# Patient Record
Sex: FEMALE | Race: WHITE | NOT HISPANIC OR LATINO | Employment: OTHER | URBAN - METROPOLITAN AREA
[De-identification: names, ages, dates, MRNs, and addresses within clinical notes are randomized per-mention and may not be internally consistent; named-entity substitution may affect disease eponyms.]

---

## 2017-01-09 ENCOUNTER — HOSPITAL ENCOUNTER (OUTPATIENT)
Dept: LAB | Facility: HOSPITAL | Age: 67
Setting detail: SPECIMEN
Discharge: HOME | End: 2017-01-09
Attending: NURSE PRACTITIONER | Admitting: NURSE PRACTITIONER

## 2017-01-09 LAB
ALBUMIN SERPL-MCNC: 3.7 G/DL (ref 3.5–4.8)
ALBUMIN SERPL-MCNC: 3.7 G/DL (ref 3.5–4.8)
ALBUMIN/GLOB SERPL: 1 {RATIO} (ref 1–1.7)
ALBUMIN/GLOB SERPL: 1.1 {RATIO} (ref 1–1.7)
ALP SERPL-CCNC: 37 IU/L (ref 32–91)
ALP SERPL-CCNC: 39 IU/L (ref 32–91)
ALT SERPL-CCNC: 18 IU/L (ref 14–54)
ALT SERPL-CCNC: 18 IU/L (ref 14–54)
ANION GAP SERPL CALC-SCNC: 11 MMOL/L (ref 10–20)
ANION GAP SERPL CALC-SCNC: 12 MMOL/L (ref 10–20)
AST SERPL-CCNC: 21 IU/L (ref 15–41)
AST SERPL-CCNC: 21 IU/L (ref 15–41)
BILIRUB SERPL-MCNC: 0.4 MG/DL (ref 0.3–1.2)
BILIRUB SERPL-MCNC: 0.4 MG/DL (ref 0.3–1.2)
BILIRUB UR QL STRIP: NEGATIVE MG/DL
BUN SERPL-MCNC: 10 MG/DL (ref 8–20)
BUN SERPL-MCNC: 9 MG/DL (ref 8–20)
BUN/CREAT SERPL: 11.1 (ref 5.4–26.2)
BUN/CREAT SERPL: 9 (ref 5.4–26.2)
CALCIUM SERPL-MCNC: 9.3 MG/DL (ref 8.9–10.3)
CALCIUM SERPL-MCNC: 9.3 MG/DL (ref 8.9–10.3)
CASTS URNS QL MICRO: ABNORMAL /[LPF]
CHLORIDE SERPL-SCNC: 99 MMOL/L (ref 101–111)
CHLORIDE SERPL-SCNC: 99 MMOL/L (ref 101–111)
CHOLEST SERPL-MCNC: 160 MG/DL
CHOLEST/HDLC SERPL: 3.4 {RATIO}
COLOR UR: YELLOW
CONV BACTERIA IN URINE MICRO: NEGATIVE
CONV CLARITY OF URINE: ABNORMAL
CONV CO2: 30 MMOL/L (ref 22–32)
CONV CO2: 30 MMOL/L (ref 22–32)
CONV HYALINE CASTS IN URINE MICRO: ABNORMAL /[LPF] (ref 0–5)
CONV LDL CHOLESTEROL DIRECT: 90 MG/DL (ref 0–100)
CONV MICROALBUM.,U,RANDOM: 6 MG/L
CONV PROTEIN IN URINE BY AUTOMATED TEST STRIP: NEGATIVE MG/DL
CONV SMALL ROUND CELLS: ABNORMAL /[HPF]
CONV TOTAL PROTEIN: 7 G/DL (ref 6.1–7.9)
CONV TOTAL PROTEIN: 7.4 G/DL (ref 6.1–7.9)
CONV UROBILINOGEN IN URINE BY AUTOMATED TEST STRIP: 0.2 MG/DL
CREAT 24H UR-MCNC: 73.1 MG/DL
CREAT UR-MCNC: 0.9 MG/DL (ref 0.4–1)
CREAT UR-MCNC: 1 MG/DL (ref 0.4–1)
CRP SERPL-MCNC: 0.2 MG/DL (ref 0–0.7)
DIFFERENTIAL METHOD BLD: (no result)
EOSINOPHIL # BLD AUTO: 0.2 10*3/UL (ref 0–0.3)
EOSINOPHIL # BLD AUTO: 4 % (ref 0–3)
ERYTHROCYTE [DISTWIDTH] IN BLOOD BY AUTOMATED COUNT: 15.8 % (ref 11.5–14.5)
ERYTHROCYTE [SEDIMENTATION RATE] IN BLOOD BY WESTERGREN METHOD: 20 MM/HR (ref 0–30)
GLOBULIN UR ELPH-MCNC: 3.3 G/DL (ref 2.5–3.8)
GLOBULIN UR ELPH-MCNC: 3.7 G/DL (ref 2.5–3.8)
GLUCOSE SERPL-MCNC: 170 MG/DL (ref 65–99)
GLUCOSE SERPL-MCNC: 173 MG/DL (ref 65–99)
GLUCOSE UR QL: NEGATIVE MG/DL
HCT VFR BLD AUTO: 36.7 % (ref 35–49)
HDLC SERPL-MCNC: 46 MG/DL
HGB BLD-MCNC: 12.2 G/DL (ref 12–15)
HGB UR QL STRIP: NEGATIVE
KETONES UR QL STRIP: NEGATIVE MG/DL
LDLC/HDLC SERPL: 1.9 {RATIO}
LEUKOCYTE ESTERASE UR QL STRIP: ABNORMAL
LIPID INTERPRETATION: ABNORMAL
LYMPHOCYTES # BLD AUTO: 2.1 10*3/UL (ref 0.8–4.8)
LYMPHOCYTES NFR BLD AUTO: 58 % (ref 18–42)
MCH RBC QN AUTO: 27.6 PG (ref 26–32)
MCHC RBC AUTO-ENTMCNC: 33.2 G/DL (ref 32–36)
MCV RBC AUTO: 83.1 FL (ref 80–94)
MICROALBUMIN/CREAT UR: 8.2 UG/MG
MONOCYTES # BLD AUTO: 0.5 10*3/UL (ref 0.1–1.3)
MONOCYTES NFR BLD AUTO: 13 % (ref 2–11)
NEUTROPHILS # BLD AUTO: 1 10*3/UL (ref 2.3–8.6)
NEUTROPHILS NFR BLD AUTO: 25 % (ref 50–75)
NITRITE UR QL STRIP: NEGATIVE
PATHOLOGIST REVIEW: (no result)
PATHOLOGIST REVIEW: (no result)
PH UR STRIP.AUTO: 7.5 [PH] (ref 4.5–8)
PLATELET # BLD AUTO: 284 10*3/UL (ref 150–450)
PMV BLD AUTO: 8 FL (ref 7.4–10.4)
POTASSIUM SERPL-SCNC: 4 MMOL/L (ref 3.6–5.1)
POTASSIUM SERPL-SCNC: 4 MMOL/L (ref 3.6–5.1)
RBC # BLD AUTO: 4.42 10*6/UL (ref 4–5.4)
RBC #/AREA URNS HPF: 1 /[HPF] (ref 0–3)
SODIUM SERPL-SCNC: 136 MMOL/L (ref 136–144)
SODIUM SERPL-SCNC: 137 MMOL/L (ref 136–144)
SP GR UR: 1.01 (ref 1–1.03)
SPERM URNS QL MICRO: ABNORMAL /[HPF]
SQUAMOUS SPT QL MICRO: 10 /[HPF] (ref 0–5)
TRIGL SERPL-MCNC: 165 MG/DL
UNIDENT CRYS URNS QL MICRO: ABNORMAL /[HPF]
VLDLC SERPL CALC-MCNC: 24.1 MG/DL
WBC # BLD AUTO: 3.8 10*3/UL (ref 4.5–11.5)
WBC #/AREA URNS HPF: 6 /[HPF] (ref 0–5)
YEAST SPEC QL WET PREP: ABNORMAL /[HPF]

## 2017-01-31 ENCOUNTER — HOSPITAL ENCOUNTER (OUTPATIENT)
Dept: LAB | Facility: HOSPITAL | Age: 67
Discharge: HOME | End: 2017-01-31
Attending: INTERNAL MEDICINE | Admitting: INTERNAL MEDICINE

## 2017-01-31 LAB
BASOPHILS # BLD AUTO: 0 10*3/UL (ref 0–0.2)
BASOPHILS NFR BLD AUTO: 1 % (ref 0–2)
DIFFERENTIAL METHOD BLD: (no result)
EOSINOPHIL # BLD AUTO: 0.1 10*3/UL (ref 0–0.3)
EOSINOPHIL # BLD AUTO: 4 % (ref 0–3)
ERYTHROCYTE [DISTWIDTH] IN BLOOD BY AUTOMATED COUNT: 15.7 % (ref 11.5–14.5)
HCT VFR BLD AUTO: 35.5 % (ref 35–49)
HGB BLD-MCNC: 11.8 G/DL (ref 12–15)
LYMPHOCYTES # BLD AUTO: 1.8 10*3/UL (ref 0.8–4.8)
LYMPHOCYTES NFR BLD AUTO: 45 % (ref 18–42)
MCH RBC QN AUTO: 27.8 PG (ref 26–32)
MCHC RBC AUTO-ENTMCNC: 33.2 G/DL (ref 32–36)
MCV RBC AUTO: 83.9 FL (ref 80–94)
MONOCYTES # BLD AUTO: 0.4 10*3/UL (ref 0.1–1.3)
MONOCYTES NFR BLD AUTO: 10 % (ref 2–11)
NEUTROPHILS # BLD AUTO: 1.6 10*3/UL (ref 2.3–8.6)
NEUTROPHILS NFR BLD AUTO: 40 % (ref 50–75)
NRBC BLD AUTO-RTO: 0 /100{WBCS}
NRBC/RBC NFR BLD MANUAL: 0 10*3/UL
PLATELET # BLD AUTO: 291 10*3/UL (ref 150–450)
PMV BLD AUTO: 8.6 FL (ref 7.4–10.4)
RBC # BLD AUTO: 4.23 10*6/UL (ref 4–5.4)
WBC # BLD AUTO: 3.9 10*3/UL (ref 4.5–11.5)

## 2017-02-08 ENCOUNTER — HOSPITAL ENCOUNTER (OUTPATIENT)
Dept: FAMILY MEDICINE CLINIC | Facility: CLINIC | Age: 67
Setting detail: SPECIMEN
Discharge: HOME | End: 2017-02-08
Attending: FAMILY MEDICINE | Admitting: FAMILY MEDICINE

## 2017-02-08 LAB
BILIRUB UR QL STRIP: NEGATIVE MG/DL
CASTS URNS QL MICRO: ABNORMAL /[LPF]
COLOR UR: YELLOW
CONV BACTERIA IN URINE MICRO: NEGATIVE
CONV CLARITY OF URINE: CLEAR
CONV HYALINE CASTS IN URINE MICRO: 0 /[LPF] (ref 0–5)
CONV PROTEIN IN URINE BY AUTOMATED TEST STRIP: NEGATIVE MG/DL
CONV SMALL ROUND CELLS: ABNORMAL /[HPF]
CONV UROBILINOGEN IN URINE BY AUTOMATED TEST STRIP: 0.2 MG/DL
CULTURE INDICATED?: ABNORMAL
GLUCOSE UR QL: 500 MG/DL
HGB UR QL STRIP: ABNORMAL
KETONES UR QL STRIP: NEGATIVE MG/DL
LEUKOCYTE ESTERASE UR QL STRIP: NEGATIVE
NITRITE UR QL STRIP: NEGATIVE
PH UR STRIP.AUTO: 7.5 [PH] (ref 4.5–8)
RBC #/AREA URNS HPF: 3 /[HPF] (ref 0–3)
SP GR UR: 1.01 (ref 1–1.03)
SPERM URNS QL MICRO: ABNORMAL /[HPF]
SQUAMOUS SPT QL MICRO: 1 /[HPF] (ref 0–5)
UNIDENT CRYS URNS QL MICRO: ABNORMAL /[HPF]
WBC #/AREA URNS HPF: 5 /[HPF] (ref 0–5)
YEAST SPEC QL WET PREP: ABNORMAL /[HPF]

## 2017-02-21 ENCOUNTER — HOSPITAL ENCOUNTER (OUTPATIENT)
Dept: FAMILY MEDICINE CLINIC | Facility: CLINIC | Age: 67
Setting detail: SPECIMEN
Discharge: HOME | End: 2017-02-21
Attending: FAMILY MEDICINE | Admitting: FAMILY MEDICINE

## 2017-02-21 LAB
BILIRUB UR QL STRIP: NEGATIVE MG/DL
CASTS URNS QL MICRO: ABNORMAL /[LPF]
COLOR UR: YELLOW
CONV BACTERIA IN URINE MICRO: NEGATIVE
CONV CLARITY OF URINE: CLEAR
CONV HYALINE CASTS IN URINE MICRO: 2 /[LPF] (ref 0–5)
CONV PROTEIN IN URINE BY AUTOMATED TEST STRIP: NEGATIVE MG/DL
CONV SMALL ROUND CELLS: ABNORMAL /[HPF]
CONV UROBILINOGEN IN URINE BY AUTOMATED TEST STRIP: 0.2 MG/DL
CULTURE INDICATED?: ABNORMAL
GLUCOSE UR QL: 500 MG/DL
HGB UR QL STRIP: NEGATIVE
KETONES UR QL STRIP: NEGATIVE MG/DL
LEUKOCYTE ESTERASE UR QL STRIP: NEGATIVE
NITRITE UR QL STRIP: NEGATIVE
PH UR STRIP.AUTO: 7 [PH] (ref 4.5–8)
RBC #/AREA URNS HPF: 3 /[HPF] (ref 0–3)
SP GR UR: 1.01 (ref 1–1.03)
SPERM URNS QL MICRO: ABNORMAL /[HPF]
SQUAMOUS SPT QL MICRO: 2 /[HPF] (ref 0–5)
UNIDENT CRYS URNS QL MICRO: ABNORMAL /[HPF]
WBC #/AREA URNS HPF: 1 /[HPF] (ref 0–5)
YEAST SPEC QL WET PREP: ABNORMAL /[HPF]

## 2017-03-02 ENCOUNTER — HOSPITAL ENCOUNTER (OUTPATIENT)
Dept: ONCOLOGY | Facility: CLINIC | Age: 67
Discharge: HOME | End: 2017-03-02
Attending: INTERNAL MEDICINE | Admitting: INTERNAL MEDICINE

## 2017-03-02 LAB
FERRITIN SERPL-MCNC: 11 NG/ML (ref 11–307)
FOLATE SERPL-MCNC: >24.8 NG/ML (ref 5.9–24.8)
IRON SATN MFR SERPL: 12 % (ref 15–50)
IRON SERPL-MCNC: 70 UG/DL (ref 28–170)
LDH SERPL-CCNC: 148 IU/L (ref 98–192)
MAGNESIUM UR-MCNC: 0.93 % (ref 0.5–1.5)
RETICS/RBC NFR MANUAL: 0.05 10*6/UL
TIBC SERPL-MCNC: 572 UG/DL (ref 228–428)

## 2017-03-03 LAB
ANA SER QL IA: NORMAL
HAPTOGLOB SERPL-MCNC: 85 MG/DL (ref 36–195)

## 2017-03-06 LAB
ALBUMIN SERPL-MCNC: 4.2 G/DL (ref 3.5–4.8)
ALPHA1 GLOB FLD ELPH-MCNC: 0.1 GM/DL (ref 0.1–0.4)
ALPHA2 GLOB SERPL ELPH-MCNC: 0.6 GM/DL (ref 0.5–1)
B-GLOBULIN SERPL ELPH-MCNC: 1.1 GM/DL (ref 0.7–1.4)
CONV TOTAL PROTEIN: 7.1 G/DL (ref 6.1–7.9)
GAMMA GLOB SERPL ELPH-MCNC: 1.2 GM/DL (ref 0.6–1.6)
INSULIN SERPL-ACNC: NORMAL U[IU]/ML

## 2017-03-27 ENCOUNTER — HOSPITAL ENCOUNTER (OUTPATIENT)
Dept: ONCOLOGY | Facility: CLINIC | Age: 67
Discharge: HOME | End: 2017-03-27
Attending: INTERNAL MEDICINE | Admitting: INTERNAL MEDICINE

## 2017-03-28 ENCOUNTER — HOSPITAL ENCOUNTER (OUTPATIENT)
Dept: ONCOLOGY | Facility: CLINIC | Age: 67
Discharge: HOME | End: 2017-03-28
Attending: INTERNAL MEDICINE | Admitting: INTERNAL MEDICINE

## 2017-03-29 ENCOUNTER — HOSPITAL ENCOUNTER (OUTPATIENT)
Dept: ONCOLOGY | Facility: CLINIC | Age: 67
Discharge: HOME | End: 2017-03-29
Attending: INTERNAL MEDICINE | Admitting: INTERNAL MEDICINE

## 2017-04-26 ENCOUNTER — OFFICE (AMBULATORY)
Dept: URBAN - METROPOLITAN AREA CLINIC 64 | Facility: CLINIC | Age: 67
End: 2017-04-26

## 2017-04-26 VITALS
DIASTOLIC BLOOD PRESSURE: 77 MMHG | SYSTOLIC BLOOD PRESSURE: 151 MMHG | WEIGHT: 178 LBS | HEIGHT: 64 IN | HEART RATE: 62 BPM

## 2017-04-26 DIAGNOSIS — D50.9 IRON DEFICIENCY ANEMIA, UNSPECIFIED: ICD-10-CM

## 2017-04-26 DIAGNOSIS — R10.13 EPIGASTRIC PAIN: ICD-10-CM

## 2017-04-26 DIAGNOSIS — K21.9 GASTRO-ESOPHAGEAL REFLUX DISEASE WITHOUT ESOPHAGITIS: ICD-10-CM

## 2017-04-26 DIAGNOSIS — K59.00 CONSTIPATION, UNSPECIFIED: ICD-10-CM

## 2017-04-26 PROCEDURE — 99204 OFFICE O/P NEW MOD 45 MIN: CPT | Performed by: INTERNAL MEDICINE

## 2017-04-26 RX ORDER — ESOMEPRAZOLE MAGNESIUM 40 MG/1
CAPSULE, DELAYED RELEASE ORAL
Qty: 30 | Refills: 11 | Status: COMPLETED
End: 2017-04-26

## 2017-04-26 RX ORDER — PANTOPRAZOLE SODIUM 40 MG/1
40 TABLET, DELAYED RELEASE ORAL
Qty: 30 | Refills: 11 | Status: COMPLETED
Start: 2017-04-26 | End: 2017-11-15

## 2017-04-28 ENCOUNTER — HOSPITAL ENCOUNTER (OUTPATIENT)
Dept: ONCOLOGY | Facility: CLINIC | Age: 67
Discharge: HOME | End: 2017-04-28
Attending: INTERNAL MEDICINE | Admitting: INTERNAL MEDICINE

## 2017-05-01 ENCOUNTER — HOSPITAL ENCOUNTER (OUTPATIENT)
Dept: LAB | Facility: HOSPITAL | Age: 67
Setting detail: SPECIMEN
Discharge: HOME | End: 2017-05-01
Attending: INTERNAL MEDICINE | Admitting: INTERNAL MEDICINE

## 2017-05-01 LAB
ALBUMIN SERPL-MCNC: 3.8 G/DL (ref 3.5–4.8)
ALBUMIN/GLOB SERPL: 1.1 {RATIO} (ref 1–1.7)
ALP SERPL-CCNC: 38 IU/L (ref 32–91)
ALT SERPL-CCNC: 14 IU/L (ref 14–54)
ANION GAP SERPL CALC-SCNC: 14.2 MMOL/L (ref 10–20)
AST SERPL-CCNC: 16 IU/L (ref 15–41)
BILIRUB SERPL-MCNC: 0.3 MG/DL (ref 0.3–1.2)
BUN SERPL-MCNC: 11 MG/DL (ref 8–20)
BUN/CREAT SERPL: 11 (ref 5.4–26.2)
CALCIUM SERPL-MCNC: 9.5 MG/DL (ref 8.9–10.3)
CHLORIDE SERPL-SCNC: 104 MMOL/L (ref 101–111)
CHOLEST SERPL-MCNC: 151 MG/DL
CHOLEST/HDLC SERPL: 3 {RATIO}
CONV CO2: 27 MMOL/L (ref 22–32)
CONV LDL CHOLESTEROL DIRECT: 79 MG/DL (ref 0–100)
CONV TOTAL PROTEIN: 7.3 G/DL (ref 6.1–7.9)
CREAT UR-MCNC: 1 MG/DL (ref 0.4–1)
GLOBULIN UR ELPH-MCNC: 3.5 G/DL (ref 2.5–3.8)
GLUCOSE SERPL-MCNC: 146 MG/DL (ref 65–99)
HDLC SERPL-MCNC: 50 MG/DL
LDLC/HDLC SERPL: 1.6 {RATIO}
LIPID INTERPRETATION: ABNORMAL
POTASSIUM SERPL-SCNC: 4.2 MMOL/L (ref 3.6–5.1)
SODIUM SERPL-SCNC: 141 MMOL/L (ref 136–144)
TRIGL SERPL-MCNC: 137 MG/DL
VLDLC SERPL CALC-MCNC: 22 MG/DL

## 2017-06-28 ENCOUNTER — HOSPITAL ENCOUNTER (OUTPATIENT)
Dept: ONCOLOGY | Facility: CLINIC | Age: 67
Discharge: HOME | End: 2017-06-28
Attending: INTERNAL MEDICINE | Admitting: INTERNAL MEDICINE

## 2017-07-11 ENCOUNTER — ON CAMPUS - OUTPATIENT (AMBULATORY)
Dept: URBAN - METROPOLITAN AREA HOSPITAL 77 | Facility: HOSPITAL | Age: 67
End: 2017-07-11

## 2017-07-11 DIAGNOSIS — Z12.11 ENCOUNTER FOR SCREENING FOR MALIGNANT NEOPLASM OF COLON: ICD-10-CM

## 2017-07-11 DIAGNOSIS — K62.1 RECTAL POLYP: ICD-10-CM

## 2017-07-11 DIAGNOSIS — D50.9 IRON DEFICIENCY ANEMIA, UNSPECIFIED: ICD-10-CM

## 2017-07-11 DIAGNOSIS — D12.3 BENIGN NEOPLASM OF TRANSVERSE COLON: ICD-10-CM

## 2017-07-11 DIAGNOSIS — K57.30 DIVERTICULOSIS OF LARGE INTESTINE WITHOUT PERFORATION OR ABS: ICD-10-CM

## 2017-07-11 PROCEDURE — 43239 EGD BIOPSY SINGLE/MULTIPLE: CPT | Performed by: INTERNAL MEDICINE

## 2017-07-11 PROCEDURE — 45385 COLONOSCOPY W/LESION REMOVAL: CPT | Mod: 33 | Performed by: INTERNAL MEDICINE

## 2017-07-26 ENCOUNTER — HOSPITAL ENCOUNTER (OUTPATIENT)
Dept: ONCOLOGY | Facility: CLINIC | Age: 67
Discharge: HOME | End: 2017-07-26
Attending: INTERNAL MEDICINE | Admitting: INTERNAL MEDICINE

## 2017-07-27 ENCOUNTER — HOSPITAL ENCOUNTER (OUTPATIENT)
Dept: LAB | Facility: HOSPITAL | Age: 67
Setting detail: SPECIMEN
Discharge: HOME | End: 2017-07-27
Attending: NURSE PRACTITIONER | Admitting: NURSE PRACTITIONER

## 2017-07-27 LAB
ALBUMIN SERPL-MCNC: 3.8 G/DL (ref 3.5–4.8)
ALBUMIN/GLOB SERPL: 1.2 {RATIO} (ref 1–1.7)
ALP SERPL-CCNC: 37 IU/L (ref 32–91)
ALT SERPL-CCNC: 18 IU/L (ref 14–54)
ANION GAP SERPL CALC-SCNC: 13.3 MMOL/L (ref 10–20)
AST SERPL-CCNC: 18 IU/L (ref 15–41)
BILIRUB SERPL-MCNC: 0.5 MG/DL (ref 0.3–1.2)
BUN SERPL-MCNC: 15 MG/DL (ref 8–20)
BUN/CREAT SERPL: 15 (ref 5.4–26.2)
CALCIUM SERPL-MCNC: 9.4 MG/DL (ref 8.9–10.3)
CHLORIDE SERPL-SCNC: 98 MMOL/L (ref 101–111)
CHOLEST SERPL-MCNC: 160 MG/DL
CHOLEST/HDLC SERPL: 4 {RATIO}
CONV CO2: 28 MMOL/L (ref 22–32)
CONV LDL CHOLESTEROL DIRECT: 95 MG/DL (ref 0–100)
CONV MICROALBUM.,U,RANDOM: <2 MG/L
CONV TOTAL PROTEIN: 7 G/DL (ref 6.1–7.9)
CREAT 24H UR-MCNC: 47.3 MG/DL
CREAT UR-MCNC: 1 MG/DL (ref 0.4–1)
GLOBULIN UR ELPH-MCNC: 3.2 G/DL (ref 2.5–3.8)
GLUCOSE SERPL-MCNC: 123 MG/DL (ref 65–99)
HDLC SERPL-MCNC: 40 MG/DL
LDLC/HDLC SERPL: 2.4 {RATIO}
LIPID INTERPRETATION: ABNORMAL
MICROALBUMIN/CREAT UR: NORMAL UG/MG
POTASSIUM SERPL-SCNC: 4.3 MMOL/L (ref 3.6–5.1)
SODIUM SERPL-SCNC: 135 MMOL/L (ref 136–144)
TRIGL SERPL-MCNC: 176 MG/DL
VLDLC SERPL CALC-MCNC: 25 MG/DL

## 2017-08-23 ENCOUNTER — OFFICE (AMBULATORY)
Dept: URBAN - METROPOLITAN AREA CLINIC 64 | Facility: CLINIC | Age: 67
End: 2017-08-23

## 2017-08-23 ENCOUNTER — HOSPITAL ENCOUNTER (OUTPATIENT)
Dept: ONCOLOGY | Facility: HOSPITAL | Age: 67
Discharge: HOME | End: 2017-08-23
Attending: INTERNAL MEDICINE | Admitting: INTERNAL MEDICINE

## 2017-08-23 VITALS
HEIGHT: 64 IN | DIASTOLIC BLOOD PRESSURE: 79 MMHG | HEART RATE: 64 BPM | WEIGHT: 177 LBS | SYSTOLIC BLOOD PRESSURE: 143 MMHG

## 2017-08-23 DIAGNOSIS — K21.9 GASTRO-ESOPHAGEAL REFLUX DISEASE WITHOUT ESOPHAGITIS: ICD-10-CM

## 2017-08-23 DIAGNOSIS — D50.9 IRON DEFICIENCY ANEMIA, UNSPECIFIED: ICD-10-CM

## 2017-08-23 PROCEDURE — 99213 OFFICE O/P EST LOW 20 MIN: CPT | Performed by: INTERNAL MEDICINE

## 2017-08-23 RX ORDER — LANSOPRAZOLE 30 MG/1
CAPSULE, DELAYED RELEASE PELLETS ORAL
Qty: 90 | Refills: 1 | Status: ACTIVE

## 2017-09-27 ENCOUNTER — CLINICAL SUPPORT (OUTPATIENT)
Dept: ONCOLOGY | Facility: HOSPITAL | Age: 67
End: 2017-09-27

## 2017-09-27 ENCOUNTER — HOSPITAL ENCOUNTER (OUTPATIENT)
Dept: ONCOLOGY | Facility: CLINIC | Age: 67
Setting detail: INFUSION SERIES
Discharge: HOME | End: 2017-09-27
Attending: INTERNAL MEDICINE | Admitting: INTERNAL MEDICINE

## 2017-09-27 ENCOUNTER — HOSPITAL ENCOUNTER (OUTPATIENT)
Dept: ONCOLOGY | Facility: HOSPITAL | Age: 67
Discharge: HOME | End: 2017-09-27
Attending: INTERNAL MEDICINE | Admitting: INTERNAL MEDICINE

## 2017-09-27 NOTE — PROGRESS NOTES
PATIENTS ONCOLOGY RECORD LOCATED IN [facility]      Subjective     Name:  NANCI FISH     Date:  2017  Address:  [address] [place] [address] [place] [facility]  Home: [phone]  :  1950 AGE:  67 y.o.        RECORDS OBTAINED:  Patients Oncology Record is located in [facility]

## 2017-10-25 ENCOUNTER — HOSPITAL ENCOUNTER (OUTPATIENT)
Dept: ONCOLOGY | Facility: HOSPITAL | Age: 67
Discharge: HOME | End: 2017-10-25
Attending: INTERNAL MEDICINE | Admitting: INTERNAL MEDICINE

## 2017-10-25 ENCOUNTER — HOSPITAL ENCOUNTER (OUTPATIENT)
Dept: LAB | Facility: HOSPITAL | Age: 67
Setting detail: SPECIMEN
Discharge: HOME | End: 2017-10-25
Attending: INTERNAL MEDICINE | Admitting: INTERNAL MEDICINE

## 2017-10-25 LAB
ALBUMIN SERPL-MCNC: 3.9 G/DL (ref 3.5–4.8)
ALBUMIN SERPL-MCNC: 4 G/DL (ref 3.5–4.8)
ALBUMIN/GLOB SERPL: 1.1 {RATIO} (ref 1–1.7)
ALBUMIN/GLOB SERPL: 1.2 {RATIO} (ref 1–1.7)
ALP SERPL-CCNC: 38 IU/L (ref 32–91)
ALP SERPL-CCNC: 40 IU/L (ref 32–91)
ALT SERPL-CCNC: 17 IU/L (ref 14–54)
ALT SERPL-CCNC: 17 IU/L (ref 14–54)
ANION GAP SERPL CALC-SCNC: 11.2 MMOL/L (ref 10–20)
ANION GAP SERPL CALC-SCNC: 12.2 MMOL/L (ref 10–20)
AST SERPL-CCNC: 20 IU/L (ref 15–41)
AST SERPL-CCNC: 23 IU/L (ref 15–41)
BASOPHILS # BLD AUTO: 0 10*3/UL (ref 0–0.2)
BASOPHILS NFR BLD AUTO: 1 % (ref 0–2)
BILIRUB SERPL-MCNC: 0.3 MG/DL (ref 0.3–1.2)
BILIRUB SERPL-MCNC: 0.4 MG/DL (ref 0.3–1.2)
BILIRUB UR QL STRIP: NEGATIVE MG/DL
BUN SERPL-MCNC: 12 MG/DL (ref 8–20)
BUN SERPL-MCNC: 12 MG/DL (ref 8–20)
BUN/CREAT SERPL: 13.3 (ref 5.4–26.2)
BUN/CREAT SERPL: 13.3 (ref 5.4–26.2)
CALCIUM SERPL-MCNC: 9.7 MG/DL (ref 8.9–10.3)
CALCIUM SERPL-MCNC: 9.8 MG/DL (ref 8.9–10.3)
CASTS URNS QL MICRO: ABNORMAL /[LPF]
CHLORIDE SERPL-SCNC: 97 MMOL/L (ref 101–111)
CHLORIDE SERPL-SCNC: 98 MMOL/L (ref 101–111)
CHOLEST SERPL-MCNC: 149 MG/DL
CHOLEST/HDLC SERPL: 3 {RATIO}
COLOR UR: YELLOW
CONV BACTERIA IN URINE MICRO: NEGATIVE
CONV CLARITY OF URINE: CLEAR
CONV CO2: 29 MMOL/L (ref 22–32)
CONV CO2: 29 MMOL/L (ref 22–32)
CONV HYALINE CASTS IN URINE MICRO: 1 /[LPF] (ref 0–5)
CONV LDL CHOLESTEROL DIRECT: 86 MG/DL (ref 0–100)
CONV MICROALBUM.,U,RANDOM: 2 MG/L
CONV PROTEIN IN URINE BY AUTOMATED TEST STRIP: NEGATIVE MG/DL
CONV SMALL ROUND CELLS: ABNORMAL /[HPF]
CONV TOTAL PROTEIN: 7.1 G/DL (ref 6.1–7.9)
CONV TOTAL PROTEIN: 7.5 G/DL (ref 6.1–7.9)
CONV UROBILINOGEN IN URINE BY AUTOMATED TEST STRIP: 0.2 MG/DL
CREAT 24H UR-MCNC: 41.4 MG/DL
CREAT UR-MCNC: 0.9 MG/DL (ref 0.4–1)
CREAT UR-MCNC: 0.9 MG/DL (ref 0.4–1)
CRP SERPL-MCNC: 0.38 MG/DL (ref 0–0.7)
CULTURE INDICATED?: ABNORMAL
DIFFERENTIAL METHOD BLD: (no result)
EOSINOPHIL # BLD AUTO: 0.2 10*3/UL (ref 0–0.3)
EOSINOPHIL # BLD AUTO: 4 % (ref 0–3)
ERYTHROCYTE [DISTWIDTH] IN BLOOD BY AUTOMATED COUNT: 14 % (ref 11.5–14.5)
ERYTHROCYTE [SEDIMENTATION RATE] IN BLOOD BY WESTERGREN METHOD: 24 MM/HR (ref 0–30)
GLOBULIN UR ELPH-MCNC: 3.2 G/DL (ref 2.5–3.8)
GLOBULIN UR ELPH-MCNC: 3.5 G/DL (ref 2.5–3.8)
GLUCOSE SERPL-MCNC: 113 MG/DL (ref 65–99)
GLUCOSE SERPL-MCNC: 113 MG/DL (ref 65–99)
GLUCOSE UR QL: NEGATIVE MG/DL
HCT VFR BLD AUTO: 40.1 % (ref 35–49)
HDLC SERPL-MCNC: 49 MG/DL
HGB BLD-MCNC: 13.3 G/DL (ref 12–15)
HGB UR QL STRIP: NEGATIVE
KETONES UR QL STRIP: NEGATIVE MG/DL
LDLC/HDLC SERPL: 1.8 {RATIO}
LEUKOCYTE ESTERASE UR QL STRIP: ABNORMAL
LIPID INTERPRETATION: NORMAL
LYMPHOCYTES # BLD AUTO: 2.2 10*3/UL (ref 0.8–4.8)
LYMPHOCYTES NFR BLD AUTO: 48 % (ref 18–42)
MCH RBC QN AUTO: 29.9 PG (ref 26–32)
MCHC RBC AUTO-ENTMCNC: 33.3 G/DL (ref 32–36)
MCV RBC AUTO: 89.6 FL (ref 80–94)
MICROALBUMIN/CREAT UR: 4.8 UG/MG
MONOCYTES # BLD AUTO: 0.4 10*3/UL (ref 0.1–1.3)
MONOCYTES NFR BLD AUTO: 8 % (ref 2–11)
NEUTROPHILS # BLD AUTO: 1.8 10*3/UL (ref 2.3–8.6)
NEUTROPHILS NFR BLD AUTO: 39 % (ref 50–75)
NITRITE UR QL STRIP: NEGATIVE
NRBC BLD AUTO-RTO: 0 /100{WBCS}
NRBC/RBC NFR BLD MANUAL: 0 10*3/UL
PH UR STRIP.AUTO: 7 [PH] (ref 4.5–8)
PLATELET # BLD AUTO: 321 10*3/UL (ref 150–450)
PMV BLD AUTO: 7.8 FL (ref 7.4–10.4)
POTASSIUM SERPL-SCNC: 4.2 MMOL/L (ref 3.6–5.1)
POTASSIUM SERPL-SCNC: 4.2 MMOL/L (ref 3.6–5.1)
RBC # BLD AUTO: 4.47 10*6/UL (ref 4–5.4)
RBC #/AREA URNS HPF: 1 /[HPF] (ref 0–3)
SODIUM SERPL-SCNC: 134 MMOL/L (ref 136–144)
SODIUM SERPL-SCNC: 134 MMOL/L (ref 136–144)
SP GR UR: 1.01 (ref 1–1.03)
SPERM URNS QL MICRO: ABNORMAL /[HPF]
SQUAMOUS SPT QL MICRO: 2 /[HPF] (ref 0–5)
TRIGL SERPL-MCNC: 134 MG/DL
UNIDENT CRYS URNS QL MICRO: ABNORMAL /[HPF]
VLDLC SERPL CALC-MCNC: 13.7 MG/DL
WBC # BLD AUTO: 4.6 10*3/UL (ref 4.5–11.5)
WBC #/AREA URNS HPF: 3 /[HPF] (ref 0–5)
YEAST SPEC QL WET PREP: ABNORMAL /[HPF]

## 2017-11-15 ENCOUNTER — OFFICE (AMBULATORY)
Dept: URBAN - METROPOLITAN AREA CLINIC 64 | Facility: CLINIC | Age: 67
End: 2017-11-15

## 2017-11-15 VITALS
HEART RATE: 62 BPM | HEIGHT: 64 IN | SYSTOLIC BLOOD PRESSURE: 127 MMHG | WEIGHT: 180 LBS | DIASTOLIC BLOOD PRESSURE: 69 MMHG

## 2017-11-15 DIAGNOSIS — K21.9 GASTRO-ESOPHAGEAL REFLUX DISEASE WITHOUT ESOPHAGITIS: ICD-10-CM

## 2017-11-15 DIAGNOSIS — K59.00 CONSTIPATION, UNSPECIFIED: ICD-10-CM

## 2017-11-15 DIAGNOSIS — R10.13 EPIGASTRIC PAIN: ICD-10-CM

## 2017-11-15 LAB
AMYLASE, SERUM: 36 U/L (ref 31–124)
LIPASE, SERUM: 32 U/L (ref 14–72)

## 2017-11-15 PROCEDURE — 99214 OFFICE O/P EST MOD 30 MIN: CPT | Performed by: INTERNAL MEDICINE

## 2017-11-22 ENCOUNTER — HOSPITAL ENCOUNTER (OUTPATIENT)
Dept: RHEUMATOLOGY | Facility: CLINIC | Age: 67
Discharge: HOME | End: 2017-11-22
Attending: INTERNAL MEDICINE | Admitting: INTERNAL MEDICINE

## 2018-01-10 ENCOUNTER — OFFICE (AMBULATORY)
Dept: URBAN - METROPOLITAN AREA CLINIC 64 | Facility: CLINIC | Age: 68
End: 2018-01-10

## 2018-01-10 VITALS
SYSTOLIC BLOOD PRESSURE: 128 MMHG | HEIGHT: 64 IN | WEIGHT: 177 LBS | DIASTOLIC BLOOD PRESSURE: 72 MMHG | HEART RATE: 61 BPM

## 2018-01-10 DIAGNOSIS — K21.9 GASTRO-ESOPHAGEAL REFLUX DISEASE WITHOUT ESOPHAGITIS: ICD-10-CM

## 2018-01-10 DIAGNOSIS — R10.13 EPIGASTRIC PAIN: ICD-10-CM

## 2018-01-10 PROCEDURE — 99213 OFFICE O/P EST LOW 20 MIN: CPT | Performed by: INTERNAL MEDICINE

## 2018-02-01 ENCOUNTER — HOSPITAL ENCOUNTER (OUTPATIENT)
Dept: LAB | Facility: HOSPITAL | Age: 68
Setting detail: SPECIMEN
Discharge: HOME | End: 2018-02-01
Attending: NURSE PRACTITIONER | Admitting: NURSE PRACTITIONER

## 2018-02-01 LAB
ALBUMIN SERPL-MCNC: 3.9 G/DL (ref 3.5–4.8)
ALBUMIN/GLOB SERPL: 1.1 {RATIO} (ref 1–1.7)
ALP SERPL-CCNC: 38 IU/L (ref 32–91)
ALT SERPL-CCNC: 16 IU/L (ref 14–54)
ANION GAP SERPL CALC-SCNC: 10.3 MMOL/L (ref 10–20)
AST SERPL-CCNC: 20 IU/L (ref 15–41)
BILIRUB SERPL-MCNC: 0.3 MG/DL (ref 0.3–1.2)
BUN SERPL-MCNC: 11 MG/DL (ref 8–20)
BUN/CREAT SERPL: 15.7 (ref 5.4–26.2)
CALCIUM SERPL-MCNC: 9.6 MG/DL (ref 8.9–10.3)
CHLORIDE SERPL-SCNC: 100 MMOL/L (ref 101–111)
CHOLEST SERPL-MCNC: 163 MG/DL
CHOLEST/HDLC SERPL: 3.8 {RATIO}
CONV CO2: 30 MMOL/L (ref 22–32)
CONV LDL CHOLESTEROL DIRECT: 97 MG/DL (ref 0–100)
CONV MICROALBUM.,U,RANDOM: 7 MG/L
CONV TOTAL PROTEIN: 7.4 G/DL (ref 6.1–7.9)
CREAT 24H UR-MCNC: 90.1 MG/DL
CREAT UR-MCNC: 0.7 MG/DL (ref 0.4–1)
GLOBULIN UR ELPH-MCNC: 3.5 G/DL (ref 2.5–3.8)
GLUCOSE SERPL-MCNC: 169 MG/DL (ref 65–99)
HDLC SERPL-MCNC: 43 MG/DL
LDLC/HDLC SERPL: 2.3 {RATIO}
LIPID INTERPRETATION: ABNORMAL
MICROALBUMIN/CREAT UR: 7.8 UG/MG
POTASSIUM SERPL-SCNC: 4.3 MMOL/L (ref 3.6–5.1)
SODIUM SERPL-SCNC: 136 MMOL/L (ref 136–144)
TRIGL SERPL-MCNC: 180 MG/DL
VLDLC SERPL CALC-MCNC: 23.4 MG/DL

## 2018-02-22 ENCOUNTER — HOSPITAL ENCOUNTER (OUTPATIENT)
Dept: LAB | Facility: HOSPITAL | Age: 68
Discharge: HOME | End: 2018-02-22
Attending: INTERNAL MEDICINE | Admitting: INTERNAL MEDICINE

## 2018-02-22 LAB
ALBUMIN SERPL-MCNC: 3.4 G/DL (ref 3.5–4.8)
ALBUMIN/GLOB SERPL: 1.1 {RATIO} (ref 1–1.7)
ALP SERPL-CCNC: 36 IU/L (ref 32–91)
ALT SERPL-CCNC: ABNORMAL IU/L (ref 14–54)
ANION GAP SERPL CALC-SCNC: 10 MMOL/L (ref 10–20)
AST SERPL-CCNC: ABNORMAL IU/L (ref 15–41)
BASOPHILS # BLD AUTO: 0 10*3/UL (ref 0–0.2)
BASOPHILS NFR BLD AUTO: 0 % (ref 0–2)
BILIRUB SERPL-MCNC: ABNORMAL MG/DL (ref 0.3–1.2)
BILIRUB UR QL STRIP: NEGATIVE MG/DL
BUN SERPL-MCNC: 12 MG/DL (ref 8–20)
BUN/CREAT SERPL: 13.3 (ref 5.4–26.2)
CALCIUM SERPL-MCNC: 9 MG/DL (ref 8.9–10.3)
CASTS URNS QL MICRO: ABNORMAL /[LPF]
CHLORIDE SERPL-SCNC: 99 MMOL/L (ref 101–111)
COLOR UR: YELLOW
CONV BACTERIA IN URINE MICRO: NEGATIVE
CONV CLARITY OF URINE: ABNORMAL
CONV CO2: 29 MMOL/L (ref 22–32)
CONV HYALINE CASTS IN URINE MICRO: ABNORMAL /[LPF] (ref 0–5)
CONV PROTEIN IN URINE BY AUTOMATED TEST STRIP: NEGATIVE MG/DL
CONV SMALL ROUND CELLS: ABNORMAL /[HPF]
CONV TOTAL PROTEIN: 6.5 G/DL (ref 6.1–7.9)
CONV UROBILINOGEN IN URINE BY AUTOMATED TEST STRIP: 0.2 MG/DL
CREAT UR-MCNC: 0.9 MG/DL (ref 0.4–1)
CRP SERPL-MCNC: 0.53 MG/DL (ref 0–0.7)
CULTURE INDICATED?: ABNORMAL
DIFFERENTIAL METHOD BLD: (no result)
EOSINOPHIL # BLD AUTO: 0.2 10*3/UL (ref 0–0.3)
EOSINOPHIL # BLD AUTO: 4 % (ref 0–3)
ERYTHROCYTE [DISTWIDTH] IN BLOOD BY AUTOMATED COUNT: 13.6 % (ref 11.5–14.5)
ERYTHROCYTE [SEDIMENTATION RATE] IN BLOOD BY WESTERGREN METHOD: 17 MM/HR (ref 0–30)
GLOBULIN UR ELPH-MCNC: 3.1 G/DL (ref 2.5–3.8)
GLUCOSE SERPL-MCNC: 316 MG/DL (ref 65–99)
GLUCOSE UR QL: >1000 MG/DL
HCT VFR BLD AUTO: 39.8 % (ref 35–49)
HGB BLD-MCNC: 13.4 G/DL (ref 12–15)
HGB UR QL STRIP: NEGATIVE
KETONES UR QL STRIP: NEGATIVE MG/DL
LEUKOCYTE ESTERASE UR QL STRIP: ABNORMAL
LYMPHOCYTES # BLD AUTO: 1.7 10*3/UL (ref 0.8–4.8)
LYMPHOCYTES NFR BLD AUTO: 36 % (ref 18–42)
MCH RBC QN AUTO: 29.6 PG (ref 26–32)
MCHC RBC AUTO-ENTMCNC: 33.6 G/DL (ref 32–36)
MCV RBC AUTO: 87.9 FL (ref 80–94)
MONOCYTES # BLD AUTO: 0.3 10*3/UL (ref 0.1–1.3)
MONOCYTES NFR BLD AUTO: 7 % (ref 2–11)
NEUTROPHILS # BLD AUTO: 2.5 10*3/UL (ref 2.3–8.6)
NEUTROPHILS NFR BLD AUTO: 53 % (ref 50–75)
NITRITE UR QL STRIP: NEGATIVE
NRBC BLD AUTO-RTO: 0 /100{WBCS}
NRBC/RBC NFR BLD MANUAL: 0 10*3/UL
PH UR STRIP.AUTO: 6.5 [PH] (ref 4.5–8)
PLATELET # BLD AUTO: 276 10*3/UL (ref 150–450)
PMV BLD AUTO: 8 FL (ref 7.4–10.4)
POTASSIUM SERPL-SCNC: ABNORMAL MMOL/L (ref 3.6–5.1)
RBC # BLD AUTO: 4.52 10*6/UL (ref 4–5.4)
RBC #/AREA URNS HPF: 1 /[HPF] (ref 0–3)
SODIUM SERPL-SCNC: 134 MMOL/L (ref 136–144)
SP GR UR: 1.02 (ref 1–1.03)
SPERM URNS QL MICRO: ABNORMAL /[HPF]
SQUAMOUS SPT QL MICRO: 4 /[HPF] (ref 0–5)
UNIDENT CRYS URNS QL MICRO: ABNORMAL /[HPF]
WBC # BLD AUTO: 4.7 10*3/UL (ref 4.5–11.5)
WBC #/AREA URNS HPF: 5 /[HPF] (ref 0–5)
YEAST SPEC QL WET PREP: ABNORMAL /[HPF]

## 2018-02-22 PROCEDURE — 86481 TB AG RESPONSE T-CELL SUSP: CPT

## 2018-02-23 ENCOUNTER — LAB REQUISITION (OUTPATIENT)
Dept: LAB | Facility: HOSPITAL | Age: 68
End: 2018-02-23

## 2018-02-23 DIAGNOSIS — Z00.00 ROUTINE GENERAL MEDICAL EXAMINATION AT A HEALTH CARE FACILITY: ICD-10-CM

## 2018-02-25 LAB
TSPOT INTERPRETATION: NEGATIVE
TSPOT NIL CONTROL: 0
TSPOT PANEL A: 0
TSPOT PANEL B: 0
TSPOT POS CONTROL: 138

## 2018-02-26 LAB — TSPOT INTERPRETATION: NORMAL

## 2018-04-18 ENCOUNTER — HOSPITAL ENCOUNTER (OUTPATIENT)
Dept: LAB | Facility: HOSPITAL | Age: 68
Discharge: HOME | End: 2018-04-18
Attending: INTERNAL MEDICINE | Admitting: INTERNAL MEDICINE

## 2018-04-18 LAB
ANION GAP SERPL CALC-SCNC: 11.1 MMOL/L (ref 10–20)
BUN SERPL-MCNC: 12 MG/DL (ref 8–20)
BUN/CREAT SERPL: 12 (ref 5.4–26.2)
CALCIUM SERPL-MCNC: 9.8 MG/DL (ref 8.9–10.3)
CHLORIDE SERPL-SCNC: 101 MMOL/L (ref 101–111)
CONV CO2: 29 MMOL/L (ref 22–32)
CREAT UR-MCNC: 1 MG/DL (ref 0.4–1)
GLUCOSE SERPL-MCNC: 139 MG/DL (ref 65–99)
POTASSIUM SERPL-SCNC: 4.1 MMOL/L (ref 3.6–5.1)
SODIUM SERPL-SCNC: 137 MMOL/L (ref 136–144)

## 2018-05-03 ENCOUNTER — HOSPITAL ENCOUNTER (OUTPATIENT)
Dept: ORTHOPEDIC SURGERY | Facility: CLINIC | Age: 68
Discharge: HOME | End: 2018-05-03
Attending: PODIATRIST | Admitting: PODIATRIST

## 2018-05-03 ENCOUNTER — HOSPITAL ENCOUNTER (OUTPATIENT)
Dept: LAB | Facility: HOSPITAL | Age: 68
Setting detail: SPECIMEN
Discharge: HOME | End: 2018-05-03
Attending: NURSE PRACTITIONER | Admitting: NURSE PRACTITIONER

## 2018-05-03 LAB
ALBUMIN SERPL-MCNC: 3.9 G/DL (ref 3.5–4.8)
ALBUMIN/GLOB SERPL: 1.1 {RATIO} (ref 1–1.7)
ALP SERPL-CCNC: 42 IU/L (ref 32–91)
ALT SERPL-CCNC: 19 IU/L (ref 14–54)
ANION GAP SERPL CALC-SCNC: 11 MMOL/L (ref 10–20)
AST SERPL-CCNC: 20 IU/L (ref 15–41)
BILIRUB SERPL-MCNC: 0.3 MG/DL (ref 0.3–1.2)
BUN SERPL-MCNC: 11 MG/DL (ref 8–20)
BUN/CREAT SERPL: 11 (ref 5.4–26.2)
CALCIUM SERPL-MCNC: 9.9 MG/DL (ref 8.9–10.3)
CHLORIDE SERPL-SCNC: 103 MMOL/L (ref 101–111)
CHOLEST SERPL-MCNC: 154 MG/DL
CHOLEST/HDLC SERPL: 3.7 {RATIO}
CONV CO2: 30 MMOL/L (ref 22–32)
CONV LDL CHOLESTEROL DIRECT: 89 MG/DL (ref 0–100)
CONV TOTAL PROTEIN: 7.5 G/DL (ref 6.1–7.9)
CREAT UR-MCNC: 1 MG/DL (ref 0.4–1)
GLOBULIN UR ELPH-MCNC: 3.6 G/DL (ref 2.5–3.8)
GLUCOSE SERPL-MCNC: 101 MG/DL (ref 65–99)
HDLC SERPL-MCNC: 42 MG/DL
LDLC/HDLC SERPL: 2.1 {RATIO}
LIPID INTERPRETATION: ABNORMAL
POTASSIUM SERPL-SCNC: 4 MMOL/L (ref 3.6–5.1)
SODIUM SERPL-SCNC: 140 MMOL/L (ref 136–144)
TRIGL SERPL-MCNC: 167 MG/DL
VLDLC SERPL CALC-MCNC: 23.4 MG/DL

## 2018-07-03 ENCOUNTER — HOSPITAL ENCOUNTER (OUTPATIENT)
Dept: LAB | Facility: HOSPITAL | Age: 68
Discharge: HOME | End: 2018-07-03
Attending: INTERNAL MEDICINE | Admitting: INTERNAL MEDICINE

## 2018-07-03 LAB
ALBUMIN SERPL-MCNC: 3.9 G/DL (ref 3.5–4.8)
ALBUMIN/GLOB SERPL: 1.1 {RATIO} (ref 1–1.7)
ALP SERPL-CCNC: 40 IU/L (ref 32–91)
ALT SERPL-CCNC: 16 IU/L (ref 14–54)
ANION GAP SERPL CALC-SCNC: 11 MMOL/L (ref 10–20)
AST SERPL-CCNC: 18 IU/L (ref 15–41)
BASOPHILS # BLD AUTO: 0 10*3/UL (ref 0–0.2)
BASOPHILS NFR BLD AUTO: 1 % (ref 0–2)
BILIRUB SERPL-MCNC: 0.6 MG/DL (ref 0.3–1.2)
BILIRUB UR QL STRIP: NEGATIVE MG/DL
BUN SERPL-MCNC: 14 MG/DL (ref 8–20)
BUN/CREAT SERPL: 15.6 (ref 5.4–26.2)
CALCIUM SERPL-MCNC: 9.5 MG/DL (ref 8.9–10.3)
CASTS URNS QL MICRO: NORMAL /[LPF]
CHLORIDE SERPL-SCNC: 101 MMOL/L (ref 101–111)
COLOR UR: YELLOW
CONV BACTERIA IN URINE MICRO: NEGATIVE
CONV CLARITY OF URINE: CLEAR
CONV CO2: 28 MMOL/L (ref 22–32)
CONV HYALINE CASTS IN URINE MICRO: 3 /[LPF] (ref 0–5)
CONV PROTEIN IN URINE BY AUTOMATED TEST STRIP: NEGATIVE MG/DL
CONV SMALL ROUND CELLS: NORMAL /[HPF]
CONV TOTAL PROTEIN: 7.5 G/DL (ref 6.1–7.9)
CONV UROBILINOGEN IN URINE BY AUTOMATED TEST STRIP: 1 MG/DL
CREAT UR-MCNC: 0.9 MG/DL (ref 0.4–1)
CRP SERPL-MCNC: 0.18 MG/DL (ref 0–0.7)
CULTURE INDICATED?: NORMAL
DIFFERENTIAL METHOD BLD: (no result)
EOSINOPHIL # BLD AUTO: 0.2 10*3/UL (ref 0–0.3)
EOSINOPHIL # BLD AUTO: 4 % (ref 0–3)
ERYTHROCYTE [DISTWIDTH] IN BLOOD BY AUTOMATED COUNT: 12.5 % (ref 11.5–14.5)
ERYTHROCYTE [SEDIMENTATION RATE] IN BLOOD BY WESTERGREN METHOD: 15 MM/HR (ref 0–30)
GLOBULIN UR ELPH-MCNC: 3.6 G/DL (ref 2.5–3.8)
GLUCOSE SERPL-MCNC: 172 MG/DL (ref 65–99)
GLUCOSE UR QL: NEGATIVE MG/DL
HCT VFR BLD AUTO: 41.1 % (ref 35–49)
HGB BLD-MCNC: 13.7 G/DL (ref 12–15)
HGB UR QL STRIP: NEGATIVE
KETONES UR QL STRIP: NEGATIVE MG/DL
LEUKOCYTE ESTERASE UR QL STRIP: NEGATIVE
LYMPHOCYTES # BLD AUTO: 2.1 10*3/UL (ref 0.8–4.8)
LYMPHOCYTES NFR BLD AUTO: 42 % (ref 18–42)
MCH RBC QN AUTO: 29.1 PG (ref 26–32)
MCHC RBC AUTO-ENTMCNC: 33.2 G/DL (ref 32–36)
MCV RBC AUTO: 87.5 FL (ref 80–94)
MONOCYTES # BLD AUTO: 0.4 10*3/UL (ref 0.1–1.3)
MONOCYTES NFR BLD AUTO: 9 % (ref 2–11)
NEUTROPHILS # BLD AUTO: 2.2 10*3/UL (ref 2.3–8.6)
NEUTROPHILS NFR BLD AUTO: 44 % (ref 50–75)
NITRITE UR QL STRIP: NEGATIVE
NRBC BLD AUTO-RTO: 0 /100{WBCS}
NRBC/RBC NFR BLD MANUAL: 0 10*3/UL
PH UR STRIP.AUTO: 7.5 [PH] (ref 4.5–8)
PLATELET # BLD AUTO: 309 10*3/UL (ref 150–450)
PMV BLD AUTO: 7.8 FL (ref 7.4–10.4)
POTASSIUM SERPL-SCNC: 4 MMOL/L (ref 3.6–5.1)
RBC # BLD AUTO: 4.69 10*6/UL (ref 4–5.4)
RBC #/AREA URNS HPF: 1 /[HPF] (ref 0–3)
SODIUM SERPL-SCNC: 136 MMOL/L (ref 136–144)
SP GR UR: 1.02 (ref 1–1.03)
SPERM URNS QL MICRO: NORMAL /[HPF]
SQUAMOUS SPT QL MICRO: 1 /[HPF] (ref 0–5)
UNIDENT CRYS URNS QL MICRO: NORMAL /[HPF]
WBC # BLD AUTO: 4.9 10*3/UL (ref 4.5–11.5)
WBC #/AREA URNS HPF: 1 /[HPF] (ref 0–5)
YEAST SPEC QL WET PREP: NORMAL /[HPF]

## 2018-07-16 ENCOUNTER — HOSPITAL ENCOUNTER (OUTPATIENT)
Dept: LAB | Facility: HOSPITAL | Age: 68
Discharge: HOME | End: 2018-07-16
Attending: NURSE PRACTITIONER | Admitting: NURSE PRACTITIONER

## 2018-07-16 LAB — HAV IGM SERPL QL IA: NONREACTIVE

## 2018-10-02 ENCOUNTER — HOSPITAL ENCOUNTER (OUTPATIENT)
Dept: FAMILY MEDICINE CLINIC | Facility: CLINIC | Age: 68
Discharge: HOME | End: 2018-10-02
Attending: PHYSICIAN ASSISTANT | Admitting: PHYSICIAN ASSISTANT

## 2018-10-31 ENCOUNTER — HOSPITAL ENCOUNTER (OUTPATIENT)
Dept: LAB | Facility: HOSPITAL | Age: 68
Setting detail: SPECIMEN
Discharge: HOME | End: 2018-10-31
Attending: NURSE PRACTITIONER | Admitting: NURSE PRACTITIONER

## 2018-10-31 LAB
ALBUMIN SERPL-MCNC: 3.6 G/DL (ref 3.5–4.8)
ALBUMIN/GLOB SERPL: 1 {RATIO} (ref 1–1.7)
ALP SERPL-CCNC: 43 IU/L (ref 32–91)
ALT SERPL-CCNC: 18 IU/L (ref 14–54)
ANION GAP SERPL CALC-SCNC: 12 MMOL/L (ref 10–20)
AST SERPL-CCNC: 20 IU/L (ref 15–41)
BASOPHILS # BLD AUTO: 0.1 10*3/UL (ref 0–0.2)
BASOPHILS NFR BLD AUTO: 1 % (ref 0–2)
BILIRUB SERPL-MCNC: 0.5 MG/DL (ref 0.3–1.2)
BUN SERPL-MCNC: 10 MG/DL (ref 8–20)
BUN/CREAT SERPL: 11.1 (ref 5.4–26.2)
CALCIUM SERPL-MCNC: 9.5 MG/DL (ref 8.9–10.3)
CHLORIDE SERPL-SCNC: 103 MMOL/L (ref 101–111)
CHOLEST SERPL-MCNC: 163 MG/DL
CHOLEST/HDLC SERPL: 3.9 {RATIO}
CONV CO2: 29 MMOL/L (ref 22–32)
CONV LDL CHOLESTEROL DIRECT: 105 MG/DL (ref 0–100)
CONV TOTAL PROTEIN: 7.1 G/DL (ref 6.1–7.9)
CREAT UR-MCNC: 0.9 MG/DL (ref 0.4–1)
CRP SERPL-MCNC: 0.34 MG/DL (ref 0–0.7)
DIFFERENTIAL METHOD BLD: (no result)
EOSINOPHIL # BLD AUTO: 0.3 10*3/UL (ref 0–0.3)
EOSINOPHIL # BLD AUTO: 5 % (ref 0–3)
ERYTHROCYTE [DISTWIDTH] IN BLOOD BY AUTOMATED COUNT: 12.9 % (ref 11.5–14.5)
ERYTHROCYTE [SEDIMENTATION RATE] IN BLOOD BY WESTERGREN METHOD: 13 MM/HR (ref 0–30)
GLOBULIN UR ELPH-MCNC: 3.5 G/DL (ref 2.5–3.8)
GLUCOSE SERPL-MCNC: 136 MG/DL (ref 65–99)
HCT VFR BLD AUTO: 38.7 % (ref 35–49)
HDLC SERPL-MCNC: 41 MG/DL
HGB BLD-MCNC: 13.1 G/DL (ref 12–15)
LDLC/HDLC SERPL: 2.5 {RATIO}
LIPID INTERPRETATION: ABNORMAL
LYMPHOCYTES # BLD AUTO: 2.9 10*3/UL (ref 0.8–4.8)
LYMPHOCYTES NFR BLD AUTO: 51 % (ref 18–42)
MCH RBC QN AUTO: 29.5 PG (ref 26–32)
MCHC RBC AUTO-ENTMCNC: 33.8 G/DL (ref 32–36)
MCV RBC AUTO: 87.2 FL (ref 80–94)
MONOCYTES # BLD AUTO: 0.4 10*3/UL (ref 0.1–1.3)
MONOCYTES NFR BLD AUTO: 7 % (ref 2–11)
NEUTROPHILS # BLD AUTO: 2.1 10*3/UL (ref 2.3–8.6)
NEUTROPHILS NFR BLD AUTO: 36 % (ref 50–75)
NRBC BLD AUTO-RTO: 0 /100{WBCS}
NRBC/RBC NFR BLD MANUAL: 0 10*3/UL
PLATELET # BLD AUTO: 313 10*3/UL (ref 150–450)
PMV BLD AUTO: 8.2 FL (ref 7.4–10.4)
POTASSIUM SERPL-SCNC: 4 MMOL/L (ref 3.6–5.1)
RBC # BLD AUTO: 4.44 10*6/UL (ref 4–5.4)
SODIUM SERPL-SCNC: 140 MMOL/L (ref 136–144)
TRIGL SERPL-MCNC: 186 MG/DL
VLDLC SERPL CALC-MCNC: 16.8 MG/DL
WBC # BLD AUTO: 5.7 10*3/UL (ref 4.5–11.5)

## 2018-11-12 ENCOUNTER — HOSPITAL ENCOUNTER (OUTPATIENT)
Dept: LAB | Facility: HOSPITAL | Age: 68
Discharge: HOME | End: 2018-11-12
Attending: INTERNAL MEDICINE | Admitting: INTERNAL MEDICINE

## 2018-11-12 LAB
ALBUMIN SERPL-MCNC: 3.8 G/DL (ref 3.5–4.8)
ALP SERPL-CCNC: 45 IU/L (ref 32–91)
ALT SERPL-CCNC: 18 IU/L (ref 14–54)
ANION GAP SERPL CALC-SCNC: 12.8 MMOL/L (ref 10–20)
AST SERPL-CCNC: 24 IU/L (ref 15–41)
BILIRUB DIRECT SERPL-MCNC: 0.1 MG/DL (ref 0.1–0.5)
BILIRUB SERPL-MCNC: 0.4 MG/DL (ref 0.3–1.2)
BUN SERPL-MCNC: 13 MG/DL (ref 8–20)
BUN/CREAT SERPL: 13 (ref 5.4–26.2)
CALCIUM SERPL-MCNC: 9.2 MG/DL (ref 8.9–10.3)
CHLORIDE SERPL-SCNC: 99 MMOL/L (ref 101–111)
CHOLEST SERPL-MCNC: 164 MG/DL
CHOLEST/HDLC SERPL: 3.8 {RATIO}
CK SERPL-CCNC: 49 IU/L (ref 38–234)
CONV CO2: 28 MMOL/L (ref 22–32)
CONV LDL CHOLESTEROL DIRECT: 97 MG/DL (ref 0–100)
CONV TOTAL PROTEIN: 6.9 G/DL (ref 6.1–7.9)
CREAT UR-MCNC: 1 MG/DL (ref 0.4–1)
GLUCOSE SERPL-MCNC: 172 MG/DL (ref 65–99)
HDLC SERPL-MCNC: 44 MG/DL
LDLC/HDLC SERPL: 2.2 {RATIO}
LIPID INTERPRETATION: ABNORMAL
POTASSIUM SERPL-SCNC: 3.8 MMOL/L (ref 3.6–5.1)
SODIUM SERPL-SCNC: 136 MMOL/L (ref 136–144)
TRIGL SERPL-MCNC: 200 MG/DL
VLDLC SERPL CALC-MCNC: 23.4 MG/DL

## 2019-01-29 ENCOUNTER — HOSPITAL ENCOUNTER (OUTPATIENT)
Dept: LAB | Facility: HOSPITAL | Age: 69
Setting detail: SPECIMEN
Discharge: HOME | End: 2019-01-29
Attending: NURSE PRACTITIONER | Admitting: NURSE PRACTITIONER

## 2019-01-29 LAB
ALBUMIN SERPL-MCNC: 3.6 G/DL (ref 3.5–4.8)
ALBUMIN/GLOB SERPL: 1.1 {RATIO} (ref 1–1.7)
ALP SERPL-CCNC: 41 IU/L (ref 32–91)
ALT SERPL-CCNC: 17 IU/L (ref 14–54)
ANION GAP SERPL CALC-SCNC: 12.8 MMOL/L (ref 10–20)
AST SERPL-CCNC: 19 IU/L (ref 15–41)
BILIRUB SERPL-MCNC: 0.5 MG/DL (ref 0.3–1.2)
BUN SERPL-MCNC: 12 MG/DL (ref 8–20)
BUN/CREAT SERPL: 13.3 (ref 5.4–26.2)
CALCIUM SERPL-MCNC: 9.7 MG/DL (ref 8.9–10.3)
CHLORIDE SERPL-SCNC: 98 MMOL/L (ref 101–111)
CHOLEST SERPL-MCNC: 153 MG/DL
CHOLEST/HDLC SERPL: 3.7 {RATIO}
CONV CO2: 29 MMOL/L (ref 22–32)
CONV LDL CHOLESTEROL DIRECT: 91 MG/DL (ref 0–100)
CONV TOTAL PROTEIN: 6.9 G/DL (ref 6.1–7.9)
CREAT UR-MCNC: 0.9 MG/DL (ref 0.4–1)
GLOBULIN UR ELPH-MCNC: 3.3 G/DL (ref 2.5–3.8)
GLUCOSE SERPL-MCNC: 129 MG/DL (ref 65–99)
HDLC SERPL-MCNC: 41 MG/DL
LDLC/HDLC SERPL: 2.2 {RATIO}
LIPID INTERPRETATION: ABNORMAL
POTASSIUM SERPL-SCNC: 3.8 MMOL/L (ref 3.6–5.1)
SODIUM SERPL-SCNC: 136 MMOL/L (ref 136–144)
TRIGL SERPL-MCNC: 170 MG/DL
VLDLC SERPL CALC-MCNC: 21.6 MG/DL

## 2019-03-04 ENCOUNTER — HOSPITAL ENCOUNTER (OUTPATIENT)
Dept: LAB | Facility: HOSPITAL | Age: 69
Discharge: HOME | End: 2019-03-04
Attending: NURSE PRACTITIONER | Admitting: NURSE PRACTITIONER

## 2019-03-04 PROCEDURE — 86481 TB AG RESPONSE T-CELL SUSP: CPT

## 2019-03-05 ENCOUNTER — LAB REQUISITION (OUTPATIENT)
Dept: LAB | Facility: HOSPITAL | Age: 69
End: 2019-03-05

## 2019-03-05 DIAGNOSIS — Z00.00 ROUTINE GENERAL MEDICAL EXAMINATION AT A HEALTH CARE FACILITY: ICD-10-CM

## 2019-03-06 LAB
TSPOT INTERPRETATION: NEGATIVE
TSPOT INTERPRETATION: NORMAL
TSPOT NIL CONTROL INTERPRETATION: NORMAL
TSPOT PANEL A: 0
TSPOT PANEL B: 0
TSPOT POS CONTROL INTERPRETATION: NORMAL

## 2019-04-08 ENCOUNTER — HOSPITAL ENCOUNTER (OUTPATIENT)
Dept: FAMILY MEDICINE CLINIC | Facility: CLINIC | Age: 69
Setting detail: SPECIMEN
Discharge: HOME | End: 2019-04-08
Attending: FAMILY MEDICINE | Admitting: FAMILY MEDICINE

## 2019-04-08 LAB
AMIKACIN SUSC ISLT: ABNORMAL
AMPICILLIN SUSC ISLT: ABNORMAL
AZTREONAM SUSC ISLT: ABNORMAL
BACTERIA ISLT: ABNORMAL
BACTERIA SPEC AEROBE CULT: ABNORMAL
CEFAZOLIN SUSC ISLT: ABNORMAL
CEFEPIME SUSC ISLT: ABNORMAL
CEFTAZIDIME SUSC ISLT: ABNORMAL
CEFTRIAXONE SUSC ISLT: ABNORMAL
CIPROFLOXACIN SUSC ISLT: ABNORMAL
COLONY COUNT: ABNORMAL
ERTAPENEM SUSC ISLT: ABNORMAL
LEVOFLOXACIN SUSC ISLT: ABNORMAL
Lab: ABNORMAL
MEROPENEM SUSC ISLT: ABNORMAL
MICRO REPORT STATUS: ABNORMAL
NITROFURANTOIN SUSC ISLT: ABNORMAL
PIP+TAZO SUSC ISLT: ABNORMAL
SPECIMEN SOURCE: ABNORMAL
SUSC METH SPEC: ABNORMAL
TETRACYCLINE SUSC ISLT: ABNORMAL
TOBRAMYCIN SUSC ISLT: ABNORMAL
TRIMETHOPRIM/SULFA: ABNORMAL

## 2019-07-15 ENCOUNTER — LAB (OUTPATIENT)
Dept: LAB | Facility: HOSPITAL | Age: 69
End: 2019-07-15

## 2019-07-15 ENCOUNTER — TRANSCRIBE ORDERS (OUTPATIENT)
Dept: ADMINISTRATIVE | Facility: HOSPITAL | Age: 69
End: 2019-07-15

## 2019-07-15 DIAGNOSIS — Z79.899 LONG TERM USE OF DRUG: Primary | ICD-10-CM

## 2019-07-15 DIAGNOSIS — Z79.899 LONG TERM USE OF DRUG: ICD-10-CM

## 2019-07-15 LAB
ALBUMIN SERPL-MCNC: 3.7 G/DL (ref 3.5–4.8)
ALBUMIN/GLOB SERPL: 1.1 G/DL (ref 1–1.7)
ALP SERPL-CCNC: 39 U/L (ref 32–91)
ALT SERPL W P-5'-P-CCNC: 23 U/L (ref 14–54)
ANION GAP SERPL CALCULATED.3IONS-SCNC: 12 MMOL/L (ref 5–15)
AST SERPL-CCNC: 23 U/L (ref 15–41)
BASOPHILS # BLD AUTO: 0 10*3/MM3 (ref 0–0.2)
BASOPHILS NFR BLD AUTO: 0.8 % (ref 0–1.5)
BILIRUB SERPL-MCNC: 0.4 MG/DL (ref 0.3–1.2)
BUN BLD-MCNC: 12 MG/DL (ref 8–20)
BUN/CREAT SERPL: 13.3 (ref 5.4–26.2)
CALCIUM SPEC-SCNC: 9.5 MG/DL (ref 8.9–10.3)
CHLORIDE SERPL-SCNC: 103 MMOL/L (ref 101–111)
CO2 SERPL-SCNC: 27 MMOL/L (ref 22–32)
CREAT BLD-MCNC: 0.9 MG/DL (ref 0.4–1)
CRP SERPL-MCNC: 0.19 MG/DL (ref 0–0.7)
DEPRECATED RDW RBC AUTO: 40.7 FL (ref 37–54)
EOSINOPHIL # BLD AUTO: 0.2 10*3/MM3 (ref 0–0.4)
EOSINOPHIL NFR BLD AUTO: 5 % (ref 0.3–6.2)
ERYTHROCYTE [DISTWIDTH] IN BLOOD BY AUTOMATED COUNT: 13.2 % (ref 12.3–15.4)
ERYTHROCYTE [SEDIMENTATION RATE] IN BLOOD: 22 MM/HR (ref 0–30)
GFR SERPL CREATININE-BSD FRML MDRD: 62 ML/MIN/1.73
GLOBULIN UR ELPH-MCNC: 3.5 GM/DL (ref 2.5–3.8)
GLUCOSE BLD-MCNC: 189 MG/DL (ref 65–99)
HCT VFR BLD AUTO: 40.6 % (ref 34–46.6)
HGB BLD-MCNC: 13.5 G/DL (ref 12–15.9)
LYMPHOCYTES # BLD AUTO: 2.3 10*3/MM3 (ref 0.7–3.1)
LYMPHOCYTES NFR BLD AUTO: 45.9 % (ref 19.6–45.3)
MCH RBC QN AUTO: 29.3 PG (ref 26.6–33)
MCHC RBC AUTO-ENTMCNC: 33.3 G/DL (ref 31.5–35.7)
MCV RBC AUTO: 87.9 FL (ref 79–97)
MONOCYTES # BLD AUTO: 0.3 10*3/MM3 (ref 0.1–0.9)
MONOCYTES NFR BLD AUTO: 6.7 % (ref 5–12)
NEUTROPHILS # BLD AUTO: 2.1 10*3/MM3 (ref 1.7–7)
NEUTROPHILS NFR BLD AUTO: 41.6 % (ref 42.7–76)
NRBC BLD AUTO-RTO: 0.1 /100 WBC (ref 0–0.2)
PLATELET # BLD AUTO: 285 10*3/MM3 (ref 140–450)
PMV BLD AUTO: 8.2 FL (ref 6–12)
POTASSIUM BLD-SCNC: 4 MMOL/L (ref 3.6–5.1)
PROT SERPL-MCNC: 7.2 G/DL (ref 6.1–7.9)
RBC # BLD AUTO: 4.62 10*6/MM3 (ref 3.77–5.28)
SODIUM BLD-SCNC: 138 MMOL/L (ref 136–144)
WBC NRBC COR # BLD: 5 10*3/MM3 (ref 3.4–10.8)

## 2019-07-15 PROCEDURE — 85025 COMPLETE CBC W/AUTO DIFF WBC: CPT

## 2019-07-15 PROCEDURE — 36415 COLL VENOUS BLD VENIPUNCTURE: CPT

## 2019-07-15 PROCEDURE — 86140 C-REACTIVE PROTEIN: CPT

## 2019-07-15 PROCEDURE — 85652 RBC SED RATE AUTOMATED: CPT

## 2019-07-15 PROCEDURE — 80053 COMPREHEN METABOLIC PANEL: CPT

## 2019-07-31 PROBLEM — I10 HYPERTENSION: Status: ACTIVE | Noted: 2019-07-31

## 2019-07-31 PROBLEM — Z12.31 VISIT FOR SCREENING MAMMOGRAM: Status: ACTIVE | Noted: 2018-03-20

## 2019-07-31 PROBLEM — Z23 ENCOUNTER FOR IMMUNIZATION: Status: ACTIVE | Noted: 2017-10-19

## 2019-07-31 PROBLEM — R30.0 DIFFICULT OR PAINFUL URINATION: Status: ACTIVE | Noted: 2019-04-08

## 2019-07-31 PROBLEM — E11.42 DIABETIC PERIPHERAL NEUROPATHY (HCC): Status: ACTIVE | Noted: 2019-05-08

## 2019-07-31 PROBLEM — H91.93 DECREASED HEARING, BILATERAL: Status: ACTIVE | Noted: 2017-08-24

## 2019-07-31 PROBLEM — E55.9 VITAMIN D DEFICIENCY: Status: ACTIVE | Noted: 2018-02-27

## 2019-07-31 PROBLEM — M77.40 METATARSALGIA: Status: ACTIVE | Noted: 2018-05-03

## 2019-07-31 PROBLEM — M19.91 PRIMARY LOCALIZED OSTEOARTHRITIS: Status: ACTIVE | Noted: 2018-02-27

## 2019-07-31 PROBLEM — Z82.3 FAMILY HISTORY OF CEREBROVASCULAR ACCIDENT (CVA): Status: ACTIVE | Noted: 2019-07-31

## 2019-07-31 PROBLEM — Z00.00 ENCOUNTER FOR GENERAL ADULT MEDICAL EXAMINATION WITHOUT ABNORMAL FINDINGS: Status: ACTIVE | Noted: 2017-09-19

## 2019-07-31 PROBLEM — D72.819 LEUKOPENIA: Status: ACTIVE | Noted: 2017-02-21

## 2019-07-31 PROBLEM — R82.90 ABNORMAL URINALYSIS: Status: ACTIVE | Noted: 2019-04-08

## 2019-07-31 PROBLEM — M54.6 THORACIC BACK PAIN: Status: ACTIVE | Noted: 2018-10-02

## 2019-07-31 PROBLEM — R89.9 ABNORMAL LABORATORY TEST: Status: ACTIVE | Noted: 2018-07-09

## 2019-07-31 PROBLEM — M54.50 LOW BACK PAIN: Status: ACTIVE | Noted: 2018-10-02

## 2019-07-31 PROBLEM — M79.673 PAIN OF FOOT: Status: ACTIVE | Noted: 2017-08-09

## 2019-07-31 PROBLEM — M19.079 OSTEOARTHRITIS OF FOOT: Status: ACTIVE | Noted: 2017-08-09

## 2019-07-31 PROBLEM — M05.9 SEROPOSITIVE RHEUMATOID ARTHRITIS (HCC): Status: ACTIVE | Noted: 2017-04-11

## 2019-07-31 PROBLEM — R00.2 PALPITATIONS: Status: ACTIVE | Noted: 2019-01-02

## 2019-07-31 PROBLEM — R31.29 MICROSCOPIC HEMATURIA: Status: ACTIVE | Noted: 2017-02-08

## 2019-07-31 PROBLEM — J18.9 ATYPICAL PNEUMONIA: Status: ACTIVE | Noted: 2019-03-09

## 2019-07-31 PROBLEM — K21.9 GASTROESOPHAGEAL REFLUX DISEASE: Status: ACTIVE | Noted: 2019-07-31

## 2019-07-31 PROBLEM — E11.9 TYPE 2 DIABETES MELLITUS: Status: ACTIVE | Noted: 2019-07-31

## 2019-07-31 PROBLEM — E78.5 HYPERLIPIDEMIA: Status: ACTIVE | Noted: 2019-07-31

## 2019-07-31 RX ORDER — LANCETS
EACH MISCELLANEOUS
COMMUNITY
Start: 2016-06-15 | End: 2020-05-13

## 2019-07-31 RX ORDER — SYRINGE-NEEDLE,INSULIN,0.5 ML 27GX1/2"
SYRINGE, EMPTY DISPOSABLE MISCELLANEOUS
COMMUNITY
Start: 2019-01-31 | End: 2020-05-13

## 2019-08-06 ENCOUNTER — OFFICE VISIT (OUTPATIENT)
Dept: ENDOCRINOLOGY | Facility: CLINIC | Age: 69
End: 2019-08-06

## 2019-08-06 VITALS
HEIGHT: 63 IN | HEART RATE: 55 BPM | DIASTOLIC BLOOD PRESSURE: 70 MMHG | OXYGEN SATURATION: 96 % | WEIGHT: 170 LBS | SYSTOLIC BLOOD PRESSURE: 120 MMHG | BODY MASS INDEX: 30.12 KG/M2

## 2019-08-06 DIAGNOSIS — Z79.4 TYPE 2 DIABETES MELLITUS WITH HYPERGLYCEMIA, WITH LONG-TERM CURRENT USE OF INSULIN (HCC): Primary | ICD-10-CM

## 2019-08-06 DIAGNOSIS — E11.65 TYPE 2 DIABETES MELLITUS WITH HYPERGLYCEMIA, WITH LONG-TERM CURRENT USE OF INSULIN (HCC): Primary | ICD-10-CM

## 2019-08-06 DIAGNOSIS — E11.42 DIABETIC PERIPHERAL NEUROPATHY (HCC): ICD-10-CM

## 2019-08-06 DIAGNOSIS — I10 ESSENTIAL HYPERTENSION: ICD-10-CM

## 2019-08-06 DIAGNOSIS — E78.5 HYPERLIPIDEMIA, UNSPECIFIED HYPERLIPIDEMIA TYPE: ICD-10-CM

## 2019-08-06 PROCEDURE — 99214 OFFICE O/P EST MOD 30 MIN: CPT | Performed by: INTERNAL MEDICINE

## 2019-08-06 NOTE — PROGRESS NOTES
Endocrine Progress Note Outpatient     Patient Care Team:  Emily Burdick DO as PCP - General    Chief Complaint: Follow-up diabetes     HPI: XT 9-year-old female with history of type 2 diabetes, hypertension, hyperlipidemia is here for follow-up.  For type 2 diabetes she is currently on Janumet /1000, 1 tablet daily and Novolin 70/30 insulin, 54 units before breakfast and 18 units before supper.  She now tells me that she is taking her insulin before she eats and she is trying to follow the diet.  Hypertension: Well-controlled  Hyperlipidemia: Currently on lovastatin and Crestor panel is doing well.    Past Medical History:   Diagnosis Date   • Depression     Abstracted from [facility]   • Dexamethasone adverse reaction 2014    Abstracted from [facility] Osteopenia   • Diabetes mellitus, type 2 (CMS/HCC) 1988    [facility]   • GERD (gastroesophageal reflux disease)     Abstracted from [facility]   • H/O mammogram     Abstracted from [facility]   • Hyperlipidemia     Abstracted from [facility]   • Hypertension     Abstracted from [facility]   • Osteopenia     Abstracted from [facility]   • RA (rheumatoid arthritis) (CMS/HCC)     [facility]   • Rheumatoid nodule (CMS/HCC) 06/2011    Abstracted from [facility] rt. elbow, left elbow, and epidermal inclusion cyst post. neck (June 2011)--Dr. Whitmore   • Rheumatoid nodule (CMS/HCC)     [facility] 2 and rt. & lt. 3rd fingers (Oct.2012)   • Uterine prolapse     Abstracted from [facility]       Social History     Socioeconomic History   • Marital status:      Spouse name: Not on file   • Number of children: Not on file   • Years of education: Not on file   • Highest education level: Not on file   Tobacco Use   • Smoking status: Never Smoker   • Smokeless tobacco: Never Used   Substance and Sexual Activity   • Alcohol use: No     Frequency: Never   • Drug use: Defer   • Sexual activity: Defer        Family History   Problem Relation Age of Onset   • Heart disease Mother    • Diabetes Mother    • Hypertension Mother    • Rheum arthritis Mother    • Coronary artery disease Mother    • Alcohol abuse Mother    • Heart disease Father    • Mental illness Father    • Hypertension Father    • Alcohol abuse Father    • Diabetes Sister    • Stroke Sister    • Cancer Sister    • Osteoarthritis Sister    • Rheum arthritis Sister    • Diabetes Maternal Grandfather        No Known Allergies    ROS:   Constitutional:  Admit fatigue, tiredness.    Eyes:  Denies change in visual acuity   HENT:  Denies nasal congestion or sore throat   Respiratory: denies cough, shortness of breath.   Cardiovascular:  denies chest pain, edema   GI:  Denies abdominal pain, nausea, vomiting.   Musculoskeletal:  Denies back pain or joint pain   Integument:  Denies dry skin and rash   Neurologic:  Denies headache, focal weakness or sensory changes   Endocrine:  Denies polyuria or polydipsia   Psychiatric:  Denies depression or anxiety      Vitals:    08/06/19 1030   BP: 120/70   Pulse: 55   SpO2: 96%       Physical Exam:  GEN: NAD, conversant  EYES: EOMI, PERRL, no conjunctival erythema  NECK: no thyromegaly, full ROM   CV: RRR, no murmurs/rubs/gallops, no peripheral edema  LUNG: CTAB, no wheezes/rales/ronchi  SKIN: no rashes, no acanthosis  MSK: no deformities, full ROM of all extremities  NEURO: no tremors, DTR normal  PSYCH: AOX3, appropriate mood, affect normal      Results Review:     I reviewed the patient's new clinical results.       Lab Results   Component Value Date    GLUCOSE 189 (H) 07/15/2019    BUN 12 07/15/2019    CREATININE 0.90 07/15/2019    EGFRIFNONA 62 07/15/2019    EGFRIFAFRI 59 (L) 08/13/2017    BCR 13.3 07/15/2019    K 4.0 07/15/2019    CO2 27.0 07/15/2019    CALCIUM 9.5 07/15/2019    ALBUMIN 3.70 07/15/2019    LABIL2 1.1 01/29/2019    AST 23 07/15/2019    ALT 23 07/15/2019    CHOL 153 01/29/2019    TRIG 170 (H)  "01/29/2019    LDL 91 01/29/2019    HDL 41 01/29/2019     Lab Results   Component Value Date    TSH 1.61 05/12/2017     Labs from July 30, 2019 showed an A1c of 7.3%, total cholesterol 141, LDL 66, triglycerides 152, BUN 13, creatinine 1.0, sodium 140, potassium 4.1, chloride 99, CO2 30, glucose 119.    Medication Review: Reviewed.       Current Outpatient Medications:   •  ACCU-CHEK SOFTCLIX LANCETS lancets, ACCU-CHEK SOFTCLIX LANCETS, Disp: , Rfl:   •  acetaminophen (TYLENOL) 500 MG tablet, Daily., Disp: , Rfl:   •  Adalimumab (HUMIRA PEN) 40 MG/0.8ML Pen-injector Kit, HUMIRA PEN 40 MG/0.8ML PNKT, Disp: , Rfl:   •  albuterol sulfate HFA (VENTOLIN HFA) 108 (90 Base) MCG/ACT inhaler, VENTOLIN  (90 Base) MCG/ACT AERS, Disp: , Rfl:   •  amLODIPine (NORVASC) 10 MG tablet, Take 10 mg by mouth Daily., Disp: , Rfl:   •  aspirin 81 MG EC tablet, Every 12 (Twelve) Hours., Disp: , Rfl:   •  atenolol (TENORMIN) 25 MG tablet, Take 25 mg by mouth Daily., Disp: , Rfl:   •  CALCIUM-VITAMIN D PO, Take  by mouth., Disp: , Rfl:   •  DULoxetine (CYMBALTA) 60 MG capsule, Take 60 mg by mouth Daily., Disp: , Rfl:   •  fenofibrate 160 MG tablet, Daily., Disp: , Rfl:   •  Ferrous Sulfate (IRON) 325 (65 Fe) MG tablet, IRON 325 (65 Fe) MG TABS, Disp: , Rfl:   •  fluticasone (FLONASE ALLERGY RELIEF) 50 MCG/ACT nasal spray, FLONASE ALLERGY RELIEF 50 MCG/ACT SUSP, Disp: , Rfl:   •  glucose blood (ACCU-CHEK SIMONE PLUS) test strip, ACCU-CHEK SIMONE PLUS STRP, Disp: , Rfl:   •  ibuprofen (ADVIL) 200 MG tablet, Every 12 (Twelve) Hours., Disp: , Rfl:   •  insulin NPH-insulin regular (HUMULIN 70/30) (70-30) 100 UNIT/ML injection, HUMULIN 70/30 (70-30) 100 UNIT/ML SUSP, Disp: , Rfl:   •  Insulin Syringe-Needle U-100 (BD INSULIN SYRINGE U/F) 31G X 5/16\" 1 ML misc, BD INSULIN SYRINGE U/F 31G X 5/16\" 1 ML, Disp: , Rfl:   •  L-Lysine 500 MG tablet tablet, L-LYSINE 500 MG TABS, Disp: , Rfl:   •  lansoprazole (PREVACID) 30 MG capsule, LANSOPRAZOLE 30 " "MG CPDR, Disp: , Rfl:   •  lovastatin (MEVACOR) 40 MG tablet, Daily., Disp: , Rfl:   •  SITagliptin-metFORMIN HCl ER (JANUMET XR) 100-1000 MG tablet, Daily., Disp: , Rfl:   •  spironolactone-hydrochlorothiazide (ALDACTAZIDE) 25-25 MG tablet, SPIRONOLACTONE-HCTZ 25-25 MG TABS, Disp: , Rfl:   •  traZODone (DESYREL) 50 MG tablet, TRAZODONE HCL 50 MG TABS, Disp: , Rfl:   •  Cholecalciferol (VITAMIN D) 2000 units capsule, VITAMIN D 2000 UNIT CAPS, Disp: , Rfl:   •  Cyanocobalamin 1000 MCG/ML kit, Inject as directed, Disp: , Rfl:       Assessment/Plan   Diabetes mellitus type 2: Excellent control, continue current medication  Hypertension: Well-controlled, continue current medication  Hyperlipidemia: Well-controlled, continue current medication  Diabetic neuropathy: Stable.    Follow-up in 6 months with labs.            Dano Rangel MD FACE.  06/15/19  4:34 PM      EMR Dragon / transcription disclaimer:     \"Dictated utilizing Dragon dictation\".                 "

## 2019-08-13 ENCOUNTER — OFFICE VISIT (OUTPATIENT)
Dept: FAMILY MEDICINE CLINIC | Facility: CLINIC | Age: 69
End: 2019-08-13

## 2019-08-13 VITALS
BODY MASS INDEX: 30.12 KG/M2 | DIASTOLIC BLOOD PRESSURE: 75 MMHG | SYSTOLIC BLOOD PRESSURE: 148 MMHG | TEMPERATURE: 97.9 F | WEIGHT: 170 LBS | OXYGEN SATURATION: 95 % | HEIGHT: 63 IN | RESPIRATION RATE: 14 BRPM | HEART RATE: 55 BPM

## 2019-08-13 DIAGNOSIS — E61.1 IRON DEFICIENCY: ICD-10-CM

## 2019-08-13 DIAGNOSIS — E53.8 B12 DEFICIENCY: ICD-10-CM

## 2019-08-13 DIAGNOSIS — R07.81 RIB PAIN ON RIGHT SIDE: Primary | ICD-10-CM

## 2019-08-13 LAB
BASOPHILS # BLD AUTO: 0.1 10*3/MM3 (ref 0–0.2)
BASOPHILS NFR BLD AUTO: 1.1 % (ref 0–1.5)
DEPRECATED RDW RBC AUTO: 42 FL (ref 37–54)
EOSINOPHIL # BLD AUTO: 0.3 10*3/MM3 (ref 0–0.4)
EOSINOPHIL NFR BLD AUTO: 4.4 % (ref 0.3–6.2)
ERYTHROCYTE [DISTWIDTH] IN BLOOD BY AUTOMATED COUNT: 13.5 % (ref 12.3–15.4)
FERRITIN SERPL-MCNC: 61 NG/ML (ref 11–307)
HCT VFR BLD AUTO: 40.5 % (ref 34–46.6)
HGB BLD-MCNC: 13.7 G/DL (ref 12–15.9)
IRON 24H UR-MRATE: 84 MCG/DL (ref 28–170)
IRON SATN MFR SERPL: 18 % (ref 15–50)
LYMPHOCYTES # BLD AUTO: 2.7 10*3/MM3 (ref 0.7–3.1)
LYMPHOCYTES NFR BLD AUTO: 45 % (ref 19.6–45.3)
MCH RBC QN AUTO: 29.5 PG (ref 26.6–33)
MCHC RBC AUTO-ENTMCNC: 33.7 G/DL (ref 31.5–35.7)
MCV RBC AUTO: 87.6 FL (ref 79–97)
MONOCYTES # BLD AUTO: 0.5 10*3/MM3 (ref 0.1–0.9)
MONOCYTES NFR BLD AUTO: 8.5 % (ref 5–12)
NEUTROPHILS # BLD AUTO: 2.5 10*3/MM3 (ref 1.7–7)
NEUTROPHILS NFR BLD AUTO: 41 % (ref 42.7–76)
NRBC BLD AUTO-RTO: 0.1 /100 WBC (ref 0–0.2)
PLATELET # BLD AUTO: 305 10*3/MM3 (ref 140–450)
PMV BLD AUTO: 8.8 FL (ref 6–12)
RBC # BLD AUTO: 4.63 10*6/MM3 (ref 3.77–5.28)
TIBC SERPL-MCNC: 476 MCG/DL (ref 228–428)
TRANSFERRIN SERPL-MCNC: 340 MG/DL (ref 190–380)
VIT B12 BLD-MCNC: 1391 PG/ML (ref 180–914)
WBC NRBC COR # BLD: 6.1 10*3/MM3 (ref 3.4–10.8)

## 2019-08-13 PROCEDURE — 99213 OFFICE O/P EST LOW 20 MIN: CPT | Performed by: FAMILY MEDICINE

## 2019-08-13 PROCEDURE — 82728 ASSAY OF FERRITIN: CPT | Performed by: FAMILY MEDICINE

## 2019-08-13 PROCEDURE — 82607 VITAMIN B-12: CPT | Performed by: FAMILY MEDICINE

## 2019-08-13 PROCEDURE — 36415 COLL VENOUS BLD VENIPUNCTURE: CPT | Performed by: FAMILY MEDICINE

## 2019-08-13 PROCEDURE — 84466 ASSAY OF TRANSFERRIN: CPT | Performed by: FAMILY MEDICINE

## 2019-08-13 PROCEDURE — 85025 COMPLETE CBC W/AUTO DIFF WBC: CPT | Performed by: FAMILY MEDICINE

## 2019-08-13 PROCEDURE — 83540 ASSAY OF IRON: CPT | Performed by: FAMILY MEDICINE

## 2019-08-13 RX ORDER — LANOLIN ALCOHOL/MO/W.PET/CERES
1000 CREAM (GRAM) TOPICAL DAILY
COMMUNITY

## 2019-08-13 RX ORDER — LISINOPRIL AND HYDROCHLOROTHIAZIDE 25; 20 MG/1; MG/1
1 TABLET ORAL DAILY
COMMUNITY
End: 2019-11-14 | Stop reason: SDUPTHER

## 2019-08-13 NOTE — PROGRESS NOTES
"Subjective   Jenna Rosa is a 69 y.o. female.     Chief Complaint   Patient presents with   • Chest Pain     pain in her ribs. Patient saw  at the [facility] recently.          Current Outpatient Medications:   •  ACCU-CHEK SOFTCLIX LANCETS lancets, ACCU-CHEK SOFTCLIX LANCETS, Disp: , Rfl:   •  acetaminophen (TYLENOL) 500 MG tablet, Daily., Disp: , Rfl:   •  Adalimumab (HUMIRA PEN) 40 MG/0.8ML Pen-injector Kit, HUMIRA PEN 40 MG/0.8ML PNKT, Disp: , Rfl:   •  albuterol sulfate HFA (VENTOLIN HFA) 108 (90 Base) MCG/ACT inhaler, VENTOLIN  (90 Base) MCG/ACT AERS, Disp: , Rfl:   •  amLODIPine (NORVASC) 10 MG tablet, Take 10 mg by mouth Daily., Disp: , Rfl:   •  aspirin 81 MG EC tablet, Every 12 (Twelve) Hours., Disp: , Rfl:   •  atenolol (TENORMIN) 25 MG tablet, Take 25 mg by mouth Daily., Disp: , Rfl:   •  CALCIUM-VITAMIN D PO, Take  by mouth., Disp: , Rfl:   •  Cholecalciferol (VITAMIN D) 2000 units capsule, VITAMIN D 2000 UNIT CAPS, Disp: , Rfl:   •  DULoxetine (CYMBALTA) 60 MG capsule, Take 60 mg by mouth Daily., Disp: , Rfl:   •  fenofibrate 160 MG tablet, Daily., Disp: , Rfl:   •  Ferrous Sulfate (IRON) 325 (65 Fe) MG tablet, IRON 325 (65 Fe) MG TABS, Disp: , Rfl:   •  fluticasone (FLONASE ALLERGY RELIEF) 50 MCG/ACT nasal spray, FLONASE ALLERGY RELIEF 50 MCG/ACT SUSP, Disp: , Rfl:   •  glucose blood (ACCU-CHEK SIMONE PLUS) test strip, ACCU-CHEK SIMONE PLUS STRP, Disp: , Rfl:   •  ibuprofen (ADVIL) 200 MG tablet, Every 12 (Twelve) Hours., Disp: , Rfl:   •  insulin NPH-insulin regular (HUMULIN 70/30) (70-30) 100 UNIT/ML injection, HUMULIN 70/30 (70-30) 100 UNIT/ML SUSP, Disp: , Rfl:   •  Insulin Syringe-Needle U-100 (BD INSULIN SYRINGE U/F) 31G X 5/16\" 1 ML misc, BD INSULIN SYRINGE U/F 31G X 5/16\" 1 ML, Disp: , Rfl:   •  L-Lysine 500 MG tablet tablet, L-LYSINE 500 MG TABS, Disp: , Rfl:   •  lansoprazole (PREVACID) 30 MG capsule, LANSOPRAZOLE 30 MG CPDR, Disp: , Rfl:   •  lisinopril-hydrochlorothiazide " (PRINZIDE,ZESTORETIC) 20-25 MG per tablet, Take 1 tablet by mouth Daily., Disp: , Rfl:   •  lovastatin (MEVACOR) 40 MG tablet, Daily., Disp: , Rfl:   •  SITagliptin-metFORMIN HCl ER (JANUMET XR) 100-1000 MG tablet, Daily., Disp: , Rfl:   •  traZODone (DESYREL) 50 MG tablet, TRAZODONE HCL 50 MG TABS, Disp: , Rfl:   •  vitamin B-12 (CYANOCOBALAMIN) 1000 MCG tablet, Take 1,000 mcg by mouth Daily., Disp: , Rfl:   •  Cyanocobalamin 1000 MCG/ML kit, Inject as directed, Disp: , Rfl:     Past Medical History:   Diagnosis Date   • Breast mass, right    • Depression     [facility]   • Dexamethasone adverse reaction 2014    Abstracted from [facility] Osteopenia   • Diabetes mellitus, type 2 (CMS/HCC) 1988    Abstracted from [facility]   • Diabetic peripheral neuropathy (CMS/HCC)    • GERD (gastroesophageal reflux disease)     Abstracted from [facility]   • H/O mammogram     Abstracted from [facility]   • Hyperlipidemia     Abstracted from [facility]   • Hypertension     Abstracted from [facility]   • Insomnia    • Low HDL (under 40)    • Lower back pain    • Metatarsalgia    • Never smoked cigarettes    • Obesity    • Osteoarthritis    • Osteopenia     Abstracted from [facility]   • Palpitations    • Polypharmacy    • Pulmonary nodule    • RA (rheumatoid arthritis) (CMS/HCC)     Abstracted from [facility]   • Rheumatoid arthritis (CMS/HCC)    • Rheumatoid nodule (CMS/HCC) 06/2011    [facility] rt. elbow, left elbow, and epidermal inclusion cyst post. neck (June 2011)--Dr. Whitmore   • Rheumatoid nodule (CMS/HCC)     Abstracted from [facility] rt. thumb x 2 and rt. & lt. 3rd fingers (Oct.2012)   • Uterine prolapse     Abstracted from [facility]   • Uterine prolapse    • Vitamin D deficiency        Past Surgical History:   Procedure Laterality Date   • BILATERAL SALPINGO OOPHORECTOMY      Abstracted from [facility] endometriosis   • CHOLECYSTECTOMY      Abstracted from [facility]   • COLONOSCOPY   2017    Abstracted from [facility] polyps x 3= 2017, rech 2020 [facility]   • CYST REMOVAL      Abstracted from [facility] Seb cyst on neck   • OTHER SURGICAL HISTORY      Abstracted from [facility] Uterine Prolapse repair   • TOTAL ABDOMINAL HYSTERECTOMY      Abstracted from [facility] BSO---endometriosis   • TOTAL ABDOMINAL HYSTERECTOMY WITH SALPINGO OOPHORECTOMY         Family History   Problem Relation Age of Onset   • Heart disease Mother    • Diabetes Mother    • Hypertension Mother    • Rheum arthritis Mother    • Coronary artery disease Mother    • Alcohol abuse Mother    • Heart disease Father    • Mental illness Father    • Hypertension Father    • Alcohol abuse Father    • Diabetes Sister    • Stroke Sister    • Cancer Sister    • Osteoarthritis Sister    • Rheum arthritis Sister    • Diabetes Maternal Grandfather        Social History     Socioeconomic History   • Marital status:      Spouse name: Not on file   • Number of children: Not on file   • Years of education: Not on file   • Highest education level: Not on file   Tobacco Use   • Smoking status: Never Smoker   • Smokeless tobacco: Never Used   Substance and Sexual Activity   • Alcohol use: No     Frequency: Never   • Drug use: Defer   • Sexual activity: Defer       68y/o C female here for f/u from  for Rt side pain    Pt leaned over the car console to open the door and slipped and fell onto the console hitting her Rt lower ribs on her side; Pt tried ice/ heat but not better after 1 week so went to  for eval; Rib XRays neg and told to use heat and f/u w/ PCP         The following portions of the patient's history were reviewed and updated as appropriate: allergies, current medications, past family history, past medical history, past social history, past surgical history and problem list.    Review of Systems   Musculoskeletal: Positive for arthralgias.   Skin: Negative for rash and bruise.       Vitals:    08/13/19 1149   BP: 148/75   Pulse:  55   Resp: 14   Temp: 97.9 °F (36.6 °C)   SpO2: 95%       Objective   Physical Exam   Constitutional: She is oriented to person, place, and time. She appears well-developed and well-nourished.   HENT:   Head: Normocephalic and atraumatic.   Right Ear: External ear normal. cerumen impaction (mild cerumin) is present.  Left Ear: Tympanic membrane, external ear and ear canal normal.   Cardiovascular: Normal rate, regular rhythm and normal heart sounds.   Pulmonary/Chest: Effort normal and breath sounds normal. No respiratory distress. She exhibits tenderness (Rt side lower rib cage).   Neurological: She is alert and oriented to person, place, and time. No cranial nerve deficit.   Psychiatric: She has a normal mood and affect. Her behavior is normal. Judgment and thought content normal.   Nursing note and vitals reviewed.        Assessment/Plan   Jenna was seen today for chest pain.    Diagnoses and all orders for this visit:    Rib pain on right side    Iron deficiency  -     Ferritin  -     Iron Profile  -     CBC & Differential  -     CBC Auto Differential    B12 deficiency  -     Vitamin B12

## 2019-09-05 ENCOUNTER — HOSPITAL ENCOUNTER (EMERGENCY)
Facility: HOSPITAL | Age: 69
Discharge: HOME | End: 2019-09-06
Attending: EMERGENCY MEDICINE | Admitting: EMERGENCY MEDICINE

## 2019-09-05 ENCOUNTER — APPOINTMENT (OUTPATIENT)
Dept: GENERAL RADIOLOGY | Facility: HOSPITAL | Age: 69
End: 2019-09-05

## 2019-09-05 DIAGNOSIS — N30.00 ACUTE CYSTITIS WITHOUT HEMATURIA: Primary | ICD-10-CM

## 2019-09-05 DIAGNOSIS — R00.2 PALPITATIONS: ICD-10-CM

## 2019-09-05 LAB
BASOPHILS # BLD AUTO: 0.1 10*3/MM3 (ref 0–0.2)
BASOPHILS NFR BLD AUTO: 0.7 % (ref 0–1.5)
DEPRECATED RDW RBC AUTO: 40.3 FL (ref 37–54)
EOSINOPHIL # BLD AUTO: 0.1 10*3/MM3 (ref 0–0.4)
EOSINOPHIL NFR BLD AUTO: 1.2 % (ref 0.3–6.2)
ERYTHROCYTE [DISTWIDTH] IN BLOOD BY AUTOMATED COUNT: 13.3 % (ref 12.3–15.4)
HCT VFR BLD AUTO: 38.2 % (ref 34–46.6)
HGB BLD-MCNC: 12.9 G/DL (ref 12–15.9)
LYMPHOCYTES # BLD AUTO: 2.1 10*3/MM3 (ref 0.7–3.1)
LYMPHOCYTES NFR BLD AUTO: 23.4 % (ref 19.6–45.3)
MCH RBC QN AUTO: 29 PG (ref 26.6–33)
MCHC RBC AUTO-ENTMCNC: 33.8 G/DL (ref 31.5–35.7)
MCV RBC AUTO: 85.8 FL (ref 79–97)
MONOCYTES # BLD AUTO: 0.9 10*3/MM3 (ref 0.1–0.9)
MONOCYTES NFR BLD AUTO: 9.8 % (ref 5–12)
NEUTROPHILS # BLD AUTO: 5.7 10*3/MM3 (ref 1.7–7)
NEUTROPHILS NFR BLD AUTO: 64.9 % (ref 42.7–76)
NRBC BLD AUTO-RTO: 0 /100 WBC (ref 0–0.2)
PLATELET # BLD AUTO: 290 10*3/MM3 (ref 140–450)
PMV BLD AUTO: 7.9 FL (ref 6–12)
RBC # BLD AUTO: 4.45 10*6/MM3 (ref 3.77–5.28)
WBC NRBC COR # BLD: 8.8 10*3/MM3 (ref 3.4–10.8)

## 2019-09-05 PROCEDURE — 83735 ASSAY OF MAGNESIUM: CPT | Performed by: EMERGENCY MEDICINE

## 2019-09-05 PROCEDURE — 93005 ELECTROCARDIOGRAM TRACING: CPT | Performed by: EMERGENCY MEDICINE

## 2019-09-05 PROCEDURE — 81001 URINALYSIS AUTO W/SCOPE: CPT | Performed by: EMERGENCY MEDICINE

## 2019-09-05 PROCEDURE — 84443 ASSAY THYROID STIM HORMONE: CPT | Performed by: EMERGENCY MEDICINE

## 2019-09-05 PROCEDURE — 80053 COMPREHEN METABOLIC PANEL: CPT | Performed by: EMERGENCY MEDICINE

## 2019-09-05 PROCEDURE — 93005 ELECTROCARDIOGRAM TRACING: CPT

## 2019-09-05 PROCEDURE — 85025 COMPLETE CBC W/AUTO DIFF WBC: CPT | Performed by: EMERGENCY MEDICINE

## 2019-09-05 PROCEDURE — 87077 CULTURE AEROBIC IDENTIFY: CPT | Performed by: EMERGENCY MEDICINE

## 2019-09-05 PROCEDURE — 99284 EMERGENCY DEPT VISIT MOD MDM: CPT

## 2019-09-05 PROCEDURE — 87186 SC STD MICRODIL/AGAR DIL: CPT | Performed by: EMERGENCY MEDICINE

## 2019-09-05 PROCEDURE — 71045 X-RAY EXAM CHEST 1 VIEW: CPT

## 2019-09-05 PROCEDURE — 87086 URINE CULTURE/COLONY COUNT: CPT | Performed by: EMERGENCY MEDICINE

## 2019-09-05 PROCEDURE — 84484 ASSAY OF TROPONIN QUANT: CPT | Performed by: EMERGENCY MEDICINE

## 2019-09-06 VITALS
SYSTOLIC BLOOD PRESSURE: 137 MMHG | HEIGHT: 63 IN | OXYGEN SATURATION: 99 % | TEMPERATURE: 98.8 F | HEART RATE: 63 BPM | RESPIRATION RATE: 18 BRPM | BODY MASS INDEX: 30.08 KG/M2 | WEIGHT: 169.75 LBS | DIASTOLIC BLOOD PRESSURE: 53 MMHG

## 2019-09-06 LAB
ALBUMIN SERPL-MCNC: 3.8 G/DL (ref 3.5–4.8)
ALBUMIN/GLOB SERPL: 1 G/DL (ref 1–1.7)
ALP SERPL-CCNC: 41 U/L (ref 32–91)
ALT SERPL W P-5'-P-CCNC: 15 U/L (ref 14–54)
ANION GAP SERPL CALCULATED.3IONS-SCNC: 17.5 MMOL/L (ref 5–15)
AST SERPL-CCNC: 18 U/L (ref 15–41)
BACTERIA UR QL AUTO: ABNORMAL /HPF
BILIRUB SERPL-MCNC: 0.5 MG/DL (ref 0.3–1.2)
BILIRUB UR QL STRIP: NEGATIVE
BUN BLD-MCNC: 13 MG/DL (ref 8–20)
BUN/CREAT SERPL: 13 (ref 5.4–26.2)
CALCIUM SPEC-SCNC: 9.1 MG/DL (ref 8.9–10.3)
CHLORIDE SERPL-SCNC: 97 MMOL/L (ref 101–111)
CLARITY UR: ABNORMAL
CO2 SERPL-SCNC: 23 MMOL/L (ref 22–32)
COLOR UR: ABNORMAL
CREAT BLD-MCNC: 1 MG/DL (ref 0.4–1)
GFR SERPL CREATININE-BSD FRML MDRD: 55 ML/MIN/1.73
GLOBULIN UR ELPH-MCNC: 3.7 GM/DL (ref 2.5–3.8)
GLUCOSE BLD-MCNC: 156 MG/DL (ref 65–99)
GLUCOSE BLDC GLUCOMTR-MCNC: 149 MG/DL (ref 70–105)
GLUCOSE UR STRIP-MCNC: NEGATIVE MG/DL
HGB UR QL STRIP.AUTO: ABNORMAL
HYALINE CASTS UR QL AUTO: ABNORMAL /LPF
KETONES UR QL STRIP: NEGATIVE
LEUKOCYTE ESTERASE UR QL STRIP.AUTO: ABNORMAL
MAGNESIUM SERPL-MCNC: 1.9 MG/DL (ref 1.8–2.5)
NITRITE UR QL STRIP: POSITIVE
PH UR STRIP.AUTO: 6 [PH] (ref 5–8)
POTASSIUM BLD-SCNC: 3.5 MMOL/L (ref 3.6–5.1)
PROT SERPL-MCNC: 7.5 G/DL (ref 6.1–7.9)
PROT UR QL STRIP: NEGATIVE
RBC # UR: ABNORMAL /HPF
REF LAB TEST METHOD: ABNORMAL
SODIUM BLD-SCNC: 134 MMOL/L (ref 136–144)
SP GR UR STRIP: 1.01 (ref 1–1.03)
SQUAMOUS #/AREA URNS HPF: ABNORMAL /HPF
TROPONIN I SERPL-MCNC: <0.03 NG/ML (ref 0–0.03)
TSH SERPL DL<=0.05 MIU/L-ACNC: 1.4 UIU/ML (ref 0.34–5.6)
UROBILINOGEN UR QL STRIP: ABNORMAL
WBC UR QL AUTO: ABNORMAL /HPF

## 2019-09-06 PROCEDURE — 96361 HYDRATE IV INFUSION ADD-ON: CPT

## 2019-09-06 PROCEDURE — 96374 THER/PROPH/DIAG INJ IV PUSH: CPT

## 2019-09-06 PROCEDURE — 25010000002 CEFTRIAXONE PER 250 MG: Performed by: EMERGENCY MEDICINE

## 2019-09-06 PROCEDURE — 82962 GLUCOSE BLOOD TEST: CPT

## 2019-09-06 RX ORDER — POTASSIUM CHLORIDE 20 MEQ/1
40 TABLET, EXTENDED RELEASE ORAL
Status: DISCONTINUED | OUTPATIENT
Start: 2019-09-06 | End: 2019-09-06 | Stop reason: HOSPADM

## 2019-09-06 RX ORDER — CEFDINIR 300 MG/1
300 CAPSULE ORAL 2 TIMES DAILY
Qty: 14 CAPSULE | Refills: 0 | Status: SHIPPED | OUTPATIENT
Start: 2019-09-06 | End: 2019-09-25

## 2019-09-06 RX ORDER — POTASSIUM CHLORIDE 20 MEQ/1
TABLET, EXTENDED RELEASE ORAL
Status: COMPLETED
Start: 2019-09-06 | End: 2019-09-06

## 2019-09-06 RX ADMIN — POTASSIUM CHLORIDE 40 MEQ: 1500 TABLET, EXTENDED RELEASE ORAL at 01:25

## 2019-09-06 RX ADMIN — POTASSIUM CHLORIDE 40 MEQ: 20 TABLET, EXTENDED RELEASE ORAL at 01:25

## 2019-09-06 RX ADMIN — CEFTRIAXONE SODIUM 1 G: 10 INJECTION, POWDER, FOR SOLUTION INTRAVENOUS at 01:14

## 2019-09-06 RX ADMIN — SODIUM CHLORIDE 1000 ML: 900 INJECTION, SOLUTION INTRAVENOUS at 01:25

## 2019-09-06 NOTE — ED PROVIDER NOTES
Subjective   Pleasant 69-year-old female who developed fast irregular palpitations at 9 PM at rest.  Patient states it waxed and waned for approximately 2 hours with associated chills, leg aches.  Patient was mildly lightheaded but did not feel she would pass out.  No chest pain or shortness of air.  Patient has had similar symptoms in the past.  Patient has no known cardiac history however.  Patient has had negative stress tests and cardiac catheterization in the past.  She denies any recent illness or changes to her medicines.  Patient does complain of dysuria and has been taking Azo today.  Palpitations were moderate without any clear aggravating leaving factors.            Review of Systems   Constitutional: Positive for chills.   Respiratory: Positive for cough.    Cardiovascular: Positive for palpitations.   Genitourinary: Positive for dysuria.   Musculoskeletal: Positive for myalgias.   All other systems reviewed and are negative.      Past Medical History:   Diagnosis Date   • Breast mass, right    • Depression     Abstracted from [facility]   • Dexamethasone adverse reaction 2014    Abstracted from [facility] Osteopenia   • Diabetes mellitus, type 2 (CMS/Formerly [facility]) 1988    Abstracted from [facility]   • Diabetic peripheral neuropathy (CMS/Formerly [facility])    • GERD (gastroesophageal reflux disease)     Abstracted from [facility]   • H/O mammogram     Abstracted from [facility]   • Hyperlipidemia     Abstracted from [facility]   • Hypertension     Abstracted from [facility]   • Insomnia    • Low HDL (under 40)    • Lower back pain    • Metatarsalgia    • Never smoked cigarettes    • Obesity    • Osteoarthritis    • Osteopenia     Abstracted from [facility]   • Palpitations    • Polypharmacy    • Pulmonary nodule    • RA (rheumatoid arthritis) (CMS/Formerly [facility])     Abstracted from [facility]   • Rheumatoid arthritis (CMS/Formerly [facility])    • Rheumatoid nodule (CMS/Formerly [facility]) 06/2011    Abstracted from [facility] rt. elbow, left elbow, and epidermal  inclusion cyst post. neck (June 2011)--Dr. Whitmore   • Rheumatoid nodule (CMS/HCC)     Abstracted from [facility] rt. thumb x 2 and rt. & lt. 3rd fingers (Oct.2012)   • Uterine prolapse     Abstracted from [facility]   • Uterine prolapse    • Vitamin D deficiency        No Known Allergies    Past Surgical History:   Procedure Laterality Date   • BILATERAL SALPINGO OOPHORECTOMY      Abstracted from [facility] endometriosis   • CHOLECYSTECTOMY      Abstracted from [facility]   • COLONOSCOPY  2017    Abstracted from [facility] polyps x 3= 2017, rech 2020 [facility]   • CYST REMOVAL      Abstracted from [facility] Seb cyst on neck   • OTHER SURGICAL HISTORY      Abstracted from [facility] Uterine Prolapse repair   • TOTAL ABDOMINAL HYSTERECTOMY      Abstracted from [facility] BSO---endometriosis   • TOTAL ABDOMINAL HYSTERECTOMY WITH SALPINGO OOPHORECTOMY         Family History   Problem Relation Age of Onset   • Heart disease Mother    • Diabetes Mother    • Hypertension Mother    • Rheum arthritis Mother    • Coronary artery disease Mother    • Alcohol abuse Mother    • Heart disease Father    • Mental illness Father    • Hypertension Father    • Alcohol abuse Father    • Diabetes Sister    • Stroke Sister    • Cancer Sister    • Osteoarthritis Sister    • Rheum arthritis Sister    • Diabetes Maternal Grandfather        Social History     Socioeconomic History   • Marital status:      Spouse name: Not on file   • Number of children: Not on file   • Years of education: Not on file   • Highest education level: Not on file   Tobacco Use   • Smoking status: Never Smoker   • Smokeless tobacco: Never Used   Substance and Sexual Activity   • Alcohol use: No     Frequency: Never   • Drug use: Defer   • Sexual activity: Defer           Objective   Physical Exam   Constitutional: She is oriented to person, place, and time. She appears well-developed and well-nourished.   HENT:   Head: Normocephalic and atraumatic.    Mouth/Throat: Oropharynx is clear and moist.   Eyes: Conjunctivae and EOM are normal. Pupils are equal, round, and reactive to light.   Neck: Normal range of motion. Neck supple.   Cardiovascular: Normal rate, regular rhythm and normal heart sounds.   Pulmonary/Chest: Effort normal and breath sounds normal.   Abdominal: Soft. Bowel sounds are normal. She exhibits no distension. There is no tenderness.   Musculoskeletal: Normal range of motion. She exhibits no edema or deformity.   Neurological: She is alert and oriented to person, place, and time. No cranial nerve deficit.   Motor and sensation intact   Skin: Skin is warm and dry. Capillary refill takes less than 2 seconds.   Psychiatric: She has a normal mood and affect.       Procedures           ED Course  ED Course as of Sep 06 0217   Thu Sep 05, 2019   2316 Normal sinus rhythm, rate 69, QTc 435, no acute ST change  [JR]      ED Course User Index  [JR] Candido Jalloh MD                  MDM  Number of Diagnoses or Management Options  Acute cystitis without hematuria:   Palpitations:   Diagnosis management comments: Results for orders placed or performed during the hospital encounter of 09/05/19  -Comprehensive Metabolic Panel       Result                      Value             Ref Range           Glucose                     156 (H)           65 - 99 mg/dL       BUN                         13                8 - 20 mg/dL        Creatinine                  1.00              0.40 - 1.00 *       Sodium                      134 (L)           136 - 144 mm*       Potassium                   3.5 (L)           3.6 - 5.1 mm*       Chloride                    97 (L)            101 - 111 mm*       CO2                         23.0              22.0 - 32.0 *       Calcium                     9.1               8.9 - 10.3 m*       Total Protein               7.5               6.1 - 7.9 g/*       Albumin                     3.80              3.50 - 4.80 *       ALT (SGPT)                   15                14 - 54 U/L         AST (SGOT)                  18                15 - 41 U/L         Alkaline Phosphatase        41                32 - 91 U/L         Total Bilirubin             0.5               0.3 - 1.2 mg*       eGFR Non  Amer       55 (L)            >60 mL/min/1*       Globulin                    3.7               2.5 - 3.8 gm*       A/G Ratio                   1.0               1.0 - 1.7 g/*       BUN/Creatinine Ratio        13.0              5.4 - 26.2          Anion Gap                   17.5 (H)          5.0 - 15.0 m*  -Urinalysis With Culture If Indicated - Urine, Clean Catch       Result                      Value             Ref Range           Color, UA                   Orange (A)        Yellow, Straw       Appearance, UA              Cloudy (A)        Clear               pH, UA                      6.0               5.0 - 8.0           Specific [place], UA        1.011             1.005 - 1.030       Glucose, UA                 Negative          Negative            Ketones, UA                 Negative          Negative            Bilirubin, UA               Negative          Negative            Blood, UA                   Trace (A)         Negative            Protein, UA                 Negative          Negative            Leuk Esterase, UA                             Negative        Moderate (2+) (A)       Nitrite, UA                 Positive (A)      Negative            Urobilinogen, UA            1.0 E.U./dL       0.2 - 1.0 E.*  -Troponin       Result                      Value             Ref Range           Troponin I                  <0.030            0.000 - 0.03*  -Magnesium       Result                      Value             Ref Range           Magnesium                   1.9               1.8 - 2.5 mg*  -TSH       Result                      Value             Ref Range           TSH                         1.400             0.340 - 5.60*  -CBC Auto  Differential       Result                      Value             Ref Range           WBC                         8.80              3.40 - 10.80*       RBC                         4.45              3.77 - 5.28 *       Hemoglobin                  12.9              12.0 - 15.9 *       Hematocrit                  38.2              34.0 - 46.6 %       MCV                         85.8              79.0 - 97.0 *       MCH                         29.0              26.6 - 33.0 *       MCHC                        33.8              31.5 - 35.7 *       RDW                         13.3              12.3 - 15.4 %       RDW-SD                      40.3              37.0 - 54.0 *       MPV                         7.9               6.0 - 12.0 fL       Platelets                   290               140 - 450 10*       Neutrophil %                64.9              42.7 - 76.0 %       Lymphocyte %                23.4              19.6 - 45.3 %       Monocyte %                  9.8               5.0 - 12.0 %        Eosinophil %                1.2               0.3 - 6.2 %         Basophil %                  0.7               0.0 - 1.5 %         Neutrophils, Absolute       5.70              1.70 - 7.00 *       Lymphocytes, Absolute       2.10              0.70 - 3.10 *       Monocytes, Absolute         0.90              0.10 - 0.90 *       Eosinophils, Absolute       0.10              0.00 - 0.40 *       Basophils, Absolute         0.10              0.00 - 0.20 *       nRBC                        0.0               0.0 - 0.2 /1*  -Urinalysis, Microscopic Only - Urine, Clean Catch       Result                      Value             Ref Range           RBC, UA                     3-5 (A)           None Seen /H*       WBC, UA                                       None Seen /H*   Too Numerous to Count (A)       Bacteria, UA                2+ (A)            None Seen /H*       Squamous Epithelial Ce*     None Seen         None Seen, 0*        Hyaline Casts, UA           None Seen         None Seen /L*       Methodology                                                   Automated Microscopy  -POC Glucose Once       Result                      Value             Ref Range           Glucose                     149 (H)           70 - 105 mg/*    Well, feels better, no cp/soa, occ palp felt in ED, NSR when symptomatic.       Amount and/or Complexity of Data Reviewed  Clinical lab tests: reviewed  Tests in the radiology section of CPT®: reviewed  Independent visualization of images, tracings, or specimens: yes        Final diagnoses:   Acute cystitis without hematuria   Palpitations              Candido Jalloh MD  09/06/19 0212

## 2019-09-06 NOTE — ED NOTES
"Pt reports to having bounding in her neck and she decided to check her Blood pressure.  The machine said she \"had a irregular heart rate.\" She reports to having chills, dry cough from Lisinopril. She believes she has a UTI due to bladder spasms, pain with urination, and achiness.       Lisa Garcia, RN  09/05/19 [id number]    "

## 2019-09-08 LAB — BACTERIA SPEC AEROBE CULT: ABNORMAL

## 2019-09-08 NOTE — PHARMACY RECOMMENDATION
69 yoF with acute cystitis. Final Ucx: E.coli (Susceptible to Rocephin). Patient was given Rx for cefdinir on ED discharge. No further interventions required.    Urine Culture - Urine, Urine, Clean Catch   Order: [id number] - Reflex for Order [id number]   Status:  Final result   Visible to patient:  No (Not Released) Next appt:  12/02/2019 at 01:40 PM in Rheumatology (Rose Gibbs MD)   Specimen Information: Urine, Clean Catch        Urine Culture >100,000 CFU/mL Escherichia coli Abnormal           Resulting Agency: [facility]   Susceptibility      Escherichia coli     ISIDRO     Ampicillin >=32 ug/ml Resistant     Ampicillin + Sulbactam >=32 ug/ml Resistant     Cefazolin <=4 ug/ml Susceptible     Cefepime <=1 ug/ml Susceptible     Ceftazidime <=1 ug/ml Susceptible     Ceftriaxone <=1 ug/ml Susceptible     Gentamicin >=16 ug/ml Resistant     Levofloxacin 1 ug/ml Susceptible     Nitrofurantoin <=16 ug/ml Susceptible     Piperacillin + Tazobactam <=4 ug/ml Susceptible     Tetracycline >=16 ug/ml Resistant     Trimethoprim + Sulfamethoxazole >=320 ug/ml Resistant                Chantal Carter, PharmD, BCPS.

## 2019-09-09 DIAGNOSIS — N30.00 ACUTE CYSTITIS WITHOUT HEMATURIA: Primary | ICD-10-CM

## 2019-09-16 RX ORDER — ATENOLOL 25 MG/1
TABLET ORAL
Qty: 90 TABLET | Refills: 0 | Status: SHIPPED | OUTPATIENT
Start: 2019-09-16 | End: 2019-12-23

## 2019-09-16 RX ORDER — INSULIN NPH HUM/REG INSULIN HM 70-30/ML
VIAL (ML) SUBCUTANEOUS
Qty: 30 ML | Refills: 2 | Status: SHIPPED | OUTPATIENT
Start: 2019-09-16 | End: 2020-02-12

## 2019-09-16 RX ORDER — AMLODIPINE BESYLATE 10 MG/1
TABLET ORAL
Qty: 90 TABLET | Refills: 0 | Status: SHIPPED | OUTPATIENT
Start: 2019-09-16 | End: 2019-12-23

## 2019-09-19 ENCOUNTER — CLINICAL SUPPORT (OUTPATIENT)
Dept: FAMILY MEDICINE CLINIC | Facility: CLINIC | Age: 69
End: 2019-09-19

## 2019-09-19 DIAGNOSIS — N30.00 ACUTE CYSTITIS WITHOUT HEMATURIA: Primary | ICD-10-CM

## 2019-09-19 LAB
BACTERIA UR QL AUTO: ABNORMAL /HPF
BILIRUB UR QL STRIP: NEGATIVE
CLARITY UR: ABNORMAL
COLOR UR: YELLOW
GLUCOSE UR STRIP-MCNC: ABNORMAL MG/DL
HGB UR QL STRIP.AUTO: ABNORMAL
HYALINE CASTS UR QL AUTO: ABNORMAL /LPF
KETONES UR QL STRIP: NEGATIVE
LEUKOCYTE ESTERASE UR QL STRIP.AUTO: ABNORMAL
NITRITE UR QL STRIP: NEGATIVE
PH UR STRIP.AUTO: 6.5 [PH] (ref 5–8)
PROT UR QL STRIP: NEGATIVE
RBC # UR: ABNORMAL /HPF
REF LAB TEST METHOD: ABNORMAL
SP GR UR STRIP: 1.02 (ref 1–1.03)
SQUAMOUS #/AREA URNS HPF: ABNORMAL /HPF
UROBILINOGEN UR QL STRIP: ABNORMAL
WBC UR QL AUTO: ABNORMAL /HPF

## 2019-09-19 PROCEDURE — 87077 CULTURE AEROBIC IDENTIFY: CPT | Performed by: FAMILY MEDICINE

## 2019-09-19 PROCEDURE — 81001 URINALYSIS AUTO W/SCOPE: CPT | Performed by: FAMILY MEDICINE

## 2019-09-19 PROCEDURE — 87086 URINE CULTURE/COLONY COUNT: CPT | Performed by: FAMILY MEDICINE

## 2019-09-19 PROCEDURE — 87186 SC STD MICRODIL/AGAR DIL: CPT | Performed by: FAMILY MEDICINE

## 2019-09-20 RX ORDER — TRAZODONE HYDROCHLORIDE 50 MG/1
TABLET ORAL
Qty: 90 TABLET | Refills: 0 | Status: SHIPPED | OUTPATIENT
Start: 2019-09-20 | End: 2020-01-24

## 2019-09-20 RX ORDER — SITAGLIPTIN AND METFORMIN HYDROCHLORIDE 1000; 100 MG/1; MG/1
TABLET, FILM COATED, EXTENDED RELEASE ORAL
Qty: 90 TABLET | Refills: 0 | Status: SHIPPED | OUTPATIENT
Start: 2019-09-20 | End: 2020-01-24

## 2019-09-21 LAB — BACTERIA SPEC AEROBE CULT: ABNORMAL

## 2019-09-25 RX ORDER — LEVOFLOXACIN 500 MG/1
500 TABLET, FILM COATED ORAL DAILY
Qty: 5 TABLET | Refills: 0 | Status: SHIPPED | OUTPATIENT
Start: 2019-09-25 | End: 2020-02-04

## 2019-10-23 ENCOUNTER — TELEPHONE (OUTPATIENT)
Dept: RHEUMATOLOGY | Facility: CLINIC | Age: 69
End: 2019-10-23

## 2019-10-23 NOTE — TELEPHONE ENCOUNTER
Humira (CF) - [place] Approved.  Coverage dates: 7/23/19 - 10/21/20.  Patient was last seen by DM in March but has a follow up with Dr. EASTON on 12/2/19.

## 2019-11-04 RX ORDER — LANSOPRAZOLE 30 MG/1
CAPSULE, DELAYED RELEASE ORAL
Qty: 90 CAPSULE | Refills: 0 | Status: SHIPPED | OUTPATIENT
Start: 2019-11-04 | End: 2020-01-24

## 2019-11-14 RX ORDER — LISINOPRIL AND HYDROCHLOROTHIAZIDE 25; 20 MG/1; MG/1
1 TABLET ORAL DAILY
Qty: 90 TABLET | Refills: 2 | Status: SHIPPED | OUTPATIENT
Start: 2019-11-14 | End: 2020-07-31

## 2019-12-02 ENCOUNTER — APPOINTMENT (OUTPATIENT)
Dept: LAB | Facility: HOSPITAL | Age: 69
End: 2019-12-02

## 2019-12-02 ENCOUNTER — OFFICE VISIT (OUTPATIENT)
Dept: RHEUMATOLOGY | Facility: CLINIC | Age: 69
End: 2019-12-02

## 2019-12-02 VITALS
HEIGHT: 63 IN | HEART RATE: 137 BPM | SYSTOLIC BLOOD PRESSURE: 148 MMHG | BODY MASS INDEX: 29.77 KG/M2 | DIASTOLIC BLOOD PRESSURE: 78 MMHG | WEIGHT: 168 LBS

## 2019-12-02 DIAGNOSIS — M19.012 OSTEOARTHRITIS OF LEFT SHOULDER, UNSPECIFIED OSTEOARTHRITIS TYPE: ICD-10-CM

## 2019-12-02 DIAGNOSIS — M85.80 OSTEOPENIA, UNSPECIFIED LOCATION: ICD-10-CM

## 2019-12-02 DIAGNOSIS — M05.9 SEROPOSITIVE RHEUMATOID ARTHRITIS (HCC): Primary | ICD-10-CM

## 2019-12-02 DIAGNOSIS — Z79.899 LONG-TERM USE OF HIGH-RISK MEDICATION: ICD-10-CM

## 2019-12-02 LAB
ALBUMIN SERPL-MCNC: 4.3 G/DL (ref 3.5–5.2)
ALBUMIN/GLOB SERPL: 1.2 G/DL
ALP SERPL-CCNC: 44 U/L (ref 39–117)
ALT SERPL W P-5'-P-CCNC: 11 U/L (ref 1–33)
ANION GAP SERPL CALCULATED.3IONS-SCNC: 11.5 MMOL/L (ref 5–15)
ANISOCYTOSIS BLD QL: ABNORMAL
AST SERPL-CCNC: 13 U/L (ref 1–32)
BILIRUB SERPL-MCNC: <0.2 MG/DL (ref 0.2–1.2)
BUN BLD-MCNC: 15 MG/DL (ref 8–23)
BUN/CREAT SERPL: 16.5 (ref 7–25)
CALCIUM SPEC-SCNC: 9.7 MG/DL (ref 8.6–10.5)
CHLORIDE SERPL-SCNC: 98 MMOL/L (ref 98–107)
CO2 SERPL-SCNC: 28.5 MMOL/L (ref 22–29)
CREAT BLD-MCNC: 0.91 MG/DL (ref 0.57–1)
CRP SERPL-MCNC: 0.19 MG/DL (ref 0–0.5)
DEPRECATED RDW RBC AUTO: 41.2 FL (ref 37–54)
EOSINOPHIL # BLD MANUAL: 0.28 10*3/MM3 (ref 0–0.4)
EOSINOPHIL NFR BLD MANUAL: 4.1 % (ref 0.3–6.2)
ERYTHROCYTE [DISTWIDTH] IN BLOOD BY AUTOMATED COUNT: 12.8 % (ref 12.3–15.4)
ERYTHROCYTE [SEDIMENTATION RATE] IN BLOOD: 17 MM/HR (ref 0–30)
GFR SERPL CREATININE-BSD FRML MDRD: 61 ML/MIN/1.73
GLOBULIN UR ELPH-MCNC: 3.5 GM/DL
GLUCOSE BLD-MCNC: 166 MG/DL (ref 65–99)
HCT VFR BLD AUTO: 38.5 % (ref 34–46.6)
HGB BLD-MCNC: 12.3 G/DL (ref 12–15.9)
LYMPHOCYTES # BLD MANUAL: 3.22 10*3/MM3 (ref 0.7–3.1)
LYMPHOCYTES NFR BLD MANUAL: 46.4 % (ref 19.6–45.3)
LYMPHOCYTES NFR BLD MANUAL: 5.2 % (ref 5–12)
MCH RBC QN AUTO: 28.1 PG (ref 26.6–33)
MCHC RBC AUTO-ENTMCNC: 31.9 G/DL (ref 31.5–35.7)
MCV RBC AUTO: 88.1 FL (ref 79–97)
MICROCYTES BLD QL: ABNORMAL
MONOCYTES # BLD AUTO: 0.36 10*3/MM3 (ref 0.1–0.9)
NEUTROPHILS # BLD AUTO: 3.07 10*3/MM3 (ref 1.7–7)
NEUTROPHILS NFR BLD MANUAL: 44.3 % (ref 42.7–76)
PLAT MORPH BLD: NORMAL
PLATELET # BLD AUTO: 332 10*3/MM3 (ref 140–450)
PMV BLD AUTO: 10.3 FL (ref 6–12)
POTASSIUM BLD-SCNC: 4.3 MMOL/L (ref 3.5–5.2)
PROT SERPL-MCNC: 7.8 G/DL (ref 6–8.5)
RBC # BLD AUTO: 4.37 10*6/MM3 (ref 3.77–5.28)
SODIUM BLD-SCNC: 138 MMOL/L (ref 136–145)
WBC MORPH BLD: NORMAL
WBC NRBC COR # BLD: 6.94 10*3/MM3 (ref 3.4–10.8)

## 2019-12-02 PROCEDURE — 86140 C-REACTIVE PROTEIN: CPT | Performed by: INTERNAL MEDICINE

## 2019-12-02 PROCEDURE — 86481 TB AG RESPONSE T-CELL SUSP: CPT | Performed by: INTERNAL MEDICINE

## 2019-12-02 PROCEDURE — 80053 COMPREHEN METABOLIC PANEL: CPT | Performed by: INTERNAL MEDICINE

## 2019-12-02 PROCEDURE — 85007 BL SMEAR W/DIFF WBC COUNT: CPT | Performed by: INTERNAL MEDICINE

## 2019-12-02 PROCEDURE — 99214 OFFICE O/P EST MOD 30 MIN: CPT | Performed by: INTERNAL MEDICINE

## 2019-12-02 PROCEDURE — 85652 RBC SED RATE AUTOMATED: CPT | Performed by: INTERNAL MEDICINE

## 2019-12-02 PROCEDURE — 36415 COLL VENOUS BLD VENIPUNCTURE: CPT | Performed by: INTERNAL MEDICINE

## 2019-12-02 PROCEDURE — 85027 COMPLETE CBC AUTOMATED: CPT | Performed by: INTERNAL MEDICINE

## 2019-12-02 NOTE — PROGRESS NOTES
HPI:  The patient is a 69-year-old female who suffers from seropositive rheumatoid arthritis she comes today in follow-up.  Her last appointment was 3/4/2019.  Her treatment consists of Humira 40 mg every other week.  She uses the medication regularly, denies side effects overall she claims to be doing well, today she reports being achy, her pain at times is rated 6 out of 10, she has about 10 minutes of morning stiffness her left shoulder has been achy as well as her lower back.  She feels depressed but not suicidal because of her 's diagnosis of cancer, he is going to start chemotherapy soon all the appointments having overwhelming.    She denies fever or chills, no changes in her weight, she has dry eyes and dry mouth, denies nausea vomiting, no diarrhea, no chest pain shortness of breath.  All other systems reviewed and they were negative.    No recent laboratories were reviewed today.    Rheumatologic history:  1. Seropositive rheumatoid arthritis diagnosed by Dr. Burrows from [facility] in 1998  Failure to multiple medications, patient does not recall the names  Most recent treatment  at the moment of transitioning care due, MTX 7.5 mg and Humira.     MTX discontinued because of leukopenia  Humira to this date     HEPATITIS PANEL (7/18) NEGATIVE  TSPOT (2/18) NEGATIVE     2.osteopenia; DEXA 7/2016 Femur T-score -1.5 & spine T-score -0.9               Past Medical History:   Diagnosis Date   • Breast mass, right    • Depression     Abstracted from [facility]   • Dexamethasone adverse reaction 2014    Abstracted from [facility] Osteopenia   • Diabetes mellitus, type 2 (CMS/HCC) 1988    Abstracted from [facility]   • Diabetic peripheral neuropathy (CMS/formerly [facility])    • GERD (gastroesophageal reflux disease)     Abstracted from [facility]   • H/O mammogram     Abstracted from [facility]   • Hyperlipidemia     Abstracted from [facility]   • Hypertension     Abstracted from [facility]   • Insomnia    •  Low HDL (under 40)    • Lower back pain    • Metatarsalgia    • Never smoked cigarettes    • Obesity    • Osteoarthritis    • Osteopenia     Abstracted from [facility]   • Palpitations    • Polypharmacy    • Pulmonary nodule    • RA (rheumatoid arthritis) (CMS/HCC)     Abstracted from [facility]   • Rheumatoid arthritis (CMS/HCC)    • Rheumatoid nodule (CMS/HCC) 06/2011    Abstracted [facility] rt. elbow, left elbow, and epidermal inclusion cyst post. neck (June 2011)--Dr. Whitmore   • Rheumatoid nodule (CMS/HCC)     Abstracted from [facility] rt. thumb x 2 and rt. & lt. 3rd fingers (Oct.2012)   • Uterine prolapse     Abstracted from [facility]   • Uterine prolapse    • Vitamin D deficiency        Current Outpatient Medications   Medication Sig Dispense Refill   • ACCU-CHEK SOFTCLIX LANCETS lancets ACCU-CHEK SOFTCLIX LANCETS     • acetaminophen (TYLENOL) 500 MG tablet Daily.     • Adalimumab (HUMIRA PEN) 40 MG/0.8ML Pen-injector Kit HUMIRA PEN 40 MG/0.8ML PNKT     • albuterol sulfate HFA (VENTOLIN HFA) 108 (90 Base) MCG/ACT inhaler VENTOLIN  (90 Base) MCG/ACT AERS     • amLODIPine (NORVASC) 10 MG tablet TAKE 1 TABLET BY MOUTH DAILY 90 tablet 0   • aspirin 81 MG EC tablet Every 12 (Twelve) Hours.     • atenolol (TENORMIN) 25 MG tablet TAKE 1 TABLET BY MOUTH DAILY 90 tablet 0   • CALCIUM-VITAMIN D PO Take  by mouth.     • Cholecalciferol (VITAMIN D) 2000 units capsule VITAMIN D 2000 UNIT CAPS     • Cyanocobalamin 1000 MCG/ML kit Inject as directed     • DULoxetine (CYMBALTA) 60 MG capsule Take 60 mg by mouth Daily.     • fenofibrate 160 MG tablet Daily.     • Ferrous Sulfate (IRON) 325 (65 Fe) MG tablet IRON 325 (65 Fe) MG TABS     • fluticasone (FLONASE ALLERGY RELIEF) 50 MCG/ACT nasal spray FLONASE ALLERGY RELIEF 50 MCG/ACT SUSP     • glucose blood (ACCU-CHEK SIMONE PLUS) test strip ACCU-CHEK SIMONE PLUS STRP     • HUMULIN 70/30 (70-30) 100 UNIT/ML injection INJECT 52 UNITS BEFORE BREAKFAST AND 22 UNITS  "BEFORE SUPPER DX E11.65 30 mL 2   • ibuprofen (ADVIL) 200 MG tablet Every 12 (Twelve) Hours.     • Insulin Syringe-Needle U-100 (BD INSULIN SYRINGE U/F) 31G X 5/16\" 1 ML misc BD INSULIN SYRINGE U/F 31G X 5/16\" 1 ML     • JANUMET -1000 MG tablet TAKE 1 TABLET BY MOUTH DAILY 90 tablet 0   • L-Lysine 500 MG tablet tablet L-LYSINE 500 MG TABS     • lansoprazole (PREVACID) 30 MG capsule TAKE 1 CAPSULE BY MOUTH DAILY 90 capsule 0   • levoFLOXacin (LEVAQUIN) 500 MG tablet Take 1 tablet by mouth Daily. 5 tablet 0   • lisinopril-hydrochlorothiazide (PRINZIDE,ZESTORETIC) 20-25 MG per tablet TAKE 1 TABLET BY MOUTH DAILY 90 tablet 2   • lovastatin (MEVACOR) 40 MG tablet Daily.     • traZODone (DESYREL) 50 MG tablet TAKE 1 TABLET BY MOUTH DAILY AT BEDTIME 90 tablet 0   • vitamin B-12 (CYANOCOBALAMIN) 1000 MCG tablet Take 1,000 mcg by mouth Daily.       No current facility-administered medications for this visit.        Physical exam:    Vitals:    12/02/19 1415   BP: 148/78   Pulse: (!) 137     GENERAL: Well-developed, well-nourished in no acute distress. Alert and oriented x3.  HEENT: Normocephalic, atraumatic. Pupils are equal, round, and reactive to light. Extraocular muscles are intact. Mucous membranes are pink and moist. Nostrils are clear.   NECK: Supple without lymphadenopathy.  LUNGS: Clear to auscultation bilaterally.  HEART: Regular rate and rhythm without murmur, rub or gallop.  CHEST: Respirations easy and unlabored.  EXTREMITIES: No cyanosis, edema or clubbing.  SKIN: Warm, dry and intact.  0.7 cm mass in the fourth right DIP and smaller one in the fourth right PIP joint.  MSK: Left shoulder painful arc at 95 degrees.  Tenderness with internal rotation.  Orlin test negative. Otherwise no joint tenderness, no signs of synovitis.    Assessment:  1.  Seropositive rheumatoid arthritis.  Managing well with current therapy.  Physical exam shows signs of osteoarthritis in the left shoulder.  No signs of active " inflammatory arthropathy.  Continue with Humira as is.  Labs to be done today.    2.  High-risk long-term medications.  The patient is on medication for management of inflammatory arthritis.  I am monitoring for side effects.    Colonoscopy- 6/2017 ; Pap Smear  2 years ago   ; Mammogram- 2019  Vaccinations: Flu 2017  ; Pneumovax- 10/2017   ; Zoster-No  DEXA Scan:  7/19/ 2016, 2/19 osteopenia        Ca+ / Vit D: Yes, both  Last Imaging Studies: CXR: 11/16/2016 Hands: 11/22/17 Feet: 11/22/17  Hepatitis panel, HIV, QTB/PPD:  2/22/18      Orders Placed This Encounter   Procedures   • US Soft Tissue     Cyst in the  4th right DIP and PIP joint.     Standing Status:   Future     Standing Expiration Date:   12/2/2020     Scheduling Instructions:      MSK Ultrasound of the hands and wrists     Order Specific Question:   Reason for Exam:     Answer:   Chronic polyarthralgias   • Comprehensive Metabolic Panel   • C-reactive Protein   • Sedimentation Rate   • Comprehensive Metabolic Panel     Standing Status:   Future     Standing Expiration Date:   12/2/2020   • C-reactive Protein     Standing Status:   Future     Standing Expiration Date:   12/2/2020   • Sedimentation Rate     Standing Status:   Future     Standing Expiration Date:   12/2/2020   • TSPOT   • CBC With Manual Differential   • CBC With Manual Differential     Standing Status:   Future       #3 osteopenia.  Continue to take calcium and vitamin D.  Labs to be done today.    4.  Left shoulder arthropathy/tendinopathy.  We discussed regarding corticosteroid injection.  Patient agreeable to proceed.    5.  Diabetes.  She is to have a close monitoring of her blood sugar after her corticosteroid injection.    6.Cysts in the 4th digit. New.  MSK ordered.    Plan:  Continue with Humira 40 mg every other week  Corticosteroid injection of the left shoulder, appointment to be scheduled  Monitor blood glucose  Take calcium and vitamin D  Update vaccinations  US of the  hands  RTC in 3 months or sooner if needed.

## 2019-12-04 LAB
TSPOT INTERPRETATION: NEGATIVE
TSPOT NIL CONTROL INTERPRETATION: NORMAL
TSPOT PANEL A: 0
TSPOT PANEL B: 0
TSPOT POS CONTROL INTERPRETATION: NORMAL

## 2019-12-05 ENCOUNTER — TELEPHONE (OUTPATIENT)
Dept: RHEUMATOLOGY | Facility: CLINIC | Age: 69
End: 2019-12-05

## 2019-12-23 RX ORDER — ATENOLOL 25 MG/1
TABLET ORAL
Qty: 90 TABLET | Refills: 0 | Status: SHIPPED | OUTPATIENT
Start: 2019-12-23 | End: 2020-03-27

## 2019-12-23 RX ORDER — AMLODIPINE BESYLATE 10 MG/1
TABLET ORAL
Qty: 90 TABLET | Refills: 0 | Status: SHIPPED | OUTPATIENT
Start: 2019-12-23 | End: 2020-03-27

## 2019-12-27 ENCOUNTER — TELEPHONE (OUTPATIENT)
Dept: ENDOCRINOLOGY | Facility: CLINIC | Age: 69
End: 2019-12-27

## 2019-12-27 NOTE — TELEPHONE ENCOUNTER
Pt wnted to schedule lab appt. Last office note states that pt should fu in  6 months with labs but there are no orders in chart. Pt scheduled for lab 1/28. Please advise what labs to order?

## 2019-12-30 ENCOUNTER — CLINICAL SUPPORT (OUTPATIENT)
Dept: FAMILY MEDICINE CLINIC | Facility: CLINIC | Age: 69
End: 2019-12-30

## 2019-12-30 DIAGNOSIS — E11.69 TYPE 2 DIABETES MELLITUS WITH OTHER SPECIFIED COMPLICATION, UNSPECIFIED WHETHER LONG TERM INSULIN USE (HCC): Primary | ICD-10-CM

## 2019-12-30 DIAGNOSIS — Z23 NEED FOR INFLUENZA VACCINATION: ICD-10-CM

## 2019-12-30 DIAGNOSIS — D72.819 LEUKOPENIA, UNSPECIFIED TYPE: ICD-10-CM

## 2019-12-30 DIAGNOSIS — E55.9 VITAMIN D DEFICIENCY: ICD-10-CM

## 2019-12-30 PROCEDURE — G0008 ADMIN INFLUENZA VIRUS VAC: HCPCS | Performed by: FAMILY MEDICINE

## 2019-12-30 PROCEDURE — 90653 IIV ADJUVANT VACCINE IM: CPT | Performed by: FAMILY MEDICINE

## 2020-01-03 DIAGNOSIS — Z79.899 LONG-TERM USE OF HIGH-RISK MEDICATION: ICD-10-CM

## 2020-01-03 DIAGNOSIS — M05.9 SEROPOSITIVE RHEUMATOID ARTHRITIS (HCC): Primary | ICD-10-CM

## 2020-01-03 RX ORDER — ADALIMUMAB 40MG/0.4ML
40 KIT SUBCUTANEOUS
Qty: 6 EACH | Refills: 0 | Status: SHIPPED | OUTPATIENT
Start: 2020-01-03 | End: 2020-04-30 | Stop reason: SDUPTHER

## 2020-01-20 NOTE — TELEPHONE ENCOUNTER
Last visit:  8/13/19  Next visit:none   Last labs: 12/2/19    Rx requested:lansoprazole,Janumet,Trazodone  Pharmacy:[facility] in [place]

## 2020-01-24 RX ORDER — SITAGLIPTIN AND METFORMIN HYDROCHLORIDE 1000; 100 MG/1; MG/1
TABLET, FILM COATED, EXTENDED RELEASE ORAL
Qty: 90 TABLET | Refills: 0 | Status: SHIPPED | OUTPATIENT
Start: 2020-01-24 | End: 2020-04-21

## 2020-01-24 RX ORDER — TRAZODONE HYDROCHLORIDE 50 MG/1
TABLET ORAL
Qty: 90 TABLET | Refills: 0 | Status: SHIPPED | OUTPATIENT
Start: 2020-01-24 | End: 2020-05-18

## 2020-01-24 RX ORDER — LANSOPRAZOLE 30 MG/1
CAPSULE, DELAYED RELEASE ORAL
Qty: 90 CAPSULE | Refills: 0 | Status: SHIPPED | OUTPATIENT
Start: 2020-01-24 | End: 2020-04-21

## 2020-01-28 ENCOUNTER — LAB (OUTPATIENT)
Dept: LAB | Facility: HOSPITAL | Age: 70
End: 2020-01-28

## 2020-01-28 DIAGNOSIS — E11.69 TYPE 2 DIABETES MELLITUS WITH OTHER SPECIFIED COMPLICATION, UNSPECIFIED WHETHER LONG TERM INSULIN USE (HCC): ICD-10-CM

## 2020-01-28 DIAGNOSIS — E55.9 VITAMIN D DEFICIENCY: ICD-10-CM

## 2020-01-28 DIAGNOSIS — D72.819 LEUKOPENIA, UNSPECIFIED TYPE: ICD-10-CM

## 2020-01-28 LAB
ALBUMIN SERPL-MCNC: 4.2 G/DL (ref 3.5–5.2)
ALBUMIN/GLOB SERPL: 1.2 G/DL
ALP SERPL-CCNC: 39 U/L (ref 39–117)
ALT SERPL W P-5'-P-CCNC: 14 U/L (ref 1–33)
ANION GAP SERPL CALCULATED.3IONS-SCNC: 12.8 MMOL/L (ref 5–15)
AST SERPL-CCNC: 15 U/L (ref 1–32)
BILIRUB SERPL-MCNC: 0.2 MG/DL (ref 0.2–1.2)
BUN BLD-MCNC: 12 MG/DL (ref 8–23)
BUN/CREAT SERPL: 13.2 (ref 7–25)
CALCIUM SPEC-SCNC: 10.1 MG/DL (ref 8.6–10.5)
CHLORIDE SERPL-SCNC: 101 MMOL/L (ref 98–107)
CHOLEST SERPL-MCNC: 166 MG/DL (ref 0–200)
CO2 SERPL-SCNC: 29.2 MMOL/L (ref 22–29)
CREAT BLD-MCNC: 0.91 MG/DL (ref 0.57–1)
GFR SERPL CREATININE-BSD FRML MDRD: 61 ML/MIN/1.73
GLOBULIN UR ELPH-MCNC: 3.4 GM/DL
GLUCOSE BLD-MCNC: 94 MG/DL (ref 65–99)
HBA1C MFR BLD: 7.2 % (ref 3.5–5.6)
HDLC SERPL-MCNC: 47 MG/DL (ref 40–60)
LDLC SERPL CALC-MCNC: 86 MG/DL (ref 0–100)
LDLC/HDLC SERPL: 1.83 {RATIO}
POTASSIUM BLD-SCNC: 4 MMOL/L (ref 3.5–5.2)
PROT SERPL-MCNC: 7.6 G/DL (ref 6–8.5)
SODIUM BLD-SCNC: 143 MMOL/L (ref 136–145)
TRIGL SERPL-MCNC: 165 MG/DL (ref 0–150)
VLDLC SERPL-MCNC: 33 MG/DL (ref 5–40)

## 2020-01-28 PROCEDURE — 83036 HEMOGLOBIN GLYCOSYLATED A1C: CPT

## 2020-01-28 PROCEDURE — 80061 LIPID PANEL: CPT

## 2020-01-28 PROCEDURE — 80053 COMPREHEN METABOLIC PANEL: CPT

## 2020-01-28 PROCEDURE — 36415 COLL VENOUS BLD VENIPUNCTURE: CPT

## 2020-01-31 NOTE — TELEPHONE ENCOUNTER
Pt came in for labs to be drawn, but only orders in system are [facility]. (pt also had some [facility] orders drawn yesterday)  Please clarify if you want additional lab orders.

## 2020-01-31 NOTE — TELEPHONE ENCOUNTER
These were both ordered by other tavo Rangel/Haim...LM asking if pt had done yet. If she wants done here, you will have to reorder them under you.

## 2020-02-03 RX ORDER — AMOXICILLIN AND CLAVULANATE POTASSIUM 875; 125 MG/1; MG/1
TABLET, FILM COATED ORAL
COMMUNITY
Start: 2020-01-13 | End: 2020-02-04

## 2020-02-04 ENCOUNTER — OFFICE VISIT (OUTPATIENT)
Dept: ENDOCRINOLOGY | Facility: CLINIC | Age: 70
End: 2020-02-04

## 2020-02-04 VITALS
SYSTOLIC BLOOD PRESSURE: 135 MMHG | OXYGEN SATURATION: 98 % | BODY MASS INDEX: 29.77 KG/M2 | HEIGHT: 63 IN | WEIGHT: 168 LBS | DIASTOLIC BLOOD PRESSURE: 75 MMHG | HEART RATE: 65 BPM

## 2020-02-04 DIAGNOSIS — I10 ESSENTIAL HYPERTENSION: ICD-10-CM

## 2020-02-04 DIAGNOSIS — E78.2 MIXED HYPERLIPIDEMIA: ICD-10-CM

## 2020-02-04 DIAGNOSIS — Z79.4 TYPE 2 DIABETES MELLITUS WITH HYPERGLYCEMIA, WITH LONG-TERM CURRENT USE OF INSULIN (HCC): Primary | ICD-10-CM

## 2020-02-04 DIAGNOSIS — E11.65 TYPE 2 DIABETES MELLITUS WITH HYPERGLYCEMIA, WITH LONG-TERM CURRENT USE OF INSULIN (HCC): Primary | ICD-10-CM

## 2020-02-04 DIAGNOSIS — E11.42 DIABETIC PERIPHERAL NEUROPATHY (HCC): ICD-10-CM

## 2020-02-04 PROCEDURE — 99214 OFFICE O/P EST MOD 30 MIN: CPT | Performed by: INTERNAL MEDICINE

## 2020-02-04 NOTE — PROGRESS NOTES
Endocrine Progress Note Outpatient     Patient Care Team:  Emily Burdick DO as PCP - General    Chief Complaint: Follow-up diabetes     HPI: 69-year-old female with history of type 2 diabetes, hypertension, hyperlipidemia is here for follow-up.  For type 2 diabetes she is currently on Janumet /1000, 1 tablet daily and Novolin 70/30 insulin, 50 units before breakfast and 20 units before supper.  She now tells me that she is taking her insulin before she eats and she is trying to follow the diet.  Hypertension: Well-controlled  Hyperlipidemia: Currently on lovastatin and Crestor panel is doing well.    Past Medical History:   Diagnosis Date   • Breast mass, right    • Depression     Abstracted from [facility]   • Dexamethasone adverse reaction 2014    [facility] Osteopenia   • Diabetes mellitus, type 2 (CMS/HCC) 1988    Abstracted from [facility]   • Diabetic peripheral neuropathy (CMS/HCC)    • GERD (gastroesophageal reflux disease)     Abstracted from [facility]   • H/O mammogram     Abstracted from [facility]   • Hyperlipidemia     Abstracted from [facility]   • Hypertension     Abstracted from [facility]   • Insomnia    • Low HDL (under 40)    • Lower back pain    • Metatarsalgia    • Never smoked cigarettes    • Obesity    • Osteoarthritis    • Osteopenia     Abstracted from [facility]   • Palpitations    • Polypharmacy    • Pulmonary nodule    • RA (rheumatoid arthritis) (CMS/HCC)     Abstracted from [facility]   • Rheumatoid arthritis (CMS/HCC)    • Rheumatoid nodule (CMS/HCC) 06/2011    [facility] rt. elbow, left elbow, and epidermal inclusion cyst post. neck (June 2011)--Dr. Whitmore   • Rheumatoid nodule (CMS/HCC)     [facility] rt. thumb x 2 and rt. & lt. 3rd fingers (Oct.2012)   • Uterine prolapse     [facility]   • Uterine prolapse    • Vitamin D deficiency        Social History     Socioeconomic History   • Marital status:       Spouse name: Carson Rosa   • Number of children: 3   • Years of education: Not on file   • Highest education level: Not on file   Tobacco Use   • Smoking status: Never Smoker   • Smokeless tobacco: Never Used   Substance and Sexual Activity   • Alcohol use: No     Frequency: Never   • Drug use: Defer   • Sexual activity: Defer       Family History   Problem Relation Age of Onset   • Heart disease Mother    • Diabetes Mother    • Hypertension Mother    • Rheum arthritis Mother    • Coronary artery disease Mother    • Alcohol abuse Mother    • Heart disease Father    • Mental illness Father    • Hypertension Father    • Alcohol abuse Father    • Diabetes Sister    • Stroke Sister    • Cancer Sister    • Osteoarthritis Sister    • Rheum arthritis Sister    • Diabetes Maternal Grandfather        No Known Allergies    ROS:   Constitutional:  Admit fatigue, tiredness.    Eyes:  Denies change in visual acuity   HENT:  Denies nasal congestion or sore throat   Respiratory: denies cough, shortness of breath.   Cardiovascular:  denies chest pain, edema   GI:  Denies abdominal pain, nausea, vomiting.   Musculoskeletal:  Denies back pain or joint pain   Integument:  Denies dry skin and rash   Neurologic:  Denies headache, focal weakness or sensory changes   Endocrine:  Denies polyuria or polydipsia   Psychiatric:  Denies depression or anxiety      Vitals:    02/04/20 1000   BP: 135/75   Pulse: 65   SpO2: 98%       Physical Exam:  GEN: NAD, conversant  EYES: EOMI, PERRL, no conjunctival erythema  NECK: no thyromegaly, full ROM   CV: RRR, no murmurs/rubs/gallops, no peripheral edema  LUNG: CTAB, no wheezes/rales/ronchi  SKIN: no rashes, no acanthosis  MSK: no deformities, full ROM of all extremities  NEURO: no tremors, DTR normal  PSYCH: AOX3, appropriate mood, affect normal      Results Review:     I reviewed the patient's new clinical results.       Lab Results   Component Value Date    GLUCOSE 94 01/28/2020    BUN 12  01/28/2020    CREATININE 0.91 01/28/2020    EGFRIFNONA 61 01/28/2020    EGFRIFAFRI 59 (L) 08/13/2017    BCR 13.2 01/28/2020    K 4.0 01/28/2020    CO2 29.2 (H) 01/28/2020    CALCIUM 10.1 01/28/2020    ALBUMIN 4.20 01/28/2020    LABIL2 1.1 01/29/2019    AST 15 01/28/2020    ALT 14 01/28/2020    CHOL 166 01/28/2020    TRIG 165 (H) 01/28/2020    LDL 86 01/28/2020    HDL 47 01/28/2020     Lab Results   Component Value Date    TSH 1.400 09/05/2019   Labs from January 2020 showed an A1c of 7.2, LDL 86, triglycerides 165, BUN 12, creatinine 0.9.    Labs from July 30, 2019 showed an A1c of 7.3%, total cholesterol 141, LDL 66, triglycerides 152, BUN 13, creatinine 1.0, sodium 140, potassium 4.1, chloride 99, CO2 30, glucose 119.    Medication Review: Reviewed.       Current Outpatient Medications:   •  ACCU-CHEK SOFTCLIX LANCETS lancets, ACCU-CHEK SOFTCLIX LANCETS, Disp: , Rfl:   •  acetaminophen (TYLENOL) 500 MG tablet, Daily., Disp: , Rfl:   •  albuterol sulfate HFA (VENTOLIN HFA) 108 (90 Base) MCG/ACT inhaler, VENTOLIN  (90 Base) MCG/ACT AERS, Disp: , Rfl:   •  amLODIPine (NORVASC) 10 MG tablet, TAKE 1 TABLET BY MOUTH DAILY, Disp: 90 tablet, Rfl: 0  •  aspirin 81 MG EC tablet, Every 12 (Twelve) Hours., Disp: , Rfl:   •  atenolol (TENORMIN) 25 MG tablet, TAKE 1 TABLET BY MOUTH DAILY, Disp: 90 tablet, Rfl: 0  •  CALCIUM-VITAMIN D PO, Take  by mouth., Disp: , Rfl:   •  Cholecalciferol (VITAMIN D) 2000 units capsule, VITAMIN D 2000 UNIT CAPS, Disp: , Rfl:   •  DULoxetine (CYMBALTA) 60 MG capsule, Take 60 mg by mouth Daily., Disp: , Rfl:   •  fenofibrate 160 MG tablet, Daily., Disp: , Rfl:   •  Ferrous Sulfate (IRON) 325 (65 Fe) MG tablet, IRON 325 (65 Fe) MG TABS, Disp: , Rfl:   •  fluticasone (FLONASE ALLERGY RELIEF) 50 MCG/ACT nasal spray, FLONASE ALLERGY RELIEF 50 MCG/ACT SUSP, Disp: , Rfl:   •  glucose blood (ACCU-CHEK SIMONE PLUS) test strip, ACCU-CHEK SIMONE PLUS STRP, Disp: , Rfl:   •  HUMIRA PEN 40 MG/0.4ML  "Pen-injector Kit, Inject 40 mg under the skin into the appropriate area as directed Every 14 (Fourteen) Days., Disp: 6 each, Rfl: 0  •  HUMULIN 70/30 (70-30) 100 UNIT/ML injection, INJECT 52 UNITS BEFORE BREAKFAST AND 22 UNITS BEFORE SUPPER DX E11.65, Disp: 30 mL, Rfl: 2  •  ibuprofen (ADVIL) 200 MG tablet, Every 12 (Twelve) Hours., Disp: , Rfl:   •  Insulin Syringe-Needle U-100 (BD INSULIN SYRINGE U/F) 31G X 5/16\" 1 ML misc, BD INSULIN SYRINGE U/F 31G X 5/16\" 1 ML, Disp: , Rfl:   •  JANUMET -1000 MG tablet, TAKE 1 TABLET BY MOUTH DAILY, Disp: 90 tablet, Rfl: 0  •  L-Lysine 500 MG tablet tablet, L-LYSINE 500 MG TABS, Disp: , Rfl:   •  lansoprazole (PREVACID) 30 MG capsule, TAKE 1 CAPSULE BY MOUTH DAILY, Disp: 90 capsule, Rfl: 0  •  lisinopril-hydrochlorothiazide (PRINZIDE,ZESTORETIC) 20-25 MG per tablet, TAKE 1 TABLET BY MOUTH DAILY, Disp: 90 tablet, Rfl: 2  •  lovastatin (MEVACOR) 40 MG tablet, Daily., Disp: , Rfl:   •  traZODone (DESYREL) 50 MG tablet, TAKE 1 TABLET BY MOUTH DAILY AT BEDTIME, Disp: 90 tablet, Rfl: 0  •  vitamin B-12 (CYANOCOBALAMIN) 1000 MCG tablet, Take 1,000 mcg by mouth Daily., Disp: , Rfl:       Assessment/Plan   1. Diabetes mellitus type 2: Excellent control, continue current medication  2. Hypertension: Well-controlled, continue current medication  3. Hyperlipidemia: Well-controlled, continue current medication  4. Diabetic neuropathy: Stable.    Follow-up in 6 months with labs.            Dano Rangel MD FACE.  06/15/19  4:34 PM      EMR Dragon / transcription disclaimer:     \"Dictated utilizing Dragon dictation\".                 "

## 2020-02-04 NOTE — PATIENT INSTRUCTIONS
Continue current medication  Always keep glucose source with you in case of low blood sugar  Please get regular eye exam and flu vaccine  Follow-up in 6 months with labs.  Always bring blood sugar records for review on the visits.

## 2020-02-12 RX ORDER — INSULIN NPH HUM/REG INSULIN HM 70-30/ML
VIAL (ML) SUBCUTANEOUS
Qty: 30 ML | Refills: 2 | Status: SHIPPED | OUTPATIENT
Start: 2020-02-12 | End: 2020-05-13

## 2020-03-06 ENCOUNTER — OFFICE VISIT (OUTPATIENT)
Dept: FAMILY MEDICINE CLINIC | Facility: CLINIC | Age: 70
End: 2020-03-06

## 2020-03-06 VITALS
SYSTOLIC BLOOD PRESSURE: 135 MMHG | HEART RATE: 70 BPM | HEIGHT: 63 IN | BODY MASS INDEX: 29.95 KG/M2 | RESPIRATION RATE: 16 BRPM | TEMPERATURE: 98.2 F | WEIGHT: 169 LBS | OXYGEN SATURATION: 97 % | DIASTOLIC BLOOD PRESSURE: 75 MMHG

## 2020-03-06 DIAGNOSIS — I10 ESSENTIAL HYPERTENSION: ICD-10-CM

## 2020-03-06 DIAGNOSIS — J02.9 ACUTE PHARYNGITIS, UNSPECIFIED ETIOLOGY: Primary | ICD-10-CM

## 2020-03-06 DIAGNOSIS — F32.9 REACTIVE DEPRESSION: ICD-10-CM

## 2020-03-06 PROCEDURE — 99213 OFFICE O/P EST LOW 20 MIN: CPT | Performed by: FAMILY MEDICINE

## 2020-03-06 RX ORDER — GABAPENTIN 300 MG/1
300 CAPSULE ORAL NIGHTLY
Qty: 30 CAPSULE | Refills: 2 | Status: SHIPPED | OUTPATIENT
Start: 2020-03-06 | End: 2020-05-11 | Stop reason: SDUPTHER

## 2020-03-06 RX ORDER — ESCITALOPRAM OXALATE 10 MG/1
10 TABLET ORAL NIGHTLY
Qty: 30 TABLET | Refills: 1 | Status: SHIPPED | OUTPATIENT
Start: 2020-03-06 | End: 2020-05-20 | Stop reason: SDUPTHER

## 2020-03-06 RX ORDER — AMOXICILLIN 875 MG/1
875 TABLET, COATED ORAL 2 TIMES DAILY
Qty: 20 TABLET | Refills: 0 | Status: SHIPPED | OUTPATIENT
Start: 2020-03-06 | End: 2020-05-13

## 2020-03-06 NOTE — PROGRESS NOTES
"Subjective   Jenna Rosa is a 70 y.o. female.     Chief Complaint   Patient presents with   • Sore Throat     sore throat x3days    • Anxiety     anxiety/stress         Current Outpatient Medications:   •  ACCU-CHEK SOFTCLIX LANCETS lancets, ACCU-CHEK SOFTCLIX LANCETS, Disp: , Rfl:   •  acetaminophen (TYLENOL) 500 MG tablet, Daily., Disp: , Rfl:   •  albuterol sulfate HFA (VENTOLIN HFA) 108 (90 Base) MCG/ACT inhaler, VENTOLIN  (90 Base) MCG/ACT AERS, Disp: , Rfl:   •  amLODIPine (NORVASC) 10 MG tablet, TAKE 1 TABLET BY MOUTH DAILY, Disp: 90 tablet, Rfl: 0  •  aspirin 81 MG EC tablet, Every 12 (Twelve) Hours., Disp: , Rfl:   •  atenolol (TENORMIN) 25 MG tablet, TAKE 1 TABLET BY MOUTH DAILY, Disp: 90 tablet, Rfl: 0  •  CALCIUM-VITAMIN D PO, Take  by mouth., Disp: , Rfl:   •  Cholecalciferol (VITAMIN D) 2000 units capsule, VITAMIN D 2000 UNIT CAPS, Disp: , Rfl:   •  fenofibrate 160 MG tablet, Daily., Disp: , Rfl:   •  Ferrous Sulfate (IRON) 325 (65 Fe) MG tablet, IRON 325 (65 Fe) MG TABS, Disp: , Rfl:   •  fluticasone (FLONASE ALLERGY RELIEF) 50 MCG/ACT nasal spray, FLONASE ALLERGY RELIEF 50 MCG/ACT SUSP, Disp: , Rfl:   •  glucose blood (ACCU-CHEK SIMONE PLUS) test strip, Use to check BS 2 times daily DX E11.65, Disp: 200 each, Rfl: 3  •  HUMIRA PEN 40 MG/0.4ML Pen-injector Kit, Inject 40 mg under the skin into the appropriate area as directed Every 14 (Fourteen) Days., Disp: 6 each, Rfl: 0  •  HUMULIN 70/30 (70-30) 100 UNIT/ML injection, INJECT 52 UNITS BEFORE BREAKFAST AND 22 UNITS BEFORE SUPPER DX E11.65, Disp: 30 mL, Rfl: 2  •  ibuprofen (ADVIL) 200 MG tablet, Every 12 (Twelve) Hours., Disp: , Rfl:   •  Insulin Syringe-Needle U-100 (BD INSULIN SYRINGE U/F) 31G X 5/16\" 1 ML misc, BD INSULIN SYRINGE U/F 31G X 5/16\" 1 ML, Disp: , Rfl:   •  JANUMET -1000 MG tablet, TAKE 1 TABLET BY MOUTH DAILY, Disp: 90 tablet, Rfl: 0  •  L-Lysine 500 MG tablet tablet, L-LYSINE 500 MG TABS, Disp: , Rfl:   •  " lansoprazole (PREVACID) 30 MG capsule, TAKE 1 CAPSULE BY MOUTH DAILY, Disp: 90 capsule, Rfl: 0  •  lisinopril-hydrochlorothiazide (PRINZIDE,ZESTORETIC) 20-25 MG per tablet, TAKE 1 TABLET BY MOUTH DAILY, Disp: 90 tablet, Rfl: 2  •  lovastatin (MEVACOR) 40 MG tablet, Daily., Disp: , Rfl:   •  traZODone (DESYREL) 50 MG tablet, TAKE 1 TABLET BY MOUTH DAILY AT BEDTIME, Disp: 90 tablet, Rfl: 0  •  vitamin B-12 (CYANOCOBALAMIN) 1000 MCG tablet, Take 1,000 mcg by mouth Daily., Disp: , Rfl:   •  amoxicillin (AMOXIL) 875 MG tablet, Take 1 tablet by mouth 2 (Two) Times a Day., Disp: 20 tablet, Rfl: 0  •  escitalopram (LEXAPRO) 10 MG tablet, Take 1 tablet by mouth Every Night., Disp: 30 tablet, Rfl: 1  •  gabapentin (NEURONTIN) 300 MG capsule, Take 1 capsule by mouth Every Night., Disp: 30 capsule, Rfl: 2    Past Medical History:   Diagnosis Date   • Breast mass, right    • Depression     [facility]   • Dexamethasone adverse reaction 2014    [facility] from [facility] Osteopenia   • Diabetes mellitus, type 2 (CMS/HCC) 1988    Abstracted from [facility]   • Diabetic peripheral neuropathy (CMS/HCC)    • GERD (gastroesophageal reflux disease)     [facility]   • H/O mammogram     [facility]   • Hyperlipidemia     Abstracted from [facility]   • Hypertension     Abstract[facility]   • Insomnia    • Low HDL (under 40)    • Lower back pain    • Metatarsalgia    • Never smoked cigarettes    • Obesity    • Osteoarthritis    • Osteopenia     [facility]   • Palpitations    • Polypharmacy    • Pulmonary nodule    • RA (rheumatoid arthritis) (CMS/HCC)     [facility]   • Rheumatoid arthritis (CMS/HCC)    • Rheumatoid nodule (CMS/HCC) 06/2011    [facility] elbow, left elbow, and epidermal inclusion cyst post. neck (June 2011)--Dr. Whitmore   • Rheumatoid nodule (CMS/HCC)     [facility] rt. thumb x 2 and rt. & lt. 3rd  fingers (Oct.2012)   • Uterine prolapse     [facility]   • Uterine prolapse    • Vitamin D deficiency        Past Surgical History:   Procedure Laterality Date   • BILATERAL SALPINGO OOPHORECTOMY      Abstracted from [facility] endometriosis   • CHOLECYSTECTOMY      Abstracted from [facility]   • COLONOSCOPY  2017    Abstracted from [facility] polyps x 3= 2017, rech 2020 [facility]   • CYST REMOVAL      Abstracted from [facility] Seb cyst on neck   • OTHER SURGICAL HISTORY      Abstracted from [facility] Uterine Prolapse repair   • TOTAL ABDOMINAL HYSTERECTOMY      Abstracted from [facility] BSO---endometriosis   • TOTAL ABDOMINAL HYSTERECTOMY WITH SALPINGO OOPHORECTOMY         Family History   Problem Relation Age of Onset   • Heart disease Mother    • Diabetes Mother    • Hypertension Mother    • Rheum arthritis Mother    • Coronary artery disease Mother    • Alcohol abuse Mother    • Heart disease Father    • Mental illness Father    • Hypertension Father    • Alcohol abuse Father    • Diabetes Sister    • Stroke Sister    • Cancer Sister    • Osteoarthritis Sister    • Rheum arthritis Sister    • Diabetes Maternal Grandfather        Social History     Socioeconomic History   • Marital status:      Spouse name: Carson Rosa   • Number of children: 3   • Years of education: Not on file   • Highest education level: Not on file   Tobacco Use   • Smoking status: Never Smoker   • Smokeless tobacco: Never Used   Substance and Sexual Activity   • Alcohol use: No     Frequency: Never   • Drug use: Defer   • Sexual activity: Defer       71y/o C female here w/ 3days of s. Throat and f/u on Anx    Pt states her 's cancer is spreading and she is trying to take care of him but it's exhausting and she is stressed and tired; she does have family nearby but hasn't asked them for much help; he is more confused lately and still undergoing radiation    Pt did get her flu shot but has had some exposure to  strep and now has a sore throat       The following portions of the patient's history were reviewed and updated as appropriate: allergies, current medications, past family history, past medical history, past social history, past surgical history and problem list.    Review of Systems   Constitutional: Negative for chills, fatigue and fever.   HENT: Positive for sore throat. Negative for congestion, ear discharge, ear pain, postnasal drip, rhinorrhea, sinus pressure, sneezing, swollen glands, trouble swallowing and voice change.    Eyes: Negative for pain, discharge and redness.   Respiratory: Negative for cough, shortness of breath, wheezing and stridor.    Skin: Negative for rash.   Allergic/Immunologic: Negative for environmental allergies.   Neurological: Positive for numbness (RLS).   Psychiatric/Behavioral: Positive for dysphoric mood and sleep disturbance.       Vitals:    03/06/20 1331   BP: 135/75   Pulse: 70   Resp: 16   Temp: 98.2 °F (36.8 °C)   SpO2: 97%       Objective   Physical Exam   Constitutional: She is oriented to person, place, and time. She appears well-developed and well-nourished. No distress.   HENT:   Head: Normocephalic and atraumatic.   Right Ear: External ear normal.   Left Ear: External ear normal.   Nose: Nose normal.   Mouth/Throat: Uvula is midline and mucous membranes are normal. Oropharyngeal exudate and posterior oropharyngeal erythema present. No tonsillar abscesses.   Eyes: Pupils are equal, round, and reactive to light. Conjunctivae and EOM are normal. Right eye exhibits no discharge. Left eye exhibits no discharge. No scleral icterus.   Neck: Normal range of motion. Neck supple. No thyromegaly present.   Cardiovascular: Normal rate, regular rhythm, normal heart sounds and intact distal pulses.   No murmur heard.  Pulmonary/Chest: Effort normal and breath sounds normal. No respiratory distress. She has no wheezes. She has no rales.   Abdominal: Soft. Bowel sounds are normal.    Lymphadenopathy:        Head (right side): No submental, no submandibular, no preauricular, no posterior auricular and no occipital adenopathy present.        Head (left side): No submental, no submandibular, no preauricular, no posterior auricular and no occipital adenopathy present.     She has cervical adenopathy.        Right cervical: Superficial cervical adenopathy present.        Left cervical: Superficial cervical adenopathy present.   Neurological: She is alert and oriented to person, place, and time.   Skin: Skin is warm and dry. No rash noted. She is not diaphoretic. No erythema. No pallor.   Psychiatric: She has a normal mood and affect. Her behavior is normal. Judgment and thought content normal.   Nursing note and vitals reviewed.        Assessment/Plan   Jenna was seen today for sore throat and anxiety.    Diagnoses and all orders for this visit:    Acute pharyngitis, unspecified etiology    Reactive depression    Essential hypertension    Other orders  -     amoxicillin (AMOXIL) 875 MG tablet; Take 1 tablet by mouth 2 (Two) Times a Day.  -     gabapentin (NEURONTIN) 300 MG capsule; Take 1 capsule by mouth Every Night.  -     escitalopram (LEXAPRO) 10 MG tablet; Take 1 tablet by mouth Every Night.    TRIAL OF Lexapro and d/c cymbalta  Increase Neurontin to 300mg qhs

## 2020-03-20 RX ORDER — FENOFIBRATE 160 MG/1
TABLET ORAL
Qty: 90 TABLET | Refills: 3 | Status: SHIPPED | OUTPATIENT
Start: 2020-03-20 | End: 2020-05-13

## 2020-03-20 NOTE — TELEPHONE ENCOUNTER
Last visit:  3/6/20  Next visit: 3/27/20  Last labs: 1/28/20    Rx requested: Fenofibrate  Pharmacy:[facility]

## 2020-03-27 RX ORDER — AMLODIPINE BESYLATE 10 MG/1
TABLET ORAL
Qty: 30 TABLET | Refills: 0 | Status: SHIPPED | OUTPATIENT
Start: 2020-03-27 | End: 2020-05-01 | Stop reason: SDUPTHER

## 2020-03-27 RX ORDER — ATENOLOL 25 MG/1
TABLET ORAL
Qty: 30 TABLET | Refills: 0 | Status: SHIPPED | OUTPATIENT
Start: 2020-03-27 | End: 2020-05-01 | Stop reason: SDUPTHER

## 2020-03-30 RX ORDER — LOVASTATIN 40 MG/1
TABLET ORAL
Qty: 90 TABLET | Refills: 1 | Status: SHIPPED | OUTPATIENT
Start: 2020-03-30 | End: 2020-05-13

## 2020-04-21 RX ORDER — SITAGLIPTIN AND METFORMIN HYDROCHLORIDE 1000; 100 MG/1; MG/1
TABLET, FILM COATED, EXTENDED RELEASE ORAL
Qty: 90 TABLET | Refills: 0 | Status: SHIPPED | OUTPATIENT
Start: 2020-04-21 | End: 2020-05-13

## 2020-04-21 RX ORDER — LANSOPRAZOLE 30 MG/1
CAPSULE, DELAYED RELEASE ORAL
Qty: 90 CAPSULE | Refills: 0 | Status: SHIPPED | OUTPATIENT
Start: 2020-04-21 | End: 2020-07-21

## 2020-04-21 NOTE — TELEPHONE ENCOUNTER
Last visit: 3/6/20  Next visit: none  Last labs:1/28/20    Rx requested: Lansoprazole & Janumet  Pharmacy: [facility] in [place]

## 2020-04-28 ENCOUNTER — OFFICE VISIT (OUTPATIENT)
Dept: RHEUMATOLOGY | Facility: CLINIC | Age: 70
End: 2020-04-28

## 2020-04-28 VITALS — BODY MASS INDEX: 29.76 KG/M2 | WEIGHT: 168 LBS

## 2020-04-28 DIAGNOSIS — Z79.899 LONG-TERM USE OF HIGH-RISK MEDICATION: ICD-10-CM

## 2020-04-28 DIAGNOSIS — M85.80 OSTEOPENIA, UNSPECIFIED LOCATION: ICD-10-CM

## 2020-04-28 DIAGNOSIS — G62.9 NEUROPATHY: ICD-10-CM

## 2020-04-28 DIAGNOSIS — M05.9 SEROPOSITIVE RHEUMATOID ARTHRITIS (HCC): Primary | ICD-10-CM

## 2020-04-28 PROCEDURE — 99442 PR PHYS/QHP TELEPHONE EVALUATION 11-20 MIN: CPT | Performed by: INTERNAL MEDICINE

## 2020-04-28 NOTE — PROGRESS NOTES
You have chosen to receive care through a telephone visit. Do you consent to use a telephone visit for your medical care today? Yes    HPI:  The patient is a 70-year-old female who suffers from seropositive rheumatoid arthritis.  She uses Humira 40 mg subcutaneously every other week.  She was seen in the office 12/2/2019.  At the time of the visit, the patient had signs of low disease activity of her RA, she did have signs of osteoarthritis.  I proceeded with an ultrasound of the hands which were done this past. 2020 and they were negative for signs of active inflammatory arthritis.    On the last visit, the patient was feeling overwhelmed and achy all over, her  has recently been diagnosed with cancer, fortunately he passed away this past March 2020.  He has been feeling more symptomatic since then, she has pain in both hands wrists, she has pain in both feet notes tingling and burning at times.  She had been started on Neurontin by her  mg at night and that helps somewhat.  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    She denies fever or chills, no chest pain, shortness of breath or cough.  All other systems were reviewed and they were negative.    No recent laboratories for review today.        Rheumatologic history:  1. Seropositive rheumatoid arthritis diagnosed by Dr. Burrows from [facility] in 1998  Failure to multiple medications, patient does not recall the names  Most recent treatment  at the moment of transitioning care due, MTX 7.5 mg and Humira.     MTX discontinued because of leukopenia  Humira to this date     HEPATITIS PANEL (7/18) NEGATIVE  TSPOT (2/18) NEGATIVE     2.osteopenia; DEXA 7/2016 Femur T-score -1.5 & spine T-score -0.9       Past Medical History:   Diagnosis Date   • Breast mass, right    • Depression     Abstracted from [facility]   • Dexamethasone adverse reaction 2014    Abstracted from  [facility] Osteopenia   • Diabetes mellitus, type 2 (CMS/HCC) 1988    Abstracted from [facility]   • Diabetic peripheral neuropathy (CMS/HCC)    • GERD (gastroesophageal reflux disease)     Abstracted from [facility]   • H/O mammogram     Abstracted from [facility]   • Hyperlipidemia     Abstracted from [facility]   • Hypertension     Abstracted from [facility]   • Insomnia    • Low HDL (under 40)    • Lower back pain    • Metatarsalgia    • Never smoked cigarettes    • Obesity    • Osteoarthritis    • Osteopenia     Abstracted from [facility]   • Palpitations    • Polypharmacy    • Pulmonary nodule    • RA (rheumatoid arthritis) (CMS/HCC)     Abstracted from [facility]   • Rheumatoid arthritis (CMS/HCC)    • Rheumatoid nodule (CMS/HCC) 06/2011    Abstracted from [facility] rt. elbow, left elbow, and epidermal inclusion cyst post. neck (June 2011)--Dr. Whitmore   • Rheumatoid nodule (CMS/HCC)     Abstracted from [facility] rt. thumb x 2 and rt. & lt. 3rd fingers (Oct.2012)   • Uterine prolapse     Abstracted from [facility]   • Uterine prolapse    • Vitamin D deficiency        Current Outpatient Medications   Medication Sig Dispense Refill   • ACCU-CHEK SOFTCLIX LANCETS lancets ACCU-CHEK SOFTCLIX LANCETS     • acetaminophen (TYLENOL) 500 MG tablet Daily.     • albuterol sulfate HFA (VENTOLIN HFA) 108 (90 Base) MCG/ACT inhaler VENTOLIN  (90 Base) MCG/ACT AERS     • amLODIPine (NORVASC) 10 MG tablet TAKE 1 TABLET BY MOUTH DAILY 30 tablet 0   • amoxicillin (AMOXIL) 875 MG tablet Take 1 tablet by mouth 2 (Two) Times a Day. 20 tablet 0   • aspirin 81 MG EC tablet Every 12 (Twelve) Hours.     • atenolol (TENORMIN) 25 MG tablet TAKE 1 TABLET BY MOUTH DAILY 30 tablet 0   • CALCIUM-VITAMIN D PO Take  by mouth.     • Cholecalciferol (VITAMIN D) 2000 units capsule VITAMIN D 2000 UNIT CAPS     • escitalopram (LEXAPRO) 10 MG tablet Take 1 tablet by mouth Every Night. 30 tablet 1   • fenofibrate 160 MG tablet TAKE 1 TABLET  "BY MOUTH DAILY 90 tablet 3   • Ferrous Sulfate (IRON) 325 (65 Fe) MG tablet IRON 325 (65 Fe) MG TABS     • fluticasone (FLONASE ALLERGY RELIEF) 50 MCG/ACT nasal spray FLONASE ALLERGY RELIEF 50 MCG/ACT SUSP     • gabapentin (NEURONTIN) 300 MG capsule Take 1 capsule by mouth Every Night. 30 capsule 2   • glucose blood (ACCU-CHEK SIMONE PLUS) test strip Use to check BS 2 times daily DX E11.65 200 each 3   • HUMIRA PEN 40 MG/0.4ML Pen-injector Kit Inject 40 mg under the skin into the appropriate area as directed Every 14 (Fourteen) Days. 6 each 0   • HUMULIN 70/30 (70-30) 100 UNIT/ML injection INJECT 52 UNITS BEFORE BREAKFAST AND 22 UNITS BEFORE SUPPER DX E11.65 30 mL 2   • ibuprofen (ADVIL) 200 MG tablet Every 12 (Twelve) Hours.     • Insulin Syringe-Needle U-100 (BD INSULIN SYRINGE U/F) 31G X 5/16\" 1 ML misc BD INSULIN SYRINGE U/F 31G X 5/16\" 1 ML     • JANUMET -1000 MG tablet TAKE 1 TABLET BY MOUTH DAILY 90 tablet 0   • L-Lysine 500 MG tablet tablet L-LYSINE 500 MG TABS     • lansoprazole (PREVACID) 30 MG capsule TAKE 1 CAPSULE BY MOUTH DAILY 90 capsule 0   • lisinopril-hydrochlorothiazide (PRINZIDE,ZESTORETIC) 20-25 MG per tablet TAKE 1 TABLET BY MOUTH DAILY 90 tablet 2   • lovastatin (MEVACOR) 40 MG tablet TAKE 1 TABLET BY MOUTH DAILY 90 tablet 1   • traZODone (DESYREL) 50 MG tablet TAKE 1 TABLET BY MOUTH DAILY AT BEDTIME 90 tablet 0   • vitamin B-12 (CYANOCOBALAMIN) 1000 MCG tablet Take 1,000 mcg by mouth Daily.       No current facility-administered medications for this visit.        Physical exam:    There were no vitals filed for this visit.   Assessment:  1.  Seropositive rheumatoid arthritis.  Her evaluation is challenging.  Will order laboratories today including a Vectra DA.  For now we will continue with Humira with no changes.  It is reassuring that her last ultrasound of the hands that were done roughly 2 months ago, was negative for signs of active inflammatory arthritis.    2.  High risk long-term " medications.      Colonoscopy- 6/2017 ; Pap Smear  2 years ago   ; Mammogram- 2019  Vaccinations: Flu 12/30/19 ; Pneumovax- 10/19/2017 23: 7/12/2016   ; Zoster-No  DEXA Scan:  7/19/ 2016, 2/19 osteopenia        Ca+ / Vit D: Yes, both  Last Imaging Studies: CXR: 9/5/19   Hands//us: 12/2/2019 Feet: 5/3/18  Hepatitis panel, HIV, QTB/PPD  12/2/19    3.  Osteopenia.  Recommend to take calcium and vitamin D.  Laboratories will be done today.    4.  Left shoulder arthropathy/tendinopathy.  She feels better and not as symptomatic.    5.  Diabetes and peripheral neuropathy.  She has been recently started on gabapentin 300 mg p.o. at night.  Continues to refer to burning pain in her lower extremities.  He will give a trial of 300 mg of gabapentin twice daily.    .    Plan:  Continue with Humira 40 mg subcutaneously every other week  Laboratories to be done this week, orders sent  Increase gabapentin to 300 mg 1 tablet p.o. twice daily and continue to follow with PCP  RCT 3 months or sooner if needed.    20 minutes were spent in this encounter.    Orders Placed This Encounter   Procedures   • Comprehensive Metabolic Panel   • C-reactive Protein   • Sedimentation Rate   • Vitamin D 25 Hydroxy   • Vectra(R) DA Disease Activity   • CBC With Manual Differential

## 2020-04-29 RX ORDER — ATENOLOL 25 MG/1
TABLET ORAL
Qty: 30 TABLET | Refills: 0 | OUTPATIENT
Start: 2020-04-29

## 2020-04-29 RX ORDER — AMLODIPINE BESYLATE 10 MG/1
TABLET ORAL
Qty: 30 TABLET | Refills: 0 | OUTPATIENT
Start: 2020-04-29

## 2020-04-30 RX ORDER — ADALIMUMAB 40MG/0.4ML
40 KIT SUBCUTANEOUS
Qty: 6 EACH | Refills: 0 | Status: SHIPPED | OUTPATIENT
Start: 2020-04-30 | End: 2020-07-01 | Stop reason: SDUPTHER

## 2020-05-01 ENCOUNTER — TELEPHONE (OUTPATIENT)
Dept: FAMILY MEDICINE CLINIC | Facility: CLINIC | Age: 70
End: 2020-05-01

## 2020-05-01 RX ORDER — ATENOLOL 25 MG/1
25 TABLET ORAL DAILY
Qty: 90 TABLET | Refills: 0 | Status: SHIPPED | OUTPATIENT
Start: 2020-05-01 | End: 2020-05-13

## 2020-05-01 RX ORDER — AMLODIPINE BESYLATE 10 MG/1
10 TABLET ORAL DAILY
Qty: 90 TABLET | Refills: 0 | Status: SHIPPED | OUTPATIENT
Start: 2020-05-01 | End: 2020-07-31 | Stop reason: SDUPTHER

## 2020-05-01 NOTE — TELEPHONE ENCOUNTER
Pt was wondering if you can take over her BP refills, instead of Dr Figueroa. It's just easier for her to go through 1 provider. - Veto    Atenolol 25 mg qd  Norvasc 10 mg qd

## 2020-05-04 ENCOUNTER — APPOINTMENT (OUTPATIENT)
Dept: LAB | Facility: HOSPITAL | Age: 70
End: 2020-05-04

## 2020-05-04 ENCOUNTER — LAB REQUISITION (OUTPATIENT)
Dept: LAB | Facility: HOSPITAL | Age: 70
End: 2020-05-04

## 2020-05-04 DIAGNOSIS — M06.09 RHEUMATOID ARTHRITIS WITHOUT RHEUMATOID FACTOR, MULTIPLE SITES (HCC): ICD-10-CM

## 2020-05-04 LAB
25(OH)D3 SERPL-MCNC: 49.1 NG/ML (ref 30–100)
ALBUMIN SERPL-MCNC: 4 G/DL (ref 3.5–5.2)
ALBUMIN/GLOB SERPL: 1.1 G/DL
ALP SERPL-CCNC: 43 U/L (ref 39–117)
ALT SERPL W P-5'-P-CCNC: 12 U/L (ref 1–33)
ANION GAP SERPL CALCULATED.3IONS-SCNC: 13.6 MMOL/L (ref 5–15)
ANISOCYTOSIS BLD QL: ABNORMAL
AST SERPL-CCNC: 15 U/L (ref 1–32)
BASOPHILS # BLD MANUAL: 0.05 10*3/MM3 (ref 0–0.2)
BASOPHILS NFR BLD AUTO: 1 % (ref 0–1.5)
BILIRUB SERPL-MCNC: 0.3 MG/DL (ref 0.2–1.2)
BUN BLD-MCNC: 14 MG/DL (ref 8–23)
BUN/CREAT SERPL: 16.7 (ref 7–25)
CALCIUM SPEC-SCNC: 9.5 MG/DL (ref 8.6–10.5)
CHLORIDE SERPL-SCNC: 100 MMOL/L (ref 98–107)
CO2 SERPL-SCNC: 26.4 MMOL/L (ref 22–29)
CREAT BLD-MCNC: 0.84 MG/DL (ref 0.57–1)
CRP SERPL-MCNC: 0.21 MG/DL (ref 0–0.5)
DEPRECATED RDW RBC AUTO: 41.2 FL (ref 37–54)
EOSINOPHIL # BLD MANUAL: 0.49 10*3/MM3 (ref 0–0.4)
EOSINOPHIL NFR BLD MANUAL: 9.9 % (ref 0.3–6.2)
ERYTHROCYTE [DISTWIDTH] IN BLOOD BY AUTOMATED COUNT: 12.7 % (ref 12.3–15.4)
ERYTHROCYTE [SEDIMENTATION RATE] IN BLOOD: 18 MM/HR (ref 0–30)
GFR SERPL CREATININE-BSD FRML MDRD: 67 ML/MIN/1.73
GLOBULIN UR ELPH-MCNC: 3.6 GM/DL
GLUCOSE BLD-MCNC: 132 MG/DL (ref 65–99)
HCT VFR BLD AUTO: 37.4 % (ref 34–46.6)
HGB BLD-MCNC: 12.5 G/DL (ref 12–15.9)
LYMPHOCYTES # BLD MANUAL: 2.7 10*3/MM3 (ref 0.7–3.1)
LYMPHOCYTES NFR BLD MANUAL: 54.5 % (ref 19.6–45.3)
LYMPHOCYTES NFR BLD MANUAL: 6.9 % (ref 5–12)
MCH RBC QN AUTO: 29.6 PG (ref 26.6–33)
MCHC RBC AUTO-ENTMCNC: 33.4 G/DL (ref 31.5–35.7)
MCV RBC AUTO: 88.4 FL (ref 79–97)
MICROCYTES BLD QL: ABNORMAL
MONOCYTES # BLD AUTO: 0.34 10*3/MM3 (ref 0.1–0.9)
NEUTROPHILS # BLD AUTO: 1.37 10*3/MM3 (ref 1.7–7)
NEUTROPHILS NFR BLD MANUAL: 27.7 % (ref 42.7–76)
PLAT MORPH BLD: NORMAL
PLATELET # BLD AUTO: 309 10*3/MM3 (ref 140–450)
PMV BLD AUTO: 10 FL (ref 6–12)
POIKILOCYTOSIS BLD QL SMEAR: ABNORMAL
POTASSIUM BLD-SCNC: 4.1 MMOL/L (ref 3.5–5.2)
PROT SERPL-MCNC: 7.6 G/DL (ref 6–8.5)
RBC # BLD AUTO: 4.23 10*6/MM3 (ref 3.77–5.28)
SODIUM BLD-SCNC: 140 MMOL/L (ref 136–145)
WBC MORPH BLD: NORMAL
WBC NRBC COR # BLD: 4.96 10*3/MM3 (ref 3.4–10.8)

## 2020-05-04 PROCEDURE — 36415 COLL VENOUS BLD VENIPUNCTURE: CPT | Performed by: INTERNAL MEDICINE

## 2020-05-04 PROCEDURE — 85027 COMPLETE CBC AUTOMATED: CPT | Performed by: INTERNAL MEDICINE

## 2020-05-04 PROCEDURE — 82306 VITAMIN D 25 HYDROXY: CPT | Performed by: INTERNAL MEDICINE

## 2020-05-04 PROCEDURE — 80053 COMPREHEN METABOLIC PANEL: CPT | Performed by: INTERNAL MEDICINE

## 2020-05-04 PROCEDURE — 85652 RBC SED RATE AUTOMATED: CPT | Performed by: INTERNAL MEDICINE

## 2020-05-04 PROCEDURE — 86140 C-REACTIVE PROTEIN: CPT | Performed by: INTERNAL MEDICINE

## 2020-05-04 PROCEDURE — 85007 BL SMEAR W/DIFF WBC COUNT: CPT | Performed by: INTERNAL MEDICINE

## 2020-05-11 DIAGNOSIS — G62.9 NEUROPATHY: Primary | ICD-10-CM

## 2020-05-11 RX ORDER — GABAPENTIN 300 MG/1
300 CAPSULE ORAL 2 TIMES DAILY
Qty: 180 CAPSULE | Refills: 0 | Status: SHIPPED | OUTPATIENT
Start: 2020-05-11 | End: 2020-05-20 | Stop reason: SDUPTHER

## 2020-05-13 ENCOUNTER — HOSPITAL ENCOUNTER (OUTPATIENT)
Facility: HOSPITAL | Age: 70
Setting detail: OBSERVATION
Discharge: HOME | End: 2020-05-15
Attending: INTERNAL MEDICINE | Admitting: INTERNAL MEDICINE

## 2020-05-13 ENCOUNTER — APPOINTMENT (OUTPATIENT)
Dept: CARDIOLOGY | Facility: HOSPITAL | Age: 70
End: 2020-05-13

## 2020-05-13 ENCOUNTER — APPOINTMENT (OUTPATIENT)
Dept: GENERAL RADIOLOGY | Facility: HOSPITAL | Age: 70
End: 2020-05-13

## 2020-05-13 DIAGNOSIS — F32.9 REACTIVE DEPRESSION: ICD-10-CM

## 2020-05-13 DIAGNOSIS — R00.2 HEART PALPITATIONS: Primary | ICD-10-CM

## 2020-05-13 LAB
ALBUMIN SERPL-MCNC: 4.1 G/DL (ref 3.5–5.2)
ALBUMIN/GLOB SERPL: 1.2 G/DL
ALP SERPL-CCNC: 43 U/L (ref 39–117)
ALT SERPL W P-5'-P-CCNC: 12 U/L (ref 1–33)
ANION GAP SERPL CALCULATED.3IONS-SCNC: 14 MMOL/L (ref 5–15)
AST SERPL-CCNC: 17 U/L (ref 1–32)
BACTERIA UR QL AUTO: ABNORMAL /HPF
BASOPHILS # BLD AUTO: 0.1 10*3/MM3 (ref 0–0.2)
BASOPHILS NFR BLD AUTO: 0.9 % (ref 0–1.5)
BILIRUB SERPL-MCNC: 0.2 MG/DL (ref 0.2–1.2)
BILIRUB UR QL STRIP: NEGATIVE
BUN BLD-MCNC: 12 MG/DL (ref 8–23)
BUN/CREAT SERPL: 13 (ref 7–25)
CALCIUM SPEC-SCNC: 10.1 MG/DL (ref 8.6–10.5)
CHLORIDE SERPL-SCNC: 100 MMOL/L (ref 98–107)
CLARITY UR: CLEAR
CO2 SERPL-SCNC: 26 MMOL/L (ref 22–29)
COLOR UR: YELLOW
CREAT BLD-MCNC: 0.92 MG/DL (ref 0.57–1)
DEPRECATED RDW RBC AUTO: 40.7 FL (ref 37–54)
EOSINOPHIL # BLD AUTO: 0.3 10*3/MM3 (ref 0–0.4)
EOSINOPHIL NFR BLD AUTO: 4 % (ref 0.3–6.2)
ERYTHROCYTE [DISTWIDTH] IN BLOOD BY AUTOMATED COUNT: 13.4 % (ref 12.3–15.4)
GFR SERPL CREATININE-BSD FRML MDRD: 61 ML/MIN/1.73
GLOBULIN UR ELPH-MCNC: 3.4 GM/DL
GLUCOSE BLD-MCNC: 219 MG/DL (ref 65–99)
GLUCOSE BLDC GLUCOMTR-MCNC: 135 MG/DL (ref 70–105)
GLUCOSE BLDC GLUCOMTR-MCNC: 188 MG/DL (ref 70–105)
GLUCOSE BLDC GLUCOMTR-MCNC: 267 MG/DL (ref 70–105)
GLUCOSE UR STRIP-MCNC: ABNORMAL MG/DL
HBA1C MFR BLD: 7.3 % (ref 3.5–5.6)
HCT VFR BLD AUTO: 38.2 % (ref 34–46.6)
HGB BLD-MCNC: 13.3 G/DL (ref 12–15.9)
HGB UR QL STRIP.AUTO: NEGATIVE
HOLD SPECIMEN: NORMAL
HYALINE CASTS UR QL AUTO: ABNORMAL /LPF
KETONES UR QL STRIP: NEGATIVE
LEUKOCYTE ESTERASE UR QL STRIP.AUTO: ABNORMAL
LIPASE SERPL-CCNC: 31 U/L (ref 13–60)
LYMPHOCYTES # BLD AUTO: 2.9 10*3/MM3 (ref 0.7–3.1)
LYMPHOCYTES NFR BLD AUTO: 42.8 % (ref 19.6–45.3)
MCH RBC QN AUTO: 30.2 PG (ref 26.6–33)
MCHC RBC AUTO-ENTMCNC: 34.7 G/DL (ref 31.5–35.7)
MCV RBC AUTO: 86.8 FL (ref 79–97)
MONOCYTES # BLD AUTO: 0.5 10*3/MM3 (ref 0.1–0.9)
MONOCYTES NFR BLD AUTO: 7.3 % (ref 5–12)
NEUTROPHILS # BLD AUTO: 3.1 10*3/MM3 (ref 1.7–7)
NEUTROPHILS NFR BLD AUTO: 45 % (ref 42.7–76)
NITRITE UR QL STRIP: NEGATIVE
NRBC BLD AUTO-RTO: 0 /100 WBC (ref 0–0.2)
NT-PROBNP SERPL-MCNC: 98.8 PG/ML (ref 5–900)
PH UR STRIP.AUTO: 7 [PH] (ref 5–8)
PLATELET # BLD AUTO: 277 10*3/MM3 (ref 140–450)
PMV BLD AUTO: 8 FL (ref 6–12)
POTASSIUM BLD-SCNC: 3.9 MMOL/L (ref 3.5–5.2)
PROT SERPL-MCNC: 7.5 G/DL (ref 6–8.5)
PROT UR QL STRIP: NEGATIVE
RBC # BLD AUTO: 4.4 10*6/MM3 (ref 3.77–5.28)
RBC # UR: ABNORMAL /HPF
REF LAB TEST METHOD: ABNORMAL
SODIUM BLD-SCNC: 140 MMOL/L (ref 136–145)
SP GR UR STRIP: 1.01 (ref 1–1.03)
SQUAMOUS #/AREA URNS HPF: ABNORMAL /HPF
TROPONIN T SERPL-MCNC: <0.01 NG/ML (ref 0–0.03)
TROPONIN T SERPL-MCNC: <0.01 NG/ML (ref 0–0.03)
TSH SERPL DL<=0.05 MIU/L-ACNC: 1.57 UIU/ML (ref 0.27–4.2)
UROBILINOGEN UR QL STRIP: ABNORMAL
WBC NRBC COR # BLD: 6.8 10*3/MM3 (ref 3.4–10.8)
WBC UR QL AUTO: ABNORMAL /HPF
WHOLE BLOOD HOLD SPECIMEN: NORMAL
WHOLE BLOOD HOLD SPECIMEN: NORMAL

## 2020-05-13 PROCEDURE — 63710000001 INSULIN GLARGINE PER 5 UNITS: Performed by: INTERNAL MEDICINE

## 2020-05-13 PROCEDURE — 84443 ASSAY THYROID STIM HORMONE: CPT | Performed by: INTERNAL MEDICINE

## 2020-05-13 PROCEDURE — 99284 EMERGENCY DEPT VISIT MOD MDM: CPT

## 2020-05-13 PROCEDURE — 85025 COMPLETE CBC W/AUTO DIFF WBC: CPT | Performed by: PHYSICIAN ASSISTANT

## 2020-05-13 PROCEDURE — 82962 GLUCOSE BLOOD TEST: CPT

## 2020-05-13 PROCEDURE — 81001 URINALYSIS AUTO W/SCOPE: CPT | Performed by: PHYSICIAN ASSISTANT

## 2020-05-13 PROCEDURE — 83690 ASSAY OF LIPASE: CPT | Performed by: PHYSICIAN ASSISTANT

## 2020-05-13 PROCEDURE — 25010000002 ENOXAPARIN PER 10 MG: Performed by: INTERNAL MEDICINE

## 2020-05-13 PROCEDURE — 96372 THER/PROPH/DIAG INJ SC/IM: CPT

## 2020-05-13 PROCEDURE — 71045 X-RAY EXAM CHEST 1 VIEW: CPT

## 2020-05-13 PROCEDURE — G0378 HOSPITAL OBSERVATION PER HR: HCPCS

## 2020-05-13 PROCEDURE — 99220 PR INITIAL OBSERVATION CARE/DAY 70 MINUTES: CPT | Performed by: INTERNAL MEDICINE

## 2020-05-13 PROCEDURE — 83036 HEMOGLOBIN GLYCOSYLATED A1C: CPT | Performed by: INTERNAL MEDICINE

## 2020-05-13 PROCEDURE — 93005 ELECTROCARDIOGRAM TRACING: CPT

## 2020-05-13 PROCEDURE — 80053 COMPREHEN METABOLIC PANEL: CPT | Performed by: PHYSICIAN ASSISTANT

## 2020-05-13 PROCEDURE — 93005 ELECTROCARDIOGRAM TRACING: CPT | Performed by: INTERNAL MEDICINE

## 2020-05-13 PROCEDURE — 83880 ASSAY OF NATRIURETIC PEPTIDE: CPT | Performed by: PHYSICIAN ASSISTANT

## 2020-05-13 PROCEDURE — 93306 TTE W/DOPPLER COMPLETE: CPT

## 2020-05-13 PROCEDURE — 63710000001 INSULIN LISPRO (HUMAN) PER 5 UNITS: Performed by: INTERNAL MEDICINE

## 2020-05-13 PROCEDURE — 84484 ASSAY OF TROPONIN QUANT: CPT | Performed by: INTERNAL MEDICINE

## 2020-05-13 PROCEDURE — 84484 ASSAY OF TROPONIN QUANT: CPT | Performed by: PHYSICIAN ASSISTANT

## 2020-05-13 RX ORDER — NICOTINE POLACRILEX 4 MG
15 LOZENGE BUCCAL
Status: DISCONTINUED | OUTPATIENT
Start: 2020-05-13 | End: 2020-05-15 | Stop reason: HOSPADM

## 2020-05-13 RX ORDER — FERROUS SULFATE TAB EC 324 MG (65 MG FE EQUIVALENT) 324 (65 FE) MG
324 TABLET DELAYED RESPONSE ORAL
Status: DISCONTINUED | OUTPATIENT
Start: 2020-05-14 | End: 2020-05-15 | Stop reason: HOSPADM

## 2020-05-13 RX ORDER — MELATONIN
1000 DAILY
COMMUNITY
End: 2020-08-18

## 2020-05-13 RX ORDER — DEXTROSE MONOHYDRATE 25 G/50ML
25 INJECTION, SOLUTION INTRAVENOUS
Status: DISCONTINUED | OUTPATIENT
Start: 2020-05-13 | End: 2020-05-15 | Stop reason: HOSPADM

## 2020-05-13 RX ORDER — ASPIRIN 81 MG/1
81 TABLET ORAL EVERY MORNING
Status: DISCONTINUED | OUTPATIENT
Start: 2020-05-14 | End: 2020-05-15 | Stop reason: HOSPADM

## 2020-05-13 RX ORDER — ECHINACEA PURPUREA EXTRACT 125 MG
1 TABLET ORAL AS NEEDED
Status: DISCONTINUED | OUTPATIENT
Start: 2020-05-13 | End: 2020-05-15 | Stop reason: HOSPADM

## 2020-05-13 RX ORDER — ACETAMINOPHEN 160 MG/5ML
325 SOLUTION ORAL EVERY 4 HOURS PRN
Status: DISCONTINUED | OUTPATIENT
Start: 2020-05-13 | End: 2020-05-15 | Stop reason: HOSPADM

## 2020-05-13 RX ORDER — LANOLIN ALCOHOL/MO/W.PET/CERES
1000 CREAM (GRAM) TOPICAL DAILY
Status: DISCONTINUED | OUTPATIENT
Start: 2020-05-14 | End: 2020-05-15 | Stop reason: HOSPADM

## 2020-05-13 RX ORDER — TRAZODONE HYDROCHLORIDE 50 MG/1
50 TABLET ORAL NIGHTLY
Status: DISCONTINUED | OUTPATIENT
Start: 2020-05-13 | End: 2020-05-15 | Stop reason: HOSPADM

## 2020-05-13 RX ORDER — ATORVASTATIN CALCIUM 10 MG/1
10 TABLET, FILM COATED ORAL NIGHTLY
Status: DISCONTINUED | OUTPATIENT
Start: 2020-05-13 | End: 2020-05-15 | Stop reason: HOSPADM

## 2020-05-13 RX ORDER — ACETAMINOPHEN 325 MG/1
325 TABLET ORAL EVERY 4 HOURS PRN
Status: DISCONTINUED | OUTPATIENT
Start: 2020-05-13 | End: 2020-05-15 | Stop reason: HOSPADM

## 2020-05-13 RX ORDER — HYDRALAZINE HYDROCHLORIDE 20 MG/ML
20 INJECTION INTRAMUSCULAR; INTRAVENOUS EVERY 8 HOURS PRN
Status: DISCONTINUED | OUTPATIENT
Start: 2020-05-13 | End: 2020-05-15 | Stop reason: HOSPADM

## 2020-05-13 RX ORDER — ESCITALOPRAM OXALATE 10 MG/1
10 TABLET ORAL NIGHTLY
Status: DISCONTINUED | OUTPATIENT
Start: 2020-05-13 | End: 2020-05-15 | Stop reason: HOSPADM

## 2020-05-13 RX ORDER — INSULIN GLARGINE 100 [IU]/ML
20 INJECTION, SOLUTION SUBCUTANEOUS NIGHTLY
Status: DISCONTINUED | OUTPATIENT
Start: 2020-05-13 | End: 2020-05-15 | Stop reason: HOSPADM

## 2020-05-13 RX ORDER — ATENOLOL 25 MG/1
25 TABLET ORAL EVERY EVENING
Status: DISCONTINUED | OUTPATIENT
Start: 2020-05-13 | End: 2020-05-15 | Stop reason: HOSPADM

## 2020-05-13 RX ORDER — ACETAMINOPHEN 650 MG/1
650 SUPPOSITORY RECTAL EVERY 4 HOURS PRN
Status: DISCONTINUED | OUTPATIENT
Start: 2020-05-13 | End: 2020-05-15 | Stop reason: HOSPADM

## 2020-05-13 RX ORDER — AMLODIPINE BESYLATE 5 MG/1
10 TABLET ORAL DAILY
Status: DISCONTINUED | OUTPATIENT
Start: 2020-05-14 | End: 2020-05-15 | Stop reason: HOSPADM

## 2020-05-13 RX ORDER — FENOFIBRATE 160 MG/1
160 TABLET ORAL EVERY EVENING
COMMUNITY
End: 2021-03-22

## 2020-05-13 RX ORDER — PHENOL 1.4 %
600 AEROSOL, SPRAY (ML) MUCOUS MEMBRANE DAILY
COMMUNITY

## 2020-05-13 RX ORDER — NICOTINE 21 MG/24HR
1 PATCH, TRANSDERMAL 24 HOURS TRANSDERMAL EVERY 24 HOURS
Status: DISCONTINUED | OUTPATIENT
Start: 2020-05-13 | End: 2020-05-13

## 2020-05-13 RX ORDER — SODIUM CHLORIDE 0.9 % (FLUSH) 0.9 %
10 SYRINGE (ML) INJECTION EVERY 12 HOURS SCHEDULED
Status: DISCONTINUED | OUTPATIENT
Start: 2020-05-13 | End: 2020-05-15 | Stop reason: HOSPADM

## 2020-05-13 RX ORDER — SODIUM CHLORIDE 0.9 % (FLUSH) 0.9 %
10 SYRINGE (ML) INJECTION AS NEEDED
Status: DISCONTINUED | OUTPATIENT
Start: 2020-05-13 | End: 2020-05-15 | Stop reason: HOSPADM

## 2020-05-13 RX ORDER — NITROGLYCERIN 0.4 MG/1
0.4 TABLET SUBLINGUAL
Status: DISCONTINUED | OUTPATIENT
Start: 2020-05-13 | End: 2020-05-15 | Stop reason: HOSPADM

## 2020-05-13 RX ORDER — GABAPENTIN 300 MG/1
300 CAPSULE ORAL 2 TIMES DAILY
Status: DISCONTINUED | OUTPATIENT
Start: 2020-05-13 | End: 2020-05-15 | Stop reason: HOSPADM

## 2020-05-13 RX ORDER — BISACODYL 10 MG
10 SUPPOSITORY, RECTAL RECTAL DAILY PRN
Status: DISCONTINUED | OUTPATIENT
Start: 2020-05-13 | End: 2020-05-15 | Stop reason: HOSPADM

## 2020-05-13 RX ORDER — MELATONIN
1000 DAILY
Status: DISCONTINUED | OUTPATIENT
Start: 2020-05-14 | End: 2020-05-15 | Stop reason: HOSPADM

## 2020-05-13 RX ORDER — PANTOPRAZOLE SODIUM 40 MG/1
40 TABLET, DELAYED RELEASE ORAL EVERY MORNING
Status: DISCONTINUED | OUTPATIENT
Start: 2020-05-14 | End: 2020-05-15 | Stop reason: HOSPADM

## 2020-05-13 RX ORDER — FENOFIBRATE 145 MG/1
145 TABLET, COATED ORAL DAILY
Status: DISCONTINUED | OUTPATIENT
Start: 2020-05-14 | End: 2020-05-15 | Stop reason: HOSPADM

## 2020-05-13 RX ORDER — LOVASTATIN 40 MG/1
40 TABLET ORAL NIGHTLY
COMMUNITY
End: 2020-10-01

## 2020-05-13 RX ORDER — ATENOLOL 25 MG/1
25 TABLET ORAL EVERY EVENING
COMMUNITY
End: 2020-05-29 | Stop reason: ALTCHOICE

## 2020-05-13 RX ORDER — DOCUSATE SODIUM 100 MG/1
100 CAPSULE, LIQUID FILLED ORAL 2 TIMES DAILY PRN
Status: DISCONTINUED | OUTPATIENT
Start: 2020-05-13 | End: 2020-05-15 | Stop reason: HOSPADM

## 2020-05-13 RX ADMIN — GABAPENTIN 300 MG: 300 CAPSULE ORAL at 20:25

## 2020-05-13 RX ADMIN — Medication 10 ML: at 20:25

## 2020-05-13 RX ADMIN — INSULIN LISPRO 10 UNITS: 100 INJECTION, SOLUTION INTRAVENOUS; SUBCUTANEOUS at 17:59

## 2020-05-13 RX ADMIN — INSULIN GLARGINE 20 UNITS: 100 INJECTION, SOLUTION SUBCUTANEOUS at 20:23

## 2020-05-13 RX ADMIN — METFORMIN HYDROCHLORIDE 1000 MG: 500 TABLET, FILM COATED ORAL at 20:25

## 2020-05-13 RX ADMIN — ATORVASTATIN CALCIUM 10 MG: 10 TABLET, FILM COATED ORAL at 20:25

## 2020-05-13 RX ADMIN — ESCITALOPRAM OXALATE 10 MG: 10 TABLET ORAL at 20:25

## 2020-05-13 RX ADMIN — TRAZODONE HYDROCHLORIDE 50 MG: 50 TABLET ORAL at 20:25

## 2020-05-13 RX ADMIN — ATENOLOL 25 MG: 25 TABLET ORAL at 18:00

## 2020-05-13 RX ADMIN — ENOXAPARIN SODIUM 40 MG: 40 INJECTION SUBCUTANEOUS at 18:00

## 2020-05-13 NOTE — H&P
"      [facility] Medicine Services      Patient Name: Jenna Rosa  : 1950  MRN: [id number]  Primary Care Physician: Emily Burdick DO  Date of admission: 2020    Patient Care Team:  Emily Burdick DO as PCP - General          Subjective   History Present Illness     Chief Complaint:   Chief Complaint   Patient presents with   • Palpitations       Ms. Rosa is a 70 y.o.  presents to [facility] complaining of presented to the ER with 2 day history of heart palpitations and a feeling like her heart is \" flip-flopping in her chest.\"  She reports a  positive family history of CAD.  Patient reports she sees Dr. Figueroa yearly for hypertension.  She has an appointment scheduled for next Monday but felt as though this was getting worse not better.    She admits to similar symptoms over the past years but in the past 2 days her symptoms worsened. he had associated radiation to the left arm and shoulder and jaw.  She admits to similar symptoms when she had a rebellious teenager and had situational stress.  Her  recently passed away from malignancy complications.     She denies any nausea vomiting diarrhea constipation or weight changes  She denies any fever, chills, cough, recent travel or sick contacts  She has not changed medicines or diet.  She does not drink coffee on a regular basis.    She currently is on an antidepressant but feels like it is not enough.  She is requesting a psych evaluation         She reports a  positive family history of CAD.  Patient reports she sees Dr. Figueroa yearly for hypertension.  She has an appointment scheduled for next Monday but felt as though this was getting worse not better.       History of Present Illness       Constitutional: Negative for chills, diaphoresis, fatigue and fever.   HENT: Negative.    Respiratory: Negative for cough and shortness of breath.    Cardiovascular: Positive for palpitations. Negative for chest pain. "   Gastrointestinal: Negative for abdominal pain, nausea and vomiting.   Genitourinary: Negative.    Musculoskeletal: Negative.    Skin: Negative.    Neurological: Negative.      ROS        Personal History     Past Medical History:   Past Medical History:   Diagnosis Date   • Breast mass, right    • Depression     Abstracted from [facility]   • Dexamethasone adverse reaction 2014    Abstracted from [facility] Osteopenia   • Diabetes mellitus, type 2 (CMS/HCC) 1988    Abstracted from [facility]   • Diabetic peripheral neuropathy (CMS/HCC)    • GERD (gastroesophageal reflux disease)     Abstracted from [facility]   • H/O mammogram     Abstracted from [facility]   • Hyperlipidemia     Abstracted from [facility]   • Hypertension     Abstracted from [facility]   • Insomnia    • Low HDL (under 40)    • Lower back pain    • Metatarsalgia    • Never smoked cigarettes    • Obesity    • Osteoarthritis    • Osteopenia     Abstracted from [facility]   • Palpitations    • Polypharmacy    • Pulmonary nodule    • RA (rheumatoid arthritis) (CMS/HCC)     Abstracted from [facility]   • Rheumatoid arthritis (CMS/HCC)    • Rheumatoid nodule (CMS/HCC) 06/2011    Abstracted from [facility] rt. elbow, left elbow, and epidermal inclusion cyst post. neck (June 2011)--Dr. Whitmore   • Rheumatoid nodule (CMS/HCC)     Abstracted from [facility] rt. thumb x 2 and rt. & lt. 3rd fingers (Oct.2012)   • Uterine prolapse     Abstracted from [facility]   • Uterine prolapse    • Vitamin D deficiency        Surgical History:      Past Surgical History:   Procedure Laterality Date   • BILATERAL SALPINGO OOPHORECTOMY      Abstracted from [facility] endometriosis   • CHOLECYSTECTOMY      Abstracted from [facility]   • COLONOSCOPY  2017    Abstracted from [facility] polyps x 3= 2017, rech 2020 [facility]   • CYST REMOVAL      Abstracted from [facility] Seb cyst on neck   • OTHER SURGICAL HISTORY      Abstracted from [facility] Uterine Prolapse repair   • TOTAL  ABDOMINAL HYSTERECTOMY      Abstracted from [facility]---endometriosis   • TOTAL ABDOMINAL HYSTERECTOMY WITH SALPINGO OOPHORECTOMY             Family History: family history includes Alcohol abuse in her father and mother; Cancer in her sister; Coronary artery disease in her mother; Diabetes in her maternal grandfather, mother, and sister; Heart disease in her father and mother; Hypertension in her father and mother; Mental illness in her father; Osteoarthritis in her sister; Rheum arthritis in her mother and sister; Stroke in her sister. Otherwise pertinent FHx was reviewed and unremarkable.     Social History:  reports that she has never smoked. She has never used smokeless tobacco. Drug use questions deferred to the physician. She reports that she does not drink alcohol.      Medications:  Prior to Admission medications    Medication Sig Start Date End Date Taking? Authorizing Provider   albuterol sulfate HFA (VENTOLIN HFA) 108 (90 Base) MCG/ACT inhaler Inhale 1 puff Every 6 (Six) Hours As Needed for Wheezing or Shortness of Air. 3/12/19  Yes Emergency, Nurse Luis RN   amLODIPine (NORVASC) 10 MG tablet Take 1 tablet by mouth Daily. 5/1/20  Yes Emily Burdick, DO   aspirin 81 MG EC tablet Take 81 mg by mouth Every Morning. 2/21/17  Yes Emergency, Nurse Luis RN   atenolol (TENORMIN) 25 MG tablet Take 25 mg by mouth Every Evening.   Yes Evangelina Fontana MD   calcium carbonate (OS-PREET) 600 MG tablet Take 600 mg by mouth Daily.   Yes Evangelina Fontana MD   cholecalciferol (VITAMIN D3) 25 MCG (1000 UT) tablet Take 1,000 Units by mouth Daily.   Yes Evangelina Fontana MD   escitalopram (LEXAPRO) 10 MG tablet Take 1 tablet by mouth Every Night. 3/6/20  Yes Emily Burdick, DO   fenofibrate 160 MG tablet Take 160 mg by mouth Every Evening.   Yes Evangelina Fontana MD   Ferrous Sulfate (IRON) 325 (65 Fe) MG tablet Take 325 mg by mouth Daily. 4/11/17  Yes EmergencyNurse Luis RN   fluticasone (FLONASE  ALLERGY RELIEF) 50 MCG/ACT nasal spray 1 spray into the nostril(s) as directed by provider Daily As Needed for Allergies. 8/12/15  Yes Emergency, Nurse Epic, RN   gabapentin (Neurontin) 300 MG capsule Take 1 capsule by mouth 2 (Two) Times a Day. 5/11/20  Yes Rose Gibbs MD   insulin NPH-insulin regular (humuLIN 70/30,novoLIN 70/30) (70-30) 100 UNIT/ML injection Inject 50 Units under the skin into the appropriate area as directed Every Morning.   Yes Evangelina Fontana MD   insulin NPH-insulin regular (humuLIN 70/30,novoLIN 70/30) (70-30) 100 UNIT/ML injection Inject 20 Units under the skin into the appropriate area as directed Every Evening.   Yes Evangelina Fontana MD   lansoprazole (PREVACID) 30 MG capsule TAKE 1 CAPSULE BY MOUTH DAILY 4/21/20  Yes Emily Burdick DO   lisinopril-hydrochlorothiazide (PRINZIDE,ZESTORETIC) 20-25 MG per tablet TAKE 1 TABLET BY MOUTH DAILY 11/14/19  Yes Walker Rodriguez MD   lovastatin (MEVACOR) 40 MG tablet Take 40 mg by mouth Every Night.   Yes Evangelina Fontana MD   SITagliptin-metFORMIN HCl ER (Janumet XR) 100-1000 MG tablet Take 1 tablet by mouth Every Evening.   Yes Evangelina Fontana MD   traZODone (DESYREL) 50 MG tablet TAKE 1 TABLET BY MOUTH DAILY AT BEDTIME 1/24/20  Yes Emily Burdick DO   vitamin B-12 (CYANOCOBALAMIN) 1000 MCG tablet Take 1,000 mcg by mouth Daily.   Yes Evangelina Fontana MD   HUMIRA PEN 40 MG/0.4ML Pen-injector Kit Inject 40 mg under the skin into the appropriate area as directed Every 14 (Fourteen) Days. 4/30/20   Rose Gibbs MD   ACCU-CHEK SOFTCLIX LANCETS lancets ACCU-CHEK SOFTCLIX LANCETS 6/15/16 5/13/20  Evangelina Fontana MD   acetaminophen (TYLENOL) 500 MG tablet Daily. 1/29/18 5/13/20  Emergency, Nurse Epic, RN   amoxicillin (AMOXIL) 875 MG tablet Take 1 tablet by mouth 2 (Two) Times a Day. 3/6/20 5/13/20  Emily Burdick DO   atenolol (TENORMIN) 25 MG tablet Take 1 tablet by mouth Daily.  "5/1/20 5/13/20  Emily Burdick DO   CALCIUM-VITAMIN D PO Take  by mouth.  5/13/20  Emergency, Nurse ANGIE Smith   Cholecalciferol (VITAMIN D) 2000 units capsule VITAMIN D 2000 UNIT CAPS 7/6/18 5/13/20  Emergency, Nurse ANGIE Smith   fenofibrate 160 MG tablet TAKE 1 TABLET BY MOUTH DAILY 3/20/20 5/13/20  Emily Burdick DO   glucose blood (ACCU-CHEK SIMONE PLUS) test strip Use to check BS 2 times daily DX E11.65 2/4/20 5/13/20  Dano Rangel MD   HUMULIN 70/30 (70-30) 100 UNIT/ML injection INJECT 52 UNITS BEFORE BREAKFAST AND 22 UNITS BEFORE SUPPER DX E11.65 2/12/20 5/13/20  Dano Rangel MD   ibuprofen (ADVIL) 200 MG tablet Every 12 (Twelve) Hours. 1/29/18 5/13/20  Emergency, Nurse Epic, RN   Insulin Syringe-Needle U-100 (BD INSULIN SYRINGE U/F) 31G X 5/16\" 1 ML misc BD INSULIN SYRINGE U/F 31G X 5/16\" 1 ML 1/31/19 5/13/20  Provider, MD Evangelina   JANUMET -1000 MG tablet TAKE 1 TABLET BY MOUTH DAILY 4/21/20 5/13/20  Emily Burdick DO   L-Lysine 500 MG tablet tablet L-LYSINE 500 MG TABS 1/29/18 5/13/20  Emergency, Nurse ANGIE Smith   lovastatin (MEVACOR) 40 MG tablet TAKE 1 TABLET BY MOUTH DAILY 3/30/20 5/13/20  Emily Burdick DO       Allergies:  No Known Allergies    Objective   Objective     Vital Signs  Temp:  [97 °F (36.1 °C)] 97 °F (36.1 °C)  Heart Rate:  [58-63] 58  Resp:  [14] 14  BP: (135-198)/(58-72) 135/62  SpO2:  [95 %-99 %] 96 %  on   ;      Body mass index is 30.07 kg/m².       Constitutional: She is oriented to person, place, and time. She appears well-developed and well-nourished.   HENT:   Head: Normocephalic and atraumatic.   Eyes: Pupils are equal, round, and reactive to light.   Neck: Normal range of motion.   Cardiovascular: Normal rate and regular rhythm.   Pulmonary/Chest: Effort normal and breath sounds normal.   Musculoskeletal: Normal range of motion.   Neurological: She is alert and oriented to person, place, and time.   Skin: Skin is warm and dry.   Psychiatric: She has a normal " mood and affect. Her behavior is normal.     Physical Exam    Results Review:  I have personally reviewed most recent cardiac tracings, lab results and radiology images and interpretations and agree with findings    Results from last 7 days   Lab Units 05/13/20  1423   WBC 10*3/mm3 6.80   HEMOGLOBIN g/dL 13.3   HEMATOCRIT % 38.2   PLATELETS 10*3/mm3 277     Results from last 7 days   Lab Units 05/13/20  1423   SODIUM mmol/L 140   POTASSIUM mmol/L 3.9   CHLORIDE mmol/L 100   CO2 mmol/L 26.0   BUN mg/dL 12   CREATININE mg/dL 0.92   GLUCOSE mg/dL 219*   CALCIUM mg/dL 10.1   ALT (SGPT) U/L 12   AST (SGOT) U/L 17   TROPONIN T ng/mL <0.010   PROBNP pg/mL 98.8     Estimated Creatinine Clearance: 56.7 mL/min (by C-G formula based on SCr of 0.92 mg/dL).  Brief Urine Lab Results  (Last result in the past 365 days)      Color   Clarity   Blood   Leuk Est   Nitrite   Protein   CREAT   Urine HCG        05/13/20 1451 Yellow Clear Negative Trace Negative Negative               Microbiology Results (last 10 days)     ** No results found for the last 240 hours. **          ECG/EMG Results (most recent)     None                    Xr Chest 1 View    Result Date: 5/13/2020  Mild cardiac enlargement with no acute process identified.  Electronically Signed By-Candido Martinez On:5/13/2020 3:19 PM This report was finalized on [id number] by  Candido Martinez, .        Estimated Creatinine Clearance: 56.7 mL/min (by C-G formula based on SCr of 0.92 mg/dL).    Assessment/Plan   Assessment/Plan       Active Hospital Problems    Diagnosis  POA   • **Heart palpitations [R00.2]  Yes   • Gastroesophageal reflux disease [K21.9]  Yes   • Essential hypertension [I10]  Yes   • Type 2 diabetes mellitus (CMS/HCC) [E11.9]  Yes   • High-density lipoprotein deficiency [E78.6]  Yes      Resolved Hospital Problems   No resolved problems to display.     # Heart palpitations  Admit to cardiac monitored floor  Monitor for any arrhythmias  Check TSH  Rule out for MI  by serial troponins  Echocardiogram  Cardiolgy evaluation    #Type 2 diabetes mellitus  Resume home diabetic medicines  Consistent carbohydrate diet  Basal bolus insulin regimen  PRN insulin for additional coverage,as needed    #Hypertension, essential  Resume home medicine  PRN medicines for elevated blood pressure    #GERD  Resume home medicine    #HLD  Resume home medicines    #Depression, situational  Patient is currently on Lexapro  Her  recently ; has been feeling additional stress  Patient is requesting psych evaluation    VTE Prophylaxis -   Mechanical Order History:     None      Pharmalogical Order History:     Ordered     Dose Route Frequency Stop    20 1616  enoxaparin (LOVENOX) syringe 40 mg      40 mg SC Daily --          CODE STATUS:    Code Status and Medical Interventions:   Ordered at: 20 1616     Code Status:    CPR     Medical Interventions (Level of Support Prior to Arrest):    Full       This patient has been examined wearing appropriate Personal Protective Equipment and discussed with hospital infection control department. 20      I discussed the patient's findings and my recommendations with patient.        Electronically signed by Mary Ann Olguin MD, 20, 4:47 PM.  [facility] Hospitalist Team

## 2020-05-13 NOTE — ED PROVIDER NOTES
"Subjective   History:  Patient is a 70-year-old female who presents to the ER with 4 to 5-day history of heart palpitations and feeling like her heart is \" flip-flopping in her chest.\"  She denies any sharp pain.  She reports of positive family history of CAD.  She does report an increase in stress in her life with her  passing away in March but this is new since this weekend.  Patient reports she sees Dr. Figueroa yearly for hypertension.  She has an appointment scheduled for next Monday but felt as though this was getting worse not better.    Onset: 4-5 days  Location: chest  Duration: intermittent  Character: heart palpitations   Aggravating/Alleviating factors: None  Radiation None  Severity: moderate            Review of Systems   Constitutional: Negative for chills, diaphoresis, fatigue and fever.   HENT: Negative.    Respiratory: Negative for cough and shortness of breath.    Cardiovascular: Positive for palpitations. Negative for chest pain.   Gastrointestinal: Negative for abdominal pain, nausea and vomiting.   Genitourinary: Negative.    Musculoskeletal: Negative.    Skin: Negative.    Neurological: Negative.        Past Medical History:   Diagnosis Date   • Breast mass, right    • Depression     Abstracted from [facility]   • Dexamethasone adverse reaction 2014    Abstracted from [facility] Osteopenia   • Diabetes mellitus, type 2 (CMS/HCC) 1988    Abstracted from [facility]   • Diabetic peripheral neuropathy (CMS/HCC)    • GERD (gastroesophageal reflux disease)     Abstracted from [facility]   • H/O mammogram     Abstracted from [facility]   • Hyperlipidemia     Abstracted from [facility]   • Hypertension     Abstracted from [facility]   • Insomnia    • Low HDL (under 40)    • Lower back pain    • Metatarsalgia    • Never smoked cigarettes    • Obesity    • Osteoarthritis    • Osteopenia     Abstracted from [facility]   • Palpitations    • Polypharmacy    • Pulmonary nodule    • RA (rheumatoid " arthritis) (CMS/HCC)     Abstracted from [facility]   • Rheumatoid arthritis (CMS/HCC)    • Rheumatoid nodule (CMS/HCC) 06/2011    [facility] elbow, left elbow, and epidermal inclusion cyst post. neck (June 2011)--Dr. Whitmore   • Rheumatoid nodule (CMS/HCC)     Abstracted from [facility] rt. thumb x 2 and rt. & lt. 3rd fingers (Oct.2012)   • Uterine prolapse     Abstracted from [facility]   • Uterine prolapse    • Vitamin D deficiency        No Known Allergies    Past Surgical History:   Procedure Laterality Date   • BILATERAL SALPINGO OOPHORECTOMY      Abstracted from [facility] endometriosis   • CHOLECYSTECTOMY      Abstracted from [facility]   • COLONOSCOPY  2017    Abstracted from [facility] polyps x 3= 2017, rech 2020 [facility]   • CYST REMOVAL      Abstracted from [facility] Seb cyst on neck   • OTHER SURGICAL HISTORY      Abstracted from [facility] Uterine Prolapse repair   • TOTAL ABDOMINAL HYSTERECTOMY      Abstracted from [facility] BSO---endometriosis   • TOTAL ABDOMINAL HYSTERECTOMY WITH SALPINGO OOPHORECTOMY         Family History   Problem Relation Age of Onset   • Heart disease Mother    • Diabetes Mother    • Hypertension Mother    • Rheum arthritis Mother    • Coronary artery disease Mother    • Alcohol abuse Mother    • Heart disease Father    • Mental illness Father    • Hypertension Father    • Alcohol abuse Father    • Diabetes Sister    • Stroke Sister    • Cancer Sister    • Osteoarthritis Sister    • Rheum arthritis Sister    • Diabetes Maternal Grandfather        Social History     Socioeconomic History   • Marital status:      Spouse name: Carson Rosa   • Number of children: 3   • Years of education: Not on file   • Highest education level: Not on file   Tobacco Use   • Smoking status: Never Smoker   • Smokeless tobacco: Never Used   Substance and Sexual Activity   • Alcohol use: No     Frequency: Never   • Drug use: Defer   • Sexual activity: Defer            Objective   Physical Exam   Constitutional: She is oriented to person, place, and time. She appears well-developed and well-nourished.   HENT:   Head: Normocephalic and atraumatic.   Eyes: Pupils are equal, round, and reactive to light.   Neck: Normal range of motion.   Cardiovascular: Normal rate and regular rhythm.   Pulmonary/Chest: Effort normal and breath sounds normal.   Musculoskeletal: Normal range of motion.   Neurological: She is alert and oriented to person, place, and time.   Skin: Skin is warm and dry.   Psychiatric: She has a normal mood and affect. Her behavior is normal.       Procedures           ED Course  ED Course as of May 13 1512   Wed May 13, 2020   1427 EKG interpreted by ER physician and reviewed by myself.  Sinus rhythm prolonged NV interval rate of 60    [MG]      ED Course User Index  [MG] Leigh Boyer PA-C      No radiology results for the last day  Labs Reviewed   COMPREHENSIVE METABOLIC PANEL - Abnormal; Notable for the following components:       Result Value    Glucose 219 (*)     All other components within normal limits    Narrative:     GFR Normal >60  Chronic Kidney Disease <60  Kidney Failure <15     POCT GLUCOSE FINGERSTICK - Abnormal; Notable for the following components:    Glucose 188 (*)     All other components within normal limits   BNP (IN-HOUSE) - Normal    Narrative:     Among patients with dyspnea, NT-proBNP is highly sensitive for the detection of acute congestive heart failure. In addition NT-proBNP of <300 pg/ml effectively rules out acute congestive heart failure with 99% negative predictive value.    Results may be falsely decreased if patient taking Biotin.     LIPASE - Normal   TROPONIN (IN-HOUSE) - Normal    Narrative:     Troponin T Reference Range:  <= 0.03 ng/mL-   Negative for AMI  >0.03 ng/mL-     Abnormal for myocardial necrosis.  Clinicians would have to utilize clinical acumen, EKG, Troponin and serial changes to determine if it is an  Acute Myocardial Infarction or myocardial injury due to an underlying chronic condition.       Results may be falsely decreased if patient taking Biotin.     CBC WITH AUTO DIFFERENTIAL - Normal   RAINBOW DRAW    Narrative:     The following orders were created for panel order [place] Draw.  Procedure                               Abnormality         Status                     ---------                               -----------         ------                     Light Blue Top[[id number]]                                   In process                 Green Top (Gel)[[id number]]                                                             Lavender Top[[id number]]                                     In process                 Gold Top - SST[[id number]]                                   In process                   Please view results for these tests on the individual orders.   URINALYSIS W/ CULTURE IF INDICATED   URINALYSIS, MICROSCOPIC ONLY   CBC AND DIFFERENTIAL    Narrative:     The following orders were created for panel order CBC & Differential.  Procedure                               Abnormality         Status                     ---------                               -----------         ------                     CBC Auto Differential[[id number]]        Normal              Final result                 Please view results for these tests on the individual orders.   LIGHT BLUE TOP   LAVENDER TOP   GOLD TOP - SST     Medications   sodium chloride 0.9 % flush 10 mL (has no administration in time range)                                          MDM  Number of Diagnoses or Management Options  Heart palpitations:   Diagnosis management comments: I examined the patient using the appropriate personal protective equipment.      DISPOSITION:   Chart Review:  Comorbidity:  has a past medical history of Breast mass, right, Depression, Dexamethasone adverse reaction (2014), Diabetes mellitus, type 2 (CMS/[facility]) (1988), Diabetic  peripheral neuropathy (CMS/HCC), GERD (gastroesophageal reflux disease), H/O mammogram, Hyperlipidemia, Hypertension, Insomnia, Low HDL (under 40), Lower back pain, Metatarsalgia, Never smoked cigarettes, Obesity, Osteoarthritis, Osteopenia, Palpitations, Polypharmacy, Pulmonary nodule, RA (rheumatoid arthritis) (CMS/HCC), Rheumatoid arthritis (CMS/HCC), Rheumatoid nodule (CMS/HCC) (06/2011), Rheumatoid nodule (CMS/HCC), Uterine prolapse, Uterine prolapse, and Vitamin D deficiency.  Differentials:this list is not all inclusive and does not constitute the entirety of considered causes --> CAD, PE, pneumonia, anxiety  ECG: interpreted by ER physician and reviewed by myself: Sinus rhythm rate of 69  Labs: Troponin negative, glucose 188, CBC unremarkable, CMP fairly unremarkable other than elevated glucose    Imaging: Was interpreted by physician and reviewed by myself:  No radiology results for the last day    Disposition/Treatment:  Patient is 70-year-old female presents to the ER with intermittent heart palpitations over the last 4 to 5 days and feeling like her heart feels funny.  Patient's lab work and imaging was negative.  Patient was admitted to the hospitalist for chest pain rule out she was stable and in agreement plan         Amount and/or Complexity of Data Reviewed  Clinical lab tests: reviewed    Patient Progress  Patient progress: stable      Final diagnoses:   Heart palpitations            Leigh Boyer PA-C  05/13/20 1513

## 2020-05-13 NOTE — ED NOTES
Pt c/o palpitations ongoing for several days. Denies chest pain or other c/o. Pt reports increased stress over the last couple of months.     Eliz Cordova RN  05/13/20 3705

## 2020-05-14 ENCOUNTER — APPOINTMENT (OUTPATIENT)
Dept: NUCLEAR MEDICINE | Facility: HOSPITAL | Age: 70
End: 2020-05-14

## 2020-05-14 PROBLEM — F33.1 MAJOR DEPRESSIVE DISORDER, RECURRENT EPISODE, MODERATE: Status: ACTIVE | Noted: 2020-05-14

## 2020-05-14 PROBLEM — Z63.4 BEREAVEMENT: Status: ACTIVE | Noted: 2020-05-14

## 2020-05-14 LAB
ANION GAP SERPL CALCULATED.3IONS-SCNC: 11 MMOL/L (ref 5–15)
BASOPHILS # BLD AUTO: 0 10*3/MM3 (ref 0–0.2)
BASOPHILS NFR BLD AUTO: 0.5 % (ref 0–1.5)
BH CV ECHO MEAS - ACS: 1.9 CM
BH CV ECHO MEAS - AO MAX PG (FULL): 2 MMHG
BH CV ECHO MEAS - AO MAX PG: 7 MMHG
BH CV ECHO MEAS - AO MEAN PG (FULL): 1.1 MMHG
BH CV ECHO MEAS - AO MEAN PG: 4.1 MMHG
BH CV ECHO MEAS - AO ROOT AREA (BSA CORRECTED): 1.8
BH CV ECHO MEAS - AO ROOT AREA: 8.6 CM^2
BH CV ECHO MEAS - AO ROOT DIAM: 3.3 CM
BH CV ECHO MEAS - AO V2 MAX: 132.1 CM/SEC
BH CV ECHO MEAS - AO V2 MEAN: 97.2 CM/SEC
BH CV ECHO MEAS - AO V2 VTI: 39.6 CM
BH CV ECHO MEAS - AORTIC HR: 48.5 BPM
BH CV ECHO MEAS - AORTIC R-R: 1.2 SEC
BH CV ECHO MEAS - AVA(I,A): 3.1 CM^2
BH CV ECHO MEAS - AVA(I,D): 3.1 CM^2
BH CV ECHO MEAS - AVA(V,A): 3.2 CM^2
BH CV ECHO MEAS - AVA(V,D): 3.2 CM^2
BH CV ECHO MEAS - BSA(HAYCOCK): 1.9 M^2
BH CV ECHO MEAS - BSA: 1.8 M^2
BH CV ECHO MEAS - BZI_BMI: 29.9 KILOGRAMS/M^2
BH CV ECHO MEAS - BZI_METRIC_HEIGHT: 160 CM
BH CV ECHO MEAS - BZI_METRIC_WEIGHT: 76.7 KG
BH CV ECHO MEAS - CI(AO): 9.1 L/MIN/M^2
BH CV ECHO MEAS - CI(LVOT): 3.3 L/MIN/M^2
BH CV ECHO MEAS - CO(AO): 16.4 L/MIN
BH CV ECHO MEAS - CO(LVOT): 5.9 L/MIN
BH CV ECHO MEAS - EDV(CUBED): 116 ML
BH CV ECHO MEAS - EDV(MOD-SP4): 91.9 ML
BH CV ECHO MEAS - EDV(TEICH): 111.6 ML
BH CV ECHO MEAS - EF(CUBED): 76.5 %
BH CV ECHO MEAS - EF(MOD-BP): 71 %
BH CV ECHO MEAS - EF(MOD-SP4): 71.1 %
BH CV ECHO MEAS - EF(TEICH): 68.4 %
BH CV ECHO MEAS - ESV(CUBED): 27.3 ML
BH CV ECHO MEAS - ESV(MOD-SP4): 26.6 ML
BH CV ECHO MEAS - ESV(TEICH): 35.3 ML
BH CV ECHO MEAS - FS: 38.3 %
BH CV ECHO MEAS - IVS/LVPW: 1
BH CV ECHO MEAS - IVSD: 0.91 CM
BH CV ECHO MEAS - LA DIMENSION(2D): 4.1 CM
BH CV ECHO MEAS - LV DIASTOLIC VOL/BSA (35-75): 51 ML/M^2
BH CV ECHO MEAS - LV MASS(C)D: 152 GRAMS
BH CV ECHO MEAS - LV MASS(C)DI: 84.4 GRAMS/M^2
BH CV ECHO MEAS - LV MAX PG: 5 MMHG
BH CV ECHO MEAS - LV MEAN PG: 2.9 MMHG
BH CV ECHO MEAS - LV SYSTOLIC VOL/BSA (12-30): 14.8 ML/M^2
BH CV ECHO MEAS - LV V1 MAX: 111.3 CM/SEC
BH CV ECHO MEAS - LV V1 MEAN: 81.1 CM/SEC
BH CV ECHO MEAS - LV V1 VTI: 32.4 CM
BH CV ECHO MEAS - LVIDD: 4.9 CM
BH CV ECHO MEAS - LVIDS: 3 CM
BH CV ECHO MEAS - LVOT AREA: 3.8 CM^2
BH CV ECHO MEAS - LVOT DIAM: 2.2 CM
BH CV ECHO MEAS - LVPWD: 0.89 CM
BH CV ECHO MEAS - MV A MAX VEL: 100.7 CM/SEC
BH CV ECHO MEAS - MV DEC SLOPE: 317.8 CM/SEC^2
BH CV ECHO MEAS - MV DEC TIME: 0.25 SEC
BH CV ECHO MEAS - MV E MAX VEL: 80.7 CM/SEC
BH CV ECHO MEAS - MV E/A: 0.8
BH CV ECHO MEAS - MV MAX PG: 4.3 MMHG
BH CV ECHO MEAS - MV MEAN PG: 1.6 MMHG
BH CV ECHO MEAS - MV V2 MAX: 103.2 CM/SEC
BH CV ECHO MEAS - MV V2 MEAN: 58.2 CM/SEC
BH CV ECHO MEAS - MV V2 VTI: 37 CM
BH CV ECHO MEAS - MVA(VTI): 3.3 CM^2
BH CV ECHO MEAS - PA MAX PG (FULL): 1.7 MMHG
BH CV ECHO MEAS - PA MAX PG: 5.4 MMHG
BH CV ECHO MEAS - PA V2 MAX: 115.8 CM/SEC
BH CV ECHO MEAS - PULM A REVS DUR: 0.13 SEC
BH CV ECHO MEAS - PULM A REVS VEL: 31.9 CM/SEC
BH CV ECHO MEAS - PULM DIAS VEL: 42.7 CM/SEC
BH CV ECHO MEAS - PULM S/D: 1.6
BH CV ECHO MEAS - PULM SYS VEL: 70.3 CM/SEC
BH CV ECHO MEAS - PVA(V,A): 1.9 CM^2
BH CV ECHO MEAS - PVA(V,D): 1.9 CM^2
BH CV ECHO MEAS - QP/QS: 0.48
BH CV ECHO MEAS - RAP SYSTOLE: 3 MMHG
BH CV ECHO MEAS - RV MAX PG: 3.7 MMHG
BH CV ECHO MEAS - RV MEAN PG: 2 MMHG
BH CV ECHO MEAS - RV V1 MAX: 95.8 CM/SEC
BH CV ECHO MEAS - RV V1 MEAN: 66.5 CM/SEC
BH CV ECHO MEAS - RV V1 VTI: 26.1 CM
BH CV ECHO MEAS - RVDD: 2 CM
BH CV ECHO MEAS - RVOT AREA: 2.3 CM^2
BH CV ECHO MEAS - RVOT DIAM: 1.7 CM
BH CV ECHO MEAS - RVSP: 21.1 MMHG
BH CV ECHO MEAS - SI(AO): 188.3 ML/M^2
BH CV ECHO MEAS - SI(CUBED): 49.3 ML/M^2
BH CV ECHO MEAS - SI(LVOT): 68 ML/M^2
BH CV ECHO MEAS - SI(MOD-SP4): 36.3 ML/M^2
BH CV ECHO MEAS - SI(TEICH): 42.4 ML/M^2
BH CV ECHO MEAS - SV(AO): 339 ML
BH CV ECHO MEAS - SV(CUBED): 88.7 ML
BH CV ECHO MEAS - SV(LVOT): 122.4 ML
BH CV ECHO MEAS - SV(MOD-SP4): 65.3 ML
BH CV ECHO MEAS - SV(RVOT): 58.7 ML
BH CV ECHO MEAS - SV(TEICH): 76.3 ML
BH CV ECHO MEAS - TR MAX VEL: 212.9 CM/SEC
BH CV NUCLEAR PRIOR STUDY: 3
BH CV STRESS BP STAGE 1: NORMAL
BH CV STRESS BP STAGE 2: NORMAL
BH CV STRESS BP STAGE 3: NORMAL
BH CV STRESS BP STAGE 4: NORMAL
BH CV STRESS COMMENTS STAGE 1: NORMAL
BH CV STRESS COMMENTS STAGE 2: NORMAL
BH CV STRESS DOSE REGADENOSON STAGE 1: 0.4
BH CV STRESS DURATION MIN STAGE 1: 0
BH CV STRESS DURATION MIN STAGE 2: 4
BH CV STRESS DURATION SEC STAGE 1: 10
BH CV STRESS DURATION SEC STAGE 2: 0
BH CV STRESS HR STAGE 1: 73
BH CV STRESS HR STAGE 2: 82
BH CV STRESS HR STAGE 3: 80
BH CV STRESS HR STAGE 4: 80
BH CV STRESS PROTOCOL 1: NORMAL
BH CV STRESS RECOVERY BP: NORMAL MMHG
BH CV STRESS RECOVERY HR: 70 BPM
BH CV STRESS STAGE 1: 1
BH CV STRESS STAGE 2: 2
BH CV STRESS STAGE 3: 3
BH CV STRESS STAGE 4: 4
BUN BLD-MCNC: 15 MG/DL (ref 8–23)
BUN/CREAT SERPL: 16.9 (ref 7–25)
CALCIUM SPEC-SCNC: 9.4 MG/DL (ref 8.6–10.5)
CHLORIDE SERPL-SCNC: 100 MMOL/L (ref 98–107)
CO2 SERPL-SCNC: 29 MMOL/L (ref 22–29)
CREAT BLD-MCNC: 0.89 MG/DL (ref 0.57–1)
DEPRECATED RDW RBC AUTO: 40.7 FL (ref 37–54)
EOSINOPHIL # BLD AUTO: 0.4 10*3/MM3 (ref 0–0.4)
EOSINOPHIL NFR BLD AUTO: 5.4 % (ref 0.3–6.2)
ERYTHROCYTE [DISTWIDTH] IN BLOOD BY AUTOMATED COUNT: 13.3 % (ref 12.3–15.4)
GFR SERPL CREATININE-BSD FRML MDRD: 63 ML/MIN/1.73
GLUCOSE BLD-MCNC: 108 MG/DL (ref 65–99)
GLUCOSE BLDC GLUCOMTR-MCNC: 119 MG/DL (ref 70–105)
GLUCOSE BLDC GLUCOMTR-MCNC: 132 MG/DL (ref 70–105)
GLUCOSE BLDC GLUCOMTR-MCNC: 144 MG/DL (ref 70–105)
GLUCOSE BLDC GLUCOMTR-MCNC: 184 MG/DL (ref 70–105)
HCT VFR BLD AUTO: 37.2 % (ref 34–46.6)
HGB BLD-MCNC: 12.8 G/DL (ref 12–15.9)
LV EF 2D ECHO EST: 65 %
LV EF NUC BP: 80 %
LYMPHOCYTES # BLD AUTO: 4.1 10*3/MM3 (ref 0.7–3.1)
LYMPHOCYTES NFR BLD AUTO: 57.7 % (ref 19.6–45.3)
MAXIMAL PREDICTED HEART RATE: 151 BPM
MCH RBC QN AUTO: 29.7 PG (ref 26.6–33)
MCHC RBC AUTO-ENTMCNC: 34.3 G/DL (ref 31.5–35.7)
MCV RBC AUTO: 86.4 FL (ref 79–97)
MONOCYTES # BLD AUTO: 0.5 10*3/MM3 (ref 0.1–0.9)
MONOCYTES NFR BLD AUTO: 7.1 % (ref 5–12)
NEUTROPHILS # BLD AUTO: 2.1 10*3/MM3 (ref 1.7–7)
NEUTROPHILS NFR BLD AUTO: 29.3 % (ref 42.7–76)
NRBC BLD AUTO-RTO: 0.1 /100 WBC (ref 0–0.2)
PERCENT MAX PREDICTED HR: 54.3 %
PLATELET # BLD AUTO: 251 10*3/MM3 (ref 140–450)
PMV BLD AUTO: 8 FL (ref 6–12)
POTASSIUM BLD-SCNC: 3.5 MMOL/L (ref 3.5–5.2)
RBC # BLD AUTO: 4.31 10*6/MM3 (ref 3.77–5.28)
SODIUM BLD-SCNC: 140 MMOL/L (ref 136–145)
STRESS BASELINE BP: NORMAL MMHG
STRESS BASELINE HR: 57 BPM
STRESS PERCENT HR: 64 %
STRESS POST PEAK BP: NORMAL MMHG
STRESS POST PEAK HR: 82 BPM
STRESS TARGET HR: 128 BPM
TROPONIN T SERPL-MCNC: 0.01 NG/ML (ref 0–0.03)
TROPONIN T SERPL-MCNC: <0.01 NG/ML (ref 0–0.03)
WBC NRBC COR # BLD: 7.1 10*3/MM3 (ref 3.4–10.8)

## 2020-05-14 PROCEDURE — 63710000001 INSULIN LISPRO (HUMAN) PER 5 UNITS: Performed by: INTERNAL MEDICINE

## 2020-05-14 PROCEDURE — 99225 PR SBSQ OBSERVATION CARE/DAY 25 MINUTES: CPT | Performed by: INTERNAL MEDICINE

## 2020-05-14 PROCEDURE — 99204 OFFICE O/P NEW MOD 45 MIN: CPT | Performed by: PSYCHIATRY & NEUROLOGY

## 2020-05-14 PROCEDURE — 80048 BASIC METABOLIC PNL TOTAL CA: CPT | Performed by: INTERNAL MEDICINE

## 2020-05-14 PROCEDURE — A9500 TC99M SESTAMIBI: HCPCS | Performed by: INTERNAL MEDICINE

## 2020-05-14 PROCEDURE — 96372 THER/PROPH/DIAG INJ SC/IM: CPT

## 2020-05-14 PROCEDURE — G0378 HOSPITAL OBSERVATION PER HR: HCPCS

## 2020-05-14 PROCEDURE — 82962 GLUCOSE BLOOD TEST: CPT

## 2020-05-14 PROCEDURE — 0 TECHNETIUM SESTAMIBI: Performed by: INTERNAL MEDICINE

## 2020-05-14 PROCEDURE — 84484 ASSAY OF TROPONIN QUANT: CPT | Performed by: INTERNAL MEDICINE

## 2020-05-14 PROCEDURE — 93306 TTE W/DOPPLER COMPLETE: CPT | Performed by: INTERNAL MEDICINE

## 2020-05-14 PROCEDURE — 85025 COMPLETE CBC W/AUTO DIFF WBC: CPT | Performed by: INTERNAL MEDICINE

## 2020-05-14 PROCEDURE — 99214 OFFICE O/P EST MOD 30 MIN: CPT | Performed by: INTERNAL MEDICINE

## 2020-05-14 PROCEDURE — 25010000002 ENOXAPARIN PER 10 MG: Performed by: INTERNAL MEDICINE

## 2020-05-14 PROCEDURE — 93018 CV STRESS TEST I&R ONLY: CPT | Performed by: NURSE PRACTITIONER

## 2020-05-14 PROCEDURE — 93017 CV STRESS TEST TRACING ONLY: CPT

## 2020-05-14 PROCEDURE — 78452 HT MUSCLE IMAGE SPECT MULT: CPT | Performed by: INTERNAL MEDICINE

## 2020-05-14 PROCEDURE — 25010000002 REGADENOSON 0.4 MG/5ML SOLUTION: Performed by: INTERNAL MEDICINE

## 2020-05-14 PROCEDURE — 78452 HT MUSCLE IMAGE SPECT MULT: CPT

## 2020-05-14 PROCEDURE — 63710000001 INSULIN GLARGINE PER 5 UNITS: Performed by: INTERNAL MEDICINE

## 2020-05-14 RX ORDER — ALPRAZOLAM 0.25 MG/1
0.25 TABLET ORAL DAILY PRN
Status: DISCONTINUED | OUTPATIENT
Start: 2020-05-14 | End: 2020-05-15 | Stop reason: HOSPADM

## 2020-05-14 RX ADMIN — FERROUS SULFATE TAB EC 324 MG (65 MG FE EQUIVALENT) 324 MG: 324 (65 FE) TABLET DELAYED RESPONSE at 13:12

## 2020-05-14 RX ADMIN — REGADENOSON 0.4 MG: 0.08 INJECTION, SOLUTION INTRAVENOUS at 10:00

## 2020-05-14 RX ADMIN — ATORVASTATIN CALCIUM 10 MG: 10 TABLET, FILM COATED ORAL at 21:10

## 2020-05-14 RX ADMIN — AMLODIPINE BESYLATE 10 MG: 5 TABLET ORAL at 13:10

## 2020-05-14 RX ADMIN — ENOXAPARIN SODIUM 40 MG: 40 INJECTION SUBCUTANEOUS at 17:52

## 2020-05-14 RX ADMIN — ESCITALOPRAM OXALATE 10 MG: 10 TABLET ORAL at 21:10

## 2020-05-14 RX ADMIN — INSULIN LISPRO 2 UNITS: 100 INJECTION, SOLUTION INTRAVENOUS; SUBCUTANEOUS at 12:20

## 2020-05-14 RX ADMIN — FENOFIBRATE 145 MG: 145 TABLET ORAL at 13:10

## 2020-05-14 RX ADMIN — CYANOCOBALAMIN TAB 1000 MCG 1000 MCG: 1000 TAB at 13:12

## 2020-05-14 RX ADMIN — INSULIN GLARGINE 20 UNITS: 100 INJECTION, SOLUTION SUBCUTANEOUS at 21:08

## 2020-05-14 RX ADMIN — MELATONIN 1000 UNITS: at 13:11

## 2020-05-14 RX ADMIN — INSULIN LISPRO 20 UNITS: 100 INJECTION, SOLUTION INTRAVENOUS; SUBCUTANEOUS at 13:11

## 2020-05-14 RX ADMIN — METFORMIN HYDROCHLORIDE 1000 MG: 500 TABLET, FILM COATED ORAL at 21:10

## 2020-05-14 RX ADMIN — Medication 10 ML: at 21:10

## 2020-05-14 RX ADMIN — HYDROCHLOROTHIAZIDE: 25 TABLET ORAL at 13:10

## 2020-05-14 RX ADMIN — TECHNETIUM TC 99M SESTAMIBI 1 DOSE: 1 INJECTION INTRAVENOUS at 09:05

## 2020-05-14 RX ADMIN — OYSTER SHELL CALCIUM WITH VITAMIN D 1 TABLET: 500; 200 TABLET, FILM COATED ORAL at 13:11

## 2020-05-14 RX ADMIN — TRAZODONE HYDROCHLORIDE 50 MG: 50 TABLET ORAL at 21:10

## 2020-05-14 RX ADMIN — Medication 10 ML: at 13:09

## 2020-05-14 RX ADMIN — TECHNETIUM TC 99M SESTAMIBI 1 DOSE: 1 INJECTION INTRAVENOUS at 10:00

## 2020-05-14 RX ADMIN — PANTOPRAZOLE SODIUM 40 MG: 40 TABLET, DELAYED RELEASE ORAL at 13:11

## 2020-05-14 RX ADMIN — ATENOLOL 25 MG: 25 TABLET ORAL at 17:52

## 2020-05-14 RX ADMIN — ASPIRIN 81 MG: 81 TABLET, COATED ORAL at 13:12

## 2020-05-14 RX ADMIN — GABAPENTIN 300 MG: 300 CAPSULE ORAL at 21:10

## 2020-05-14 RX ADMIN — GABAPENTIN 300 MG: 300 CAPSULE ORAL at 13:11

## 2020-05-14 RX ADMIN — INSULIN LISPRO 20 UNITS: 100 INJECTION, SOLUTION INTRAVENOUS; SUBCUTANEOUS at 17:53

## 2020-05-14 NOTE — PLAN OF CARE
Problem: Patient Care Overview  Goal: Plan of Care Review  Outcome: Ongoing (interventions implemented as appropriate)  Flowsheets (Taken 5/14/2020 1612)  Outcome Summary: Pt had ECHO done this AM. Negative. Psych seen today and added PRN Anxiety medication. Pleasant mood this shift.

## 2020-05-14 NOTE — CONSULTS
"Diabetes Education  Assessment/Teaching    Patient Name:  Jenna Rosa  YOB: 1950  MRN: [id number]  Admit Date:  5/13/2020      Assessment Date:  5/14/2020    Most Recent Value   General Information    Referral From:  A1c, Blood glucose [5/13/2020 A1c 7.3%. Adm bs 219]   Height  160 cm (63\")   Height Method  Stated   Weight  74.1 kg (163 lb 6.5 oz)   Weight Method  Standing scale   Pregnancy Assessment   Diabetes History   What type of diabetes do you have?  Type 2   Length of Diabetes Diagnosis  -- [Pt states dx with diabetes in 1998]   Current DM knowledge  good   Do you test your blood sugar at home?  yes   Frequency of checks  Pt checks bs in am   Meter type  Accuchek   Who performs the test?  self   Typical readings  bs usually <160   Education Preferences   What areas of diabetes would you like to learn about?  avoiding high blood sugar, avoiding low blood sugar, diabetes complications, testing my blood sugar at home, medications for diabetes   Nutrition Information   Assessment Topics   Taking Medication - Assessment  Needs education   Problem Solving - Assessment  Needs education   Reducing Risk - Assessment  Needs education   Monitoring - Assessment  Needs education   DM Goals   Taking Medication - Goal  Today [Discussed importance of taking the insulin before the meal, not after the meal]   Problem Solving - Goal  Today   Reducing Risk - Goal  Today   Monitoring - Goal  Today            Most Recent Value   DM Education Needs   Meter  Has own   Meter Type  Accuchek   Frequency of Testing  -- [Discussed importance of checking bs at least bid before insulin injections.]   Blood Glucose Target Range  Reviewed pt's A1c result of 7.3%. Reviewed how this relates to bs levels. Discussed healthy bs range and healthy A1c target.   Medication  Oral, Insulin, Vial [Pt takes Novolin 70/30 insulin using vial/syringe and takes 50 units am and 20 units presupper. Pt also takes Janumet /1000 mg in " the pm]   Problem Solving  Hypoglycemia, Hyperglycemia, Signs, Symptoms, Treatment [Pt states does not have issues with low bs. Reviewed importance of always carrying low bs tx with her. Pt states has glucose tabs. ]   Reducing Risks  A1C testing   Motivation  Engaged   Teaching Method  Explanation, Discussion   Patient Response  Verbalized understanding            Other Comments:  Contacted pt by phone due to Covid-19. Pt states she sees Dr. ROSA Rangel at the [facility]. Pt's last appt was on 2/4/2020 and has next visit scheduled for 8/4/20. Pt states has been taking insulin as prescribed. She has only been checking bs in am. Discussed importance of checking bs at different times of the day and recording readings. Explained that the bs fluctuates throughout the day. Pt states she is wanting to use the insulin pen instead of the vial/syringe. Discussed importance of contacting the [facility] and explained they can change the rx to insulin pens. Pt states she has the necessary diabetes medication and bs monitoring supplies at home. Pt states she does not have additional questions at this time.        Electronically signed by:  Alesha Walker RN  05/14/20 19:47

## 2020-05-14 NOTE — PROGRESS NOTES
"      [facility] Medicine Services Daily Progress Note      Hospitalist Team  LOS 0 days      Patient Care Team:  Emily Burdick DO as PCP - Walker Francis MD as Consulting Physician (Cardiology)    Patient Location: 357/1      Subjective   Subjective     Chief Complaint / Subjective  Chief Complaint   Patient presents with   • Palpitations         Brief Synopsis of Hospital Course/HPI  Hospital Day #2  No complaints  Stress test negative/ R/O'd for MI    Cardiology: event monitor   Psych has seen patient- low dose Xanax (PRN) and grief/support therapy                                -----------------------------------------------------------------   Ms. Rosa is a 70 y.o.  presents to [facility] complaining of presented to the ER with 2 day history of heart palpitations and a feeling like her heart is \" flip-flopping in her chest.\"  She reports a  positive family history of CAD.  Patient reports she sees Dr. Figueroa yearly for hypertension.  She has an appointment scheduled for next Monday but felt as though this was getting worse not better.     She admits to similar symptoms over the past years but in the past 2 days her symptoms worsened. he had associated radiation to the left arm and shoulder and jaw.  She admits to similar symptoms when she had a rebellious teenager and had situational stress.  Her  recently passed away from malignancy complications.      She denies any nausea vomiting diarrhea constipation or weight changes  She denies any fever, chills, cough, recent travel or sick contacts  She has not changed medicines or diet.  She does not drink coffee on a regular basis.     She currently is on an antidepressant but feels like it is not enough.  She is requesting a psych evaluation         She reports a  positive family history of CAD.  Patient reports she sees Dr. Figueroa yearly for hypertension.  She has an appointment scheduled for next Monday but " "felt as though this was getting worse not better.    5/14/2020: Patient was placed on cardiac monitoring. She successfully ruled out for MI by serial enzymes. She had a negative Lexiscan (nuclear stress test). She was seen by the Cardiology service who recommended an event monitor on discharge. She was also seen by the Pysch service for her depression.    Date::  5/14/2020      Constitutional: Negative for chills, diaphoresis, fatigue and fever.   HENT: Negative.    Respiratory: Negative for cough and shortness of breath.    Cardiovascular: Positive for palpitations. Negative for chest pain.   Gastrointestinal: Negative for abdominal pain, nausea and vomiting.   Genitourinary: Negative.    Musculoskeletal: Negative.    Skin: Negative.    Neurological: Negative.      ROS      Objective   Objective      Vital Signs  Temp:  [97 °F (36.1 °C)-98 °F (36.7 °C)] 97.9 °F (36.6 °C)  Heart Rate:  [50-63] 55  Resp:  [14-18] 16  BP: (134-198)/(58-80) 163/80  Oxygen Therapy  SpO2: 96 %  Pulse Oximetry Type: Intermittent  Device (Oxygen Therapy): room air  Device (Oxygen Therapy): room air  Flowsheet Rows      First Filed Value   Admission Height  160 cm (63\") Documented at 05/13/2020 1409   Admission Weight  77 kg (169 lb 12.1 oz) Documented at 05/13/2020 1409        Intake & Output (last 3 days)       05/11 0701 - 05/12 0700 05/12 0701 - 05/13 0700 05/13 0701 - 05/14 0700 05/14 0701 - 05/15 0700    P.O.   360     Total Intake(mL/kg)   360 (4.9)     Net   +360                 Lines, Drains & Airways    Active LDAs     Name:   Placement date:   Placement time:   Site:   Days:    Peripheral IV 05/13/20 1420 Left Antecubital   05/13/20    1420    Antecubital   less than 1                  Physical Exam:  Constitutional: She is oriented to person, place, and time. She appears well-developed and well-nourished.   HENT:   Head: Normocephalic and atraumatic.   Eyes: Pupils are equal, round, and reactive to light.   Neck: Normal range of " motion.   Cardiovascular: Normal rate and regular rhythm.   Pulmonary/Chest: Effort normal and breath sounds normal.   Musculoskeletal: Normal range of motion.   Neurological: She is alert and oriented to person, place, and time.   Skin: Skin is warm and dry.   Psychiatric: She has a normal mood and affect. Her behavior is normal.   Physical Exam        Procedures:    Results Review:     I reviewed the patient's new clinical results.      Lab Results (last 24 hours)     Procedure Component Value Units Date/Time    POC Glucose Once [[id number]]  (Abnormal) Collected:  05/14/20 1121    Specimen:  Blood Updated:  05/14/20 1125     Glucose 184 mg/dL      Comment: Serial Number: [id number]:  [id number]       Troponin [[id number]]  (Normal) Collected:  05/14/20 0649    Specimen:  Blood Updated:  05/14/20 0758     Troponin T 0.010 ng/mL     Narrative:       Troponin T Reference Range:  <= 0.03 ng/mL-   Negative for AMI  >0.03 ng/mL-     Abnormal for myocardial necrosis.  Clinicians would have to utilize clinical acumen, EKG, Troponin and serial changes to determine if it is an Acute Myocardial Infarction or myocardial injury due to an underlying chronic condition.       Results may be falsely decreased if patient taking Biotin.      POC Glucose Once [[id number]]  (Abnormal) Collected:  05/14/20 0712    Specimen:  Blood Updated:  05/14/20 0713     Glucose 132 mg/dL      Comment: Serial Number: [id number]:  [id number]       Troponin [[id number]]  (Normal) Collected:  05/14/20 0322    Specimen:  Blood Updated:  05/14/20 0512     Troponin T <0.010 ng/mL     Narrative:       Troponin T Reference Range:  <= 0.03 ng/mL-   Negative for AMI  >0.03 ng/mL-     Abnormal for myocardial necrosis.  Clinicians would have to utilize clinical acumen, EKG, Troponin and serial changes to determine if it is an Acute Myocardial Infarction or myocardial injury due to an underlying chronic condition.       Results may be falsely decreased  if patient taking Biotin.      Basic Metabolic Panel [[id number]]  (Abnormal) Collected:  05/14/20 0322    Specimen:  Blood Updated:  05/14/20 0512     Glucose 108 mg/dL      BUN 15 mg/dL      Creatinine 0.89 mg/dL      Sodium 140 mmol/L      Potassium 3.5 mmol/L      Chloride 100 mmol/L      CO2 29.0 mmol/L      Calcium 9.4 mg/dL      eGFR Non African Amer 63 mL/min/1.73      BUN/Creatinine Ratio 16.9     Anion Gap 11.0 mmol/L     Narrative:       GFR Normal >60  Chronic Kidney Disease <60  Kidney Failure <15      CBC & Differential [[id number]] Collected:  05/14/20 0322    Specimen:  Blood Updated:  05/14/20 0500    Narrative:       The following orders were created for panel order CBC & Differential.  Procedure                               Abnormality         Status                     ---------                               -----------         ------                     CBC Auto Differential[[id number]]        Abnormal            Final result                 Please view results for these tests on the individual orders.    CBC Auto Differential [[id number]]  (Abnormal) Collected:  05/14/20 0322    Specimen:  Blood Updated:  05/14/20 0500     WBC 7.10 10*3/mm3      RBC 4.31 10*6/mm3      Hemoglobin 12.8 g/dL      Hematocrit 37.2 %      MCV 86.4 fL      MCH 29.7 pg      MCHC 34.3 g/dL      RDW 13.3 %      RDW-SD 40.7 fl      MPV 8.0 fL      Platelets 251 10*3/mm3      Neutrophil % 29.3 %      Lymphocyte % 57.7 %      Monocyte % 7.1 %      Eosinophil % 5.4 %      Basophil % 0.5 %      Neutrophils, Absolute 2.10 10*3/mm3      Lymphocytes, Absolute 4.10 10*3/mm3      Monocytes, Absolute 0.50 10*3/mm3      Eosinophils, Absolute 0.40 10*3/mm3      Basophils, Absolute 0.00 10*3/mm3      nRBC 0.1 /100 WBC     Troponin [[id number]]  (Normal) Collected:  05/14/20 0024    Specimen:  Blood Updated:  05/14/20 0058     Troponin T <0.010 ng/mL     Narrative:       Troponin T Reference Range:  <= 0.03 ng/mL-   Negative for AMI  >0.03  ng/mL-     Abnormal for myocardial necrosis.  Clinicians would have to utilize clinical acumen, EKG, Troponin and serial changes to determine if it is an Acute Myocardial Infarction or myocardial injury due to an underlying chronic condition.       Results may be falsely decreased if patient taking Biotin.      Troponin [[id number]]  (Normal) Collected:  05/13/20 2121    Specimen:  Blood Updated:  05/13/20 2201     Troponin T <0.010 ng/mL     Narrative:       Troponin T Reference Range:  <= 0.03 ng/mL-   Negative for AMI  >0.03 ng/mL-     Abnormal for myocardial necrosis.  Clinicians would have to utilize clinical acumen, EKG, Troponin and serial changes to determine if it is an Acute Myocardial Infarction or myocardial injury due to an underlying chronic condition.       Results may be falsely decreased if patient taking Biotin.      POC Glucose Once [[id number]]  (Abnormal) Collected:  05/13/20 1956    Specimen:  Blood Updated:  05/13/20 1957     Glucose 267 mg/dL      Comment: Serial Number: [id number]:  [id number]       Hemoglobin A1c [[id number]]  (Abnormal) Collected:  05/13/20 1423    Specimen:  Blood Updated:  05/13/20 1915     Hemoglobin A1C 7.3 %     Narrative:       Hemoglobin A1C Reference Range:    <5.7 %        Normal  5.7-6.4 %     Increased risk for diabetes  > 6.4 %        Diabetes       These guidelines have been recommended by the American Diabetic Association for Hgb A1c.      The following 2010 guidelines have been recommended by the American Diabetes Association for Hemoglobin A1c.    HBA1c 5.7-6.4% Increased risk for future diabetes (pre-diabetes)  HBA1c     >6.4% Diabetes      TSH [[id number]]  (Normal) Collected:  05/13/20 1423    Specimen:  Blood Updated:  05/13/20 1653     TSH 1.570 uIU/mL      Comment: TSH results may be falsely decreased if patient taking Biotin.       POC Glucose Once [[id number]]  (Abnormal) Collected:  05/13/20 1616    Specimen:  Blood Updated:  05/13/20 1618      Glucose 135 mg/dL      Comment: Serial Number: [id number]:  [id number]       [place] Draw [[id number]] Collected:  05/13/20 1423    Specimen:  Blood Updated:  05/13/20 1530    Narrative:       The following orders were created for panel order [place] Draw.  Procedure                               Abnormality         Status                     ---------                               -----------         ------                     Light Blue Top[[id number]]                                   Final result               Green Top (Gel)[[id number]]                                                             Lavender Top[[id number]]                                     Final result               Gold Top - SST[[id number]]                                   Final result                 Please view results for these tests on the individual orders.    Light Blue Top [[id number]] Collected:  05/13/20 1423    Specimen:  Blood Updated:  05/13/20 1530     Extra Tube hold for add-on     Comment: Auto resulted       Lavender Top [[id number]] Collected:  05/13/20 1423    Specimen:  Blood Updated:  05/13/20 1530     Extra Tube hold for add-on     Comment: Auto resulted       Gold Top - SST [[id number]] Collected:  05/13/20 1423    Specimen:  Blood Updated:  05/13/20 1530     Extra Tube Hold for add-ons.     Comment: Auto resulted.       Urinalysis With Culture If Indicated - Urine, Clean Catch [[id number]]  (Abnormal) Collected:  05/13/20 1451    Specimen:  Urine, Clean Catch Updated:  05/13/20 1514     Color, UA Yellow     Appearance, UA Clear     pH, UA 7.0     Specific Gravity, UA 1.009     Glucose,  mg/dL (Trace)     Ketones, UA Negative     Bilirubin, UA Negative     Blood, UA Negative     Protein, UA Negative     Leuk Esterase, UA Trace     Nitrite, UA Negative     Urobilinogen, UA 0.2 E.U./dL    Urinalysis, Microscopic Only - Urine, Clean Catch [[id number]]  (Abnormal) Collected:  05/13/20 1451    Specimen:  Urine, Clean Catch  Updated:  05/13/20 1514     RBC, UA 0-2 /HPF      WBC, UA 0-2 /HPF      Bacteria, UA None Seen /HPF      Squamous Epithelial Cells, UA 0-2 /HPF      Hyaline Casts, UA 0-2 /LPF      Methodology Automated Microscopy    Comprehensive Metabolic Panel [[id number]]  (Abnormal) Collected:  05/13/20 1423    Specimen:  Blood Updated:  05/13/20 1502     Glucose 219 mg/dL      BUN 12 mg/dL      Creatinine 0.92 mg/dL      Sodium 140 mmol/L      Potassium 3.9 mmol/L      Chloride 100 mmol/L      CO2 26.0 mmol/L      Calcium 10.1 mg/dL      Total Protein 7.5 g/dL      Albumin 4.10 g/dL      ALT (SGPT) 12 U/L      AST (SGOT) 17 U/L      Alkaline Phosphatase 43 U/L      Total Bilirubin 0.2 mg/dL      eGFR Non African Amer 61 mL/min/1.73      Globulin 3.4 gm/dL      A/G Ratio 1.2 g/dL      BUN/Creatinine Ratio 13.0     Anion Gap 14.0 mmol/L     Narrative:       GFR Normal >60  Chronic Kidney Disease <60  Kidney Failure <15      Lipase [[id number]]  (Normal) Collected:  05/13/20 1423    Specimen:  Blood Updated:  05/13/20 1502     Lipase 31 U/L     Troponin [[id number]]  (Normal) Collected:  05/13/20 1423    Specimen:  Blood Updated:  05/13/20 1502     Troponin T <0.010 ng/mL     Narrative:       Troponin T Reference Range:  <= 0.03 ng/mL-   Negative for AMI  >0.03 ng/mL-     Abnormal for myocardial necrosis.  Clinicians would have to utilize clinical acumen, EKG, Troponin and serial changes to determine if it is an Acute Myocardial Infarction or myocardial injury due to an underlying chronic condition.       Results may be falsely decreased if patient taking Biotin.      BNP [[id number]]  (Normal) Collected:  05/13/20 1423    Specimen:  Blood Updated:  05/13/20 1500     proBNP 98.8 pg/mL     Narrative:       Among patients with dyspnea, NT-proBNP is highly sensitive for the detection of acute congestive heart failure. In addition NT-proBNP of <300 pg/ml effectively rules out acute congestive heart failure with 99% negative predictive  value.    Results may be falsely decreased if patient taking Biotin.      CBC & Differential [[id number]] Collected:  05/13/20 1423    Specimen:  Blood Updated:  05/13/20 1436    Narrative:       The following orders were created for panel order CBC & Differential.  Procedure                               Abnormality         Status                     ---------                               -----------         ------                     CBC Auto Differential[[id number]]        Normal              Final result                 Please view results for these tests on the individual orders.    CBC Auto Differential [[id number]]  (Normal) Collected:  05/13/20 1423    Specimen:  Blood Updated:  05/13/20 1436     WBC 6.80 10*3/mm3      RBC 4.40 10*6/mm3      Hemoglobin 13.3 g/dL      Hematocrit 38.2 %      MCV 86.8 fL      MCH 30.2 pg      MCHC 34.7 g/dL      RDW 13.4 %      RDW-SD 40.7 fl      MPV 8.0 fL      Platelets 277 10*3/mm3      Neutrophil % 45.0 %      Lymphocyte % 42.8 %      Monocyte % 7.3 %      Eosinophil % 4.0 %      Basophil % 0.9 %      Neutrophils, Absolute 3.10 10*3/mm3      Lymphocytes, Absolute 2.90 10*3/mm3      Monocytes, Absolute 0.50 10*3/mm3      Eosinophils, Absolute 0.30 10*3/mm3      Basophils, Absolute 0.10 10*3/mm3      nRBC 0.0 /100 WBC     POC Glucose Once [[id number]]  (Abnormal) Collected:  05/13/20 1427    Specimen:  Blood Updated:  05/13/20 1428     Glucose 188 mg/dL      Comment: Serial Number: [id number]:  [id number]           Hemoglobin A1C   Date Value Ref Range Status   05/13/2020 7.3 (H) 3.5 - 5.6 % Final               Lab Results   Component Value Date    LIPASE 31 05/13/2020     Lab Results   Component Value Date    CHOL 166 01/28/2020    TRIG 165 (H) 01/28/2020    HDL 47 01/28/2020    LDL 86 01/28/2020       No results found for: INTRAOP, PREDX, FINALDX, COMDX    Microbiology Results (last 10 days)     ** No results found for the last 240 hours. **          ECG/EMG Results  (most recent)     Procedure Component Value Units Date/Time    Adult Transthoracic Echo Complete W/ Cont if Necessary Per Protocol [[id number]] Collected:  05/13/20 1713     Updated:  05/13/20 1742     BSA 1.8 m^2      RVIDd 2.0 cm      IVSd 0.91 cm      LVIDd 4.9 cm      LVIDs 3.0 cm      LVPWd 0.89 cm      IVS/LVPW 1.0     FS 38.3 %      EDV(Teich) 111.6 ml      ESV(Teich) 35.3 ml      EF(Teich) 68.4 %      EDV(cubed) 116.0 ml      ESV(cubed) 27.3 ml      EF(cubed) 76.5 %      LV mass(C)d 152.0 grams      LV mass(C)dI 84.4 grams/m^2      SV(Teich) 76.3 ml      SI(Teich) 42.4 ml/m^2      SV(cubed) 88.7 ml      SI(cubed) 49.3 ml/m^2      Ao root diam 3.3 cm      Ao root area 8.6 cm^2      ACS 1.9 cm      LVOT diam 2.2 cm      LVOT area 3.8 cm^2      RVOT diam 1.7 cm      RVOT area 2.3 cm^2      EDV(MOD-sp4) 91.9 ml      ESV(MOD-sp4) 26.6 ml      EF(MOD-sp4) 71.1 %      SV(MOD-sp4) 65.3 ml      SI(MOD-sp4) 36.3 ml/m^2      Ao root area (BSA corrected) 1.8     LV Cheek Vol (BSA corrected) 51.0 ml/m^2      LV Sys Vol (BSA corrected) 14.8 ml/m^2      Aortic R-R 1.2 sec      Aortic HR 48.5 BPM      MV E max edgar 80.7 cm/sec      MV A max edgar 100.7 cm/sec      MV E/A 0.8     MV V2 max 103.2 cm/sec      MV max PG 4.3 mmHg      MV V2 mean 58.2 cm/sec      MV mean PG 1.6 mmHg      MV V2 VTI 37.0 cm      MVA(VTI) 3.3 cm^2      MV dec slope 317.8 cm/sec^2      MV dec time 0.25 sec      Ao pk edgar 132.1 cm/sec      Ao max PG 7.0 mmHg      Ao max PG (full) 2.0 mmHg      Ao V2 mean 97.2 cm/sec      Ao mean PG 4.1 mmHg      Ao mean PG (full) 1.1 mmHg      Ao V2 VTI 39.6 cm      JAMSHID(I,A) 3.1 cm^2      JAMSHID(I,D) 3.1 cm^2      JAMSHID(V,A) 3.2 cm^2      JAMSHID(V,D) 3.2 cm^2      LV V1 max PG 5.0 mmHg      LV V1 mean PG 2.9 mmHg      LV V1 max 111.3 cm/sec      LV V1 mean 81.1 cm/sec      LV V1 VTI 32.4 cm      CO(Ao) 16.4 l/min      CI(Ao) 9.1 l/min/m^2      SV(Ao) 339.0 ml      SI(Ao) 188.3 ml/m^2      CO(LVOT) 5.9 l/min      CI(LVOT) 3.3  l/min/m^2      SV(LVOT) 122.4 ml      SV(RVOT) 58.7 ml      SI(LVOT) 68.0 ml/m^2      PA V2 max 115.8 cm/sec      PA max PG 5.4 mmHg      PA max PG (full) 1.7 mmHg       CV ECHO SAMEER - PVA(V,A) 1.9 cm^2       CV ECHO SAMEER - PVA(V,D) 1.9 cm^2      RV V1 max PG 3.7 mmHg      RV V1 mean PG 2.0 mmHg      RV V1 max 95.8 cm/sec      RV V1 mean 66.5 cm/sec      RV V1 VTI 26.1 cm      TR max jude 212.9 cm/sec      RVSP(TR) 21.1 mmHg      RAP systole 3.0 mmHg      Pulm Sys Jude 70.3 cm/sec      Pulm Cheek Jude 42.7 cm/sec      Pulm S/D 1.6     Qp/Qs 0.48     Pulm A Revs Dur 0.13 sec      Pulm A Revs Jude 31.9 cm/sec       CV ECHO SAMEER - BZI_BMI 29.9 kilograms/m^2       CV ECHO SAMEER - BSA(HAYCOCK) 1.9 m^2       CV ECHO SAMEER - BZI_METRIC_WEIGHT 76.7 kg       CV ECHO SAMEER - BZI_METRIC_HEIGHT 160.0 cm      EF(MOD-bp) 71.0 %      LA dimension(2D) 4.1 cm                     Xr Chest 1 View    Result Date: 5/13/2020  Mild cardiac enlargement with no acute process identified.  Electronically Signed By-Candido Martinez On:5/13/2020 3:19 PM This report was finalized on [id number] by  Candido Martinez, .          Xrays, labs reviewed personally by physician.    Medication Review:   I have reviewed the patient's current medication list      Scheduled Meds    amLODIPine 10 mg Oral Daily   aspirin 81 mg Oral QAM   atenolol 25 mg Oral Q PM   atorvastatin 10 mg Oral Nightly   calcium 500 mg vitamin D 5 mcg (200 UT) 1 tablet Oral Daily   cholecalciferol 1,000 Units Oral Daily   enoxaparin 40 mg Subcutaneous Daily   escitalopram 10 mg Oral Nightly   fenofibrate 145 mg Oral Daily   ferrous sulfate 324 mg Oral Daily With Breakfast   gabapentin 300 mg Oral BID   insulin glargine 20 Units Subcutaneous Nightly   insulin lispro 0-9 Units Subcutaneous TID AC   insulin lispro 20 Units Subcutaneous TID With Meals   lisinopril-hydroCHLOROthiazide (ZESTORETIC) 20-25 mg combo dose  Oral Q24H   metFORMIN 1,000 mg Oral Nightly   pantoprazole 40 mg Oral QAM    sodium chloride 10 mL Intravenous Q12H   traZODone 50 mg Oral Nightly   vitamin B-12 1,000 mcg Oral Daily       Meds Infusions       Meds PRN  •  acetaminophen **OR** acetaminophen **OR** acetaminophen  •  bisacodyl  •  dextrose  •  dextrose  •  docusate sodium  •  glucagon (human recombinant)  •  hydrALAZINE  •  [DISCONTINUED] insulin lispro **AND** insulin lispro  •  insulin lispro **AND** insulin lispro  •  nitroglycerin  •  [COMPLETED] Insert peripheral IV **AND** sodium chloride  •  sodium chloride  •  sodium chloride    I personally reviewed patient's x-ray films  I personally reviewed patient's EKG strips      Assessment/Plan   Assessment/Plan     Active Hospital Problems    Diagnosis  POA   • **Heart palpitations [R00.2]  Yes   • Gastroesophageal reflux disease [K21.9]  Yes   • Essential hypertension [I10]  Yes   • Type 2 diabetes mellitus (CMS/HCC) [E11.9]  Yes   • High-density lipoprotein deficiency [E78.6]  Yes      Resolved Hospital Problems   No resolved problems to display.       MEDICAL DECISION MAKING COMPLEXITY BY PROBLEM:   # Heart palpitations  Admit to cardiac monitored floor  Monitor for any arrhythmias  TSH 1.57  Ruled out for MI  Echo: negative  Cardiology: will need event monitor on discharge; nuclear stress test negative     #Type 2 diabetes mellitus  Resume home diabetic medicines  Consistent carbohydrate diet  Basal bolus insulin regimen  PRN insulin for additional coverage,as needed     #Hypertension, essential  Resume home medicine  PRN medicines for elevated blood pressure     #GERD  Resume home medicine     #HLD  Resume home medicines     #Depression, situational  Patient is currently on Lexapro  Her  recently ; has been feeling additional stress  Psych: low dose Xanax (PRN) support/greif therapy      VTE Prophylaxis -   Mechanical Order History:     None      Pharmalogical Order History:     Ordered     Dose Route Frequency Stop    20 9375  enoxaparin (LOVENOX)  syringe 40 mg      40 mg SC Daily --            Code Status -   Code Status and Medical Interventions:   Ordered at: 05/13/20 1616     Code Status:    CPR     Medical Interventions (Level of Support Prior to Arrest):    Full       This patient has been examined wearing appropriate Personal Protective Equipment and discussed with hospital infection control department. 05/14/20        Discharge Planning          Destination      Coordination has not been started for this encounter.      Durable Medical Equipment      Coordination has not been started for this encounter.      Dialysis/Infusion      Coordination has not been started for this encounter.      Home Medical Care      Coordination has not been started for this encounter.      Therapy      Coordination has not been started for this encounter.      Community Resources      Coordination has not been started for this encounter.            Electronically signed by Mary Ann Olguin MD, 05/14/20, 12:38.  [facility] [place] Hospitalist Team

## 2020-05-14 NOTE — CONSULTS
Referring Provider:   Reason for Consultation: Palpitations and chest discomfort  History of diabetes hypertension dyslipidemia    Patient Care Team:  Emily Burdick DO as PCP - General  Walker Rodriguez MD as Consulting Physician (Cardiology)    Chief complaint palpitations        History of present illness:  Jenna Rosa is a 70 y.o. female who presents with palpitations on and off with some discomfort on the left side of the chest without any significant radiation.  70-year-old white male patient with known history of hypertension diabetes dyslipidemia having problems with palpitations with some discomfort on the chest  Symptoms happens mostly under stress sometimes with exertion going on for a few weeks progressively getting worse  Denies of any syncopal episodes lower extremity edema PND orthopnea  Usually exacerbated by stress no significant relieving factors    Review of Systems   Constitution: Negative for fever and malaise/fatigue.   HENT: Negative for congestion and hearing loss.    Eyes: Negative for double vision and visual disturbance.   Cardiovascular: Positive for chest pain, irregular heartbeat and palpitations. Negative for claudication, dyspnea on exertion, leg swelling and syncope.   Respiratory: Negative for cough and shortness of breath.    Endocrine: Negative for cold intolerance.   Skin: Negative for color change and rash.   Musculoskeletal: Negative for arthritis and joint pain.   Gastrointestinal: Negative for abdominal pain and heartburn.   Genitourinary: Negative for hematuria.   Neurological: Negative for excessive daytime sleepiness and dizziness.   Psychiatric/Behavioral: Negative for depression. The patient is not nervous/anxious.    All other systems reviewed and are negative.      History  Past Medical History:   Diagnosis Date   • Breast mass, right    • Depression     Abstracted from [facility]   • Dexamethasone adverse reaction 2014    Abstracted from  [facility] Osteopenia   • Diabetes mellitus, type 2 (CMS/HCC) 1988    Abstracted from [facility]   • Diabetic peripheral neuropathy (CMS/HCC)    • GERD (gastroesophageal reflux disease)     Abstracted from [facility]   • H/O mammogram     Abstracted from [facility]   • Hyperlipidemia     Abstracted from [facility]   • Hypertension     Abstracted from [facility]   • Insomnia    • Low HDL (under 40)    • Lower back pain    • Metatarsalgia    • Never smoked cigarettes    • Obesity    • Osteoarthritis    • Osteopenia     Abstracted from [facility]   • Palpitations    • Polypharmacy    • Pulmonary nodule    • RA (rheumatoid arthritis) (CMS/HCC)     Abstracted from [facility]   • Rheumatoid arthritis (CMS/HCC)    • Rheumatoid nodule (CMS/HCC) 06/2011    Abstracted from [facility] rt. elbow, left elbow, and epidermal inclusion cyst post. neck (June 2011)--Dr. Whitmore   • Rheumatoid nodule (CMS/HCC)     Abstracted from [facility] rt. thumb x 2 and rt. & lt. 3rd fingers (Oct.2012)   • Uterine prolapse     Abstracted from [facility]   • Uterine prolapse    • Vitamin D deficiency        Past Surgical History:   Procedure Laterality Date   • BILATERAL SALPINGO OOPHORECTOMY      Abstracted from [facility] endometriosis   • CHOLECYSTECTOMY      Abstracted from [facility]   • COLONOSCOPY  2017    Abstracted from [facility] polyps x 3= 2017, rech 2020 [facility]   • CYST REMOVAL      Abstracted from [facility] Seb cyst on neck   • OTHER SURGICAL HISTORY      Abstracted from [facility] Uterine Prolapse repair   • TOTAL ABDOMINAL HYSTERECTOMY      Abstracted from [facility] BSO---endometriosis   • TOTAL ABDOMINAL HYSTERECTOMY WITH SALPINGO OOPHORECTOMY         Family History   Problem Relation Age of Onset   • Heart disease Mother    • Diabetes Mother    • Hypertension Mother    • Rheum arthritis Mother    • Coronary artery disease Mother    • Alcohol abuse Mother    • Heart disease Father    • Mental illness Father    • Hypertension Father     • Alcohol abuse Father    • Diabetes Sister    • Stroke Sister    • Cancer Sister    • Osteoarthritis Sister    • Rheum arthritis Sister    • Diabetes Maternal Grandfather        Social History     Tobacco Use   • Smoking status: Never Smoker   • Smokeless tobacco: Never Used   Substance Use Topics   • Alcohol use: No     Frequency: Never   • Drug use: Defer        Medications Prior to Admission   Medication Sig Dispense Refill Last Dose   • albuterol sulfate HFA (VENTOLIN HFA) 108 (90 Base) MCG/ACT inhaler Inhale 1 puff Every 6 (Six) Hours As Needed for Wheezing or Shortness of Air.   Taking   • amLODIPine (NORVASC) 10 MG tablet Take 1 tablet by mouth Daily. 90 tablet 0 5/13/2020 at Unknown time   • aspirin 81 MG EC tablet Take 81 mg by mouth Every Morning.   5/13/2020 at Unknown time   • atenolol (TENORMIN) 25 MG tablet Take 25 mg by mouth Every Evening.   5/12/2020 at Unknown time   • calcium carbonate (OS-PREET) 600 MG tablet Take 600 mg by mouth Daily.   5/13/2020 at Unknown time   • cholecalciferol (VITAMIN D3) 25 MCG (1000 UT) tablet Take 1,000 Units by mouth Daily.      • escitalopram (LEXAPRO) 10 MG tablet Take 1 tablet by mouth Every Night. 30 tablet 1 5/12/2020 at Unknown time   • fenofibrate 160 MG tablet Take 160 mg by mouth Every Evening.   5/12/2020 at Unknown time   • Ferrous Sulfate (IRON) 325 (65 Fe) MG tablet Take 325 mg by mouth Daily.   5/13/2020 at Unknown time   • fluticasone (FLONASE ALLERGY RELIEF) 50 MCG/ACT nasal spray 1 spray into the nostril(s) as directed by provider Daily As Needed for Allergies.   Taking   • gabapentin (Neurontin) 300 MG capsule Take 1 capsule by mouth 2 (Two) Times a Day. 180 capsule 0 5/13/2020 at Unknown time   • insulin NPH-insulin regular (humuLIN 70/30,novoLIN 70/30) (70-30) 100 UNIT/ML injection Inject 50 Units under the skin into the appropriate area as directed Every Morning.   5/13/2020 at Unknown time   • insulin NPH-insulin regular (humuLIN 70/30,novoLIN  70/30) (70-30) 100 UNIT/ML injection Inject 20 Units under the skin into the appropriate area as directed Every Evening.   5/12/2020 at Unknown time   • lansoprazole (PREVACID) 30 MG capsule TAKE 1 CAPSULE BY MOUTH DAILY 90 capsule 0 5/13/2020 at Unknown time   • lisinopril-hydrochlorothiazide (PRINZIDE,ZESTORETIC) 20-25 MG per tablet TAKE 1 TABLET BY MOUTH DAILY 90 tablet 2 5/13/2020 at Unknown time   • lovastatin (MEVACOR) 40 MG tablet Take 40 mg by mouth Every Night.   5/12/2020 at Unknown time   • SITagliptin-metFORMIN HCl ER (Janumet XR) 100-1000 MG tablet Take 1 tablet by mouth Every Evening.   5/12/2020 at Unknown time   • traZODone (DESYREL) 50 MG tablet TAKE 1 TABLET BY MOUTH DAILY AT BEDTIME 90 tablet 0 5/12/2020 at Unknown time   • vitamin B-12 (CYANOCOBALAMIN) 1000 MCG tablet Take 1,000 mcg by mouth Daily.   5/13/2020 at Unknown time   • HUMIRA PEN 40 MG/0.4ML Pen-injector Kit Inject 40 mg under the skin into the appropriate area as directed Every 14 (Fourteen) Days. 6 each 0 5/7/2020         Patient has no known allergies.    Scheduled Meds:  amLODIPine 10 mg Oral Daily   aspirin 81 mg Oral QAM   atenolol 25 mg Oral Q PM   atorvastatin 10 mg Oral Nightly   calcium 500 mg vitamin D 5 mcg (200 UT) 1 tablet Oral Daily   cholecalciferol 1,000 Units Oral Daily   enoxaparin 40 mg Subcutaneous Daily   escitalopram 10 mg Oral Nightly   fenofibrate 145 mg Oral Daily   ferrous sulfate 324 mg Oral Daily With Breakfast   gabapentin 300 mg Oral BID   insulin glargine 20 Units Subcutaneous Nightly   insulin lispro 0-9 Units Subcutaneous TID AC   insulin lispro 20 Units Subcutaneous TID With Meals   lisinopril-hydroCHLOROthiazide (ZESTORETIC) 20-25 mg combo dose  Oral Q24H   metFORMIN 1,000 mg Oral Nightly   pantoprazole 40 mg Oral QAM   sodium chloride 10 mL Intravenous Q12H   traZODone 50 mg Oral Nightly   vitamin B-12 1,000 mcg Oral Daily     Continuous Infusions:   PRN Meds:.•  acetaminophen **OR** acetaminophen  "**OR** acetaminophen  •  bisacodyl  •  dextrose  •  dextrose  •  docusate sodium  •  glucagon (human recombinant)  •  hydrALAZINE  •  [DISCONTINUED] insulin lispro **AND** insulin lispro  •  insulin lispro **AND** insulin lispro  •  nitroglycerin  •  [COMPLETED] Insert peripheral IV **AND** sodium chloride  •  sodium chloride  •  sodium chloride        VITAL SIGNS  Vitals:    05/13/20 1713 05/13/20 1926 05/14/20 0350 05/14/20 1150   BP: 150/72 152/68 134/61 163/80   BP Location:  Right arm Right arm Right arm   Patient Position:  Lying Lying Lying   Pulse:  54 50 55   Resp:  18 14 16   Temp:  97.6 °F (36.4 °C) 97.8 °F (36.6 °C) 97.9 °F (36.6 °C)   TempSrc:  Oral Oral Oral   SpO2:  99% 96% 96%   Weight: 76.7 kg (169 lb)  74.1 kg (163 lb 6.5 oz)    Height: 160 cm (63\")          Flowsheet Rows      First Filed Value   Admission Height  160 cm (63\") Documented at 05/13/2020 1409   Admission Weight  77 kg (169 lb 12.1 oz) Documented at 05/13/2020 1409           TELEMETRY: Sinus rhythm sinus bradycardia    Physical Exam:  Physical Exam   Constitutional: She is oriented to person, place, and time. She appears well-developed and well-nourished. She is cooperative.   HENT:   Head: Normocephalic and atraumatic.   Mouth/Throat: Uvula is midline and oropharynx is clear and moist. No oral lesions.   Eyes: Conjunctivae are normal. No scleral icterus.   Neck: Trachea normal. Neck supple. No JVD present. Carotid bruit is not present. No thyromegaly present.   Cardiovascular: Normal rate, regular rhythm, S1 normal, S2 normal, normal heart sounds, intact distal pulses and normal pulses. PMI is not displaced. Exam reveals no gallop and no friction rub.   No murmur heard.  Pulmonary/Chest: Effort normal and breath sounds normal.   Abdominal: Soft. Bowel sounds are normal.   Musculoskeletal: Normal range of motion.   Neurological: She is alert and oriented to person, place, and time. She has normal strength.   No focal deficits   Skin: " Skin is warm. No cyanosis.   Psychiatric: She has a normal mood and affect.                LAB RESULTS (LAST 7 DAYS)    CBC  Results from last 7 days   Lab Units 05/14/20  0322 05/13/20  1423   WBC 10*3/mm3 7.10 6.80   RBC 10*6/mm3 4.31 4.40   HEMOGLOBIN g/dL 12.8 13.3   HEMATOCRIT % 37.2 38.2   MCV fL 86.4 86.8   PLATELETS 10*3/mm3 251 277       BMP  Results from last 7 days   Lab Units 05/14/20  0322 05/13/20  1423   SODIUM mmol/L 140 140   POTASSIUM mmol/L 3.5 3.9   CHLORIDE mmol/L 100 100   CO2 mmol/L 29.0 26.0   BUN mg/dL 15 12   CREATININE mg/dL 0.89 0.92   GLUCOSE mg/dL 108* 219*       CMP Results from last 7 days   Lab Units 05/14/20  0322 05/13/20  1423   SODIUM mmol/L 140 140   POTASSIUM mmol/L 3.5 3.9   CHLORIDE mmol/L 100 100   CO2 mmol/L 29.0 26.0   BUN mg/dL 15 12   CREATININE mg/dL 0.89 0.92   GLUCOSE mg/dL 108* 219*   ALBUMIN g/dL  --  4.10   BILIRUBIN mg/dL  --  0.2   ALK PHOS U/L  --  43   AST (SGOT) U/L  --  17   ALT (SGPT) U/L  --  12   LIPASE U/L  --  31         BNP        TROPONIN  Results from last 7 days   Lab Units 05/14/20  0649   TROPONIN T ng/mL 0.010       CoAg        Creatinine Clearance  Estimated Creatinine Clearance: 57.5 mL/min (by C-G formula based on SCr of 0.89 mg/dL).    ABG        Radiology  Xr Chest 1 View    Result Date: 5/13/2020  Mild cardiac enlargement with no acute process identified.  Electronically Signed By-Candido Martinez On:5/13/2020 3:19 PM This report was finalized on [id number] by  Candido Martinez, .          EKG          I personally viewed and interpreted the patient's EKG/Telemetry data:    ECHOCARDIOGRAM:         STRESS MYOVIEW:    CARDIAC CATHETERIZATION:    OTHER:         Assessment/Plan       Heart palpitations    Gastroesophageal reflux disease    High-density lipoprotein deficiency    Essential hypertension    Type 2 diabetes mellitus (CMS/HCC)      Problems with palpitations with chest discomfort multiple cardiac risk factors suggest echocardiogram and stress  Myoview  If continues to have symptoms may discharge with event monitor  Monitor for tachybradycardia episodes      I discussed the patients findings and my recommendations with patient and attending nurse    Walker Rodriguez MD  05/14/20  12:23

## 2020-05-14 NOTE — CONSULTS
Referring Provider: Dr Olguin  Reason for Consultation: depression      Chief complaint depression     Subjective .     History of present illness:  The patient is a 70 y.o. female who was admitted secondary to chest discomfort. PMH: HTN, DM, RA, neuropathy . Psyhc consult was requested by Dr Sharif meyers to depression.  The pt acknowledged long hx of depression, she suffers from RA, chronic pain, her  passed away in March 2020, the pt feels depressed, lonely, anxious, unable to relax, denied AVH/SI/HI, she was started on lexapro and trazodone 2 months ago by PCP, did not notice any improvement yet. The pt lives alone with supervision from her children. The pt reported poor sleep. Anxiety is intense at times, anxiety is associated with chest discomfort, tension, she is unable to relax, hard to fall asleep .  Past psych hx: depression anxiety       Review of Systems   All systems were reviewed and negative except for:  Constitution:  positive for fatigue  Cardiovascular: positive for  chest pressure / pain, on exertion  Musculoskeletal: positive for  joint stiffness, joint swelling and muscle pain  Behavioral/Psych: positive for  anxiety and depression    History    Past Medical History:   Diagnosis Date   • Breast mass, right    • Depression     Abstracted from [facility]   • Dexamethasone adverse reaction 2014    Abstracted from [facility] Osteopenia   • Diabetes mellitus, type 2 (CMS/HCC) 1988    Abstracted from [facility]   • Diabetic peripheral neuropathy (CMS/HCC)    • GERD (gastroesophageal reflux disease)     Abstracted from [facility]   • H/O mammogram     Abstracted from [facility]   • Hyperlipidemia     Abstracted from [facility]   • Hypertension     Abstracted from [facility]   • Insomnia    • Low HDL (under 40)    • Lower back pain    • Metatarsalgia    • Never smoked cigarettes    • Obesity    • Osteoarthritis    • Osteopenia     Abstracted from [facility]   • Palpitations    • Polypharmacy    •  Pulmonary nodule    • RA (rheumatoid arthritis) (CMS/HCC)     Abstracted from [facility]   • Rheumatoid arthritis (CMS/HCC)    • Rheumatoid nodule (CMS/HCC) 06/2011    [facility] elbow, left elbow, and epidermal inclusion cyst post. neck (June 2011)--Dr. Whitmore   • Rheumatoid nodule (CMS/HCC)     [facility] thumb x 2 and rt. & lt. 3rd fingers (Oct.2012)   • Uterine prolapse     Abstracted from [facility]   • Uterine prolapse    • Vitamin D deficiency           Family History   Problem Relation Age of Onset   • Heart disease Mother    • Diabetes Mother    • Hypertension Mother    • Rheum arthritis Mother    • Coronary artery disease Mother    • Alcohol abuse Mother    • Heart disease Father    • Mental illness Father    • Hypertension Father    • Alcohol abuse Father    • Diabetes Sister    • Stroke Sister    • Cancer Sister    • Osteoarthritis Sister    • Rheum arthritis Sister    • Diabetes Maternal Grandfather         Social History     Tobacco Use   • Smoking status: Never Smoker   • Smokeless tobacco: Never Used   Substance Use Topics   • Alcohol use: No     Frequency: Never   • Drug use: Defer          Medications Prior to Admission   Medication Sig Dispense Refill Last Dose   • albuterol sulfate HFA (VENTOLIN HFA) 108 (90 Base) MCG/ACT inhaler Inhale 1 puff Every 6 (Six) Hours As Needed for Wheezing or Shortness of Air.   Taking   • amLODIPine (NORVASC) 10 MG tablet Take 1 tablet by mouth Daily. 90 tablet 0 5/13/2020 at Unknown time   • aspirin 81 MG EC tablet Take 81 mg by mouth Every Morning.   5/13/2020 at Unknown time   • atenolol (TENORMIN) 25 MG tablet Take 25 mg by mouth Every Evening.   5/12/2020 at Unknown time   • calcium carbonate (OS-PREET) 600 MG tablet Take 600 mg by mouth Daily.   5/13/2020 at Unknown time   • cholecalciferol (VITAMIN D3) 25 MCG (1000 UT) tablet Take 1,000 Units by mouth Daily.      • escitalopram (LEXAPRO) 10 MG tablet Take 1 tablet by  mouth Every Night. 30 tablet 1 5/12/2020 at Unknown time   • fenofibrate 160 MG tablet Take 160 mg by mouth Every Evening.   5/12/2020 at Unknown time   • Ferrous Sulfate (IRON) 325 (65 Fe) MG tablet Take 325 mg by mouth Daily.   5/13/2020 at Unknown time   • fluticasone (FLONASE ALLERGY RELIEF) 50 MCG/ACT nasal spray 1 spray into the nostril(s) as directed by provider Daily As Needed for Allergies.   Taking   • gabapentin (Neurontin) 300 MG capsule Take 1 capsule by mouth 2 (Two) Times a Day. 180 capsule 0 5/13/2020 at Unknown time   • insulin NPH-insulin regular (humuLIN 70/30,novoLIN 70/30) (70-30) 100 UNIT/ML injection Inject 50 Units under the skin into the appropriate area as directed Every Morning.   5/13/2020 at Unknown time   • insulin NPH-insulin regular (humuLIN 70/30,novoLIN 70/30) (70-30) 100 UNIT/ML injection Inject 20 Units under the skin into the appropriate area as directed Every Evening.   5/12/2020 at Unknown time   • lansoprazole (PREVACID) 30 MG capsule TAKE 1 CAPSULE BY MOUTH DAILY 90 capsule 0 5/13/2020 at Unknown time   • lisinopril-hydrochlorothiazide (PRINZIDE,ZESTORETIC) 20-25 MG per tablet TAKE 1 TABLET BY MOUTH DAILY 90 tablet 2 5/13/2020 at Unknown time   • lovastatin (MEVACOR) 40 MG tablet Take 40 mg by mouth Every Night.   5/12/2020 at Unknown time   • SITagliptin-metFORMIN HCl ER (Janumet XR) 100-1000 MG tablet Take 1 tablet by mouth Every Evening.   5/12/2020 at Unknown time   • traZODone (DESYREL) 50 MG tablet TAKE 1 TABLET BY MOUTH DAILY AT BEDTIME 90 tablet 0 5/12/2020 at Unknown time   • vitamin B-12 (CYANOCOBALAMIN) 1000 MCG tablet Take 1,000 mcg by mouth Daily.   5/13/2020 at Unknown time   • HUMIRA PEN 40 MG/0.4ML Pen-injector Kit Inject 40 mg under the skin into the appropriate area as directed Every 14 (Fourteen) Days. 6 each 0 5/7/2020        Scheduled Meds:    amLODIPine 10 mg Oral Daily   aspirin 81 mg Oral QAM   atenolol 25 mg Oral Q PM   atorvastatin 10 mg Oral Nightly  "  calcium 500 mg vitamin D 5 mcg (200 UT) 1 tablet Oral Daily   cholecalciferol 1,000 Units Oral Daily   enoxaparin 40 mg Subcutaneous Daily   escitalopram 10 mg Oral Nightly   fenofibrate 145 mg Oral Daily   ferrous sulfate 324 mg Oral Daily With Breakfast   gabapentin 300 mg Oral BID   insulin glargine 20 Units Subcutaneous Nightly   insulin lispro 0-9 Units Subcutaneous TID AC   insulin lispro 20 Units Subcutaneous TID With Meals   lisinopril-hydroCHLOROthiazide (ZESTORETIC) 20-25 mg combo dose  Oral Q24H   metFORMIN 1,000 mg Oral Nightly   pantoprazole 40 mg Oral QAM   sodium chloride 10 mL Intravenous Q12H   traZODone 50 mg Oral Nightly   vitamin B-12 1,000 mcg Oral Daily        Continuous Infusions:       PRN Meds:  •  acetaminophen **OR** acetaminophen **OR** acetaminophen  •  bisacodyl  •  dextrose  •  dextrose  •  docusate sodium  •  glucagon (human recombinant)  •  hydrALAZINE  •  [DISCONTINUED] insulin lispro **AND** insulin lispro  •  insulin lispro **AND** insulin lispro  •  nitroglycerin  •  [COMPLETED] Insert peripheral IV **AND** sodium chloride  •  sodium chloride  •  sodium chloride      Allergies:  Patient has no known allergies.      Objective     Vital Signs   /80 (BP Location: Right arm, Patient Position: Lying)   Pulse 55   Temp 97.9 °F (36.6 °C) (Oral)   Resp 16   Ht 160 cm (63\")   Wt 74.1 kg (163 lb 6.5 oz)   SpO2 96%   BMI 28.95 kg/m²     Physical Exam:     General Appearance:    In NAD    Head:    Normocephalic, without obvious abnormality, atraumatic   Eyes:            Lids and lashes normal, conjunctivae and sclerae normal, no   icterus, no pallor, corneas clear, PERRLA   Skin:   No bleeding, bruising or rash           Mental Status Exam:    Hygiene:   good  Cooperation:  Cooperative  Eye Contact:  Good  Psychomotor Behavior:  Slow  Affect:  Blunted  Hopelessness: Denies  Speech:  Normal  Thought Progress:  Goal directed and Linear  Thought Content:  Normal  Suicidal:  " None  Homicidal:  None  Hallucinations:  None  Delusion:  None  Memory:  fair   Orientation:  Person, Place, Time and Situation  Reliability:  good  Insight:  Good  Judgement:  Good  Impulse Control:  Fair  Physical/Medical Issues:  Yes      Medications and allergies reviewed     Lab Results   Component Value Date    GLUCOSE 108 (H) 05/14/2020    CALCIUM 9.4 05/14/2020     05/14/2020    K 3.5 05/14/2020    CO2 29.0 05/14/2020     05/14/2020    BUN 15 05/14/2020    CREATININE 0.89 05/14/2020    EGFRIFAFRI 59 (L) 08/13/2017    EGFRIFNONA 63 05/14/2020    BCR 16.9 05/14/2020    ANIONGAP 11.0 05/14/2020       Last Urine Toxicity     LAST URINE TOXICITY RESULTS Latest Ref Rng & Units 2/1/2018 10/25/2017    CREATININE UR mg/dl 90.1 41.4          No results found for: PHENYTOIN, PHENOBARB, VALPROATE, CBMZ    Lab Results   Component Value Date     05/14/2020    BUN 15 05/14/2020    CREATININE 0.89 05/14/2020    TSH 1.570 05/13/2020    WBC 7.10 05/14/2020       Brief Urine Lab Results  (Last result in the past 365 days)      Color   Clarity   Blood   Leuk Est   Nitrite   Protein   CREAT   Urine HCG        05/13/20 1451 Yellow Clear Negative Trace Negative Negative               Assessment/Plan       Heart palpitations    Gastroesophageal reflux disease    High-density lipoprotein deficiency    Essential hypertension    Type 2 diabetes mellitus (CMS/HCC)    Major depressive disorder, recurrent episode, moderate (CMS/HCC)    Bereavement         Assessment: Major depressive d/o, moderate recurrent. Bereavement   Treatment Plan: the pt is currently on lexapro and trazodone, noticed some improvement in depression and sleep, still feels anxious at times. When anxious - chest pain and SOA   She had  consult,   Add low dose of xanax 0.25 mg PO QD PRN for anxiety   Supportive and grief therapy   Treatment Plan discussed with: Patient and nursing     I discussed the patients findings and my recommendations  with patient and nursing staff    I have reviewed and approved the behavioral health treatment plans and problem list. Yes  Thank you for the consult   Referring MD has access to consult report and progress notes in EMR     Sienna Villatoro MD  05/14/20  13:04

## 2020-05-14 NOTE — PROGRESS NOTES
Discharge Planning Assessment   [facility]     Patient Name: Jenna Rosa  MRN: [id number]  Today's Date: 5/14/2020    Admit Date: 5/13/2020      Patient Forms     Row Name 05/14/20 1307       Patient Forms    Patient Observation Letter  Attempted    Details of attempted delivery  attempted to call patient room x2 with no answer             Discharge Planning Assessment   [facility]     Patient Name: Jenna Rosa  MRN: [id number]  Today's Date: 5/14/2020    Admit Date: 5/13/2020    Discharge Needs Assessment     Row Name 05/14/20 0936       Living Environment    Lives With  alone    Current Living Arrangements  home/apartment/condo    Primary Care Provided by  self    Provides Primary Care For  no one    Family Caregiver if Needed  child(berta), adult    Quality of Family Relationships  helpful;supportive    Able to Return to Prior Arrangements  yes       Resource/Environmental Concerns    Resource/Environmental Concerns  none    Transportation Concerns  car, none       Transition Planning    Patient/Family Anticipates Transition to  home    Patient/Family Anticipated Services at Transition  none    Transportation Anticipated  family or friend will provide       Discharge Needs Assessment    Readmission Within the Last 30 Days  no previous admission in last 30 days    Concerns to be Addressed  denies needs/concerns at this time    Equipment Currently Used at Home  none has walker and cane at home, if needed    Anticipated Changes Related to Illness  none    Equipment Needed After Discharge  none        Discharge Plan     Row Name 05/14/20 0936       Plan    Plan  D/C Plan: Anticipate routine home    Patient/Family in Agreement with Plan  yes    Plan Comments  Due to COVID-19 pandemic,  working off site. Called and spoke to patient in room. Patient lives alone, but children provide support. Patient IADLs and drives. Pharmacy verified. Denies any difficulty affording meds. PCP BAY Burdick. Denies any d/c needs  at this time. Barrier to D/C: ECHO pending, cardiology and psych consults pending.         Expected Discharge Date and Time     Expected Discharge Date Expected Discharge Time    May 15, 2020         Demographic Summary     Row Name 05/14/20 0935       General Information    Admission Type  observation    Arrived From  emergency department    Referral Source  admission list    Reason for Consult  discharge planning    Preferred Language  English     Used During This Interaction  no        Functional Status     Row Name 05/14/20 0936       Functional Status    Usual Activity Tolerance  good    Current Activity Tolerance  good       Functional Status, IADL    Medications  independent    Meal Preparation  independent    Housekeeping  independent    Laundry  independent    Shopping  independent       Mental Status Summary    Recent Changes in Mental Status/Cognitive Functioning  no changes        Yolanda Souza

## 2020-05-15 ENCOUNTER — READMISSION MANAGEMENT (OUTPATIENT)
Dept: CALL CENTER | Facility: HOSPITAL | Age: 70
End: 2020-05-15

## 2020-05-15 ENCOUNTER — APPOINTMENT (OUTPATIENT)
Dept: RESPIRATORY THERAPY | Facility: HOSPITAL | Age: 70
End: 2020-05-15

## 2020-05-15 VITALS
WEIGHT: 167.99 LBS | DIASTOLIC BLOOD PRESSURE: 71 MMHG | BODY MASS INDEX: 29.77 KG/M2 | TEMPERATURE: 97.9 F | RESPIRATION RATE: 16 BRPM | OXYGEN SATURATION: 96 % | SYSTOLIC BLOOD PRESSURE: 120 MMHG | HEART RATE: 56 BPM | HEIGHT: 63 IN

## 2020-05-15 LAB
ANION GAP SERPL CALCULATED.3IONS-SCNC: 11 MMOL/L (ref 5–15)
BASOPHILS # BLD AUTO: 0 10*3/MM3 (ref 0–0.2)
BASOPHILS NFR BLD AUTO: 0.8 % (ref 0–1.5)
BUN BLD-MCNC: 16 MG/DL (ref 8–23)
BUN/CREAT SERPL: 18.8 (ref 7–25)
CALCIUM SPEC-SCNC: 9.8 MG/DL (ref 8.6–10.5)
CHLORIDE SERPL-SCNC: 100 MMOL/L (ref 98–107)
CO2 SERPL-SCNC: 27 MMOL/L (ref 22–29)
CREAT BLD-MCNC: 0.85 MG/DL (ref 0.57–1)
DEPRECATED RDW RBC AUTO: 40.7 FL (ref 37–54)
EOSINOPHIL # BLD AUTO: 0.3 10*3/MM3 (ref 0–0.4)
EOSINOPHIL NFR BLD AUTO: 4.7 % (ref 0.3–6.2)
ERYTHROCYTE [DISTWIDTH] IN BLOOD BY AUTOMATED COUNT: 13.2 % (ref 12.3–15.4)
GFR SERPL CREATININE-BSD FRML MDRD: 66 ML/MIN/1.73
GLUCOSE BLD-MCNC: 155 MG/DL (ref 65–99)
GLUCOSE BLDC GLUCOMTR-MCNC: 206 MG/DL (ref 70–105)
GLUCOSE BLDC GLUCOMTR-MCNC: 61 MG/DL (ref 70–105)
GLUCOSE BLDC GLUCOMTR-MCNC: 72 MG/DL (ref 70–105)
HCT VFR BLD AUTO: 37.8 % (ref 34–46.6)
HGB BLD-MCNC: 13 G/DL (ref 12–15.9)
LYMPHOCYTES # BLD AUTO: 3.3 10*3/MM3 (ref 0.7–3.1)
LYMPHOCYTES NFR BLD AUTO: 55.9 % (ref 19.6–45.3)
MCH RBC QN AUTO: 29.6 PG (ref 26.6–33)
MCHC RBC AUTO-ENTMCNC: 34.4 G/DL (ref 31.5–35.7)
MCV RBC AUTO: 86 FL (ref 79–97)
MONOCYTES # BLD AUTO: 0.5 10*3/MM3 (ref 0.1–0.9)
MONOCYTES NFR BLD AUTO: 8.2 % (ref 5–12)
NEUTROPHILS # BLD AUTO: 1.8 10*3/MM3 (ref 1.7–7)
NEUTROPHILS NFR BLD AUTO: 30.4 % (ref 42.7–76)
NRBC BLD AUTO-RTO: 0.1 /100 WBC (ref 0–0.2)
PLATELET # BLD AUTO: 262 10*3/MM3 (ref 140–450)
PMV BLD AUTO: 8 FL (ref 6–12)
POTASSIUM BLD-SCNC: 4.2 MMOL/L (ref 3.5–5.2)
RBC # BLD AUTO: 4.39 10*6/MM3 (ref 3.77–5.28)
SODIUM BLD-SCNC: 138 MMOL/L (ref 136–145)
TROPONIN T SERPL-MCNC: <0.01 NG/ML (ref 0–0.03)
WBC NRBC COR # BLD: 5.9 10*3/MM3 (ref 3.4–10.8)

## 2020-05-15 PROCEDURE — 80048 BASIC METABOLIC PNL TOTAL CA: CPT | Performed by: INTERNAL MEDICINE

## 2020-05-15 PROCEDURE — 85025 COMPLETE CBC W/AUTO DIFF WBC: CPT | Performed by: INTERNAL MEDICINE

## 2020-05-15 PROCEDURE — 63710000001 INSULIN LISPRO (HUMAN) PER 5 UNITS: Performed by: INTERNAL MEDICINE

## 2020-05-15 PROCEDURE — 99213 OFFICE O/P EST LOW 20 MIN: CPT | Performed by: PSYCHIATRY & NEUROLOGY

## 2020-05-15 PROCEDURE — 84484 ASSAY OF TROPONIN QUANT: CPT | Performed by: INTERNAL MEDICINE

## 2020-05-15 PROCEDURE — 82962 GLUCOSE BLOOD TEST: CPT

## 2020-05-15 PROCEDURE — 93270 REMOTE 30 DAY ECG REV/REPORT: CPT

## 2020-05-15 PROCEDURE — 99217 PR OBSERVATION CARE DISCHARGE MANAGEMENT: CPT | Performed by: INTERNAL MEDICINE

## 2020-05-15 PROCEDURE — 99214 OFFICE O/P EST MOD 30 MIN: CPT | Performed by: INTERNAL MEDICINE

## 2020-05-15 PROCEDURE — G0378 HOSPITAL OBSERVATION PER HR: HCPCS

## 2020-05-15 RX ORDER — ALPRAZOLAM 0.25 MG/1
0.25 TABLET ORAL DAILY PRN
Qty: 9 TABLET | Refills: 0 | Status: SHIPPED | OUTPATIENT
Start: 2020-05-15 | End: 2020-05-20 | Stop reason: SDUPTHER

## 2020-05-15 RX ADMIN — FERROUS SULFATE TAB EC 324 MG (65 MG FE EQUIVALENT) 324 MG: 324 (65 FE) TABLET DELAYED RESPONSE at 08:38

## 2020-05-15 RX ADMIN — PANTOPRAZOLE SODIUM 40 MG: 40 TABLET, DELAYED RELEASE ORAL at 06:11

## 2020-05-15 RX ADMIN — GABAPENTIN 300 MG: 300 CAPSULE ORAL at 08:38

## 2020-05-15 RX ADMIN — Medication 10 ML: at 08:39

## 2020-05-15 RX ADMIN — FENOFIBRATE 145 MG: 145 TABLET ORAL at 08:38

## 2020-05-15 RX ADMIN — MELATONIN 1000 UNITS: at 08:38

## 2020-05-15 RX ADMIN — CYANOCOBALAMIN TAB 1000 MCG 1000 MCG: 1000 TAB at 08:38

## 2020-05-15 RX ADMIN — AMLODIPINE BESYLATE 10 MG: 5 TABLET ORAL at 08:38

## 2020-05-15 RX ADMIN — INSULIN LISPRO 20 UNITS: 100 INJECTION, SOLUTION INTRAVENOUS; SUBCUTANEOUS at 08:39

## 2020-05-15 RX ADMIN — OYSTER SHELL CALCIUM WITH VITAMIN D 1 TABLET: 500; 200 TABLET, FILM COATED ORAL at 08:38

## 2020-05-15 RX ADMIN — ASPIRIN 81 MG: 81 TABLET, COATED ORAL at 06:11

## 2020-05-15 RX ADMIN — INSULIN LISPRO 4 UNITS: 100 INJECTION, SOLUTION INTRAVENOUS; SUBCUTANEOUS at 08:39

## 2020-05-15 RX ADMIN — HYDROCHLOROTHIAZIDE: 25 TABLET ORAL at 08:38

## 2020-05-15 NOTE — PLAN OF CARE
Problem: Patient Care Overview  Goal: Plan of Care Review  Outcome: Ongoing (interventions implemented as appropriate)  Flowsheets (Taken 5/15/2020 6191)  Outcome Summary: Pt being discharged back home with event monitor.

## 2020-05-15 NOTE — PLAN OF CARE
Pt has had cardiac workout while here. All tests and labs negative at this time. Resting well at this time. Anticipate DC home. Will continue to monitor.

## 2020-05-15 NOTE — DISCHARGE SUMMARY
"      [facility] Medicine Services  DISCHARGE SUMMARY        Prepared For PCP:  Emily Burdick DO    Patient Name: Jenna Rosa  : 1950  MRN: [id number]      Date of Admission:   2020    Date of Discharge:  5/15/2020    Length of stay:  LOS: 0 days     Hospital Course     Presenting Problem:   Heart palpitations [R00.2]      Active Hospital Problems    Diagnosis  POA   • **Heart palpitations [R00.2]  Yes   • Major depressive disorder, recurrent episode, moderate (CMS/HCC) [F33.1]  Yes   • Bereavement [Z63.4]  Not Applicable   • Gastroesophageal reflux disease [K21.9]  Yes   • Essential hypertension [I10]  Yes   • Type 2 diabetes mellitus (CMS/HCC) [E11.9]  Yes   • High-density lipoprotein deficiency [E78.6]  Yes      Resolved Hospital Problems   No resolved problems to display.           Hospital Course:  Jenna Rosa is a 70 y.o. female who presented to [facility] complaining of 2 day history of heart palpitations and a feeling like her heart is \" flip-flopping in her chest.\"  She reports a  positive family history of CAD.  Patient reports she sees Dr. Figueroa yearly for hypertension.  She has an appointment scheduled for next Monday but felt as though this was getting worse not better.     She admits to similar symptoms over the past years but in the past 2 days her symptoms worsened. he had associated radiation to the left arm and shoulder and jaw.  She admits to similar symptoms when she had a rebellious teenager and had situational stress.  Her  recently passed away from malignancy complications.      She denies any nausea vomiting diarrhea constipation or weight changes  She denies any fever, chills, cough, recent travel or sick contacts  She has not changed medicines or diet.  She does not drink coffee on a regular basis.     She currently is on an antidepressant but feels like it is not enough.  She is requesting a psych evaluation         She " reports a  positive family history of CAD.  Patient reports she sees Dr. Figueroa yearly for hypertension.  She has an appointment scheduled for next Monday but felt as though this was getting worse not better.     5/14/2020: Patient was placed on cardiac monitoring. She successfully ruled out for MI by serial enzymes. She had a negative echocardiogram and Lexiscan (nuclear stress test). She was seen by the Cardiology service who recommended an event monitor on discharge. She was also seen by the [facility] service for her depression.She was started on low dose Xanax as needed.[facility] recommended grief and support therapy resources for the patient.     5/15/2020 She states she is at her baseline and has had no palpitations. She will be discharged to home with an event monitor.    Recommendation for Outpatient Providers:       Reasons For Change In Medications and Indications for New Medications:  Depression- Xanax      Day of Discharge     HPI:   Brief Synopsis of Hospital Course/HPIHospital Day #3  No complaints  Stress test negative/ R/O'd for MI    Cardiology: event monitor   Psych has seen patient- low dose Xanax (PRN) and grief/support therapy      Vital Signs:   Temp:  [97.9 °F (36.6 °C)] 97.9 °F (36.6 °C)  Heart Rate:  [54-60] 56  Resp:  [16-18] 16  BP: (120-163)/(66-80) 120/71     Physical Exam:  Constitutional: She is oriented to person, place, and time. She appears well-developed and well-nourished.   HENT:   Head: Normocephalic and atraumatic.   Eyes: Pupils are equal, round, and reactive to light.   Neck: Normal range of motion.   Cardiovascular: Normal rate and regular rhythm.   Pulmonary/Chest: Effort normal and breath sounds normal.   Musculoskeletal: Normal range of motion.   Neurological: She is alert and oriented to person, place, and time.   Skin: Skin is warm and dry.   Psychiatric: She has a normal mood and affect. Her behavior is normal.   Physical Exam    Pertinent  and/or Most Recent Results     Results  from last 7 days   Lab Units 05/15/20  0658 05/14/20  0322 05/13/20  1423   WBC 10*3/mm3 5.90 7.10 6.80   HEMOGLOBIN g/dL 13.0 12.8 13.3   HEMATOCRIT % 37.8 37.2 38.2   PLATELETS 10*3/mm3 262 251 277   SODIUM mmol/L 138 140 140   POTASSIUM mmol/L 4.2 3.5 3.9   CHLORIDE mmol/L 100 100 100   CO2 mmol/L 27.0 29.0 26.0   BUN mg/dL 16 15 12   CREATININE mg/dL 0.85 0.89 0.92   GLUCOSE mg/dL 155* 108* 219*   CALCIUM mg/dL 9.8 9.4 10.1     Results from last 7 days   Lab Units 05/13/20  1423   BILIRUBIN mg/dL 0.2   ALK PHOS U/L 43   ALT (SGPT) U/L 12   AST (SGOT) U/L 17           Invalid input(s): TG, LDLCALC, LDLREALC  Results from last 7 days   Lab Units 05/15/20  0658 05/15/20  0052 05/14/20  2219 05/14/20  1610 05/14/20  0649  05/13/20  1423   TSH uIU/mL  --   --   --   --   --   --  1.570   HEMOGLOBIN A1C %  --   --   --   --   --   --  7.3*   PROBNP pg/mL  --   --   --   --   --   --  98.8   TROPONIN T ng/mL <0.010 <0.010 <0.010 <0.010 0.010   < > <0.010    < > = values in this interval not displayed.       Brief Urine Lab Results  (Last result in the past 365 days)      Color   Clarity   Blood   Leuk Est   Nitrite   Protein   CREAT   Urine HCG        05/13/20 1451 Yellow Clear Negative Trace Negative Negative               Microbiology Results Abnormal     None          Xr Chest 1 View    Result Date: 5/13/2020  Impression: Mild cardiac enlargement with no acute process identified.  Electronically Signed By-Candido Martinez On:5/13/2020 3:19 PM This report was finalized on [id number] by  Candido Martinez, .                Results for orders placed during the hospital encounter of 05/13/20   Adult Transthoracic Echo Complete W/ Cont if Necessary Per Protocol    Narrative · Estimated EF = 65%.  · Left ventricular systolic function is normal.  · Left ventricular diastolic dysfunction (grade I a) consistent with   impaired relaxation.  · Left atrial cavity size is borderline dilated.  · Mild mitral valve regurgitation is  present                  Test Results Pending at Discharge   Order Current Status    Troponin In process            Procedures Performed   Nuclear Stress test  Echocardiogram           Consults:   Consults     Date and Time Order Name Status Description    5/13/2020 1716 Inpatient Cardiology Consult Completed     5/13/2020 1644 Inpatient Psychiatrist Consult Completed     5/13/2020 1510 Hospitalist (on-call MD unless specified) Completed             Discharge Details        Discharge Medications      ASK your doctor about these medications      Instructions Start Date   amLODIPine 10 MG tablet  Commonly known as:  NORVASC   10 mg, Oral, Daily      aspirin 81 MG EC tablet   81 mg, Oral, Every Morning      atenolol 25 MG tablet  Commonly known as:  TENORMIN  Ask about: Which instructions should I use?   25 mg, Oral, Every Evening      calcium carbonate 600 MG tablet  Commonly known as:  OS-PREET   600 mg, Oral, Daily      cholecalciferol 25 MCG (1000 UT) tablet  Commonly known as:  VITAMIN D3  Ask about: Which instructions should I use?   1,000 Units, Oral, Daily      escitalopram 10 MG tablet  Commonly known as:  Lexapro   10 mg, Oral, Nightly      fenofibrate 160 MG tablet  Ask about: Which instructions should I use?   160 mg, Oral, Every Evening      ferrous sulfate 325 (65 Fe) MG tablet   325 mg, Oral, Daily      Flonase Allergy Relief 50 MCG/ACT nasal spray  Generic drug:  fluticasone   1 spray, Nasal, Daily PRN      gabapentin 300 MG capsule  Commonly known as:  Neurontin   300 mg, Oral, 2 Times Daily      Humira Pen 40 MG/0.4ML Pen-injector Kit  Generic drug:  Adalimumab   40 mg, Subcutaneous, Every 14 Days      insulin NPH-insulin regular (70-30) 100 UNIT/ML injection  Commonly known as:  humuLIN 70/30,novoLIN 70/30  Ask about: Which instructions should I use?   50 Units, Subcutaneous, Every Morning      insulin NPH-insulin regular (70-30) 100 UNIT/ML injection  Commonly known as:  humuLIN 70/30,novoLIN  70/30  Ask about: Which instructions should I use?   20 Units, Subcutaneous, Every Evening      Janumet -1000 MG tablet  Generic drug:  SITagliptin-metFORMIN HCl ER  Ask about: Which instructions should I use?   1 tablet, Oral, Every Evening      lansoprazole 30 MG capsule  Commonly known as:  PREVACID   TAKE 1 CAPSULE BY MOUTH DAILY      lisinopril-hydrochlorothiazide 20-25 MG per tablet  Commonly known as:  PRINZIDE,ZESTORETIC   1 tablet, Oral, Daily      lovastatin 40 MG tablet  Commonly known as:  MEVACOR  Ask about: Which instructions should I use?   40 mg, Oral, Nightly      traZODone 50 MG tablet  Commonly known as:  DESYREL   TAKE 1 TABLET BY MOUTH DAILY AT BEDTIME      Ventolin  (90 Base) MCG/ACT inhaler  Generic drug:  albuterol sulfate HFA   1 puff, Inhalation, Every 6 Hours PRN      vitamin B-12 1000 MCG tablet  Commonly known as:  CYANOCOBALAMIN   1,000 mcg, Oral, Daily             No Known Allergies      Discharge Disposition: home      Diet: Consistent carbohydrate  Hospital:  Diet Order   Procedures   • Diet Diabetic/Consistent Carbs; Diabetic - Consistent Carb         Discharge Activity:    as tolerated      CODE STATUS:    Code Status and Medical Interventions:   Ordered at: 05/13/20 1616     Code Status:    CPR     Medical Interventions (Level of Support Prior to Arrest):    Full         Follow-up Appointments  Future Appointments   Date Time Provider Department Center   5/18/2020  9:20 AM Walker Rodriguez MD [facility]ARD [facility] NA   7/28/2020 10:00 AM [facility] None   8/4/2020 10:00 AM Dano Rangel MD [facility] NA None             Condition on Discharge:      Stable      This patient has been examined wearing appropriate Personal Protective Equipment and discussed with hospital infection control department. 05/15/20      Electronically signed by Mary Ann Olguin MD, 05/15/20, 10:53 AM.      Time: I spent  68 minutes on this discharge activity  which included face-to-face encounter with the patient/reviewing the data in the system/coordination of the care with the nursing staff as well as consultants/documentation/entering orders.

## 2020-05-15 NOTE — PROGRESS NOTES
Reason for follow-up: Palpitations  Hypertension and dyslipidemia  Diabetes mellitus     Patient Care Team:  Emily Burdick DO as PCP - General  Walker Rodriguez MD as Consulting Physician (Cardiology)    Subjective .   Feels okay looks comfortable lying in the bed     Review of Systems   Constitution: Negative for fever and malaise/fatigue.   HENT: Negative for congestion and hearing loss.    Eyes: Negative for double vision and visual disturbance.   Cardiovascular: Negative for chest pain, claudication, dyspnea on exertion, leg swelling and syncope.   Respiratory: Negative for cough and shortness of breath.    Endocrine: Negative for cold intolerance.   Skin: Negative for color change and rash.   Musculoskeletal: Negative for arthritis and joint pain.   Gastrointestinal: Negative for abdominal pain and heartburn.   Genitourinary: Negative for hematuria.   Neurological: Negative for excessive daytime sleepiness and dizziness.   Psychiatric/Behavioral: Negative for depression. The patient is not nervous/anxious.    All other systems reviewed and are negative.      Patient has no known allergies.    Scheduled Meds:  amLODIPine 10 mg Oral Daily   aspirin 81 mg Oral QAM   atenolol 25 mg Oral Q PM   atorvastatin 10 mg Oral Nightly   calcium 500 mg vitamin D 5 mcg (200 UT) 1 tablet Oral Daily   cholecalciferol 1,000 Units Oral Daily   enoxaparin 40 mg Subcutaneous Daily   escitalopram 10 mg Oral Nightly   fenofibrate 145 mg Oral Daily   ferrous sulfate 324 mg Oral Daily With Breakfast   gabapentin 300 mg Oral BID   insulin glargine 20 Units Subcutaneous Nightly   insulin lispro 0-9 Units Subcutaneous TID AC   insulin lispro 20 Units Subcutaneous TID With Meals   lisinopril-hydroCHLOROthiazide (ZESTORETIC) 20-25 mg combo dose  Oral Q24H   metFORMIN 1,000 mg Oral Nightly   pantoprazole 40 mg Oral QAM   sodium chloride 10 mL Intravenous Q12H   traZODone 50 mg Oral Nightly   vitamin B-12 1,000 mcg Oral Daily  "    Continuous Infusions:   PRN Meds:.•  acetaminophen **OR** acetaminophen **OR** acetaminophen  •  ALPRAZolam  •  bisacodyl  •  dextrose  •  dextrose  •  docusate sodium  •  glucagon (human recombinant)  •  hydrALAZINE  •  [DISCONTINUED] insulin lispro **AND** insulin lispro  •  insulin lispro **AND** insulin lispro  •  nitroglycerin  •  [COMPLETED] Insert peripheral IV **AND** sodium chloride  •  sodium chloride  •  sodium chloride    Objective   Looks comfortable lying in the bed    VITAL SIGNS  Vitals:    05/14/20 1752 05/14/20 2029 05/15/20 0411 05/15/20 0700   BP: 154/72 153/70 145/66 120/71   BP Location:  Right arm Right arm Right arm   Patient Position:  Lying Lying Lying   Pulse: 60 54 55 56   Resp:  18 16    Temp:  97.9 °F (36.6 °C) 97.9 °F (36.6 °C)    TempSrc:  Oral Oral    SpO2:  97% 96%    Weight:   76.2 kg (167 lb 15.8 oz)    Height:           Flowsheet Rows      First Filed Value   Admission Height  160 cm (63\") Documented at 05/13/2020 1409   Admission Weight  77 kg (169 lb 12.1 oz) Documented at 05/13/2020 1409           TELEMETRY: Sinus rhythm    Physical Exam:  Physical Exam   Constitutional: She is oriented to person, place, and time. She appears well-developed and well-nourished. She is cooperative.   HENT:   Head: Normocephalic and atraumatic.   Mouth/Throat: Uvula is midline and oropharynx is clear and moist. No oral lesions.   Eyes: Conjunctivae are normal. No scleral icterus.   Neck: Trachea normal. Neck supple. No JVD present. Carotid bruit is not present. No thyromegaly present.   Cardiovascular: Normal rate, regular rhythm, S1 normal, S2 normal, normal heart sounds, intact distal pulses and normal pulses. PMI is not displaced. Exam reveals no gallop and no friction rub.   No murmur heard.  Pulmonary/Chest: Effort normal and breath sounds normal.   Abdominal: Soft. Bowel sounds are normal.   Musculoskeletal: Normal range of motion.   Neurological: She is alert and oriented to person, " place, and time. She has normal strength.   No focal deficits   Skin: Skin is warm. No cyanosis.   Psychiatric: She has a normal mood and affect.                LAB RESULTS (LAST 7 DAYS)    CBC  Results from last 7 days   Lab Units 05/15/20  0658 05/14/20  0322 05/13/20  1423   WBC 10*3/mm3 5.90 7.10 6.80   RBC 10*6/mm3 4.39 4.31 4.40   HEMOGLOBIN g/dL 13.0 12.8 13.3   HEMATOCRIT % 37.8 37.2 38.2   MCV fL 86.0 86.4 86.8   PLATELETS 10*3/mm3 262 251 277       BMP  Results from last 7 days   Lab Units 05/15/20  0658 05/14/20  0322 05/13/20  1423   SODIUM mmol/L 138 140 140   POTASSIUM mmol/L 4.2 3.5 3.9   CHLORIDE mmol/L 100 100 100   CO2 mmol/L 27.0 29.0 26.0   BUN mg/dL 16 15 12   CREATININE mg/dL 0.85 0.89 0.92   GLUCOSE mg/dL 155* 108* 219*       CMP Results from last 7 days   Lab Units 05/15/20  0658 05/14/20  0322 05/13/20  1423   SODIUM mmol/L 138 140 140   POTASSIUM mmol/L 4.2 3.5 3.9   CHLORIDE mmol/L 100 100 100   CO2 mmol/L 27.0 29.0 26.0   BUN mg/dL 16 15 12   CREATININE mg/dL 0.85 0.89 0.92   GLUCOSE mg/dL 155* 108* 219*   ALBUMIN g/dL  --   --  4.10   BILIRUBIN mg/dL  --   --  0.2   ALK PHOS U/L  --   --  43   AST (SGOT) U/L  --   --  17   ALT (SGPT) U/L  --   --  12   LIPASE U/L  --   --  31         BNP        TROPONIN  Results from last 7 days   Lab Units 05/15/20  1022   TROPONIN T ng/mL <0.010       CoAg        Creatinine Clearance  Estimated Creatinine Clearance: 61 mL/min (by C-G formula based on SCr of 0.85 mg/dL).    ABG        Radiology  Xr Chest 1 View    Result Date: 5/13/2020  Mild cardiac enlargement with no acute process identified.  Electronically Signed By-Candido Martinez On:5/13/2020 3:19 PM This report was finalized on [id number] by  Candido Martinez, .            EKG        I personally viewed and interpreted the patient's EKG/Telemetry data:    ECHOCARDIOGRAM:    Results for orders placed during the hospital encounter of 05/13/20   Adult Transthoracic Echo Complete W/ Cont if Necessary Per  Protocol    Narrative · Estimated EF = 65%.  · Left ventricular systolic function is normal.  · Left ventricular diastolic dysfunction (grade I a) consistent with   impaired relaxation.  · Left atrial cavity size is borderline dilated.  · Mild mitral valve regurgitation is present        STRESS MYOVIEW:    CARDIAC CATHETERIZATION:    OTHER:         Assessment/Plan       Heart palpitations    Gastroesophageal reflux disease    High-density lipoprotein deficiency    Essential hypertension    Type 2 diabetes mellitus (CMS/HCC)    Major depressive disorder, recurrent episode, moderate (CMS/HCC)    Bereavement      Problems with palpitations suggest 4-week event monitor  Stress and anxiety per primary team  All patients with chest discomfort stress Myoview small ischemia will aggressively treat with medical management risk factor modification  As aggressive control of hypertension diabetes dyslipidemia    I discussed the patients findings and my recommendations with  attending nurse and attending physician    Walker Rodriguez MD  05/15/20  11:23

## 2020-05-15 NOTE — OUTREACH NOTE
Prep Survey      Responses   [facility] facility patient discharged from?  [facility]   Is LACE score < 7 ?  No   Eligibility  [facility]   Hospital  [facility]   Date of Admission  05/13/20   Date of Discharge  05/15/20   Discharge Disposition  Home or Self Care   Discharge diagnosis  Heart palpitations   COVID-19 Test Status  Not tested   Does the patient have one of the following disease processes/diagnoses(primary or secondary)?  Other   Does the patient have Home health ordered?  No   Is there a DME ordered?  No   Prep survey completed?  Yes          Jami Tomlinson RN

## 2020-05-15 NOTE — PROGRESS NOTES
Chief complaint anxiety, palpitations     Subjective .     History of present illness:  The patient is a 70 y.o. female who was admitted secondary to chest discomfort. PMH: HTN, DM<,RA, neuropathy. Psych consult was requested by Dr Sharif meyers to depression.  The pt has a hx of depression, suffers from chronic pain, her  passed away in March 2020 .  The pt was feeling depressed, anxious, lonely, anxiety was associated with chest pain. The pt was on lexapro and trazodone for 2 months , noticed some improvement but still has intense anxiety . The pt was started on xanax PRN   Past psych hx: depression anxiety       Review of Systems   All systems were reviewed and negative except for:  Constitution:  positive for fatigue  Cardiovascular: positive for  palpitations  Behavioral/Psych: positive for  anxiety    History       Medications Prior to Admission   Medication Sig Dispense Refill Last Dose   • albuterol sulfate HFA (VENTOLIN HFA) 108 (90 Base) MCG/ACT inhaler Inhale 1 puff Every 6 (Six) Hours As Needed for Wheezing or Shortness of Air.   Taking   • amLODIPine (NORVASC) 10 MG tablet Take 1 tablet by mouth Daily. 90 tablet 0 5/13/2020 at Unknown time   • aspirin 81 MG EC tablet Take 81 mg by mouth Every Morning.   5/13/2020 at Unknown time   • atenolol (TENORMIN) 25 MG tablet Take 25 mg by mouth Every Evening.   5/12/2020 at Unknown time   • calcium carbonate (OS-PREET) 600 MG tablet Take 600 mg by mouth Daily.   5/13/2020 at Unknown time   • cholecalciferol (VITAMIN D3) 25 MCG (1000 UT) tablet Take 1,000 Units by mouth Daily.      • escitalopram (LEXAPRO) 10 MG tablet Take 1 tablet by mouth Every Night. 30 tablet 1 5/12/2020 at Unknown time   • fenofibrate 160 MG tablet Take 160 mg by mouth Every Evening.   5/12/2020 at Unknown time   • Ferrous Sulfate (IRON) 325 (65 Fe) MG tablet Take 325 mg by mouth Daily.   5/13/2020 at Unknown time   • fluticasone (FLONASE ALLERGY RELIEF) 50 MCG/ACT nasal spray 1 spray  into the nostril(s) as directed by provider Daily As Needed for Allergies.   Taking   • gabapentin (Neurontin) 300 MG capsule Take 1 capsule by mouth 2 (Two) Times a Day. 180 capsule 0 5/13/2020 at Unknown time   • insulin NPH-insulin regular (humuLIN 70/30,novoLIN 70/30) (70-30) 100 UNIT/ML injection Inject 50 Units under the skin into the appropriate area as directed Every Morning.   5/13/2020 at Unknown time   • insulin NPH-insulin regular (humuLIN 70/30,novoLIN 70/30) (70-30) 100 UNIT/ML injection Inject 20 Units under the skin into the appropriate area as directed Every Evening.   5/12/2020 at Unknown time   • lansoprazole (PREVACID) 30 MG capsule TAKE 1 CAPSULE BY MOUTH DAILY 90 capsule 0 5/13/2020 at Unknown time   • lisinopril-hydrochlorothiazide (PRINZIDE,ZESTORETIC) 20-25 MG per tablet TAKE 1 TABLET BY MOUTH DAILY 90 tablet 2 5/13/2020 at Unknown time   • lovastatin (MEVACOR) 40 MG tablet Take 40 mg by mouth Every Night.   5/12/2020 at Unknown time   • SITagliptin-metFORMIN HCl ER (Janumet XR) 100-1000 MG tablet Take 1 tablet by mouth Every Evening.   5/12/2020 at Unknown time   • traZODone (DESYREL) 50 MG tablet TAKE 1 TABLET BY MOUTH DAILY AT BEDTIME 90 tablet 0 5/12/2020 at Unknown time   • vitamin B-12 (CYANOCOBALAMIN) 1000 MCG tablet Take 1,000 mcg by mouth Daily.   5/13/2020 at Unknown time   • HUMIRA PEN 40 MG/0.4ML Pen-injector Kit Inject 40 mg under the skin into the appropriate area as directed Every 14 (Fourteen) Days. 6 each 0 5/7/2020        Scheduled Meds:    amLODIPine 10 mg Oral Daily   aspirin 81 mg Oral QAM   atenolol 25 mg Oral Q PM   atorvastatin 10 mg Oral Nightly   calcium 500 mg vitamin D 5 mcg (200 UT) 1 tablet Oral Daily   cholecalciferol 1,000 Units Oral Daily   enoxaparin 40 mg Subcutaneous Daily   escitalopram 10 mg Oral Nightly   fenofibrate 145 mg Oral Daily   ferrous sulfate 324 mg Oral Daily With Breakfast   gabapentin 300 mg Oral BID   insulin glargine 20 Units Subcutaneous  "Nightly   insulin lispro 0-9 Units Subcutaneous TID AC   insulin lispro 20 Units Subcutaneous TID With Meals   lisinopril-hydroCHLOROthiazide (ZESTORETIC) 20-25 mg combo dose  Oral Q24H   metFORMIN 1,000 mg Oral Nightly   pantoprazole 40 mg Oral QAM   sodium chloride 10 mL Intravenous Q12H   traZODone 50 mg Oral Nightly   vitamin B-12 1,000 mcg Oral Daily        Continuous Infusions:       PRN Meds:  •  acetaminophen **OR** acetaminophen **OR** acetaminophen  •  ALPRAZolam  •  bisacodyl  •  dextrose  •  dextrose  •  docusate sodium  •  glucagon (human recombinant)  •  hydrALAZINE  •  [DISCONTINUED] insulin lispro **AND** insulin lispro  •  insulin lispro **AND** insulin lispro  •  nitroglycerin  •  [COMPLETED] Insert peripheral IV **AND** sodium chloride  •  sodium chloride  •  sodium chloride      Allergies:  Patient has no known allergies.      Objective     Vital Signs   /71 (BP Location: Right arm, Patient Position: Lying)   Pulse 56   Temp 97.9 °F (36.6 °C) (Oral)   Resp 16   Ht 160 cm (63\")   Wt 76.2 kg (167 lb 15.8 oz)   SpO2 96%   BMI 29.76 kg/m²     Physical Exam:     General Appearance:    In NAD    Head:    Normocephalic, without obvious abnormality, atraumatic   Eyes:            Lids and lashes normal, conjunctivae and sclerae normal, no   icterus, no pallor, corneas clear, PERRLA   Skin:   No bleeding, bruising or rash          Neurologic:   Cranial nerves 2 - 12 grossly intact, sensation intact, DTR       present and equal bilaterally       Mental Status Exam:    Hygiene:   good  Cooperation:  Cooperative  Eye Contact:  Good  Psychomotor Behavior:  Appropriate  Affect:  Blunted  Hopelessness: Denies  Speech:  Normal  Thought Progress:  Goal directed and Linear  Thought Content:  Normal  Suicidal:  None  Homicidal:  None  Hallucinations:  None  Delusion:  None  Memory:  fair   Orientation:  Person, Place, Time and Situation  Reliability:  good  Insight:  Good  Judgement:  Good  Impulse " Control:  Good  Physical/Medical Issues:  Yes      Medications and allergies reviewed     Lab Results   Component Value Date    GLUCOSE 155 (H) 05/15/2020    CALCIUM 9.8 05/15/2020     05/15/2020    K 4.2 05/15/2020    CO2 27.0 05/15/2020     05/15/2020    BUN 16 05/15/2020    CREATININE 0.85 05/15/2020    EGFRIFAFRI 59 (L) 08/13/2017    EGFRIFNONA 66 05/15/2020    BCR 18.8 05/15/2020    ANIONGAP 11.0 05/15/2020       Last Urine Toxicity     LAST URINE TOXICITY RESULTS Latest Ref Rng & Units 2/1/2018 10/25/2017    CREATININE UR mg/dl 90.1 41.4          No results found for: PHENYTOIN, PHENOBARB, VALPROATE, CBMZ    Lab Results   Component Value Date     05/15/2020    BUN 16 05/15/2020    CREATININE 0.85 05/15/2020    TSH 1.570 05/13/2020    WBC 5.90 05/15/2020       Brief Urine Lab Results  (Last result in the past 365 days)      Color   Clarity   Blood   Leuk Est   Nitrite   Protein   CREAT   Urine HCG        05/13/20 1451 Yellow Clear Negative Trace Negative Negative               Assessment/Plan       Heart palpitations    Gastroesophageal reflux disease    High-density lipoprotein deficiency    Essential hypertension    Type 2 diabetes mellitus (CMS/[facility])    Major depressive disorder, recurrent episode, moderate (CMS/[facility])    Bereavement       Assessment: Major depressive d/o recurrent moderate   Bereavement   Treatment Plan: cont lexapro, trazodone, xanax PRN, grief therapy after dc , coping skills, stress management discussed with the pt, the pt can be d/c when medically stable , will sing off   Treatment Plan discussed with: Patient and nursing     I discussed the patients findings and my recommendations with patient and nursing staff    I have reviewed and approved the behavioral health treatment plans and problem list. Yes     Referring MD has access to consult report and progress notes in EMR     Sienna Villatoro MD  05/15/20  09:44

## 2020-05-15 NOTE — PROGRESS NOTES
"      [facility] Medicine Services Daily Progress Note      Hospitalist Team  LOS 0 days      Patient Care Team:  Emily Burdick DO as PCP - Walker Francis MD as Consulting Physician (Cardiology)    Patient Location: 357/1      Subjective   Subjective     Chief Complaint / Subjective  Chief Complaint   Patient presents with   • Palpitations         Brief Synopsis of Hospital Course/HPIHospital Day #3  No complaints  Stress test negative/ R/O'd for MI    Cardiology: event monitor   Psych has seen patient- low dose Xanax (PRN) and grief/support therapy                                 -----------------------------------------------------------------   Ms. Rosa is a 70 y.o.  presents to [facility] complaining of presented to the ER with 2 day history of heart palpitations and a feeling like her heart is \" flip-flopping in her chest.\"  She reports a  positive family history of CAD.  Patient reports she sees Dr. Figueroa yearly for hypertension.  She has an appointment scheduled for next Monday but felt as though this was getting worse not better.     She admits to similar symptoms over the past years but in the past 2 days her symptoms worsened. he had associated radiation to the left arm and shoulder and jaw.  She admits to similar symptoms when she had a rebellious teenager and had situational stress.  Her  recently passed away from malignancy complications.      She denies any nausea vomiting diarrhea constipation or weight changes  She denies any fever, chills, cough, recent travel or sick contacts  She has not changed medicines or diet.  She does not drink coffee on a regular basis.     She currently is on an antidepressant but feels like it is not enough.  She is requesting a psych evaluation         She reports a  positive family history of CAD.  Patient reports she sees Dr. Figueroa yearly for hypertension.  She has an appointment scheduled for next Monday but " "felt as though this was getting worse not better.     5/14/2020: Patient was placed on cardiac monitoring. She successfully ruled out for MI by serial enzymes. She had a negative Lexiscan (nuclear stress test). She was seen by the Cardiology service who recommended an event monitor on discharge. She was also seen by the [facility] service for her depression.She was started on low dose Xanax as needed.[facility] recommended grief and support therapy resources for the patient.    5/15/2020 She states she is at her baseline and has had no palpitations. She will be discharged with an event monitor.     Date::  5/15/2020      Constitutional: Negative for chills, diaphoresis, fatigue and fever.   HENT: Negative.    Respiratory: Negative for cough and shortness of breath.    Cardiovascular: Positive for palpitations. Negative for chest pain.   Gastrointestinal: Negative for abdominal pain, nausea and vomiting.   Genitourinary: Negative.    Musculoskeletal: Negative.    Skin: Negative.    Neurological: Negative.  ROS      Objective   Objective      Vital Signs  Temp:  [97.9 °F (36.6 °C)] 97.9 °F (36.6 °C)  Heart Rate:  [54-60] 56  Resp:  [16-18] 16  BP: (120-163)/(66-80) 120/71  Oxygen Therapy  SpO2: 96 %  Pulse Oximetry Type: Intermittent  Device (Oxygen Therapy): room air  Device (Oxygen Therapy): room air  Flowsheet Rows      First Filed Value   Admission Height  160 cm (63\") Documented at 05/13/2020 1409   Admission Weight  77 kg (169 lb 12.1 oz) Documented at 05/13/2020 1409        Intake & Output (last 3 days)       05/12 0701 - 05/13 0700 05/13 0701 - 05/14 0700 05/14 0701 - 05/15 0700 05/15 0701 - 05/16 0700    P.O.  360 780 240    Total Intake(mL/kg)  360 (4.9) 780 (10.2) 240 (3.1)    Net  +360 +780 +240                Lines, Drains & Airways    Active LDAs     Name:   Placement date:   Placement time:   Site:   Days:    Peripheral IV 05/13/20 1420 Left Antecubital   05/13/20    1420    Antecubital   1          "         Physical Exam:  Constitutional: She is oriented to person, place, and time. She appears well-developed and well-nourished.   HENT:   Head: Normocephalic and atraumatic.   Eyes: Pupils are equal, round, and reactive to light.   Neck: Normal range of motion.   Cardiovascular: Normal rate and regular rhythm.   Pulmonary/Chest: Effort normal and breath sounds normal.   Musculoskeletal: Normal range of motion.   Neurological: She is alert and oriented to person, place, and time.   Skin: Skin is warm and dry.   Psychiatric: She has a normal mood and affect. Her behavior is normal.   Physical Exam        Procedures:    Results Review:     I reviewed the patient's new clinical results.      Lab Results (last 24 hours)     Procedure Component Value Units Date/Time    Troponin [[id number]] Collected:  05/15/20 1022    Specimen:  Blood Updated:  05/15/20 1041    Troponin [[id number]]  (Normal) Collected:  05/15/20 0658    Specimen:  Blood Updated:  05/15/20 0751     Troponin T <0.010 ng/mL     Narrative:       Troponin T Reference Range:  <= 0.03 ng/mL-   Negative for AMI  >0.03 ng/mL-     Abnormal for myocardial necrosis.  Clinicians would have to utilize clinical acumen, EKG, Troponin and serial changes to determine if it is an Acute Myocardial Infarction or myocardial injury due to an underlying chronic condition.       Results may be falsely decreased if patient taking Biotin.      Basic Metabolic Panel [[id number]]  (Abnormal) Collected:  05/15/20 0658    Specimen:  Blood Updated:  05/15/20 0751     Glucose 155 mg/dL      BUN 16 mg/dL      Creatinine 0.85 mg/dL      Sodium 138 mmol/L      Potassium 4.2 mmol/L      Chloride 100 mmol/L      CO2 27.0 mmol/L      Calcium 9.8 mg/dL      eGFR Non African Amer 66 mL/min/1.73      BUN/Creatinine Ratio 18.8     Anion Gap 11.0 mmol/L     Narrative:       GFR Normal >60  Chronic Kidney Disease <60  Kidney Failure <15      POC Glucose Once [[id number]]  (Abnormal) Collected:   05/15/20 0741    Specimen:  Blood Updated:  05/15/20 0742     Glucose 206 mg/dL      Comment: Serial Number: [id number]:  [id number]       CBC & Differential [[id number]] Collected:  05/15/20 0658    Specimen:  Blood Updated:  05/15/20 0733    Narrative:       The following orders were created for panel order CBC & Differential.  Procedure                               Abnormality         Status                     ---------                               -----------         ------                     CBC Auto Differential[[id number]]        Abnormal            Final result                 Please view results for these tests on the individual orders.    CBC Auto Differential [[id number]]  (Abnormal) Collected:  05/15/20 0658    Specimen:  Blood Updated:  05/15/20 0733     WBC 5.90 10*3/mm3      RBC 4.39 10*6/mm3      Hemoglobin 13.0 g/dL      Hematocrit 37.8 %      MCV 86.0 fL      MCH 29.6 pg      MCHC 34.4 g/dL      RDW 13.2 %      RDW-SD 40.7 fl      MPV 8.0 fL      Platelets 262 10*3/mm3      Neutrophil % 30.4 %      Lymphocyte % 55.9 %      Monocyte % 8.2 %      Eosinophil % 4.7 %      Basophil % 0.8 %      Neutrophils, Absolute 1.80 10*3/mm3      Lymphocytes, Absolute 3.30 10*3/mm3      Monocytes, Absolute 0.50 10*3/mm3      Eosinophils, Absolute 0.30 10*3/mm3      Basophils, Absolute 0.00 10*3/mm3      nRBC 0.1 /100 WBC     Troponin [[id number]]  (Normal) Collected:  05/15/20 0052    Specimen:  Blood Updated:  05/15/20 0201     Troponin T <0.010 ng/mL     Narrative:       Troponin T Reference Range:  <= 0.03 ng/mL-   Negative for AMI  >0.03 ng/mL-     Abnormal for myocardial necrosis.  Clinicians would have to utilize clinical acumen, EKG, Troponin and serial changes to determine if it is an Acute Myocardial Infarction or myocardial injury due to an underlying chronic condition.       Results may be falsely decreased if patient taking Biotin.      Troponin [[id number]]  (Normal) Collected:  05/14/20 0670     Specimen:  Blood Updated:  05/14/20 2256     Troponin T <0.010 ng/mL     Narrative:       Troponin T Reference Range:  <= 0.03 ng/mL-   Negative for AMI  >0.03 ng/mL-     Abnormal for myocardial necrosis.  Clinicians would have to utilize clinical acumen, EKG, Troponin and serial changes to determine if it is an Acute Myocardial Infarction or myocardial injury due to an underlying chronic condition.       Results may be falsely decreased if patient taking Biotin.      POC Glucose Once [[id number]]  (Abnormal) Collected:  05/14/20 2027    Specimen:  Blood Updated:  05/14/20 2028     Glucose 119 mg/dL      Comment: Serial Number: [id number]:  [id number]       Troponin [[id number]]  (Normal) Collected:  05/14/20 1610    Specimen:  Blood Updated:  05/14/20 1655     Troponin T <0.010 ng/mL     Narrative:       Troponin T Reference Range:  <= 0.03 ng/mL-   Negative for AMI  >0.03 ng/mL-     Abnormal for myocardial necrosis.  Clinicians would have to utilize clinical acumen, EKG, Troponin and serial changes to determine if it is an Acute Myocardial Infarction or myocardial injury due to an underlying chronic condition.       Results may be falsely decreased if patient taking Biotin.      POC Glucose Once [[id number]]  (Abnormal) Collected:  05/14/20 1629    Specimen:  Blood Updated:  05/14/20 1630     Glucose 144 mg/dL      Comment: Serial Number: [id number]:  [id number]       POC Glucose Once [[id number]]  (Abnormal) Collected:  05/14/20 1121    Specimen:  Blood Updated:  05/14/20 1125     Glucose 184 mg/dL      Comment: Serial Number: [id number]:  [id number]           Hemoglobin A1C   Date Value Ref Range Status   05/13/2020 7.3 (H) 3.5 - 5.6 % Final               Lab Results   Component Value Date    LIPASE 31 05/13/2020     Lab Results   Component Value Date    CHOL 166 01/28/2020    TRIG 165 (H) 01/28/2020    HDL 47 01/28/2020    LDL 86 01/28/2020       No results found for: INTRAOP, PREDX, FINALDX,  COMDX    Microbiology Results (last 10 days)     ** No results found for the last 240 hours. **          ECG/EMG Results (most recent)     Procedure Component Value Units Date/Time    Adult Transthoracic Echo Complete W/ Cont if Necessary Per Protocol [[id number]] Collected:  05/13/20 1713     Updated:  05/14/20 1422     BSA 1.8 m^2      RVIDd 2.0 cm      IVSd 0.91 cm      LVIDd 4.9 cm      LVIDs 3.0 cm      LVPWd 0.89 cm      IVS/LVPW 1.0     FS 38.3 %      EDV(Teich) 111.6 ml      ESV(Teich) 35.3 ml      EF(Teich) 68.4 %      EDV(cubed) 116.0 ml      ESV(cubed) 27.3 ml      EF(cubed) 76.5 %      LV mass(C)d 152.0 grams      LV mass(C)dI 84.4 grams/m^2      SV(Teich) 76.3 ml      SI(Teich) 42.4 ml/m^2      SV(cubed) 88.7 ml      SI(cubed) 49.3 ml/m^2      Ao root diam 3.3 cm      Ao root area 8.6 cm^2      ACS 1.9 cm      LVOT diam 2.2 cm      LVOT area 3.8 cm^2      RVOT diam 1.7 cm      RVOT area 2.3 cm^2      EDV(MOD-sp4) 91.9 ml      ESV(MOD-sp4) 26.6 ml      EF(MOD-sp4) 71.1 %      SV(MOD-sp4) 65.3 ml      SI(MOD-sp4) 36.3 ml/m^2      Ao root area (BSA corrected) 1.8     LV Cheek Vol (BSA corrected) 51.0 ml/m^2      LV Sys Vol (BSA corrected) 14.8 ml/m^2      Aortic R-R 1.2 sec      Aortic HR 48.5 BPM      MV E max edgar 80.7 cm/sec      MV A max edgar 100.7 cm/sec      MV E/A 0.8     MV V2 max 103.2 cm/sec      MV max PG 4.3 mmHg      MV V2 mean 58.2 cm/sec      MV mean PG 1.6 mmHg      MV V2 VTI 37.0 cm      MVA(VTI) 3.3 cm^2      MV dec slope 317.8 cm/sec^2      MV dec time 0.25 sec      Ao pk edgar 132.1 cm/sec      Ao max PG 7.0 mmHg      Ao max PG (full) 2.0 mmHg      Ao V2 mean 97.2 cm/sec      Ao mean PG 4.1 mmHg      Ao mean PG (full) 1.1 mmHg      Ao V2 VTI 39.6 cm      JAMSHID(I,A) 3.1 cm^2      JAMSHID(I,D) 3.1 cm^2      JAMSHID(V,A) 3.2 cm^2      JAMSHID(V,D) 3.2 cm^2      LV V1 max PG 5.0 mmHg      LV V1 mean PG 2.9 mmHg      LV V1 max 111.3 cm/sec      LV V1 mean 81.1 cm/sec      LV V1 VTI 32.4 cm      CO(Ao) 16.4  l/min      CI(Ao) 9.1 l/min/m^2      SV(Ao) 339.0 ml      SI(Ao) 188.3 ml/m^2      CO(LVOT) 5.9 l/min      CI(LVOT) 3.3 l/min/m^2      SV(LVOT) 122.4 ml      SV(RVOT) 58.7 ml      SI(LVOT) 68.0 ml/m^2      PA V2 max 115.8 cm/sec      PA max PG 5.4 mmHg      PA max PG (full) 1.7 mmHg       CV ECHO SAMEER - PVA(V,A) 1.9 cm^2       CV ECHO SAMEER - PVA(V,D) 1.9 cm^2      RV V1 max PG 3.7 mmHg      RV V1 mean PG 2.0 mmHg      RV V1 max 95.8 cm/sec      RV V1 mean 66.5 cm/sec      RV V1 VTI 26.1 cm      TR max jude 212.9 cm/sec      RVSP(TR) 21.1 mmHg      RAP systole 3.0 mmHg      Pulm Sys Jude 70.3 cm/sec      Pulm Cheek Jude 42.7 cm/sec      Pulm S/D 1.6     Qp/Qs 0.48     Pulm A Revs Dur 0.13 sec      Pulm A Revs Jude 31.9 cm/sec       CV ECHO SAMEER - BZI_BMI 29.9 kilograms/m^2       CV ECHO SAMEER - BSA(HAYCOCK) 1.9 m^2       CV ECHO SAMEER - BZI_METRIC_WEIGHT 76.7 kg       CV ECHO SAMEER - BZI_METRIC_HEIGHT 160.0 cm      EF(MOD-bp) 71.0 %      LA dimension(2D) 4.1 cm      Echo EF Estimated 65 %     Narrative:       · Estimated EF = 65%.  · Left ventricular systolic function is normal.  · Left ventricular diastolic dysfunction (grade I a) consistent with   impaired relaxation.  · Left atrial cavity size is borderline dilated.  · Mild mitral valve regurgitation is present       ECG 12 Lead [[id number]] Collected:  05/13/20 1417     Updated:  05/15/20 1003    Narrative:       HEART RATE= 60  bpm  RR Interval= 1008  ms  DE Interval= 259  ms  P Horizontal Axis= 187  deg  P Front Axis= -61  deg  QRSD Interval= 86  ms  QT Interval= 465  ms  QRS Axis= -40  deg  T Wave Axis= 21  deg  - ABNORMAL ECG -  Sinus rhythm  Prolonged DE interval  Left anterior fascicular block  Electronically Signed By:   Date and Time of Study: 2020-05-13 14:17:58               Results for orders placed during the hospital encounter of 05/13/20   Adult Transthoracic Echo Complete W/ Cont if Necessary Per Protocol    Narrative · Estimated EF = 65%.  ·  Left ventricular systolic function is normal.  · Left ventricular diastolic dysfunction (grade I a) consistent with   impaired relaxation.  · Left atrial cavity size is borderline dilated.  · Mild mitral valve regurgitation is present          Xr Chest 1 View    Result Date: 5/13/2020  Mild cardiac enlargement with no acute process identified.  Electronically Signed By-Candido Martinez On:5/13/2020 3:19 PM This report was finalized on [id number] by  Candido Martinez, .          Xrays, labs reviewed personally by physician.    Medication Review:   I have reviewed the patient's current medication list      Scheduled Meds    amLODIPine 10 mg Oral Daily   aspirin 81 mg Oral QAM   atenolol 25 mg Oral Q PM   atorvastatin 10 mg Oral Nightly   calcium 500 mg vitamin D 5 mcg (200 UT) 1 tablet Oral Daily   cholecalciferol 1,000 Units Oral Daily   enoxaparin 40 mg Subcutaneous Daily   escitalopram 10 mg Oral Nightly   fenofibrate 145 mg Oral Daily   ferrous sulfate 324 mg Oral Daily With Breakfast   gabapentin 300 mg Oral BID   insulin glargine 20 Units Subcutaneous Nightly   insulin lispro 0-9 Units Subcutaneous TID AC   insulin lispro 20 Units Subcutaneous TID With Meals   lisinopril-hydroCHLOROthiazide (ZESTORETIC) 20-25 mg combo dose  Oral Q24H   metFORMIN 1,000 mg Oral Nightly   pantoprazole 40 mg Oral QAM   sodium chloride 10 mL Intravenous Q12H   traZODone 50 mg Oral Nightly   vitamin B-12 1,000 mcg Oral Daily       Meds Infusions       Meds PRN  •  acetaminophen **OR** acetaminophen **OR** acetaminophen  •  ALPRAZolam  •  bisacodyl  •  dextrose  •  dextrose  •  docusate sodium  •  glucagon (human recombinant)  •  hydrALAZINE  •  [DISCONTINUED] insulin lispro **AND** insulin lispro  •  insulin lispro **AND** insulin lispro  •  nitroglycerin  •  [COMPLETED] Insert peripheral IV **AND** sodium chloride  •  sodium chloride  •  sodium chloride    I personally reviewed patient's x-ray films    I personally reviewed patient's EKG  strips    Assessment/Plan   Assessment/Plan     Active Hospital Problems    Diagnosis  POA   • **Heart palpitations [R00.2]  Yes   • Major depressive disorder, recurrent episode, moderate (CMS/HCC) [F33.1]  Unknown   • Bereavement [Z63.4]  Not Applicable   • Gastroesophageal reflux disease [K21.9]  Yes   • Essential hypertension [I10]  Yes   • Type 2 diabetes mellitus (CMS/HCC) [E11.9]  Yes   • High-density lipoprotein deficiency [E78.6]  Yes      Resolved Hospital Problems   No resolved problems to display.       MEDICAL DECISION MAKING COMPLEXITY BY PROBLEM:     # Heart palpitations  On cardiac monitored floor-no events noted  Monitor for any arrhythmias  TSH 1.57  Ruled out for MI  Echo: negative  Cardiology: will need event monitor on discharge; nuclear stress test negative     #Type 2 diabetes mellitus  Resume home diabetic medicines  Consistent carbohydrate diet  Basal bolus insulin regimen  PRN insulin for additional coverage,as needed     #Hypertension, essential  Resume home medicine  PRN medicines for elevated blood pressure     #GERD  Resume home medicine     #HLD  Resume home medicines     #Depression, situational  Patient is currently on Lexapro  Her  recently ; has been feeling additional stress  Psych: low dose Xanax (PRN) support/greif therapy    VTE Prophylaxis -   Mechanical Order History:     None      Pharmalogical Order History:     Ordered     Dose Route Frequency Stop    20 1616  enoxaparin (LOVENOX) syringe 40 mg      40 mg SC Daily --            Code Status -   Code Status and Medical Interventions:   Ordered at: 20 1616     Code Status:    CPR     Medical Interventions (Level of Support Prior to Arrest):    Full       This patient has been examined wearing appropriate Personal Protective Equipment and discussed with hospital infection control department. 05/15/20        Discharge Planning          Destination      Coordination has not been started for this encounter.       Durable Medical Equipment      Coordination has not been started for this encounter.      Dialysis/Infusion      Coordination has not been started for this encounter.      Home Medical Care      Coordination has not been started for this encounter.      Therapy      Coordination has not been started for this encounter.      Community Resources      Coordination has not been started for this encounter.            Electronically signed by Mary Ann Olguin MD, 05/15/20, 10:41.  [facility] Hospitalist Team

## 2020-05-16 ENCOUNTER — TRANSITIONAL CARE MANAGEMENT TELEPHONE ENCOUNTER (OUTPATIENT)
Dept: CALL CENTER | Facility: HOSPITAL | Age: 70
End: 2020-05-16

## 2020-05-16 NOTE — OUTREACH NOTE
Call Center TCM Note      Responses   [facility] patient discharged from?  [facility]   COVID-19 Test Status  Not tested   Does the patient have one of the following disease processes/diagnoses(primary or secondary)?  Other   TCM attempt successful?  Yes   Call start time  1203   Call end time  1207   Discharge diagnosis  Heart palpitations   Person spoke with today (if not patient) and relationship  Roxane-daughter (listed on verbal consent)   Meds reviewed with patient/caregiver?  Yes   Is the patient having any side effects they believe may be caused by any medication additions or changes?  No   Does the patient have all medications ordered at discharge?  Yes   Is the patient taking all medications as directed (includes completed medication regime)?  Yes   Comments regarding appointments  Cardiology appt 5/18/20   Does the patient have a primary care provider?   Yes   Comments regarding PCP  No PCP appt at time of call   Has the patient kept scheduled appointments due by today?  N/A   Comments  Pt is wearing heart monitor    Pulse Ox monitoring  None   Psychosocial issues?  No   Did the patient receive a copy of their discharge instructions?  Yes   Nursing interventions  Reviewed instructions with patient, Educated on MyChart   What is the patient's perception of their health status since discharge?  Improving   Is the patient/caregiver able to teach back signs and symptoms related to disease process for when to call PCP?  Yes   Is the patient/caregiver able to teach back signs and symptoms related to disease process for when to call 911?  Yes   Is the patient/caregiver able to teach back the hierarchy of who to call/visit for symptoms/problems? PCP, Specialist, Home health nurse, Urgent Care, ED, 911  Yes   TCM call completed?  Yes          Any Sheehan RN    5/16/2020, 12:07

## 2020-05-18 RX ORDER — TRAZODONE HYDROCHLORIDE 50 MG/1
TABLET ORAL
Qty: 90 TABLET | Refills: 0 | Status: SHIPPED | OUTPATIENT
Start: 2020-05-18 | End: 2020-08-24

## 2020-05-20 ENCOUNTER — TELEMEDICINE (OUTPATIENT)
Dept: FAMILY MEDICINE CLINIC | Facility: CLINIC | Age: 70
End: 2020-05-20

## 2020-05-20 DIAGNOSIS — F41.9 ANXIETY: Primary | ICD-10-CM

## 2020-05-20 DIAGNOSIS — G62.9 NEUROPATHY: ICD-10-CM

## 2020-05-20 DIAGNOSIS — F43.21 GRIEF REACTION: ICD-10-CM

## 2020-05-20 DIAGNOSIS — M05.9 RHEUMATOID ARTHRITIS WITH POSITIVE RHEUMATOID FACTOR, INVOLVING UNSPECIFIED SITE (HCC): ICD-10-CM

## 2020-05-20 DIAGNOSIS — F32.9 REACTIVE DEPRESSION: ICD-10-CM

## 2020-05-20 PROCEDURE — 99443 PR PHYS/QHP TELEPHONE EVALUATION 21-30 MIN: CPT | Performed by: FAMILY MEDICINE

## 2020-05-20 RX ORDER — ESCITALOPRAM OXALATE 20 MG/1
20 TABLET ORAL NIGHTLY
Qty: 30 TABLET | Refills: 1 | Status: SHIPPED | OUTPATIENT
Start: 2020-05-20 | End: 2020-07-31 | Stop reason: SDUPTHER

## 2020-05-20 RX ORDER — ALPRAZOLAM 0.25 MG/1
0.25 TABLET ORAL DAILY PRN
Qty: 20 TABLET | Refills: 0 | Status: SHIPPED | OUTPATIENT
Start: 2020-05-20 | End: 2020-08-06 | Stop reason: SDUPTHER

## 2020-05-20 RX ORDER — GABAPENTIN 300 MG/1
300 CAPSULE ORAL 2 TIMES DAILY
Qty: 180 CAPSULE | Refills: 0 | Status: SHIPPED | OUTPATIENT
Start: 2020-05-20 | End: 2020-12-16 | Stop reason: SDUPTHER

## 2020-05-20 RX ORDER — ALPRAZOLAM 0.25 MG/1
0.25 TABLET ORAL DAILY PRN
Qty: 20 TABLET | Refills: 0 | Status: SHIPPED | OUTPATIENT
Start: 2020-05-20 | End: 2020-05-20

## 2020-05-20 NOTE — PROGRESS NOTES
Subjective   Jenna Rosa is a 70 y.o. female.     Chief Complaint   Patient presents with   • Palpitations   • Anxiety         Current Outpatient Medications:   •  albuterol sulfate HFA (VENTOLIN HFA) 108 (90 Base) MCG/ACT inhaler, Inhale 1 puff Every 6 (Six) Hours As Needed for Wheezing or Shortness of Air., Disp: , Rfl:   •  ALPRAZolam (XANAX) 0.25 MG tablet, Take 1 tablet by mouth Daily As Needed for Anxiety for up to 9 days., Disp: 20 tablet, Rfl: 0  •  amLODIPine (NORVASC) 10 MG tablet, Take 1 tablet by mouth Daily., Disp: 90 tablet, Rfl: 0  •  aspirin 81 MG EC tablet, Take 81 mg by mouth Every Morning., Disp: , Rfl:   •  atenolol (TENORMIN) 25 MG tablet, Take 25 mg by mouth Every Evening., Disp: , Rfl:   •  calcium carbonate (OS-PREET) 600 MG tablet, Take 600 mg by mouth Daily., Disp: , Rfl:   •  cholecalciferol (VITAMIN D3) 25 MCG (1000 UT) tablet, Take 1,000 Units by mouth Daily., Disp: , Rfl:   •  escitalopram (LEXAPRO) 20 MG tablet, Take 1 tablet by mouth Every Night., Disp: 30 tablet, Rfl: 1  •  fenofibrate 160 MG tablet, Take 160 mg by mouth Every Evening., Disp: , Rfl:   •  Ferrous Sulfate (IRON) 325 (65 Fe) MG tablet, Take 325 mg by mouth Daily., Disp: , Rfl:   •  fluticasone (FLONASE ALLERGY RELIEF) 50 MCG/ACT nasal spray, 1 spray into the nostril(s) as directed by provider Daily As Needed for Allergies., Disp: , Rfl:   •  gabapentin (Neurontin) 300 MG capsule, Take 1 capsule by mouth 2 (Two) Times a Day., Disp: 180 capsule, Rfl: 0  •  HUMIRA PEN 40 MG/0.4ML Pen-injector Kit, Inject 40 mg under the skin into the appropriate area as directed Every 14 (Fourteen) Days., Disp: 6 each, Rfl: 0  •  insulin NPH-insulin regular (humuLIN 70/30,novoLIN 70/30) (70-30) 100 UNIT/ML injection, Inject 50 Units under the skin into the appropriate area as directed Every Morning., Disp: , Rfl:   •  insulin NPH-insulin regular (humuLIN 70/30,novoLIN 70/30) (70-30) 100 UNIT/ML injection, Inject 20 Units under the skin  into the appropriate area as directed Every Evening., Disp: , Rfl:   •  lansoprazole (PREVACID) 30 MG capsule, TAKE 1 CAPSULE BY MOUTH DAILY, Disp: 90 capsule, Rfl: 0  •  lisinopril-hydrochlorothiazide (PRINZIDE,ZESTORETIC) 20-25 MG per tablet, TAKE 1 TABLET BY MOUTH DAILY, Disp: 90 tablet, Rfl: 2  •  lovastatin (MEVACOR) 40 MG tablet, Take 40 mg by mouth Every Night., Disp: , Rfl:   •  SITagliptin-metFORMIN HCl ER (Janumet XR) 100-1000 MG tablet, Take 1 tablet by mouth Every Evening., Disp: , Rfl:   •  traZODone (DESYREL) 50 MG tablet, TAKE 1 TABLET BY MOUTH DAILY AT BEDTIME, Disp: 90 tablet, Rfl: 0  •  vitamin B-12 (CYANOCOBALAMIN) 1000 MCG tablet, Take 1,000 mcg by mouth Daily., Disp: , Rfl:     Past Medical History:   Diagnosis Date   • Depression     Abstracted from [facility]   • Diabetes mellitus, type 2 (CMS/HCC) 1988    Abstracted from [facility]   • Diabetic peripheral neuropathy (CMS/HCC)    • GERD (gastroesophageal reflux disease)     Abstracted from [facility]   • Hyperlipidemia     Abstracted from [facility]   • Hypertension     Abstracted from [facility]   • Insomnia    • Lower back pain    • Obesity    • Osteoarthritis    • Osteopenia     Abstracted from [facility]   • Palpitations    • Pulmonary nodule    • RA (rheumatoid arthritis) (CMS/HCC)     Abstracted from [facility]   • Rheumatoid arthritis (CMS/HCC)    • Rheumatoid nodule (CMS/HCC) 06/2011    Abstracted from [facility] rt. elbow, left elbow, and epidermal inclusion cyst post. neck (June 2011)--Dr. Whitmore   • Rheumatoid nodule (CMS/HCC)     Abstracted from [facility] rt. thumb x 2 and rt. & lt. 3rd fingers (Oct.2012)   • Vitamin D deficiency        Past Surgical History:   Procedure Laterality Date   • CHOLECYSTECTOMY      Abstracted from [facility]   • COLONOSCOPY  2017    2017 = TA, rech 2020 [facility]   • CYST REMOVAL      Seb cyst on neck   • OTHER SURGICAL HISTORY       Uterine Prolapse repair   • TOTAL ABDOMINAL HYSTERECTOMY      Abstracted  from [facility]---endometriosis   • TOTAL ABDOMINAL HYSTERECTOMY WITH SALPINGO OOPHORECTOMY         Family History   Problem Relation Age of Onset   • Heart disease Mother    • Diabetes Mother    • Hypertension Mother    • Rheum arthritis Mother    • Coronary artery disease Mother    • Alcohol abuse Mother    • Heart disease Father    • Mental illness Father    • Hypertension Father    • Alcohol abuse Father    • Diabetes Sister    • Stroke Sister    • Cancer Sister    • Osteoarthritis Sister    • Rheum arthritis Sister    • Diabetes Maternal Grandfather        Social History     Socioeconomic History   • Marital status:      Spouse name: Carson Rosa   • Number of children: 3   • Years of education: Not on file   • Highest education level: Not on file   Tobacco Use   • Smoking status: Never Smoker   • Smokeless tobacco: Never Used   Substance and Sexual Activity   • Alcohol use: No     Frequency: Never   • Drug use: Defer   • Sexual activity: Defer       69 y/o C female recently  and also recently hospitalized for palpitations on phone for f/u appt w/ hx/o DMII/ HTN/ CHOL/ RA    Pt was worried about her heart but it checked out ok and given a few xanax by Psych in the hospital; they do help her     Pt now living w/ her 2 dogs but one has cancer and may need to be put down; she walks the other dog and has her sister and daughter bring her food so she doesn't have to go to the grocery store    Pt doing ok since her 's death and has heard from Hospice that she can call if she needs anything (grief counseling)  Pt also needing a new Rheum Dr. since their office is closing-------pt needs someone who takes both medicare/ medicaid though------she can drive to [facility] but not wanting to go all over [place] for new Dr.       The following portions of the patient's history were reviewed and updated as appropriate: allergies, current medications, past family history, past medical history,  past social history, past surgical history and problem list.    Review of Systems   Constitutional: Negative for activity change, appetite change, unexpected weight gain and unexpected weight loss.   Respiratory: Negative for cough, shortness of breath and wheezing.    Cardiovascular: Positive for palpitations. Negative for chest pain and leg swelling.   Gastrointestinal: Negative for constipation, diarrhea and GERD.   Neurological: Positive for numbness (burning pain in LE). Negative for weakness.   Psychiatric/Behavioral: Positive for dysphoric mood, sleep disturbance and stress. Negative for depressed mood. The patient is nervous/anxious.        There were no vitals filed for this visit.    Objective   Physical Exam   Constitutional: She is oriented to person, place, and time. No distress.   Pulmonary/Chest: No respiratory distress.   Neurological: She is alert and oriented to person, place, and time.   Psychiatric: Her speech is normal and behavior is normal. Judgment and thought content normal. Her mood appears anxious. Cognition and memory are normal.         Assessment/Plan   Jenna was seen today for palpitations and anxiety.    Diagnoses and all orders for this visit:    Anxiety    Grief reaction    Reactive depression  -     Discontinue: ALPRAZolam (XANAX) 0.25 MG tablet; Take 1 tablet by mouth Daily As Needed for Anxiety for up to 9 days.  -     ALPRAZolam (XANAX) 0.25 MG tablet; Take 1 tablet by mouth Daily As Needed for Anxiety for up to 9 days.    Neuropathy  -     gabapentin (Neurontin) 300 MG capsule; Take 1 capsule by mouth 2 (Two) Times a Day.    Rheumatoid arthritis with positive rheumatoid factor, involving unspecified site (CMS/[facility])  -     Ambulatory Referral to Rheumatology    Other orders  -     escitalopram (LEXAPRO) 20 MG tablet; Take 1 tablet by mouth Every Night.    Increase Lexapro to 20mg qday but can use Xanax prn  Pt to call office if not contacted by new Rheum in 1-2 weeks  Pt to call  INB in 1mo    You have chosen to receive care through a telephone visit. Do you consent to use a telephone visit for your medical care today? {YES   This visit has been rescheduled as a phone visit to comply with patient safety concerns in accordance with CDC recommendations. Total time of discussion was 28 minutes.

## 2020-05-22 ENCOUNTER — OFFICE VISIT (OUTPATIENT)
Dept: CARDIOLOGY | Facility: CLINIC | Age: 70
End: 2020-05-22

## 2020-05-22 ENCOUNTER — READMISSION MANAGEMENT (OUTPATIENT)
Dept: CALL CENTER | Facility: HOSPITAL | Age: 70
End: 2020-05-22

## 2020-05-22 VITALS
WEIGHT: 168 LBS | HEART RATE: 65 BPM | OXYGEN SATURATION: 95 % | HEIGHT: 63 IN | DIASTOLIC BLOOD PRESSURE: 74 MMHG | SYSTOLIC BLOOD PRESSURE: 165 MMHG | BODY MASS INDEX: 29.77 KG/M2

## 2020-05-22 DIAGNOSIS — I49.5 TACHY-BRADY SYNDROME (HCC): ICD-10-CM

## 2020-05-22 DIAGNOSIS — I10 ESSENTIAL HYPERTENSION: ICD-10-CM

## 2020-05-22 DIAGNOSIS — E11.69 TYPE 2 DIABETES MELLITUS WITH OTHER SPECIFIED COMPLICATION, UNSPECIFIED WHETHER LONG TERM INSULIN USE (HCC): ICD-10-CM

## 2020-05-22 DIAGNOSIS — E78.2 MIXED HYPERLIPIDEMIA: Primary | ICD-10-CM

## 2020-05-22 DIAGNOSIS — R00.2 PALPITATIONS: ICD-10-CM

## 2020-05-22 PROCEDURE — 99213 OFFICE O/P EST LOW 20 MIN: CPT | Performed by: INTERNAL MEDICINE

## 2020-05-22 NOTE — OUTREACH NOTE
Medical Week 2 Survey      Responses   [facility] patient discharged from?  [facility]   COVID-19 Test Status  Not tested   Does the patient have one of the following disease processes/diagnoses(primary or secondary)?  Other   Week 2 attempt successful?  No   Unsuccessful attempts  Attempt 1          Eden Plascencia LPN

## 2020-05-22 NOTE — PROGRESS NOTES
Subjective:     Encounter Date:05/22/2020      Patient ID: Jenna Rosa is a 70 y.o. female.    Chief Complaint: Follow-up palpitations  History of Present Illness  70 -year-old white female patient with known history of hypertension dyslipidemia and diabetes mellitus,  comes back for follow up        Patient recently admitted hospital with an episode of palpitations and bradycardia  Patient has a event monitor which showed bradycardia with significant PACs PVCs consistent with tachybradycardia episodes  She is still having palpitations  Patient underwent stress test and echocardiogram May 2020  Stress test showed LVEF is normal small inferoapical ischemia cannot be excluded  Patient denies of any chest pain currently on medical treatment  Echocardiogram LVEF 65% diastolic LV dysfunction mild mitral valve regurgitation  Patient will be referred to the electrophysiology  May also need to consider cardiac catheterization     The following portions of the patient's history were reviewed and updated as appropriate: Allergies current medications past family history past medical history past social history past surgical history problem list and review of systems  Past Medical History:   Diagnosis Date   • Depression     Abstracted from [facility]   • Diabetes mellitus, type 2 (CMS/[facility]) 1988    Abstracted from [facility]   • Diabetic peripheral neuropathy (CMS/[facility])    • GERD (gastroesophageal reflux disease)     Abstracted from [facility]   • Hyperlipidemia     Abstracted from [facility]   • Hypertension     Abstracted from [facility]   • Insomnia    • Lower back pain    • Obesity    • Osteoarthritis    • Osteopenia     Abstracted from [facility]   • Palpitations    • Pulmonary nodule    • RA (rheumatoid arthritis) (CMS/[facility])     Abstracted from [facility]   • Rheumatoid arthritis (CMS/[facility])    • Rheumatoid nodule (CMS/[facility]) 06/2011    Abstracted from [facility] rt. elbow, left elbow, and epidermal inclusion cyst post.  "neck (June 2011)--Dr. Whitmore   • Rheumatoid nodule (CMS/HCC)     Abstracted from [facility] rt. thumb x 2 and rt. & lt. 3rd fingers (Oct.2012)   • Vitamin D deficiency      Past Surgical History:   Procedure Laterality Date   • CHOLECYSTECTOMY      Abstracted from [facility]   • COLONOSCOPY  2017 2017 = TA, rech 2020 [facility]   • CYST REMOVAL      Seb cyst on neck   • OTHER SURGICAL HISTORY       Uterine Prolapse repair   • TOTAL ABDOMINAL HYSTERECTOMY      Abstracted from [facility]---endometriosis   • TOTAL ABDOMINAL HYSTERECTOMY WITH SALPINGO OOPHORECTOMY       /74 (BP Location: Left arm, Patient Position: Sitting, Cuff Size: Adult)   Pulse 65   Ht 160 cm (63\")   Wt 76.2 kg (168 lb)   SpO2 95%   BMI 29.76 kg/m²   Family History   Problem Relation Age of Onset   • Heart disease Mother    • Diabetes Mother    • Hypertension Mother    • Rheum arthritis Mother    • Coronary artery disease Mother    • Alcohol abuse Mother    • Heart disease Father    • Mental illness Father    • Hypertension Father    • Alcohol abuse Father    • Diabetes Sister    • Stroke Sister    • Cancer Sister    • Osteoarthritis Sister    • Rheum arthritis Sister    • Diabetes Maternal Grandfather        Current Outpatient Medications:   •  albuterol sulfate HFA (VENTOLIN HFA) 108 (90 Base) MCG/ACT inhaler, Inhale 1 puff Every 6 (Six) Hours As Needed for Wheezing or Shortness of Air., Disp: , Rfl:   •  ALPRAZolam (XANAX) 0.25 MG tablet, Take 1 tablet by mouth Daily As Needed for Anxiety for up to 9 days., Disp: 20 tablet, Rfl: 0  •  amLODIPine (NORVASC) 10 MG tablet, Take 1 tablet by mouth Daily., Disp: 90 tablet, Rfl: 0  •  aspirin 81 MG EC tablet, Take 81 mg by mouth Every Morning., Disp: , Rfl:   •  atenolol (TENORMIN) 25 MG tablet, Take 25 mg by mouth Every Evening., Disp: , Rfl:   •  calcium carbonate (OS-PREET) 600 MG tablet, Take 600 mg by mouth Daily., Disp: , Rfl:   •  cholecalciferol (VITAMIN D3) 25 MCG (1000 UT) tablet, " Take 1,000 Units by mouth Daily., Disp: , Rfl:   •  escitalopram (LEXAPRO) 20 MG tablet, Take 1 tablet by mouth Every Night., Disp: 30 tablet, Rfl: 1  •  fenofibrate 160 MG tablet, Take 160 mg by mouth Every Evening., Disp: , Rfl:   •  Ferrous Sulfate (IRON) 325 (65 Fe) MG tablet, Take 325 mg by mouth Daily., Disp: , Rfl:   •  fluticasone (FLONASE ALLERGY RELIEF) 50 MCG/ACT nasal spray, 1 spray into the nostril(s) as directed by provider Daily As Needed for Allergies., Disp: , Rfl:   •  gabapentin (Neurontin) 300 MG capsule, Take 1 capsule by mouth 2 (Two) Times a Day., Disp: 180 capsule, Rfl: 0  •  HUMIRA PEN 40 MG/0.4ML Pen-injector Kit, Inject 40 mg under the skin into the appropriate area as directed Every 14 (Fourteen) Days., Disp: 6 each, Rfl: 0  •  insulin NPH-insulin regular (humuLIN 70/30,novoLIN 70/30) (70-30) 100 UNIT/ML injection, Inject 50 Units under the skin into the appropriate area as directed Every Morning., Disp: , Rfl:   •  insulin NPH-insulin regular (humuLIN 70/30,novoLIN 70/30) (70-30) 100 UNIT/ML injection, Inject 20 Units under the skin into the appropriate area as directed Every Evening., Disp: , Rfl:   •  lansoprazole (PREVACID) 30 MG capsule, TAKE 1 CAPSULE BY MOUTH DAILY, Disp: 90 capsule, Rfl: 0  •  lisinopril-hydrochlorothiazide (PRINZIDE,ZESTORETIC) 20-25 MG per tablet, TAKE 1 TABLET BY MOUTH DAILY, Disp: 90 tablet, Rfl: 2  •  lovastatin (MEVACOR) 40 MG tablet, Take 40 mg by mouth Every Night., Disp: , Rfl:   •  SITagliptin-metFORMIN HCl ER (Janumet XR) 100-1000 MG tablet, Take 1 tablet by mouth Every Evening., Disp: , Rfl:   •  traZODone (DESYREL) 50 MG tablet, TAKE 1 TABLET BY MOUTH DAILY AT BEDTIME, Disp: 90 tablet, Rfl: 0  •  vitamin B-12 (CYANOCOBALAMIN) 1000 MCG tablet, Take 1,000 mcg by mouth Daily., Disp: , Rfl:   Social History     Socioeconomic History   • Marital status:      Spouse name: Carson Rosa   • Number of children: 3   • Years of education: Not on file    • Highest education level: Not on file   Tobacco Use   • Smoking status: Never Smoker   • Smokeless tobacco: Never Used   Substance and Sexual Activity   • Alcohol use: No     Frequency: Never   • Drug use: Defer   • Sexual activity: Defer     No Known Allergies  Review of Systems   Cardiovascular: Positive for palpitations. Negative for chest pain and leg swelling.   Respiratory: Positive for shortness of breath.    Neurological: Positive for numbness. Negative for dizziness.              Objective:     Physical Exam   Constitutional: She is oriented to person, place, and time. She appears well-developed and well-nourished. She is cooperative.   HENT:   Head: Normocephalic and atraumatic.   Mouth/Throat: Uvula is midline and oropharynx is clear and moist. No oral lesions.   Eyes: Conjunctivae are normal. No scleral icterus.   Neck: Trachea normal. Neck supple. No JVD present. Carotid bruit is not present. No thyromegaly present.   Cardiovascular: Normal rate, regular rhythm, S1 normal, S2 normal, normal heart sounds, intact distal pulses and normal pulses. PMI is not displaced. Exam reveals no gallop and no friction rub.   No murmur heard.  Pulmonary/Chest: Effort normal and breath sounds normal.   Abdominal: Soft. Bowel sounds are normal.   Musculoskeletal: Normal range of motion.   Neurological: She is alert and oriented to person, place, and time. She has normal strength.   No focal deficits   Skin: Skin is warm. No cyanosis.   Psychiatric: She has a normal mood and affect.       Procedures    Lab Review:       Assessment:          Diagnosis Plan   1. Mixed hyperlipidemia  Ambulatory Referral to Cardiac Electrophysiology   2. Essential hypertension  Ambulatory Referral to Cardiac Electrophysiology   3. Palpitations  Ambulatory Referral to Cardiac Electrophysiology   4. Type 2 diabetes mellitus with other specified complication, unspecified whether long term insulin use (CMS/[facility])  Ambulatory Referral to Cardiac  Electrophysiology   5. Tachy-fish syndrome (CMS/HCC)  Ambulatory Referral to Cardiac Electrophysiology          Plan:       Continue aggressive control of hypertension dyslipidemia stable  Diabetes needs to be aggressive control  Modify cardiac risk factors aggressively  EP evaluation

## 2020-05-26 ENCOUNTER — READMISSION MANAGEMENT (OUTPATIENT)
Dept: CALL CENTER | Facility: HOSPITAL | Age: 70
End: 2020-05-26

## 2020-05-26 NOTE — OUTREACH NOTE
Medical Week 2 Survey      Responses   [facility] [place] patient discharged from?  [facility]   COVID-19 Test Status  Not tested   Does the patient have one of the following disease processes/diagnoses(primary or secondary)?  Other   Week 2 attempt successful?  Yes   Call start time  0944   Discharge diagnosis  Heart palpitations   Call end time  0949   Meds reviewed with patient/caregiver?  Yes   Is the patient having any side effects they believe may be caused by any medication additions or changes?  No   Does the patient have all medications ordered at discharge?  Yes   Is the patient taking all medications as directed (includes completed medication regime)?  Yes   Does the patient have a primary care provider?   Yes   Does the patient have an appointment with their PCP within 7 days of discharge?  Yes   Has the patient kept scheduled appointments due by today?  Yes   Comments  States she is waiting to hear from Dr. Gotti's office for an appt   Has home health visited the patient within 72 hours of discharge?  N/A   Pulse Ox monitoring  None   Psychosocial issues?  No   Did the patient receive a copy of their discharge instructions?  Yes   Nursing interventions  Reviewed instructions with patient   What is the patient's perception of their health status since discharge?  Same   Is the patient/caregiver able to teach back signs and symptoms related to disease process for when to call PCP?  Yes   Is the patient/caregiver able to teach back signs and symptoms related to disease process for when to call 911?  Yes   Is the patient/caregiver able to teach back the hierarchy of who to call/visit for symptoms/problems? PCP, Specialist, Home health nurse, Urgent Care, ED, 911  Yes   Additional teach back comments  States she is still having the heart palpitation and Dr. Figueroa stated she will probably need a pacemaker and referred her to Dr. Gotti and she is just waiting on appt.  States her blood sugar in the afternoon is  dropping to the 50s so she is calling Dr. Rangel regarding this for possible change in insulin dosage.     Week 2 Call Completed?  Yes   Wrap up additional comments  Pt waiting for appt with Dr. Gotti regarding pacemaker placement          Eden Plascencia LPN

## 2020-05-29 ENCOUNTER — OFFICE VISIT (OUTPATIENT)
Dept: CARDIOLOGY | Facility: CLINIC | Age: 70
End: 2020-05-29

## 2020-05-29 VITALS
BODY MASS INDEX: 29.94 KG/M2 | HEART RATE: 60 BPM | WEIGHT: 169 LBS | SYSTOLIC BLOOD PRESSURE: 131 MMHG | DIASTOLIC BLOOD PRESSURE: 68 MMHG | OXYGEN SATURATION: 96 %

## 2020-05-29 DIAGNOSIS — R00.1 BRADYCARDIA, SINUS: ICD-10-CM

## 2020-05-29 DIAGNOSIS — I10 ESSENTIAL HYPERTENSION: ICD-10-CM

## 2020-05-29 DIAGNOSIS — I49.3 PVC (PREMATURE VENTRICULAR CONTRACTION): ICD-10-CM

## 2020-05-29 DIAGNOSIS — R00.2 PALPITATIONS: Primary | ICD-10-CM

## 2020-05-29 PROCEDURE — 99204 OFFICE O/P NEW MOD 45 MIN: CPT | Performed by: INTERNAL MEDICINE

## 2020-05-29 RX ORDER — ACEBUTOLOL HYDROCHLORIDE 200 MG/1
200 CAPSULE ORAL DAILY
Qty: 30 CAPSULE | Refills: 3 | Status: SHIPPED | OUTPATIENT
Start: 2020-05-29 | End: 2020-07-31

## 2020-05-29 NOTE — PROGRESS NOTES
CC--palpitations and bradycardia    Sub  70-year-old very pleasant patient came for referral for suspected tachycardia/bradycardia syndrome  Her symptoms started increasing since March of this year and she kind of correlated to loss of her spouse with grief  She complains of palpitations and sometimes fullness in her neck and denies any symptoms of neptali syncope  She does have atypical epigastric discomfort unrelated to exertion and a stress test showed normal EF and inferoapical ischemia could not be excluded and left left on medical management  She had normal EF on echocardiography with sclerotic aortic valve without stenosis  She also feels fatigued with intermittent bradycardia  An event recorder on her revealed sinus bradycardia with intermittent PVCs  She denies any TIA or stroke or renal disease in the past  She does have essential hypertension on amlodipine and atenolol for a long time        Past Medical History:   Diagnosis Date   • Depression     Abstracted from [facility]   • Diabetes mellitus, type 2 (CMS/HCC) 1988    Abstracted from [facility]   • Diabetic peripheral neuropathy (CMS/HCC)    • GERD (gastroesophageal reflux disease)     Abstracted from [facility]   • Hyperlipidemia     Abstracted from [facility]   • Hypertension     Abstracted from [facility]   • Insomnia    • Lower back pain    • Obesity    • Osteoarthritis    • Osteopenia     Abstracted from [facility]   • Palpitations    • Pulmonary nodule    • RA (rheumatoid arthritis) (CMS/HCC)     Abstracted from [facility]   • Rheumatoid arthritis (CMS/HCC)    • Rheumatoid nodule (CMS/HCC) 06/2011    Abstracted from [facility] rt. elbow, left elbow, and epidermal inclusion cyst post. neck (June 2011)--Dr. Whitmore   • Rheumatoid nodule (CMS/HCC)     Abstracted from [facility] rt. thumb x 2 and rt. & lt. 3rd fingers (Oct.2012)   • Vitamin D deficiency      Past Surgical History:   Procedure Laterality Date   • CHOLECYSTECTOMY      Abstracted from  [facility]   • COLONOSCOPY  2017    2017 = TA, rech 2020 [facility]   • CYST REMOVAL      Seb cyst on neck   • OTHER SURGICAL HISTORY       Uterine Prolapse repair   • TOTAL ABDOMINAL HYSTERECTOMY      Abstracted from [facility] BSO---endometriosis   • TOTAL ABDOMINAL HYSTERECTOMY WITH SALPINGO OOPHORECTOMY       Family History   Problem Relation Age of Onset   • Heart disease Mother    • Diabetes Mother    • Hypertension Mother    • Rheum arthritis Mother    • Coronary artery disease Mother    • Alcohol abuse Mother    • Heart disease Father    • Mental illness Father    • Hypertension Father    • Alcohol abuse Father    • Diabetes Sister    • Stroke Sister    • Cancer Sister    • Osteoarthritis Sister    • Rheum arthritis Sister    • Diabetes Maternal Grandfather      Social History     Tobacco Use   • Smoking status: Never Smoker   • Smokeless tobacco: Never Used   Substance Use Topics   • Alcohol use: No     Frequency: Never   • Drug use: Defer     Allergies:  Patient has no known allergies.    Review of Systems   General:  positive for fatigue and tiredness  Eyes: No redness  Cardiovascular: No chest pain, positive for  palpitations  Respiratory:   no shortness of breath  Gastrointestinal: No nausea or vomiting, bleeding  Genitourinary: no hematuria or dysuria  Musculoskeletal: No arthralgia or myalgia  Skin: No rash  Neurologic: No numbness, tingling, syncope  Hematologic/Lymphatic: No abnormal bleeding      Physical Exam  VITALS REVIEWED  Blood pressure 131/68 pulse rate 60 patient afebrile respiration 12 times a minute  General:      well developed, well nourished, in no acute distress.    Head:      normocephalic and atraumatic.    Eyes:      PERRL/EOM intact, conjunctiva and sclera clear with out nystagmus.    Neck:      no masses, thyromegaly,  trachea central with normal respiratory effort   Lungs:      clear bilaterally to auscultation.    Heart:      Sinus rhythm with ejection systolic murmur at the base of  the heart consistent with aortic valve sclerosis with normal carotid upstroke without any gallops or rubs  Msk:      Positive for mild kyphosis   Pulses:      pulses normal in all 4 extremities.    Extremities:       no cyanosis or clubbing--no edema     Neurologic:      no focal deficits.   alert oriented x3  Skin:      intact without lesions or rashes.    Psych:      alert and cooperative; normal mood and affect; normal attention span and concentration.            Assessment and plan    Sinus bradycardia with symptoms predominantly in the form of fatigue  Intermittent PVCs  Aortic valve sclerosis without aortic stenosis  Normal EF with inferoapical artifact versus ischemia on medical treatment  Essential hypertension    Recommendations  Stop atenolol  Start acebutolol 200 mg a day to reduce bradycardia  Evaluate this patient in a month to see this months and then decide further treatment like pacemaker    Electronically signed by Alberto Gotti MD, 05/29/20, 3:34 PM.

## 2020-06-02 ENCOUNTER — READMISSION MANAGEMENT (OUTPATIENT)
Dept: CALL CENTER | Facility: HOSPITAL | Age: 70
End: 2020-06-02

## 2020-06-02 RX ORDER — PEN NEEDLE, DIABETIC 29 G X1/2"
NEEDLE, DISPOSABLE MISCELLANEOUS
Qty: 150 EACH | Refills: 3 | Status: SHIPPED | OUTPATIENT
Start: 2020-06-02 | End: 2021-03-29

## 2020-06-02 NOTE — OUTREACH NOTE
Medical Week 3 Survey      Responses   [facility] patient discharged from?  [facility]   COVID-19 Test Status  Not tested   Does the patient have one of the following disease processes/diagnoses(primary or secondary)?  Other   Week 3 attempt successful?  Yes   Call start time  1741   Call end time  1743   Meds reviewed with patient/caregiver?  Yes   Is the patient having any side effects they believe may be caused by any medication additions or changes?  No   Does the patient have all medications ordered at discharge?  Yes   Is the patient taking all medications as directed (includes completed medication regime)?  Yes   Does the patient have a primary care provider?   Yes   Has the patient kept scheduled appointments due by today?  Yes   Has home health visited the patient within 72 hours of discharge?  N/A   Pulse Ox monitoring  None   Did the patient receive a copy of their discharge instructions?  Yes   Nursing interventions  Reviewed instructions with patient   What is the patient's perception of their health status since discharge?  Improving   Is the patient/caregiver able to teach back signs and symptoms related to disease process for when to call PCP?  Yes   Is the patient/caregiver able to teach back signs and symptoms related to disease process for when to call 911?  Yes   Is the patient/caregiver able to teach back the hierarchy of who to call/visit for symptoms/problems? PCP, Specialist, Home health nurse, Urgent Care, ED, 911  Yes   Week 3 Call Completed?  Yes          Trista Greenfield LPN

## 2020-06-04 LAB — TOAL ENROLLMENT DAYS: 19

## 2020-06-04 PROCEDURE — 93228 REMOTE 30 DAY ECG REV/REPORT: CPT | Performed by: INTERNAL MEDICINE

## 2020-06-09 ENCOUNTER — READMISSION MANAGEMENT (OUTPATIENT)
Dept: CALL CENTER | Facility: HOSPITAL | Age: 70
End: 2020-06-09

## 2020-06-09 NOTE — OUTREACH NOTE
Medical Week 4 Survey      Responses   [facility] patient discharged from?  [facility]   COVID-19 Test Status  Not tested   Does the patient have one of the following disease processes/diagnoses(primary or secondary)?  Other   Week 4 attempt successful?  Yes   Call start time  1931   Call end time  1933   Discharge diagnosis  Heart palpitations   Meds reviewed with patient/caregiver?  Yes   Is the patient having any side effects they believe may be caused by any medication additions or changes?  No   Is the patient taking all medications as directed (includes completed medication regime)?  Yes   Has the patient kept scheduled appointments due by today?  Yes   Is the patient still receiving Home Health Services?  N/A   Pulse Ox monitoring  None   What is the patient's perception of their health status since discharge?  Improving   Is the patient/caregiver able to teach back signs and symptoms related to disease process for when to call PCP?  Yes   Is the patient/caregiver able to teach back signs and symptoms related to disease process for when to call 911?  Yes   Is the patient/caregiver able to teach back the hierarchy of who to call/visit for symptoms/problems? PCP, Specialist, Home health nurse, Urgent Care, ED, 911  Yes   Additional teach back comments  Patient states the new medication has helped with the heart palpitations and she is having a little more energy.     Week 4 Call Completed?  Yes   Would the patient like one additional call?  No   Graduated  Yes   Did the patient feel the follow up calls were helpful during their recovery period?  Yes   Was the number of calls appropriate?  Yes   Wrap up additional comments  Denies quesitons or needs at this time          Eden Plascencia LPN

## 2020-06-22 ENCOUNTER — TELEPHONE (OUTPATIENT)
Dept: FAMILY MEDICINE CLINIC | Facility: CLINIC | Age: 70
End: 2020-06-22

## 2020-06-22 NOTE — TELEPHONE ENCOUNTER
Patient called stating that she called her insurance company and to ask about a rheumatologist and they can't find one close to her that takes her insurance. Patient wants to know if she should see a ortho doctor instread or if you can take on her Humira injections.     [phone]

## 2020-06-23 DIAGNOSIS — M05.9 RHEUMATOID ARTHRITIS WITH POSITIVE RHEUMATOID FACTOR, INVOLVING UNSPECIFIED SITE (HCC): Primary | ICD-10-CM

## 2020-06-29 RX ORDER — INSULIN NPH HUM/REG INSULIN HM 70-30/ML
VIAL (ML) SUBCUTANEOUS
Qty: 30 EACH | Refills: 2 | Status: SHIPPED | OUTPATIENT
Start: 2020-06-29 | End: 2020-09-14

## 2020-07-01 DIAGNOSIS — M05.9 SEROPOSITIVE RHEUMATOID ARTHRITIS (HCC): ICD-10-CM

## 2020-07-01 DIAGNOSIS — Z79.899 LONG-TERM USE OF HIGH-RISK MEDICATION: ICD-10-CM

## 2020-07-01 DIAGNOSIS — G62.9 NEUROPATHY: ICD-10-CM

## 2020-07-01 RX ORDER — ADALIMUMAB 40MG/0.4ML
40 KIT SUBCUTANEOUS
Qty: 6 EACH | Refills: 0 | Status: SHIPPED | OUTPATIENT
Start: 2020-07-01 | End: 2020-09-09 | Stop reason: SDUPTHER

## 2020-07-01 NOTE — TELEPHONE ENCOUNTER
Humira Pen    [facility]    Seen 5/20/20  sched 8/18/20  Fasting  And other labs due 7/28/20

## 2020-07-13 ENCOUNTER — OFFICE VISIT (OUTPATIENT)
Dept: CARDIOLOGY | Facility: CLINIC | Age: 70
End: 2020-07-13

## 2020-07-13 VITALS
DIASTOLIC BLOOD PRESSURE: 82 MMHG | OXYGEN SATURATION: 94 % | SYSTOLIC BLOOD PRESSURE: 164 MMHG | HEART RATE: 61 BPM | BODY MASS INDEX: 30.19 KG/M2 | WEIGHT: 170.4 LBS

## 2020-07-13 DIAGNOSIS — I49.3 PVC (PREMATURE VENTRICULAR CONTRACTION): ICD-10-CM

## 2020-07-13 DIAGNOSIS — R00.2 PALPITATIONS: ICD-10-CM

## 2020-07-13 DIAGNOSIS — R00.1 BRADYCARDIA, SINUS: Primary | ICD-10-CM

## 2020-07-13 DIAGNOSIS — I10 ESSENTIAL HYPERTENSION: ICD-10-CM

## 2020-07-13 PROCEDURE — 99214 OFFICE O/P EST MOD 30 MIN: CPT | Performed by: INTERNAL MEDICINE

## 2020-07-13 NOTE — PROGRESS NOTES
CC--palpitations and bradycardia    Sub--70-year-old very pleasant patient came for follow-up after initiation of acebutolol at last visit and patient was seen in the past for suspected tachycardia/bradycardia syndrome  She was on atenolol which has been stopped and changed to acebutolol and she saw some improvement of symptoms with occasional palpitations without any neptali syncope and complains of mild dizziness  Her symptoms started increasing since March of this year and she kind of correlated to loss of her spouse with grief  She complains of palpitations and sometimes fullness in her neck and denies any symptoms of neptali syncope  She does have atypical epigastric discomfort unrelated to exertion and a stress test showed normal EF and inferoapical ischemia could not be excluded and left jaycob medical management  She had normal EF on echocardiography with sclerotic aortic valve without stenosis  An event recorder on her revealed sinus bradycardia with intermittent PVCs  She denies any TIA or stroke or renal disease in the past  She does have essential hypertension on amlodipine and atenolol for a long time      Past history is reviewed below and verified  Past Medical History:   Diagnosis Date   • Depression     Abstracted from [facility]   • Diabetes mellitus, type 2 (CMS/HCC) 1988    Abstracted from [facility]   • Diabetic peripheral neuropathy (CMS/[facility])    • GERD (gastroesophageal reflux disease)     [facility]ed from [facility]   • Hyperlipidemia     Abstracted from [facility]   • Hypertension     Abstracted from [facility]   • Insomnia    • Lower back pain    • Obesity    • Osteoarthritis    • Osteopenia     Abstracted from [facility]   • Palpitations    • Pulmonary nodule    • RA (rheumatoid arthritis) (CMS/HCC)     Abstracted from [facility]   • Rheumatoid arthritis (CMS/HCC)    • Rheumatoid nodule (CMS/HCC) 06/2011    Abstracted from [facility] rt. elbow, left elbow, and epidermal inclusion cyst post. neck  (June 2011)--Dr. Whitmore   • Rheumatoid nodule (CMS/HCC)     Abstracted from [facility] rt. thumb x 2 and rt. & lt. 3rd fingers (Oct.2012)   • Vitamin D deficiency      Past Surgical History:   Procedure Laterality Date   • CHOLECYSTECTOMY      Abstracted from [facility]   • COLONOSCOPY  2017 2017 = TA, rech 2020 [facility]   • CYST REMOVAL      Seb cyst on neck   • OTHER SURGICAL HISTORY       Uterine Prolapse repair   • TOTAL ABDOMINAL HYSTERECTOMY      Abstracted from [facility]---endometriosis   • TOTAL ABDOMINAL HYSTERECTOMY WITH SALPINGO OOPHORECTOMY       Family History   Problem Relation Age of Onset   • Heart disease Mother    • Diabetes Mother    • Hypertension Mother    • Rheum arthritis Mother    • Coronary artery disease Mother    • Alcohol abuse Mother    • Heart disease Father    • Mental illness Father    • Hypertension Father    • Alcohol abuse Father    • Diabetes Sister    • Stroke Sister    • Cancer Sister    • Osteoarthritis Sister    • Rheum arthritis Sister    • Diabetes Maternal Grandfather      Social History     Tobacco Use   • Smoking status: Never Smoker   • Smokeless tobacco: Never Used   Substance Use Topics   • Alcohol use: No     Frequency: Never   • Drug use: Defer         Review of Systems   General:  positive for fatigue and tiredness  Eyes: No redness  Cardiovascular: No chest pain, positive for  palpitations  Respiratory:   no shortness of breath  Gastrointestinal: No nausea or vomiting, bleeding  Genitourinary: no hematuria or dysuria  Musculoskeletal: No arthralgia or myalgia  Skin: No rash  Neurologic: No numbness, tingling, syncope  Hematologic/Lymphatic: No abnormal bleeding      Physical Exam  VITALS REVIEWED  Blood pressure 164/82 pulse rate 61 patient afebrile respiration 12 times a minute  General:      well developed, well nourished, in no acute distress.    Head:      normocephalic and atraumatic.    Eyes:      PERRL/EOM intact, conjunctiva and sclera clear with out  nystagmus.    Neck:      no masses, thyromegaly,  trachea central with normal respiratory effort   Lungs:      clear bilaterally to auscultation.    Heart:      Sinus rhythm with ejection systolic murmur at the base of the heart consistent with aortic valve sclerosis with normal carotid upstroke without any gallops or rubs  Msk:      Positive for mild kyphosis   Pulses:      pulses normal in all 4 extremities.    Extremities:       no cyanosis or clubbing--no edema     Neurologic:      no focal deficits.   alert oriented x3  Skin:      intact without lesions or rashes.    Psych:      alert and cooperative; normal mood and affect; normal attention span and concentration.            Assessment and plan    Sinus bradycardia with symptoms predominantly in the form of fatigue--mild improvement with change of beta-blockers and to continue on current medications and reevaluate after few months and then decide on pacer implant depending on patient's symptoms  Intermittent PVCs  Aortic valve sclerosis without aortic stenosis  Normal EF with inferoapical artifact versus ischemia on medical treatment  Essential hypertension    Electronically signed by Alberto Gotti MD, 07/13/20, 4:44 PM.

## 2020-07-21 RX ORDER — LANSOPRAZOLE 30 MG/1
CAPSULE, DELAYED RELEASE ORAL
Qty: 90 CAPSULE | Refills: 0 | Status: SHIPPED | OUTPATIENT
Start: 2020-07-21 | End: 2020-09-14

## 2020-07-28 ENCOUNTER — LAB (OUTPATIENT)
Dept: LAB | Facility: HOSPITAL | Age: 70
End: 2020-07-28

## 2020-07-28 DIAGNOSIS — E55.9 VITAMIN D DEFICIENCY: ICD-10-CM

## 2020-07-28 DIAGNOSIS — Z79.4 TYPE 2 DIABETES MELLITUS WITH HYPERGLYCEMIA, WITH LONG-TERM CURRENT USE OF INSULIN (HCC): ICD-10-CM

## 2020-07-28 DIAGNOSIS — I10 ESSENTIAL HYPERTENSION: ICD-10-CM

## 2020-07-28 DIAGNOSIS — D72.819 LEUKOPENIA, UNSPECIFIED TYPE: ICD-10-CM

## 2020-07-28 DIAGNOSIS — E11.69 TYPE 2 DIABETES MELLITUS WITH OTHER SPECIFIED COMPLICATION, UNSPECIFIED WHETHER LONG TERM INSULIN USE (HCC): ICD-10-CM

## 2020-07-28 DIAGNOSIS — E11.42 DIABETIC PERIPHERAL NEUROPATHY (HCC): ICD-10-CM

## 2020-07-28 DIAGNOSIS — E78.2 MIXED HYPERLIPIDEMIA: ICD-10-CM

## 2020-07-28 DIAGNOSIS — E11.65 TYPE 2 DIABETES MELLITUS WITH HYPERGLYCEMIA, WITH LONG-TERM CURRENT USE OF INSULIN (HCC): ICD-10-CM

## 2020-07-28 LAB
ALBUMIN SERPL-MCNC: 4.2 G/DL (ref 3.5–5.2)
ALBUMIN UR-MCNC: <1.2 MG/DL
ALBUMIN/GLOB SERPL: 1.3 G/DL
ALP SERPL-CCNC: 40 U/L (ref 39–117)
ALT SERPL W P-5'-P-CCNC: 18 U/L (ref 1–33)
ANION GAP SERPL CALCULATED.3IONS-SCNC: 10.7 MMOL/L (ref 5–15)
AST SERPL-CCNC: 14 U/L (ref 1–32)
BILIRUB SERPL-MCNC: 0.2 MG/DL (ref 0–1.2)
BUN SERPL-MCNC: 12 MG/DL (ref 8–23)
BUN/CREAT SERPL: 14.8 (ref 7–25)
CALCIUM SPEC-SCNC: 9.9 MG/DL (ref 8.6–10.5)
CHLORIDE SERPL-SCNC: 101 MMOL/L (ref 98–107)
CHOLEST SERPL-MCNC: 135 MG/DL (ref 0–200)
CO2 SERPL-SCNC: 28.3 MMOL/L (ref 22–29)
CREAT SERPL-MCNC: 0.81 MG/DL (ref 0.57–1)
CREAT UR-MCNC: 52.3 MG/DL
GFR SERPL CREATININE-BSD FRML MDRD: 70 ML/MIN/1.73
GLOBULIN UR ELPH-MCNC: 3.2 GM/DL
GLUCOSE SERPL-MCNC: 109 MG/DL (ref 65–99)
HBA1C MFR BLD: 7.9 % (ref 3.5–5.6)
HDLC SERPL-MCNC: 41 MG/DL (ref 40–60)
LDLC SERPL CALC-MCNC: 66 MG/DL (ref 0–100)
LDLC/HDLC SERPL: 1.61 {RATIO}
MICROALBUMIN/CREAT UR: NORMAL MG/G{CREAT}
POTASSIUM SERPL-SCNC: 4.1 MMOL/L (ref 3.5–5.2)
PROT SERPL-MCNC: 7.4 G/DL (ref 6–8.5)
SODIUM SERPL-SCNC: 140 MMOL/L (ref 136–145)
TRIGL SERPL-MCNC: 140 MG/DL (ref 0–150)
VLDLC SERPL-MCNC: 28 MG/DL (ref 5–40)

## 2020-07-28 PROCEDURE — 83036 HEMOGLOBIN GLYCOSYLATED A1C: CPT

## 2020-07-28 PROCEDURE — 82043 UR ALBUMIN QUANTITATIVE: CPT

## 2020-07-28 PROCEDURE — 80061 LIPID PANEL: CPT

## 2020-07-28 PROCEDURE — 36415 COLL VENOUS BLD VENIPUNCTURE: CPT

## 2020-07-28 PROCEDURE — 80053 COMPREHEN METABOLIC PANEL: CPT

## 2020-07-28 PROCEDURE — 82570 ASSAY OF URINE CREATININE: CPT

## 2020-07-31 ENCOUNTER — OFFICE VISIT (OUTPATIENT)
Dept: FAMILY MEDICINE CLINIC | Facility: CLINIC | Age: 70
End: 2020-07-31

## 2020-07-31 VITALS
DIASTOLIC BLOOD PRESSURE: 56 MMHG | HEART RATE: 56 BPM | BODY MASS INDEX: 29.77 KG/M2 | SYSTOLIC BLOOD PRESSURE: 113 MMHG | OXYGEN SATURATION: 97 % | RESPIRATION RATE: 18 BRPM | HEIGHT: 63 IN | WEIGHT: 168 LBS | TEMPERATURE: 98 F

## 2020-07-31 DIAGNOSIS — Z12.31 SCREENING MAMMOGRAM, ENCOUNTER FOR: ICD-10-CM

## 2020-07-31 DIAGNOSIS — M05.9 SEROPOSITIVE RHEUMATOID ARTHRITIS (HCC): ICD-10-CM

## 2020-07-31 DIAGNOSIS — Z00.00 MEDICARE ANNUAL WELLNESS VISIT, SUBSEQUENT: Primary | ICD-10-CM

## 2020-07-31 DIAGNOSIS — F33.1 MAJOR DEPRESSIVE DISORDER, RECURRENT EPISODE, MODERATE (HCC): ICD-10-CM

## 2020-07-31 PROCEDURE — G0439 PPPS, SUBSEQ VISIT: HCPCS | Performed by: FAMILY MEDICINE

## 2020-07-31 RX ORDER — ESCITALOPRAM OXALATE 20 MG/1
20 TABLET ORAL NIGHTLY
Qty: 90 TABLET | Refills: 1 | Status: SHIPPED | OUTPATIENT
Start: 2020-07-31 | End: 2020-08-18

## 2020-07-31 RX ORDER — LISINOPRIL AND HYDROCHLOROTHIAZIDE 12.5; 1 MG/1; MG/1
1 TABLET ORAL DAILY
Start: 2020-07-31 | End: 2020-08-28 | Stop reason: SDUPTHER

## 2020-07-31 RX ORDER — ACEBUTOLOL HYDROCHLORIDE 200 MG/1
200 CAPSULE ORAL DAILY
Qty: 30 CAPSULE | Refills: 3
Start: 2020-07-31 | End: 2020-09-22

## 2020-07-31 RX ORDER — AMLODIPINE BESYLATE 10 MG/1
10 TABLET ORAL DAILY
Qty: 90 TABLET | Refills: 1 | Status: SHIPPED | OUTPATIENT
Start: 2020-07-31 | End: 2021-01-26

## 2020-08-03 ENCOUNTER — TELEPHONE (OUTPATIENT)
Dept: FAMILY MEDICINE CLINIC | Facility: CLINIC | Age: 70
End: 2020-08-03

## 2020-08-03 NOTE — TELEPHONE ENCOUNTER
Patient called and left a VM stating that she needs something for poison ivy to be called in if possible.

## 2020-08-04 RX ORDER — PREDNISONE 10 MG/1
TABLET ORAL
Qty: 30 TABLET | Refills: 0 | Status: SHIPPED | OUTPATIENT
Start: 2020-08-04 | End: 2020-08-18

## 2020-08-05 RX ORDER — ALPRAZOLAM 0.25 MG/1
TABLET ORAL
Qty: 20 TABLET | Refills: 0 | OUTPATIENT
Start: 2020-08-05

## 2020-08-06 DIAGNOSIS — F32.9 REACTIVE DEPRESSION: ICD-10-CM

## 2020-08-06 RX ORDER — ALPRAZOLAM 0.25 MG/1
0.25 TABLET ORAL DAILY PRN
Qty: 20 TABLET | Refills: 0 | Status: SHIPPED | OUTPATIENT
Start: 2020-08-06 | End: 2020-08-15

## 2020-08-06 NOTE — TELEPHONE ENCOUNTER
Pt states she just takes it occasionally when absolutely needed.  Usually just a couple times per week.

## 2020-08-18 ENCOUNTER — OFFICE VISIT (OUTPATIENT)
Dept: FAMILY MEDICINE CLINIC | Facility: CLINIC | Age: 70
End: 2020-08-18

## 2020-08-18 VITALS
SYSTOLIC BLOOD PRESSURE: 142 MMHG | HEART RATE: 59 BPM | RESPIRATION RATE: 16 BRPM | WEIGHT: 168 LBS | HEIGHT: 63 IN | OXYGEN SATURATION: 96 % | TEMPERATURE: 97.3 F | DIASTOLIC BLOOD PRESSURE: 81 MMHG | BODY MASS INDEX: 29.77 KG/M2

## 2020-08-18 DIAGNOSIS — E78.2 MIXED HYPERLIPIDEMIA: ICD-10-CM

## 2020-08-18 DIAGNOSIS — E11.65 TYPE 2 DIABETES MELLITUS WITH HYPERGLYCEMIA, WITH LONG-TERM CURRENT USE OF INSULIN (HCC): ICD-10-CM

## 2020-08-18 DIAGNOSIS — E55.9 VITAMIN D DEFICIENCY: ICD-10-CM

## 2020-08-18 DIAGNOSIS — M05.9 RHEUMATOID ARTHRITIS WITH POSITIVE RHEUMATOID FACTOR, INVOLVING UNSPECIFIED SITE (HCC): Primary | ICD-10-CM

## 2020-08-18 DIAGNOSIS — Z79.4 TYPE 2 DIABETES MELLITUS WITH HYPERGLYCEMIA, WITH LONG-TERM CURRENT USE OF INSULIN (HCC): ICD-10-CM

## 2020-08-18 DIAGNOSIS — M19.91 PRIMARY LOCALIZED OSTEOARTHRITIS: ICD-10-CM

## 2020-08-18 PROBLEM — M19.079 OSTEOARTHRITIS OF FOOT: Status: RESOLVED | Noted: 2017-08-09 | Resolved: 2020-08-18

## 2020-08-18 PROCEDURE — 99213 OFFICE O/P EST LOW 20 MIN: CPT | Performed by: FAMILY MEDICINE

## 2020-08-18 PROCEDURE — 80053 COMPREHEN METABOLIC PANEL: CPT | Performed by: FAMILY MEDICINE

## 2020-08-18 PROCEDURE — 36415 COLL VENOUS BLD VENIPUNCTURE: CPT | Performed by: FAMILY MEDICINE

## 2020-08-18 PROCEDURE — 85652 RBC SED RATE AUTOMATED: CPT | Performed by: FAMILY MEDICINE

## 2020-08-18 PROCEDURE — 86140 C-REACTIVE PROTEIN: CPT | Performed by: FAMILY MEDICINE

## 2020-08-18 PROCEDURE — 85025 COMPLETE CBC W/AUTO DIFF WBC: CPT | Performed by: FAMILY MEDICINE

## 2020-08-18 RX ORDER — ESCITALOPRAM OXALATE 20 MG/1
20 TABLET ORAL NIGHTLY
Qty: 90 TABLET | Refills: 0 | Status: SHIPPED | OUTPATIENT
Start: 2020-08-18 | End: 2020-09-14

## 2020-08-19 LAB
ALBUMIN SERPL-MCNC: 4 G/DL (ref 3.5–5.2)
ALBUMIN/GLOB SERPL: 1.5 G/DL
ALP SERPL-CCNC: 39 U/L (ref 39–117)
ALT SERPL W P-5'-P-CCNC: 18 U/L (ref 1–33)
ANION GAP SERPL CALCULATED.3IONS-SCNC: 9.5 MMOL/L (ref 5–15)
AST SERPL-CCNC: 16 U/L (ref 1–32)
BASOPHILS # BLD AUTO: 0.03 10*3/MM3 (ref 0–0.2)
BASOPHILS NFR BLD AUTO: 0.5 % (ref 0–1.5)
BILIRUB SERPL-MCNC: 0.2 MG/DL (ref 0–1.2)
BUN SERPL-MCNC: 14 MG/DL (ref 8–23)
BUN/CREAT SERPL: 15.2 (ref 7–25)
CALCIUM SPEC-SCNC: 9.3 MG/DL (ref 8.6–10.5)
CHLORIDE SERPL-SCNC: 101 MMOL/L (ref 98–107)
CO2 SERPL-SCNC: 27.5 MMOL/L (ref 22–29)
CREAT SERPL-MCNC: 0.92 MG/DL (ref 0.57–1)
CRP SERPL-MCNC: 0.17 MG/DL (ref 0–0.5)
DEPRECATED RDW RBC AUTO: 41.5 FL (ref 37–54)
EOSINOPHIL # BLD AUTO: 0.24 10*3/MM3 (ref 0–0.4)
EOSINOPHIL NFR BLD AUTO: 3.8 % (ref 0.3–6.2)
ERYTHROCYTE [DISTWIDTH] IN BLOOD BY AUTOMATED COUNT: 12.9 % (ref 12.3–15.4)
ERYTHROCYTE [SEDIMENTATION RATE] IN BLOOD: 8 MM/HR (ref 0–30)
GFR SERPL CREATININE-BSD FRML MDRD: 61 ML/MIN/1.73
GLOBULIN UR ELPH-MCNC: 2.7 GM/DL
GLUCOSE SERPL-MCNC: 224 MG/DL (ref 65–99)
HCT VFR BLD AUTO: 40.9 % (ref 34–46.6)
HGB BLD-MCNC: 13.3 G/DL (ref 12–15.9)
IMM GRANULOCYTES # BLD AUTO: 0.02 10*3/MM3 (ref 0–0.05)
IMM GRANULOCYTES NFR BLD AUTO: 0.3 % (ref 0–0.5)
LYMPHOCYTES # BLD AUTO: 2.15 10*3/MM3 (ref 0.7–3.1)
LYMPHOCYTES NFR BLD AUTO: 34.3 % (ref 19.6–45.3)
MCH RBC QN AUTO: 28.6 PG (ref 26.6–33)
MCHC RBC AUTO-ENTMCNC: 32.5 G/DL (ref 31.5–35.7)
MCV RBC AUTO: 88 FL (ref 79–97)
MONOCYTES # BLD AUTO: 0.71 10*3/MM3 (ref 0.1–0.9)
MONOCYTES NFR BLD AUTO: 11.3 % (ref 5–12)
NEUTROPHILS NFR BLD AUTO: 3.11 10*3/MM3 (ref 1.7–7)
NEUTROPHILS NFR BLD AUTO: 49.8 % (ref 42.7–76)
NRBC BLD AUTO-RTO: 0 /100 WBC (ref 0–0.2)
PLATELET # BLD AUTO: 255 10*3/MM3 (ref 140–450)
PMV BLD AUTO: 10.4 FL (ref 6–12)
POTASSIUM SERPL-SCNC: 4.9 MMOL/L (ref 3.5–5.2)
PROT SERPL-MCNC: 6.7 G/DL (ref 6–8.5)
RBC # BLD AUTO: 4.65 10*6/MM3 (ref 3.77–5.28)
SODIUM SERPL-SCNC: 138 MMOL/L (ref 136–145)
WBC # BLD AUTO: 6.26 10*3/MM3 (ref 3.4–10.8)

## 2020-08-24 RX ORDER — TRAZODONE HYDROCHLORIDE 50 MG/1
TABLET ORAL
Qty: 90 TABLET | Refills: 0 | Status: SHIPPED | OUTPATIENT
Start: 2020-08-24 | End: 2020-09-14

## 2020-08-24 RX ORDER — LISINOPRIL AND HYDROCHLOROTHIAZIDE 25; 20 MG/1; MG/1
1 TABLET ORAL DAILY
Qty: 90 TABLET | Refills: 2 | OUTPATIENT
Start: 2020-08-24

## 2020-08-24 NOTE — TELEPHONE ENCOUNTER
Last visit:  8/18/20  Next visit: 11/19/20  Last labs:8/18/20    Rx requested:Trazodone   Pharmacy: [facility] in [place]

## 2020-08-28 RX ORDER — LISINOPRIL AND HYDROCHLOROTHIAZIDE 12.5; 1 MG/1; MG/1
1 TABLET ORAL DAILY
Qty: 30 TABLET | Refills: 0
Start: 2020-08-28 | End: 2020-08-28

## 2020-08-28 RX ORDER — LISINOPRIL AND HYDROCHLOROTHIAZIDE 12.5; 1 MG/1; MG/1
1 TABLET ORAL DAILY
Qty: 30 TABLET | Refills: 0 | Status: SHIPPED | OUTPATIENT
Start: 2020-08-28 | End: 2020-09-02 | Stop reason: SDUPTHER

## 2020-09-01 ENCOUNTER — TELEPHONE (OUTPATIENT)
Dept: FAMILY MEDICINE CLINIC | Facility: CLINIC | Age: 70
End: 2020-09-01

## 2020-09-01 NOTE — TELEPHONE ENCOUNTER
[facility] [phone] called.  States they had that the patient was on 20 mg lisinopril/hctz and just wants to confirm the rx rec'd for 10-12.5mg.  Also states that if the 10-12.5mg is correct, can they split into two medications, because it is unavailable.

## 2020-09-01 NOTE — TELEPHONE ENCOUNTER
Call pt and see what she want to do....... [facility] doesn't have her combo pill and they want to give her them separately

## 2020-09-02 ENCOUNTER — OFFICE VISIT (OUTPATIENT)
Dept: CARDIOLOGY | Facility: CLINIC | Age: 70
End: 2020-09-02

## 2020-09-02 VITALS
WEIGHT: 169 LBS | HEART RATE: 61 BPM | HEIGHT: 63 IN | SYSTOLIC BLOOD PRESSURE: 150 MMHG | BODY MASS INDEX: 29.95 KG/M2 | OXYGEN SATURATION: 98 % | DIASTOLIC BLOOD PRESSURE: 75 MMHG

## 2020-09-02 DIAGNOSIS — R00.2 PALPITATIONS: ICD-10-CM

## 2020-09-02 DIAGNOSIS — I20.0 UNSTABLE ANGINA PECTORIS (HCC): ICD-10-CM

## 2020-09-02 DIAGNOSIS — E78.2 MIXED HYPERLIPIDEMIA: Primary | ICD-10-CM

## 2020-09-02 DIAGNOSIS — I10 ESSENTIAL HYPERTENSION: ICD-10-CM

## 2020-09-02 DIAGNOSIS — R94.39 ABNORMAL NUCLEAR STRESS TEST: ICD-10-CM

## 2020-09-02 DIAGNOSIS — E11.69 TYPE 2 DIABETES MELLITUS WITH OTHER SPECIFIED COMPLICATION, UNSPECIFIED WHETHER LONG TERM INSULIN USE (HCC): ICD-10-CM

## 2020-09-02 PROCEDURE — 99214 OFFICE O/P EST MOD 30 MIN: CPT | Performed by: INTERNAL MEDICINE

## 2020-09-02 RX ORDER — LISINOPRIL AND HYDROCHLOROTHIAZIDE 12.5; 1 MG/1; MG/1
1 TABLET ORAL DAILY
Qty: 90 TABLET | Refills: 3 | Status: SHIPPED | OUTPATIENT
Start: 2020-09-02 | End: 2020-09-04 | Stop reason: ALTCHOICE

## 2020-09-02 NOTE — PROGRESS NOTES
Subjective:     Encounter Date:09/02/2020      Patient ID: Jenna Rosa is a 70 y.o. female.    Chief Complaint: HTN & HLD f/u   History of Present Illness      70 -year-old white female patient with known history of hypertension dyslipidemia and diabetes mellitus,  comes back for follow up        Patient recently admitted hospital with an episode of palpitations and bradycardia  Patient has a event monitor which showed bradycardia with significant PACs PVCs consistent with tachybradycardia episodes  She is still having palpitations  Patient underwent stress test and echocardiogram May 2020  Stress test showed LVEF is normal small inferoapical ischemia cannot be excluded  Patient denies of any chest pain currently on medical treatment  Echocardiogram LVEF 65% diastolic LV dysfunction mild mitral valve regurgitation  Patient is seen by electrophysiology started on beta-blockers  She now comes to see me with worsening symptoms of chest discomfort and proceed with cardiac catheterization    The following portions of the patient's history were reviewed and updated as appropriate: Allergies current medications past family history past medical history past social history past surgical history problem list and review of systems  Past Medical History:   Diagnosis Date   • Depression     Abstracted from [facility]   • Diabetes mellitus, type 2 (CMS/[facility]) 1988    Abstracted from [facility]   • Diabetic peripheral neuropathy (CMS/[facility])    • GERD (gastroesophageal reflux disease)     Abstracted from [facility]   • Hyperlipidemia     Abstracted from [facility]   • Hypertension     Abstracted from [facility]   • Insomnia    • Lower back pain    • Obesity    • Osteoarthritis    • Osteopenia     Abstracted from [facility]   • Palpitations    • Pulmonary nodule    • RA (rheumatoid arthritis) (CMS/[facility])     Abstracted from [facility]   • Rheumatoid arthritis (CMS/[facility])    • Rheumatoid nodule (CMS/[facility]) 06/2011    rt. elbow, left  "elbow, and epidermal inclusion cyst post. neck (June 2011)-----Dr. Whitmore   • Rheumatoid nodule (CMS/HCC)      rt. thumb x 2 and rt. & lt. 3rd fingers (Oct.2012)   • Vitamin D deficiency      Past Surgical History:   Procedure Laterality Date   • [facility]      [facility]   • COLONOSCOPY  2017 2017 = TA, rech 2020 [facility]   • CYST REMOVAL      Seb cyst on neck   • OTHER SURGICAL HISTORY       Uterine Prolapse repair   • TOTAL ABDOMINAL HYSTERECTOMY      Abstracted from [facility]---endometriosis   • TOTAL ABDOMINAL HYSTERECTOMY WITH SALPINGO OOPHORECTOMY       /75 (BP Location: Left arm, Patient Position: Sitting, Cuff Size: Adult)   Pulse 61   Ht 160 cm (63\")   Wt 76.7 kg (169 lb)   SpO2 98%   BMI 29.94 kg/m²   Family History   Problem Relation Age of Onset   • Heart disease Mother    • Diabetes Mother    • Hypertension Mother    • Rheum arthritis Mother    • Coronary artery disease Mother    • Alcohol abuse Mother    • Heart disease Father    • Mental illness Father    • Hypertension Father    • Alcohol abuse Father    • Stroke Sister    • Cancer Sister 29        RENAL Cancer   • Osteoarthritis Sister    • Rheum arthritis Sister    • Diabetes Maternal Grandfather    • Rheum arthritis Sister    • Osteoarthritis Sister    • Diabetes Sister    • Rheum arthritis Brother    • Osteoarthritis Brother        Current Outpatient Medications:   •  acebutolol (Sectral) 200 MG capsule, Take 1 capsule by mouth Daily., Disp: 30 capsule, Rfl: 3  •  amLODIPine (NORVASC) 10 MG tablet, Take 1 tablet by mouth Daily., Disp: 90 tablet, Rfl: 1  •  aspirin 81 MG EC tablet, Take 81 mg by mouth Every Morning., Disp: , Rfl:   •  BD INSULIN SYRINGE U/F 31G X 5/16\" 1 ML misc, USE TO INJECT INSULIN 4 TIMES DAILY , Disp: 150 each, Rfl: 3  •  calcium carbonate (OS-PREET) 600 MG tablet, Take 600 mg by mouth Daily., Disp: , Rfl:   •  diclofenac (VOLTAREN) 1 % gel gel, Apply 4 g topically to the appropriate area " as directed 4 (Four) Times a Day As Needed (hand/ foot pain)., Disp: , Rfl:   •  escitalopram (LEXAPRO) 20 MG tablet, TAKE 1 TABLET BY MOUTH EVERY NIGHT. , Disp: 90 tablet, Rfl: 0  •  fenofibrate 160 MG tablet, Take 160 mg by mouth Every Evening., Disp: , Rfl:   •  Ferrous Sulfate (IRON) 325 (65 Fe) MG tablet, Take 325 mg by mouth Daily., Disp: , Rfl:   •  fluticasone (FLONASE ALLERGY RELIEF) 50 MCG/ACT nasal spray, 1 spray into the nostril(s) as directed by provider Daily As Needed for Allergies., Disp: , Rfl:   •  gabapentin (Neurontin) 300 MG capsule, Take 1 capsule by mouth 2 (Two) Times a Day., Disp: 180 capsule, Rfl: 0  •  HUMIRA PEN 40 MG/0.4ML Pen-injector Kit, Inject 40 mg under the skin into the appropriate area as directed Every 14 (Fourteen) Days. Sulfate Free Formula, Disp: 6 each, Rfl: 0  •  HUMULIN 70/30 (70-30) 100 UNIT/ML injection, INJECT 52 UNITS BEFORE BREAKFAST AND 22 UNITS BEFORE SUPPER DX E11.65 , Disp: 30 each, Rfl: 2  •  lansoprazole (PREVACID) 30 MG capsule, TAKE 1 CAPSULE BY MOUTH DAILY , Disp: 90 capsule, Rfl: 0  •  lisinopril-hydrochlorothiazide (Zestoretic) 10-12.5 MG per tablet, Take 1 tablet by mouth Daily., Disp: 90 tablet, Rfl: 3  •  lovastatin (MEVACOR) 40 MG tablet, Take 40 mg by mouth Every Night., Disp: , Rfl:   •  SITagliptin-metFORMIN HCl ER (Janumet XR) 100-1000 MG tablet, Take 1 tablet by mouth Every Evening., Disp: 90 tablet, Rfl: 1  •  traZODone (DESYREL) 50 MG tablet, TAKE 1 TABLET AT BEDTIME , Disp: 90 tablet, Rfl: 0  •  vitamin B-12 (CYANOCOBALAMIN) 1000 MCG tablet, Take 1,000 mcg by mouth Daily., Disp: , Rfl:   Social History     Socioeconomic History   • Marital status:      Spouse name: Carson Rosa   • Number of children: 3   • Years of education: Not on file   • Highest education level: Not on file   Tobacco Use   • Smoking status: Never Smoker   • Smokeless tobacco: Never Used   Substance and Sexual Activity   • Alcohol use: Yes     Frequency: Never      Comment: occ   • Drug use: Defer   • Sexual activity: Defer     No Known Allergies  Review of Systems   Constitution: Negative for chills, fever and malaise/fatigue.   HENT: Negative for congestion and hearing loss.    Eyes: Negative for double vision and visual disturbance.   Cardiovascular: Positive for chest pain, dyspnea on exertion and palpitations. Negative for claudication, leg swelling and syncope.   Respiratory: Positive for shortness of breath. Negative for cough.    Endocrine: Negative for cold intolerance.   Skin: Negative for color change and rash.   Musculoskeletal: Negative for arthritis and joint pain.   Gastrointestinal: Negative for abdominal pain and heartburn.   Genitourinary: Negative for hematuria.   Neurological: Positive for dizziness and light-headedness. Negative for excessive daytime sleepiness and numbness.   Psychiatric/Behavioral: Negative for depression. The patient is not nervous/anxious.    All other systems reviewed and are negative.             Objective:     Physical Exam   Constitutional: She is oriented to person, place, and time. She appears well-developed and well-nourished. She is cooperative.   HENT:   Head: Normocephalic and atraumatic.   Mouth/Throat: Uvula is midline and oropharynx is clear and moist. No oral lesions.   Eyes: Conjunctivae are normal. No scleral icterus.   Neck: Trachea normal. Neck supple. No JVD present. Carotid bruit is not present. No thyromegaly present.   Cardiovascular: Normal rate, regular rhythm, S1 normal, S2 normal, normal heart sounds, intact distal pulses and normal pulses. PMI is not displaced. Exam reveals no gallop and no friction rub.   No murmur heard.  Pulmonary/Chest: Effort normal and breath sounds normal.   Abdominal: Soft. Bowel sounds are normal.   Musculoskeletal: Normal range of motion.   Neurological: She is alert and oriented to person, place, and time. She has normal strength.   No focal deficits   Skin: Skin is warm. No  cyanosis.   Psychiatric: She has a normal mood and affect.       Procedures    Lab Review:       Assessment:          Diagnosis Plan   1. Mixed hyperlipidemia  Case Request Cath Lab: Left Heart Cath    CBC (No Diff)    Basic Metabolic Panel    Protime-INR    ECG 12 Lead   2. Essential hypertension  Case Request Cath Lab: Left Heart Cath    CBC (No Diff)    Basic Metabolic Panel    Protime-INR    ECG 12 Lead   3. Palpitations  Case Request Cath Lab: Left Heart Cath    CBC (No Diff)    Basic Metabolic Panel    Protime-INR    ECG 12 Lead   4. Type 2 diabetes mellitus with other specified complication, unspecified whether long term insulin use (CMS/[facility])  Case Request Cath Lab: Left Heart Cath    CBC (No Diff)    Basic Metabolic Panel    Protime-INR    ECG 12 Lead   5. Unstable angina pectoris (CMS/[facility])  Case Request Cath Lab: Left Heart Cath    CBC (No Diff)    Basic Metabolic Panel    Protime-INR    ECG 12 Lead   6. Abnormal nuclear stress test  Case Request Cath Lab: Left Heart Cath    CBC (No Diff)    Basic Metabolic Panel    Protime-INR    ECG 12 Lead          Plan:     Hypertension and dyslipidemia diabetes mellitus needs aggressive control and cardiac risk factor modification  Worsening symptoms we will proceed with cardiac catheterization

## 2020-09-02 NOTE — H&P (VIEW-ONLY)
Subjective:     Encounter Date:09/02/2020      Patient ID: Jenna Rosa is a 70 y.o. female.    Chief Complaint: HTN & HLD f/u   History of Present Illness      70 -year-old white female patient with known history of hypertension dyslipidemia and diabetes mellitus,  comes back for follow up        Patient recently admitted hospital with an episode of palpitations and bradycardia  Patient has a event monitor which showed bradycardia with significant PACs PVCs consistent with tachybradycardia episodes  She is still having palpitations  Patient underwent stress test and echocardiogram May 2020  Stress test showed LVEF is normal small inferoapical ischemia cannot be excluded  Patient denies of any chest pain currently on medical treatment  Echocardiogram LVEF 65% diastolic LV dysfunction mild mitral valve regurgitation  Patient is seen by electrophysiology started on beta-blockers  She now comes to see me with worsening symptoms of chest discomfort and proceed with cardiac catheterization    The following portions of the patient's history were reviewed and updated as appropriate: Allergies current medications past family history past medical history past social history past surgical history problem list and review of systems  Past Medical History:   Diagnosis Date   • Depression     Abstracted from [employer]   • Diabetes mellitus, type 2 (CMS/[facility]) 1988    Abstracted from [facility]   • Diabetic peripheral neuropathy (CMS/[facility])    • GERD (gastroesophageal reflux disease)     Abstracted from [facility]   • Hyperlipidemia     Abstracted from [facility]   • Hypertension     Abstracted from [facility]   • Insomnia    • Lower back pain    • Obesity    • Osteoarthritis    • Osteopenia     Abstracted from [facility]   • Palpitations    • Pulmonary nodule    • RA (rheumatoid arthritis) (CMS/[facility])     Abstracted from [facility]   • Rheumatoid arthritis (CMS/[facility])    • Rheumatoid nodule (CMS/[facility]) 06/2011    rt. elbow, left  "elbow, and epidermal inclusion cyst post. neck (June 2011)-----Dr. Whitmore   • Rheumatoid nodule (CMS/HCC)      rt. thumb x 2 and rt. & lt. 3rd fingers (Oct.2012)   • Vitamin D deficiency      Past Surgical History:   Procedure Laterality Date   • [facility]      [facility]   • COLONOSCOPY  2017 2017 = TA, rech 2020 [facility]   • CYST REMOVAL      Seb cyst on neck   • OTHER SURGICAL HISTORY       Uterine Prolapse repair   • TOTAL ABDOMINAL HYSTERECTOMY      Abstracted from [facility]---endometriosis   • TOTAL ABDOMINAL HYSTERECTOMY WITH SALPINGO OOPHORECTOMY       /75 (BP Location: Left arm, Patient Position: Sitting, Cuff Size: Adult)   Pulse 61   Ht 160 cm (63\")   Wt 76.7 kg (169 lb)   SpO2 98%   BMI 29.94 kg/m²   Family History   Problem Relation Age of Onset   • Heart disease Mother    • Diabetes Mother    • Hypertension Mother    • Rheum arthritis Mother    • Coronary artery disease Mother    • Alcohol abuse Mother    • Heart disease Father    • Mental illness Father    • Hypertension Father    • Alcohol abuse Father    • Stroke Sister    • Cancer Sister 29        RENAL Cancer   • Osteoarthritis Sister    • Rheum arthritis Sister    • Diabetes Maternal Grandfather    • Rheum arthritis Sister    • Osteoarthritis Sister    • Diabetes Sister    • Rheum arthritis Brother    • Osteoarthritis Brother        Current Outpatient Medications:   •  acebutolol (Sectral) 200 MG capsule, Take 1 capsule by mouth Daily., Disp: 30 capsule, Rfl: 3  •  amLODIPine (NORVASC) 10 MG tablet, Take 1 tablet by mouth Daily., Disp: 90 tablet, Rfl: 1  •  aspirin 81 MG EC tablet, Take 81 mg by mouth Every Morning., Disp: , Rfl:   •  BD INSULIN SYRINGE U/F 31G X 5/16\" 1 ML misc, USE TO INJECT INSULIN 4 TIMES DAILY , Disp: 150 each, Rfl: 3  •  calcium carbonate (OS-PREET) 600 MG tablet, Take 600 mg by mouth Daily., Disp: , Rfl:   •  diclofenac (VOLTAREN) 1 % gel gel, Apply 4 g topically to the appropriate area " as directed 4 (Four) Times a Day As Needed (hand/ foot pain)., Disp: , Rfl:   •  escitalopram (LEXAPRO) 20 MG tablet, TAKE 1 TABLET BY MOUTH EVERY NIGHT. , Disp: 90 tablet, Rfl: 0  •  fenofibrate 160 MG tablet, Take 160 mg by mouth Every Evening., Disp: , Rfl:   •  Ferrous Sulfate (IRON) 325 (65 Fe) MG tablet, Take 325 mg by mouth Daily., Disp: , Rfl:   •  fluticasone (FLONASE ALLERGY RELIEF) 50 MCG/ACT nasal spray, 1 spray into the nostril(s) as directed by provider Daily As Needed for Allergies., Disp: , Rfl:   •  gabapentin (Neurontin) 300 MG capsule, Take 1 capsule by mouth 2 (Two) Times a Day., Disp: 180 capsule, Rfl: 0  •  HUMIRA PEN 40 MG/0.4ML Pen-injector Kit, Inject 40 mg under the skin into the appropriate area as directed Every 14 (Fourteen) Days. Sulfate Free Formula, Disp: 6 each, Rfl: 0  •  HUMULIN 70/30 (70-30) 100 UNIT/ML injection, INJECT 52 UNITS BEFORE BREAKFAST AND 22 UNITS BEFORE SUPPER DX E11.65 , Disp: 30 each, Rfl: 2  •  lansoprazole (PREVACID) 30 MG capsule, TAKE 1 CAPSULE BY MOUTH DAILY , Disp: 90 capsule, Rfl: 0  •  lisinopril-hydrochlorothiazide (Zestoretic) 10-12.5 MG per tablet, Take 1 tablet by mouth Daily., Disp: 90 tablet, Rfl: 3  •  lovastatin (MEVACOR) 40 MG tablet, Take 40 mg by mouth Every Night., Disp: , Rfl:   •  SITagliptin-metFORMIN HCl ER (Janumet XR) 100-1000 MG tablet, Take 1 tablet by mouth Every Evening., Disp: 90 tablet, Rfl: 1  •  traZODone (DESYREL) 50 MG tablet, TAKE 1 TABLET AT BEDTIME , Disp: 90 tablet, Rfl: 0  •  vitamin B-12 (CYANOCOBALAMIN) 1000 MCG tablet, Take 1,000 mcg by mouth Daily., Disp: , Rfl:   Social History     Socioeconomic History   • Marital status:      Spouse name: Carson Rosa   • Number of children: 3   • Years of education: Not on file   • Highest education level: Not on file   Tobacco Use   • Smoking status: Never Smoker   • Smokeless tobacco: Never Used   Substance and Sexual Activity   • Alcohol use: Yes     Frequency: Never      Comment: occ   • Drug use: Defer   • Sexual activity: Defer     No Known Allergies  Review of Systems   Constitution: Negative for chills, fever and malaise/fatigue.   HENT: Negative for congestion and hearing loss.    Eyes: Negative for double vision and visual disturbance.   Cardiovascular: Positive for chest pain, dyspnea on exertion and palpitations. Negative for claudication, leg swelling and syncope.   Respiratory: Positive for shortness of breath. Negative for cough.    Endocrine: Negative for cold intolerance.   Skin: Negative for color change and rash.   Musculoskeletal: Negative for arthritis and joint pain.   Gastrointestinal: Negative for abdominal pain and heartburn.   Genitourinary: Negative for hematuria.   Neurological: Positive for dizziness and light-headedness. Negative for excessive daytime sleepiness and numbness.   Psychiatric/Behavioral: Negative for depression. The patient is not nervous/anxious.    All other systems reviewed and are negative.             Objective:     Physical Exam   Constitutional: She is oriented to person, place, and time. She appears well-developed and well-nourished. She is cooperative.   HENT:   Head: Normocephalic and atraumatic.   Mouth/Throat: Uvula is midline and oropharynx is clear and moist. No oral lesions.   Eyes: Conjunctivae are normal. No scleral icterus.   Neck: Trachea normal. Neck supple. No JVD present. Carotid bruit is not present. No thyromegaly present.   Cardiovascular: Normal rate, regular rhythm, S1 normal, S2 normal, normal heart sounds, intact distal pulses and normal pulses. PMI is not displaced. Exam reveals no gallop and no friction rub.   No murmur heard.  Pulmonary/Chest: Effort normal and breath sounds normal.   Abdominal: Soft. Bowel sounds are normal.   Musculoskeletal: Normal range of motion.   Neurological: She is alert and oriented to person, place, and time. She has normal strength.   No focal deficits   Skin: Skin is warm. No  cyanosis.   Psychiatric: She has a normal mood and affect.       Procedures    Lab Review:       Assessment:          Diagnosis Plan   1. Mixed hyperlipidemia  Case Request Cath Lab: Left Heart Cath    CBC (No Diff)    Basic Metabolic Panel    Protime-INR    ECG 12 Lead   2. Essential hypertension  Case Request Cath Lab: Left Heart Cath    CBC (No Diff)    Basic Metabolic Panel    Protime-INR    ECG 12 Lead   3. Palpitations  Case Request Cath Lab: Left Heart Cath    CBC (No Diff)    Basic Metabolic Panel    Protime-INR    ECG 12 Lead   4. Type 2 diabetes mellitus with other specified complication, unspecified whether long term insulin use (CMS/[facility])  Case Request Cath Lab: Left Heart Cath    CBC (No Diff)    Basic Metabolic Panel    Protime-INR    ECG 12 Lead   5. Unstable angina pectoris (CMS/[facility])  Case Request Cath Lab: Left Heart Cath    CBC (No Diff)    Basic Metabolic Panel    Protime-INR    ECG 12 Lead   6. Abnormal nuclear stress test  Case Request Cath Lab: Left Heart Cath    CBC (No Diff)    Basic Metabolic Panel    Protime-INR    ECG 12 Lead          Plan:     Hypertension and dyslipidemia diabetes mellitus needs aggressive control and cardiac risk factor modification  Worsening symptoms we will proceed with cardiac catheterization

## 2020-09-04 ENCOUNTER — TELEPHONE (OUTPATIENT)
Dept: CARDIOLOGY | Facility: CLINIC | Age: 70
End: 2020-09-04

## 2020-09-04 RX ORDER — LISINOPRIL 10 MG/1
10 TABLET ORAL DAILY
Qty: 90 TABLET | Refills: 0 | Status: SHIPPED | OUTPATIENT
Start: 2020-09-04 | End: 2021-08-20

## 2020-09-04 RX ORDER — LISINOPRIL 10 MG/1
10 TABLET ORAL DAILY
COMMUNITY
End: 2020-09-04 | Stop reason: SDUPTHER

## 2020-09-04 RX ORDER — HYDROCHLOROTHIAZIDE 12.5 MG/1
12.5 TABLET ORAL DAILY
COMMUNITY
End: 2020-09-04 | Stop reason: SDUPTHER

## 2020-09-04 RX ORDER — HYDROCHLOROTHIAZIDE 12.5 MG/1
12.5 TABLET ORAL DAILY
Qty: 90 TABLET | Refills: 0 | Status: SHIPPED | OUTPATIENT
Start: 2020-09-04 | End: 2021-09-14 | Stop reason: SDUPTHER

## 2020-09-04 NOTE — TELEPHONE ENCOUNTER
[facility] called and wanted to know correct dosing on lisinopril/HCTZ.  I advsd 10mg/12.5mg. She stated that they don't make that dosage anymore and wanted to do the lisinopril 10mg and HCTZ 12.5mg.  Advsd ok and updated chart. Called pt and she stated she needed to reschedule cath.

## 2020-09-09 DIAGNOSIS — M05.9 SEROPOSITIVE RHEUMATOID ARTHRITIS (HCC): ICD-10-CM

## 2020-09-09 DIAGNOSIS — Z79.899 LONG-TERM USE OF HIGH-RISK MEDICATION: ICD-10-CM

## 2020-09-09 RX ORDER — ADALIMUMAB 40MG/0.4ML
40 KIT SUBCUTANEOUS
Qty: 6 EACH | Refills: 0 | Status: SHIPPED | OUTPATIENT
Start: 2020-09-09 | End: 2021-04-14 | Stop reason: SDUPTHER

## 2020-09-09 NOTE — TELEPHONE ENCOUNTER
Humira Pen 40mg/0.4ml    [facility]    Last Seen 8/18/2020  Next Appt 11/19/2020

## 2020-09-14 ENCOUNTER — HOSPITAL ENCOUNTER (OUTPATIENT)
Dept: CARDIOLOGY | Facility: HOSPITAL | Age: 70
Discharge: HOME | End: 2020-09-14

## 2020-09-14 ENCOUNTER — LAB (OUTPATIENT)
Dept: LAB | Facility: HOSPITAL | Age: 70
End: 2020-09-14
Attending: INTERNAL MEDICINE

## 2020-09-14 DIAGNOSIS — E78.2 MIXED HYPERLIPIDEMIA: ICD-10-CM

## 2020-09-14 DIAGNOSIS — I20.0 UNSTABLE ANGINA PECTORIS (HCC): ICD-10-CM

## 2020-09-14 DIAGNOSIS — R00.2 PALPITATIONS: ICD-10-CM

## 2020-09-14 DIAGNOSIS — I10 ESSENTIAL HYPERTENSION: ICD-10-CM

## 2020-09-14 DIAGNOSIS — R94.39 ABNORMAL NUCLEAR STRESS TEST: ICD-10-CM

## 2020-09-14 DIAGNOSIS — E11.69 TYPE 2 DIABETES MELLITUS WITH OTHER SPECIFIED COMPLICATION, UNSPECIFIED WHETHER LONG TERM INSULIN USE (HCC): ICD-10-CM

## 2020-09-14 LAB
ANION GAP SERPL CALCULATED.3IONS-SCNC: 6.9 MMOL/L (ref 5–15)
BUN SERPL-MCNC: 13 MG/DL (ref 8–23)
BUN/CREAT SERPL: 13.5 (ref 7–25)
CALCIUM SPEC-SCNC: 9.8 MG/DL (ref 8.6–10.5)
CHLORIDE SERPL-SCNC: 102 MMOL/L (ref 98–107)
CO2 SERPL-SCNC: 30.1 MMOL/L (ref 22–29)
CREAT SERPL-MCNC: 0.96 MG/DL (ref 0.57–1)
DEPRECATED RDW RBC AUTO: 42.7 FL (ref 37–54)
ERYTHROCYTE [DISTWIDTH] IN BLOOD BY AUTOMATED COUNT: 13.1 % (ref 12.3–15.4)
GFR SERPL CREATININE-BSD FRML MDRD: 58 ML/MIN/1.73
GLUCOSE SERPL-MCNC: 161 MG/DL (ref 65–99)
HCT VFR BLD AUTO: 42.4 % (ref 34–46.6)
HGB BLD-MCNC: 13.7 G/DL (ref 12–15.9)
INR PPP: 1.06 (ref 0.93–1.1)
MCH RBC QN AUTO: 28.8 PG (ref 26.6–33)
MCHC RBC AUTO-ENTMCNC: 32.3 G/DL (ref 31.5–35.7)
MCV RBC AUTO: 89.1 FL (ref 79–97)
PLATELET # BLD AUTO: 293 10*3/MM3 (ref 140–450)
PMV BLD AUTO: 10 FL (ref 6–12)
POTASSIUM SERPL-SCNC: 4 MMOL/L (ref 3.5–5.2)
PROTHROMBIN TIME: 11.5 SECONDS (ref 9.6–11.7)
RBC # BLD AUTO: 4.76 10*6/MM3 (ref 3.77–5.28)
SODIUM SERPL-SCNC: 139 MMOL/L (ref 136–145)
WBC # BLD AUTO: 6.47 10*3/MM3 (ref 3.4–10.8)

## 2020-09-14 PROCEDURE — C9803 HOPD COVID-19 SPEC COLLECT: HCPCS | Performed by: INTERNAL MEDICINE

## 2020-09-14 PROCEDURE — 93005 ELECTROCARDIOGRAM TRACING: CPT | Performed by: INTERNAL MEDICINE

## 2020-09-14 PROCEDURE — 85027 COMPLETE CBC AUTOMATED: CPT

## 2020-09-14 PROCEDURE — 93010 ELECTROCARDIOGRAM REPORT: CPT | Performed by: INTERNAL MEDICINE

## 2020-09-14 PROCEDURE — 36415 COLL VENOUS BLD VENIPUNCTURE: CPT

## 2020-09-14 PROCEDURE — U0004 COV-19 TEST NON-CDC HGH THRU: HCPCS | Performed by: INTERNAL MEDICINE

## 2020-09-14 PROCEDURE — 80048 BASIC METABOLIC PNL TOTAL CA: CPT

## 2020-09-14 PROCEDURE — 85610 PROTHROMBIN TIME: CPT

## 2020-09-14 RX ORDER — TRAZODONE HYDROCHLORIDE 50 MG/1
50 TABLET ORAL NIGHTLY
COMMUNITY
End: 2020-11-24

## 2020-09-14 RX ORDER — ESCITALOPRAM OXALATE 20 MG/1
10 TABLET ORAL
COMMUNITY
End: 2021-03-23

## 2020-09-14 RX ORDER — LANSOPRAZOLE 30 MG/1
30 CAPSULE, DELAYED RELEASE ORAL DAILY
COMMUNITY
End: 2020-10-01

## 2020-09-15 LAB — SARS-COV-2 RNA NOSE QL NAA+PROBE: NOT DETECTED

## 2020-09-15 RX ORDER — ALPRAZOLAM 0.25 MG/1
0.25 TABLET ORAL AS NEEDED
COMMUNITY
End: 2021-05-04

## 2020-09-16 ENCOUNTER — HOSPITAL ENCOUNTER (OUTPATIENT)
Facility: HOSPITAL | Age: 70
Setting detail: HOSPITAL OUTPATIENT SURGERY
Discharge: HOME | End: 2020-09-16
Attending: INTERNAL MEDICINE | Admitting: INTERNAL MEDICINE

## 2020-09-16 VITALS
RESPIRATION RATE: 18 BRPM | OXYGEN SATURATION: 92 % | SYSTOLIC BLOOD PRESSURE: 136 MMHG | HEIGHT: 64 IN | DIASTOLIC BLOOD PRESSURE: 58 MMHG | TEMPERATURE: 97.7 F | HEART RATE: 55 BPM | BODY MASS INDEX: 28.6 KG/M2 | WEIGHT: 167.55 LBS

## 2020-09-16 DIAGNOSIS — R00.2 PALPITATIONS: ICD-10-CM

## 2020-09-16 DIAGNOSIS — E78.2 MIXED HYPERLIPIDEMIA: ICD-10-CM

## 2020-09-16 DIAGNOSIS — I10 ESSENTIAL HYPERTENSION: ICD-10-CM

## 2020-09-16 DIAGNOSIS — R94.39 ABNORMAL NUCLEAR STRESS TEST: ICD-10-CM

## 2020-09-16 DIAGNOSIS — I20.0 UNSTABLE ANGINA PECTORIS (HCC): ICD-10-CM

## 2020-09-16 DIAGNOSIS — E11.69 TYPE 2 DIABETES MELLITUS WITH OTHER SPECIFIED COMPLICATION, UNSPECIFIED WHETHER LONG TERM INSULIN USE (HCC): ICD-10-CM

## 2020-09-16 LAB — GLUCOSE BLDC GLUCOMTR-MCNC: 218 MG/DL (ref 70–105)

## 2020-09-16 PROCEDURE — 93571 IV DOP VEL&/PRESS C FLO 1ST: CPT | Performed by: INTERNAL MEDICINE

## 2020-09-16 PROCEDURE — 93799 UNLISTED CV SVC/PROCEDURE: CPT | Performed by: INTERNAL MEDICINE

## 2020-09-16 PROCEDURE — 99153 MOD SED SAME PHYS/QHP EA: CPT | Performed by: INTERNAL MEDICINE

## 2020-09-16 PROCEDURE — 25010000002 FENTANYL CITRATE (PF) 100 MCG/2ML SOLUTION: Performed by: INTERNAL MEDICINE

## 2020-09-16 PROCEDURE — 93458 L HRT ARTERY/VENTRICLE ANGIO: CPT | Performed by: INTERNAL MEDICINE

## 2020-09-16 PROCEDURE — C1887 CATHETER, GUIDING: HCPCS | Performed by: INTERNAL MEDICINE

## 2020-09-16 PROCEDURE — 25010000002 MIDAZOLAM PER 1 MG: Performed by: INTERNAL MEDICINE

## 2020-09-16 PROCEDURE — 99152 MOD SED SAME PHYS/QHP 5/>YRS: CPT | Performed by: INTERNAL MEDICINE

## 2020-09-16 PROCEDURE — C1769 GUIDE WIRE: HCPCS | Performed by: INTERNAL MEDICINE

## 2020-09-16 PROCEDURE — 25010000002 HEPARIN (PORCINE) PER 1000 UNITS: Performed by: INTERNAL MEDICINE

## 2020-09-16 PROCEDURE — C1894 INTRO/SHEATH, NON-LASER: HCPCS | Performed by: INTERNAL MEDICINE

## 2020-09-16 PROCEDURE — 0 IOPAMIDOL PER 1 ML: Performed by: INTERNAL MEDICINE

## 2020-09-16 PROCEDURE — 82962 GLUCOSE BLOOD TEST: CPT

## 2020-09-16 PROCEDURE — C1760 CLOSURE DEV, VASC: HCPCS | Performed by: INTERNAL MEDICINE

## 2020-09-16 RX ORDER — FENTANYL CITRATE 50 UG/ML
INJECTION, SOLUTION INTRAMUSCULAR; INTRAVENOUS AS NEEDED
Status: DISCONTINUED | OUTPATIENT
Start: 2020-09-16 | End: 2020-09-16 | Stop reason: HOSPADM

## 2020-09-16 RX ORDER — DIPHENHYDRAMINE HCL 25 MG
25 CAPSULE ORAL EVERY 6 HOURS PRN
Status: DISCONTINUED | OUTPATIENT
Start: 2020-09-16 | End: 2020-09-16 | Stop reason: HOSPADM

## 2020-09-16 RX ORDER — ONDANSETRON 4 MG/1
4 TABLET, FILM COATED ORAL EVERY 6 HOURS PRN
Status: DISCONTINUED | OUTPATIENT
Start: 2020-09-16 | End: 2020-09-16 | Stop reason: HOSPADM

## 2020-09-16 RX ORDER — HEPARIN SODIUM 1000 [USP'U]/ML
INJECTION, SOLUTION INTRAVENOUS; SUBCUTANEOUS AS NEEDED
Status: DISCONTINUED | OUTPATIENT
Start: 2020-09-16 | End: 2020-09-16 | Stop reason: HOSPADM

## 2020-09-16 RX ORDER — MIDAZOLAM HYDROCHLORIDE 1 MG/ML
INJECTION INTRAMUSCULAR; INTRAVENOUS AS NEEDED
Status: DISCONTINUED | OUTPATIENT
Start: 2020-09-16 | End: 2020-09-16 | Stop reason: HOSPADM

## 2020-09-16 RX ORDER — ACETAMINOPHEN 325 MG/1
650 TABLET ORAL EVERY 4 HOURS PRN
Status: DISCONTINUED | OUTPATIENT
Start: 2020-09-16 | End: 2020-09-16 | Stop reason: HOSPADM

## 2020-09-16 RX ORDER — LIDOCAINE HYDROCHLORIDE 20 MG/ML
INJECTION, SOLUTION INFILTRATION; PERINEURAL AS NEEDED
Status: DISCONTINUED | OUTPATIENT
Start: 2020-09-16 | End: 2020-09-16 | Stop reason: HOSPADM

## 2020-09-16 RX ORDER — ONDANSETRON 2 MG/ML
4 INJECTION INTRAMUSCULAR; INTRAVENOUS EVERY 6 HOURS PRN
Status: DISCONTINUED | OUTPATIENT
Start: 2020-09-16 | End: 2020-09-16 | Stop reason: HOSPADM

## 2020-09-16 RX ORDER — ISOSORBIDE MONONITRATE 30 MG/1
30 TABLET, EXTENDED RELEASE ORAL DAILY
Qty: 30 TABLET | Refills: 2 | Status: SHIPPED | OUTPATIENT
Start: 2020-09-16 | End: 2020-12-14

## 2020-09-16 RX ADMIN — ACETAMINOPHEN 650 MG: 325 TABLET ORAL at 12:56

## 2020-09-16 NOTE — DISCHARGE INSTR - ACTIVITY
Post Cath Instructions    Call Dr. Figueroa’s office to schedule a follow up appointment in 4 weeks at [phone].    1) Drink plenty of fluids for the next 24 hours.  This helps to eliminate the dye used in your procedure through urination.  You may resume a normal diet; however, try to avoid foods that would cause gas or constipation.    2) Sedative medication given to you during your catheterization may decrease your judgement and reaction time for up to 24-48 hours.  Therefore:  a. DO NOT drive or operate hazardous machinery (48 hours)  b. DO NOT consume alcoholic beverages  c. DO NOT make any important/legal decisions  d. Have someone stay with you for at least 24 hours    3) To allow proper healing and prevent bleeding, the following activities are to be strictly avoided for the next 24-48 hours:  a. Excessive bending at wound site  b. Straining (anything that would tense up muscles around the affected puncture site)  c. Lifting objects greater than 5 pounds, pushing, or pulling for 5 days  i. For Groin Cases:  1. Refrain from sexual activity  2. Refrain from running or vigorous walking  3. No prolonged sitting or standing  4. Limit stair climbing as much as possible    4) Keep the puncture site clean and dry.  You may remove the dressing tomorrow and replace it with a band-aid for at least one additional day.  Gently clean the site with mild soap and water.  No scrubbing/rubbing and lightly pat the area dry.  Showers are acceptable; however, avoid submerging in water (tub baths, hot tubs, swimming pools, dishwater, etc…) for at least one week.  The site should be completely healed before resuming these activities to reduce the risk of infection.  Check the site often.  Watch for signs and symptoms of infection and notify your physician if any of the following occur:  a. Bleeding or an increase in swelling at the puncture site  b. Fever  c. Increased soreness around puncture site  d. Foul odor or significant  drainage from the puncture site  e. Swelling, redness, or warmth at the puncture site    **A bruise or small “pea sized” lump under the skin at the puncture site is not unusual.  This should disappear within 3-4 weeks.**  5) CONTACT YOUR PHYSICIAN OR CALL 911 IF YOU EXPERIENCE ANY OF THE FOLLOWING:  a. Increased angina (chest pain) or frequent sensations of pressure, burning, pain, or other discomfort in the chest, arm, jaws, or stomach  b. Lightheadedness, dizziness, faint feeling, sweating, or difficulty breathing  c. Odd sensation changes like numbness, tingling, coldness, or pain in the arm or leg in which the catheter was inserted  d. Limb in which the catheter was inserted becomes pale/bluish in color    IMPORTANT:  Although this occurs very rarely, if you should develop bright red or excessive bleeding, feel a “pop” inside at the insertion site, or notice a sudden increase in swelling larger than a walnut, you should call 911.  Hold continuous firm pressure to the access site until emergency personnel arrive.  It is best if someone else can do this for you.

## 2020-09-22 RX ORDER — ACEBUTOLOL HYDROCHLORIDE 200 MG/1
200 CAPSULE ORAL DAILY
Qty: 30 CAPSULE | Refills: 3 | Status: SHIPPED | OUTPATIENT
Start: 2020-09-22 | End: 2021-01-25

## 2020-10-01 RX ORDER — LOVASTATIN 40 MG/1
TABLET ORAL
Qty: 90 TABLET | Refills: 1 | Status: SHIPPED | OUTPATIENT
Start: 2020-10-01 | End: 2021-04-08 | Stop reason: SDUPTHER

## 2020-10-01 RX ORDER — LANSOPRAZOLE 30 MG/1
CAPSULE, DELAYED RELEASE ORAL
Qty: 90 CAPSULE | Refills: 0 | Status: SHIPPED | OUTPATIENT
Start: 2020-10-01 | End: 2020-12-28

## 2020-10-19 PROCEDURE — U0003 INFECTIOUS AGENT DETECTION BY NUCLEIC ACID (DNA OR RNA); SEVERE ACUTE RESPIRATORY SYNDROME CORONAVIRUS 2 (SARS-COV-2) (CORONAVIRUS DISEASE [COVID-19]), AMPLIFIED PROBE TECHNIQUE, MAKING USE OF HIGH THROUGHPUT TECHNOLOGIES AS DESCRIBED BY CMS-2020-01-R: HCPCS | Performed by: NURSE PRACTITIONER

## 2020-11-06 DIAGNOSIS — E11.65 TYPE 2 DIABETES MELLITUS WITH HYPERGLYCEMIA, WITH LONG-TERM CURRENT USE OF INSULIN (HCC): Primary | ICD-10-CM

## 2020-11-06 DIAGNOSIS — Z79.4 TYPE 2 DIABETES MELLITUS WITH HYPERGLYCEMIA, WITH LONG-TERM CURRENT USE OF INSULIN (HCC): Primary | ICD-10-CM

## 2020-11-06 RX ORDER — INSULIN NPH HUM/REG INSULIN HM 70-30/ML
VIAL (ML) SUBCUTANEOUS
Qty: 30 EACH | Refills: 2 | Status: SHIPPED | OUTPATIENT
Start: 2020-11-06 | End: 2020-11-19

## 2020-11-16 ENCOUNTER — TELEPHONE (OUTPATIENT)
Dept: FAMILY MEDICINE CLINIC | Facility: CLINIC | Age: 70
End: 2020-11-16

## 2020-11-17 ENCOUNTER — CLINICAL SUPPORT (OUTPATIENT)
Dept: FAMILY MEDICINE CLINIC | Facility: CLINIC | Age: 70
End: 2020-11-17

## 2020-11-17 DIAGNOSIS — M19.91 PRIMARY LOCALIZED OSTEOARTHRITIS: ICD-10-CM

## 2020-11-17 DIAGNOSIS — M05.9 SEROPOSITIVE RHEUMATOID ARTHRITIS (HCC): Primary | ICD-10-CM

## 2020-11-17 DIAGNOSIS — Z79.4 TYPE 2 DIABETES MELLITUS WITH HYPERGLYCEMIA, WITH LONG-TERM CURRENT USE OF INSULIN (HCC): ICD-10-CM

## 2020-11-17 DIAGNOSIS — E11.65 TYPE 2 DIABETES MELLITUS WITH HYPERGLYCEMIA, WITH LONG-TERM CURRENT USE OF INSULIN (HCC): ICD-10-CM

## 2020-11-17 DIAGNOSIS — G60.8 OTHER HEREDITARY AND IDIOPATHIC NEUROPATHIES: ICD-10-CM

## 2020-11-17 LAB — HBA1C MFR BLD: 7.7 % (ref 3.5–5.6)

## 2020-11-17 PROCEDURE — 85652 RBC SED RATE AUTOMATED: CPT | Performed by: FAMILY MEDICINE

## 2020-11-17 PROCEDURE — 83036 HEMOGLOBIN GLYCOSYLATED A1C: CPT | Performed by: FAMILY MEDICINE

## 2020-11-17 PROCEDURE — 85025 COMPLETE CBC W/AUTO DIFF WBC: CPT | Performed by: FAMILY MEDICINE

## 2020-11-17 PROCEDURE — 86140 C-REACTIVE PROTEIN: CPT | Performed by: FAMILY MEDICINE

## 2020-11-17 PROCEDURE — 36415 COLL VENOUS BLD VENIPUNCTURE: CPT | Performed by: FAMILY MEDICINE

## 2020-11-17 PROCEDURE — 80053 COMPREHEN METABOLIC PANEL: CPT | Performed by: FAMILY MEDICINE

## 2020-11-18 LAB
ALBUMIN SERPL-MCNC: 4.3 G/DL (ref 3.5–5.2)
ALBUMIN/GLOB SERPL: 1.3 G/DL
ALP SERPL-CCNC: 46 U/L (ref 39–117)
ALT SERPL W P-5'-P-CCNC: 16 U/L (ref 1–33)
ANION GAP SERPL CALCULATED.3IONS-SCNC: 7.3 MMOL/L (ref 5–15)
AST SERPL-CCNC: 18 U/L (ref 1–32)
BASOPHILS # BLD AUTO: 0.03 10*3/MM3 (ref 0–0.2)
BASOPHILS NFR BLD AUTO: 0.6 % (ref 0–1.5)
BILIRUB SERPL-MCNC: 0.2 MG/DL (ref 0–1.2)
BUN SERPL-MCNC: 12 MG/DL (ref 8–23)
BUN/CREAT SERPL: 15.6 (ref 7–25)
CALCIUM SPEC-SCNC: 9.7 MG/DL (ref 8.6–10.5)
CHLORIDE SERPL-SCNC: 101 MMOL/L (ref 98–107)
CO2 SERPL-SCNC: 29.7 MMOL/L (ref 22–29)
CREAT SERPL-MCNC: 0.77 MG/DL (ref 0.57–1)
CRP SERPL-MCNC: 0.22 MG/DL (ref 0–0.5)
DEPRECATED RDW RBC AUTO: 40.6 FL (ref 37–54)
EOSINOPHIL # BLD AUTO: 0.18 10*3/MM3 (ref 0–0.4)
EOSINOPHIL NFR BLD AUTO: 3.9 % (ref 0.3–6.2)
ERYTHROCYTE [DISTWIDTH] IN BLOOD BY AUTOMATED COUNT: 12.5 % (ref 12.3–15.4)
ERYTHROCYTE [SEDIMENTATION RATE] IN BLOOD: 12 MM/HR (ref 0–30)
GFR SERPL CREATININE-BSD FRML MDRD: 74 ML/MIN/1.73
GLOBULIN UR ELPH-MCNC: 3.2 GM/DL
GLUCOSE SERPL-MCNC: 211 MG/DL (ref 65–99)
HCT VFR BLD AUTO: 40.7 % (ref 34–46.6)
HGB BLD-MCNC: 13.2 G/DL (ref 12–15.9)
IMM GRANULOCYTES # BLD AUTO: 0.01 10*3/MM3 (ref 0–0.05)
IMM GRANULOCYTES NFR BLD AUTO: 0.2 % (ref 0–0.5)
LYMPHOCYTES # BLD AUTO: 2.27 10*3/MM3 (ref 0.7–3.1)
LYMPHOCYTES NFR BLD AUTO: 49 % (ref 19.6–45.3)
MCH RBC QN AUTO: 28.9 PG (ref 26.6–33)
MCHC RBC AUTO-ENTMCNC: 32.4 G/DL (ref 31.5–35.7)
MCV RBC AUTO: 89.3 FL (ref 79–97)
MONOCYTES # BLD AUTO: 0.39 10*3/MM3 (ref 0.1–0.9)
MONOCYTES NFR BLD AUTO: 8.4 % (ref 5–12)
NEUTROPHILS NFR BLD AUTO: 1.75 10*3/MM3 (ref 1.7–7)
NEUTROPHILS NFR BLD AUTO: 37.9 % (ref 42.7–76)
NRBC BLD AUTO-RTO: 0 /100 WBC (ref 0–0.2)
PLATELET # BLD AUTO: 287 10*3/MM3 (ref 140–450)
PMV BLD AUTO: 11.3 FL (ref 6–12)
POTASSIUM SERPL-SCNC: 4.8 MMOL/L (ref 3.5–5.2)
PROT SERPL-MCNC: 7.5 G/DL (ref 6–8.5)
RBC # BLD AUTO: 4.56 10*6/MM3 (ref 3.77–5.28)
SODIUM SERPL-SCNC: 138 MMOL/L (ref 136–145)
WBC # BLD AUTO: 4.63 10*3/MM3 (ref 3.4–10.8)

## 2020-11-19 ENCOUNTER — OFFICE VISIT (OUTPATIENT)
Dept: FAMILY MEDICINE CLINIC | Facility: CLINIC | Age: 70
End: 2020-11-19

## 2020-11-19 VITALS
HEIGHT: 63 IN | DIASTOLIC BLOOD PRESSURE: 71 MMHG | WEIGHT: 169 LBS | HEART RATE: 58 BPM | BODY MASS INDEX: 29.95 KG/M2 | OXYGEN SATURATION: 97 % | SYSTOLIC BLOOD PRESSURE: 148 MMHG | TEMPERATURE: 97.8 F | RESPIRATION RATE: 14 BRPM

## 2020-11-19 DIAGNOSIS — F33.1 MAJOR DEPRESSIVE DISORDER, RECURRENT EPISODE, MODERATE (HCC): ICD-10-CM

## 2020-11-19 DIAGNOSIS — Z79.4 TYPE 2 DIABETES MELLITUS WITH HYPERGLYCEMIA, WITH LONG-TERM CURRENT USE OF INSULIN (HCC): Primary | ICD-10-CM

## 2020-11-19 DIAGNOSIS — Z23 NEED FOR INFLUENZA VACCINATION: ICD-10-CM

## 2020-11-19 DIAGNOSIS — E11.42 DIABETIC PERIPHERAL NEUROPATHY (HCC): ICD-10-CM

## 2020-11-19 DIAGNOSIS — M05.79 RHEUMATOID ARTHRITIS INVOLVING MULTIPLE SITES WITH POSITIVE RHEUMATOID FACTOR (HCC): ICD-10-CM

## 2020-11-19 DIAGNOSIS — E11.65 TYPE 2 DIABETES MELLITUS WITH HYPERGLYCEMIA, WITH LONG-TERM CURRENT USE OF INSULIN (HCC): Primary | ICD-10-CM

## 2020-11-19 PROCEDURE — 99214 OFFICE O/P EST MOD 30 MIN: CPT | Performed by: FAMILY MEDICINE

## 2020-11-19 PROCEDURE — G0008 ADMIN INFLUENZA VIRUS VAC: HCPCS | Performed by: FAMILY MEDICINE

## 2020-11-19 PROCEDURE — 90694 VACC AIIV4 NO PRSRV 0.5ML IM: CPT | Performed by: FAMILY MEDICINE

## 2020-11-19 NOTE — PROGRESS NOTES
"Jerry Rosa is a 70 y.o. female.     Chief Complaint   Patient presents with   • Diabetes   • Results     discuss lab results    • Rheumatoid Arthritis   • Depression         Current Outpatient Medications:   •  acebutolol (SECTRAL) 200 MG capsule, TAKE 1 CAPSULE BY MOUTH DAILY. , Disp: 30 capsule, Rfl: 3  •  ALPRAZolam (XANAX) 0.25 MG tablet, Take 0.25 mg by mouth As Needed for Anxiety., Disp: , Rfl:   •  amLODIPine (NORVASC) 10 MG tablet, Take 1 tablet by mouth Daily., Disp: 90 tablet, Rfl: 1  •  aspirin 81 MG EC tablet, Take 81 mg by mouth Daily., Disp: , Rfl:   •  BD INSULIN SYRINGE U/F 31G X 5/16\" 1 ML misc, USE TO INJECT INSULIN 4 TIMES DAILY , Disp: 150 each, Rfl: 3  •  calcium carbonate (OS-PREET) 600 MG tablet, Take 600 mg by mouth Daily., Disp: , Rfl:   •  diclofenac (VOLTAREN) 1 % gel gel, Apply 4 g topically to the appropriate area as directed 4 (Four) Times a Day As Needed., Disp: , Rfl:   •  escitalopram (LEXAPRO) 20 MG tablet, Take 10 mg by mouth every night at bedtime., Disp: , Rfl:   •  fenofibrate 160 MG tablet, Take 160 mg by mouth Every Evening., Disp: , Rfl:   •  Ferrous Sulfate (IRON) 325 (65 Fe) MG tablet, Take 325 mg by mouth Daily., Disp: , Rfl:   •  fluticasone (FLONASE ALLERGY RELIEF) 50 MCG/ACT nasal spray, 1 spray into the nostril(s) as directed by provider Daily As Needed for Allergies., Disp: , Rfl:   •  gabapentin (Neurontin) 300 MG capsule, Take 1 capsule by mouth 2 (Two) Times a Day., Disp: 180 capsule, Rfl: 0  •  HUMIRA PEN 40 MG/0.4ML Pen-injector Kit, Inject 40 mg under the skin into the appropriate area as directed Every 14 (Fourteen) Days. Sulfate Free Formula, Disp: 6 each, Rfl: 0  •  hydroCHLOROthiazide (HYDRODIURIL) 12.5 MG tablet, Take 1 tablet by mouth Daily., Disp: 90 tablet, Rfl: 0  •  isosorbide mononitrate (IMDUR) 30 MG 24 hr tablet, Take 1 tablet by mouth Daily., Disp: 30 tablet, Rfl: 2  •  lansoprazole (PREVACID) 30 MG capsule, TAKE 1 CAPSULE BY MOUTH " DAILY , Disp: 90 capsule, Rfl: 0  •  lisinopril (PRINIVIL,ZESTRIL) 10 MG tablet, Take 1 tablet by mouth Daily., Disp: 90 tablet, Rfl: 0  •  lovastatin (MEVACOR) 40 MG tablet, TAKE 1 TABLET BY MOUTH DAILY , Disp: 90 tablet, Rfl: 1  •  SITagliptin-metFORMIN HCl ER (Janumet XR) 100-1000 MG tablet, Take 1 tablet by mouth Every Evening., Disp: 90 tablet, Rfl: 1  •  vitamin B-12 (CYANOCOBALAMIN) 1000 MCG tablet, Take 1,000 mcg by mouth Daily., Disp: , Rfl:   •  insulin NPH-insulin regular (humuLIN 70/30,novoLIN 70/30) (70-30) 100 UNIT/ML injection, 52 units SQ qam and 22 units SQ Qpm, Disp: , Rfl: 12  •  traZODone (DESYREL) 50 MG tablet, TAKE 1 TABLET AT BEDTIME , Disp: 90 tablet, Rfl: 0    Past Medical History:   Diagnosis Date   • Depression    • Diabetic peripheral neuropathy    • GERD    • Insomnia    • Lower back pain    • Obesity    • Osteoarthritis    • Osteopenia    • Palpitations    • Pulmonary nodule    • RA    • Rheumatoid nodule 06/2011     rt. thumb x 2 and rt. & lt. 3rd fingers (Oct.2012)/ rt. elbow, left elbow, and epidermal inclusion cyst post. neck (June 2011)-----Dr. Whitmore   • Vitamin D deficiency        Past Surgical History:   Procedure Laterality Date   • CARDIAC CATHETERIZATION N/A 9/16/2020    Procedure: Left Heart Cath;  Surgeon: Walker Rodriguez MD;  Location: [facility] CATH INVASIVE LOCATION;  Service: Cardiovascular;  Laterality: N/A;   • CARDIAC CATHETERIZATION N/A 9/16/2020    Procedure: Coronary angiography;  Surgeon: Walker Rodriguez MD;  Location: [facility] CATH INVASIVE LOCATION;  Service: Cardiovascular;  Laterality: N/A;   • CARDIAC CATHETERIZATION  9/16/2020    Procedure: Functional Flow [place];  Surgeon: Kath Medina MD;  Location: [facility] CATH INVASIVE LOCATION;  Service: Cardiology;;   • CHOLECYSTECTOMY      Abstracted from [facility]   • COLONOSCOPY  2017    2017 = TA, rech 2020 [facility]   • CYST REMOVAL      Seb cyst on neck   • OTHER SURGICAL HISTORY        Uterine Prolapse repair   • TOTAL ABDOMINAL HYSTERECTOMY      Abstracted from [facility]---endometriosis   • TOTAL ABDOMINAL HYSTERECTOMY WITH SALPINGO OOPHORECTOMY         Family History   Problem Relation Age of Onset   • Heart disease Mother    • Diabetes Mother    • Hypertension Mother    • Rheum arthritis Mother    • Coronary artery disease Mother    • Alcohol abuse Mother    • Heart disease Father    • Mental illness Father    • Hypertension Father    • Alcohol abuse Father    • Stroke Sister    • Cancer Sister 29        RENAL Cancer   • Osteoarthritis Sister    • Rheum arthritis Sister    • Diabetes Maternal Grandfather    • Rheum arthritis Sister    • Osteoarthritis Sister    • Diabetes Sister    • Rheum arthritis Brother    • Osteoarthritis Brother        Social History     Socioeconomic History   • Marital status:      Spouse name: Carson Rosa   • Number of children: 3   • Years of education: Not on file   • Highest education level: Not on file   Tobacco Use   • Smoking status: Never Smoker   • Smokeless tobacco: Never Used   Substance and Sexual Activity   • Alcohol use: Yes     Frequency: Never     Comment: occ   • Drug use: Defer   • Sexual activity: Defer       69 y/o C female here for f/u on DMII/ RA/ Anx.and Depression and recent labs    Pt states she is doing fine on her RA meds and taking her insulin as Rx'd----states her BS's have been some elevated though on current dosing  Pt just got refill on xanax and lexapro-----doing ok overall; just hard w/ holidays         The following portions of the patient's history were reviewed and updated as appropriate: allergies, current medications, past family history, past medical history, past social history, past surgical history and problem list.    Review of Systems   Constitutional: Negative for activity change, appetite change, fatigue, unexpected weight gain and unexpected weight loss.   Eyes: Negative for blurred vision, double vision,  pain and visual disturbance.   Cardiovascular: Negative for leg swelling.   Gastrointestinal: Negative for abdominal distention, abdominal pain, constipation, diarrhea, nausea and vomiting.   Endocrine: Negative for polydipsia, polyphagia and polyuria.   Genitourinary: Negative for frequency.   Musculoskeletal: Positive for myalgias. Negative for gait problem.   Skin: Negative for color change, dry skin, rash and skin lesions.   Neurological: Positive for numbness. Negative for weakness.   Psychiatric/Behavioral: Positive for dysphoric mood and stress. Negative for depressed mood. The patient is not nervous/anxious.        Vitals:    11/19/20 1259   BP: 148/71   Pulse: 58   Resp: 14   Temp: 97.8 °F (36.6 °C)   SpO2: 97%       Objective   Physical Exam  Vitals signs and nursing note reviewed.   Constitutional:       General: She is not in acute distress.     Appearance: Normal appearance. She is well-developed. She is not ill-appearing or toxic-appearing.   Cardiovascular:      Rate and Rhythm: Normal rate and regular rhythm.      Heart sounds: Normal heart sounds. No murmur.   Pulmonary:      Effort: Pulmonary effort is normal. No respiratory distress.      Breath sounds: Normal breath sounds. No stridor. No wheezing, rhonchi or rales.   Musculoskeletal:         General: No tenderness or deformity.   Feet:      Right foot:      Skin integrity: No ulcer, blister or warmth.      Toenail Condition: ingrown     Left foot:      Skin integrity: No blister, warmth or callus.      Toenail Condition: ingrown  Skin:     General: Skin is warm and dry.      Capillary Refill: Capillary refill takes less than 2 seconds.      Findings: No erythema or rash.   Neurological:      Mental Status: She is alert and oriented to person, place, and time.      Cranial Nerves: No cranial nerve deficit.   Psychiatric:         Attention and Perception: Attention and perception normal.         Mood and Affect: Mood normal. Affect is flat.          Speech: Speech normal.         Behavior: Behavior normal. Behavior is cooperative.         Thought Content: Thought content normal.         Cognition and Memory: Cognition and memory normal.         Judgment: Judgment normal.           Assessment/Plan   Diagnoses and all orders for this visit:    1. Type 2 diabetes mellitus with hyperglycemia, with long-term current use of insulin (CMS/[facility]) (Primary)    2. Major depressive disorder, recurrent episode, moderate (CMS/[facility])    3. Rheumatoid arthritis involving multiple sites with positive rheumatoid factor (CMS/[facility])    4. Diabetic peripheral neuropathy (CMS/[facility])    5. Need for influenza vaccination  -     Fluad Quad >65 years    Other orders  -     insulin NPH-insulin regular (humuLIN 70/30,novoLIN 70/30) (70-30) 100 UNIT/ML injection; 52 units SQ qam and 22 units SQ Qpm; Refill: 12    adjust insulin doses and monitor/ cont Neurontin  Cont Humira SQ  Reviewed labs w/ pt

## 2020-11-20 ENCOUNTER — OFFICE VISIT (OUTPATIENT)
Dept: CARDIOLOGY | Facility: CLINIC | Age: 70
End: 2020-11-20

## 2020-11-20 VITALS
WEIGHT: 171 LBS | OXYGEN SATURATION: 97 % | BODY MASS INDEX: 30.3 KG/M2 | SYSTOLIC BLOOD PRESSURE: 147 MMHG | HEART RATE: 63 BPM | DIASTOLIC BLOOD PRESSURE: 78 MMHG | HEIGHT: 63 IN | TEMPERATURE: 97.1 F

## 2020-11-20 DIAGNOSIS — Z79.4 TYPE 2 DIABETES MELLITUS WITH OTHER SPECIFIED COMPLICATION, WITH LONG-TERM CURRENT USE OF INSULIN (HCC): ICD-10-CM

## 2020-11-20 DIAGNOSIS — I10 ESSENTIAL HYPERTENSION: ICD-10-CM

## 2020-11-20 DIAGNOSIS — E78.2 MIXED HYPERLIPIDEMIA: Primary | ICD-10-CM

## 2020-11-20 DIAGNOSIS — E11.69 TYPE 2 DIABETES MELLITUS WITH OTHER SPECIFIED COMPLICATION, WITH LONG-TERM CURRENT USE OF INSULIN (HCC): ICD-10-CM

## 2020-11-20 DIAGNOSIS — R00.2 PALPITATIONS: ICD-10-CM

## 2020-11-20 DIAGNOSIS — I25.10 CORONARY ARTERY DISEASE INVOLVING NATIVE CORONARY ARTERY OF NATIVE HEART WITHOUT ANGINA PECTORIS: ICD-10-CM

## 2020-11-20 PROCEDURE — 99214 OFFICE O/P EST MOD 30 MIN: CPT | Performed by: INTERNAL MEDICINE

## 2020-11-20 NOTE — PROGRESS NOTES
"    Subjective:     Encounter Date:11/20/2020      Patient ID: Jenna Rosa is a 70 y.o. female.    Chief Complaint: Follow-up CAD palpitations  History of Present Illness    History of Present Illness        70 -year-old white female patient with known history of hypertension dyslipidemia and diabetes mellitus,  comes back for follow up        Patient recently admitted hospital with an episode of palpitations and bradycardia  Patient has a event monitor which showed bradycardia with significant PACs PVCs consistent with tachybradycardia episodes    Patient underwent stress test and echocardiogram May 2020  Stress test showed LVEF is normal small inferoapical ischemia cannot be excluded  Echocardiogram LVEF 65% diastolic LV dysfunction mild mitral valve regurgitation  Patient is seen by electrophysiology started on beta-blockers, sectral    Cardiac catheterization showed LAD 50 to 60% at the level of the diagonal artery and the diagonal artery ostium 80% September 2020  IFR to the LAD not significant    Patient is currently on medical treatment  At times chest pain will be treated with nitrates dose was increased recently  Blood pressure needs to be aggressively monitored advised salt reduction  Medication adjustment in the future if continues to be high              The following portions of the patient's history were reviewed and updated as appropriate: Allergies current medications past family history past medical history past social history past surgical history problem list and review of systems    /78   Pulse 63   Temp 97.1 °F (36.2 °C)   Ht 160 cm (63\")   Wt 77.6 kg (171 lb)   SpO2 97%   BMI 30.29 kg/m²     Past Medical History:   Diagnosis Date   • Depression     Abstracted from [facility]   • Diabetes mellitus, type 2 (CMS/HCC) 1988    Abstracted from [facility]   • Diabetic peripheral neuropathy (CMS/HCC)    • GERD (gastroesophageal reflux disease)     Abstracted from [facility]   • " Hyperlipidemia     Abstract[facility] from [facility]   • Hypertension     Abstracted from [facility]   • Insomnia    • Lower back pain    • Obesity    • Osteoarthritis    • Osteopenia     Abstracted from [facility]   • Palpitations    • Pulmonary nodule    • RA (rheumatoid arthritis) (CMS/HCC)     Abstracted from [facility]   • Rheumatoid arthritis (CMS/HCC)    • Rheumatoid nodule (CMS/HCC) 06/2011    rt. elbow, left elbow, and epidermal inclusion cyst post. neck (June 2011)-----Dr. Whitmore   • Rheumatoid nodule (CMS/HCC)      rt. thumb x 2 and rt. & lt. 3rd fingers (Oct.2012)   • Vitamin D deficiency      Past Surgical History:   Procedure Laterality Date   • CARDIAC CATHETERIZATION N/A 9/16/2020    Procedure: Left Heart Cath;  Surgeon: Walker Rodriguez MD;  Location:  [facility] CATH INVASIVE LOCATION;  Service: Cardiovascular;  Laterality: N/A;   • CARDIAC CATHETERIZATION N/A 9/16/2020    Procedure: Coronary angiography;  Surgeon: Walker Rodriguez MD;  Location:  [facility] CATH INVASIVE LOCATION;  Service: Cardiovascular;  Laterality: N/A;   • CARDIAC CATHETERIZATION  9/16/2020    Procedure: Functional Flow [place];  Surgeon: Kath Medina MD;  Location:  [facility] CATH INVASIVE LOCATION;  Service: Cardiology;;   • CHOLECYSTECTOMY      Abstracted from [facility]   • COLONOSCOPY  2017    2017 = TA, rech 2020 [facility]   • CYST REMOVAL      Seb cyst on neck   • OTHER SURGICAL HISTORY       Uterine Prolapse repair   • TOTAL ABDOMINAL HYSTERECTOMY      Abstracted from [facility]---endometriosis   • TOTAL ABDOMINAL HYSTERECTOMY WITH SALPINGO OOPHORECTOMY       Social History     Socioeconomic History   • Marital status:      Spouse name: Carson Rosa   • Number of children: 3   • Years of education: Not on file   • Highest education level: Not on file   Tobacco Use   • Smoking status: Never Smoker   • Smokeless tobacco: Never Used   Substance and Sexual Activity   • Alcohol use: Yes     Frequency:  "Never     Comment: occ   • Drug use: Defer   • Sexual activity: Defer     Family History   Problem Relation Age of Onset   • Heart disease Mother    • Diabetes Mother    • Hypertension Mother    • Rheum arthritis Mother    • Coronary artery disease Mother    • Alcohol abuse Mother    • Heart disease Father    • Mental illness Father    • Hypertension Father    • Alcohol abuse Father    • Stroke Sister    • Cancer Sister 29        RENAL Cancer   • Osteoarthritis Sister    • Rheum arthritis Sister    • Diabetes Maternal Grandfather    • Rheum arthritis Sister    • Osteoarthritis Sister    • Diabetes Sister    • Rheum arthritis Brother    • Osteoarthritis Brother        Current Outpatient Medications:   •  acebutolol (SECTRAL) 200 MG capsule, TAKE 1 CAPSULE BY MOUTH DAILY. , Disp: 30 capsule, Rfl: 3  •  ALPRAZolam (XANAX) 0.25 MG tablet, Take 0.25 mg by mouth As Needed for Anxiety., Disp: , Rfl:   •  amLODIPine (NORVASC) 10 MG tablet, Take 1 tablet by mouth Daily., Disp: 90 tablet, Rfl: 1  •  aspirin 81 MG EC tablet, Take 81 mg by mouth Daily., Disp: , Rfl:   •  BD INSULIN SYRINGE U/F 31G X 5/16\" 1 ML misc, USE TO INJECT INSULIN 4 TIMES DAILY , Disp: 150 each, Rfl: 3  •  calcium carbonate (OS-PREET) 600 MG tablet, Take 600 mg by mouth Daily., Disp: , Rfl:   •  diclofenac (VOLTAREN) 1 % gel gel, Apply 4 g topically to the appropriate area as directed 4 (Four) Times a Day As Needed., Disp: , Rfl:   •  escitalopram (LEXAPRO) 20 MG tablet, Take 10 mg by mouth every night at bedtime., Disp: , Rfl:   •  fenofibrate 160 MG tablet, Take 160 mg by mouth Every Evening., Disp: , Rfl:   •  Ferrous Sulfate (IRON) 325 (65 Fe) MG tablet, Take 325 mg by mouth Daily., Disp: , Rfl:   •  fluticasone (FLONASE ALLERGY RELIEF) 50 MCG/ACT nasal spray, 1 spray into the nostril(s) as directed by provider Daily As Needed for Allergies., Disp: , Rfl:   •  gabapentin (Neurontin) 300 MG capsule, Take 1 capsule by mouth 2 (Two) Times a Day., Disp: " 180 capsule, Rfl: 0  •  HUMIRA PEN 40 MG/0.4ML Pen-injector Kit, Inject 40 mg under the skin into the appropriate area as directed Every 14 (Fourteen) Days. Sulfate Free Formula, Disp: 6 each, Rfl: 0  •  hydroCHLOROthiazide (HYDRODIURIL) 12.5 MG tablet, Take 1 tablet by mouth Daily., Disp: 90 tablet, Rfl: 0  •  insulin NPH-insulin regular (humuLIN 70/30,novoLIN 70/30) (70-30) 100 UNIT/ML injection, 52 units SQ qam and 22 units SQ Qpm, Disp: , Rfl: 12  •  isosorbide mononitrate (IMDUR) 30 MG 24 hr tablet, Take 1 tablet by mouth Daily., Disp: 30 tablet, Rfl: 2  •  lansoprazole (PREVACID) 30 MG capsule, TAKE 1 CAPSULE BY MOUTH DAILY , Disp: 90 capsule, Rfl: 0  •  lisinopril (PRINIVIL,ZESTRIL) 10 MG tablet, Take 1 tablet by mouth Daily., Disp: 90 tablet, Rfl: 0  •  lovastatin (MEVACOR) 40 MG tablet, TAKE 1 TABLET BY MOUTH DAILY , Disp: 90 tablet, Rfl: 1  •  SITagliptin-metFORMIN HCl ER (Janumet XR) 100-1000 MG tablet, Take 1 tablet by mouth Every Evening., Disp: 90 tablet, Rfl: 1  •  traZODone (DESYREL) 50 MG tablet, Take 50 mg by mouth Every Night., Disp: , Rfl:   •  vitamin B-12 (CYANOCOBALAMIN) 1000 MCG tablet, Take 1,000 mcg by mouth Daily., Disp: , Rfl:   No Known Allergies    Review of Systems   Constitution: Negative for fever and malaise/fatigue.   HENT: Negative for congestion and hearing loss.    Eyes: Negative for double vision and visual disturbance.   Cardiovascular: Positive for chest pain (at times) and palpitations. Negative for claudication, dyspnea on exertion, leg swelling and syncope.   Respiratory: Positive for shortness of breath. Negative for cough.    Endocrine: Negative for cold intolerance.   Skin: Negative for color change and rash.   Musculoskeletal: Negative for arthritis and joint pain.   Gastrointestinal: Positive for nausea. Negative for abdominal pain, heartburn and vomiting.   Genitourinary: Negative for hematuria.   Neurological: Negative for excessive daytime sleepiness, dizziness,  light-headedness and numbness.   Psychiatric/Behavioral: Negative for depression. The patient is not nervous/anxious.    All other systems reviewed and are negative.             Objective:     Constitutional:       Appearance: Well-developed.   Eyes:      General: No scleral icterus.     Conjunctiva/sclera: Conjunctivae normal.   HENT:      Head: Normocephalic and atraumatic.    Mouth/Throat:      Mouth: No oral lesions.      Pharynx: Uvula midline.   Neck:      Musculoskeletal: Neck supple.      Thyroid: No thyromegaly.      Vascular: No carotid bruit or JVD.      Trachea: Trachea normal.   Pulmonary:      Effort: Pulmonary effort is normal.      Breath sounds: Normal breath sounds.   Cardiovascular:      Normal rate. Regular rhythm.      No gallop.   Pulses:     Intact distal pulses.   Abdominal:      General: Bowel sounds are normal.      Palpations: Abdomen is soft.   Musculoskeletal: Normal range of motion.   Skin:     General: Skin is warm. There is no cyanosis.   Neurological:      Mental Status: Alert and oriented to person, place, and time.      Comments: No focal deficits   Psychiatric:         Behavior: Behavior is cooperative.         Procedures    Lab Review:       Assessment:          Diagnosis Plan   1. Mixed hyperlipidemia  CK    Comprehensive Metabolic Panel    Lipid Panel   2. Essential hypertension  CK    Comprehensive Metabolic Panel    Lipid Panel   3. Type 2 diabetes mellitus with other specified complication, with long-term current use of insulin (CMS/[facility])  CK    Comprehensive Metabolic Panel    Lipid Panel   4. Coronary artery disease involving native coronary artery of native heart without angina pectoris  CK    Comprehensive Metabolic Panel    Lipid Panel   5. Palpitations  CK    Comprehensive Metabolic Panel    Lipid Panel          Plan:       Continue beta-blockers aggressive cardiac risk factor modification  Aggressive medical treatment for underlying CAD especially involving the  diagonal artery  Needs aggressive control of diabetes hypertension dyslipidemia modify cardiac risk factors  Monitor blood pressure closely

## 2020-11-24 RX ORDER — TRAZODONE HYDROCHLORIDE 50 MG/1
TABLET ORAL
Qty: 90 TABLET | Refills: 0 | Status: SHIPPED | OUTPATIENT
Start: 2020-11-24 | End: 2021-04-09 | Stop reason: SDUPTHER

## 2020-11-24 NOTE — TELEPHONE ENCOUNTER
Last visit: 11/19/20  Next visit:none  Last labs: 11/17/20    Rx requested: Trazodone   Pharmacy: [facility]

## 2020-12-07 ENCOUNTER — OFFICE VISIT (OUTPATIENT)
Dept: CARDIOLOGY | Facility: CLINIC | Age: 70
End: 2020-12-07

## 2020-12-07 VITALS
DIASTOLIC BLOOD PRESSURE: 70 MMHG | RESPIRATION RATE: 18 BRPM | SYSTOLIC BLOOD PRESSURE: 124 MMHG | HEIGHT: 63 IN | BODY MASS INDEX: 30.3 KG/M2 | HEART RATE: 85 BPM | OXYGEN SATURATION: 96 % | WEIGHT: 171 LBS

## 2020-12-07 DIAGNOSIS — I25.10 CORONARY ARTERY DISEASE INVOLVING NATIVE CORONARY ARTERY OF NATIVE HEART WITHOUT ANGINA PECTORIS: ICD-10-CM

## 2020-12-07 DIAGNOSIS — I49.3 PVC (PREMATURE VENTRICULAR CONTRACTION): Primary | ICD-10-CM

## 2020-12-07 DIAGNOSIS — R94.39 ABNORMAL NUCLEAR STRESS TEST: ICD-10-CM

## 2020-12-07 DIAGNOSIS — R00.2 PALPITATIONS: ICD-10-CM

## 2020-12-07 DIAGNOSIS — R00.1 BRADYCARDIA, SINUS: ICD-10-CM

## 2020-12-07 PROCEDURE — 99214 OFFICE O/P EST MOD 30 MIN: CPT | Performed by: INTERNAL MEDICINE

## 2020-12-07 PROCEDURE — 93000 ELECTROCARDIOGRAM COMPLETE: CPT | Performed by: INTERNAL MEDICINE

## 2020-12-07 RX ORDER — BLOOD SUGAR DIAGNOSTIC
STRIP MISCELLANEOUS
COMMUNITY
Start: 2020-11-30 | End: 2021-04-26

## 2020-12-07 NOTE — PROGRESS NOTES
CC--palpitations and bradycardia    Sub--70-year-old very pleasant patient came for follow-up after initiation of acebutolol at prior visit and patient was seen in the past for suspected tachycardia/bradycardia syndrome  She was on atenolol which has been stopped and changed to acebutolol and she saw some improvement of symptoms with occasional palpitations without any neptali syncope and complains of mild dizziness  Her symptoms started increasing since March of this year and she kind of correlated to loss of her spouse with grief  She complains of palpitations and sometimes fullness in her neck and denies any symptoms of neptali syncope  She does have atypical epigastric discomfort unrelated to exertion and a stress test showed normal EF and inferoapical ischemia could not be excluded and left jaycob medical management--she underwent cardiac catheterization which showed moderate LAD disease negative on IFR and left to medical management  She had normal EF on echocardiography with sclerotic aortic valve without stenosis  An event recorder on her revealed sinus bradycardia with intermittent PVCs  She denies any TIA or stroke or renal disease in the past  She does have essential hypertension on amlodipine and atenolol for a long time  She continues to have transient symptoms mostly lasting few seconds without any neptali syncope      Past history is reviewed below and verified  Past Medical History:   Diagnosis Date   • Depression    • Diabetic peripheral neuropathy    • GERD    • Insomnia    • Lower back pain    • Obesity    • Osteoarthritis    • Osteopenia    • Palpitations    • Pulmonary nodule    • RA    • Rheumatoid nodule 06/2011     rt. thumb x 2 and rt. & lt. 3rd fingers (Oct.2012)/ rt. elbow, left elbow, and epidermal inclusion cyst post. neck (June 2011)-----Dr. Whitmore   • Vitamin D deficiency      Past Surgical History:   Procedure Laterality Date   • CARDIAC CATHETERIZATION N/A 9/16/2020    Procedure: Left Heart  Cath;  Surgeon: Walker Rodriguez MD;  Location: [facility] CATH INVASIVE LOCATION;  Service: Cardiovascular;  Laterality: N/A;   • CARDIAC CATHETERIZATION N/A 9/16/2020    Procedure: Coronary angiography;  Surgeon: Walker Rodriguez MD;  Location: [facility] CATH INVASIVE LOCATION;  Service: Cardiovascular;  Laterality: N/A;   • CARDIAC CATHETERIZATION  9/16/2020    Procedure: Functional Flow [place];  Surgeon: Kath Medina MD;  Location: [facility] CATH INVASIVE LOCATION;  Service: Cardiology;;   • CHOLECYSTECTOMY      Abstracted from [facility]   • COLONOSCOPY  2017    2017 = TA, rech 2020 [facility]   • CYST REMOVAL      Seb cyst on neck   • OTHER SURGICAL HISTORY       Uterine Prolapse repair   • TOTAL ABDOMINAL HYSTERECTOMY      Abstracted from [facility] BSO---endometriosis   • TOTAL ABDOMINAL HYSTERECTOMY WITH SALPINGO OOPHORECTOMY           Review of Systems   General:  positive for fatigue and tiredness  Eyes: No redness  Cardiovascular: No chest pain, positive for  palpitations  Respiratory:   no shortness of breath  Gastrointestinal: No nausea or vomiting, bleeding  Genitourinary: no hematuria or dysuria  Musculoskeletal: No arthralgia or myalgia  Skin: No rash  Neurologic: No numbness, tingling, syncope  Hematologic/Lymphatic: No abnormal bleeding      Physical Exam  VITALS REVIEWED  Blood pressure 124/70 pulse rate 85 patient afebrile respiration 12 times a minute  General:      well developed, well nourished, in no acute distress.    Head:      normocephalic and atraumatic.    Eyes:      PERRL/EOM intact, conjunctiva and sclera clear with out nystagmus.    Neck:      no masses, thyromegaly,  trachea central with normal respiratory effort   Lungs:      clear bilaterally to auscultation.    Heart:      Sinus rhythm with ejection systolic murmur at the base of the heart consistent with aortic valve sclerosis with normal carotid upstroke without any gallops or rubs  Msk:      Positive for mild  kyphosis   Pulses:      pulses normal in all 4 extremities.    Extremities:       no cyanosis or clubbing--no edema     Neurologic:      no focal deficits.   alert oriented x3  Skin:      intact without lesions or rashes.    Psych:      alert and cooperative; normal mood and affect; normal attention span and concentration.            Assessment and plan    Sinus bradycardia with symptoms predominantly in the form of fatigue--mild improvement with change of beta-blockers and to continue on current medications and reevaluate after few months and then decide on pacer implant depending on patient's symptoms--current symptoms are fairly transient lasting only for few seconds and medical treatment can be continued unless her symptoms get prolonged or long-lasting  Intermittent PVCs  Aortic valve sclerosis without aortic stenosis  Normal EF with inferoapical artifact versus ischemia on medical treatment--post cardiac catheterization with moderate LAD disease left on medical management  Essential hypertension  Medications  reviewed and follow-up after 6 months        ECG 12 Lead    Date/Time: 12/7/2020 4:04 PM  Performed by: Alberto Gotti MD  Authorized by: Alberto Gotti MD   Comparison: compared with previous ECG   Similar to previous ECG  Rhythm: sinus rhythm  Rate: normal  Conduction: 1st degree AV block  QRS axis: left  Other findings: non-specific ST-T wave changes and left atrial abnormality          Electronically signed by Alberto Gotti MD, 12/07/20, 4:04 PM EST.

## 2020-12-14 RX ORDER — ISOSORBIDE MONONITRATE 30 MG/1
30 TABLET, EXTENDED RELEASE ORAL DAILY
Qty: 30 TABLET | Refills: 2 | Status: SHIPPED | OUTPATIENT
Start: 2020-12-14 | End: 2021-01-13

## 2020-12-16 DIAGNOSIS — G62.9 NEUROPATHY: ICD-10-CM

## 2020-12-16 RX ORDER — GABAPENTIN 300 MG/1
300 CAPSULE ORAL 2 TIMES DAILY
Qty: 180 CAPSULE | Refills: 0 | Status: SHIPPED | OUTPATIENT
Start: 2020-12-16 | End: 2021-03-19

## 2020-12-28 RX ORDER — LANSOPRAZOLE 30 MG/1
CAPSULE, DELAYED RELEASE ORAL
Qty: 90 CAPSULE | Refills: 0 | Status: SHIPPED | OUTPATIENT
Start: 2020-12-28 | End: 2021-03-29

## 2021-01-12 ENCOUNTER — TELEPHONE (OUTPATIENT)
Dept: CARDIOLOGY | Facility: CLINIC | Age: 71
End: 2021-01-12

## 2021-01-12 NOTE — TELEPHONE ENCOUNTER
Pt LMOM and advsd that she is having twinges in her heart and was advsd at last OV 11/20/2020 if she continues to have them her isosorbide mono will be increased. Called pt back and tai Figueroa at [facility] today and may not be able to get back with her until tomorrow but if she has increased chest pains to go to the ER. She verbally understood.       Please advise. [phone]

## 2021-01-13 RX ORDER — ISOSORBIDE MONONITRATE 30 MG/1
30 TABLET, EXTENDED RELEASE ORAL 2 TIMES DAILY
Qty: 60 TABLET | Refills: 11 | Status: SHIPPED | OUTPATIENT
Start: 2021-01-13 | End: 2021-05-04 | Stop reason: SDUPTHER

## 2021-01-13 NOTE — TELEPHONE ENCOUNTER
Called pt and advsd.  She stated that she is almost out of her meds and needed a new Rx sent into [facility]. Ordered meds.

## 2021-01-25 ENCOUNTER — OFFICE VISIT (OUTPATIENT)
Dept: CARDIOLOGY | Facility: CLINIC | Age: 71
End: 2021-01-25

## 2021-01-25 VITALS
DIASTOLIC BLOOD PRESSURE: 72 MMHG | SYSTOLIC BLOOD PRESSURE: 148 MMHG | HEART RATE: 63 BPM | HEIGHT: 63 IN | RESPIRATION RATE: 18 BRPM | BODY MASS INDEX: 30.83 KG/M2 | OXYGEN SATURATION: 97 % | WEIGHT: 174 LBS

## 2021-01-25 DIAGNOSIS — R00.1 BRADYCARDIA, SINUS: ICD-10-CM

## 2021-01-25 DIAGNOSIS — I10 ESSENTIAL HYPERTENSION: ICD-10-CM

## 2021-01-25 DIAGNOSIS — I25.10 CORONARY ARTERY DISEASE INVOLVING NATIVE CORONARY ARTERY OF NATIVE HEART WITHOUT ANGINA PECTORIS: ICD-10-CM

## 2021-01-25 DIAGNOSIS — I49.3 PVC (PREMATURE VENTRICULAR CONTRACTION): Primary | ICD-10-CM

## 2021-01-25 PROCEDURE — 93000 ELECTROCARDIOGRAM COMPLETE: CPT | Performed by: INTERNAL MEDICINE

## 2021-01-25 PROCEDURE — 99213 OFFICE O/P EST LOW 20 MIN: CPT | Performed by: INTERNAL MEDICINE

## 2021-01-25 RX ORDER — ACEBUTOLOL HYDROCHLORIDE 200 MG/1
200 CAPSULE ORAL DAILY
Qty: 30 CAPSULE | Refills: 3 | Status: SHIPPED | OUTPATIENT
Start: 2021-01-25 | End: 2021-05-04 | Stop reason: SDUPTHER

## 2021-01-25 NOTE — PROGRESS NOTES
CC--palpitations and bradycardia    Sub--70-year-old very pleasant patient came for follow-up after initiation of acebutolol at prior visit and patient was seen in the past for suspected tachycardia/bradycardia syndrome  She was on atenolol which has been stopped and changed to acebutolol and she saw some improvement of symptoms with occasional palpitations without any neptali syncope and complains of mild dizziness--Her symptoms started increasing since March of this year and she kind of correlated to loss of her spouse with grief  She complains of palpitations and sometimes fullness in her neck and denies any symptoms of neptali syncope  She does have atypical epigastric discomfort unrelated to exertion and a stress test showed normal EF and inferoapical ischemia could not be excluded and left jaycob medical management--she underwent cardiac catheterization which showed moderate LAD disease negative on IFR and left to medical management  She had normal EF on echocardiography with sclerotic aortic valve without stenosis  An event recorder on her revealed sinus bradycardia with intermittent PVCs  She denies any TIA or stroke or renal disease in the past  She does have essential hypertension on amlodipine  She continues to have transient symptoms mostly lasting few seconds without any neptali syncope        Past Medical History:   Diagnosis Date   • Depression    • Diabetic peripheral neuropathy    • GERD    • Insomnia    • Lower back pain    • Obesity    • Osteoarthritis    • Osteopenia    • Palpitations    • Pulmonary nodule    • RA    • Rheumatoid nodule 06/2011     rt. thumb x 2 and rt. & lt. 3rd fingers (Oct.2012)/ rt. elbow, left elbow, and epidermal inclusion cyst post. neck (June 2011)-----Dr. Whitmore   • Vitamin D deficiency      Physical Exam  VITALS REVIEWED  General:      well developed, well nourished, in no acute distress.    Head:      normocephalic and atraumatic.    Eyes:      PERRL/EOM intact, conjunctiva  and sclera clear with out nystagmus.    Neck:      no masses, thyromegaly,  trachea central with normal respiratory effort   Lungs:      clear bilaterally to auscultation.    Heart:      Sinus rhythm with ejection systolic murmur at the base of the heart consistent with aortic valve sclerosis with normal carotid upstroke without any gallops or rubs        Assessment and plan    Sinus bradycardia with symptoms predominantly in the form of fatigue--mild improvement with change of beta-blockers and to continue on current medications and reevaluate after few months and then decide on pacer implant depending on patient's symptoms--current symptoms are fairly transient lasting only for few seconds and medical treatment can be continued unless her symptoms get prolonged or long-lasting--in fact her resting heart rate was 66 without any PVCs on auscultation and patient was reassured and continue on current dosing of acebutolol  Intermittent PVCs  Aortic valve sclerosis without aortic stenosis  Normal EF with inferoapical artifact versus ischemia on medical treatment--post cardiac catheterization with moderate LAD disease left on medical management  Essential hypertension  Medications  reviewed and follow-up after 6 months      ECG 12 Lead    Date/Time: 1/25/2021 1:50 PM  Performed by: Alberto Gotti MD  Authorized by: Alberto Gotti MD   Comparison: compared with previous ECG   Similar to previous ECG  Rhythm: sinus rhythm  Rate: normal  Conduction: conduction normal  QRS axis: left  Other findings: non-specific ST-T wave changes and low voltage          Electronically signed by Alberto Gotti MD, 01/25/21, 1:49 PM EST.

## 2021-01-26 RX ORDER — AMLODIPINE BESYLATE 10 MG/1
10 TABLET ORAL DAILY
Qty: 90 TABLET | Refills: 0 | Status: SHIPPED | OUTPATIENT
Start: 2021-01-26 | End: 2021-03-29

## 2021-02-09 NOTE — PROGRESS NOTES
Continued Stay Note  JONATHON Chong     Patient Name: Jenna Rosa  MRN: [id number]  Today's Date: 5/17/2020    Admit Date: 5/13/2020    Discharge Plan     Row Name 05/17/20 1749       Plan    Final Discharge Disposition Code  01 - home or self-care            Expected Discharge Date and Time     Expected Discharge Date Expected Discharge Time    May 15, 2020             Carine Barroso RN     Problem: Discharge Planning:  Goal: Discharged to appropriate level of care  Description: Discharged to appropriate level of care  Outcome: Ongoing     Problem: Body Temperature - Imbalanced:  Goal: Ability to maintain a body temperature in the normal range will improve  Description: Ability to maintain a body temperature in the normal range will improve  Outcome: Ongoing     Problem: Mobility - Impaired:  Goal: Mobility will improve to maximum level  Description: Mobility will improve to maximum level  2/9/2021 1557 by Luis Galaviz RN  Outcome: Ongoing  2/9/2021 1200 by Lius Galaviz RN  Outcome: Ongoing     Problem: Pain:  Goal: Pain level will decrease  Description: Pain level will decrease  Outcome: Ongoing  Goal: Control of acute pain  Description: Control of acute pain  Outcome: Ongoing  Goal: Control of chronic pain  Description: Control of chronic pain  Outcome: Ongoing     Problem: Skin Integrity - Impaired:  Goal: Will show no infection signs and symptoms  Description: Will show no infection signs and symptoms  Outcome: Ongoing     Problem: Infection:  Goal: Will remain free from infection  Description: Will remain free from infection  2/9/2021 1557 by Luis Galaviz RN  Outcome: Ongoing  2/9/2021 1200 by Luis Galaviz RN  Outcome: Ongoing     Problem: Daily Care:  Goal: Daily care needs are met  Description: Daily care needs are met  Outcome: Ongoing     Problem: Pain:  Goal: Pain level will decrease  Description: Pain level will decrease  Outcome: Ongoing  Goal: Control of acute pain  Description: Control of acute pain  Outcome: Ongoing  Goal: Control of chronic pain  Description: Control of chronic pain  Outcome: Ongoing     Problem:  Bowel/Gastric:  Goal: Will show no signs and symptoms of gastrointestinal bleeding  Description: Will show no signs and symptoms of gastrointestinal bleeding  Outcome: Ongoing     Problem: Coping:  Goal: Ability to identify strategies to decrease anxiety will improve  Description: Ability to identify strategies to decrease anxiety will improve  Outcome: Ongoing     Problem: Nutritional:  Goal: Ability to chew and swallow food without choking will improve  Description: Ability to chew and swallow food without choking will improve  Outcome: Ongoing  Goal: Ability to make healthy dietary choices will improve  Outcome: Ongoing     Problem: Sensory:  Goal: General experience of comfort will improve  Description: General experience of comfort will improve  Outcome: Ongoing     Problem:  Activity:  Goal: Ability to tolerate increased activity will improve  Description: Ability to tolerate increased activity will improve  Outcome: Ongoing     Problem: Cardiac:  Goal: Hemodynamic stability will improve  Description: Hemodynamic stability will improve  Outcome: Ongoing

## 2021-02-16 RX ORDER — SITAGLIPTIN AND METFORMIN HYDROCHLORIDE 1000; 100 MG/1; MG/1
TABLET, FILM COATED, EXTENDED RELEASE ORAL
Qty: 90 TABLET | Refills: 0 | Status: SHIPPED | OUTPATIENT
Start: 2021-02-16 | End: 2021-03-29

## 2021-03-03 RX ORDER — INSULIN NPH HUM/REG INSULIN HM 70-30/ML
VIAL (ML) SUBCUTANEOUS
Qty: 30 EACH | Refills: 2 | Status: SHIPPED | OUTPATIENT
Start: 2021-03-03 | End: 2021-05-21 | Stop reason: SDUPTHER

## 2021-03-15 ENCOUNTER — TELEPHONE (OUTPATIENT)
Dept: ENDOCRINOLOGY | Facility: CLINIC | Age: 71
End: 2021-03-15

## 2021-03-16 NOTE — TELEPHONE ENCOUNTER
[place] pt. Appt rescheduled for 5/21 @ 1015. There are no lab orders in the chart. Pt wants to know if you want her to have labs before her appt?

## 2021-03-18 DIAGNOSIS — Z79.4 TYPE 2 DIABETES MELLITUS WITH HYPERGLYCEMIA, WITH LONG-TERM CURRENT USE OF INSULIN (HCC): Primary | ICD-10-CM

## 2021-03-18 DIAGNOSIS — E11.65 TYPE 2 DIABETES MELLITUS WITH HYPERGLYCEMIA, WITH LONG-TERM CURRENT USE OF INSULIN (HCC): Primary | ICD-10-CM

## 2021-03-19 DIAGNOSIS — G62.9 NEUROPATHY: ICD-10-CM

## 2021-03-19 RX ORDER — GABAPENTIN 300 MG/1
300 CAPSULE ORAL 2 TIMES DAILY
Qty: 180 CAPSULE | Refills: 0 | Status: SHIPPED | OUTPATIENT
Start: 2021-03-19 | End: 2021-06-15

## 2021-03-19 NOTE — TELEPHONE ENCOUNTER
Last visit:  11/19/20  Next visit: none  Last labs: 11/17/20    Rx requested: Gabapentin   Pharmacy: [facility] in [place]

## 2021-03-22 RX ORDER — FENOFIBRATE 160 MG/1
TABLET ORAL
Qty: 90 TABLET | Refills: 0 | Status: SHIPPED | OUTPATIENT
Start: 2021-03-22 | End: 2021-06-16

## 2021-03-22 NOTE — TELEPHONE ENCOUNTER
Last visit: 11/19/20  Next visit:none  Last labs:11/17/20    Rx requested:Fenofibrate   Pharmacy: [facility] in [place]

## 2021-03-23 RX ORDER — ESCITALOPRAM OXALATE 20 MG/1
TABLET ORAL
Qty: 90 TABLET | Refills: 0 | Status: SHIPPED | OUTPATIENT
Start: 2021-03-23 | End: 2021-09-14

## 2021-03-29 DIAGNOSIS — E11.65 TYPE 2 DIABETES MELLITUS WITH HYPERGLYCEMIA, WITH LONG-TERM CURRENT USE OF INSULIN (HCC): Primary | ICD-10-CM

## 2021-03-29 DIAGNOSIS — Z79.4 TYPE 2 DIABETES MELLITUS WITH HYPERGLYCEMIA, WITH LONG-TERM CURRENT USE OF INSULIN (HCC): Primary | ICD-10-CM

## 2021-03-29 RX ORDER — PEN NEEDLE, DIABETIC 29 G X1/2"
NEEDLE, DISPOSABLE MISCELLANEOUS
Qty: 150 EACH | Refills: 3 | Status: SHIPPED | OUTPATIENT
Start: 2021-03-29 | End: 2021-10-20

## 2021-03-29 NOTE — TELEPHONE ENCOUNTER
Last visit:  11/19/20  Next visit: none  Last labs:11/17/20     Rx requested: Lansoprazole,Janumet,Amlodipine   Pharmacy: [facility] in [place]

## 2021-03-30 RX ORDER — AMLODIPINE BESYLATE 10 MG/1
10 TABLET ORAL DAILY
Qty: 90 TABLET | Refills: 0 | Status: SHIPPED | OUTPATIENT
Start: 2021-03-30 | End: 2021-06-21 | Stop reason: SDUPTHER

## 2021-03-30 RX ORDER — SITAGLIPTIN AND METFORMIN HYDROCHLORIDE 1000; 100 MG/1; MG/1
TABLET, FILM COATED, EXTENDED RELEASE ORAL
Qty: 90 TABLET | Refills: 0 | Status: SHIPPED | OUTPATIENT
Start: 2021-03-30 | End: 2021-06-21 | Stop reason: SDUPTHER

## 2021-03-30 RX ORDER — LANSOPRAZOLE 30 MG/1
CAPSULE, DELAYED RELEASE ORAL
Qty: 90 CAPSULE | Refills: 0 | Status: SHIPPED | OUTPATIENT
Start: 2021-03-30 | End: 2021-06-21 | Stop reason: SDUPTHER

## 2021-04-09 RX ORDER — LOVASTATIN 40 MG/1
40 TABLET ORAL DAILY
Qty: 90 TABLET | Refills: 0 | Status: SHIPPED | OUTPATIENT
Start: 2021-04-09 | End: 2021-06-21 | Stop reason: SDUPTHER

## 2021-04-09 RX ORDER — TRAZODONE HYDROCHLORIDE 50 MG/1
50 TABLET ORAL
Qty: 90 TABLET | Refills: 0 | Status: SHIPPED | OUTPATIENT
Start: 2021-04-09 | End: 2021-06-21 | Stop reason: SDUPTHER

## 2021-04-14 DIAGNOSIS — M05.9 SEROPOSITIVE RHEUMATOID ARTHRITIS (HCC): ICD-10-CM

## 2021-04-14 DIAGNOSIS — Z79.899 LONG-TERM USE OF HIGH-RISK MEDICATION: ICD-10-CM

## 2021-04-14 RX ORDER — ADALIMUMAB 40MG/0.4ML
40 KIT SUBCUTANEOUS
Qty: 6 EACH | Refills: 0 | Status: SHIPPED | OUTPATIENT
Start: 2021-04-14 | End: 2021-07-14 | Stop reason: SDUPTHER

## 2021-04-16 ENCOUNTER — TELEPHONE (OUTPATIENT)
Dept: FAMILY MEDICINE CLINIC | Facility: CLINIC | Age: 71
End: 2021-04-16

## 2021-04-16 NOTE — TELEPHONE ENCOUNTER
PATIENT CALLED IN STATING SHE HAS GROIN PAIN, SHE WOULD LIKE TO SPEAK WITH THE NURSE ABOUT WHAT SHE CAN DO.    BEST CALL BACK #

## 2021-04-26 DIAGNOSIS — Z79.4 TYPE 2 DIABETES MELLITUS WITH HYPERGLYCEMIA, WITH LONG-TERM CURRENT USE OF INSULIN (HCC): Primary | ICD-10-CM

## 2021-04-26 DIAGNOSIS — E11.65 TYPE 2 DIABETES MELLITUS WITH HYPERGLYCEMIA, WITH LONG-TERM CURRENT USE OF INSULIN (HCC): Primary | ICD-10-CM

## 2021-04-26 RX ORDER — BLOOD SUGAR DIAGNOSTIC
STRIP MISCELLANEOUS
Qty: 100 EACH | Refills: 11 | Status: SHIPPED | OUTPATIENT
Start: 2021-04-26 | End: 2021-10-27 | Stop reason: SDUPTHER

## 2021-04-30 ENCOUNTER — HOSPITAL ENCOUNTER (EMERGENCY)
Facility: HOSPITAL | Age: 71
Discharge: HOME | End: 2021-05-01
Attending: EMERGENCY MEDICINE | Admitting: EMERGENCY MEDICINE

## 2021-04-30 DIAGNOSIS — R10.84 GENERALIZED ABDOMINAL PAIN: Primary | ICD-10-CM

## 2021-04-30 DIAGNOSIS — R11.2 NON-INTRACTABLE VOMITING WITH NAUSEA, UNSPECIFIED VOMITING TYPE: ICD-10-CM

## 2021-04-30 PROCEDURE — 99283 EMERGENCY DEPT VISIT LOW MDM: CPT

## 2021-04-30 RX ORDER — ONDANSETRON 2 MG/ML
4 INJECTION INTRAMUSCULAR; INTRAVENOUS ONCE
Status: COMPLETED | OUTPATIENT
Start: 2021-04-30 | End: 2021-05-01

## 2021-05-01 ENCOUNTER — APPOINTMENT (OUTPATIENT)
Dept: CT IMAGING | Facility: HOSPITAL | Age: 71
End: 2021-05-01

## 2021-05-01 VITALS
TEMPERATURE: 97.9 F | HEIGHT: 63 IN | OXYGEN SATURATION: 97 % | DIASTOLIC BLOOD PRESSURE: 48 MMHG | BODY MASS INDEX: 30.78 KG/M2 | RESPIRATION RATE: 18 BRPM | WEIGHT: 173.72 LBS | HEART RATE: 59 BPM | SYSTOLIC BLOOD PRESSURE: 114 MMHG

## 2021-05-01 LAB
ALBUMIN SERPL-MCNC: 4 G/DL (ref 3.5–5.2)
ALBUMIN/GLOB SERPL: 1.3 G/DL
ALP SERPL-CCNC: 44 U/L (ref 39–117)
ALT SERPL W P-5'-P-CCNC: 14 U/L (ref 1–33)
ANION GAP SERPL CALCULATED.3IONS-SCNC: 12 MMOL/L (ref 5–15)
AST SERPL-CCNC: 15 U/L (ref 1–32)
BASOPHILS # BLD AUTO: 0.1 10*3/MM3 (ref 0–0.2)
BASOPHILS NFR BLD AUTO: 0.7 % (ref 0–1.5)
BILIRUB SERPL-MCNC: <0.2 MG/DL (ref 0–1.2)
BILIRUB UR QL STRIP: NEGATIVE
BUN SERPL-MCNC: 16 MG/DL (ref 8–23)
BUN/CREAT SERPL: 16.5 (ref 7–25)
CALCIUM SPEC-SCNC: 9.3 MG/DL (ref 8.6–10.5)
CHLORIDE SERPL-SCNC: 100 MMOL/L (ref 98–107)
CLARITY UR: CLEAR
CO2 SERPL-SCNC: 25 MMOL/L (ref 22–29)
COLOR UR: YELLOW
CREAT SERPL-MCNC: 0.97 MG/DL (ref 0.57–1)
DEPRECATED RDW RBC AUTO: 41.6 FL (ref 37–54)
EOSINOPHIL # BLD AUTO: 0.2 10*3/MM3 (ref 0–0.4)
EOSINOPHIL NFR BLD AUTO: 2.5 % (ref 0.3–6.2)
ERYTHROCYTE [DISTWIDTH] IN BLOOD BY AUTOMATED COUNT: 13.3 % (ref 12.3–15.4)
GFR SERPL CREATININE-BSD FRML MDRD: 57 ML/MIN/1.73
GLOBULIN UR ELPH-MCNC: 3.2 GM/DL
GLUCOSE BLDC GLUCOMTR-MCNC: 229 MG/DL (ref 70–105)
GLUCOSE SERPL-MCNC: 234 MG/DL (ref 65–99)
GLUCOSE UR STRIP-MCNC: ABNORMAL MG/DL
HCT VFR BLD AUTO: 38 % (ref 34–46.6)
HGB BLD-MCNC: 12.9 G/DL (ref 12–15.9)
HGB UR QL STRIP.AUTO: NEGATIVE
HOLD SPECIMEN: NORMAL
KETONES UR QL STRIP: NEGATIVE
LEUKOCYTE ESTERASE UR QL STRIP.AUTO: NEGATIVE
LIPASE SERPL-CCNC: 27 U/L (ref 13–60)
LYMPHOCYTES # BLD AUTO: 2.5 10*3/MM3 (ref 0.7–3.1)
LYMPHOCYTES NFR BLD AUTO: 30.3 % (ref 19.6–45.3)
MCH RBC QN AUTO: 29.7 PG (ref 26.6–33)
MCHC RBC AUTO-ENTMCNC: 33.9 G/DL (ref 31.5–35.7)
MCV RBC AUTO: 87.6 FL (ref 79–97)
MONOCYTES # BLD AUTO: 0.7 10*3/MM3 (ref 0.1–0.9)
MONOCYTES NFR BLD AUTO: 7.9 % (ref 5–12)
NEUTROPHILS NFR BLD AUTO: 4.8 10*3/MM3 (ref 1.7–7)
NEUTROPHILS NFR BLD AUTO: 58.6 % (ref 42.7–76)
NITRITE UR QL STRIP: NEGATIVE
NRBC BLD AUTO-RTO: 0.1 /100 WBC (ref 0–0.2)
PH UR STRIP.AUTO: 6 [PH] (ref 5–8)
PLATELET # BLD AUTO: 265 10*3/MM3 (ref 140–450)
PMV BLD AUTO: 7.4 FL (ref 6–12)
POTASSIUM SERPL-SCNC: 4.4 MMOL/L (ref 3.5–5.2)
PROT SERPL-MCNC: 7.2 G/DL (ref 6–8.5)
PROT UR QL STRIP: NEGATIVE
RBC # BLD AUTO: 4.34 10*6/MM3 (ref 3.77–5.28)
SODIUM SERPL-SCNC: 137 MMOL/L (ref 136–145)
SP GR UR STRIP: 1.01 (ref 1–1.03)
UROBILINOGEN UR QL STRIP: ABNORMAL
WBC # BLD AUTO: 8.3 10*3/MM3 (ref 3.4–10.8)
WHOLE BLOOD HOLD SPECIMEN: NORMAL

## 2021-05-01 PROCEDURE — 82962 GLUCOSE BLOOD TEST: CPT

## 2021-05-01 PROCEDURE — 83690 ASSAY OF LIPASE: CPT | Performed by: EMERGENCY MEDICINE

## 2021-05-01 PROCEDURE — 80053 COMPREHEN METABOLIC PANEL: CPT | Performed by: EMERGENCY MEDICINE

## 2021-05-01 PROCEDURE — 96375 TX/PRO/DX INJ NEW DRUG ADDON: CPT

## 2021-05-01 PROCEDURE — 74177 CT ABD & PELVIS W/CONTRAST: CPT

## 2021-05-01 PROCEDURE — 85025 COMPLETE CBC W/AUTO DIFF WBC: CPT | Performed by: EMERGENCY MEDICINE

## 2021-05-01 PROCEDURE — 0 IOPAMIDOL PER 1 ML: Performed by: EMERGENCY MEDICINE

## 2021-05-01 PROCEDURE — 96374 THER/PROPH/DIAG INJ IV PUSH: CPT

## 2021-05-01 PROCEDURE — 25010000002 ONDANSETRON PER 1 MG: Performed by: EMERGENCY MEDICINE

## 2021-05-01 PROCEDURE — 81003 URINALYSIS AUTO W/O SCOPE: CPT | Performed by: EMERGENCY MEDICINE

## 2021-05-01 RX ORDER — ONDANSETRON 4 MG/1
4 TABLET, ORALLY DISINTEGRATING ORAL EVERY 8 HOURS PRN
Qty: 12 TABLET | Refills: 0 | Status: SHIPPED | OUTPATIENT
Start: 2021-05-01 | End: 2021-05-04 | Stop reason: SDUPTHER

## 2021-05-01 RX ADMIN — IOPAMIDOL 100 ML: 755 INJECTION, SOLUTION INTRAVENOUS at 01:08

## 2021-05-01 RX ADMIN — SODIUM CHLORIDE 500 ML: 9 INJECTION, SOLUTION INTRAVENOUS at 00:36

## 2021-05-01 RX ADMIN — ONDANSETRON 4 MG: 2 INJECTION INTRAMUSCULAR; INTRAVENOUS at 00:39

## 2021-05-01 RX ADMIN — FAMOTIDINE 20 MG: 10 INJECTION INTRAVENOUS at 00:39

## 2021-05-01 NOTE — ED PROVIDER NOTES
Subjective   71-year-old female complains of onset of nonbloody vomiting and mild diarrhea at 8 PM.  No known sick contacts.  No fever, no chest symptoms.  There is associated moderate epigastric abdominal cramps.  No urinary symptoms.          Review of Systems   Gastrointestinal: Positive for abdominal pain, diarrhea, nausea and vomiting.   All other systems reviewed and are negative.      Past Medical History:   Diagnosis Date   • Depression    • Diabetic peripheral neuropathy    • GERD    • Insomnia    • Lower back pain    • Obesity    • Osteoarthritis    • Osteopenia    • Palpitations    • Pulmonary nodule    • RA    • Rheumatoid nodule 06/2011     rt. thumb x 2 and rt. & lt. 3rd fingers (Oct.2012)/ rt. elbow, left elbow, and epidermal inclusion cyst post. neck (June 2011)-----Dr. Whitmore   • Vitamin D deficiency        No Known Allergies    Past Surgical History:   Procedure Laterality Date   • CARDIAC CATHETERIZATION N/A 9/16/2020    Procedure: Left Heart Cath;  Surgeon: Walker Rodriguez MD;  [facility];  Service: Cardiovascular;  Laterality: N/A;   • CARDIAC CATHETERIZATION N/A 9/16/2020    Procedure: Coronary angiography;  Surgeon: Walker Rodriguez MD;  [facility];  Service: Cardiovascular;  Laterality: N/A;   • CARDIAC CATHETERIZATION  9/16/2020    Procedure: Functional Flow [place];  Surgeon: Kath Medina MD;  [facility];  Service: Cardiology;;   • CHOLECYSTECTOMY      Abstracted from [employer]   • COLONOSCOPY  2017    2017 = TA, rech 2020 [facility]   • CYST REMOVAL      Seb cyst on neck   • OTHER SURGICAL HISTORY       Uterine Prolapse repair   • TOTAL ABDOMINAL HYSTERECTOMY      Abstracted from [employer] [facility]---endometriosis   • TOTAL ABDOMINAL HYSTERECTOMY WITH SALPINGO OOPHORECTOMY         Family History   Problem Relation Age of Onset   • Heart disease Mother    • Diabetes Mother    •  Hypertension Mother    • Rheum arthritis Mother    • Coronary artery disease Mother    • Alcohol abuse Mother    • Heart disease Father    • Mental illness Father    • Hypertension Father    • Alcohol abuse Father    • Stroke Sister    • Cancer Sister 29        RENAL Cancer   • Osteoarthritis Sister    • Rheum arthritis Sister    • Diabetes Maternal Grandfather    • Rheum arthritis Sister    • Osteoarthritis Sister    • Diabetes Sister    • Rheum arthritis Brother    • Osteoarthritis Brother        Social History     Socioeconomic History   • Marital status:      Spouse name: Carson Rosa   • Number of children: 3   • Years of education: Not on file   • Highest education level: Not on file   Tobacco Use   • Smoking status: Never Smoker   • Smokeless tobacco: Never Used   Substance and Sexual Activity   • Alcohol use: Yes     Comment: occ   • Drug use: Defer   • Sexual activity: Defer           Objective   Physical Exam  Constitutional:       Appearance: Normal appearance.   HENT:      Head: Normocephalic and atraumatic.      Mouth/Throat:      Mouth: Mucous membranes are moist.      Pharynx: Oropharynx is clear.   Eyes:      Conjunctiva/sclera: Conjunctivae normal.      Pupils: Pupils are equal, round, and reactive to light.   Cardiovascular:      Rate and Rhythm: Normal rate and regular rhythm.   Pulmonary:      Effort: Pulmonary effort is normal.      Breath sounds: Normal breath sounds.   Abdominal:      General: Bowel sounds are normal. There is no distension.      Palpations: Abdomen is soft.      Comments: Mild diffuse abdominal tenderness, no rebound or guarding   Musculoskeletal:         General: Normal range of motion.      Cervical back: Normal range of motion and neck supple.   Skin:     General: Skin is warm and dry.      Capillary Refill: Capillary refill takes less than 2 seconds.   Neurological:      General: No focal deficit present.      Mental Status: She is alert and oriented to person,  place, and time.   Psychiatric:         Mood and Affect: Mood normal.         Behavior: Behavior normal.         Procedures           ED Course                                           MDM  Number of Diagnoses or Management Options  Generalized abdominal pain  Non-intractable vomiting with nausea, unspecified vomiting type  Diagnosis management comments: Results for orders placed or performed during the hospital encounter of 04/30/21  -Comprehensive Metabolic Panel  Specimen: Blood       Result                      Value             Ref Range           Glucose                     234 (H)           65 - 99 mg/dL       BUN                         16                8 - 23 mg/dL        Creatinine                  0.97              0.57 - 1.00 *       Sodium                      137               136 - 145 mm*       Potassium                   4.4               3.5 - 5.2 mm*       Chloride                    100               98 - 107 mmo*       CO2                         25.0              22.0 - 29.0 *       Calcium                     9.3               8.6 - 10.5 m*       Total Protein               7.2               6.0 - 8.5 g/*       Albumin                     4.00              3.50 - 5.20 *       ALT (SGPT)                  14                1 - 33 U/L          AST (SGOT)                  15                1 - 32 U/L          Alkaline Phosphatase        44                39 - 117 U/L        Total Bilirubin             <0.2              0.0 - 1.2 mg*       eGFR Non  Amer       57 (L)            >60 mL/min/1*       Globulin                    3.2               gm/dL               A/G Ratio                   1.3               g/dL                BUN/Creatinine Ratio        16.5              7.0 - 25.0          Anion Gap                   12.0              5.0 - 15.0 m*  -Lipase  Specimen: Blood       Result                      Value             Ref Range           Lipase                      27                 13 - 60 U/L    -Urinalysis With Culture If Indicated - Urine, Clean Catch  Specimen: Urine, Clean Catch       Result                      Value             Ref Range           Color, UA                   Yellow            Yellow, Straw       Appearance, UA              Clear             Clear               pH, UA                      6.0               5.0 - 8.0           Specific [place], UA        1.010             1.005 - 1.030       Glucose, UA                                   Negative        500 mg/dL (2+) (A)       Ketones, UA                 Negative          Negative            Bilirubin, UA               Negative          Negative            Blood, UA                   Negative          Negative            Protein, UA                 Negative          Negative            Leuk Esterase, UA           Negative          Negative            Nitrite, UA                 Negative          Negative            Urobilinogen, UA            0.2 E.U./dL       0.2 - 1.0 E.*  -CBC Auto Differential  Specimen: Arm, Right; Blood       Result                      Value             Ref Range           WBC                         8.30              3.40 - 10.80*       RBC                         4.34              3.77 - 5.28 *       Hemoglobin                  12.9              12.0 - 15.9 *       Hematocrit                  38.0              34.0 - 46.6 %       MCV                         87.6              79.0 - 97.0 *       MCH                         29.7              26.6 - 33.0 *       MCHC                        33.9              31.5 - 35.7 *       RDW                         13.3              12.3 - 15.4 %       RDW-SD                      41.6              37.0 - 54.0 *       MPV                         7.4               6.0 - 12.0 fL       Platelets                   265               140 - 450 10*       Neutrophil %                58.6              42.7 - 76.0 %       Lymphocyte %                30.3               19.6 - 45.3 %       Monocyte %                  7.9               5.0 - 12.0 %        Eosinophil %                2.5               0.3 - 6.2 %         Basophil %                  0.7               0.0 - 1.5 %         Neutrophils, Absolute       4.80              1.70 - 7.00 *       Lymphocytes, Absolute       2.50              0.70 - 3.10 *       Monocytes, Absolute         0.70              0.10 - 0.90 *       Eosinophils, Absolute       0.20              0.00 - 0.40 *       Basophils, Absolute         0.10              0.00 - 0.20 *       nRBC                        0.1               0.0 - 0.2 /1*  -POC Glucose Once  Specimen: Blood       Result                      Value             Ref Range           Glucose                     229 (H)           70 - 105 mg/*  -Light Blue Top       Result                      Value             Ref Range           Extra Tube                                                    hold for add-on  -Gold Top - SST       Result                      Value             Ref Range           Extra Tube                                                    Hold for add-ons.  CT Abdomen Pelvis With Contrast   Final Result            1. No acute process seen within the abdomen or pelvis.    2. Diverticulosis without evidence of diverticulitis.    3. Hepatomegaly with mild diffuse hepatic steatosis.    4. Small right lower lobe pulmonary nodule. In a low-risk patient, no further follow-up would be needed. In a high-risk patient, follow-up in one year would be recommended.                    Electronically signed by:  Jared Lovelace D.O.      4/30/2021 11:27 PM       Patient well, feels better.  CT findings reviewed.  Patient will fu with her PCP regarding lung nodule.       Amount and/or Complexity of Data Reviewed  Clinical lab tests: reviewed and ordered  Tests in the radiology section of CPT®: ordered and reviewed  Tests in the medicine section of CPT®: ordered and reviewed        Final  diagnoses:   Generalized abdominal pain   Non-intractable vomiting with nausea, unspecified vomiting type       ED Disposition  ED Disposition     ED Disposition Condition Comment    Discharge Stable           Emily Burdick,   [address]  [place] [id number] [phone]    Schedule an appointment as soon as possible for a visit            Medication List      New Prescriptions    ondansetron ODT 4 MG disintegrating tablet  Commonly known as: ZOFRAN-ODT  Place 1 tablet on the tongue Every 8 (Eight) Hours As Needed for Nausea.           Where to Get Your Medications      These medications were sent to [id number] - [place], [place] - [address] AT [address] - [phone] [facility] [phone]   [address] [facility] [place] [address]    Hours: 24-hours Phone: [phone]   · ondansetron ODT 4 MG disintegrating tablet          Candido Jalloh MD  05/01/21 0147

## 2021-05-01 NOTE — DISCHARGE INSTRUCTIONS
Have your Family Doctor review today's testing with attention to CT of the Abdomen.  Incidental note was made of right lung nodule which may require follow up imaging.

## 2021-05-03 ENCOUNTER — TELEPHONE (OUTPATIENT)
Dept: FAMILY MEDICINE CLINIC | Facility: CLINIC | Age: 71
End: 2021-05-03

## 2021-05-03 NOTE — TELEPHONE ENCOUNTER
Caller: Jenna Rosa    Relationship to patient: Self    Best call back number: [phone]   New or established patient?  [] New  [x] Established  Date of discharge: 4/31/21  Facility discharged from: [facility]    Diagnosis/Symptoms: PLEASE SEE ED DOCUMENATATION    Length of stay (If applicable):1 CARMENZA    Specialty Only: Did you see a [facility] health provider?    [x] Yes  [] No  DR. TRACEE HAIRSTON    * NEEDED TO CHANGE DOCTORS.  DR. CHENG DID NOT HAVE ANY APPOINTMENT TILL NEXT WEEK.

## 2021-05-04 ENCOUNTER — OFFICE VISIT (OUTPATIENT)
Dept: FAMILY MEDICINE CLINIC | Facility: CLINIC | Age: 71
End: 2021-05-04

## 2021-05-04 VITALS
BODY MASS INDEX: 30.48 KG/M2 | HEART RATE: 54 BPM | HEIGHT: 63 IN | SYSTOLIC BLOOD PRESSURE: 147 MMHG | DIASTOLIC BLOOD PRESSURE: 71 MMHG | TEMPERATURE: 97.8 F | WEIGHT: 172 LBS | RESPIRATION RATE: 16 BRPM | OXYGEN SATURATION: 96 %

## 2021-05-04 DIAGNOSIS — R11.2 NON-INTRACTABLE VOMITING WITH NAUSEA, UNSPECIFIED VOMITING TYPE: Primary | ICD-10-CM

## 2021-05-04 DIAGNOSIS — Z63.4 BEREAVEMENT: ICD-10-CM

## 2021-05-04 DIAGNOSIS — F33.1 MAJOR DEPRESSIVE DISORDER, RECURRENT EPISODE, MODERATE (HCC): ICD-10-CM

## 2021-05-04 PROCEDURE — 99213 OFFICE O/P EST LOW 20 MIN: CPT | Performed by: FAMILY MEDICINE

## 2021-05-04 RX ORDER — ISOSORBIDE MONONITRATE 30 MG/1
30 TABLET, EXTENDED RELEASE ORAL 2 TIMES DAILY
Qty: 180 TABLET | Refills: 2 | Status: SHIPPED | OUTPATIENT
Start: 2021-05-04 | End: 2022-02-07

## 2021-05-04 RX ORDER — ACEBUTOLOL HYDROCHLORIDE 200 MG/1
200 CAPSULE ORAL DAILY
Qty: 90 CAPSULE | Refills: 1 | Status: SHIPPED | OUTPATIENT
Start: 2021-05-04 | End: 2021-11-15 | Stop reason: SDUPTHER

## 2021-05-04 RX ORDER — ONDANSETRON 4 MG/1
4 TABLET, ORALLY DISINTEGRATING ORAL EVERY 8 HOURS PRN
Qty: 30 TABLET | Refills: 0 | Status: SHIPPED | OUTPATIENT
Start: 2021-05-04 | End: 2021-11-15 | Stop reason: SDUPTHER

## 2021-05-04 SDOH — SOCIAL STABILITY - SOCIAL INSECURITY: DISSAPEARANCE AND DEATH OF FAMILY MEMBER: Z63.4

## 2021-05-17 ENCOUNTER — LAB (OUTPATIENT)
Dept: LAB | Facility: HOSPITAL | Age: 71
End: 2021-05-17

## 2021-05-17 DIAGNOSIS — I10 ESSENTIAL HYPERTENSION: ICD-10-CM

## 2021-05-17 DIAGNOSIS — E78.2 MIXED HYPERLIPIDEMIA: ICD-10-CM

## 2021-05-17 DIAGNOSIS — Z79.4 TYPE 2 DIABETES MELLITUS WITH OTHER SPECIFIED COMPLICATION, WITH LONG-TERM CURRENT USE OF INSULIN (HCC): ICD-10-CM

## 2021-05-17 DIAGNOSIS — I25.10 CORONARY ARTERY DISEASE INVOLVING NATIVE CORONARY ARTERY OF NATIVE HEART WITHOUT ANGINA PECTORIS: ICD-10-CM

## 2021-05-17 DIAGNOSIS — E11.69 TYPE 2 DIABETES MELLITUS WITH OTHER SPECIFIED COMPLICATION, WITH LONG-TERM CURRENT USE OF INSULIN (HCC): ICD-10-CM

## 2021-05-17 DIAGNOSIS — R00.2 PALPITATIONS: ICD-10-CM

## 2021-05-17 LAB
ALBUMIN SERPL-MCNC: 3.9 G/DL (ref 3.5–5.2)
ALBUMIN/GLOB SERPL: 1.4 G/DL
ALP SERPL-CCNC: 44 U/L (ref 39–117)
ALT SERPL W P-5'-P-CCNC: 14 U/L (ref 1–33)
ANION GAP SERPL CALCULATED.3IONS-SCNC: 9.1 MMOL/L (ref 5–15)
AST SERPL-CCNC: 20 U/L (ref 1–32)
BILIRUB SERPL-MCNC: 0.2 MG/DL (ref 0–1.2)
BUN SERPL-MCNC: 11 MG/DL (ref 8–23)
BUN/CREAT SERPL: 12.4 (ref 7–25)
CALCIUM SPEC-SCNC: 9.3 MG/DL (ref 8.6–10.5)
CHLORIDE SERPL-SCNC: 102 MMOL/L (ref 98–107)
CHOLEST SERPL-MCNC: 135 MG/DL (ref 0–200)
CO2 SERPL-SCNC: 28.9 MMOL/L (ref 22–29)
CREAT SERPL-MCNC: 0.89 MG/DL (ref 0.57–1)
GFR SERPL CREATININE-BSD FRML MDRD: 63 ML/MIN/1.73
GLOBULIN UR ELPH-MCNC: 2.8 GM/DL
GLUCOSE SERPL-MCNC: 114 MG/DL (ref 65–99)
HDLC SERPL-MCNC: 45 MG/DL (ref 40–60)
LDLC SERPL CALC-MCNC: 67 MG/DL (ref 0–100)
LDLC/HDLC SERPL: 1.42 {RATIO}
POTASSIUM SERPL-SCNC: 4 MMOL/L (ref 3.5–5.2)
PROT SERPL-MCNC: 6.7 G/DL (ref 6–8.5)
SODIUM SERPL-SCNC: 140 MMOL/L (ref 136–145)
TRIGL SERPL-MCNC: 130 MG/DL (ref 0–150)
VLDLC SERPL-MCNC: 23 MG/DL (ref 5–40)

## 2021-05-17 PROCEDURE — 80061 LIPID PANEL: CPT

## 2021-05-17 PROCEDURE — 36415 COLL VENOUS BLD VENIPUNCTURE: CPT

## 2021-05-17 PROCEDURE — 80053 COMPREHEN METABOLIC PANEL: CPT

## 2021-05-19 ENCOUNTER — OFFICE VISIT (OUTPATIENT)
Dept: CARDIOLOGY | Facility: CLINIC | Age: 71
End: 2021-05-19

## 2021-05-19 VITALS
HEIGHT: 63 IN | BODY MASS INDEX: 30.48 KG/M2 | WEIGHT: 172 LBS | OXYGEN SATURATION: 98 % | HEART RATE: 53 BPM | DIASTOLIC BLOOD PRESSURE: 71 MMHG | SYSTOLIC BLOOD PRESSURE: 133 MMHG

## 2021-05-19 DIAGNOSIS — Z79.4 TYPE 2 DIABETES MELLITUS WITH HYPERGLYCEMIA, WITH LONG-TERM CURRENT USE OF INSULIN (HCC): Primary | ICD-10-CM

## 2021-05-19 DIAGNOSIS — E78.2 MIXED HYPERLIPIDEMIA: ICD-10-CM

## 2021-05-19 DIAGNOSIS — I10 ESSENTIAL HYPERTENSION: ICD-10-CM

## 2021-05-19 DIAGNOSIS — I25.10 CORONARY ARTERY DISEASE INVOLVING NATIVE CORONARY ARTERY OF NATIVE HEART WITHOUT ANGINA PECTORIS: ICD-10-CM

## 2021-05-19 DIAGNOSIS — E11.65 TYPE 2 DIABETES MELLITUS WITH HYPERGLYCEMIA, WITH LONG-TERM CURRENT USE OF INSULIN (HCC): Primary | ICD-10-CM

## 2021-05-19 PROCEDURE — 99213 OFFICE O/P EST LOW 20 MIN: CPT | Performed by: INTERNAL MEDICINE

## 2021-05-19 NOTE — PROGRESS NOTES
Subjective:     Encounter Date:05/19/2021      Patient ID: Jenna Rosa is a 71 y.o. female.    Chief Complaint: Follow-up CAD  History of Present Illness   71 -year-old white female patient with known history of hypertension dyslipidemia and diabetes mellitus,  comes back for follow up        Patient recently admitted hospital with an episode of palpitations and bradycardia  Patient has a event monitor which showed bradycardia with significant PACs PVCs consistent with tachybradycardia episodes     Patient underwent stress test and echocardiogram May 2020  Stress test showed LVEF is normal small inferoapical ischemia cannot be excluded  Echocardiogram LVEF 65% diastolic LV dysfunction mild mitral valve regurgitation  Patient is seen by electrophysiology started on beta-blockers, sectral     Cardiac catheterization showed LAD 50 to 60% at the level of the diagonal artery and the diagonal artery ostium 80% September 2020  IFR to the LAD not significant     Patient is currently on medical treatment  At times chest pain will be treated with nitrates dose was increased recently  Blood pressure needs to be aggressively monitored advised salt reduction  Medication adjustment in the future if continues to be high    Patient comes back for follow-up  Blood pressure much better she denies of any active symptoms      The following portions of the patient's history were reviewed and updated as appropriate: Allergies current medications past family history past medical history past social history past surgical history problem list and review of systems  Past Medical History:   Diagnosis Date   • Depression    • Diabetic peripheral neuropathy    • GERD    • Insomnia    • Lower back pain    • Obesity    • Osteoarthritis    • Osteopenia    • Palpitations    • RA    • Rheumatoid nodule 06/2011     rt. thumb x 2 and rt. & lt. 3rd fingers (Oct.2012)/ rt. elbow, left elbow, and epidermal inclusion cyst post. neck (June  "2011)-----Dr. Whitmore   • Vitamin D deficiency      Past Surgical History:   Procedure Laterality Date   • CARDIAC CATHETERIZATION N/A 9/16/2020    Procedure: Left Heart Cath;  Surgeon: Walker Rodriguez MD;  Location: [facility] CATH INVASIVE LOCATION;  Service: Cardiovascular;  Laterality: N/A;   • CARDIAC CATHETERIZATION N/A 9/16/2020    Procedure: Coronary angiography;  Surgeon: Walker Rodriguez MD;  Location:  [facility] INV[facility];  Service: Cardiovascular;  Laterality: N/A;   • CARDIAC CATHETERIZATION  9/16/2020    Procedure: Functional Flow [place];  Surgeon: Kath Medina MD;  Location: [facility] CATH INVASIVE LOCATION;  Service: Cardiology;;   • CHOLECYSTECTOMY     • COLONOSCOPY  2017    2017 = TA, rech 2020 [facility]   • CYST REMOVAL      Seb cyst on neck   • OTHER SURGICAL HISTORY       Uterine Prolapse repair   • TOTAL ABDOMINAL HYSTERECTOMY     • TOTAL ABDOMINAL HYSTERECTOMY WITH SALPINGO OOPHORECTOMY       /71   Pulse 53   Ht 160 cm (63\")   Wt 78 kg (172 lb)   SpO2 98%   BMI 30.47 kg/m²   Family History   Problem Relation Age of Onset   • Heart disease Mother    • Diabetes Mother    • Hypertension Mother    • Rheum arthritis Mother    • Coronary artery disease Mother    • Alcohol abuse Mother    • Heart disease Father    • Mental illness Father    • Hypertension Father    • Alcohol abuse Father    • Stroke Sister    • Cancer Sister 29        RENAL Cancer   • Osteoarthritis Sister    • Rheum arthritis Sister    • Diabetes Maternal Grandfather    • Rheum arthritis Sister    • Osteoarthritis Sister    • Diabetes Sister    • Rheum arthritis Brother    • Osteoarthritis Brother        Current Outpatient Medications:   •  acebutolol (SECTRAL) 200 MG capsule, Take 1 capsule by mouth Daily., Disp: 90 capsule, Rfl: 1  •  amLODIPine (NORVASC) 10 MG tablet, TAKE 1 TABLET BY MOUTH DAILY. , Disp: 90 tablet, Rfl: 0  •  aspirin 81 MG EC tablet, Take 81 mg by mouth Daily., Disp: , " Rfl:   •  calcium carbonate (OS-PREET) 600 MG tablet, Take 600 mg by mouth Daily., Disp: , Rfl:   •  diclofenac (VOLTAREN) 1 % gel gel, Apply 4 g topically to the appropriate area as directed 4 (Four) Times a Day As Needed., Disp: , Rfl:   •  escitalopram (LEXAPRO) 20 MG tablet, TAKE 1 TABLET BY MOUTH EVERY NIGHT. , Disp: 90 tablet, Rfl: 0  •  fenofibrate 160 MG tablet, TAKE 1 TABLET BY MOUTH DAILY , Disp: 90 tablet, Rfl: 0  •  Ferrous Sulfate (IRON) 325 (65 Fe) MG tablet, Take 325 mg by mouth Daily., Disp: , Rfl:   •  fluticasone (FLONASE ALLERGY RELIEF) 50 MCG/ACT nasal spray, 1 spray into the nostril(s) as directed by provider Daily As Needed for Allergies., Disp: , Rfl:   •  gabapentin (NEURONTIN) 300 MG capsule, TAKE 1 CAPSULE BY MOUTH 2 (TWO) TIMES A DAY. , Disp: 180 capsule, Rfl: 0  •  Humira Pen 40 MG/0.4ML Pen-injector Kit, Inject 40 mg under the skin into the appropriate area as directed Every 14 (Fourteen) Days. Sulfate Free Formula, Disp: 6 each, Rfl: 0  •  HumuLIN 70/30 (70-30) 100 UNIT/ML injection, INJECT 52 UNITS BEFORE BREAKFAST AND 22 UNITS BEFORE SUPPER DX E11.65, Disp: 30 each, Rfl: 2  •  hydroCHLOROthiazide (HYDRODIURIL) 12.5 MG tablet, Take 1 tablet by mouth Daily., Disp: 90 tablet, Rfl: 0  •  isosorbide mononitrate (IMDUR) 30 MG 24 hr tablet, Take 1 tablet by mouth 2 (Two) Times a Day., Disp: 180 tablet, Rfl: 2  •  Janumet -1000 MG tablet, TAKE 1 TABLET BY MOUTH EVERY EVENING. , Disp: 90 tablet, Rfl: 0  •  lansoprazole (PREVACID) 30 MG capsule, TAKE 1 CAPSULE BY MOUTH DAILY , Disp: 90 capsule, Rfl: 0  •  lisinopril (PRINIVIL,ZESTRIL) 10 MG tablet, Take 1 tablet by mouth Daily., Disp: 90 tablet, Rfl: 0  •  lovastatin (MEVACOR) 40 MG tablet, Take 1 tablet by mouth Daily., Disp: 90 tablet, Rfl: 0  •  ondansetron ODT (ZOFRAN-ODT) 4 MG disintegrating tablet, Place 1 tablet on the tongue Every 8 (Eight) Hours As Needed for Nausea., Disp: 30 tablet, Rfl: 0  •  traZODone (DESYREL) 50 MG tablet,  "Take 1 tablet by mouth every night at bedtime., Disp: 90 tablet, Rfl: 0  •  vitamin B-12 (CYANOCOBALAMIN) 1000 MCG tablet, Take 1,000 mcg by mouth Daily., Disp: , Rfl:   •  BD Insulin Syringe U/F 31G X 5/16\" 1 ML misc, USE TO INJECT INSULIN 4 TIMES DAILY , Disp: 150 each, Rfl: 3  •  glucose blood (Accu-Chek Jazmyne Plus) test strip, USE ONE STRIP TO TEST TWICE A DAY, Disp: 100 each, Rfl: 11  Social History     Socioeconomic History   • Marital status:      Spouse name: Carson Rosa   • Number of children: 3   • Years of education: Not on file   • Highest education level: Not on file   Tobacco Use   • Smoking status: Never Smoker   • Smokeless tobacco: Never Used   Vaping Use   • Vaping Use: Never used   Substance and Sexual Activity   • Alcohol use: Yes     Comment: occ   • Drug use: Defer   • Sexual activity: Defer     No Known Allergies  ROS  All the systems were reviewed otherwise negative         Objective:     Physical Exam  Vital stable neck no JVP elevation lungs clear heart sounds S1-S2 regular extremities no edema bilateral pulses present right groin healed well  Procedures    Lab Review:       Assessment:          Diagnosis Plan   1. Type 2 diabetes mellitus with hyperglycemia, with long-term current use of insulin (CMS/[facility])  CK    Comprehensive Metabolic Panel    Lipid Panel   2. Coronary artery disease involving native coronary artery of native heart without angina pectoris  CK    Comprehensive Metabolic Panel    Lipid Panel   3. Essential hypertension  CK    Comprehensive Metabolic Panel    Lipid Panel   4. Mixed hyperlipidemia  CK    Comprehensive Metabolic Panel    Lipid Panel          Plan:       MDM  Number of Diagnoses or Management Options  Coronary artery disease involving native coronary artery of native heart without angina pectoris: established, improving  Essential hypertension: established, improving  Mixed hyperlipidemia: established, improving  Type 2 diabetes mellitus with " hyperglycemia, with long-term current use of insulin (CMS/[facility]): established, improving     Amount and/or Complexity of Data Reviewed  Review and summarize past medical records: yes    Risk of Complications, Morbidity, and/or Mortality  Presenting problems: moderate  Management options: moderate    Patient Progress  Patient progress: stable

## 2021-05-21 ENCOUNTER — OFFICE VISIT (OUTPATIENT)
Dept: ENDOCRINOLOGY | Facility: CLINIC | Age: 71
End: 2021-05-21

## 2021-05-21 VITALS
TEMPERATURE: 97.1 F | OXYGEN SATURATION: 96 % | HEIGHT: 63 IN | HEART RATE: 60 BPM | DIASTOLIC BLOOD PRESSURE: 70 MMHG | SYSTOLIC BLOOD PRESSURE: 130 MMHG | WEIGHT: 171 LBS | BODY MASS INDEX: 30.3 KG/M2

## 2021-05-21 DIAGNOSIS — Z79.4 TYPE 2 DIABETES MELLITUS WITH HYPERGLYCEMIA, WITH LONG-TERM CURRENT USE OF INSULIN (HCC): Primary | ICD-10-CM

## 2021-05-21 DIAGNOSIS — E11.65 TYPE 2 DIABETES MELLITUS WITH HYPERGLYCEMIA, WITH LONG-TERM CURRENT USE OF INSULIN (HCC): Primary | ICD-10-CM

## 2021-05-21 DIAGNOSIS — E11.42 DIABETIC PERIPHERAL NEUROPATHY (HCC): ICD-10-CM

## 2021-05-21 DIAGNOSIS — I10 ESSENTIAL HYPERTENSION: ICD-10-CM

## 2021-05-21 DIAGNOSIS — E78.2 MIXED HYPERLIPIDEMIA: ICD-10-CM

## 2021-05-21 LAB — GLUCOSE BLDC GLUCOMTR-MCNC: 156 MG/DL (ref 70–105)

## 2021-05-21 PROCEDURE — 82962 GLUCOSE BLOOD TEST: CPT | Performed by: INTERNAL MEDICINE

## 2021-05-21 PROCEDURE — 99214 OFFICE O/P EST MOD 30 MIN: CPT | Performed by: INTERNAL MEDICINE

## 2021-05-21 RX ORDER — INSULIN NPH HUM/REG INSULIN HM 70-30/ML
VIAL (ML) SUBCUTANEOUS
Qty: 30 EACH | Refills: 6 | Status: SHIPPED | OUTPATIENT
Start: 2021-05-21 | End: 2021-07-06 | Stop reason: SDUPTHER

## 2021-05-21 RX ORDER — EMPAGLIFLOZIN 10 MG/1
10 TABLET, FILM COATED ORAL DAILY
Qty: 30 TABLET | Refills: 6 | Status: SHIPPED | OUTPATIENT
Start: 2021-05-21 | End: 2021-06-28 | Stop reason: ALTCHOICE

## 2021-05-21 NOTE — PROGRESS NOTES
Endocrine Progress Note Outpatient     Patient Care Team:  Emily Burdick DO as PCP - General (Family Medicine)  Walker Rodriguez MD as Consulting Physician (Cardiology)    Chief Complaint: Follow-up type 2 diabetes          HPI: 71-year-old female with history of type 2 diabetes, hypertension and hyperlipidemia is here for follow-up.  For type 2 diabetes: She is currently on Janumet /1001 tablet daily with Novolin 70/30 insulin she takes 52 units before breakfast and 22 before supper.  She did bring in blood sugar records for review, she is having some low blood sugars throughout the day.  Hypertension: Well-controlled  Hyperlipidemia: On lovastatin.    Past Medical History:   Diagnosis Date   • Depression    • Diabetic peripheral neuropathy    • GERD    • Insomnia    • Lower back pain    • Obesity    • Osteoarthritis    • Osteopenia    • Palpitations    • RA    • Rheumatoid nodule 06/2011     rt. thumb x 2 and rt. & lt. 3rd fingers (Oct.2012)/ rt. elbow, left elbow, and epidermal inclusion cyst post. neck (June 2011)-----Dr. Whitmore   • Vitamin D deficiency        Social History     Socioeconomic History   • Marital status:      Spouse name: Carson Rosa   • Number of children: 3   • Years of education: Not on file   • Highest education level: Not on file   Tobacco Use   • Smoking status: Never Smoker   • Smokeless tobacco: Never Used   Vaping Use   • Vaping Use: Never used   Substance and Sexual Activity   • Alcohol use: Yes     Comment: occ   • Drug use: Defer   • Sexual activity: Defer       Family History   Problem Relation Age of Onset   • Heart disease Mother    • Diabetes Mother    • Hypertension Mother    • Rheum arthritis Mother    • Coronary artery disease Mother    • Alcohol abuse Mother    • Heart disease Father    • Mental illness Father    • Hypertension Father    • Alcohol abuse Father    • Stroke Sister    • Cancer Sister 29        RENAL Cancer   • Osteoarthritis Sister     • Rheum arthritis Sister    • Diabetes Maternal Grandfather    • Rheum arthritis Sister    • Osteoarthritis Sister    • Diabetes Sister    • Rheum arthritis Brother    • Osteoarthritis Brother        No Known Allergies    ROS:   Constitutional:  Denies fatigue, tiredness.    Eyes:  Denies change in visual acuity   HENT:  Denies nasal congestion or sore throat   Respiratory: denies cough, admit shortness of breath.   Cardiovascular:  denies chest pain, edema   GI:  Denies abdominal pain, nausea, vomiting.   Musculoskeletal:  Denies back pain or joint pain   Integument:  Denies dry skin and rash   Neurologic:  Denies headache, focal weakness or sensory changes   Endocrine:  Denies polyuria or polydipsia   Psychiatric:  Denies depression or anxiety      Vitals:    05/21/21 1031   BP: 130/70   Pulse: 60   Temp: 97.1 °F (36.2 °C)   SpO2: 96%     Body mass index is 30.29 kg/m².     Physical Exam:  GEN: NAD, conversant  EYES: EOMI, PERRL, no conjunctival erythema  NECK: no thyromegaly, full ROM   CV: RRR, no murmurs/rubs/gallops, no peripheral edema  LUNG: CTAB, no wheezes/rales/ronchi  SKIN: no rashes, no acanthosis  MSK: no deformities, full ROM of all extremities  NEURO: no tremors, DTR normal  PSYCH: AOX3, appropriate mood, affect normal      Results Review:     I reviewed the patient's new clinical results.    Lab Results   Component Value Date    HGBA1C 7.7 (H) 11/17/2020    HGBA1C 7.9 (H) 07/28/2020    HGBA1C 7.3 (H) 05/13/2020      Lab Results   Component Value Date    GLUCOSE 114 (H) 05/17/2021    BUN 11 05/17/2021    CREATININE 0.89 05/17/2021    EGFRIFNONA 63 05/17/2021    EGFRIFAFRI 59 (L) 08/13/2017    BCR 12.4 05/17/2021    K 4.0 05/17/2021    CO2 28.9 05/17/2021    CALCIUM 9.3 05/17/2021    ALBUMIN 3.90 05/17/2021    LABIL2 1.1 01/29/2019    AST 20 05/17/2021    ALT 14 05/17/2021    CHOL 135 05/17/2021    TRIG 130 05/17/2021    LDL 67 05/17/2021    HDL 45 05/17/2021     Lab Results   Component Value Date  "   TSH 1.570 05/13/2020         Medication Review: Reviewed.       Current Outpatient Medications:   •  acebutolol (SECTRAL) 200 MG capsule, Take 1 capsule by mouth Daily., Disp: 90 capsule, Rfl: 1  •  amLODIPine (NORVASC) 10 MG tablet, TAKE 1 TABLET BY MOUTH DAILY. , Disp: 90 tablet, Rfl: 0  •  aspirin 81 MG EC tablet, Take 81 mg by mouth Daily., Disp: , Rfl:   •  BD Insulin Syringe U/F 31G X 5/16\" 1 ML misc, USE TO INJECT INSULIN 4 TIMES DAILY , Disp: 150 each, Rfl: 3  •  calcium carbonate (OS-PREET) 600 MG tablet, Take 600 mg by mouth Daily., Disp: , Rfl:   •  diclofenac (VOLTAREN) 1 % gel gel, Apply 4 g topically to the appropriate area as directed 4 (Four) Times a Day As Needed., Disp: , Rfl:   •  escitalopram (LEXAPRO) 20 MG tablet, TAKE 1 TABLET BY MOUTH EVERY NIGHT. , Disp: 90 tablet, Rfl: 0  •  fenofibrate 160 MG tablet, TAKE 1 TABLET BY MOUTH DAILY , Disp: 90 tablet, Rfl: 0  •  Ferrous Sulfate (IRON) 325 (65 Fe) MG tablet, Take 325 mg by mouth Daily., Disp: , Rfl:   •  fluticasone (FLONASE ALLERGY RELIEF) 50 MCG/ACT nasal spray, 1 spray into the nostril(s) as directed by provider Daily As Needed for Allergies., Disp: , Rfl:   •  gabapentin (NEURONTIN) 300 MG capsule, TAKE 1 CAPSULE BY MOUTH 2 (TWO) TIMES A DAY. , Disp: 180 capsule, Rfl: 0  •  glucose blood (Accu-Chek Jazymne Plus) test strip, USE ONE STRIP TO TEST TWICE A DAY, Disp: 100 each, Rfl: 11  •  Humira Pen 40 MG/0.4ML Pen-injector Kit, Inject 40 mg under the skin into the appropriate area as directed Every 14 (Fourteen) Days. Sulfate Free Formula, Disp: 6 each, Rfl: 0  •  HumuLIN 70/30 (70-30) 100 UNIT/ML injection, INJECT 52 UNITS BEFORE BREAKFAST AND 22 UNITS BEFORE SUPPER DX E11.65, Disp: 30 each, Rfl: 2  •  hydroCHLOROthiazide (HYDRODIURIL) 12.5 MG tablet, Take 1 tablet by mouth Daily., Disp: 90 tablet, Rfl: 0  •  isosorbide mononitrate (IMDUR) 30 MG 24 hr tablet, Take 1 tablet by mouth 2 (Two) Times a Day., Disp: 180 tablet, Rfl: 2  •  Janumet XR " 100-1000 MG tablet, TAKE 1 TABLET BY MOUTH EVERY EVENING. , Disp: 90 tablet, Rfl: 0  •  lansoprazole (PREVACID) 30 MG capsule, TAKE 1 CAPSULE BY MOUTH DAILY , Disp: 90 capsule, Rfl: 0  •  lisinopril (PRINIVIL,ZESTRIL) 10 MG tablet, Take 1 tablet by mouth Daily., Disp: 90 tablet, Rfl: 0  •  lovastatin (MEVACOR) 40 MG tablet, Take 1 tablet by mouth Daily., Disp: 90 tablet, Rfl: 0  •  ondansetron ODT (ZOFRAN-ODT) 4 MG disintegrating tablet, Place 1 tablet on the tongue Every 8 (Eight) Hours As Needed for Nausea., Disp: 30 tablet, Rfl: 0  •  traZODone (DESYREL) 50 MG tablet, Take 1 tablet by mouth every night at bedtime., Disp: 90 tablet, Rfl: 0  •  vitamin B-12 (CYANOCOBALAMIN) 1000 MCG tablet, Take 1,000 mcg by mouth Daily., Disp: , Rfl:       Assessment/Plan   1.  Diabetes mellitus type 2: Uncontrolled with low blood sugars, will decrease insulin 70/30 to 40 units half an hour before breakfast and 20 units half an hour before supper and add Jardiance 10 mg p.o. daily as she has history of CAD.  Side effects of excessive urination and yeast infections with Jardiance discussed with her and she is advised to call if she develops any side effects and drink plenty of water.  Also advised to keep glucose source in case of low blood sugar if she continues to have blood sugars less than 70 then she will call us.  2.  Hypertension: Well-controlled, continue current medication  3.  Hyperlipidemia: On lovastatin and follow lipid panel.  4.  Diabetic peripheral neuropathy: Stable.            Dano Rangel MD FACE.

## 2021-06-12 PROCEDURE — 87077 CULTURE AEROBIC IDENTIFY: CPT | Performed by: FAMILY MEDICINE

## 2021-06-12 PROCEDURE — 87086 URINE CULTURE/COLONY COUNT: CPT | Performed by: FAMILY MEDICINE

## 2021-06-12 PROCEDURE — 87186 SC STD MICRODIL/AGAR DIL: CPT | Performed by: FAMILY MEDICINE

## 2021-06-15 DIAGNOSIS — G62.9 NEUROPATHY: ICD-10-CM

## 2021-06-15 RX ORDER — GABAPENTIN 300 MG/1
300 CAPSULE ORAL 2 TIMES DAILY
Qty: 180 CAPSULE | Refills: 0 | Status: SHIPPED | OUTPATIENT
Start: 2021-06-15 | End: 2021-10-27 | Stop reason: SDUPTHER

## 2021-06-16 RX ORDER — FENOFIBRATE 160 MG/1
TABLET ORAL
Qty: 90 TABLET | Refills: 2 | Status: SHIPPED | OUTPATIENT
Start: 2021-06-16 | End: 2022-03-21 | Stop reason: SDUPTHER

## 2021-06-21 RX ORDER — LANSOPRAZOLE 30 MG/1
30 CAPSULE, DELAYED RELEASE ORAL DAILY
Qty: 90 CAPSULE | Refills: 0 | Status: SHIPPED | OUTPATIENT
Start: 2021-06-21 | End: 2021-06-21 | Stop reason: SDUPTHER

## 2021-06-21 RX ORDER — INSULIN NPH HUM/REG INSULIN HM 70-30/ML
VIAL (ML) SUBCUTANEOUS
Refills: 1 | OUTPATIENT
Start: 2021-06-21

## 2021-06-21 RX ORDER — EMPAGLIFLOZIN 10 MG/1
TABLET, FILM COATED ORAL
Refills: 1 | OUTPATIENT
Start: 2021-06-21

## 2021-06-21 RX ORDER — SITAGLIPTIN AND METFORMIN HYDROCHLORIDE 1000; 100 MG/1; MG/1
1 TABLET, FILM COATED, EXTENDED RELEASE ORAL DAILY
Qty: 90 TABLET | Refills: 0 | Status: SHIPPED | OUTPATIENT
Start: 2021-06-21 | End: 2021-06-21 | Stop reason: SDUPTHER

## 2021-06-21 RX ORDER — LOVASTATIN 40 MG/1
40 TABLET ORAL DAILY
Qty: 90 TABLET | Refills: 2 | Status: SHIPPED | OUTPATIENT
Start: 2021-06-21 | End: 2021-11-22

## 2021-06-21 RX ORDER — SITAGLIPTIN AND METFORMIN HYDROCHLORIDE 1000; 100 MG/1; MG/1
1 TABLET, FILM COATED, EXTENDED RELEASE ORAL DAILY
Qty: 90 TABLET | Refills: 0 | Status: SHIPPED | OUTPATIENT
Start: 2021-06-21 | End: 2021-11-15 | Stop reason: SDUPTHER

## 2021-06-21 RX ORDER — LANSOPRAZOLE 30 MG/1
30 CAPSULE, DELAYED RELEASE ORAL DAILY
Qty: 90 CAPSULE | Refills: 0 | Status: SHIPPED | OUTPATIENT
Start: 2021-06-21 | End: 2021-11-15 | Stop reason: SDUPTHER

## 2021-06-21 RX ORDER — AMLODIPINE BESYLATE 10 MG/1
10 TABLET ORAL DAILY
Qty: 90 TABLET | Refills: 0 | Status: SHIPPED | OUTPATIENT
Start: 2021-06-21 | End: 2021-12-22

## 2021-06-21 RX ORDER — TRAZODONE HYDROCHLORIDE 50 MG/1
50 TABLET ORAL
Qty: 90 TABLET | Refills: 0 | Status: SHIPPED | OUTPATIENT
Start: 2021-06-21 | End: 2021-11-15 | Stop reason: SDUPTHER

## 2021-06-21 RX ORDER — AMLODIPINE BESYLATE 10 MG/1
10 TABLET ORAL DAILY
Qty: 90 TABLET | Refills: 0 | Status: SHIPPED | OUTPATIENT
Start: 2021-06-21 | End: 2021-06-21 | Stop reason: SDUPTHER

## 2021-06-21 RX ORDER — LOVASTATIN 40 MG/1
40 TABLET ORAL DAILY
Qty: 90 TABLET | Refills: 2 | Status: SHIPPED | OUTPATIENT
Start: 2021-06-21 | End: 2021-06-21 | Stop reason: SDUPTHER

## 2021-06-24 ENCOUNTER — TELEPHONE (OUTPATIENT)
Dept: ENDOCRINOLOGY | Facility: CLINIC | Age: 71
End: 2021-06-24

## 2021-06-27 NOTE — TELEPHONE ENCOUNTER
Okay.  Please ask her to send blood sugar records for review measurements adjust insulin doses if needed.

## 2021-06-28 ENCOUNTER — TELEPHONE (OUTPATIENT)
Dept: ENDOCRINOLOGY | Facility: CLINIC | Age: 71
End: 2021-06-28

## 2021-06-28 ENCOUNTER — TELEPHONE (OUTPATIENT)
Dept: FAMILY MEDICINE CLINIC | Facility: CLINIC | Age: 71
End: 2021-06-28

## 2021-06-28 ENCOUNTER — OFFICE VISIT (OUTPATIENT)
Dept: FAMILY MEDICINE CLINIC | Facility: CLINIC | Age: 71
End: 2021-06-28

## 2021-06-28 VITALS
TEMPERATURE: 98 F | WEIGHT: 168.8 LBS | HEIGHT: 63 IN | RESPIRATION RATE: 18 BRPM | BODY MASS INDEX: 29.91 KG/M2 | HEART RATE: 56 BPM | OXYGEN SATURATION: 96 % | SYSTOLIC BLOOD PRESSURE: 135 MMHG | DIASTOLIC BLOOD PRESSURE: 73 MMHG

## 2021-06-28 DIAGNOSIS — R30.0 DIFFICULT OR PAINFUL URINATION: Primary | ICD-10-CM

## 2021-06-28 DIAGNOSIS — Z87.440 RECENT URINARY TRACT INFECTION: ICD-10-CM

## 2021-06-28 DIAGNOSIS — E11.65 TYPE 2 DIABETES MELLITUS WITH HYPERGLYCEMIA, WITH LONG-TERM CURRENT USE OF INSULIN (HCC): ICD-10-CM

## 2021-06-28 DIAGNOSIS — Z79.4 TYPE 2 DIABETES MELLITUS WITH HYPERGLYCEMIA, WITH LONG-TERM CURRENT USE OF INSULIN (HCC): ICD-10-CM

## 2021-06-28 LAB
BILIRUB BLD-MCNC: NEGATIVE MG/DL
CLARITY, POC: ABNORMAL
COLOR UR: ABNORMAL
GLUCOSE UR STRIP-MCNC: NEGATIVE MG/DL
KETONES UR QL: NEGATIVE
LEUKOCYTE EST, POC: ABNORMAL
NITRITE UR-MCNC: POSITIVE MG/ML
PH UR: 7.5 [PH] (ref 5–8)
PROT UR STRIP-MCNC: NEGATIVE MG/DL
RBC # UR STRIP: NEGATIVE /UL
SP GR UR: 1.02 (ref 1–1.03)
UROBILINOGEN UR QL: NORMAL

## 2021-06-28 PROCEDURE — 87077 CULTURE AEROBIC IDENTIFY: CPT | Performed by: FAMILY MEDICINE

## 2021-06-28 PROCEDURE — 99213 OFFICE O/P EST LOW 20 MIN: CPT | Performed by: FAMILY MEDICINE

## 2021-06-28 PROCEDURE — 87186 SC STD MICRODIL/AGAR DIL: CPT | Performed by: FAMILY MEDICINE

## 2021-06-28 PROCEDURE — 87086 URINE CULTURE/COLONY COUNT: CPT | Performed by: FAMILY MEDICINE

## 2021-06-28 PROCEDURE — 81003 URINALYSIS AUTO W/O SCOPE: CPT | Performed by: FAMILY MEDICINE

## 2021-06-28 RX ORDER — NITROFURANTOIN 25; 75 MG/1; MG/1
100 CAPSULE ORAL 2 TIMES DAILY
Qty: 14 CAPSULE | Refills: 0 | Status: SHIPPED | OUTPATIENT
Start: 2021-06-28 | End: 2021-10-01

## 2021-06-28 NOTE — TELEPHONE ENCOUNTER
Painful urination x 3 weeks.  Saw Dr Lawson for it 3 weeks ago and now its back.  Taking otc urinary pills. No appts avail today.  Last seen 5/21

## 2021-06-28 NOTE — TELEPHONE ENCOUNTER
Pt was instructed to call in BGs on 6/24 d/t D/C Jardiance because pt developed a UTI. Pt only had one BG reading of 158 at breakfast on Sunday. Pt will test her BG BID for a couple of days and then call back BGs to see if changes in other DM meds need to be adjusted.

## 2021-06-28 NOTE — PROGRESS NOTES
"Jerry Rosa is a 71 y.o. female.     Chief Complaint   Patient presents with   • Urinary Tract Infection     [facility] [place]x - still having symptoms. stopped jardiance.         Current Outpatient Medications:   •  acebutolol (SECTRAL) 200 MG capsule, Take 1 capsule by mouth Daily., Disp: 90 capsule, Rfl: 1  •  amLODIPine (NORVASC) 10 MG tablet, Take 1 tablet by mouth Daily., Disp: 90 tablet, Rfl: 0  •  aspirin 81 MG EC tablet, Take 81 mg by mouth Daily., Disp: , Rfl:   •  BD Insulin Syringe U/F 31G X 5/16\" 1 ML misc, USE TO INJECT INSULIN 4 TIMES DAILY , Disp: 150 each, Rfl: 3  •  calcium carbonate (OS-PREET) 600 MG tablet, Take 600 mg by mouth Daily., Disp: , Rfl:   •  diclofenac (VOLTAREN) 1 % gel gel, Apply 4 g topically to the appropriate area as directed 4 (Four) Times a Day As Needed., Disp: , Rfl:   •  escitalopram (LEXAPRO) 20 MG tablet, TAKE 1 TABLET BY MOUTH EVERY NIGHT. , Disp: 90 tablet, Rfl: 0  •  fenofibrate 160 MG tablet, TAKE 1 TABLET BY MOUTH DAILY , Disp: 90 tablet, Rfl: 2  •  Ferrous Sulfate (IRON) 325 (65 Fe) MG tablet, Take 325 mg by mouth Daily., Disp: , Rfl:   •  fluticasone (FLONASE ALLERGY RELIEF) 50 MCG/ACT nasal spray, 1 spray into the nostril(s) as directed by provider Daily As Needed for Allergies., Disp: , Rfl:   •  gabapentin (NEURONTIN) 300 MG capsule, TAKE 1 CAPSULE BY MOUTH 2 (TWO) TIMES A DAY. , Disp: 180 capsule, Rfl: 0  •  glucose blood (Accu-Chek Jazmyne Plus) test strip, USE ONE STRIP TO TEST TWICE A DAY, Disp: 100 each, Rfl: 11  •  Humira Pen 40 MG/0.4ML Pen-injector Kit, Inject 40 mg under the skin into the appropriate area as directed Every 14 (Fourteen) Days. Sulfate Free Formula, Disp: 6 each, Rfl: 0  •  HumuLIN 70/30 (70-30) 100 UNIT/ML injection, INJECT 40 UNITS BEFORE BREAKFAST AND 20 UNITS BEFORE SUPPER DX E11.65, Disp: 30 each, Rfl: 6  •  hydroCHLOROthiazide (HYDRODIURIL) 12.5 MG tablet, Take 1 tablet by mouth Daily., Disp: 90 tablet, Rfl: " 0  •  isosorbide mononitrate (IMDUR) 30 MG 24 hr tablet, Take 1 tablet by mouth 2 (Two) Times a Day., Disp: 180 tablet, Rfl: 2  •  lansoprazole (PREVACID) 30 MG capsule, Take 1 capsule by mouth Daily., Disp: 90 capsule, Rfl: 0  •  lisinopril (PRINIVIL,ZESTRIL) 10 MG tablet, Take 1 tablet by mouth Daily., Disp: 90 tablet, Rfl: 0  •  lovastatin (MEVACOR) 40 MG tablet, Take 1 tablet by mouth Daily., Disp: 90 tablet, Rfl: 2  •  ondansetron ODT (ZOFRAN-ODT) 4 MG disintegrating tablet, Place 1 tablet on the tongue Every 8 (Eight) Hours As Needed for Nausea., Disp: 30 tablet, Rfl: 0  •  SITagliptin-metFORMIN HCl ER (Janumet XR) 100-1000 MG tablet, Take 1 tablet by mouth Daily., Disp: 90 tablet, Rfl: 0  •  traZODone (DESYREL) 50 MG tablet, Take 1 tablet by mouth every night at bedtime., Disp: 90 tablet, Rfl: 0  •  vitamin B-12 (CYANOCOBALAMIN) 1000 MCG tablet, Take 1,000 mcg by mouth Daily., Disp: , Rfl:   •  nitrofurantoin, macrocrystal-monohydrate, (Macrobid) 100 MG capsule, Take 1 capsule by mouth 2 (Two) Times a Day., Disp: 14 capsule, Rfl: 0    Past Medical History:   Diagnosis Date   • Depression    • Diabetic peripheral neuropathy    • GERD    • Insomnia    • Lower back pain    • Obesity    • Osteoarthritis    • Osteopenia    • Palpitations    • RA    • Rheumatoid nodule 06/2011     rt. thumb x 2 and rt. & lt. 3rd fingers (Oct.2012)/ rt. elbow, left elbow, and epidermal inclusion cyst post. neck (June 2011)-----Dr. Whitmore   • Vitamin D deficiency        Past Surgical History:   Procedure Laterality Date   • CARDIAC CATHETERIZATION N/A 9/16/2020    Procedure: Left Heart Cath;  Surgeon: Walekr Rodriguez MD;  Location: [facility] CATH INVASIVE LOCATION;  Service: Cardiovascular;  Laterality: N/A;   • CARDIAC CATHETERIZATION N/A 9/16/2020    Procedure: Coronary angiography;  Surgeon: Walker Rodriguez MD;  Location: [facility] CATH INVASIVE LOCATION;  Service: Cardiovascular;  Laterality: N/A;   • CARDIAC  CATHETERIZATION  9/16/2020    Procedure: Functional Flow [place];  Surgeon: Kath Medina MD;  Location: [facility] CATH INVASIVE LOCATION;  Service: Cardiology;;   • CHOLECYSTECTOMY     • COLONOSCOPY  2017    2017 = TA, rech 2020 [facility]   • CYST REMOVAL      Seb cyst on neck   • TOTAL ABDOMINAL HYSTERECTOMY     • TOTAL ABDOMINAL HYSTERECTOMY WITH SALPINGO OOPHORECTOMY     • VAGINAL PROLAPSE REPAIR       Uterine Prolapse repair       Family History   Problem Relation Age of Onset   • Heart disease Mother    • Diabetes Mother    • Hypertension Mother    • Rheum arthritis Mother    • Coronary artery disease Mother    • Alcohol abuse Mother    • Heart disease Father    • Mental illness Father    • Hypertension Father    • Alcohol abuse Father    • Stroke Sister    • Cancer Sister 29        RENAL Cancer   • Osteoarthritis Sister    • Rheum arthritis Sister    • Diabetes Maternal Grandfather    • Rheum arthritis Sister    • Osteoarthritis Sister    • Diabetes Sister    • Rheum arthritis Brother    • Osteoarthritis Brother        Social History     Socioeconomic History   • Marital status:      Spouse name: Carson Rosa   • Number of children: 3   • Years of education: Not on file   • Highest education level: Not on file   Tobacco Use   • Smoking status: Never Smoker   • Smokeless tobacco: Never Used   Vaping Use   • Vaping Use: Never used   Substance and Sexual Activity   • Alcohol use: Yes     Comment: occ   • Drug use: Defer   • Sexual activity: Defer       70 y/o C female here w/ urinary c/o's    Pt put on Jardiance and then started having urinary c/o's and told to stop it by ENDO; she stopped the Jardiance and felt better so thought it wasn't the cause and restarted the jardiance back up; pt ended up going to UC and being tx'd for UTI on 6/12............ Pt doing better but wanting urine rechecked; Pt has been taking AZO prn for the discomfort and stopped the Jardiance again       The following portions  of the patient's history were reviewed and updated as appropriate: allergies, current medications, past family history, past medical history, past social history, past surgical history and problem list.    Review of Systems   Constitutional: Negative for activity change, appetite change, fever, unexpected weight gain and unexpected weight loss.   Genitourinary: Positive for dysuria and urgency. Negative for flank pain, hematuria and urinary incontinence.   Skin: Negative for rash.       Vitals:    06/28/21 1200   BP: 135/73   Pulse: 56   Resp: 18   Temp: 98 °F (36.7 °C)   SpO2: 96%       Objective   Physical Exam  Vitals and nursing note reviewed.   Constitutional:       General: She is not in acute distress.     Appearance: Normal appearance. She is not ill-appearing or toxic-appearing.   HENT:      Head: Normocephalic and atraumatic.   Pulmonary:      Effort: Pulmonary effort is normal.   Neurological:      Mental Status: She is alert and oriented to person, place, and time.      Cranial Nerves: No cranial nerve deficit.   Psychiatric:         Attention and Perception: Attention and perception normal.         Mood and Affect: Mood and affect normal.         Speech: Speech normal.         Behavior: Behavior normal. Behavior is cooperative.         Thought Content: Thought content normal.         Cognition and Memory: Cognition and memory normal.         Judgment: Judgment normal.           Assessment/Plan   Diagnoses and all orders for this visit:    1. Difficult or painful urination (Primary)  -     POCT urinalysis dipstick, automated  -     Urine Culture - Urine, Urine, Clean Catch    2. Recent urinary tract infection    3. Type 2 diabetes mellitus with hyperglycemia, with long-term current use of insulin (CMS/[facility])    Other orders  -     nitrofurantoin, macrocrystal-monohydrate, (Macrobid) 100 MG capsule; Take 1 capsule by mouth 2 (Two) Times a Day.  Dispense: 14 capsule; Refill: 0      Retx based off most recent  Urine cx until today's cx back for review  Cont azo prn  STOP Jardiance

## 2021-06-28 NOTE — TELEPHONE ENCOUNTER
Pt notified and verbalized understanding. She was instructed on how to call the blood sugar line.

## 2021-06-30 LAB — BACTERIA SPEC AEROBE CULT: ABNORMAL

## 2021-07-02 ENCOUNTER — TELEPHONE (OUTPATIENT)
Dept: ENDOCRINOLOGY | Facility: CLINIC | Age: 71
End: 2021-07-02

## 2021-07-06 RX ORDER — INSULIN NPH HUM/REG INSULIN HM 70-30/ML
VIAL (ML) SUBCUTANEOUS
Qty: 30 EACH | Refills: 6
Start: 2021-07-06 | End: 2021-07-15

## 2021-07-06 NOTE — TELEPHONE ENCOUNTER
Called pt back and pt stated some of her readings over the weekend were 196, 176, 146, 202, and 300. Per MD s/o increased her 70/30 to 44 units before breakfast.

## 2021-07-14 ENCOUNTER — TELEPHONE (OUTPATIENT)
Dept: FAMILY MEDICINE CLINIC | Facility: CLINIC | Age: 71
End: 2021-07-14

## 2021-07-14 DIAGNOSIS — M05.9 SEROPOSITIVE RHEUMATOID ARTHRITIS (HCC): ICD-10-CM

## 2021-07-14 DIAGNOSIS — Z79.899 LONG-TERM USE OF HIGH-RISK MEDICATION: ICD-10-CM

## 2021-07-14 RX ORDER — ADALIMUMAB 40MG/0.4ML
40 KIT SUBCUTANEOUS
Qty: 6 EACH | Refills: 0 | Status: SHIPPED | OUTPATIENT
Start: 2021-07-14 | End: 2021-07-14

## 2021-07-14 RX ORDER — ADALIMUMAB 40MG/0.4ML
40 KIT SUBCUTANEOUS
Qty: 6 EACH | Refills: 0 | Status: CANCELLED | OUTPATIENT
Start: 2021-07-14

## 2021-07-14 RX ORDER — ADALIMUMAB 40MG/0.4ML
40 KIT SUBCUTANEOUS
Qty: 6 EACH | Refills: 0 | Status: SHIPPED | OUTPATIENT
Start: 2021-07-14 | End: 2021-10-11

## 2021-07-14 NOTE — TELEPHONE ENCOUNTER
Humira Pen 40mg/0.4ml    [facility]    Last Seen 6/28/2021  Next Appt 10/12/2021

## 2021-07-14 NOTE — TELEPHONE ENCOUNTER
HUB to share    If calls, I sent it to [facility] and then resent to correct pharmacy - Dr Burdick

## 2021-07-15 ENCOUNTER — TELEPHONE (OUTPATIENT)
Dept: ENDOCRINOLOGY | Facility: CLINIC | Age: 71
End: 2021-07-15

## 2021-07-15 RX ORDER — INSULIN NPH HUM/REG INSULIN HM 70-30/ML
VIAL (ML) SUBCUTANEOUS
Qty: 20 ML | Refills: 11 | Status: SHIPPED | OUTPATIENT
Start: 2021-07-15 | End: 2022-07-26

## 2021-07-15 RX ORDER — INSULIN NPH HUM/REG INSULIN HM 70-30/ML
VIAL (ML) SUBCUTANEOUS
Qty: 20 ML | Refills: 11 | Status: SHIPPED | OUTPATIENT
Start: 2021-07-15 | End: 2021-07-15 | Stop reason: SDUPTHER

## 2021-07-15 NOTE — TELEPHONE ENCOUNTER
rx ready for signature. Didn't have updated dosage. BGs scanned into phone note. Per MD s/o no changes recommended at this time.

## 2021-07-16 ENCOUNTER — CLINICAL SUPPORT (OUTPATIENT)
Dept: FAMILY MEDICINE CLINIC | Facility: CLINIC | Age: 71
End: 2021-07-16

## 2021-07-16 DIAGNOSIS — N39.0 URINARY TRACT INFECTION WITHOUT HEMATURIA, SITE UNSPECIFIED: Primary | ICD-10-CM

## 2021-07-16 LAB
BILIRUB BLD-MCNC: NEGATIVE MG/DL
CLARITY, POC: CLEAR
COLOR UR: YELLOW
GLUCOSE UR STRIP-MCNC: ABNORMAL MG/DL
KETONES UR QL: NEGATIVE
LEUKOCYTE EST, POC: NEGATIVE
NITRITE UR-MCNC: NEGATIVE MG/ML
PH UR: 6 [PH] (ref 5–8)
PROT UR STRIP-MCNC: NEGATIVE MG/DL
RBC # UR STRIP: NEGATIVE /UL
SP GR UR: 1.02 (ref 1–1.03)
UROBILINOGEN UR QL: NORMAL

## 2021-07-16 PROCEDURE — 81003 URINALYSIS AUTO W/O SCOPE: CPT | Performed by: FAMILY MEDICINE

## 2021-08-12 ENCOUNTER — TELEPHONE (OUTPATIENT)
Dept: CARDIOLOGY | Facility: CLINIC | Age: 71
End: 2021-08-12

## 2021-08-12 NOTE — TELEPHONE ENCOUNTER
Please see patient's message below and advise.     ----- Message from Jenna Rsoa sent at 8/11/2021  2:38 PM EDT -----  Regarding: Complaint    Contact: [phone]    I’m having a lot of dizziness and my bottom number on my blood pressure is like 64 and 63 pulse is 54 having a quick mild pain in my chest that comes and goes two to three times a day

## 2021-08-13 ENCOUNTER — OFFICE VISIT (OUTPATIENT)
Dept: CARDIOLOGY | Facility: CLINIC | Age: 71
End: 2021-08-13

## 2021-08-13 VITALS
DIASTOLIC BLOOD PRESSURE: 78 MMHG | HEART RATE: 61 BPM | HEIGHT: 63 IN | OXYGEN SATURATION: 96 % | WEIGHT: 169 LBS | BODY MASS INDEX: 29.95 KG/M2 | SYSTOLIC BLOOD PRESSURE: 162 MMHG

## 2021-08-13 DIAGNOSIS — E78.2 MIXED HYPERLIPIDEMIA: Primary | ICD-10-CM

## 2021-08-13 DIAGNOSIS — I25.10 CORONARY ARTERY DISEASE INVOLVING NATIVE CORONARY ARTERY OF NATIVE HEART WITHOUT ANGINA PECTORIS: ICD-10-CM

## 2021-08-13 DIAGNOSIS — I10 ESSENTIAL HYPERTENSION: ICD-10-CM

## 2021-08-13 DIAGNOSIS — Z79.4 TYPE 2 DIABETES MELLITUS WITH HYPERGLYCEMIA, WITH LONG-TERM CURRENT USE OF INSULIN (HCC): ICD-10-CM

## 2021-08-13 DIAGNOSIS — E11.65 TYPE 2 DIABETES MELLITUS WITH HYPERGLYCEMIA, WITH LONG-TERM CURRENT USE OF INSULIN (HCC): ICD-10-CM

## 2021-08-13 PROCEDURE — 93000 ELECTROCARDIOGRAM COMPLETE: CPT | Performed by: INTERNAL MEDICINE

## 2021-08-13 PROCEDURE — 99213 OFFICE O/P EST LOW 20 MIN: CPT | Performed by: INTERNAL MEDICINE

## 2021-08-13 NOTE — PROGRESS NOTES
Subjective:     Encounter Date:08/13/2021      Patient ID: Jenna Rosa is a 71 y.o. female.    Chief Complaint: Low Blood Pressure & Dizziness  History of Present Illness     71 -year-old white female patient with known history of hypertension dyslipidemia and diabetes mellitus,  comes back for follow up        Patient recently admitted hospital with an episode of palpitations and bradycardia  Patient has a event monitor which showed bradycardia with significant PACs PVCs consistent with tachybradycardia episodes     Patient underwent stress test and echocardiogram May 2020  Stress test showed LVEF is normal small inferoapical ischemia cannot be excluded  Echocardiogram LVEF 65% diastolic LV dysfunction mild mitral valve regurgitation  Patient is seen by electrophysiology started on beta-blockers, sectral     Cardiac catheterization showed LAD 50 to 60% at the level of the diagonal artery and the diagonal artery ostium 80% September 2020  IFR to the LAD not significant     Patient is currently on medical treatment  At times chest pain will be treated with nitrates dose was increased recently  Blood pressure needs to be aggressively monitored advised salt reduction  Medication adjustment in the future if continues to be high     Patient comes back for follow-up  Blood pressure much better she denies of any active symptoms    Patient comes back for follow-up for an acute visit  According to the patient she is worried about low blood pressure and dizzy spells but her blood pressure is actually still slightly elevated today  I discussed with the patient some of her symptoms probably noncardiac she denies of any active symptoms of chest pain syncope shortness of breath  EKG sinus rhythm noted borderline left axis deviation compared to the last EKG no significant changes  She will come back for follow-up as scheduled in November            The following portions of the patient's history were reviewed and updated  "as appropriate: Allergies current medications past family history past medical history past social history past surgical history problem list and review of systems  Past Medical History:   Diagnosis Date   • Depression    • Diabetic peripheral neuropathy    • GERD    • Insomnia    • Lower back pain    • Obesity    • Osteoarthritis    • Osteopenia    • Palpitations    • RA    • Rheumatoid nodule 06/2011     rt. thumb x 2 and rt. & lt. 3rd fingers (Oct.2012)/ rt. elbow, left elbow, and epidermal inclusion cyst post. neck (June 2011)-----Dr. Whitmore   • Vitamin D deficiency      Past Surgical History:   Procedure Laterality Date   • CARDIAC CATHETERIZATION N/A 9/16/2020    Procedure: Left Heart Cath;  Surgeon: Walker Rodriguez MD;  Location:  [facility];  Service: Cardiovascular;  Laterality: N/A;   • CARDIAC CATHETERIZATION N/A 9/16/2020    Procedure: Coronary angiography;  Surgeon: Walker Rodriguez MD;  Location:  [facility];  Service: Cardiovascular;  Laterality: N/A;   • CARDIAC CATHETERIZATION  9/16/2020    Procedure: Functional Flow [place];  Surgeon: Kath Medina MD;  Location:  [facility];  Service: Cardiology;;   • CHOLECYSTECTOMY     • COLONOSCOPY  2017    2017 = TA, rech 2020 [facility]   • CYST REMOVAL      Seb cyst on neck   • TOTAL ABDOMINAL HYSTERECTOMY     • TOTAL ABDOMINAL HYSTERECTOMY WITH SALPINGO OOPHORECTOMY     • VAGINAL PROLAPSE REPAIR       Uterine Prolapse repair     /78 (BP Location: Left arm, Patient Position: Sitting, Cuff Size: Adult)   Pulse 61   Ht 160 cm (63\")   Wt 76.7 kg (169 lb)   SpO2 96%   BMI 29.94 kg/m²   Family History   Problem Relation Age of Onset   • Heart disease Mother    • Diabetes Mother    • Hypertension Mother    • Rheum arthritis Mother    • Coronary artery disease Mother    • Alcohol abuse Mother    • Heart disease Father    • Mental illness Father    • Hypertension Father    • " "Alcohol abuse Father    • Stroke Sister    • Cancer Sister 29        RENAL Cancer   • Osteoarthritis Sister    • Rheum arthritis Sister    • Diabetes Maternal Grandfather    • Rheum arthritis Sister    • Osteoarthritis Sister    • Diabetes Sister    • Rheum arthritis Brother    • Osteoarthritis Brother        Current Outpatient Medications:   •  acebutolol (SECTRAL) 200 MG capsule, Take 1 capsule by mouth Daily., Disp: 90 capsule, Rfl: 1  •  amLODIPine (NORVASC) 10 MG tablet, Take 1 tablet by mouth Daily., Disp: 90 tablet, Rfl: 0  •  aspirin 81 MG EC tablet, Take 81 mg by mouth Daily., Disp: , Rfl:   •  BD Insulin Syringe U/F 31G X 5/16\" 1 ML misc, USE TO INJECT INSULIN 4 TIMES DAILY , Disp: 150 each, Rfl: 3  •  calcium carbonate (OS-PREET) 600 MG tablet, Take 600 mg by mouth Daily., Disp: , Rfl:   •  diclofenac (VOLTAREN) 1 % gel gel, Apply 4 g topically to the appropriate area as directed 4 (Four) Times a Day As Needed., Disp: , Rfl:   •  escitalopram (LEXAPRO) 20 MG tablet, TAKE 1 TABLET BY MOUTH EVERY NIGHT. , Disp: 90 tablet, Rfl: 0  •  fenofibrate 160 MG tablet, TAKE 1 TABLET BY MOUTH DAILY , Disp: 90 tablet, Rfl: 2  •  Ferrous Sulfate (IRON) 325 (65 Fe) MG tablet, Take 325 mg by mouth Daily., Disp: , Rfl:   •  fluticasone (FLONASE ALLERGY RELIEF) 50 MCG/ACT nasal spray, 1 spray into the nostril(s) as directed by provider Daily As Needed for Allergies., Disp: , Rfl:   •  gabapentin (NEURONTIN) 300 MG capsule, TAKE 1 CAPSULE BY MOUTH 2 (TWO) TIMES A DAY. , Disp: 180 capsule, Rfl: 0  •  glucose blood (Accu-Chek Jazmyne Plus) test strip, USE ONE STRIP TO TEST TWICE A DAY, Disp: 100 each, Rfl: 11  •  Humira Pen 40 MG/0.4ML Pen-injector Kit, Inject 40 mg under the skin into the appropriate area as directed Every 14 (Fourteen) Days. Sulfate Free Formula, Disp: 6 each, Rfl: 0  •  HumuLIN 70/30 (70-30) 100 UNIT/ML injection, INJECT 45 UNITS BEFORE BREAKFAST AND 20 UNITS BEFORE SUPPER DX E11.65, Disp: 20 mL, Rfl: 11  •  " hydroCHLOROthiazide (HYDRODIURIL) 12.5 MG tablet, Take 1 tablet by mouth Daily., Disp: 90 tablet, Rfl: 0  •  isosorbide mononitrate (IMDUR) 30 MG 24 hr tablet, Take 1 tablet by mouth 2 (Two) Times a Day., Disp: 180 tablet, Rfl: 2  •  lansoprazole (PREVACID) 30 MG capsule, Take 1 capsule by mouth Daily., Disp: 90 capsule, Rfl: 0  •  lisinopril (PRINIVIL,ZESTRIL) 10 MG tablet, Take 1 tablet by mouth Daily., Disp: 90 tablet, Rfl: 0  •  lovastatin (MEVACOR) 40 MG tablet, Take 1 tablet by mouth Daily., Disp: 90 tablet, Rfl: 2  •  nitrofurantoin, macrocrystal-monohydrate, (Macrobid) 100 MG capsule, Take 1 capsule by mouth 2 (Two) Times a Day., Disp: 14 capsule, Rfl: 0  •  ondansetron ODT (ZOFRAN-ODT) 4 MG disintegrating tablet, Place 1 tablet on the tongue Every 8 (Eight) Hours As Needed for Nausea., Disp: 30 tablet, Rfl: 0  •  SITagliptin-metFORMIN HCl ER (Janumet XR) 100-1000 MG tablet, Take 1 tablet by mouth Daily., Disp: 90 tablet, Rfl: 0  •  traZODone (DESYREL) 50 MG tablet, Take 1 tablet by mouth every night at bedtime., Disp: 90 tablet, Rfl: 0  •  vitamin B-12 (CYANOCOBALAMIN) 1000 MCG tablet, Take 1,000 mcg by mouth Daily., Disp: , Rfl:   Social History     Socioeconomic History   • Marital status:      Spouse name: Carson Rosa   • Number of children: 3   • Years of education: Not on file   • Highest education level: Not on file   Tobacco Use   • Smoking status: Never Smoker   • Smokeless tobacco: Never Used   Vaping Use   • Vaping Use: Never used   Substance and Sexual Activity   • Alcohol use: Yes     Comment: occ   • Drug use: Defer   • Sexual activity: Defer     Allergies   Allergen Reactions   • Jardiance [Empagliflozin] Other (See Comments)     UTI's     Review of Systems   Constitutional: Negative for fever and malaise/fatigue.   Cardiovascular: Positive for palpitations. Negative for chest pain and dyspnea on exertion.   Respiratory: Negative for cough and shortness of breath.    Skin: Negative  for rash.   Gastrointestinal: Negative for abdominal pain, nausea and vomiting.   Neurological: Positive for dizziness. Negative for focal weakness, headaches and numbness.   All other systems reviewed and are negative.             Objective:     Constitutional:       Appearance: Well-developed.   Eyes:      General: No scleral icterus.     Conjunctiva/sclera: Conjunctivae normal.   HENT:      Head: Normocephalic and atraumatic.    Mouth/Throat:      Mouth: No oral lesions.      Pharynx: Uvula midline.   Neck:      Thyroid: No thyromegaly.      Vascular: No carotid bruit or JVD.      Trachea: Trachea normal.   Pulmonary:      Effort: Pulmonary effort is normal.      Breath sounds: Normal breath sounds.   Cardiovascular:      Normal rate. Regular rhythm.      No gallop.   Pulses:     Intact distal pulses.   Abdominal:      General: Bowel sounds are normal.      Palpations: Abdomen is soft.   Musculoskeletal: Normal range of motion.      Cervical back: Neck supple. Skin:     General: Skin is warm. There is no cyanosis.   Neurological:      Mental Status: Alert and oriented to person, place, and time.      Comments: No focal deficits   Psychiatric:         Behavior: Behavior is cooperative.           ECG 12 Lead    Date/Time: 8/13/2021 2:12 PM  Performed by: Walker Rodriguez MD  Authorized by: Walker Rodriguez MD   Comments: Sinus rhythm borderline left axis deviation no changes compared to the last EKG first-degree AV block unchanged            Lab Review:       Assessment:          Diagnosis Plan   1. Mixed hyperlipidemia     2. Essential hypertension     3. Coronary artery disease involving native coronary artery of native heart without angina pectoris     4. Type 2 diabetes mellitus with hyperglycemia, with long-term current use of insulin (CMS/[facility])            Plan:       MDM  Number of Diagnoses or Management Options  Coronary artery disease involving native coronary artery of native heart  without angina pectoris: established, improving  Essential hypertension: established, worsening  Mixed hyperlipidemia: established, improving  Type 2 diabetes mellitus with hyperglycemia, with long-term current use of insulin (CMS/[facility]): established, improving     Amount and/or Complexity of Data Reviewed  Clinical lab tests: reviewed  Review and summarize past medical records: yes    Risk of Complications, Morbidity, and/or Mortality  Presenting problems: moderate  Management options: moderate    Patient Progress  Patient progress: stable

## 2021-08-18 DIAGNOSIS — M79.641 RIGHT HAND PAIN: Primary | ICD-10-CM

## 2021-08-20 RX ORDER — LISINOPRIL 10 MG/1
10 TABLET ORAL DAILY
Qty: 90 TABLET | Refills: 3 | Status: SHIPPED | OUTPATIENT
Start: 2021-08-20 | End: 2021-11-22 | Stop reason: SDUPTHER

## 2021-08-20 NOTE — TELEPHONE ENCOUNTER
Rx Refill Note  Requested Prescriptions     Pending Prescriptions Disp Refills   • lisinopril (PRINIVIL,ZESTRIL) 10 MG tablet [Pharmacy Med Name: LISINOPRIL 10MG TABLET] 90 tablet 3     Sig: TAKE 1 TABLET BY MOUTH DAILY.      Last office visit with prescribing clinician: 8/13/2021      Next office visit with prescribing clinician: 1/17/2021 - Following up with      Comprehensive Metabolic Panel (05/17/2021 09:37)  Lipid Panel (05/17/2021 09:37)         Lilia Thomas MA  08/20/21, 14:05 EDT

## 2021-09-14 RX ORDER — HYDROCHLOROTHIAZIDE 12.5 MG/1
CAPSULE, GELATIN COATED ORAL
Qty: 90 CAPSULE | Refills: 3 | Status: SHIPPED | OUTPATIENT
Start: 2021-09-14 | End: 2021-11-15

## 2021-09-14 RX ORDER — ESCITALOPRAM OXALATE 20 MG/1
TABLET ORAL
Qty: 90 TABLET | Refills: 0 | Status: SHIPPED | OUTPATIENT
Start: 2021-09-14 | End: 2021-11-15

## 2021-09-14 NOTE — TELEPHONE ENCOUNTER
Rx Refill Note  Requested Prescriptions     Pending Prescriptions Disp Refills   • escitalopram (LEXAPRO) 20 MG tablet [Pharmacy Med Name: ESCITALOPRAM OXALATE 20MG TABLET] 90 tablet 0     Sig: TAKE 1 TABLET BY MOUTH EVERY NIGHT.      Last office visit with prescribing clinician: 6/28/2021      Next office visit with prescribing clinician: 11/15/2021     POC Glucose (05/21/2021 10:31)  Lipid Panel (05/17/2021 09:37)  Comprehensive Metabolic Panel (05/17/2021 09:37)         Brianda Madrigal CMA  09/14/21, 10:57 EDT     Did not pass protocol checklist

## 2021-09-14 NOTE — TELEPHONE ENCOUNTER
Rx Refill Note  Requested Prescriptions     Pending Prescriptions Disp Refills   • hydroCHLOROthiazide (MICROZIDE) 12.5 MG capsule [Pharmacy Med Name: HYDROCHLOROTHIAZIDE 12.5MG CAPSULE] 90 capsule 3     Sig: TAKE 1 CAPSULE BY MOUTH DAILY TAKE WITH LISINOPRIL 10MG INSTEAD OF LISINOPRIL/HCTZ 10/12.5      Last office visit with prescribing clinician: 8/13/2021      Next office visit with prescribing clinician: 11/17/21     Comprehensive Metabolic Panel (05/17/2021 09:37)         Anita Wall MA  09/14/21, 10:06 EDT

## 2021-10-01 ENCOUNTER — OFFICE VISIT (OUTPATIENT)
Dept: CARDIOLOGY | Facility: CLINIC | Age: 71
End: 2021-10-01

## 2021-10-01 VITALS
HEART RATE: 60 BPM | BODY MASS INDEX: 29.94 KG/M2 | OXYGEN SATURATION: 95 % | WEIGHT: 169 LBS | DIASTOLIC BLOOD PRESSURE: 60 MMHG | SYSTOLIC BLOOD PRESSURE: 136 MMHG

## 2021-10-01 DIAGNOSIS — R00.1 BRADYCARDIA, SINUS: ICD-10-CM

## 2021-10-01 DIAGNOSIS — I25.10 CORONARY ARTERY DISEASE INVOLVING NATIVE CORONARY ARTERY OF NATIVE HEART WITHOUT ANGINA PECTORIS: ICD-10-CM

## 2021-10-01 DIAGNOSIS — I10 ESSENTIAL HYPERTENSION: ICD-10-CM

## 2021-10-01 DIAGNOSIS — E78.2 MIXED HYPERLIPIDEMIA: Primary | ICD-10-CM

## 2021-10-01 DIAGNOSIS — I49.3 PVC (PREMATURE VENTRICULAR CONTRACTION): ICD-10-CM

## 2021-10-01 PROCEDURE — 99214 OFFICE O/P EST MOD 30 MIN: CPT | Performed by: INTERNAL MEDICINE

## 2021-10-01 RX ORDER — MULTIPLE VITAMINS W/ MINERALS TAB 9MG-400MCG
1 TAB ORAL DAILY
COMMUNITY
End: 2021-10-19

## 2021-10-01 RX ORDER — UBIDECARENONE 100 MG
100 CAPSULE ORAL DAILY
COMMUNITY
End: 2022-01-21

## 2021-10-01 NOTE — PROGRESS NOTES
CC--palpitations and bradycardia    Sub--71-year-old very pleasant patient came for follow-up after initiation of acebutolol at prior visit and patient was seen in the past for suspected tachycardia/bradycardia syndrome  She was on atenolol which has been stopped and changed to acebutolol and she saw some improvement of symptoms with occasional palpitations without any neptali syncope and complains of mild dizziness--Her symptoms started increasing since March of this year and she kind of correlated to loss of her spouse with grief  She complains of palpitations and sometimes fullness in her neck and denies any symptoms of neptali syncope  She does have atypical epigastric discomfort unrelated to exertion and a stress test showed normal EF and inferoapical ischemia could not be excluded and left jaycob medical management--she underwent cardiac catheterization which showed moderate LAD disease negative on IFR and left to medical management  She had normal EF on echocardiography with sclerotic aortic valve without stenosis  An event recorder on her revealed sinus bradycardia with intermittent PVCs  She denies any TIA or stroke or renal disease in the past  She does have essential hypertension on amlodipine  She continues to have transient symptoms mostly lasting few seconds without any neptali syncope        Past Medical History:   Diagnosis Date   • Depression    • Diabetic peripheral neuropathy    • GERD    • Insomnia    • Lower back pain    • Obesity    • Osteoarthritis    • Osteopenia    • Palpitations    • Pulmonary nodule    • RA    • Rheumatoid nodule 06/2011     rt. thumb x 2 and rt. & lt. 3rd fingers (Oct.2012)/ rt. elbow, left elbow, and epidermal inclusion cyst post. neck (June 2011)-----Dr. Whitmore   • Vitamin D deficiency      Physical Exam  VITALS REVIEWED  General:      well developed, well nourished, in no acute distress.    Head:      normocephalic and atraumatic.    Eyes:      PERRL/EOM intact, conjunctiva  and sclera clear with out nystagmus.    Neck:      no masses, thyromegaly,  trachea central with normal respiratory effort   Lungs:      clear bilaterally to auscultation.    Heart:      Sinus rhythm with ejection systolic murmur at the base of the heart consistent with aortic valve sclerosis with normal carotid upstroke without any gallops or rubs        Assessment and plan    Sinus bradycardia with symptoms predominantly in the form of fatigue--mild improvement with change of beta-blockers and to continue on current medications and reevaluate after few months and then decide on pacer implant depending on patient's symptoms--current symptoms are fairly transient lasting only for few seconds and medical treatment can be continued unless her symptoms get prolonged or long-lasting--in fact her resting heart rate was 66 without any PVCs on auscultation and patient was reassured and continue on current dosing of acebutolol  Intermittent PVCs  Aortic valve sclerosis without aortic stenosis  Normal EF with inferoapical artifact versus ischemia on medical treatment--post cardiac catheterization with moderate LAD disease left on medical management  Essential hypertension  Medications  reviewed and follow-up after 6 months  LDL is 67 and normal renal panel        Electronically signed by Alberto Gotti MD, 10/01/21, 1:31 PM EDT.

## 2021-10-11 ENCOUNTER — TELEPHONE (OUTPATIENT)
Dept: FAMILY MEDICINE CLINIC | Facility: CLINIC | Age: 71
End: 2021-10-11

## 2021-10-11 DIAGNOSIS — M05.9 SEROPOSITIVE RHEUMATOID ARTHRITIS (HCC): ICD-10-CM

## 2021-10-11 DIAGNOSIS — Z79.899 LONG-TERM USE OF HIGH-RISK MEDICATION: ICD-10-CM

## 2021-10-11 RX ORDER — ADALIMUMAB 40MG/0.4ML
40 KIT SUBCUTANEOUS
Qty: 2 EACH | Refills: 3 | Status: SHIPPED | OUTPATIENT
Start: 2021-10-11 | End: 2022-04-15 | Stop reason: SDUPTHER

## 2021-10-11 NOTE — TELEPHONE ENCOUNTER
HUB to read.  PA was sent for [email or web address]. Insurance won't pay for a 90 day supply of the HUMIRA but the qty of 2 for a 30 day supply went through.

## 2021-10-18 DIAGNOSIS — E11.65 TYPE 2 DIABETES MELLITUS WITH HYPERGLYCEMIA, WITH LONG-TERM CURRENT USE OF INSULIN (HCC): ICD-10-CM

## 2021-10-18 DIAGNOSIS — Z79.4 TYPE 2 DIABETES MELLITUS WITH HYPERGLYCEMIA, WITH LONG-TERM CURRENT USE OF INSULIN (HCC): ICD-10-CM

## 2021-10-19 ENCOUNTER — OFFICE VISIT (OUTPATIENT)
Dept: FAMILY MEDICINE CLINIC | Facility: CLINIC | Age: 71
End: 2021-10-19

## 2021-10-19 VITALS
BODY MASS INDEX: 29.77 KG/M2 | RESPIRATION RATE: 16 BRPM | OXYGEN SATURATION: 98 % | HEIGHT: 63 IN | HEART RATE: 59 BPM | TEMPERATURE: 97.1 F | WEIGHT: 168 LBS | DIASTOLIC BLOOD PRESSURE: 66 MMHG | SYSTOLIC BLOOD PRESSURE: 151 MMHG

## 2021-10-19 DIAGNOSIS — N39.0 URINARY TRACT INFECTION WITHOUT HEMATURIA, SITE UNSPECIFIED: Primary | ICD-10-CM

## 2021-10-19 DIAGNOSIS — Z12.31 SCREENING MAMMOGRAM, ENCOUNTER FOR: ICD-10-CM

## 2021-10-19 DIAGNOSIS — K21.9 CHRONIC GERD: ICD-10-CM

## 2021-10-19 DIAGNOSIS — R19.7 DIARRHEA, UNSPECIFIED TYPE: ICD-10-CM

## 2021-10-19 DIAGNOSIS — R82.90 ABNORMAL URINALYSIS: ICD-10-CM

## 2021-10-19 LAB
BILIRUB BLD-MCNC: NEGATIVE MG/DL
CLARITY, POC: CLEAR
COLOR UR: YELLOW
EXPIRATION DATE: ABNORMAL
GLUCOSE UR STRIP-MCNC: NEGATIVE MG/DL
KETONES UR QL: NEGATIVE
LEUKOCYTE EST, POC: ABNORMAL
Lab: ABNORMAL
NITRITE UR-MCNC: NEGATIVE MG/ML
PH UR: 7 [PH] (ref 5–8)
PROT UR STRIP-MCNC: NEGATIVE MG/DL
RBC # UR STRIP: NEGATIVE /UL
SP GR UR: 1.02 (ref 1–1.03)
UROBILINOGEN UR QL: NORMAL

## 2021-10-19 PROCEDURE — 99213 OFFICE O/P EST LOW 20 MIN: CPT | Performed by: FAMILY MEDICINE

## 2021-10-19 PROCEDURE — 81003 URINALYSIS AUTO W/O SCOPE: CPT | Performed by: FAMILY MEDICINE

## 2021-10-19 PROCEDURE — 87186 SC STD MICRODIL/AGAR DIL: CPT | Performed by: FAMILY MEDICINE

## 2021-10-19 PROCEDURE — 87077 CULTURE AEROBIC IDENTIFY: CPT | Performed by: FAMILY MEDICINE

## 2021-10-19 PROCEDURE — 87086 URINE CULTURE/COLONY COUNT: CPT | Performed by: FAMILY MEDICINE

## 2021-10-20 RX ORDER — PEN NEEDLE, DIABETIC 29 G X1/2"
NEEDLE, DISPOSABLE MISCELLANEOUS
Qty: 150 EACH | Refills: 3 | Status: SHIPPED | OUTPATIENT
Start: 2021-10-20 | End: 2022-09-19

## 2021-10-21 ENCOUNTER — TELEPHONE (OUTPATIENT)
Dept: FAMILY MEDICINE CLINIC | Facility: CLINIC | Age: 71
End: 2021-10-21

## 2021-10-21 LAB — BACTERIA SPEC AEROBE CULT: ABNORMAL

## 2021-10-21 RX ORDER — SULFAMETHOXAZOLE AND TRIMETHOPRIM 800; 160 MG/1; MG/1
1 TABLET ORAL 2 TIMES DAILY
Qty: 14 TABLET | Refills: 0 | Status: SHIPPED | OUTPATIENT
Start: 2021-10-21 | End: 2021-11-15

## 2021-10-26 ENCOUNTER — HOSPITAL ENCOUNTER (EMERGENCY)
Facility: HOSPITAL | Age: 71
Discharge: HOME | End: 2021-10-26
Admitting: EMERGENCY MEDICINE

## 2021-10-26 ENCOUNTER — APPOINTMENT (OUTPATIENT)
Dept: GENERAL RADIOLOGY | Facility: HOSPITAL | Age: 71
End: 2021-10-26

## 2021-10-26 ENCOUNTER — APPOINTMENT (OUTPATIENT)
Dept: CT IMAGING | Facility: HOSPITAL | Age: 71
End: 2021-10-26

## 2021-10-26 VITALS
HEIGHT: 63 IN | OXYGEN SATURATION: 100 % | HEART RATE: 70 BPM | BODY MASS INDEX: 30.04 KG/M2 | TEMPERATURE: 98 F | RESPIRATION RATE: 16 BRPM | WEIGHT: 169.53 LBS | DIASTOLIC BLOOD PRESSURE: 60 MMHG | SYSTOLIC BLOOD PRESSURE: 120 MMHG

## 2021-10-26 DIAGNOSIS — R73.9 HYPERGLYCEMIA: Primary | ICD-10-CM

## 2021-10-26 DIAGNOSIS — R91.1 NODULE OF RIGHT LUNG: ICD-10-CM

## 2021-10-26 DIAGNOSIS — E86.0 DEHYDRATION: ICD-10-CM

## 2021-10-26 DIAGNOSIS — R68.83 CHILLS: ICD-10-CM

## 2021-10-26 DIAGNOSIS — R11.0 NAUSEA: ICD-10-CM

## 2021-10-26 DIAGNOSIS — R79.89 ELEVATED LACTIC ACID LEVEL: ICD-10-CM

## 2021-10-26 LAB
ALBUMIN SERPL-MCNC: 4.4 G/DL (ref 3.5–5.2)
ALBUMIN/GLOB SERPL: 1.4 G/DL
ALP SERPL-CCNC: 61 U/L (ref 39–117)
ALT SERPL W P-5'-P-CCNC: 13 U/L (ref 1–33)
ANION GAP SERPL CALCULATED.3IONS-SCNC: 18 MMOL/L (ref 5–15)
AST SERPL-CCNC: 17 U/L (ref 1–32)
B PARAPERT DNA SPEC QL NAA+PROBE: NOT DETECTED
B PERT DNA SPEC QL NAA+PROBE: NOT DETECTED
BASOPHILS # BLD AUTO: 0 10*3/MM3 (ref 0–0.2)
BASOPHILS NFR BLD AUTO: 0.5 % (ref 0–1.5)
BILIRUB SERPL-MCNC: 0.2 MG/DL (ref 0–1.2)
BILIRUB UR QL STRIP: NEGATIVE
BUN SERPL-MCNC: 13 MG/DL (ref 8–23)
BUN/CREAT SERPL: 13.3 (ref 7–25)
C PNEUM DNA NPH QL NAA+NON-PROBE: NOT DETECTED
CALCIUM SPEC-SCNC: 9.5 MG/DL (ref 8.6–10.5)
CHLORIDE SERPL-SCNC: 95 MMOL/L (ref 98–107)
CLARITY UR: CLEAR
CO2 SERPL-SCNC: 21 MMOL/L (ref 22–29)
COLOR UR: YELLOW
CREAT SERPL-MCNC: 0.98 MG/DL (ref 0.57–1)
D-LACTATE SERPL-SCNC: 1.7 MMOL/L (ref 0.5–2)
D-LACTATE SERPL-SCNC: 2.4 MMOL/L (ref 0.5–2)
DEPRECATED RDW RBC AUTO: 42 FL (ref 37–54)
EOSINOPHIL # BLD AUTO: 1.3 10*3/MM3 (ref 0–0.4)
EOSINOPHIL NFR BLD AUTO: 16.1 % (ref 0.3–6.2)
ERYTHROCYTE [DISTWIDTH] IN BLOOD BY AUTOMATED COUNT: 13.6 % (ref 12.3–15.4)
FLUAV SUBTYP SPEC NAA+PROBE: NOT DETECTED
FLUBV RNA ISLT QL NAA+PROBE: NOT DETECTED
GFR SERPL CREATININE-BSD FRML MDRD: 56 ML/MIN/1.73
GLOBULIN UR ELPH-MCNC: 3.2 GM/DL
GLUCOSE BLDC GLUCOMTR-MCNC: 270 MG/DL (ref 70–105)
GLUCOSE SERPL-MCNC: 258 MG/DL (ref 65–99)
GLUCOSE UR STRIP-MCNC: ABNORMAL MG/DL
HADV DNA SPEC NAA+PROBE: NOT DETECTED
HCOV 229E RNA SPEC QL NAA+PROBE: NOT DETECTED
HCOV HKU1 RNA SPEC QL NAA+PROBE: NOT DETECTED
HCOV NL63 RNA SPEC QL NAA+PROBE: NOT DETECTED
HCOV OC43 RNA SPEC QL NAA+PROBE: NOT DETECTED
HCT VFR BLD AUTO: 41.1 % (ref 34–46.6)
HGB BLD-MCNC: 14.1 G/DL (ref 12–15.9)
HGB UR QL STRIP.AUTO: NEGATIVE
HMPV RNA NPH QL NAA+NON-PROBE: NOT DETECTED
HOLD SPECIMEN: NORMAL
HPIV1 RNA SPEC QL NAA+PROBE: NOT DETECTED
HPIV2 RNA SPEC QL NAA+PROBE: NOT DETECTED
HPIV3 RNA NPH QL NAA+PROBE: NOT DETECTED
HPIV4 P GENE NPH QL NAA+PROBE: NOT DETECTED
KETONES UR QL STRIP: NEGATIVE
LEUKOCYTE ESTERASE UR QL STRIP.AUTO: NEGATIVE
LIPASE SERPL-CCNC: 30 U/L (ref 13–60)
LYMPHOCYTES # BLD AUTO: 2.5 10*3/MM3 (ref 0.7–3.1)
LYMPHOCYTES NFR BLD AUTO: 30.1 % (ref 19.6–45.3)
M PNEUMO IGG SER IA-ACNC: NOT DETECTED
MAGNESIUM SERPL-MCNC: 1.8 MG/DL (ref 1.6–2.4)
MCH RBC QN AUTO: 29.9 PG (ref 26.6–33)
MCHC RBC AUTO-ENTMCNC: 34.3 G/DL (ref 31.5–35.7)
MCV RBC AUTO: 87 FL (ref 79–97)
MONOCYTES # BLD AUTO: 0.5 10*3/MM3 (ref 0.1–0.9)
MONOCYTES NFR BLD AUTO: 6 % (ref 5–12)
NEUTROPHILS NFR BLD AUTO: 4 10*3/MM3 (ref 1.7–7)
NEUTROPHILS NFR BLD AUTO: 47.3 % (ref 42.7–76)
NITRITE UR QL STRIP: NEGATIVE
NRBC BLD AUTO-RTO: 0.1 /100 WBC (ref 0–0.2)
PH UR STRIP.AUTO: 6.5 [PH] (ref 5–8)
PLATELET # BLD AUTO: 300 10*3/MM3 (ref 140–450)
PMV BLD AUTO: 7.4 FL (ref 6–12)
POTASSIUM SERPL-SCNC: 4.1 MMOL/L (ref 3.5–5.2)
PROCALCITONIN SERPL-MCNC: 0.03 NG/ML (ref 0–0.25)
PROT SERPL-MCNC: 7.6 G/DL (ref 6–8.5)
PROT UR QL STRIP: NEGATIVE
RBC # BLD AUTO: 4.72 10*6/MM3 (ref 3.77–5.28)
RHINOVIRUS RNA SPEC NAA+PROBE: NOT DETECTED
RSV RNA NPH QL NAA+NON-PROBE: NOT DETECTED
SARS-COV-2 RNA NPH QL NAA+NON-PROBE: NOT DETECTED
SODIUM SERPL-SCNC: 134 MMOL/L (ref 136–145)
SP GR UR STRIP: 1.02 (ref 1–1.03)
TROPONIN T SERPL-MCNC: <0.01 NG/ML (ref 0–0.03)
TSH SERPL DL<=0.05 MIU/L-ACNC: 1.27 UIU/ML (ref 0.27–4.2)
UROBILINOGEN UR QL STRIP: ABNORMAL
WBC # BLD AUTO: 8.4 10*3/MM3 (ref 3.4–10.8)

## 2021-10-26 PROCEDURE — 0202U NFCT DS 22 TRGT SARS-COV-2: CPT | Performed by: PHYSICIAN ASSISTANT

## 2021-10-26 PROCEDURE — 84484 ASSAY OF TROPONIN QUANT: CPT | Performed by: PHYSICIAN ASSISTANT

## 2021-10-26 PROCEDURE — 80053 COMPREHEN METABOLIC PANEL: CPT | Performed by: PHYSICIAN ASSISTANT

## 2021-10-26 PROCEDURE — 84443 ASSAY THYROID STIM HORMONE: CPT | Performed by: PHYSICIAN ASSISTANT

## 2021-10-26 PROCEDURE — 81003 URINALYSIS AUTO W/O SCOPE: CPT | Performed by: PHYSICIAN ASSISTANT

## 2021-10-26 PROCEDURE — 71045 X-RAY EXAM CHEST 1 VIEW: CPT

## 2021-10-26 PROCEDURE — 36415 COLL VENOUS BLD VENIPUNCTURE: CPT

## 2021-10-26 PROCEDURE — 83735 ASSAY OF MAGNESIUM: CPT | Performed by: PHYSICIAN ASSISTANT

## 2021-10-26 PROCEDURE — 83605 ASSAY OF LACTIC ACID: CPT

## 2021-10-26 PROCEDURE — 99283 EMERGENCY DEPT VISIT LOW MDM: CPT

## 2021-10-26 PROCEDURE — 82962 GLUCOSE BLOOD TEST: CPT

## 2021-10-26 PROCEDURE — 85025 COMPLETE CBC W/AUTO DIFF WBC: CPT | Performed by: PHYSICIAN ASSISTANT

## 2021-10-26 PROCEDURE — 87040 BLOOD CULTURE FOR BACTERIA: CPT | Performed by: PHYSICIAN ASSISTANT

## 2021-10-26 PROCEDURE — 93005 ELECTROCARDIOGRAM TRACING: CPT | Performed by: PHYSICIAN ASSISTANT

## 2021-10-26 PROCEDURE — 74177 CT ABD & PELVIS W/CONTRAST: CPT

## 2021-10-26 PROCEDURE — 0 IOPAMIDOL PER 1 ML: Performed by: PHYSICIAN ASSISTANT

## 2021-10-26 PROCEDURE — 84145 PROCALCITONIN (PCT): CPT | Performed by: PHYSICIAN ASSISTANT

## 2021-10-26 PROCEDURE — 83690 ASSAY OF LIPASE: CPT | Performed by: PHYSICIAN ASSISTANT

## 2021-10-26 RX ORDER — SODIUM CHLORIDE 0.9 % (FLUSH) 0.9 %
10 SYRINGE (ML) INJECTION AS NEEDED
Status: DISCONTINUED | OUTPATIENT
Start: 2021-10-26 | End: 2021-10-26 | Stop reason: HOSPADM

## 2021-10-26 RX ADMIN — Medication 10 ML: at 16:04

## 2021-10-26 RX ADMIN — SODIUM CHLORIDE 1000 ML: 9 INJECTION, SOLUTION INTRAVENOUS at 16:04

## 2021-10-26 RX ADMIN — IOPAMIDOL 100 ML: 755 INJECTION, SOLUTION INTRAVENOUS at 15:56

## 2021-10-27 ENCOUNTER — TELEPHONE (OUTPATIENT)
Dept: FAMILY MEDICINE CLINIC | Facility: CLINIC | Age: 71
End: 2021-10-27

## 2021-10-27 DIAGNOSIS — N64.52 NIPPLE DISCHARGE: ICD-10-CM

## 2021-10-27 DIAGNOSIS — G62.9 NEUROPATHY: ICD-10-CM

## 2021-10-27 DIAGNOSIS — Z79.4 TYPE 2 DIABETES MELLITUS WITH HYPERGLYCEMIA, WITH LONG-TERM CURRENT USE OF INSULIN (HCC): ICD-10-CM

## 2021-10-27 DIAGNOSIS — N64.4 BREAST PAIN IN FEMALE: Primary | ICD-10-CM

## 2021-10-27 DIAGNOSIS — E11.65 TYPE 2 DIABETES MELLITUS WITH HYPERGLYCEMIA, WITH LONG-TERM CURRENT USE OF INSULIN (HCC): ICD-10-CM

## 2021-10-27 RX ORDER — GABAPENTIN 300 MG/1
300 CAPSULE ORAL 2 TIMES DAILY
Qty: 180 CAPSULE | Refills: 0 | Status: SHIPPED | OUTPATIENT
Start: 2021-10-27 | End: 2022-01-21 | Stop reason: SDUPTHER

## 2021-10-27 RX ORDER — BLOOD SUGAR DIAGNOSTIC
STRIP MISCELLANEOUS
Qty: 200 EACH | Refills: 2 | Status: SHIPPED | OUTPATIENT
Start: 2021-10-27

## 2021-10-27 NOTE — TELEPHONE ENCOUNTER
Caller: Jenna Rosa    Relationship: Self    Medication requested (name and dosage):     Requested Prescriptions:   Requested Prescriptions     Pending Prescriptions Disp Refills   • gabapentin (NEURONTIN) 300 MG capsule 180 capsule 0     Sig: Take 1 capsule by mouth 2 (Two) Times a Day.        Pharmacy where request should be sent: [address] - [phone]  - [phone]   [phone]    Additional details provided by patient: PATIENT STATES SHE HAS BEEN OUT FOR A MONTH. [facility] HAS FAXED OVER REQUEST TWICE.    Best call back number: [phone]     Does the patient have less than a 3 day supply:  [x] Yes  [] No    Yoanna Greenwood Rep   10/27/21 11:37 EDT

## 2021-10-28 ENCOUNTER — PATIENT OUTREACH (OUTPATIENT)
Dept: CASE MANAGEMENT | Facility: OTHER | Age: 71
End: 2021-10-28

## 2021-10-28 LAB — QT INTERVAL: 421 MS

## 2021-10-28 NOTE — OUTREACH NOTE
Ambulatory Case Management Note    General & Health Literacy Assessment  Questions/Answers      Most Recent Value   Assessment Completed With Patient   Living Arrangement Alone  [family members and neighbors support]   Type of Residence Private Residence   Home Care Services No   Equiptment Used at Home Tub Seat  [also glucometer and BP cuff]   Bed or Wheelchair Confined No   Difficulty Keeping Appointments No   Chronic Pain Yes   Location of Chronic Pain states RA managed by PCP, pain controlled on Humira        Care Evaluation  Questions/Answers      Most Recent Value   Annual Wellness Visit:  Patient Has Completed  [pt confirms AWV scheduled 11/15/21, will bring glucometer & BP records]   Care Gaps Addressed Colon Cancer Screening   Colon Cancer Screening Type Colonoscopy   Colon Cancer Screening Completion at [facility] or Other Other   External Colonoscopy Site [facility] - states has appt in Dec for colo-rectal screening   Other Patient Education/Resources  24/7 [facility] Nurse Call Line,  Advanced Care Planning,  MyChart,  Transportation   24/7 Nurse Call Line Education Method Verbal   ACP Education Method Verbal  [has documents, states children know her wishes]   MyChart Education Method Verbal  [active, uses it]   Transportation Education Method Verbal  [elaina self]   Advanced Directives: Patient Has  [pt states has materials - is working on completion - and children know her wishes]   Medication Adherence Medications understood   Healthy Lifestyle (Self-Efficacy) self-monitors vital signs appropriately,  correctly recognizes signs/symptoms of cardiac distress,  correctly recognizes signs/symptoms of pulmonary distress,  recognizes when to stop activity,  recognizes when to contact medical assistance,  self-reports important symptoms to medical professional      SDOH updated and reviewed with the patient during this program:     Financial Resource Strain: Low Risk    • Difficulty of Paying Living Expenses:  "Not hard at all       Food Insecurity: No Food Insecurity   • Worried About Running Out of Food in the Last Year: Never true   • Ran Out of Food in the Last Year: Never true       Transportation Needs: No Transportation Needs   • Lack of Transportation (Medical): No   • Lack of Transportation (Non-Medical): No       Housing Stability: Low Risk    • Unable to Pay for Housing in the Last Year: No   • Number of Places Lived in the Last Year: 1   • Unstable Housing in the Last Year: No   RN-ACM follow-up contact completed   See assessment/care plan flow sheets for details.    Talked with Ms Rosa re 10/26/21 ED visit for Dx hyperglycemia. She states \"I'm a whole lot better today, really just fine today\", states to be compliant with AVS instructions., monitioring glucoses & states is hydrating well.  Finished Bactrim DS yesterday and denies F/C, N/V, chest pain, palps, SOA, dizziness, bowel or bladder complaints today.  States eating WNL, nausea sx resolved upon completion of Bactrim.  States will bring glucometer to medical appts on 11/15/21.    States still taking Janumet -1000 QD and Humulin 70/30 45U before breakfast / 20U before supper.  States glucose on just before going to ER Tues was 146.   States recent glucoses yesterday: 30 mins PP lunch: 226.  HS 90. States will bring glucometer with her to 11/15/21 appts.      States doesn't check BPs, but has cuff. Agrees to check BPs 2-3X/week and bring record to [facility] appt 11/15/21.    Denies immediate issues/concerns. VU re sx requiring return to ED.  Declines assist to schedule ED f/up appt.      Confirms [facility] fasting labs 11/15/21 & PCP AWV 11/15/21, Dr García 11/17/21, Dr rangel 11/22/21. States will get flu vax from PCP at [facility]. Declines CV-19 vaccines.  States Dr Rangel usually does DM foot exam & she plans to schedule podiatry for new shoes after that. States PCP manages RA d/t no rheumatologist is area that accepts her insurance.      Other f/up appts " confirmed: [facility] in Dec for colorectal screening.  [facility] will call [facility] today for diagnostic mammogram appt on order.    Ms Rosa states she lives alone, is IADLs, but has two sisters and a daughter that live nearby that can assist her if needed. States also two neighbors can help.   keeps her grandson one weekend every two weeks, daughter visits weekly.  Drives self, has car.  Denies insecurities re food, meds, transport.  She verb appreciation for the call but denies needs, concerns, questions.  RN-ACM to f/up as needed.    Khadra Valdez RN  Ambulatory Case Management    10/28/2021, 15:20 EDT

## 2021-10-31 LAB
BACTERIA SPEC AEROBE CULT: NORMAL
BACTERIA SPEC AEROBE CULT: NORMAL

## 2021-11-11 ENCOUNTER — TELEPHONE (OUTPATIENT)
Dept: FAMILY MEDICINE CLINIC | Facility: CLINIC | Age: 71
End: 2021-11-11

## 2021-11-11 DIAGNOSIS — N64.4 BREAST PAIN IN FEMALE: Primary | ICD-10-CM

## 2021-11-11 NOTE — TELEPHONE ENCOUNTER
[facility] called and stated they received an order for diagnostic mammogram but a breast ultrasound was not ordered.  Requesting the breast US be ordered and faxed to them.

## 2021-11-15 ENCOUNTER — OFFICE VISIT (OUTPATIENT)
Dept: FAMILY MEDICINE CLINIC | Facility: CLINIC | Age: 71
End: 2021-11-15

## 2021-11-15 ENCOUNTER — TELEPHONE (OUTPATIENT)
Dept: FAMILY MEDICINE CLINIC | Facility: CLINIC | Age: 71
End: 2021-11-15

## 2021-11-15 ENCOUNTER — LAB (OUTPATIENT)
Dept: LAB | Facility: HOSPITAL | Age: 71
End: 2021-11-15

## 2021-11-15 VITALS
TEMPERATURE: 97.8 F | DIASTOLIC BLOOD PRESSURE: 67 MMHG | SYSTOLIC BLOOD PRESSURE: 131 MMHG | BODY MASS INDEX: 30.83 KG/M2 | HEIGHT: 63 IN | OXYGEN SATURATION: 97 % | HEART RATE: 63 BPM | RESPIRATION RATE: 16 BRPM | WEIGHT: 174 LBS

## 2021-11-15 DIAGNOSIS — E11.65 TYPE 2 DIABETES MELLITUS WITH HYPERGLYCEMIA, WITH LONG-TERM CURRENT USE OF INSULIN (HCC): ICD-10-CM

## 2021-11-15 DIAGNOSIS — I25.10 CORONARY ARTERY DISEASE INVOLVING NATIVE CORONARY ARTERY OF NATIVE HEART WITHOUT ANGINA PECTORIS: ICD-10-CM

## 2021-11-15 DIAGNOSIS — M85.88 OSTEOPENIA OF LUMBAR SPINE: ICD-10-CM

## 2021-11-15 DIAGNOSIS — E78.2 MIXED HYPERLIPIDEMIA: ICD-10-CM

## 2021-11-15 DIAGNOSIS — Z79.4 TYPE 2 DIABETES MELLITUS WITH HYPERGLYCEMIA, WITH LONG-TERM CURRENT USE OF INSULIN (HCC): ICD-10-CM

## 2021-11-15 DIAGNOSIS — R91.1 INCIDENTAL LUNG NODULE, > 3MM AND < 8MM: ICD-10-CM

## 2021-11-15 DIAGNOSIS — Z00.00 MEDICARE ANNUAL WELLNESS VISIT, SUBSEQUENT: Primary | ICD-10-CM

## 2021-11-15 DIAGNOSIS — I10 ESSENTIAL HYPERTENSION: ICD-10-CM

## 2021-11-15 DIAGNOSIS — Z78.0 POSTMENOPAUSAL: ICD-10-CM

## 2021-11-15 DIAGNOSIS — Z23 NEED FOR INFLUENZA VACCINATION: ICD-10-CM

## 2021-11-15 LAB
ALBUMIN SERPL-MCNC: 4.4 G/DL (ref 3.5–5.2)
ALBUMIN UR-MCNC: 3.6 MG/DL
ALBUMIN/GLOB SERPL: 1.5 G/DL
ALP SERPL-CCNC: 47 U/L (ref 39–117)
ALT SERPL W P-5'-P-CCNC: 15 U/L (ref 1–33)
ANION GAP SERPL CALCULATED.3IONS-SCNC: 9.7 MMOL/L (ref 5–15)
AST SERPL-CCNC: 16 U/L (ref 1–32)
BILIRUB SERPL-MCNC: 0.2 MG/DL (ref 0–1.2)
BUN SERPL-MCNC: 10 MG/DL (ref 8–23)
BUN/CREAT SERPL: 13.5 (ref 7–25)
CALCIUM SPEC-SCNC: 9.6 MG/DL (ref 8.6–10.5)
CHLORIDE SERPL-SCNC: 103 MMOL/L (ref 98–107)
CHOLEST SERPL-MCNC: 144 MG/DL (ref 0–200)
CK SERPL-CCNC: 56 U/L (ref 20–180)
CO2 SERPL-SCNC: 27.3 MMOL/L (ref 22–29)
CREAT SERPL-MCNC: 0.74 MG/DL (ref 0.57–1)
CREAT UR-MCNC: 93 MG/DL
GFR SERPL CREATININE-BSD FRML MDRD: 78 ML/MIN/1.73
GLOBULIN UR ELPH-MCNC: 2.9 GM/DL
GLUCOSE SERPL-MCNC: 205 MG/DL (ref 65–99)
HBA1C MFR BLD: 7.2 % (ref 3.5–5.6)
HDLC SERPL-MCNC: 43 MG/DL (ref 40–60)
LDLC SERPL CALC-MCNC: 69 MG/DL (ref 0–100)
LDLC/HDLC SERPL: 1.47 {RATIO}
MICROALBUMIN/CREAT UR: 38.7 MG/G
POTASSIUM SERPL-SCNC: 4.1 MMOL/L (ref 3.5–5.2)
PROT SERPL-MCNC: 7.3 G/DL (ref 6–8.5)
SODIUM SERPL-SCNC: 140 MMOL/L (ref 136–145)
TRIGL SERPL-MCNC: 190 MG/DL (ref 0–150)
VLDLC SERPL-MCNC: 32 MG/DL (ref 5–40)

## 2021-11-15 PROCEDURE — 80061 LIPID PANEL: CPT

## 2021-11-15 PROCEDURE — 82550 ASSAY OF CK (CPK): CPT

## 2021-11-15 PROCEDURE — G0439 PPPS, SUBSEQ VISIT: HCPCS | Performed by: FAMILY MEDICINE

## 2021-11-15 PROCEDURE — 90662 IIV NO PRSV INCREASED AG IM: CPT | Performed by: FAMILY MEDICINE

## 2021-11-15 PROCEDURE — 36415 COLL VENOUS BLD VENIPUNCTURE: CPT

## 2021-11-15 PROCEDURE — 1170F FXNL STATUS ASSESSED: CPT | Performed by: FAMILY MEDICINE

## 2021-11-15 PROCEDURE — 82570 ASSAY OF URINE CREATININE: CPT

## 2021-11-15 PROCEDURE — 80053 COMPREHEN METABOLIC PANEL: CPT

## 2021-11-15 PROCEDURE — 83036 HEMOGLOBIN GLYCOSYLATED A1C: CPT

## 2021-11-15 PROCEDURE — G0008 ADMIN INFLUENZA VIRUS VAC: HCPCS | Performed by: FAMILY MEDICINE

## 2021-11-15 PROCEDURE — 82043 UR ALBUMIN QUANTITATIVE: CPT

## 2021-11-15 PROCEDURE — 1160F RVW MEDS BY RX/DR IN RCRD: CPT | Performed by: FAMILY MEDICINE

## 2021-11-15 RX ORDER — SITAGLIPTIN AND METFORMIN HYDROCHLORIDE 1000; 100 MG/1; MG/1
1 TABLET, FILM COATED, EXTENDED RELEASE ORAL DAILY
Qty: 90 TABLET | Refills: 0 | Status: SHIPPED | OUTPATIENT
Start: 2021-11-15 | End: 2022-06-06 | Stop reason: SDUPTHER

## 2021-11-15 RX ORDER — LANSOPRAZOLE 30 MG/1
30 CAPSULE, DELAYED RELEASE ORAL DAILY
Qty: 90 CAPSULE | Refills: 0 | Status: SHIPPED | OUTPATIENT
Start: 2021-11-15 | End: 2022-03-04 | Stop reason: SDUPTHER

## 2021-11-15 RX ORDER — ACEBUTOLOL HYDROCHLORIDE 200 MG/1
200 CAPSULE ORAL DAILY
Qty: 90 CAPSULE | Refills: 1 | Status: SHIPPED | OUTPATIENT
Start: 2021-11-15 | End: 2022-05-10 | Stop reason: SDUPTHER

## 2021-11-15 RX ORDER — ESCITALOPRAM OXALATE 20 MG/1
10 TABLET ORAL NIGHTLY
Qty: 90 TABLET | Refills: 0
Start: 2021-11-15 | End: 2021-12-08

## 2021-11-15 RX ORDER — TRAZODONE HYDROCHLORIDE 50 MG/1
50 TABLET ORAL
Qty: 90 TABLET | Refills: 0 | Status: SHIPPED | OUTPATIENT
Start: 2021-11-15 | End: 2022-01-21

## 2021-11-15 RX ORDER — ONDANSETRON 4 MG/1
4 TABLET, ORALLY DISINTEGRATING ORAL EVERY 8 HOURS PRN
Qty: 30 TABLET | Refills: 0 | Status: SHIPPED | OUTPATIENT
Start: 2021-11-15

## 2021-11-15 RX ORDER — MULTIVIT-MIN/IRON/FOLIC ACID/K 18-600-40
CAPSULE ORAL
COMMUNITY

## 2021-11-15 NOTE — TELEPHONE ENCOUNTER
HUB to read    PA was sent for Humira Pen (CF) 40MG/0.4ML pen-injector kit    Waiting on the outcome from insurance.

## 2021-11-15 NOTE — TELEPHONE ENCOUNTER
HUB to read    PA approved for Humira Pen (CF) 40MG/0.4ML pen-injector kit    PA Case: [id number], Status: Approved, Coverage Starts on: 8/16/2021 12:00:00 AM, Coverage Ends on: 11/15/2022 12:00:00 AM.

## 2021-11-15 NOTE — PROGRESS NOTES
The ABCs of the Annual Wellness Visit  Subsequent Medicare Wellness Visit    Chief Complaint   Patient presents with   • Medicare Wellness-subsequent       Subjective   History of Present Illness:  Jenna Rosa is a 71 y.o. female who presents for a Subsequent Medicare Wellness Visit.    HEALTH RISK ASSESSMENT    Recent Hospitalizations:  No hospitalization(s) within the last year.    Current Medical Providers:  Patient Care Team:  Emily Burdick DO as PCP - General (Family Medicine)  Joe García MD as Consulting Physician (Cardiology)  Dano Rangel MD as Consulting Physician (Endocrinology)  Conchis Mohamud OD as Consulting Physician (Optometry)    Smoking Status:  Social History     Tobacco Use   Smoking Status Never Smoker   Smokeless Tobacco Never Used       Alcohol Consumption:  Social History     Substance and Sexual Activity   Alcohol Use Yes    Comment: occ       Depression Screen:   PHQ-2/PHQ-9 Depression Screening 11/15/2021   Little interest or pleasure in doing things 0   Feeling down, depressed, or hopeless 1   Trouble falling or staying asleep, or sleeping too much 1   Feeling tired or having little energy 3   Poor appetite or overeating 1   Feeling bad about yourself - or that you are a failure or have let yourself or your family down 0   Trouble concentrating on things, such as reading the newspaper or watching television 0   Moving or speaking so slowly that other people could have noticed. Or the opposite - being so fidgety or restless that you have been moving around a lot more than usual 0   Thoughts that you would be better off dead, or of hurting yourself in some way 0   Total Score 6   If you checked off any problems, how difficult have these problems made it for you to do your work, take care of things at home, or get along with other people? Somewhat difficult       Fall Risk Screen:  STEADI Fall Risk Assessment has not been completed.    Health Habits and Functional and  Cognitive Screening:  Functional & Cognitive Status 11/15/2021   Do you have difficulty preparing food and eating? No   Do you have difficulty bathing yourself, getting dressed or grooming yourself? No   Do you have difficulty using the toilet? No   Do you have difficulty moving around from place to place? No   Do you have trouble with steps or getting out of a bed or a chair? No   Current Diet Unhealthy Diet   Dental Exam Not up to date        Dental Exam Comment no teeth   Eye Exam Up to date        Eye Exam Comment 7/2021   Exercise (times per week) 7 times per week   Current Exercises Include Walking   Current Exercise Activities Include -   Do you need help using the phone?  No   Are you deaf or do you have serious difficulty hearing?  No   Do you need help with transportation? No   Do you need help shopping? No   Do you need help preparing meals?  No   Do you need help with housework?  No   Do you need help with laundry? No   Do you need help taking your medications? No   Do you need help managing money? No   Do you ever drive or ride in a car without wearing a seat belt? No   Have you felt unusual stress, anger or loneliness in the last month? Yes   Who do you live with? Alone   If you need help, do you have trouble finding someone available to you? No   Have you been bothered in the last four weeks by sexual problems? No   Do you have difficulty concentrating, remembering or making decisions? No         Does the patient have evidence of cognitive impairment? No    Asprin use counseling:Taking ASA appropriately as indicated    Age-appropriate Screening Schedule:  Refer to the list below for future screening recommendations based on patient's age, sex and/or medical conditions. Orders for these recommended tests are listed in the plan section. The patient has been provided with a written plan.    Health Maintenance   Topic Date Due   • TDAP/TD VACCINES (1 - Tdap) Never done   • ZOSTER VACCINE (1 of 2) Never  "done   • DIABETIC FOOT EXAM  Never done   • DXA SCAN  02/19/2021   • DIABETIC EYE EXAM  03/01/2022   • HEMOGLOBIN A1C  05/15/2022   • LIPID PANEL  11/15/2022   • URINE MICROALBUMIN  11/15/2022   • MAMMOGRAM  11/12/2023   • INFLUENZA VACCINE  Completed          The following portions of the patient's history were reviewed and updated as appropriate: allergies, current medications, past family history, past medical history, past social history, past surgical history and problem list.    Outpatient Medications Prior to Visit   Medication Sig Dispense Refill   • amLODIPine (NORVASC) 10 MG tablet Take 1 tablet by mouth Daily. 90 tablet 0   • aspirin 81 MG EC tablet Take 81 mg by mouth Daily.     • BD Insulin Syringe U/F 31G X 5/16\" 1 ML misc USE TO INJECT INSULIN 4 TIMES DAILY 150 each 3   • calcium carbonate (OS-PREET) 600 MG tablet Take 600 mg by mouth Daily.     • Cholecalciferol (Vitamin D) 50 MCG (2000 UT) capsule 1 po qd     • coenzyme Q10 100 MG capsule Take 100 mg by mouth Daily.     • diclofenac (VOLTAREN) 1 % gel gel Apply 4 g topically to the appropriate area as directed 4 (Four) Times a Day As Needed.     • fenofibrate 160 MG tablet TAKE 1 TABLET BY MOUTH DAILY  90 tablet 2   • Ferrous Sulfate (IRON) 325 (65 Fe) MG tablet Take 325 mg by mouth Daily.     • fluticasone (FLONASE ALLERGY RELIEF) 50 MCG/ACT nasal spray 1 spray into the nostril(s) as directed by provider Daily As Needed for Allergies.     • gabapentin (NEURONTIN) 300 MG capsule Take 1 capsule by mouth 2 (Two) Times a Day. 180 capsule 0   • glucose blood (Accu-Chek Jazmyne Plus) test strip USE ONE STRIP TO TEST TWICE A  each 2   • Humira Pen 40 MG/0.4ML Pen-injector Kit Inject 40 mg under the skin into the appropriate area as directed Every 14 (Fourteen) Days. Sulfate Free Formula 2 each 3   • HumuLIN 70/30 (70-30) 100 UNIT/ML injection INJECT 45 UNITS BEFORE BREAKFAST AND 20 UNITS BEFORE SUPPER DX E11.65 20 mL 11   • isosorbide mononitrate " (IMDUR) 30 MG 24 hr tablet Take 1 tablet by mouth 2 (Two) Times a Day. 180 tablet 2   • vitamin B-12 (CYANOCOBALAMIN) 1000 MCG tablet Take 1,000 mcg by mouth Daily.     • acebutolol (SECTRAL) 200 MG capsule Take 1 capsule by mouth Daily. 90 capsule 1   • escitalopram (LEXAPRO) 20 MG tablet TAKE 1 TABLET BY MOUTH EVERY NIGHT.  90 tablet 0   • Ferrous Sulfate Dried (High Potency Iron) 65 MG tablet Take 1 tablet by mouth Daily.     • lansoprazole (PREVACID) 30 MG capsule Take 1 capsule by mouth Daily. 90 capsule 0   • lisinopril (PRINIVIL,ZESTRIL) 10 MG tablet TAKE 1 TABLET BY MOUTH DAILY.  90 tablet 3   • lovastatin (MEVACOR) 40 MG tablet Take 1 tablet by mouth Daily. 90 tablet 2   • ondansetron ODT (ZOFRAN-ODT) 4 MG disintegrating tablet Place 1 tablet on the tongue Every 8 (Eight) Hours As Needed for Nausea. 30 tablet 0   • SITagliptin-metFORMIN HCl ER (Janumet XR) 100-1000 MG tablet Take 1 tablet by mouth Daily. 90 tablet 0   • traZODone (DESYREL) 50 MG tablet Take 1 tablet by mouth every night at bedtime. 90 tablet 0   • hydroCHLOROthiazide (MICROZIDE) 12.5 MG capsule TAKE 1 CAPSULE BY MOUTH DAILY TAKE WITH LISINOPRIL 10MG INSTEAD OF LISINOPRIL/HCTZ 10/12.5  90 capsule 3   • sulfamethoxazole-trimethoprim (Bactrim DS) 800-160 MG per tablet Take 1 tablet by mouth 2 (Two) Times a Day. 14 tablet 0     No facility-administered medications prior to visit.       Patient Active Problem List   Diagnosis   • Rheumatoid arthritis (HCC)   • Breast mass, right   • Decreased hearing, bilateral   • Dependent edema   • Depression   • Diabetic peripheral neuropathy (HCC)   • Difficult or painful urination   • Costochondritis   • Encounter for immunization   • Encounter for general adult medical examination without abnormal findings   • Family history of cerebrovascular accident (CVA)   • Flank pain   • Gastroesophageal reflux disease   • Herpes zoster   • High-density lipoprotein deficiency   • Mixed hyperlipidemia   • Essential  "hypertension   • Insomnia   • Leukopenia   • Microscopic hematuria   • Never smoked any substance   • Nipple discharge, bloody   • Obesity   • Osteopenia   • Other bursal cyst, unspecified site   • Other dorsalgia   • Low back pain   • Thoracic back pain   • Other hereditary and idiopathic neuropathies   • Other long term (current) drug therapy   • Other specified acquired deformities of unspecified thigh   • Pain in limb   • Pain of foot   • Metatarsalgia   • Polypharmacy   • Primary localized osteoarthritis   • Type 2 diabetes mellitus with hyperglycemia, with long-term current use of insulin ([facility])   • Urticaria   • Visit for screening mammogram   • Vitamin D deficiency   • Heart palpitations   • Major depressive disorder, recurrent episode, moderate ([facility])   • Bereavement   • Unstable angina pectoris ([facility])   • Abnormal nuclear stress test   • Coronary artery disease involving native coronary artery of native heart without angina pectoris       Advanced Care Planning:  ACP discussion was held with the patient during this visit. Patient has an advance directive (not in EMR), copy requested.    Review of Systems    Compared to one year ago, the patient feels her physical health is the same.  Compared to one year ago, the patient feels her mental health is the same.    Reviewed chart for potential of high risk medication in the elderly: yes  Reviewed chart for potential of harmful drug interactions in the elderly:yes    Objective         Vitals:    11/15/21 1426   BP: 131/67   BP Location: Right arm   Patient Position: Sitting   Cuff Size: Adult   Pulse: 63   Resp: 16   Temp: 97.8 °F (36.6 °C)   TempSrc: Temporal   SpO2: 97%   Weight: 78.9 kg (174 lb)   Height: 160 cm (63\")       Body mass index is 30.82 kg/m².  Discussed the patient's BMI with her. The BMI is in the acceptable range.    Physical Exam  Vitals and nursing note reviewed.   Constitutional:       General: She is not in acute distress.     Appearance: She " is not ill-appearing or toxic-appearing.   HENT:      Head: Normocephalic and atraumatic.   Pulmonary:      Effort: Pulmonary effort is normal.   Neurological:      Mental Status: She is alert and oriented to person, place, and time.      Cranial Nerves: No cranial nerve deficit.   Psychiatric:         Attention and Perception: Attention and perception normal.         Mood and Affect: Mood and affect normal.         Behavior: Behavior normal. Behavior is cooperative.         Thought Content: Thought content normal.         Cognition and Memory: Cognition and memory normal.         Judgment: Judgment normal.             Lab Results   Component Value Date    TRIG 190 (H) 11/15/2021    HDL 43 11/15/2021    LDL 69 11/15/2021    VLDL 32 11/15/2021    HGBA1C 7.2 (H) 11/15/2021        Assessment/Plan   Medicare Risks and Personalized Health Plan  CMS Preventative Services Quick Reference  Advance Directive Discussion  Breast Cancer/Mammogram Screening  Cardiovascular risk  Chronic Pain   Colon Cancer Screening  Dementia/Memory   Depression/Dysphoria  Diabetic Lab Screening   Fall Risk  Glaucoma Risk  Immunizations Discussed/Encouraged (specific immunizations; Influenza, Pneumococcal 23, Shingrix and COVID19 )  Inadequate Social Support, Isolation, Loneliness, Lack of Transportation, Financial Difficulties, or Caregiver Stress   Inactivity/Sedentary  Osteoporosis Risk  Urinary Incontinence    The above risks/problems have been discussed with the patient.  Pertinent information has been shared with the patient in the After Visit Summary.  Follow up plans and orders are seen below in the Assessment/Plan Section.    Diagnoses and all orders for this visit:    1. Medicare annual wellness visit, subsequent (Primary)    2. Postmenopausal  -     DEXA Bone Density Axial; Future    3. Osteopenia of lumbar spine  -     DEXA Bone Density Axial; Future    4. Incidental lung nodule, > 3mm and < 8mm  -     Cancel: CT Chest Without  Contrast; Future  -     CT Chest Without Contrast; Future    5. Need for influenza vaccination  -     Fluzone High-Dose 65+yrs    Other orders  -     traZODone (DESYREL) 50 MG tablet; Take 1 tablet by mouth every night at bedtime.  Dispense: 90 tablet; Refill: 0  -     acebutolol (SECTRAL) 200 MG capsule; Take 1 capsule by mouth Daily.  Dispense: 90 capsule; Refill: 1  -     lansoprazole (PREVACID) 30 MG capsule; Take 1 capsule by mouth Daily.  Dispense: 90 capsule; Refill: 0  -     SITagliptin-metFORMIN HCl ER (Janumet XR) 100-1000 MG tablet; Take 1 tablet by mouth Daily.  Dispense: 90 tablet; Refill: 0  -     ondansetron ODT (ZOFRAN-ODT) 4 MG disintegrating tablet; Place 1 tablet on the tongue Every 8 (Eight) Hours As Needed for Nausea.  Dispense: 30 tablet; Refill: 0  -     escitalopram (LEXAPRO) 20 MG tablet; Take 0.5 tablets by mouth Every Night.  Dispense: 90 tablet; Refill: 0      Follow Up:  No follow-ups on file.     An After Visit Summary and PPPS were given to the patient.

## 2021-11-17 ENCOUNTER — OFFICE VISIT (OUTPATIENT)
Dept: CARDIOLOGY | Facility: CLINIC | Age: 71
End: 2021-11-17

## 2021-11-17 VITALS
BODY MASS INDEX: 29.95 KG/M2 | SYSTOLIC BLOOD PRESSURE: 159 MMHG | HEART RATE: 60 BPM | HEIGHT: 63 IN | WEIGHT: 169 LBS | DIASTOLIC BLOOD PRESSURE: 63 MMHG | OXYGEN SATURATION: 97 %

## 2021-11-17 DIAGNOSIS — E11.65 TYPE 2 DIABETES MELLITUS WITH HYPERGLYCEMIA, WITH LONG-TERM CURRENT USE OF INSULIN (HCC): ICD-10-CM

## 2021-11-17 DIAGNOSIS — I10 ESSENTIAL HYPERTENSION: ICD-10-CM

## 2021-11-17 DIAGNOSIS — E78.2 MIXED HYPERLIPIDEMIA: Primary | ICD-10-CM

## 2021-11-17 DIAGNOSIS — I25.10 CORONARY ARTERY DISEASE INVOLVING NATIVE CORONARY ARTERY OF NATIVE HEART WITHOUT ANGINA PECTORIS: ICD-10-CM

## 2021-11-17 DIAGNOSIS — Z79.4 TYPE 2 DIABETES MELLITUS WITH HYPERGLYCEMIA, WITH LONG-TERM CURRENT USE OF INSULIN (HCC): ICD-10-CM

## 2021-11-17 PROCEDURE — 99214 OFFICE O/P EST MOD 30 MIN: CPT | Performed by: INTERNAL MEDICINE

## 2021-11-17 RX ORDER — ACETAMINOPHEN 325 MG/1
650 TABLET ORAL EVERY 6 HOURS PRN
COMMUNITY

## 2021-11-17 NOTE — PROGRESS NOTES
Subjective:     Encounter Date:11/17/2021      Patient ID: Jenna Rosa is a 71 y.o. female.    Chief Complaint:  History of Present Illness 71-year-old white female with history of coronary disease hypertension diabetes hyperlipidemia presents to my office for follow-up.  Patient is currently stable shortness no chest pain but has some shortness of breath exertion but no complains any PND orthopnea.  She is occasional palpitation with dizziness but no syncope.  No swelling of the feet.  She is taking her medicine regularly.  She does not smoke.  She is trying to exercise regularly she follows a good diet.    The following portions of the patient's history were reviewed and updated as appropriate: allergies, current medications, past family history, past medical history, past social history, past surgical history and problem list.  Past Medical History:   Diagnosis Date   • CAD 2020   • Depression    • DEXA    • Diabetic peripheral neuropathy    • GERD    • Hyperlipidemia    • Hypertension    • Insomnia    • Lower back pain    • MAMMO     NEG = 2021   • Obesity    • Osteoarthritis    • Osteopenia    • Palpitations    • RA    • Rheumatoid nodule 06/2011     rt. thumb x 2 and rt. & lt. 3rd fingers (Oct.2012)/ rt. elbow, left elbow, and epidermal inclusion cyst post. neck (June 2011)-----Dr. Whitmore   • Vitamin D deficiency      Past Surgical History:   Procedure Laterality Date   • BREAST SURGERY  1972   • CARDIAC CATHETERIZATION N/A 9/16/2020    Procedure: Left Heart Cath;  Surgeon: Walker Rodriguez MD;  Location:  [facility] CATH INVASIVE LOCATION;  Service: Cardiovascular;  Laterality: N/A;   • CARDIAC CATHETERIZATION N/A 9/16/2020    Procedure: Coronary angiography;  Surgeon: Walker Rodriguez MD;  Location: [facility] CATH INVASIVE LOCATION;  Service: Cardiovascular;  Laterality: N/A;   • CARDIAC CATHETERIZATION  9/16/2020    Procedure: Functional Flow Lamar;  Surgeon: Kath Medina,  "MD;  Location: [facility] INVASIVE LOCATION;  Service: Cardiology;;   • CHOLECYSTECTOMY     • COLONOSCOPY  2017    2017 = TA, rech 2020 [facility]   • CYST REMOVAL      Seb cyst on neck   • TOTAL ABDOMINAL HYSTERECTOMY     • TOTAL ABDOMINAL HYSTERECTOMY WITH SALPINGO OOPHORECTOMY     • VAGINAL PROLAPSE REPAIR       Uterine Prolapse repair     /63 (BP Location: Left arm, Patient Position: Sitting)   Pulse 60   Ht 160 cm (63\")   Wt 76.7 kg (169 lb)   SpO2 97%   BMI 29.94 kg/m²   Family History   Problem Relation Age of Onset   • Heart disease Mother    • Diabetes Mother    • Hypertension Mother    • Rheum arthritis Mother    • Coronary artery disease Mother    • Alcohol abuse Mother    • Heart disease Father    • Mental illness Father    • Hypertension Father    • Alcohol abuse Father    • Early death Father    • Stroke Sister    • Cancer Sister 29        RENAL Cancer   • Osteoarthritis Sister    • Rheum arthritis Sister    • Hypertension Sister    • Arthritis Sister    • Diabetes Maternal Grandfather    • No Known Problems Brother    • Rheum arthritis Sister    • Osteoarthritis Sister    • Diabetes Sister    • Arthritis Sister    • Liver disease Sister    • Rheum arthritis Brother    • Osteoarthritis Brother    • Arthritis Brother         RA   • Hypertension Brother        Current Outpatient Medications:   •  acebutolol (SECTRAL) 200 MG capsule, Take 1 capsule by mouth Daily., Disp: 90 capsule, Rfl: 1  •  acetaminophen (TYLENOL) 325 MG tablet, Take 650 mg by mouth Every 6 (Six) Hours As Needed for Mild Pain ., Disp: , Rfl:   •  amLODIPine (NORVASC) 10 MG tablet, Take 1 tablet by mouth Daily., Disp: 90 tablet, Rfl: 0  •  aspirin 81 MG EC tablet, Take 81 mg by mouth Daily., Disp: , Rfl:   •  BD Insulin Syringe U/F 31G X 5/16\" 1 ML misc, USE TO INJECT INSULIN 4 TIMES DAILY, Disp: 150 each, Rfl: 3  •  calcium carbonate (OS-PREET) 600 MG tablet, Take 600 mg by mouth Daily., Disp: , Rfl:   •  Cholecalciferol (Vitamin D) " 50 MCG (2000 UT) capsule, 1 po qd, Disp: , Rfl:   •  coenzyme Q10 100 MG capsule, Take 100 mg by mouth Daily., Disp: , Rfl:   •  diclofenac (VOLTAREN) 1 % gel gel, Apply 4 g topically to the appropriate area as directed 4 (Four) Times a Day As Needed., Disp: , Rfl:   •  escitalopram (LEXAPRO) 20 MG tablet, Take 0.5 tablets by mouth Every Night., Disp: 90 tablet, Rfl: 0  •  fenofibrate 160 MG tablet, TAKE 1 TABLET BY MOUTH DAILY , Disp: 90 tablet, Rfl: 2  •  Ferrous Sulfate (IRON) 325 (65 Fe) MG tablet, Take 325 mg by mouth Daily., Disp: , Rfl:   •  fluticasone (FLONASE ALLERGY RELIEF) 50 MCG/ACT nasal spray, 1 spray into the nostril(s) as directed by provider Daily As Needed for Allergies., Disp: , Rfl:   •  gabapentin (NEURONTIN) 300 MG capsule, Take 1 capsule by mouth 2 (Two) Times a Day., Disp: 180 capsule, Rfl: 0  •  glucose blood (Accu-Chek Jazmyne Plus) test strip, USE ONE STRIP TO TEST TWICE A DAY, Disp: 200 each, Rfl: 2  •  Humira Pen 40 MG/0.4ML Pen-injector Kit, Inject 40 mg under the skin into the appropriate area as directed Every 14 (Fourteen) Days. Sulfate Free Formula, Disp: 2 each, Rfl: 3  •  HumuLIN 70/30 (70-30) 100 UNIT/ML injection, INJECT 45 UNITS BEFORE BREAKFAST AND 20 UNITS BEFORE SUPPER DX E11.65, Disp: 20 mL, Rfl: 11  •  IBUPROFEN-ACETAMINOPHEN PO, Take  by mouth., Disp: , Rfl:   •  isosorbide mononitrate (IMDUR) 30 MG 24 hr tablet, Take 1 tablet by mouth 2 (Two) Times a Day., Disp: 180 tablet, Rfl: 2  •  lansoprazole (PREVACID) 30 MG capsule, Take 1 capsule by mouth Daily., Disp: 90 capsule, Rfl: 0  •  lisinopril (PRINIVIL,ZESTRIL) 10 MG tablet, TAKE 1 TABLET BY MOUTH DAILY. , Disp: 90 tablet, Rfl: 3  •  lovastatin (MEVACOR) 40 MG tablet, Take 1 tablet by mouth Daily., Disp: 90 tablet, Rfl: 2  •  ondansetron ODT (ZOFRAN-ODT) 4 MG disintegrating tablet, Place 1 tablet on the tongue Every 8 (Eight) Hours As Needed for Nausea., Disp: 30 tablet, Rfl: 0  •  SITagliptin-metFORMIN HCl ER (Janumet  XR) 100-1000 MG tablet, Take 1 tablet by mouth Daily., Disp: 90 tablet, Rfl: 0  •  traZODone (DESYREL) 50 MG tablet, Take 1 tablet by mouth every night at bedtime., Disp: 90 tablet, Rfl: 0  •  vitamin B-12 (CYANOCOBALAMIN) 1000 MCG tablet, Take 1,000 mcg by mouth Daily., Disp: , Rfl:   Allergies   Allergen Reactions   • Jardiance [Empagliflozin] Other (See Comments)     UTI's     Social History     Socioeconomic History   • Marital status:      Spouse name: Carson Rosa   • Number of children: 3   Tobacco Use   • Smoking status: Never Smoker   • Smokeless tobacco: Never Used   Vaping Use   • Vaping Use: Never used   Substance and Sexual Activity   • Alcohol use: Yes     Comment: occ   • Drug use: Never   • Sexual activity: Not Currently     Partners: Male     Birth control/protection: Post-menopausal     Review of Systems   Constitutional: Positive for malaise/fatigue. Negative for fever.   Cardiovascular: Positive for dyspnea on exertion and irregular heartbeat. Negative for chest pain and palpitations.   Respiratory: Negative for cough and shortness of breath.    Skin: Negative for rash.   Gastrointestinal: Positive for nausea. Negative for abdominal pain and vomiting.   Neurological: Positive for dizziness. Negative for focal weakness and headaches.   All other systems reviewed and are negative.             Objective:     Constitutional:       Appearance: Well-developed.   Eyes:      General: No scleral icterus.     Conjunctiva/sclera: Conjunctivae normal.   HENT:      Head: Normocephalic and atraumatic.   Neck:      Vascular: No carotid bruit or JVD.   Pulmonary:      Effort: Pulmonary effort is normal.      Breath sounds: Normal breath sounds. No wheezing. No rales.   Cardiovascular:      Normal rate. Regular rhythm.   Pulses:     Intact distal pulses.   Abdominal:      General: Bowel sounds are normal.      Palpations: Abdomen is soft.   Musculoskeletal:      Cervical back: Normal range of motion and  neck supple. Skin:     General: Skin is warm and dry.      Findings: No rash.   Neurological:      Mental Status: Alert.       Procedures    Lab Review:         MDM  1.  Coronary disease  Patient has nonobstructive disease now with normally systolic function is currently stable on medications  2.  Hypertension  Patient blood pressure currently stable on medical therapy  3.  Hyperlipidemia  Patient is on lovastatin and the lipid levels are well within normal meds  4.  Diabetes  Patient is currently on insulin and is followed by the primary care doctor    Patient's previous medical records, labs, and EKG were reviewed and discussed with the patient at today's visit.

## 2021-11-22 ENCOUNTER — OFFICE VISIT (OUTPATIENT)
Dept: ENDOCRINOLOGY | Facility: CLINIC | Age: 71
End: 2021-11-22

## 2021-11-22 VITALS
TEMPERATURE: 96.8 F | HEART RATE: 54 BPM | DIASTOLIC BLOOD PRESSURE: 75 MMHG | SYSTOLIC BLOOD PRESSURE: 150 MMHG | OXYGEN SATURATION: 95 % | WEIGHT: 169 LBS | BODY MASS INDEX: 29.95 KG/M2 | HEIGHT: 63 IN

## 2021-11-22 DIAGNOSIS — E11.65 TYPE 2 DIABETES MELLITUS WITH HYPERGLYCEMIA, WITH LONG-TERM CURRENT USE OF INSULIN (HCC): Primary | ICD-10-CM

## 2021-11-22 DIAGNOSIS — E78.2 MIXED HYPERLIPIDEMIA: ICD-10-CM

## 2021-11-22 DIAGNOSIS — Z79.4 TYPE 2 DIABETES MELLITUS WITH HYPERGLYCEMIA, WITH LONG-TERM CURRENT USE OF INSULIN (HCC): Primary | ICD-10-CM

## 2021-11-22 DIAGNOSIS — E11.42 DIABETIC PERIPHERAL NEUROPATHY (HCC): ICD-10-CM

## 2021-11-22 DIAGNOSIS — I10 ESSENTIAL HYPERTENSION: ICD-10-CM

## 2021-11-22 PROBLEM — R89.9 ABNORMAL LABORATORY TEST: Status: RESOLVED | Noted: 2018-07-09 | Resolved: 2021-11-22

## 2021-11-22 PROBLEM — M05.9 SEROPOSITIVE RHEUMATOID ARTHRITIS: Status: RESOLVED | Noted: 2017-04-11 | Resolved: 2021-11-22

## 2021-11-22 PROBLEM — R82.90 ABNORMAL URINALYSIS: Status: RESOLVED | Noted: 2019-04-08 | Resolved: 2021-11-22

## 2021-11-22 LAB — GLUCOSE BLDC GLUCOMTR-MCNC: 126 MG/DL (ref 70–105)

## 2021-11-22 PROCEDURE — 82962 GLUCOSE BLOOD TEST: CPT | Performed by: INTERNAL MEDICINE

## 2021-11-22 PROCEDURE — 99214 OFFICE O/P EST MOD 30 MIN: CPT | Performed by: INTERNAL MEDICINE

## 2021-11-22 RX ORDER — ROSUVASTATIN CALCIUM 5 MG/1
5 TABLET, COATED ORAL NIGHTLY
Qty: 90 TABLET | Refills: 4 | Status: SHIPPED | OUTPATIENT
Start: 2021-11-22 | End: 2022-06-06 | Stop reason: SDUPTHER

## 2021-11-22 RX ORDER — LISINOPRIL 20 MG/1
TABLET ORAL
Qty: 90 TABLET | Refills: 4 | Status: SHIPPED | OUTPATIENT
Start: 2021-11-22 | End: 2022-06-06

## 2021-11-22 NOTE — PATIENT INSTRUCTIONS
Increase lisinopril to 20 mg p.o. daily  DC lovastatin and add Crestor 5 mg p.o. daily  Continue other medications as it is  Always keep glucose source in case of low blood sugar  Annual eye exam and flu vaccine  Labs before follow-up.

## 2021-11-22 NOTE — PROGRESS NOTES
Endocrine Progress Note Outpatient     Patient Care Team:  Emily Burdick DO as PCP - General (Family Medicine)  Joe García MD as Consulting Physician (Cardiology)  Dano Rangel MD as Consulting Physician (Endocrinology)  Conchis Mohamud OD as Consulting Physician (Optometry)    Chief Complaint: Follow-up type 2 diabetes    HPI: 71-year-old female with history of type 2 diabetes, hypertension and hyperlipidemia is here for follow-up.    For type 2 diabetes: She is currently on Janumet /100, 1 tablet daily with Novolin 70/30 insulin she takes 40 units before breakfast and 20 before supper.  She did bring in blood sugar records for review,  BS doing well.  She was prescribed Jardiance on the last visit back in May 2021 she took it for a while but developed some UTIs and she stopped it.  I have reviewed her blood sugar records and doing mostly well.    Hypertension: BP high today.     Hyperlipidemia: On lovastatin.    Past Medical History:   Diagnosis Date   • CAD 2020   • Depression    • DEXA    • Diabetic peripheral neuropathy    • GERD    • Hyperlipidemia    • Hypertension    • Insomnia    • Lower back pain    • MAMMO     NEG = 2021   • Obesity    • Osteoarthritis    • Osteopenia    • Palpitations    • RA    • Rheumatoid nodule 06/2011     rt. thumb x 2 and rt. & lt. 3rd fingers (Oct.2012)/ rt. elbow, left elbow, and epidermal inclusion cyst post. neck (June 2011)-----Dr. Whitmore   • Vitamin D deficiency        Social History     Socioeconomic History   • Marital status:      Spouse name: Carson Rosa   • Number of children: 3   Tobacco Use   • Smoking status: Never Smoker   • Smokeless tobacco: Never Used   Vaping Use   • Vaping Use: Never used   Substance and Sexual Activity   • Alcohol use: Yes     Comment: occ   • Drug use: Never   • Sexual activity: Not Currently     Partners: Male     Birth control/protection: Post-menopausal       Family History   Problem Relation Age of Onset    • Heart disease Mother    • Diabetes Mother    • Hypertension Mother    • Rheum arthritis Mother    • Coronary artery disease Mother    • Alcohol abuse Mother    • Heart disease Father    • Mental illness Father    • Hypertension Father    • Alcohol abuse Father    • Early death Father    • Stroke Sister    • Cancer Sister 29        RENAL Cancer   • Osteoarthritis Sister    • Rheum arthritis Sister    • Hypertension Sister    • Arthritis Sister    • Diabetes Maternal Grandfather    • No Known Problems Brother    • Rheum arthritis Sister    • Osteoarthritis Sister    • Diabetes Sister    • Arthritis Sister    • Liver disease Sister    • Rheum arthritis Brother    • Osteoarthritis Brother    • Arthritis Brother         RA   • Hypertension Brother        Allergies   Allergen Reactions   • Jardiance [Empagliflozin] Other (See Comments)     UTI's       ROS:   Constitutional:  Denies fatigue, tiredness.    Eyes:  Denies change in visual acuity   HENT:  Denies nasal congestion or sore throat   Respiratory: denies cough, admit shortness of breath.   Cardiovascular:  denies chest pain, edema   GI:  Denies abdominal pain, nausea, vomiting.   Musculoskeletal:  Denies back pain or joint pain   Integument:  Denies dry skin and rash   Neurologic:  Denies headache, focal weakness or sensory changes   Endocrine:  Denies polyuria or polydipsia   Psychiatric:  Denies depression or anxiety      Vitals:    11/22/21 1106   BP: 150/75   Pulse: 54   Temp: 96.8 °F (36 °C)   SpO2: 95%     Body mass index is 29.94 kg/m².     Physical Exam:  GEN: NAD, conversant  EYES: EOMI, PERRL, no conjunctival erythema  NECK: no thyromegaly, full ROM   CV: RRR, no murmurs/rubs/gallops, no peripheral edema  LUNG: CTAB, no wheezes/rales/ronchi  SKIN: no rashes, no acanthosis  MSK: no deformities, full ROM of all extremities  NEURO: no tremors, DTR normal  PSYCH: AOX3, appropriate mood, affect normal      Results Review:     I reviewed the patient's new  "clinical results.    Lab Results   Component Value Date    HGBA1C 7.2 (H) 11/15/2021    HGBA1C 7.7 (H) 11/17/2020    HGBA1C 7.9 (H) 07/28/2020      Lab Results   Component Value Date    GLUCOSE 205 (H) 11/15/2021    BUN 10 11/15/2021    CREATININE 0.74 11/15/2021    EGFRIFNONA 78 11/15/2021    EGFRIFAFRI 59 (L) 08/13/2017    BCR 13.5 11/15/2021    K 4.1 11/15/2021    CO2 27.3 11/15/2021    CALCIUM 9.6 11/15/2021    ALBUMIN 4.40 11/15/2021    LABIL2 1.1 01/29/2019    AST 16 11/15/2021    ALT 15 11/15/2021    CHOL 144 11/15/2021    TRIG 190 (H) 11/15/2021    LDL 69 11/15/2021    HDL 43 11/15/2021     Lab Results   Component Value Date    TSH 1.270 10/26/2021         Medication Review: Reviewed.       Current Outpatient Medications:   •  acebutolol (SECTRAL) 200 MG capsule, Take 1 capsule by mouth Daily., Disp: 90 capsule, Rfl: 1  •  acetaminophen (TYLENOL) 325 MG tablet, Take 650 mg by mouth Every 6 (Six) Hours As Needed for Mild Pain ., Disp: , Rfl:   •  amLODIPine (NORVASC) 10 MG tablet, Take 1 tablet by mouth Daily., Disp: 90 tablet, Rfl: 0  •  aspirin 81 MG EC tablet, Take 81 mg by mouth Daily., Disp: , Rfl:   •  BD Insulin Syringe U/F 31G X 5/16\" 1 ML misc, USE TO INJECT INSULIN 4 TIMES DAILY, Disp: 150 each, Rfl: 3  •  calcium carbonate (OS-PREET) 600 MG tablet, Take 600 mg by mouth Daily., Disp: , Rfl:   •  Cholecalciferol (Vitamin D) 50 MCG (2000 UT) capsule, 1 po qd, Disp: , Rfl:   •  coenzyme Q10 100 MG capsule, Take 100 mg by mouth Daily., Disp: , Rfl:   •  diclofenac (VOLTAREN) 1 % gel gel, Apply 4 g topically to the appropriate area as directed 4 (Four) Times a Day As Needed., Disp: , Rfl:   •  escitalopram (LEXAPRO) 20 MG tablet, Take 0.5 tablets by mouth Every Night., Disp: 90 tablet, Rfl: 0  •  fenofibrate 160 MG tablet, TAKE 1 TABLET BY MOUTH DAILY , Disp: 90 tablet, Rfl: 2  •  Ferrous Sulfate (IRON) 325 (65 Fe) MG tablet, Take 325 mg by mouth Daily., Disp: , Rfl:   •  fluticasone (FLONASE ALLERGY " RELIEF) 50 MCG/ACT nasal spray, 1 spray into the nostril(s) as directed by provider Daily As Needed for Allergies., Disp: , Rfl:   •  gabapentin (NEURONTIN) 300 MG capsule, Take 1 capsule by mouth 2 (Two) Times a Day., Disp: 180 capsule, Rfl: 0  •  glucose blood (Accu-Chek Jazmyne Plus) test strip, USE ONE STRIP TO TEST TWICE A DAY, Disp: 200 each, Rfl: 2  •  Humira Pen 40 MG/0.4ML Pen-injector Kit, Inject 40 mg under the skin into the appropriate area as directed Every 14 (Fourteen) Days. Sulfate Free Formula, Disp: 2 each, Rfl: 3  •  HumuLIN 70/30 (70-30) 100 UNIT/ML injection, INJECT 45 UNITS BEFORE BREAKFAST AND 20 UNITS BEFORE SUPPER DX E11.65, Disp: 20 mL, Rfl: 11  •  IBUPROFEN-ACETAMINOPHEN PO, Take  by mouth., Disp: , Rfl:   •  isosorbide mononitrate (IMDUR) 30 MG 24 hr tablet, Take 1 tablet by mouth 2 (Two) Times a Day., Disp: 180 tablet, Rfl: 2  •  lansoprazole (PREVACID) 30 MG capsule, Take 1 capsule by mouth Daily., Disp: 90 capsule, Rfl: 0  •  lisinopril (PRINIVIL,ZESTRIL) 10 MG tablet, TAKE 1 TABLET BY MOUTH DAILY. , Disp: 90 tablet, Rfl: 3  •  lovastatin (MEVACOR) 40 MG tablet, Take 1 tablet by mouth Daily., Disp: 90 tablet, Rfl: 2  •  ondansetron ODT (ZOFRAN-ODT) 4 MG disintegrating tablet, Place 1 tablet on the tongue Every 8 (Eight) Hours As Needed for Nausea., Disp: 30 tablet, Rfl: 0  •  SITagliptin-metFORMIN HCl ER (Janumet XR) 100-1000 MG tablet, Take 1 tablet by mouth Daily., Disp: 90 tablet, Rfl: 0  •  traZODone (DESYREL) 50 MG tablet, Take 1 tablet by mouth every night at bedtime., Disp: 90 tablet, Rfl: 0  •  vitamin B-12 (CYANOCOBALAMIN) 1000 MCG tablet, Take 1,000 mcg by mouth Daily., Disp: , Rfl:       Assessment/Plan   1.  Diabetes mellitus type 2: Much better with A1c now at 7.2%.  I reviewed her blood sugar records, will continue Janumet XR 1 tablet p.o. daily along with 70/30 insulin, 40 units before breakfast and 20 before supper.  Continue to work on your diet and activity.  Also  advised to keep glucose source in case of low blood sugar if she continues to have blood sugars less than 70 then she will call us.    2.  Hypertension: Uncontrolled with high blood pressure, increase lisinopril to 20 mg p.o. daily.    3.  Hyperlipidemia: She is currently on lovastatin but would like to change, will change to rosuvastatin 5 mg p.o. daily and follow lipid panel.    4.  Diabetic peripheral neuropathy: Stable.            Dano Rangel MD FACE.

## 2021-12-08 RX ORDER — ESCITALOPRAM OXALATE 20 MG/1
TABLET ORAL
Qty: 90 TABLET | Refills: 0 | Status: SHIPPED | OUTPATIENT
Start: 2021-12-08 | End: 2022-01-21

## 2021-12-08 NOTE — TELEPHONE ENCOUNTER
Rx Refill Note  Requested Prescriptions     Pending Prescriptions Disp Refills   • escitalopram (LEXAPRO) 20 MG tablet [Pharmacy Med Name: ESCITALOPRAM OXALATE 20MG TABLET] 90 tablet 0     Sig: TAKE 1 TABLET BY MOUTH EVERY NIGHT.      Last office visit with prescribing clinician: 11/15/2021      Next office visit with prescribing clinician: Visit date not found     Hemoglobin A1c (11/15/2021 10:18)  Lipid Panel (11/15/2021 10:18)  Comprehensive Metabolic Panel (11/15/2021 10:18)         Brianda Madrigal CMA  12/08/21, 11:56 EST

## 2021-12-14 ENCOUNTER — TELEPHONE (OUTPATIENT)
Dept: FAMILY MEDICINE CLINIC | Facility: CLINIC | Age: 71
End: 2021-12-14

## 2021-12-14 NOTE — TELEPHONE ENCOUNTER
Caller: Jenna Rosa    Relationship: Self    Best call back number: [phone]    What medication are you requesting: PREDNISONE     What are your current symptoms: PAIN IN RIGHT HAND     How long have you been experiencing symptoms: ABOUT A WEEK     Have you had these symptoms before:    [x] Yes  [] No    Have you been treated for these symptoms before:   [x] Yes  [] No    If a prescription is needed, what is your preferred pharmacy and phone number: [facility] [address] [phone] [facility] [phone] [facility]

## 2021-12-17 ENCOUNTER — TELEPHONE (OUTPATIENT)
Dept: FAMILY MEDICINE CLINIC | Facility: CLINIC | Age: 71
End: 2021-12-17

## 2021-12-17 NOTE — TELEPHONE ENCOUNTER
----- Message from Emily Burdick DO sent at 12/17/2021  5:30 PM EST -----  EMG shows moderate carpal tunnel syndrome----------is she seeing someone for this?

## 2021-12-17 NOTE — TELEPHONE ENCOUNTER
MARY to read  MARY to ask question   My chart message was sent to the patient    EMG shows moderate carpal tunnel syndrome----------is she seeing someone for this?

## 2021-12-22 RX ORDER — AMLODIPINE BESYLATE 10 MG/1
TABLET ORAL
Qty: 90 TABLET | Refills: 0 | Status: SHIPPED | OUTPATIENT
Start: 2021-12-22 | End: 2022-01-21 | Stop reason: SDUPTHER

## 2022-01-21 ENCOUNTER — OFFICE VISIT (OUTPATIENT)
Dept: FAMILY MEDICINE CLINIC | Facility: CLINIC | Age: 72
End: 2022-01-21

## 2022-01-21 ENCOUNTER — TELEPHONE (OUTPATIENT)
Dept: FAMILY MEDICINE CLINIC | Facility: CLINIC | Age: 72
End: 2022-01-21

## 2022-01-21 VITALS
HEIGHT: 63 IN | WEIGHT: 169 LBS | TEMPERATURE: 98.6 F | SYSTOLIC BLOOD PRESSURE: 149 MMHG | DIASTOLIC BLOOD PRESSURE: 63 MMHG | OXYGEN SATURATION: 97 % | BODY MASS INDEX: 29.95 KG/M2 | RESPIRATION RATE: 16 BRPM | HEART RATE: 60 BPM

## 2022-01-21 DIAGNOSIS — L85.8 KERATOACANTHOMA OF HAND: Primary | ICD-10-CM

## 2022-01-21 DIAGNOSIS — G62.9 NEUROPATHY: ICD-10-CM

## 2022-01-21 PROCEDURE — 99213 OFFICE O/P EST LOW 20 MIN: CPT | Performed by: FAMILY MEDICINE

## 2022-01-21 RX ORDER — HYDROCHLOROTHIAZIDE 12.5 MG/1
CAPSULE, GELATIN COATED ORAL
COMMUNITY
Start: 2021-12-08 | End: 2022-01-21

## 2022-01-21 RX ORDER — GABAPENTIN 300 MG/1
300 CAPSULE ORAL 2 TIMES DAILY
Qty: 180 CAPSULE | Refills: 1 | Status: SHIPPED | OUTPATIENT
Start: 2022-01-21 | End: 2022-05-10 | Stop reason: SDUPTHER

## 2022-01-21 RX ORDER — TRAZODONE HYDROCHLORIDE 50 MG/1
25 TABLET ORAL
Qty: 90 TABLET | Refills: 0
Start: 2022-01-21 | End: 2022-02-07

## 2022-01-21 RX ORDER — LOVASTATIN 40 MG/1
TABLET ORAL
COMMUNITY
Start: 2021-12-22 | End: 2022-01-21

## 2022-01-21 RX ORDER — ESCITALOPRAM OXALATE 10 MG/1
10 TABLET ORAL NIGHTLY
Start: 2022-01-21 | End: 2022-03-14 | Stop reason: SDUPTHER

## 2022-01-21 RX ORDER — AMLODIPINE BESYLATE 10 MG/1
10 TABLET ORAL DAILY
Qty: 90 TABLET | Refills: 0 | Status: SHIPPED | OUTPATIENT
Start: 2022-01-21 | End: 2022-06-06 | Stop reason: SDUPTHER

## 2022-01-21 RX ORDER — MULTIVIT-MIN/IRON/FOLIC ACID/K 18-600-40
1 CAPSULE ORAL DAILY
COMMUNITY
End: 2022-12-29

## 2022-01-21 RX ORDER — ZINC GLUCONATE 50 MG
1 TABLET ORAL DAILY
COMMUNITY
End: 2022-12-29

## 2022-01-21 NOTE — TELEPHONE ENCOUNTER
Pt called and sp w[facility].  She stopped and got a home COVID test on the way from leaving here and tested positive.    The [facility] asked her if she told us here at the office that she was having symptoms and she said she wasn't having symptoms at all, but her temperature was more elevated for her than normal (it was 98.6, and did not say anything to MA about it). so she decided to test.    Pt wants to know if you would rec her getting a PCR, or go with the home test result?

## 2022-01-21 NOTE — PROGRESS NOTES
"Subjective   Jenna Rosa is a 71 y.o. female.     Chief Complaint   Patient presents with   • Cyst     Patient has a spot on the top of her left hand that showed up 3 months ago but it is getting bigger and it hurts when you touch it.          Current Outpatient Medications:   •  acebutolol (SECTRAL) 200 MG capsule, Take 1 capsule by mouth Daily., Disp: 90 capsule, Rfl: 1  •  acetaminophen (TYLENOL) 325 MG tablet, Take 650 mg by mouth Every 6 (Six) Hours As Needed for Mild Pain ., Disp: , Rfl:   •  amLODIPine (NORVASC) 10 MG tablet, Take 1 tablet by mouth Daily., Disp: 90 tablet, Rfl: 0  •  Ascorbic Acid (Vitamin C) 500 MG capsule, Take 1 capsule by mouth Daily., Disp: , Rfl:   •  aspirin 81 MG EC tablet, Take 81 mg by mouth Daily., Disp: , Rfl:   •  BD Insulin Syringe U/F 31G X 5/16\" 1 ML misc, USE TO INJECT INSULIN 4 TIMES DAILY, Disp: 150 each, Rfl: 3  •  calcium carbonate (OS-PREET) 600 MG tablet, Take 600 mg by mouth Daily., Disp: , Rfl:   •  Cholecalciferol (Vitamin D) 50 MCG (2000 UT) capsule, 1 po qd, Disp: , Rfl:   •  diclofenac (VOLTAREN) 1 % gel gel, Apply 4 g topically to the appropriate area as directed 4 (Four) Times a Day As Needed., Disp: , Rfl:   •  escitalopram (LEXAPRO) 10 MG tablet, Take 1 tablet by mouth Every Night., Disp: , Rfl:   •  fenofibrate 160 MG tablet, TAKE 1 TABLET BY MOUTH DAILY , Disp: 90 tablet, Rfl: 2  •  Ferrous Sulfate (IRON) 325 (65 Fe) MG tablet, Take 325 mg by mouth Daily., Disp: , Rfl:   •  fluticasone (FLONASE ALLERGY RELIEF) 50 MCG/ACT nasal spray, 1 spray into the nostril(s) as directed by provider Daily As Needed for Allergies., Disp: , Rfl:   •  gabapentin (NEURONTIN) 300 MG capsule, Take 1 capsule by mouth 2 (Two) Times a Day., Disp: 180 capsule, Rfl: 1  •  glucose blood (Accu-Chek Jazmyne Plus) test strip, USE ONE STRIP TO TEST TWICE A DAY, Disp: 200 each, Rfl: 2  •  Humira Pen 40 MG/0.4ML Pen-injector Kit, Inject 40 mg under the skin into the appropriate area as " directed Every 14 (Fourteen) Days. Sulfate Free Formula, Disp: 2 each, Rfl: 3  •  HumuLIN 70/30 (70-30) 100 UNIT/ML injection, INJECT 45 UNITS BEFORE BREAKFAST AND 20 UNITS BEFORE SUPPER DX E11.65, Disp: 20 mL, Rfl: 11  •  IBUPROFEN-ACETAMINOPHEN PO, Take  by mouth., Disp: , Rfl:   •  isosorbide mononitrate (IMDUR) 30 MG 24 hr tablet, Take 1 tablet by mouth 2 (Two) Times a Day., Disp: 180 tablet, Rfl: 2  •  lansoprazole (PREVACID) 30 MG capsule, Take 1 capsule by mouth Daily., Disp: 90 capsule, Rfl: 0  •  lisinopril (PRINIVIL,ZESTRIL) 20 MG tablet, Take 1 tablet p.o. daily., Disp: 90 tablet, Rfl: 4  •  ondansetron ODT (ZOFRAN-ODT) 4 MG disintegrating tablet, Place 1 tablet on the tongue Every 8 (Eight) Hours As Needed for Nausea., Disp: 30 tablet, Rfl: 0  •  rosuvastatin (Crestor) 5 MG tablet, Take 1 tablet by mouth Every Night., Disp: 90 tablet, Rfl: 4  •  SITagliptin-metFORMIN HCl ER (Janumet XR) 100-1000 MG tablet, Take 1 tablet by mouth Daily., Disp: 90 tablet, Rfl: 0  •  traZODone (DESYREL) 50 MG tablet, Take 0.5 tablets by mouth every night at bedtime., Disp: 90 tablet, Rfl: 0  •  vitamin B-12 (CYANOCOBALAMIN) 1000 MCG tablet, Take 1,000 mcg by mouth Daily., Disp: , Rfl:   •  Zinc 50 MG tablet, Take 1 tablet by mouth Daily., Disp: , Rfl:     Past Medical History:   Diagnosis Date   • CAD 2020   • Depression    • DEXA    • Diabetic peripheral neuropathy    • GERD    • Hyperlipidemia    • Hypertension    • Insomnia    • Lower back pain    • MAMMO     NEG = 2021   • Obesity    • Osteoarthritis    • Osteopenia    • Palpitations    • RA    • Rheumatoid nodule 06/2011     rt. thumb x 2 and rt. & lt. 3rd fingers (Oct.2012)/ rt. elbow, left elbow, and epidermal inclusion cyst post. neck (June 2011)-----Dr. Whitmore   • Vitamin D deficiency        Past Surgical History:   Procedure Laterality Date   • BREAST SURGERY  1972   • CARDIAC CATHETERIZATION N/A 9/16/2020    Procedure: Left Heart Cath;  Surgeon: Michael  Walker Albrecht MD;  Location: [facility] CATH INVASIVE LOCATION;  Service: Cardiovascular;  Laterality: N/A;   • CARDIAC CATHETERIZATION N/A 9/16/2020    Procedure: Coronary angiography;  Surgeon: Walker Rodriguez MD;  Location: [facility] CATH INVASIVE LOCATION;  Service: Cardiovascular;  Laterality: N/A;   • CARDIAC CATHETERIZATION  9/16/2020    Procedure: Functional Flow Humphrey;  Surgeon: Kath Medina MD;  Location: [facility] CATH INVASIVE LOCATION;  Service: Cardiology;;   • CHOLECYSTECTOMY     • COLONOSCOPY  2017    2017 = TA, rech 2020 [facility]   • CYST REMOVAL      Seb cyst on neck   • TOTAL ABDOMINAL HYSTERECTOMY     • TOTAL ABDOMINAL HYSTERECTOMY WITH SALPINGO OOPHORECTOMY     • VAGINAL PROLAPSE REPAIR       Uterine Prolapse repair       Family History   Problem Relation Age of Onset   • Heart disease Mother    • Diabetes Mother    • Hypertension Mother    • Rheum arthritis Mother    • Coronary artery disease Mother    • Alcohol abuse Mother    • Heart disease Father    • Mental illness Father    • Hypertension Father    • Alcohol abuse Father    • Early death Father    • Stroke Sister    • Cancer Sister 29        RENAL Cancer   • Osteoarthritis Sister    • Rheum arthritis Sister    • Hypertension Sister    • Arthritis Sister    • Diabetes Maternal Grandfather    • No Known Problems Brother    • Rheum arthritis Sister    • Osteoarthritis Sister    • Diabetes Sister    • Arthritis Sister    • Liver disease Sister    • Rheum arthritis Brother    • Osteoarthritis Brother    • Arthritis Brother         RA   • Hypertension Brother        Social History     Socioeconomic History   • Marital status:      Spouse name: Carson Rosa   • Number of children: 3   Tobacco Use   • Smoking status: Never Smoker   • Smokeless tobacco: Never Used   Vaping Use   • Vaping Use: Never used   Substance and Sexual Activity   • Alcohol use: Yes     Comment: occ   • Drug use: Never   • Sexual activity: Not Currently      Partners: Male     Birth control/protection: Post-menopausal       72 y/o C female here w/ c/o's increasing size of Left hand mass x 1mos    Pt states it started out as a flat pigmented spot and is now raised and hurting----- pt tried otc neosporin/ alcohol-----it had a little scab on top and she picked it off thinking it was infected or a cyst but nothing came out    Pt states she cut the lexapro to 10mg and doing ok and only using 1/2 tab of the trazodone hs prn w/ good success       The following portions of the patient's history were reviewed and updated as appropriate: allergies, current medications, past family history, past medical history, past social history, past surgical history and problem list.    Review of Systems   Constitutional: Negative for activity change, appetite change, fever, unexpected weight gain and unexpected weight loss.   Skin: Positive for color change and skin lesions. Negative for rash and bruise.       Vitals:    01/21/22 1319   BP: 149/63   Pulse: 60   Resp: 16   Temp: 98.6 °F (37 °C)   SpO2: 97%       Objective   Physical Exam  Vitals and nursing note reviewed.   Constitutional:       General: She is not in acute distress.     Appearance: She is not ill-appearing or toxic-appearing.   HENT:      Head: Normocephalic and atraumatic.   Cardiovascular:      Rate and Rhythm: Normal rate and regular rhythm.      Heart sounds: No murmur heard.      Pulmonary:      Effort: Pulmonary effort is normal. No respiratory distress.      Breath sounds: No stridor. No wheezing, rhonchi or rales.   Skin:         Neurological:      Mental Status: She is alert and oriented to person, place, and time.      Cranial Nerves: No cranial nerve deficit.   Psychiatric:         Mood and Affect: Mood normal.         Behavior: Behavior normal.         Thought Content: Thought content normal.         Judgment: Judgment normal.           Assessment/Plan   Diagnoses and all orders for this visit:    1.  Keratoacanthoma of hand (Primary)  -     Ambulatory Referral to Dermatology    2. Neuropathy  -     gabapentin (NEURONTIN) 300 MG capsule; Take 1 capsule by mouth 2 (Two) Times a Day.  Dispense: 180 capsule; Refill: 1    Other orders  -     escitalopram (LEXAPRO) 10 MG tablet; Take 1 tablet by mouth Every Night.  -     amLODIPine (NORVASC) 10 MG tablet; Take 1 tablet by mouth Daily.  Dispense: 90 tablet; Refill: 0  -     traZODone (DESYREL) 50 MG tablet; Take 0.5 tablets by mouth every night at bedtime.  Dispense: 90 tablet; Refill: 0

## 2022-01-21 NOTE — TELEPHONE ENCOUNTER
Pt to ICU with respiratory and RN.       Heather Zayas RN  12/05/20 8015 Sp w/Dr Burdick  Pt should go by Home test result, unless she feels she needs more proof.  Per Dr Burdick quarantine/isoloate for 10days.  If symptoms worsen or pt becomes SOA go to ER.    Sp w/pt.  Pt v/u.

## 2022-02-07 RX ORDER — TRAZODONE HYDROCHLORIDE 50 MG/1
TABLET ORAL
Qty: 90 TABLET | Refills: 0 | Status: SHIPPED | OUTPATIENT
Start: 2022-02-07 | End: 2022-06-06 | Stop reason: SDUPTHER

## 2022-02-07 RX ORDER — ISOSORBIDE MONONITRATE 30 MG/1
30 TABLET, EXTENDED RELEASE ORAL 2 TIMES DAILY
Qty: 180 TABLET | Refills: 2 | Status: SHIPPED | OUTPATIENT
Start: 2022-02-07 | End: 2022-05-26 | Stop reason: SDUPTHER

## 2022-02-07 NOTE — TELEPHONE ENCOUNTER
Rx Refill Note  Requested Prescriptions     Pending Prescriptions Disp Refills   • isosorbide mononitrate (IMDUR) 30 MG 24 hr tablet [Pharmacy Med Name: ISOSORBIDE MONONITRATE ER 30MG ER TABLET ER 24HR] 180 tablet 2     Sig: TAKE 1 TABLET BY MOUTH 2 (TWO) TIMES A DAY.   • traZODone (DESYREL) 50 MG tablet [Pharmacy Med Name: TRAZODONE HYDROCHLORIDE 50MG TABLET] 90 tablet 0     Sig: TAKE 1 TABLET BY MOUTH EVERY NIGHT AT BEDTIME.      Last office visit with prescribing clinician: 1/21/2022      Next office visit with prescribing clinician: Visit date not found     Hemoglobin A1c (11/15/2021 10:18)  Lipid Panel (11/15/2021 10:18)  Comprehensive Metabolic Panel (11/15/2021 10:18)         Brianda Madrigal CMA  02/07/22, 10:41 EST

## 2022-03-01 ENCOUNTER — CLINICAL SUPPORT (OUTPATIENT)
Dept: FAMILY MEDICINE CLINIC | Facility: CLINIC | Age: 72
End: 2022-03-01

## 2022-03-01 DIAGNOSIS — N39.0 URINARY TRACT INFECTION WITHOUT HEMATURIA, SITE UNSPECIFIED: ICD-10-CM

## 2022-03-01 DIAGNOSIS — R30.0 DYSURIA: Primary | ICD-10-CM

## 2022-03-01 LAB
BILIRUB BLD-MCNC: NEGATIVE MG/DL
CLARITY, POC: CLEAR
COLOR UR: YELLOW
EXPIRATION DATE: ABNORMAL
GLUCOSE UR STRIP-MCNC: NEGATIVE MG/DL
KETONES UR QL: NEGATIVE
LEUKOCYTE EST, POC: ABNORMAL
Lab: ABNORMAL
NITRITE UR-MCNC: POSITIVE MG/ML
PH UR: 6.5 [PH] (ref 5–8)
PROT UR STRIP-MCNC: NEGATIVE MG/DL
RBC # UR STRIP: NEGATIVE /UL
SP GR UR: 1.02 (ref 1–1.03)
UROBILINOGEN UR QL: NORMAL

## 2022-03-01 PROCEDURE — 81003 URINALYSIS AUTO W/O SCOPE: CPT | Performed by: FAMILY MEDICINE

## 2022-03-01 PROCEDURE — 87086 URINE CULTURE/COLONY COUNT: CPT | Performed by: FAMILY MEDICINE

## 2022-03-02 LAB — BACTERIA SPEC AEROBE CULT: NORMAL

## 2022-03-04 ENCOUNTER — OFFICE VISIT (OUTPATIENT)
Dept: FAMILY MEDICINE CLINIC | Facility: CLINIC | Age: 72
End: 2022-03-04

## 2022-03-04 VITALS
WEIGHT: 170 LBS | TEMPERATURE: 98.2 F | HEIGHT: 63 IN | DIASTOLIC BLOOD PRESSURE: 64 MMHG | SYSTOLIC BLOOD PRESSURE: 128 MMHG | OXYGEN SATURATION: 98 % | BODY MASS INDEX: 30.12 KG/M2 | HEART RATE: 59 BPM | RESPIRATION RATE: 14 BRPM

## 2022-03-04 DIAGNOSIS — R82.90 ABNORMAL URINALYSIS: ICD-10-CM

## 2022-03-04 DIAGNOSIS — N95.2 POST-MENOPAUSAL ATROPHIC VAGINITIS: ICD-10-CM

## 2022-03-04 DIAGNOSIS — N30.00 ACUTE CYSTITIS WITHOUT HEMATURIA: ICD-10-CM

## 2022-03-04 DIAGNOSIS — N39.0 URINARY TRACT INFECTION WITHOUT HEMATURIA, SITE UNSPECIFIED: Primary | ICD-10-CM

## 2022-03-04 LAB
BILIRUB BLD-MCNC: NEGATIVE MG/DL
CLARITY, POC: ABNORMAL
COLOR UR: ABNORMAL
EXPIRATION DATE: ABNORMAL
GLUCOSE UR STRIP-MCNC: NEGATIVE MG/DL
KETONES UR QL: ABNORMAL
LEUKOCYTE EST, POC: ABNORMAL
Lab: ABNORMAL
NITRITE UR-MCNC: POSITIVE MG/ML
PH UR: 7 [PH] (ref 5–8)
PROT UR STRIP-MCNC: NEGATIVE MG/DL
RBC # UR STRIP: NEGATIVE /UL
SP GR UR: 1.01 (ref 1–1.03)
UROBILINOGEN UR QL: NORMAL

## 2022-03-04 PROCEDURE — 87186 SC STD MICRODIL/AGAR DIL: CPT | Performed by: FAMILY MEDICINE

## 2022-03-04 PROCEDURE — 87077 CULTURE AEROBIC IDENTIFY: CPT | Performed by: FAMILY MEDICINE

## 2022-03-04 PROCEDURE — 87086 URINE CULTURE/COLONY COUNT: CPT | Performed by: FAMILY MEDICINE

## 2022-03-04 PROCEDURE — 81003 URINALYSIS AUTO W/O SCOPE: CPT | Performed by: FAMILY MEDICINE

## 2022-03-04 PROCEDURE — 99213 OFFICE O/P EST LOW 20 MIN: CPT | Performed by: FAMILY MEDICINE

## 2022-03-04 RX ORDER — NITROFURANTOIN 25; 75 MG/1; MG/1
100 CAPSULE ORAL 2 TIMES DAILY
Qty: 14 CAPSULE | Refills: 0 | Status: SHIPPED | OUTPATIENT
Start: 2022-03-04 | End: 2022-06-06

## 2022-03-04 RX ORDER — LANSOPRAZOLE 30 MG/1
30 CAPSULE, DELAYED RELEASE ORAL DAILY
Qty: 90 CAPSULE | Refills: 1 | Status: SHIPPED | OUTPATIENT
Start: 2022-03-04 | End: 2022-06-06 | Stop reason: SDUPTHER

## 2022-03-04 RX ORDER — ESTRADIOL 0.1 MG/G
CREAM VAGINAL
Qty: 42.5 G | Refills: 2 | Status: SHIPPED | OUTPATIENT
Start: 2022-03-04

## 2022-03-04 NOTE — PROGRESS NOTES
"Subjective   Jenna Rosa is a 72 y.o. female.     Chief Complaint   Patient presents with   • urinary problem         Current Outpatient Medications:   •  acebutolol (SECTRAL) 200 MG capsule, Take 1 capsule by mouth Daily., Disp: 90 capsule, Rfl: 1  •  acetaminophen (TYLENOL) 325 MG tablet, Take 650 mg by mouth Every 6 (Six) Hours As Needed for Mild Pain ., Disp: , Rfl:   •  amLODIPine (NORVASC) 10 MG tablet, Take 1 tablet by mouth Daily., Disp: 90 tablet, Rfl: 0  •  Ascorbic Acid (Vitamin C) 500 MG capsule, Take 1 capsule by mouth Daily., Disp: , Rfl:   •  aspirin 81 MG EC tablet, Take 81 mg by mouth Daily., Disp: , Rfl:   •  BD Insulin Syringe U/F 31G X 5/16\" 1 ML misc, USE TO INJECT INSULIN 4 TIMES DAILY, Disp: 150 each, Rfl: 3  •  calcium carbonate (OS-PREET) 600 MG tablet, Take 600 mg by mouth Daily., Disp: , Rfl:   •  Cholecalciferol (Vitamin D) 50 MCG (2000 UT) capsule, 1 po qd, Disp: , Rfl:   •  diclofenac (VOLTAREN) 1 % gel gel, Apply 4 g topically to the appropriate area as directed 4 (Four) Times a Day As Needed., Disp: , Rfl:   •  escitalopram (LEXAPRO) 10 MG tablet, Take 1 tablet by mouth Every Night., Disp: , Rfl:   •  fenofibrate 160 MG tablet, TAKE 1 TABLET BY MOUTH DAILY , Disp: 90 tablet, Rfl: 2  •  Ferrous Sulfate (IRON) 325 (65 Fe) MG tablet, Take 325 mg by mouth Daily., Disp: , Rfl:   •  fluticasone (FLONASE ALLERGY RELIEF) 50 MCG/ACT nasal spray, 1 spray into the nostril(s) as directed by provider Daily As Needed for Allergies., Disp: , Rfl:   •  gabapentin (NEURONTIN) 300 MG capsule, Take 1 capsule by mouth 2 (Two) Times a Day., Disp: 180 capsule, Rfl: 1  •  glucose blood (Accu-Chek Jazmyne Plus) test strip, USE ONE STRIP TO TEST TWICE A DAY, Disp: 200 each, Rfl: 2  •  Humira Pen 40 MG/0.4ML Pen-injector Kit, Inject 40 mg under the skin into the appropriate area as directed Every 14 (Fourteen) Days. Sulfate Free Formula, Disp: 2 each, Rfl: 3  •  HumuLIN 70/30 (70-30) 100 UNIT/ML injection, " INJECT 45 UNITS BEFORE BREAKFAST AND 20 UNITS BEFORE SUPPER DX E11.65, Disp: 20 mL, Rfl: 11  •  IBUPROFEN-ACETAMINOPHEN PO, Take  by mouth., Disp: , Rfl:   •  isosorbide mononitrate (IMDUR) 30 MG 24 hr tablet, TAKE 1 TABLET BY MOUTH 2 (TWO) TIMES A DAY., Disp: 180 tablet, Rfl: 2  •  lansoprazole (PREVACID) 30 MG capsule, Take 1 capsule by mouth Daily., Disp: 90 capsule, Rfl: 1  •  lisinopril (PRINIVIL,ZESTRIL) 20 MG tablet, Take 1 tablet p.o. daily., Disp: 90 tablet, Rfl: 4  •  ondansetron ODT (ZOFRAN-ODT) 4 MG disintegrating tablet, Place 1 tablet on the tongue Every 8 (Eight) Hours As Needed for Nausea., Disp: 30 tablet, Rfl: 0  •  rosuvastatin (Crestor) 5 MG tablet, Take 1 tablet by mouth Every Night., Disp: 90 tablet, Rfl: 4  •  SITagliptin-metFORMIN HCl ER (Janumet XR) 100-1000 MG tablet, Take 1 tablet by mouth Daily., Disp: 90 tablet, Rfl: 0  •  traZODone (DESYREL) 50 MG tablet, TAKE 1 TABLET BY MOUTH EVERY NIGHT AT BEDTIME., Disp: 90 tablet, Rfl: 0  •  vitamin B-12 (CYANOCOBALAMIN) 1000 MCG tablet, Take 1,000 mcg by mouth Daily., Disp: , Rfl:   •  Zinc 50 MG tablet, Take 1 tablet by mouth Daily., Disp: , Rfl:   •  estradiol (ESTRACE) 0.1 MG/GM vaginal cream, Apply small amt (< 0.5gm) to external vaginal area QHS, Disp: 42.5 g, Rfl: 2  •  nitrofurantoin, macrocrystal-monohydrate, (Macrobid) 100 MG capsule, Take 1 capsule by mouth 2 (Two) Times a Day., Disp: 14 capsule, Rfl: 0    Past Medical History:   Diagnosis Date   • CAD 2020   • Depression    • DEXA    • Diabetic peripheral neuropathy    • GERD    • Hyperlipidemia    • Hypertension    • Insomnia    • Lower back pain    • MAMMO     NEG = 2021   • Obesity    • Osteoarthritis    • Osteopenia    • Palpitations    • RA    • Rheumatoid nodule 06/2011     rt. thumb x 2 and rt. & lt. 3rd fingers (Oct.2012)/ rt. elbow, left elbow, and epidermal inclusion cyst post. neck (June 2011)-----Dr. Whitmore   • Vitamin D deficiency        Past Surgical History:   Procedure  Laterality Date   • BREAST SURGERY  1972   • CARDIAC CATHETERIZATION N/A 9/16/2020    Procedure: Left Heart Cath;  Surgeon: Walker Rodriguez MD;  Location: [facility] CATH INVASIVE LOCATION;  Service: Cardiovascular;  Laterality: N/A;   • CARDIAC CATHETERIZATION N/A 9/16/2020    Procedure: Coronary angiography;  Surgeon: Walker Rodriguez MD;  Location: [facility] CATH INVASIVE LOCATION;  Service: Cardiovascular;  Laterality: N/A;   • CARDIAC CATHETERIZATION  9/16/2020    Procedure: Functional Flow [place];  Surgeon: Kath Medina MD;  Location: [facility] CATH INVASIVE LOCATION;  Service: Cardiology;;   • CHOLECYSTECTOMY     • COLONOSCOPY  2017    2017 = TA, rech 2020 [facility]   • CYST REMOVAL      Seb cyst on neck   • TOTAL ABDOMINAL HYSTERECTOMY     • TOTAL ABDOMINAL HYSTERECTOMY WITH SALPINGO OOPHORECTOMY     • VAGINAL PROLAPSE REPAIR       Uterine Prolapse repair       Family History   Problem Relation Age of Onset   • Heart disease Mother    • Diabetes Mother    • Hypertension Mother    • Rheum arthritis Mother    • Coronary artery disease Mother    • Alcohol abuse Mother    • Heart disease Father    • Mental illness Father    • Hypertension Father    • Alcohol abuse Father    • Early death Father    • Stroke Sister    • Cancer Sister 29        RENAL Cancer   • Osteoarthritis Sister    • Rheum arthritis Sister    • Hypertension Sister    • Arthritis Sister    • Diabetes Maternal Grandfather    • No Known Problems Brother    • Rheum arthritis Sister    • Osteoarthritis Sister    • Diabetes Sister    • Arthritis Sister    • Liver disease Sister    • Rheum arthritis Brother    • Osteoarthritis Brother    • Arthritis Brother         RA   • Hypertension Brother        Social History     Socioeconomic History   • Marital status:      Spouse name: Carson oRsa   • Number of children: 3   Tobacco Use   • Smoking status: Never Smoker   • Smokeless tobacco: Never Used   Vaping Use   • Vaping Use:  Never used   Substance and Sexual Activity   • Alcohol use: Yes     Comment: occ   • Drug use: Never   • Sexual activity: Not Currently     Partners: Male     Birth control/protection: Post-menopausal       71 y/o C female here w/ urinary c/o's      Patient still feels the urge to urinate even after she is finished urinating, vaginal irritation/redness, vaginal dryness. Patient has starting using Monistat off/on x2 weeks. Patient states that she doesn't put it in internally--------she just puts it on externally. Patient is also using Azo and increasing her water intake and making sure to wipe from front to back.        The following portions of the patient's history were reviewed and updated as appropriate: allergies, current medications, past family history, past medical history, past social history, past surgical history and problem list.    Review of Systems   Constitutional: Negative for activity change, appetite change, fever, unexpected weight gain and unexpected weight loss.   Genitourinary: Positive for dysuria, frequency, urgency, urinary incontinence and vaginal pain. Negative for hematuria.   Skin: Positive for rash.       Vitals:    03/04/22 1047   BP: 128/64   Pulse: 59   Resp: 14   Temp: 98.2 °F (36.8 °C)   SpO2: 98%       Objective   Physical Exam  Nursing note reviewed.   Constitutional:       General: She is not in acute distress.     Appearance: She is not ill-appearing or toxic-appearing.   HENT:      Head: Normocephalic and atraumatic.   Pulmonary:      Effort: Pulmonary effort is normal.   Abdominal:      Hernia: There is no hernia in the left inguinal area or right inguinal area.   Genitourinary:     Exam position: Supine.      Labia:         Right: Rash present. No tenderness, lesion or injury.         Left: Rash present. No tenderness, lesion or injury.       Urethra: No urethral pain or urethral lesion.       Skin:     General: Skin is warm and dry.      Findings: No bruising or rash.    Neurological:      Mental Status: She is alert and oriented to person, place, and time.      Cranial Nerves: No cranial nerve deficit.   Psychiatric:         Attention and Perception: Attention and perception normal.         Mood and Affect: Mood and affect normal.         Speech: Speech normal.         Behavior: Behavior normal. Behavior is cooperative.         Thought Content: Thought content normal.         Cognition and Memory: Cognition and memory normal.         Judgment: Judgment normal.           Assessment/Plan   Diagnoses and all orders for this visit:    1. Urinary tract infection without hematuria, site unspecified (Primary)  -     POC Urinalysis Dipstick, Automated    2. Post-menopausal atrophic vaginitis    3. Acute cystitis without hematuria  -     POCT urinalysis dipstick, automated; Future    4. Abnormal urinalysis  -     Urine Culture - Urine, Urine, Clean Catch    Other orders  -     estradiol (ESTRACE) 0.1 MG/GM vaginal cream; Apply small amt (< 0.5gm) to external vaginal area QHS  Dispense: 42.5 g; Refill: 2  -     nitrofurantoin, macrocrystal-monohydrate, (Macrobid) 100 MG capsule; Take 1 capsule by mouth 2 (Two) Times a Day.  Dispense: 14 capsule; Refill: 0  -     lansoprazole (PREVACID) 30 MG capsule; Take 1 capsule by mouth Daily.  Dispense: 90 capsule; Refill: 1    trial of topical HRT   Rx---Macrobid x 7days  rech UA when done w/ abx

## 2022-03-06 DIAGNOSIS — N39.0 URINARY TRACT INFECTION WITHOUT HEMATURIA, SITE UNSPECIFIED: Primary | ICD-10-CM

## 2022-03-06 LAB — BACTERIA SPEC AEROBE CULT: ABNORMAL

## 2022-03-07 ENCOUNTER — TELEPHONE (OUTPATIENT)
Dept: FAMILY MEDICINE CLINIC | Facility: CLINIC | Age: 72
End: 2022-03-07

## 2022-03-07 NOTE — TELEPHONE ENCOUNTER
----- Message from Emily Burdick DO sent at 3/6/2022  9:24 AM EST -----  Urine +UTI-----finish the macrobid and come in for f/u UA when finished

## 2022-03-07 NOTE — TELEPHONE ENCOUNTER
HUB to read  My chart message was sent to the patient     Urine positive for UTI-----finish the macrobid and come in for follow up urine test when finished with antibiotics. (Call so we can put you on the lab schedule to check the urine)

## 2022-03-14 RX ORDER — ESCITALOPRAM OXALATE 10 MG/1
10 TABLET ORAL NIGHTLY
Qty: 90 TABLET | Refills: 1 | Status: SHIPPED | OUTPATIENT
Start: 2022-03-14 | End: 2022-06-06 | Stop reason: SDUPTHER

## 2022-03-15 ENCOUNTER — CLINICAL SUPPORT (OUTPATIENT)
Dept: FAMILY MEDICINE CLINIC | Facility: CLINIC | Age: 72
End: 2022-03-15

## 2022-03-15 DIAGNOSIS — N39.0 URINARY TRACT INFECTION WITHOUT HEMATURIA, SITE UNSPECIFIED: ICD-10-CM

## 2022-03-15 LAB
BILIRUB BLD-MCNC: NEGATIVE MG/DL
CLARITY, POC: CLEAR
COLOR UR: YELLOW
EXPIRATION DATE: NORMAL
GLUCOSE UR STRIP-MCNC: NEGATIVE MG/DL
KETONES UR QL: NEGATIVE
LEUKOCYTE EST, POC: NEGATIVE
Lab: NORMAL
NITRITE UR-MCNC: NEGATIVE MG/ML
PH UR: 7 [PH] (ref 5–8)
PROT UR STRIP-MCNC: NEGATIVE MG/DL
RBC # UR STRIP: NEGATIVE /UL
SP GR UR: 1.01 (ref 1–1.03)
UROBILINOGEN UR QL: NORMAL

## 2022-03-15 PROCEDURE — 81003 URINALYSIS AUTO W/O SCOPE: CPT | Performed by: FAMILY MEDICINE

## 2022-03-21 RX ORDER — FENOFIBRATE 160 MG/1
160 TABLET ORAL DAILY
Qty: 90 TABLET | Refills: 0 | Status: SHIPPED | OUTPATIENT
Start: 2022-03-21 | End: 2022-03-23 | Stop reason: SDUPTHER

## 2022-03-21 RX ORDER — FENOFIBRATE 160 MG/1
160 TABLET ORAL DAILY
Qty: 90 TABLET | Refills: 0 | Status: SHIPPED | OUTPATIENT
Start: 2022-03-21 | End: 2022-03-21

## 2022-03-23 RX ORDER — FENOFIBRATE 160 MG/1
160 TABLET ORAL DAILY
Qty: 90 TABLET | Refills: 2 | Status: SHIPPED | OUTPATIENT
Start: 2022-03-23 | End: 2022-06-06 | Stop reason: SDUPTHER

## 2022-03-23 NOTE — TELEPHONE ENCOUNTER
Caller: Jenna Rosa    Relationship: Self    Best call back number: [phone]    Requested Prescriptions:   Requested Prescriptions     Pending Prescriptions Disp Refills   • fenofibrate 160 MG tablet 90 tablet 0     Sig: Take 1 tablet by mouth Daily.        Pharmacy where request should be sent: [facility] [place] [address] [place] - [address] [facility] [address] - [phone] [facility] [phone] FX     Additional details provided by patient: PATIENT HAS ONE LEFT.    Does the patient have less than a 3 day supply:  [x] Yes  [] No    Yoanna Shea Rep   03/23/22 11:07 EDT

## 2022-04-04 ENCOUNTER — OFFICE VISIT (OUTPATIENT)
Dept: CARDIOLOGY | Facility: CLINIC | Age: 72
End: 2022-04-04

## 2022-04-04 VITALS
HEART RATE: 56 BPM | HEIGHT: 63 IN | OXYGEN SATURATION: 98 % | BODY MASS INDEX: 30.3 KG/M2 | SYSTOLIC BLOOD PRESSURE: 155 MMHG | WEIGHT: 171 LBS | DIASTOLIC BLOOD PRESSURE: 81 MMHG

## 2022-04-04 DIAGNOSIS — R00.1 BRADYCARDIA, SINUS: ICD-10-CM

## 2022-04-04 DIAGNOSIS — I49.3 PVC (PREMATURE VENTRICULAR CONTRACTION): Primary | ICD-10-CM

## 2022-04-04 DIAGNOSIS — I10 ESSENTIAL HYPERTENSION: ICD-10-CM

## 2022-04-04 DIAGNOSIS — I25.10 CORONARY ARTERY DISEASE INVOLVING NATIVE CORONARY ARTERY OF NATIVE HEART WITHOUT ANGINA PECTORIS: ICD-10-CM

## 2022-04-04 PROCEDURE — 99213 OFFICE O/P EST LOW 20 MIN: CPT | Performed by: INTERNAL MEDICINE

## 2022-04-04 PROCEDURE — 93000 ELECTROCARDIOGRAM COMPLETE: CPT | Performed by: INTERNAL MEDICINE

## 2022-04-04 NOTE — PROGRESS NOTES
CC--palpitations and bradycardia    Sub--72-year-old very pleasant patient came for follow-up after initiation of acebutolol at prior visit and patient was seen in the past for suspected tachycardia/bradycardia syndrome  She was on atenolol which has been stopped and changed to acebutolol and she saw some improvement of symptoms with occasional palpitations without any neptali syncope and complains of mild dizziness--Her symptoms started increasing since March of this year and she kind of correlated to loss of her spouse with grief  She complains of palpitations and sometimes fullness in her neck and denies any symptoms of neptali syncope  She does have atypical epigastric discomfort unrelated to exertion and a stress test showed normal EF and inferoapical ischemia could not be excluded and left jaycob medical management--she underwent cardiac catheterization which showed moderate LAD disease negative on IFR and left to medical management  She had normal EF on echocardiography with sclerotic aortic valve without stenosis  An event recorder on her revealed sinus bradycardia with intermittent PVCs  She denies any TIA or stroke or renal disease in the past  She does have essential hypertension on amlodipine  She continues to have transient symptoms mostly lasting few seconds without any neptali syncope        Past Medical History:   Diagnosis Date   • Depression    • Diabetic peripheral neuropathy    • GERD    • Insomnia    • Lower back pain    • Obesity    • Osteoarthritis    • Osteopenia    • Palpitations    • Pulmonary nodule    • RA    • Rheumatoid nodule 06/2011     rt. thumb x 2 and rt. & lt. 3rd fingers (Oct.2012)/ rt. elbow, left elbow, and epidermal inclusion cyst post. neck (June 2011)-----Dr. Whitmore   • Vitamin D deficiency      Physical Exam  VITALS REVIEWED  General:      well developed, well nourished, in no acute distress.    Head:      normocephalic and atraumatic.    Eyes:      PERRL/EOM intact, conjunctiva  and sclera clear with out nystagmus.    Neck:      no masses, thyromegaly,  trachea central with normal respiratory effort   Lungs:      clear bilaterally to auscultation.    Heart:      Sinus rhythm with ejection systolic murmur at the base of the heart consistent with aortic valve sclerosis with normal carotid upstroke without any gallops or rubs        Assessment and plan    Sinus bradycardia with symptoms predominantly in the form of fatigue--mild improvement with change of beta-blockers and to continue on current medications and reevaluate after few months and then decide on pacer implant depending on patient's symptoms--current symptoms are fairly transient lasting only for few seconds and medical treatment can be continued unless her symptoms get prolonged or long-lasting--in fact her resting heart rate was 66 without any PVCs on auscultation and patient was reassured and continue on current dosing of acebutolol  Intermittent PVCs  Aortic valve sclerosis without aortic stenosis  Normal EF with inferoapical artifact versus ischemia on medical treatment--post cardiac catheterization with moderate LAD disease left on medical management  Essential hypertension  Medications  reviewed and follow-up after 6 months          ECG 12 Lead    Date/Time: 4/4/2022 1:10 PM  Performed by: Alberto Gotti MD  Authorized by: Alberto Gotti MD   Comparison: compared with previous ECG   Similar to previous ECG  Rhythm: sinus rhythm  Rate: normal  Conduction: 1st degree AV block            Electronically signed by Alberto Gotti MD, 04/04/22, 1:10 PM EDT.

## 2022-04-08 DIAGNOSIS — G89.29 CHRONIC MIDLINE LOW BACK PAIN WITHOUT SCIATICA: Primary | ICD-10-CM

## 2022-04-08 DIAGNOSIS — M54.50 CHRONIC MIDLINE LOW BACK PAIN WITHOUT SCIATICA: Primary | ICD-10-CM

## 2022-04-08 DIAGNOSIS — R07.81 RIB PAIN ON LEFT SIDE: ICD-10-CM

## 2022-04-15 DIAGNOSIS — Z79.899 LONG-TERM USE OF HIGH-RISK MEDICATION: ICD-10-CM

## 2022-04-15 DIAGNOSIS — M05.9 SEROPOSITIVE RHEUMATOID ARTHRITIS: ICD-10-CM

## 2022-04-15 RX ORDER — ADALIMUMAB 40MG/0.4ML
40 KIT SUBCUTANEOUS
Qty: 2 EACH | Refills: 3 | Status: SHIPPED | OUTPATIENT
Start: 2022-04-15 | End: 2022-08-23

## 2022-05-10 DIAGNOSIS — G62.9 NEUROPATHY: ICD-10-CM

## 2022-05-10 RX ORDER — ACEBUTOLOL HYDROCHLORIDE 200 MG/1
200 CAPSULE ORAL DAILY
Qty: 90 CAPSULE | Refills: 1 | Status: SHIPPED | OUTPATIENT
Start: 2022-05-10 | End: 2022-11-07 | Stop reason: SDUPTHER

## 2022-05-10 RX ORDER — GABAPENTIN 300 MG/1
300 CAPSULE ORAL 2 TIMES DAILY
Qty: 180 CAPSULE | Refills: 1 | Status: SHIPPED | OUTPATIENT
Start: 2022-05-10 | End: 2022-06-06 | Stop reason: SDUPTHER

## 2022-05-19 ENCOUNTER — LAB (OUTPATIENT)
Dept: LAB | Facility: HOSPITAL | Age: 72
End: 2022-05-19

## 2022-05-19 DIAGNOSIS — Z79.4 TYPE 2 DIABETES MELLITUS WITH HYPERGLYCEMIA, WITH LONG-TERM CURRENT USE OF INSULIN: ICD-10-CM

## 2022-05-19 DIAGNOSIS — E11.65 TYPE 2 DIABETES MELLITUS WITH HYPERGLYCEMIA, WITH LONG-TERM CURRENT USE OF INSULIN: ICD-10-CM

## 2022-05-19 LAB
ALBUMIN SERPL-MCNC: 4.2 G/DL (ref 3.5–5.2)
ALBUMIN UR-MCNC: 1.4 MG/DL
ALBUMIN/GLOB SERPL: 1.6 G/DL
ALP SERPL-CCNC: 58 U/L (ref 39–117)
ALT SERPL W P-5'-P-CCNC: 20 U/L (ref 1–33)
ANION GAP SERPL CALCULATED.3IONS-SCNC: 10.2 MMOL/L (ref 5–15)
AST SERPL-CCNC: 17 U/L (ref 1–32)
BILIRUB SERPL-MCNC: 0.2 MG/DL (ref 0–1.2)
BUN SERPL-MCNC: 11 MG/DL (ref 8–23)
BUN/CREAT SERPL: 16.9 (ref 7–25)
CALCIUM SPEC-SCNC: 9.5 MG/DL (ref 8.6–10.5)
CHLORIDE SERPL-SCNC: 102 MMOL/L (ref 98–107)
CHOLEST SERPL-MCNC: 129 MG/DL (ref 0–200)
CO2 SERPL-SCNC: 25.8 MMOL/L (ref 22–29)
CREAT SERPL-MCNC: 0.65 MG/DL (ref 0.57–1)
CREAT UR-MCNC: 56 MG/DL
EGFRCR SERPLBLD CKD-EPI 2021: 94.3 ML/MIN/1.73
GLOBULIN UR ELPH-MCNC: 2.7 GM/DL
GLUCOSE SERPL-MCNC: 157 MG/DL (ref 65–99)
HBA1C MFR BLD: 7.4 % (ref 3.5–5.6)
HDLC SERPL-MCNC: 45 MG/DL (ref 40–60)
LDLC SERPL CALC-MCNC: 60 MG/DL (ref 0–100)
LDLC/HDLC SERPL: 1.24 {RATIO}
MICROALBUMIN/CREAT UR: 25 MG/G
POTASSIUM SERPL-SCNC: 4.1 MMOL/L (ref 3.5–5.2)
PROT SERPL-MCNC: 6.9 G/DL (ref 6–8.5)
SODIUM SERPL-SCNC: 138 MMOL/L (ref 136–145)
TRIGL SERPL-MCNC: 141 MG/DL (ref 0–150)
VLDLC SERPL-MCNC: 24 MG/DL (ref 5–40)

## 2022-05-19 PROCEDURE — 83036 HEMOGLOBIN GLYCOSYLATED A1C: CPT

## 2022-05-19 PROCEDURE — 80061 LIPID PANEL: CPT

## 2022-05-19 PROCEDURE — 36415 COLL VENOUS BLD VENIPUNCTURE: CPT

## 2022-05-19 PROCEDURE — 82570 ASSAY OF URINE CREATININE: CPT

## 2022-05-19 PROCEDURE — 80053 COMPREHEN METABOLIC PANEL: CPT

## 2022-05-19 PROCEDURE — 82043 UR ALBUMIN QUANTITATIVE: CPT

## 2022-05-26 ENCOUNTER — OFFICE VISIT (OUTPATIENT)
Dept: ENDOCRINOLOGY | Facility: CLINIC | Age: 72
End: 2022-05-26

## 2022-05-26 VITALS
BODY MASS INDEX: 30.12 KG/M2 | HEIGHT: 63 IN | WEIGHT: 170 LBS | HEART RATE: 57 BPM | DIASTOLIC BLOOD PRESSURE: 80 MMHG | SYSTOLIC BLOOD PRESSURE: 145 MMHG | OXYGEN SATURATION: 97 %

## 2022-05-26 DIAGNOSIS — E11.42 DIABETIC PERIPHERAL NEUROPATHY: ICD-10-CM

## 2022-05-26 DIAGNOSIS — I10 ESSENTIAL HYPERTENSION: ICD-10-CM

## 2022-05-26 DIAGNOSIS — E78.2 MIXED HYPERLIPIDEMIA: ICD-10-CM

## 2022-05-26 DIAGNOSIS — Z79.4 TYPE 2 DIABETES MELLITUS WITH HYPERGLYCEMIA, WITH LONG-TERM CURRENT USE OF INSULIN: Primary | ICD-10-CM

## 2022-05-26 DIAGNOSIS — E11.65 TYPE 2 DIABETES MELLITUS WITH HYPERGLYCEMIA, WITH LONG-TERM CURRENT USE OF INSULIN: Primary | ICD-10-CM

## 2022-05-26 LAB — GLUCOSE BLDC GLUCOMTR-MCNC: 158 MG/DL (ref 70–105)

## 2022-05-26 PROCEDURE — 99214 OFFICE O/P EST MOD 30 MIN: CPT | Performed by: INTERNAL MEDICINE

## 2022-05-26 PROCEDURE — 82962 GLUCOSE BLOOD TEST: CPT | Performed by: INTERNAL MEDICINE

## 2022-05-26 RX ORDER — TRAMADOL HYDROCHLORIDE 50 MG/1
50 TABLET ORAL 2 TIMES DAILY
COMMUNITY
Start: 2022-05-03

## 2022-05-26 RX ORDER — ISOSORBIDE MONONITRATE 30 MG/1
30 TABLET, EXTENDED RELEASE ORAL 2 TIMES DAILY
Qty: 180 TABLET | Refills: 0 | Status: SHIPPED | OUTPATIENT
Start: 2022-05-26 | End: 2022-08-23

## 2022-05-26 NOTE — PATIENT INSTRUCTIONS
Continue current medications  Continue to work on your diet and activity  Always keep glucose source in case of low blood sugar  Annual eye exam and flu vaccine  Labs before follow-up.

## 2022-06-06 ENCOUNTER — OFFICE VISIT (OUTPATIENT)
Dept: FAMILY MEDICINE CLINIC | Facility: CLINIC | Age: 72
End: 2022-06-06

## 2022-06-06 VITALS
SYSTOLIC BLOOD PRESSURE: 163 MMHG | BODY MASS INDEX: 29.59 KG/M2 | HEART RATE: 60 BPM | WEIGHT: 167 LBS | DIASTOLIC BLOOD PRESSURE: 64 MMHG | OXYGEN SATURATION: 97 % | TEMPERATURE: 97.3 F | RESPIRATION RATE: 14 BRPM | HEIGHT: 63 IN

## 2022-06-06 DIAGNOSIS — M05.9 SEROPOSITIVE RHEUMATOID ARTHRITIS: ICD-10-CM

## 2022-06-06 DIAGNOSIS — G62.9 NEUROPATHY: ICD-10-CM

## 2022-06-06 DIAGNOSIS — M06.9 RHEUMATOID ARTHRITIS FLARE: Primary | ICD-10-CM

## 2022-06-06 DIAGNOSIS — F33.1 MAJOR DEPRESSIVE DISORDER, RECURRENT EPISODE, MODERATE: ICD-10-CM

## 2022-06-06 DIAGNOSIS — I10 ELEVATED BLOOD PRESSURE READING IN OFFICE WITH DIAGNOSIS OF HYPERTENSION: ICD-10-CM

## 2022-06-06 DIAGNOSIS — Z79.899 LONG-TERM USE OF HIGH-RISK MEDICATION: ICD-10-CM

## 2022-06-06 PROCEDURE — 99214 OFFICE O/P EST MOD 30 MIN: CPT | Performed by: FAMILY MEDICINE

## 2022-06-06 RX ORDER — AMLODIPINE BESYLATE 10 MG/1
10 TABLET ORAL DAILY
Qty: 90 TABLET | Refills: 0 | Status: CANCELLED | OUTPATIENT
Start: 2022-06-06

## 2022-06-06 RX ORDER — ACEBUTOLOL HYDROCHLORIDE 200 MG/1
200 CAPSULE ORAL DAILY
Qty: 90 CAPSULE | Refills: 1 | Status: CANCELLED | OUTPATIENT
Start: 2022-06-06

## 2022-06-06 RX ORDER — ISOSORBIDE MONONITRATE 30 MG/1
30 TABLET, EXTENDED RELEASE ORAL 2 TIMES DAILY
Qty: 180 TABLET | Refills: 0 | Status: CANCELLED | OUTPATIENT
Start: 2022-06-06

## 2022-06-06 RX ORDER — ADALIMUMAB 40MG/0.4ML
40 KIT SUBCUTANEOUS
Qty: 2 EACH | Refills: 3 | Status: CANCELLED | OUTPATIENT
Start: 2022-06-06

## 2022-06-06 RX ORDER — ESCITALOPRAM OXALATE 10 MG/1
10 TABLET ORAL NIGHTLY
Qty: 90 TABLET | Refills: 1 | Status: SHIPPED | OUTPATIENT
Start: 2022-06-06 | End: 2022-12-29

## 2022-06-06 RX ORDER — PREDNISONE 1 MG/1
TABLET ORAL
Qty: 20 TABLET | Refills: 0 | Status: SHIPPED | OUTPATIENT
Start: 2022-06-06 | End: 2022-12-29

## 2022-06-06 RX ORDER — AMLODIPINE BESYLATE 10 MG/1
10 TABLET ORAL DAILY
Qty: 90 TABLET | Refills: 0 | Status: SHIPPED | OUTPATIENT
Start: 2022-06-06 | End: 2022-09-16

## 2022-06-06 RX ORDER — FENOFIBRATE 160 MG/1
160 TABLET ORAL DAILY
Qty: 90 TABLET | Refills: 2 | Status: CANCELLED | OUTPATIENT
Start: 2022-06-06

## 2022-06-06 RX ORDER — INSULIN NPH HUM/REG INSULIN HM 70-30/ML
VIAL (ML) SUBCUTANEOUS
Qty: 20 ML | Refills: 11 | Status: CANCELLED | OUTPATIENT
Start: 2022-06-06

## 2022-06-06 RX ORDER — TRAZODONE HYDROCHLORIDE 50 MG/1
50 TABLET ORAL
Qty: 90 TABLET | Refills: 0 | Status: CANCELLED | OUTPATIENT
Start: 2022-06-06

## 2022-06-06 RX ORDER — LISINOPRIL 40 MG/1
40 TABLET ORAL DAILY
Qty: 90 TABLET | Refills: 1 | Status: SHIPPED | OUTPATIENT
Start: 2022-06-06 | End: 2022-09-02 | Stop reason: SDUPTHER

## 2022-06-06 RX ORDER — SITAGLIPTIN AND METFORMIN HYDROCHLORIDE 1000; 100 MG/1; MG/1
1 TABLET, FILM COATED, EXTENDED RELEASE ORAL DAILY
Qty: 90 TABLET | Refills: 0 | Status: SHIPPED | OUTPATIENT
Start: 2022-06-06 | End: 2022-09-16

## 2022-06-06 RX ORDER — GABAPENTIN 300 MG/1
300 CAPSULE ORAL 2 TIMES DAILY
Qty: 180 CAPSULE | Refills: 1 | Status: CANCELLED | OUTPATIENT
Start: 2022-06-06

## 2022-06-06 RX ORDER — ROSUVASTATIN CALCIUM 5 MG/1
5 TABLET, COATED ORAL NIGHTLY
Qty: 90 TABLET | Refills: 4 | Status: CANCELLED | OUTPATIENT
Start: 2022-06-06 | End: 2023-06-06

## 2022-06-06 RX ORDER — GABAPENTIN 300 MG/1
300 CAPSULE ORAL 2 TIMES DAILY
Qty: 180 CAPSULE | Refills: 1 | Status: SHIPPED | OUTPATIENT
Start: 2022-06-06

## 2022-06-06 RX ORDER — LANSOPRAZOLE 30 MG/1
30 CAPSULE, DELAYED RELEASE ORAL DAILY
Qty: 90 CAPSULE | Refills: 1 | Status: CANCELLED | OUTPATIENT
Start: 2022-06-06

## 2022-06-06 RX ORDER — ESTRADIOL 0.1 MG/G
CREAM VAGINAL
Qty: 42.5 G | Refills: 2 | Status: CANCELLED | OUTPATIENT
Start: 2022-06-06

## 2022-06-06 RX ORDER — ROSUVASTATIN CALCIUM 5 MG/1
5 TABLET, COATED ORAL NIGHTLY
Qty: 90 TABLET | Refills: 1 | Status: SHIPPED | OUTPATIENT
Start: 2022-06-06 | End: 2022-12-12

## 2022-06-06 RX ORDER — TRAZODONE HYDROCHLORIDE 50 MG/1
50 TABLET ORAL
Qty: 90 TABLET | Refills: 0 | Status: SHIPPED | OUTPATIENT
Start: 2022-06-06 | End: 2023-01-03 | Stop reason: SDUPTHER

## 2022-06-06 RX ORDER — LANSOPRAZOLE 30 MG/1
30 CAPSULE, DELAYED RELEASE ORAL DAILY
Qty: 90 CAPSULE | Refills: 1 | Status: SHIPPED | OUTPATIENT
Start: 2022-06-06 | End: 2022-12-12

## 2022-06-06 RX ORDER — FENOFIBRATE 160 MG/1
160 TABLET ORAL DAILY
Qty: 90 TABLET | Refills: 2 | Status: SHIPPED | OUTPATIENT
Start: 2022-06-06 | End: 2023-02-28 | Stop reason: SDUPTHER

## 2022-06-06 NOTE — TELEPHONE ENCOUNTER
Incoming Refill Request      Medication requested (name and dose): Janumet -1000 tab (not on list)  Lovastatin 40mg (not on list)  Acebutolol 200mg  Amlodipine 10mg  Estradiol 0.1mg/gm vag cream  Fenofibrate 160mg  Gabapentin 300mg  Humira Pen 40mg/0.4ml Pen Inj Kit  Humulin 70/30 100 unit/ml inj  Isosorbide Mononitrate 30mg 24 hr tab  Lansoprazole 30mg  Rosuvastatin 5mg  Trazodone 50mg    Pharmacy where request should be sent: [facility]    Additional details provided by patient:     Best call back number:     Does the patient have less than a 3 day supply:  [] Yes  [x] No    Yoanna Lanza Rep  06/06/22, 09:53 EDT

## 2022-06-06 NOTE — PROGRESS NOTES
"Answers for HPI/ROS submitted by the patient on 6/6/2022  Please describe your symptoms.: Joint pain finger wrist etc;  Have you had these symptoms before?: Yes  How long have you been having these symptoms?: 1-2 weeks  Please list any medications you are currently taking for this condition.: Ibuprofen, 8hr Tylenol, tramadol  Please describe any probable cause for these symptoms. : It is hard to hold on to things to wipe my butt  What is the primary reason for your visit?: Other    Subjective   Jenna Rosa is a 72 y.o. female.     Chief Complaint   Patient presents with   • Joint Pain   • Depression   • Hypertension   • Insomnia         Current Outpatient Medications:   •  acebutolol (SECTRAL) 200 MG capsule, Take 1 capsule by mouth Daily., Disp: 90 capsule, Rfl: 1  •  acetaminophen (TYLENOL) 325 MG tablet, Take 650 mg by mouth Every 6 (Six) Hours As Needed for Mild Pain ., Disp: , Rfl:   •  amLODIPine (NORVASC) 10 MG tablet, Take 1 tablet by mouth Daily., Disp: 90 tablet, Rfl: 0  •  Ascorbic Acid (Vitamin C) 500 MG capsule, Take 1 capsule by mouth Daily., Disp: , Rfl:   •  aspirin 81 MG EC tablet, Take 81 mg by mouth Daily., Disp: , Rfl:   •  BD Insulin Syringe U/F 31G X 5/16\" 1 ML misc, USE TO INJECT INSULIN 4 TIMES DAILY, Disp: 150 each, Rfl: 3  •  calcium carbonate (OS-PREET) 600 MG tablet, Take 600 mg by mouth Daily., Disp: , Rfl:   •  Cholecalciferol (Vitamin D) 50 MCG (2000 UT) capsule, 1 po qd, Disp: , Rfl:   •  diclofenac (VOLTAREN) 1 % gel gel, Apply 4 g topically to the appropriate area as directed 4 (Four) Times a Day As Needed., Disp: , Rfl:   •  escitalopram (LEXAPRO) 10 MG tablet, Take 1 tablet by mouth Every Night., Disp: 90 tablet, Rfl: 1  •  estradiol (ESTRACE) 0.1 MG/GM vaginal cream, Apply small amt (< 0.5gm) to external vaginal area QHS, Disp: 42.5 g, Rfl: 2  •  fenofibrate 160 MG tablet, Take 1 tablet by mouth Daily., Disp: 90 tablet, Rfl: 2  •  Ferrous Sulfate (IRON) 325 (65 Fe) MG tablet, " Take 325 mg by mouth Daily., Disp: , Rfl:   •  fluticasone (FLONASE) 50 MCG/ACT nasal spray, 1 spray into the nostril(s) as directed by provider Daily As Needed for Allergies., Disp: , Rfl:   •  gabapentin (NEURONTIN) 300 MG capsule, Take 1 capsule by mouth 2 (Two) Times a Day., Disp: 180 capsule, Rfl: 1  •  glucose blood (Accu-Chek Jazmyne Plus) test strip, USE ONE STRIP TO TEST TWICE A DAY, Disp: 200 each, Rfl: 2  •  Humira Pen 40 MG/0.4ML Pen-injector Kit, Inject 40 mg under the skin into the appropriate area as directed Every 14 (Fourteen) Days. Sulfate Free Formula, Disp: 2 each, Rfl: 3  •  HumuLIN 70/30 (70-30) 100 UNIT/ML injection, INJECT 45 UNITS BEFORE BREAKFAST AND 20 UNITS BEFORE SUPPER DX E11.65, Disp: 20 mL, Rfl: 11  •  IBUPROFEN-ACETAMINOPHEN PO, Take  by mouth., Disp: , Rfl:   •  isosorbide mononitrate (IMDUR) 30 MG 24 hr tablet, Take 1 tablet by mouth 2 (Two) Times a Day., Disp: 180 tablet, Rfl: 0  •  lansoprazole (PREVACID) 30 MG capsule, Take 1 capsule by mouth Daily., Disp: 90 capsule, Rfl: 1  •  lisinopril (PRINIVIL,ZESTRIL) 40 MG tablet, Take 1 tablet by mouth Daily., Disp: 90 tablet, Rfl: 1  •  ondansetron ODT (ZOFRAN-ODT) 4 MG disintegrating tablet, Place 1 tablet on the tongue Every 8 (Eight) Hours As Needed for Nausea., Disp: 30 tablet, Rfl: 0  •  rosuvastatin (Crestor) 5 MG tablet, Take 1 tablet by mouth Every Night., Disp: 90 tablet, Rfl: 1  •  SITagliptin-metFORMIN HCl ER (Janumet XR) 100-1000 MG tablet, Take 1 tablet by mouth Daily., Disp: 90 tablet, Rfl: 0  •  traZODone (DESYREL) 50 MG tablet, Take 1 tablet by mouth every night at bedtime., Disp: 90 tablet, Rfl: 0  •  vitamin B-12 (CYANOCOBALAMIN) 1000 MCG tablet, Take 1,000 mcg by mouth Daily., Disp: , Rfl:   •  Zinc 50 MG tablet, Take 1 tablet by mouth Daily., Disp: , Rfl:   •  predniSONE (DELTASONE) 1 MG tablet, 1 po qday x 1 week then qday prn, Disp: 20 tablet, Rfl: 0  •  traMADol (ULTRAM) 50 MG tablet, , Disp: , Rfl:     Past  Medical History:   Diagnosis Date   • CAD 2020   • Depression    • DEXA    • Diabetic peripheral neuropathy    • GERD    • Hyperlipidemia    • Hypertension    • Insomnia    • Lower back pain    • MAMMO     NEG = 2021   • Obesity    • Osteoarthritis    • Osteopenia    • Palpitations    • RA    • Rheumatoid nodule 06/2011     rt. thumb x 2 and rt. & lt. 3rd fingers (Oct.2012)/ rt. elbow, left elbow, and epidermal inclusion cyst post. neck (June 2011)-----Dr. Whitmore   • Vitamin D deficiency        Past Surgical History:   Procedure Laterality Date   • BREAST SURGERY  1972   • CARDIAC CATHETERIZATION N/A 09/16/2020    Procedure: Left Heart Cath;  Surgeon: Walker Rodriguez MD;  Location:  [facility];  Service: Cardiovascular;  Laterality: N/A;   • CARDIAC CATHETERIZATION N/A 09/16/2020    Procedure: Coronary angiography;  Surgeon: Walker Rodriguez MD;  Location:  [facility];  Service: Cardiovascular;  Laterality: N/A;   • CARDIAC CATHETERIZATION  09/16/2020    Procedure: Functional Flow [place];  Surgeon: Kath Medina MD;  Location:  [facility];  Service: Cardiology;;   • CHOLECYSTECTOMY     • COLONOSCOPY      2017 = TA, rech 2020 [facility]   • CYST REMOVAL      Seb cyst on neck   • TOTAL ABDOMINAL HYSTERECTOMY     • TOTAL ABDOMINAL HYSTERECTOMY WITH SALPINGO OOPHORECTOMY     • VAGINAL PROLAPSE REPAIR       Uterine Prolapse repair       Family History   Problem Relation Age of Onset   • Heart disease Mother    • Diabetes Mother    • Hypertension Mother    • Rheum arthritis Mother    • Coronary artery disease Mother    • Alcohol abuse Mother    • Heart disease Father    • Mental illness Father    • Hypertension Father    • Alcohol abuse Father    • Early death Father    • Stroke Sister    • Cancer Sister 29        RENAL Cancer   • Osteoarthritis Sister    • Rheum arthritis Sister    • Hypertension Sister    • Arthritis Sister    • Diabetes  Sister    • Rheum arthritis Sister    • Osteoarthritis Sister    • Diabetes Sister    • Arthritis Sister    • Liver disease Sister    • No Known Problems Brother    • Rheum arthritis Brother    • Osteoarthritis Brother    • Arthritis Brother         RA   • Hypertension Brother    • Diabetes Maternal Grandfather        Social History     Socioeconomic History   • Marital status:      Spouse name: Carson Rosa   • Number of children: 3   • Years of education: GED   Tobacco Use   • Smoking status: Never Smoker   • Smokeless tobacco: Never Used   Vaping Use   • Vaping Use: Never used   Substance and Sexual Activity   • Alcohol use: Yes     Comment: occ   • Drug use: Not Currently   • Sexual activity: Not Currently     Partners: Male     Birth control/protection: Post-menopausal       71y/o C female here w/ c/o's pain in joints of fingers w/ hx/o RA/ OA/ HTN/ Depression/ Insomnia and recent fasting labs    Pt admits she has been crocheting baby clothes x 3mos ------ doing this more than normal and now her hands hurt  Pt taking neurontin  Pt no longer has a Rheum that takes her ins and has been using Tylenol and voltaren gel w/o min success; pt states she used to take pred at low dose w/ good results in the past and wanting to try this if poss to see if it will help     Pt feels the lexapro is working and using the trazodone for sleep w/o side effects       The following portions of the patient's history were reviewed and updated as appropriate: allergies, current medications, past family history, past medical history, past social history, past surgical history and problem list.    Review of Systems   Constitutional: Positive for activity change. Negative for appetite change, fatigue, fever, unexpected weight gain and unexpected weight loss.   Respiratory: Negative for cough, chest tightness and shortness of breath.    Cardiovascular: Negative for chest pain, palpitations and leg swelling.   Genitourinary:  Negative for frequency, urgency and urinary incontinence.   Musculoskeletal: Positive for arthralgias and joint swelling. Negative for myalgias.   Skin: Negative for rash.   Neurological: Positive for numbness. Negative for dizziness, facial asymmetry, speech difficulty, weakness, light-headedness, headache, memory problem and confusion.   Psychiatric/Behavioral: Positive for sleep disturbance. Negative for dysphoric mood and depressed mood. The patient is not nervous/anxious.        Vitals:    06/06/22 1323   BP: 163/64   Pulse: 60   Resp: 14   Temp: 97.3 °F (36.3 °C)   SpO2: 97%       Objective   Physical Exam  Vitals and nursing note reviewed.   Constitutional:       General: She is not in acute distress.     Appearance: She is well-developed. She is not ill-appearing.   HENT:      Head: Normocephalic and atraumatic.   Cardiovascular:      Rate and Rhythm: Normal rate and regular rhythm.      Heart sounds: Normal heart sounds. No murmur heard.  Pulmonary:      Effort: Pulmonary effort is normal. No respiratory distress.      Breath sounds: Normal breath sounds. No stridor. No wheezing, rhonchi or rales.   Musculoskeletal:      Right hand: Deformity and bony tenderness present. Decreased range of motion. Normal pulse.      Left hand: Deformity and bony tenderness present. Decreased range of motion. Normal pulse.   Skin:     General: Skin is warm and dry.      Findings: No rash.   Neurological:      Mental Status: She is alert and oriented to person, place, and time.      Cranial Nerves: No cranial nerve deficit.   Psychiatric:         Mood and Affect: Mood normal.         Behavior: Behavior normal.         Thought Content: Thought content normal.         Judgment: Judgment normal.           Assessment & Plan   Diagnoses and all orders for this visit:    1. Rheumatoid arthritis flare (HCC) (Primary)    2. Elevated blood pressure reading in office with diagnosis of hypertension    3. Major depressive disorder,  recurrent episode, moderate (HCC)    4. Neuropathy  -     gabapentin (NEURONTIN) 300 MG capsule; Take 1 capsule by mouth 2 (Two) Times a Day.  Dispense: 180 capsule; Refill: 1    Other orders  -     predniSONE (DELTASONE) 1 MG tablet; 1 po qday x 1 week then qday prn  Dispense: 20 tablet; Refill: 0  -     traZODone (DESYREL) 50 MG tablet; Take 1 tablet by mouth every night at bedtime.  Dispense: 90 tablet; Refill: 0  -     amLODIPine (NORVASC) 10 MG tablet; Take 1 tablet by mouth Daily.  Dispense: 90 tablet; Refill: 0  -     escitalopram (LEXAPRO) 10 MG tablet; Take 1 tablet by mouth Every Night.  Dispense: 90 tablet; Refill: 1  -     fenofibrate 160 MG tablet; Take 1 tablet by mouth Daily.  Dispense: 90 tablet; Refill: 2  -     lansoprazole (PREVACID) 30 MG capsule; Take 1 capsule by mouth Daily.  Dispense: 90 capsule; Refill: 1  -     lisinopril (PRINIVIL,ZESTRIL) 40 MG tablet; Take 1 tablet by mouth Daily.  Dispense: 90 tablet; Refill: 1  -     rosuvastatin (Crestor) 5 MG tablet; Take 1 tablet by mouth Every Night.  Dispense: 90 tablet; Refill: 1  -     SITagliptin-metFORMIN HCl ER (Janumet XR) 100-1000 MG tablet; Take 1 tablet by mouth Daily.  Dispense: 90 tablet; Refill: 0    Med refills  Trial of low dose prednisone to help w/ hand pain/ swelling  Reviewed recent fasting labs w/ pt  Pt to cont to monitor o/s BP's and call if staying >140/90's

## 2022-06-28 ENCOUNTER — OFFICE VISIT (OUTPATIENT)
Dept: FAMILY MEDICINE CLINIC | Facility: CLINIC | Age: 72
End: 2022-06-28

## 2022-06-28 VITALS
BODY MASS INDEX: 29.77 KG/M2 | SYSTOLIC BLOOD PRESSURE: 135 MMHG | RESPIRATION RATE: 14 BRPM | OXYGEN SATURATION: 97 % | HEART RATE: 57 BPM | HEIGHT: 63 IN | DIASTOLIC BLOOD PRESSURE: 61 MMHG | WEIGHT: 168 LBS | TEMPERATURE: 98.4 F

## 2022-06-28 DIAGNOSIS — N81.4 CYSTOCELE WITH PROLAPSE: Primary | ICD-10-CM

## 2022-06-28 PROCEDURE — 99213 OFFICE O/P EST LOW 20 MIN: CPT | Performed by: FAMILY MEDICINE

## 2022-07-06 ENCOUNTER — OFFICE VISIT (OUTPATIENT)
Dept: CARDIOLOGY | Facility: CLINIC | Age: 72
End: 2022-07-06

## 2022-07-06 VITALS
OXYGEN SATURATION: 98 % | BODY MASS INDEX: 29.95 KG/M2 | WEIGHT: 169 LBS | SYSTOLIC BLOOD PRESSURE: 143 MMHG | HEIGHT: 63 IN | DIASTOLIC BLOOD PRESSURE: 68 MMHG | HEART RATE: 57 BPM

## 2022-07-06 DIAGNOSIS — I25.10 CORONARY ARTERY DISEASE INVOLVING NATIVE CORONARY ARTERY OF NATIVE HEART WITHOUT ANGINA PECTORIS: ICD-10-CM

## 2022-07-06 DIAGNOSIS — E11.65 TYPE 2 DIABETES MELLITUS WITH HYPERGLYCEMIA, WITH LONG-TERM CURRENT USE OF INSULIN: ICD-10-CM

## 2022-07-06 DIAGNOSIS — Z79.4 TYPE 2 DIABETES MELLITUS WITH HYPERGLYCEMIA, WITH LONG-TERM CURRENT USE OF INSULIN: ICD-10-CM

## 2022-07-06 DIAGNOSIS — I10 ESSENTIAL HYPERTENSION: Primary | ICD-10-CM

## 2022-07-06 DIAGNOSIS — E78.2 MIXED HYPERLIPIDEMIA: ICD-10-CM

## 2022-07-06 PROCEDURE — 99214 OFFICE O/P EST MOD 30 MIN: CPT | Performed by: INTERNAL MEDICINE

## 2022-07-06 NOTE — PROGRESS NOTES
Subjective:     Encounter Date:07/06/2022      Patient ID: Jenna Rosa is a 72 y.o. female.    Chief Complaint:  History of Present Illness 72-year-old white female with history of coronary disease diabetes hypertension hyperlipidemia presents to my office for follow-up.  Patient is currently stable without any symptoms of chest pain but has occasional shortness of breath.  No complains any PND orthopnea.  No palpitation dizziness syncope or swelling of the feet.  She is taking medicines regularly she does not smoke.  She is trying exercise regularly follows a good diet.    The following portions of the patient's history were reviewed and updated as appropriate: allergies, current medications, past family history, past medical history, past social history, past surgical history and problem list.  Past Medical History:   Diagnosis Date   • CAD 2020   • Depression    • DEXA    • Diabetic peripheral neuropathy    • GERD    • Hyperlipidemia    • Hypertension    • Insomnia    • Lower back pain    • MAMMO     NEG = 2021   • Obesity    • Osteoarthritis    • Osteopenia    • Palpitations    • RA    • Rheumatoid nodule 06/2011     rt. thumb x 2 and rt. & lt. 3rd fingers (Oct.2012)/ rt. elbow, left elbow, and epidermal inclusion cyst post. neck (June 2011)-----Dr. Whitmore   • Vitamin D deficiency      Past Surgical History:   Procedure Laterality Date   • BREAST SURGERY  1972   • CARDIAC CATHETERIZATION N/A 09/16/2020    Procedure: Left Heart Cath;  Surgeon: Walker Rodriguez MD;  Location: [facility] INVASIVE LOCATION;  Service: Cardiovascular;  Laterality: N/A;   • CARDIAC CATHETERIZATION N/A 09/16/2020    Procedure: Coronary angiography;  Surgeon: Walker Rodriguez MD;  Location: [facility] CATH INVASIVE LOCATION;  Service: Cardiovascular;  Laterality: N/A;   • CARDIAC CATHETERIZATION  09/16/2020    Procedure: Functional Flow [place];  Surgeon: Kath Medina MD;  Location: [facility] CATH  "INVASIVE LOCATION;  Service: Cardiology;;   • CHOLECYSTECTOMY     • COLONOSCOPY      2017 = TA, rech 2020 [facility]   • CYST REMOVAL      Seb cyst on neck   • TOTAL ABDOMINAL HYSTERECTOMY     • TOTAL ABDOMINAL HYSTERECTOMY WITH SALPINGO OOPHORECTOMY     • VAGINAL PROLAPSE REPAIR       Uterine Prolapse repair     /68 (BP Location: Left arm, Patient Position: Sitting)   Pulse 57   Ht 160 cm (63\")   Wt 76.7 kg (169 lb)   SpO2 98%   BMI 29.94 kg/m²   Family History   Problem Relation Age of Onset   • Heart disease Mother    • Diabetes Mother    • Hypertension Mother    • Rheum arthritis Mother    • Coronary artery disease Mother    • Alcohol abuse Mother    • Heart disease Father    • Mental illness Father    • Hypertension Father    • Alcohol abuse Father    • Early death Father    • Stroke Sister    • Cancer Sister 29        RENAL Cancer   • Osteoarthritis Sister    • Rheum arthritis Sister    • Hypertension Sister    • Arthritis Sister    • Diabetes Sister    • Rheum arthritis Sister    • Osteoarthritis Sister    • Diabetes Sister    • Arthritis Sister    • Liver disease Sister    • No Known Problems Brother    • Rheum arthritis Brother    • Osteoarthritis Brother    • Arthritis Brother         RA   • Hypertension Brother    • Diabetes Maternal Grandfather        Current Outpatient Medications:   •  acebutolol (SECTRAL) 200 MG capsule, Take 1 capsule by mouth Daily., Disp: 90 capsule, Rfl: 1  •  acetaminophen (TYLENOL) 325 MG tablet, Take 650 mg by mouth Every 6 (Six) Hours As Needed for Mild Pain ., Disp: , Rfl:   •  amLODIPine (NORVASC) 10 MG tablet, Take 1 tablet by mouth Daily., Disp: 90 tablet, Rfl: 0  •  Ascorbic Acid (Vitamin C) 500 MG capsule, Take 1 capsule by mouth Daily., Disp: , Rfl:   •  aspirin 81 MG EC tablet, Take 81 mg by mouth Daily., Disp: , Rfl:   •  BD Insulin Syringe U/F 31G X 5/16\" 1 ML misc, USE TO INJECT INSULIN 4 TIMES DAILY, Disp: 150 each, Rfl: 3  •  calcium carbonate (OS-PREET) 600 MG " tablet, Take 600 mg by mouth Daily., Disp: , Rfl:   •  Cholecalciferol (Vitamin D) 50 MCG (2000 UT) capsule, 1 po qd, Disp: , Rfl:   •  diclofenac (VOLTAREN) 1 % gel gel, Apply 4 g topically to the appropriate area as directed 4 (Four) Times a Day As Needed., Disp: , Rfl:   •  escitalopram (LEXAPRO) 10 MG tablet, Take 1 tablet by mouth Every Night., Disp: 90 tablet, Rfl: 1  •  estradiol (ESTRACE) 0.1 MG/GM vaginal cream, Apply small amt (< 0.5gm) to external vaginal area QHS, Disp: 42.5 g, Rfl: 2  •  fenofibrate 160 MG tablet, Take 1 tablet by mouth Daily., Disp: 90 tablet, Rfl: 2  •  Ferrous Sulfate (IRON) 325 (65 Fe) MG tablet, Take 325 mg by mouth Daily., Disp: , Rfl:   •  fluticasone (FLONASE) 50 MCG/ACT nasal spray, 1 spray into the nostril(s) as directed by provider Daily As Needed for Allergies., Disp: , Rfl:   •  gabapentin (NEURONTIN) 300 MG capsule, Take 1 capsule by mouth 2 (Two) Times a Day., Disp: 180 capsule, Rfl: 1  •  glucose blood (Accu-Chek Jazmyne Plus) test strip, USE ONE STRIP TO TEST TWICE A DAY, Disp: 200 each, Rfl: 2  •  Humira Pen 40 MG/0.4ML Pen-injector Kit, Inject 40 mg under the skin into the appropriate area as directed Every 14 (Fourteen) Days. Sulfate Free Formula, Disp: 2 each, Rfl: 3  •  HumuLIN 70/30 (70-30) 100 UNIT/ML injection, INJECT 45 UNITS BEFORE BREAKFAST AND 20 UNITS BEFORE SUPPER DX E11.65, Disp: 20 mL, Rfl: 11  •  IBUPROFEN-ACETAMINOPHEN PO, Take  by mouth., Disp: , Rfl:   •  isosorbide mononitrate (IMDUR) 30 MG 24 hr tablet, Take 1 tablet by mouth 2 (Two) Times a Day., Disp: 180 tablet, Rfl: 0  •  lansoprazole (PREVACID) 30 MG capsule, Take 1 capsule by mouth Daily., Disp: 90 capsule, Rfl: 1  •  lisinopril (PRINIVIL,ZESTRIL) 40 MG tablet, Take 1 tablet by mouth Daily., Disp: 90 tablet, Rfl: 1  •  ondansetron ODT (ZOFRAN-ODT) 4 MG disintegrating tablet, Place 1 tablet on the tongue Every 8 (Eight) Hours As Needed for Nausea., Disp: 30 tablet, Rfl: 0  •  predniSONE  (DELTASONE) 1 MG tablet, 1 po qday x 1 week then qday prn, Disp: 20 tablet, Rfl: 0  •  rosuvastatin (Crestor) 5 MG tablet, Take 1 tablet by mouth Every Night., Disp: 90 tablet, Rfl: 1  •  SITagliptin-metFORMIN HCl ER (Janumet XR) 100-1000 MG tablet, Take 1 tablet by mouth Daily., Disp: 90 tablet, Rfl: 0  •  traMADol (ULTRAM) 50 MG tablet, Take 50 mg by mouth 2 (Two) Times a Day., Disp: , Rfl:   •  traZODone (DESYREL) 50 MG tablet, Take 1 tablet by mouth every night at bedtime., Disp: 90 tablet, Rfl: 0  •  vitamin B-12 (CYANOCOBALAMIN) 1000 MCG tablet, Take 1,000 mcg by mouth Daily., Disp: , Rfl:   •  Zinc 50 MG tablet, Take 1 tablet by mouth Daily., Disp: , Rfl:   Allergies   Allergen Reactions   • Jardiance [Empagliflozin] Other (See Comments)     UTI's     Social History     Socioeconomic History   • Marital status:      Spouse name: Carson Rosa   • Number of children: 3   • Years of education: GED   Tobacco Use   • Smoking status: Never Smoker   • Smokeless tobacco: Never Used   Vaping Use   • Vaping Use: Never used   Substance and Sexual Activity   • Alcohol use: Yes     Comment: occ   • Drug use: Not Currently   • Sexual activity: Not Currently     Partners: Male     Birth control/protection: Post-menopausal     Review of Systems   Constitutional: Positive for malaise/fatigue. Negative for fever.   Cardiovascular: Negative for chest pain, dyspnea on exertion and palpitations.   Respiratory: Positive for shortness of breath. Negative for cough.    Skin: Negative for rash.   Gastrointestinal: Positive for nausea. Negative for abdominal pain and vomiting.   Neurological: Negative for focal weakness and headaches.   All other systems reviewed and are negative.             Objective:     Constitutional:       Appearance: Well-developed.   Eyes:      General: No scleral icterus.     Conjunctiva/sclera: Conjunctivae normal.   HENT:      Head: Normocephalic and atraumatic.   Neck:      Vascular: No carotid  bruit or JVD.   Pulmonary:      Effort: Pulmonary effort is normal.      Breath sounds: Normal breath sounds. No wheezing. No rales.   Cardiovascular:      Normal rate. Regular rhythm.   Pulses:     Intact distal pulses.   Abdominal:      General: Bowel sounds are normal.      Palpations: Abdomen is soft.   Musculoskeletal:      Cervical back: Normal range of motion and neck supple. Skin:     General: Skin is warm and dry.      Findings: No rash.   Neurological:      Mental Status: Alert.       Procedures    Lab Review:         MDM  1 coronary disease  Patient has nonobstructive disease now and has normal LV systolic function and is currently stable on medications  2.  Hypertension  Patient blood pressure currently stable on medical therapy  3.  Hyperlipidemia  Patient's lipid levels are followed by primary doctor and is on a statin  4.  Diabetes  Patient is on insulin and followed by the primary care doctor    Patient's previous medical records, labs, and EKG were reviewed and discussed with the patient at today's visit.

## 2022-07-26 RX ORDER — INSULIN NPH HUM/REG INSULIN HM 70-30/ML
VIAL (ML) SUBCUTANEOUS
Qty: 20 ML | Refills: 4 | Status: SHIPPED | OUTPATIENT
Start: 2022-07-26 | End: 2022-12-22

## 2022-08-23 DIAGNOSIS — M05.9 SEROPOSITIVE RHEUMATOID ARTHRITIS: ICD-10-CM

## 2022-08-23 DIAGNOSIS — Z79.899 LONG-TERM USE OF HIGH-RISK MEDICATION: ICD-10-CM

## 2022-08-23 RX ORDER — ISOSORBIDE MONONITRATE 30 MG/1
TABLET, EXTENDED RELEASE ORAL
Qty: 180 TABLET | Refills: 0 | Status: SHIPPED | OUTPATIENT
Start: 2022-08-23 | End: 2022-11-23

## 2022-08-23 RX ORDER — ADALIMUMAB 40MG/0.4ML
KIT SUBCUTANEOUS
Qty: 2 EACH | Refills: 3 | Status: SHIPPED | OUTPATIENT
Start: 2022-08-23 | End: 2022-12-19 | Stop reason: SDUPTHER

## 2022-08-23 NOTE — TELEPHONE ENCOUNTER
Rx Refill Note  Requested Prescriptions     Pending Prescriptions Disp Refills   • Humira Pen 40 MG/0.4ML Pen-injector Kit [Pharmacy Med Name: HUMIRA(CF) PEN 40 MG/0.4 ML] 2 each 3     Sig: INJECT 40 MG (0.4ML)  UNDER THE SKIN AS DIRECTED EVERY 14 DAYS   • isosorbide mononitrate (IMDUR) 30 MG 24 hr tablet [Pharmacy Med Name: ISOSORBIDE MONONIT ER 30 MG TB] 180 tablet 0     Sig: TAKE ONE TABLET BY MOUTH TWICE A DAY      Last office visit with prescribing clinician: 6/28/2022      Next office visit with prescribing clinician: Visit date not found     Lipid Panel (05/19/2022 09:27)  Hemoglobin A1c (05/19/2022 09:27)  Comprehensive Metabolic Panel (05/19/2022 09:27)       {TIP  Is Refill Pharmacy correct?:23}  Brianda Madrigal, JOAQUÍN  08/23/22, 14:52 EDT

## 2022-09-02 ENCOUNTER — TELEPHONE (OUTPATIENT)
Dept: FAMILY MEDICINE CLINIC | Facility: CLINIC | Age: 72
End: 2022-09-02

## 2022-09-02 RX ORDER — LISINOPRIL 40 MG/1
40 TABLET ORAL DAILY
Qty: 90 TABLET | Refills: 1 | Status: SHIPPED | OUTPATIENT
Start: 2022-09-02 | End: 2023-03-09 | Stop reason: SDUPTHER

## 2022-09-02 NOTE — TELEPHONE ENCOUNTER
Caller: JULIANE    Relationship: Other    Best call back number: [phone]    Requested Prescriptions: lisinopril (PRINIVIL,ZESTRIL) 40 MG tablet  rosuvastatin (Crestor) 5 MG tablet       Pharmacy where request should be sent:    [facility] - [place] [address] - [phone]  - [phone]   [phone]      Does the patient have less than a 3 day supply:  [x] Yes  [] No    Jennifer Green, RegSched Rep   09/02/22 12:09 EDT     PLEASE ADVISE.

## 2022-09-16 RX ORDER — AMLODIPINE BESYLATE 10 MG/1
TABLET ORAL
Qty: 90 TABLET | Refills: 1 | Status: SHIPPED | OUTPATIENT
Start: 2022-09-16 | End: 2023-03-21 | Stop reason: SDUPTHER

## 2022-09-16 RX ORDER — SITAGLIPTIN AND METFORMIN HYDROCHLORIDE 1000; 100 MG/1; MG/1
TABLET, FILM COATED, EXTENDED RELEASE ORAL
Qty: 90 TABLET | Refills: 1 | Status: SHIPPED | OUTPATIENT
Start: 2022-09-16 | End: 2023-03-09 | Stop reason: SDUPTHER

## 2022-09-19 DIAGNOSIS — E11.65 TYPE 2 DIABETES MELLITUS WITH HYPERGLYCEMIA, WITH LONG-TERM CURRENT USE OF INSULIN: ICD-10-CM

## 2022-09-19 DIAGNOSIS — Z79.4 TYPE 2 DIABETES MELLITUS WITH HYPERGLYCEMIA, WITH LONG-TERM CURRENT USE OF INSULIN: ICD-10-CM

## 2022-09-19 RX ORDER — PEN NEEDLE, DIABETIC 29 G X1/2"
NEEDLE, DISPOSABLE MISCELLANEOUS
Qty: 150 EACH | Refills: 11 | Status: SHIPPED | OUTPATIENT
Start: 2022-09-19 | End: 2023-01-26

## 2022-10-19 ENCOUNTER — TELEPHONE (OUTPATIENT)
Dept: FAMILY MEDICINE CLINIC | Facility: CLINIC | Age: 72
End: 2022-10-19

## 2022-10-19 NOTE — TELEPHONE ENCOUNTER
HUB to read    PA approved for [facility]     PA approval was sent to Tracey in [place]     Waiting on the outcome from insurance.     PA Case: [id number], Status: Approved, Coverage Starts on: 7/20/2022 12:00:00 AM, Coverage Ends on: 10/19/2023 12:00:00 AM.

## 2022-11-07 ENCOUNTER — OFFICE VISIT (OUTPATIENT)
Dept: CARDIOLOGY | Facility: CLINIC | Age: 72
End: 2022-11-07

## 2022-11-07 VITALS
OXYGEN SATURATION: 93 % | WEIGHT: 167.2 LBS | BODY MASS INDEX: 29.62 KG/M2 | HEIGHT: 63 IN | DIASTOLIC BLOOD PRESSURE: 67 MMHG | SYSTOLIC BLOOD PRESSURE: 105 MMHG | HEART RATE: 55 BPM

## 2022-11-07 DIAGNOSIS — I10 ESSENTIAL HYPERTENSION: ICD-10-CM

## 2022-11-07 DIAGNOSIS — R00.1 BRADYCARDIA, SINUS: ICD-10-CM

## 2022-11-07 DIAGNOSIS — I25.10 CORONARY ARTERY DISEASE INVOLVING NATIVE CORONARY ARTERY OF NATIVE HEART WITHOUT ANGINA PECTORIS: ICD-10-CM

## 2022-11-07 DIAGNOSIS — I49.3 PVC (PREMATURE VENTRICULAR CONTRACTION): Primary | ICD-10-CM

## 2022-11-07 PROCEDURE — 93000 ELECTROCARDIOGRAM COMPLETE: CPT | Performed by: INTERNAL MEDICINE

## 2022-11-07 PROCEDURE — 99214 OFFICE O/P EST MOD 30 MIN: CPT | Performed by: INTERNAL MEDICINE

## 2022-11-07 RX ORDER — ACEBUTOLOL HYDROCHLORIDE 200 MG/1
200 CAPSULE ORAL DAILY
Qty: 90 CAPSULE | Refills: 1 | Status: SHIPPED | OUTPATIENT
Start: 2022-11-07

## 2022-11-07 NOTE — PROGRESS NOTES
CC--palpitations and bradycardia    Sub--72-year-old female patient with palpitations in for follow-up.  She has occasional palpitations mostly once or twice and then not according to her.  She is currently on acebutolol to suppress her symptoms.  He does have moderate LAD disease followed by interventional cardiologist.  She denies any syncope and she is fairly compliant with her medications.  She does have hypertension which is well controlled.      Previous history is attached below for reference only which has been reviewed        72-year-old very pleasant patient came for follow-up after initiation of acebutolol at prior visit and patient was seen in the past for suspected tachycardia/bradycardia syndrome  She was on atenolol which has been stopped and changed to acebutolol and she saw some improvement of symptoms with occasional palpitations without any neptali syncope and complains of mild dizziness--Her symptoms started increasing since March of this year and she kind of correlated to loss of her spouse with grief  She complains of palpitations and sometimes fullness in her neck and denies any symptoms of neptali syncope  She does have atypical epigastric discomfort unrelated to exertion and a stress test showed normal EF and inferoapical ischemia could not be excluded and left jaycob medical management--she underwent cardiac catheterization which showed moderate LAD disease negative on IFR and left to medical management  She had normal EF on echocardiography with sclerotic aortic valve without stenosis  An event recorder on her revealed sinus bradycardia with intermittent PVCs  She denies any TIA or stroke or renal disease in the past  She does have essential hypertension on amlodipine  She continues to have transient symptoms mostly lasting few seconds without any neptali syncope        Past Medical History:   Diagnosis Date   • Depression    • Diabetic peripheral neuropathy    • GERD    • Insomnia    • Lower back  pain    • Obesity    • Osteoarthritis    • Osteopenia    • Palpitations    • Pulmonary nodule    • RA    • Rheumatoid nodule 06/2011     rt. thumb x 2 and rt. & lt. 3rd fingers (Oct.2012)/ rt. elbow, left elbow, and epidermal inclusion cyst post. neck (June 2011)-----Dr. Whitmore   • Vitamin D deficiency        Physical Exam    General:      well developed, well nourished, in no acute distress.    Head:      normocephalic and atraumatic.    Eyes:      PERRL/EOM intact, conjunctivae and sclerae clear without nystagmus.    Neck:      no  thyromegaly, trachea central with normal respiratory effort  Lungs:      clear bilaterally to auscultation.    Heart:       regular rate and rhythm, S1, S2 without murmurs, rubs, or gallops  Skin:      intact without lesions or rashes.    Psych:      alert and cooperative; normal mood and affect; normal attention span and concentration.          Assessment and plan    Intermittent PVCs well suppressed with acebutolol and prescription refill given  Normal LV ejection fraction with prior cardiac catheterization showing moderate LAD disease without angina and stable  Essential hypertension well-controlled  Prior history of sinus bradycardia without recurrence currently on acebutolol  Aortic valve sclerosis without stenosis  Medications reviewed and follow-up in 6 months      ECG 12 Lead    Date/Time: 11/7/2022 12:31 PM  Performed by: Alberto Gotti MD  Authorized by: Alberto Gotti MD   Comparison: compared with previous ECG   Similar to previous ECG  Rhythm: sinus rhythm  Rate: normal  Conduction: 1st degree AV block  QRS axis: left  Other findings: non-specific ST-T wave changes            Electronically signed by Alberto Gotti MD, 11/07/22, 12:30 PM EST.

## 2022-11-23 RX ORDER — ISOSORBIDE MONONITRATE 30 MG/1
TABLET, EXTENDED RELEASE ORAL
Qty: 180 TABLET | Refills: 0 | Status: SHIPPED | OUTPATIENT
Start: 2022-11-23 | End: 2023-02-22

## 2022-12-06 DIAGNOSIS — Z78.0 POSTMENOPAUSAL: Primary | ICD-10-CM

## 2022-12-06 DIAGNOSIS — Z12.31 SCREENING MAMMOGRAM, ENCOUNTER FOR: ICD-10-CM

## 2022-12-07 DIAGNOSIS — R91.1 LUNG NODULE SEEN ON IMAGING STUDY: Primary | ICD-10-CM

## 2022-12-12 RX ORDER — ROSUVASTATIN CALCIUM 5 MG/1
TABLET, COATED ORAL
Qty: 90 TABLET | Refills: 1 | Status: SHIPPED | OUTPATIENT
Start: 2022-12-12

## 2022-12-12 RX ORDER — LANSOPRAZOLE 30 MG/1
CAPSULE, DELAYED RELEASE ORAL
Qty: 90 CAPSULE | Refills: 1 | Status: SHIPPED | OUTPATIENT
Start: 2022-12-12 | End: 2023-03-09 | Stop reason: SDUPTHER

## 2022-12-19 DIAGNOSIS — M05.9 SEROPOSITIVE RHEUMATOID ARTHRITIS: ICD-10-CM

## 2022-12-19 DIAGNOSIS — Z79.899 LONG-TERM USE OF HIGH-RISK MEDICATION: ICD-10-CM

## 2022-12-19 RX ORDER — ADALIMUMAB 40MG/0.4ML
40 KIT SUBCUTANEOUS
Qty: 2 EACH | Refills: 3 | Status: SHIPPED | OUTPATIENT
Start: 2022-12-19 | End: 2023-03-30 | Stop reason: SDUPTHER

## 2022-12-22 RX ORDER — INSULIN NPH HUM/REG INSULIN HM 70-30/ML
VIAL (ML) SUBCUTANEOUS
Qty: 20 ML | Refills: 4 | Status: SHIPPED | OUTPATIENT
Start: 2022-12-22 | End: 2023-01-26

## 2022-12-29 ENCOUNTER — OFFICE VISIT (OUTPATIENT)
Dept: FAMILY MEDICINE CLINIC | Facility: CLINIC | Age: 72
End: 2022-12-29

## 2022-12-29 VITALS
OXYGEN SATURATION: 97 % | SYSTOLIC BLOOD PRESSURE: 120 MMHG | HEIGHT: 63 IN | WEIGHT: 169 LBS | DIASTOLIC BLOOD PRESSURE: 56 MMHG | TEMPERATURE: 98.6 F | HEART RATE: 56 BPM | RESPIRATION RATE: 14 BRPM | BODY MASS INDEX: 29.95 KG/M2

## 2022-12-29 DIAGNOSIS — Z12.11 SCREENING FOR MALIGNANT NEOPLASM OF COLON: ICD-10-CM

## 2022-12-29 DIAGNOSIS — F32.9 REACTIVE DEPRESSION: ICD-10-CM

## 2022-12-29 DIAGNOSIS — Z23 NEED FOR INFLUENZA VACCINATION: ICD-10-CM

## 2022-12-29 DIAGNOSIS — M05.9 SEROPOSITIVE RHEUMATOID ARTHRITIS: ICD-10-CM

## 2022-12-29 DIAGNOSIS — Z00.00 MEDICARE ANNUAL WELLNESS VISIT, SUBSEQUENT: Primary | ICD-10-CM

## 2022-12-29 PROCEDURE — 1159F MED LIST DOCD IN RCRD: CPT | Performed by: FAMILY MEDICINE

## 2022-12-29 PROCEDURE — 1160F RVW MEDS BY RX/DR IN RCRD: CPT | Performed by: FAMILY MEDICINE

## 2022-12-29 PROCEDURE — 90662 IIV NO PRSV INCREASED AG IM: CPT | Performed by: FAMILY MEDICINE

## 2022-12-29 PROCEDURE — G0439 PPPS, SUBSEQ VISIT: HCPCS | Performed by: FAMILY MEDICINE

## 2022-12-29 PROCEDURE — G0008 ADMIN INFLUENZA VIRUS VAC: HCPCS | Performed by: FAMILY MEDICINE

## 2022-12-29 PROCEDURE — 85025 COMPLETE CBC W/AUTO DIFF WBC: CPT | Performed by: FAMILY MEDICINE

## 2022-12-29 PROCEDURE — 99214 OFFICE O/P EST MOD 30 MIN: CPT | Performed by: FAMILY MEDICINE

## 2022-12-29 PROCEDURE — 1170F FXNL STATUS ASSESSED: CPT | Performed by: FAMILY MEDICINE

## 2022-12-29 RX ORDER — ESCITALOPRAM OXALATE 10 MG/1
5 TABLET ORAL NIGHTLY
Qty: 90 TABLET | Refills: 1 | Status: SHIPPED
Start: 2022-12-29 | End: 2023-02-28 | Stop reason: SDUPTHER

## 2022-12-29 NOTE — PROGRESS NOTES
The ABCs of the Annual Wellness Visit  Subsequent Medicare Wellness Visit    Chief Complaint   Patient presents with   • Medicare Wellness-subsequent   • Immunizations     Patient was given the high dose flu shot while in the office today.        Subjective   History of Present Illness:  Jenna Rosa is a 72 y.o. female who presents for a Subsequent Medicare Wellness Visit.  Pt having pain in her hands more and has appt w/ Dr Magaña; pt used to be on MTX w/ the humira and RA took her off the MTX when she was doing well----open to retry the MTX  Pt also feels like she is more flat and unmotivated recently; doesn't want to leave the house; cant adrianne due to her hand pain and not wanting to cook anymore..... not sure if lexapro is too strong or needs diff med  HEALTH RISK ASSESSMENT    Recent Hospitalizations:  No hospitalization(s) within the last year.    Current Medical Providers:  Patient Care Team:  Emily Burdick DO as PCP - General (Family Medicine)  Joe García MD as Consulting Physician (Cardiology)  Dano Rangel MD as Consulting Physician (Endocrinology)  Conchis Mohamud OD as Consulting Physician (Optometry)  Harinder Gresham MD as Consulting Physician (Pain Medicine)  Juan Richey MD as Consulting Physician (Gastroenterology)  Manish Graff MD as Consulting Physician (Urology)    Smoking Status:  Social History     Tobacco Use   Smoking Status Never   Smokeless Tobacco Never       Alcohol Consumption:  Social History     Substance and Sexual Activity   Alcohol Use Yes    Comment: occ       Depression Screen:   PHQ-2/PHQ-9 Depression Screening 12/29/2022   Retired PHQ-9 Total Score -   Retired Total Score -   Little Interest or Pleasure in Doing Things 1-->several days   Feeling Down, Depressed or Hopeless 1-->several days   Trouble Falling or Staying Asleep, or Sleeping Too Much 0-->not at all   Feeling Tired or Having Little Energy 1-->several days   Poor Appetite or  Overeating 1-->several days   Feeling Bad about Yourself - or that You are a Failure or Have Let Yourself or Your Family Down 0-->not at all   Trouble Concentrating on Things, Such as Reading the Newspaper or Watching Television 0-->not at all   Moving or Speaking So Slowly that Other People Could Have Noticed? Or the Opposite - Being So Fidgety 0-->not at all   Thoughts that You Would be Better Off Dead or of Hurting Yourself in Some Way 0-->not at all   PHQ-9: Brief Depression Severity Measure Score 4   If You Checked Off Any Problems, How Difficult Have These Problems Made It For You to Do Your Work, Take Care of Things at Home, or Get Along with Other People? somewhat difficult       Fall Risk Screen:  JESSICA Fall Risk Assessment was completed, and patient is at LOW risk for falls.Assessment completed on:12/29/2022    Health Habits and Functional and Cognitive Screening:  Functional & Cognitive Status 12/29/2022   Do you have difficulty preparing food and eating? No   Do you have difficulty bathing yourself, getting dressed or grooming yourself? No   Do you have difficulty using the toilet? No   Do you have difficulty moving around from place to place? No   Do you have trouble with steps or getting out of a bed or a chair? No   Current Diet Unhealthy Diet   Dental Exam Up to date        Dental Exam Comment -   Eye Exam Up to date        Eye Exam Comment -   Exercise (times per week) 0 times per week   Current Exercises Include No Regular Exercise   Current Exercise Activities Include -   Do you need help using the phone?  No   Are you deaf or do you have serious difficulty hearing?  No   Do you need help with transportation? No   Do you need help shopping? No   Do you need help preparing meals?  No   Do you need help with housework?  No   Do you need help with laundry? No   Do you need help taking your medications? No   Do you need help managing money? No   Do you ever drive or ride in a car without wearing a  "seat belt? No   Have you felt unusual stress, anger or loneliness in the last month? Yes   Who do you live with? Alone   If you need help, do you have trouble finding someone available to you? No   Have you been bothered in the last four weeks by sexual problems? No   Do you have difficulty concentrating, remembering or making decisions? No         Does the patient have evidence of cognitive impairment? No    Asprin use counseling:Taking ASA appropriately as indicated    Age-appropriate Screening Schedule:  Refer to the list below for future screening recommendations based on patient's age, sex and/or medical conditions. Orders for these recommended tests are listed in the plan section. The patient has been provided with a written plan.    Health Maintenance   Topic Date Due   • TDAP/TD VACCINES (1 - Tdap) Never done   • ZOSTER VACCINE (1 of 2) Never done   • DIABETIC FOOT EXAM  Never done   • DXA SCAN  02/19/2021   • DIABETIC EYE EXAM  03/01/2022   • HEMOGLOBIN A1C  11/19/2022   • LIPID PANEL  05/19/2023   • URINE MICROALBUMIN  05/19/2023   • MAMMOGRAM  11/12/2023   • INFLUENZA VACCINE  Completed          The following portions of the patient's history were reviewed and updated as appropriate: allergies, current medications, past family history, past medical history, past social history, past surgical history and problem list.    Outpatient Medications Prior to Visit   Medication Sig Dispense Refill   • acetaminophen (TYLENOL) 325 MG tablet Take 650 mg by mouth Every 6 (Six) Hours As Needed for Mild Pain .     • amLODIPine (NORVASC) 10 MG tablet TAKE 1 TABLET DAILY 90 tablet 1   • aspirin 81 MG EC tablet Take 81 mg by mouth Daily.     • BD Insulin Syringe U/F 31G X 5/16\" 1 ML misc USE TO INJECT INSULIN 4 TIMES DAILY 150 each 11   • calcium carbonate (OS-PREET) 600 MG tablet Take 600 mg by mouth Daily.     • Cholecalciferol (Vitamin D) 50 MCG (2000 UT) capsule 1 po qd     • Continuous Blood Gluc  (FreeStyle " Adelaide 2 Buna) device 1 each Every 14 (Fourteen) Days. DX E11.65 1 each 0   • Continuous Blood Gluc Sensor (FreeStyle Adelaide 2 Sensor) misc 1 each Daily for 30 days, THEN 1 each Daily for 30 days. Use to test BS / DX E11.65 2 each 2   • diclofenac (VOLTAREN) 1 % gel gel Apply 4 g topically to the appropriate area as directed 4 (Four) Times a Day As Needed.     • estradiol (ESTRACE) 0.1 MG/GM vaginal cream Apply small amt (< 0.5gm) to external vaginal area QHS 42.5 g 2   • fenofibrate 160 MG tablet Take 1 tablet by mouth Daily. 90 tablet 2   • Ferrous Sulfate (IRON) 325 (65 Fe) MG tablet Take 325 mg by mouth Daily.     • fluticasone (FLONASE) 50 MCG/ACT nasal spray 1 spray into the nostril(s) as directed by provider Daily As Needed for Allergies.     • gabapentin (NEURONTIN) 300 MG capsule Take 1 capsule by mouth 2 (Two) Times a Day. 180 capsule 1   • glucose blood (Accu-Chek Jazmyne Plus) test strip USE ONE STRIP TO TEST TWICE A  each 2   • Humira Pen 40 MG/0.4ML Pen-injector Kit Inject 40 mg under the skin into the appropriate area as directed Every 14 (Fourteen) Days. 2 each 3   • HumuLIN 70/30 (70-30) 100 UNIT/ML injection INJECT 45 UNITS BEFORE BREAKFAST AND 20 UNITS BEFORE SUPPER 20 mL 4   • IBUPROFEN-ACETAMINOPHEN PO Take  by mouth.     • isosorbide mononitrate (IMDUR) 30 MG 24 hr tablet TAKE ONE TABLET BY MOUTH TWICE A  tablet 0   • Janumet -1000 MG tablet TAKE 1 TABLET DAILY 90 tablet 1   • lansoprazole (PREVACID) 30 MG capsule TAKE 1 CAPSULE DAILY 90 capsule 1   • lisinopril (PRINIVIL,ZESTRIL) 40 MG tablet Take 1 tablet by mouth Daily. 90 tablet 1   • ondansetron ODT (ZOFRAN-ODT) 4 MG disintegrating tablet Place 1 tablet on the tongue Every 8 (Eight) Hours As Needed for Nausea. 30 tablet 0   • rosuvastatin (CRESTOR) 5 MG tablet TAKE 1 TABLET EVERY NIGHT 90 tablet 1   • traZODone (DESYREL) 50 MG tablet Take 1 tablet by mouth every night at bedtime. 90 tablet 0   • vitamin B-12  (CYANOCOBALAMIN) 1000 MCG tablet Take 1,000 mcg by mouth Daily.     • escitalopram (LEXAPRO) 10 MG tablet Take 1 tablet by mouth Every Night. 90 tablet 1   • acebutolol (SECTRAL) 200 MG capsule Take 1 capsule by mouth Daily. 90 capsule 1   • traMADol (ULTRAM) 50 MG tablet Take 50 mg by mouth 2 (Two) Times a Day.     • Ascorbic Acid (Vitamin C) 500 MG capsule Take 1 capsule by mouth Daily.     • predniSONE (DELTASONE) 1 MG tablet 1 po qday x 1 week then qday prn 20 tablet 0   • Zinc 50 MG tablet Take 1 tablet by mouth Daily.       No facility-administered medications prior to visit.       Patient Active Problem List   Diagnosis   • Rheumatoid arthritis ([facility])   • Breast mass, right   • Decreased hearing, bilateral   • Dependent edema   • Depression   • Diabetic peripheral neuropathy ([facility])   • Difficult or painful urination   • Costochondritis   • Encounter for immunization   • Encounter for general adult medical examination without abnormal findings   • Family history of cerebrovascular accident (CVA)   • Flank pain   • Gastroesophageal reflux disease   • Herpes zoster   • High-density lipoprotein deficiency   • Mixed hyperlipidemia   • Essential hypertension   • Insomnia   • Leukopenia   • Microscopic hematuria   • Nipple discharge, bloody   • Obesity   • Osteopenia   • Other bursal cyst, unspecified site   • Other dorsalgia   • Low back pain   • Thoracic back pain   • Other hereditary and idiopathic neuropathies   • Other long term (current) drug therapy   • Other specified acquired deformities of unspecified thigh   • Pain in limb   • Pain of foot   • Metatarsalgia   • Polypharmacy   • Primary localized osteoarthritis   • Type 2 diabetes mellitus with hyperglycemia, with long-term current use of insulin ([facility])   • Urticaria   • Visit for screening mammogram   • Vitamin D deficiency   • Heart palpitations   • Major depressive disorder, recurrent episode, moderate ([facility])   • Bereavement   • Unstable angina pectoris  "([facility])   • Abnormal nuclear stress test   • Coronary artery disease involving native coronary artery of native heart without angina pectoris       Advanced Care Planning:  ACP discussion was held with the patient during this visit. Patient does not have an advance directive, information provided.    Review of Systems   Constitutional: Positive for activity change. Negative for appetite change and unexpected weight change.   Musculoskeletal: Positive for arthralgias and back pain.   Psychiatric/Behavioral: Positive for dysphoric mood. Negative for sleep disturbance.       Compared to one year ago, the patient feels her physical health is worse.  Compared to one year ago, the patient feels her mental health is worse.    Reviewed chart for potential of high risk medication in the elderly: yes  Reviewed chart for potential of harmful drug interactions in the elderly:yes    Objective         Vitals:    12/29/22 1052   BP: 120/56   BP Location: Right arm   Patient Position: Sitting   Cuff Size: Adult   Pulse: 56   Resp: 14   Temp: 98.6 °F (37 °C)   TempSrc: Infrared   SpO2: 97%   Weight: 76.7 kg (169 lb)   Height: 160 cm (63\")       Body mass index is 29.94 kg/m².  Discussed the patient's BMI with her. The BMI is in the acceptable range.    Physical Exam  Vitals and nursing note reviewed.   Constitutional:       General: She is not in acute distress.     Appearance: She is not ill-appearing or toxic-appearing.   Pulmonary:      Effort: Pulmonary effort is normal.   Neurological:      Mental Status: She is alert and oriented to person, place, and time.      Cranial Nerves: No cranial nerve deficit.   Psychiatric:         Attention and Perception: Attention and perception normal.         Mood and Affect: Affect is flat.         Speech: Speech normal.         Behavior: Behavior normal. Behavior is cooperative.         Thought Content: Thought content normal.         Cognition and Memory: Cognition and memory normal.         " Judgment: Judgment normal.                   Assessment & Plan   Medicare Risks and Personalized Health Plan  CMS Preventative Services Quick Reference  Advance Directive Discussion  Breast Cancer/Mammogram Screening  Cardiovascular risk  Chronic Pain   Colon Cancer Screening  Dementia/Memory   Depression/Dysphoria  Diabetic Lab Screening   Fall Risk  Glaucoma Risk  Hearing Problem  Immunizations Discussed/Encouraged (specific immunizations; Influenza, Pneumococcal 23, Prevnar 20 (Pneumococcal 20-valent conjugate), Vaxneuvance (Pneumococcal 15-valent conjugate), Shingrix and COVID19 )  Inadequate Social Support, Isolation, Loneliness, Lack of Transportation, Financial Difficulties, or Caregiver Stress   Inactivity/Sedentary  Osteoporosis Risk    The above risks/problems have been discussed with the patient.  Pertinent information has been shared with the patient in the After Visit Summary.  Follow up plans and orders are seen below in the Assessment/Plan Section.    Diagnoses and all orders for this visit:    1. Medicare annual wellness visit, subsequent (Primary)    2. Reactive depression    3. Seropositive rheumatoid arthritis (HCC)  -     CBC & Differential  -     XR Chest 2 View    4. Screening for malignant neoplasm of colon  -     Ambulatory Referral For Screening Colonoscopy    5. Need for influenza vaccination  -     Fluzone High-Dose 65+yrs    Other orders  -     methotrexate 2.5 MG tablet; Take 3 tablets by mouth 1 (One) Time Per Week.  Dispense: 12 tablet; Refill: 1  -     escitalopram (LEXAPRO) 10 MG tablet; Take 0.5 tablets by mouth Every Night.  Dispense: 90 tablet; Refill: 1      Follow Up:  No follow-ups on file.     An After Visit Summary and PPPS were given to the patient.     Check CXR/ CBC and start low dose MTX qweek  Refer for colonoscopy w/ [facility]  Flu shot today  Decrease lexapro dose  RTC in 1mo

## 2022-12-29 NOTE — PROGRESS NOTES
Venipuncture performed in left arm by Brianda Toro CMA  with good hemostasis. Patient tolerated well. 12/29/22 Brianda Toro CMA

## 2022-12-30 LAB
BASOPHILS # BLD AUTO: 0.04 10*3/MM3 (ref 0–0.2)
BASOPHILS NFR BLD AUTO: 0.8 % (ref 0–1.5)
DEPRECATED RDW RBC AUTO: 41.5 FL (ref 37–54)
EOSINOPHIL # BLD AUTO: 0.25 10*3/MM3 (ref 0–0.4)
EOSINOPHIL NFR BLD AUTO: 4.7 % (ref 0.3–6.2)
ERYTHROCYTE [DISTWIDTH] IN BLOOD BY AUTOMATED COUNT: 12.5 % (ref 12.3–15.4)
HCT VFR BLD AUTO: 41.9 % (ref 34–46.6)
HGB BLD-MCNC: 13.6 G/DL (ref 12–15.9)
IMM GRANULOCYTES # BLD AUTO: 0.01 10*3/MM3 (ref 0–0.05)
IMM GRANULOCYTES NFR BLD AUTO: 0.2 % (ref 0–0.5)
LYMPHOCYTES # BLD AUTO: 2.69 10*3/MM3 (ref 0.7–3.1)
LYMPHOCYTES NFR BLD AUTO: 50.7 % (ref 19.6–45.3)
MCH RBC QN AUTO: 29.3 PG (ref 26.6–33)
MCHC RBC AUTO-ENTMCNC: 32.5 G/DL (ref 31.5–35.7)
MCV RBC AUTO: 90.3 FL (ref 79–97)
MONOCYTES # BLD AUTO: 0.45 10*3/MM3 (ref 0.1–0.9)
MONOCYTES NFR BLD AUTO: 8.5 % (ref 5–12)
NEUTROPHILS NFR BLD AUTO: 1.87 10*3/MM3 (ref 1.7–7)
NEUTROPHILS NFR BLD AUTO: 35.1 % (ref 42.7–76)
NRBC BLD AUTO-RTO: 0 /100 WBC (ref 0–0.2)
PLATELET # BLD AUTO: 244 10*3/MM3 (ref 140–450)
PMV BLD AUTO: 10.6 FL (ref 6–12)
RBC # BLD AUTO: 4.64 10*6/MM3 (ref 3.77–5.28)
WBC NRBC COR # BLD: 5.31 10*3/MM3 (ref 3.4–10.8)

## 2023-01-03 ENCOUNTER — TELEPHONE (OUTPATIENT)
Dept: FAMILY MEDICINE CLINIC | Facility: CLINIC | Age: 73
End: 2023-01-03
Payer: MEDICARE

## 2023-01-03 RX ORDER — TRAZODONE HYDROCHLORIDE 50 MG/1
50 TABLET ORAL
Qty: 90 TABLET | Refills: 0 | Status: SHIPPED | OUTPATIENT
Start: 2023-01-03 | End: 2023-03-30 | Stop reason: SDUPTHER

## 2023-01-03 NOTE — TELEPHONE ENCOUNTER
----- Message from Jenna Rosa sent at 1/3/2023 10:04 AM EST -----  Regarding: Medicine   Contact: [phone]  I need a refill on my trazodone can you call it in today Tracey please

## 2023-01-03 NOTE — TELEPHONE ENCOUNTER
Rx Refill Note  Requested Prescriptions      No prescriptions requested or ordered in this encounter      Last office visit with prescribing clinician: 12/29/2022   Last telemedicine visit with prescribing clinician: 1/31/2023   Next office visit with prescribing clinician: 1/31/2023     CBC & Differential (12/29/2022 11:37)  Lipid Panel (05/19/2022 09:27)  Comprehensive Metabolic Panel (05/19/2022 09:27)  Hemoglobin A1c (05/19/2022 09:27)                      Would you like a call back once the refill request has been completed: [] Yes [] No    If the office needs to give you a call back, can they leave a voicemail: [] Yes [] No    Brianda Toro, CMA  01/03/23, 11:03 EST   Per the patient:  I need a refill on my trazodone can you call it in today Tracey please

## 2023-01-04 ENCOUNTER — TELEPHONE (OUTPATIENT)
Dept: ENDOCRINOLOGY | Facility: CLINIC | Age: 73
End: 2023-01-04
Payer: MEDICARE

## 2023-01-10 ENCOUNTER — TELEPHONE (OUTPATIENT)
Dept: FAMILY MEDICINE CLINIC | Facility: CLINIC | Age: 73
End: 2023-01-10

## 2023-01-10 ENCOUNTER — LAB (OUTPATIENT)
Dept: LAB | Facility: HOSPITAL | Age: 73
End: 2023-01-10
Payer: MEDICARE

## 2023-01-10 ENCOUNTER — OFFICE VISIT (OUTPATIENT)
Dept: CARDIOLOGY | Facility: CLINIC | Age: 73
End: 2023-01-10
Payer: MEDICARE

## 2023-01-10 VITALS
HEART RATE: 55 BPM | OXYGEN SATURATION: 97 % | HEIGHT: 63 IN | DIASTOLIC BLOOD PRESSURE: 79 MMHG | BODY MASS INDEX: 29.77 KG/M2 | SYSTOLIC BLOOD PRESSURE: 166 MMHG | WEIGHT: 168 LBS

## 2023-01-10 DIAGNOSIS — Z79.4 TYPE 2 DIABETES MELLITUS WITH HYPERGLYCEMIA, WITH LONG-TERM CURRENT USE OF INSULIN: ICD-10-CM

## 2023-01-10 DIAGNOSIS — E11.65 TYPE 2 DIABETES MELLITUS WITH HYPERGLYCEMIA, WITH LONG-TERM CURRENT USE OF INSULIN: ICD-10-CM

## 2023-01-10 DIAGNOSIS — I10 ESSENTIAL HYPERTENSION: ICD-10-CM

## 2023-01-10 DIAGNOSIS — I25.10 CORONARY ARTERY DISEASE INVOLVING NATIVE CORONARY ARTERY OF NATIVE HEART WITHOUT ANGINA PECTORIS: Primary | ICD-10-CM

## 2023-01-10 DIAGNOSIS — E78.2 MIXED HYPERLIPIDEMIA: ICD-10-CM

## 2023-01-10 LAB
ALBUMIN SERPL-MCNC: 4.1 G/DL (ref 3.5–5.2)
ALBUMIN UR-MCNC: 1.4 MG/DL
ALBUMIN/GLOB SERPL: 1.5 G/DL
ALP SERPL-CCNC: 70 U/L (ref 39–117)
ALT SERPL W P-5'-P-CCNC: 17 U/L (ref 1–33)
ANION GAP SERPL CALCULATED.3IONS-SCNC: 10 MMOL/L (ref 5–15)
AST SERPL-CCNC: 18 U/L (ref 1–32)
BILIRUB SERPL-MCNC: 0.2 MG/DL (ref 0–1.2)
BUN SERPL-MCNC: 13 MG/DL (ref 8–23)
BUN/CREAT SERPL: 17.1 (ref 7–25)
CALCIUM SPEC-SCNC: 9.6 MG/DL (ref 8.6–10.5)
CHLORIDE SERPL-SCNC: 103 MMOL/L (ref 98–107)
CHOLEST SERPL-MCNC: 160 MG/DL (ref 0–200)
CO2 SERPL-SCNC: 27 MMOL/L (ref 22–29)
CREAT SERPL-MCNC: 0.76 MG/DL (ref 0.57–1)
CREAT UR-MCNC: 84.6 MG/DL
EGFRCR SERPLBLD CKD-EPI 2021: 83.9 ML/MIN/1.73
GLOBULIN UR ELPH-MCNC: 2.7 GM/DL
GLUCOSE SERPL-MCNC: 152 MG/DL (ref 65–99)
HBA1C MFR BLD: 7.9 % (ref 3.5–5.6)
HDLC SERPL-MCNC: 40 MG/DL (ref 40–60)
LDLC SERPL CALC-MCNC: 85 MG/DL (ref 0–100)
LDLC/HDLC SERPL: 1.98 {RATIO}
MICROALBUMIN/CREAT UR: 16.5 MG/G
POTASSIUM SERPL-SCNC: 4.2 MMOL/L (ref 3.5–5.2)
PROT SERPL-MCNC: 6.8 G/DL (ref 6–8.5)
SODIUM SERPL-SCNC: 140 MMOL/L (ref 136–145)
TRIGL SERPL-MCNC: 204 MG/DL (ref 0–150)
VLDLC SERPL-MCNC: 35 MG/DL (ref 5–40)

## 2023-01-10 PROCEDURE — 99214 OFFICE O/P EST MOD 30 MIN: CPT | Performed by: INTERNAL MEDICINE

## 2023-01-10 PROCEDURE — 82043 UR ALBUMIN QUANTITATIVE: CPT

## 2023-01-10 PROCEDURE — 36415 COLL VENOUS BLD VENIPUNCTURE: CPT

## 2023-01-10 PROCEDURE — 80061 LIPID PANEL: CPT

## 2023-01-10 PROCEDURE — 80053 COMPREHEN METABOLIC PANEL: CPT

## 2023-01-10 PROCEDURE — 82570 ASSAY OF URINE CREATININE: CPT

## 2023-01-10 PROCEDURE — 83036 HEMOGLOBIN GLYCOSYLATED A1C: CPT

## 2023-01-10 NOTE — TELEPHONE ENCOUNTER
Caller: [employer]    Relationship to patient:     Best call back number: [phone]  OPTION 3     Patient is needing: COURTESY CALL TO INFORM PROVIDER THAT PATIENT HAS COMPLETED HEALTH CARE ASSESSMENT.

## 2023-01-10 NOTE — PROGRESS NOTES
Subjective:     Encounter Date:01/10/2023      Patient ID: Jenna Rosa is a 72 y.o. female.    Chief Complaint:  History of Present Illness 72-year-old white female with history of coronary disease hypertension hyperlipidemia and diabetes presents to my office for follow-up.  Patient is currently stable without any signs of chest pain but has some shortness of breath with exertion.  No complains any PND orthopnea.  Patient has some palpitations with dizziness but no syncope or swelling of the feet.  She is taking her medicines regularly.  She does not smoke.    The following portions of the patient's history were reviewed and updated as appropriate: allergies, current medications, past family history, past medical history, past social history, past surgical history and problem list.  Past Medical History:   Diagnosis Date   • CAD 2020   • Congenital heart disease 2017   • Depression    • DEXA    • Diabetic peripheral neuropathy    • GERD    • Heart murmur 2017   • Hyperlipidemia    • Hypertension    • Insomnia    • Lower back pain    • MAMMO     NEG = 2021   • Obesity    • Osteoarthritis    • Osteopenia    • Palpitations    • RA    • Rheumatoid nodule 06/2011     rt. thumb x 2 and rt. & lt. 3rd fingers (Oct.2012)/ rt. elbow, left elbow, and epidermal inclusion cyst post. neck (June 2011)-----Dr. Whitmore   • Vitamin D deficiency      Past Surgical History:   Procedure Laterality Date   • BREAST SURGERY  1972   • CARDIAC CATHETERIZATION N/A 09/16/2020    Procedure: Left Heart Cath;  Surgeon: Walker Rodriguez MD;  Location:  [facility] CATH INVASIVE LOC[facility];  Service: Cardiovascular;  Laterality: N/A;   • CARDIAC CATHETERIZATION N/A 09/16/2020    Procedure: Coronary angiography;  Surgeon: Walker Rodriguez MD;  Location: [facility] CATH INVASIVE LOCATION;  Service: Cardiovascular;  Laterality: N/A;   • CARDIAC CATHETERIZATION  09/16/2020    Procedure: Functional Flow [place];  Surgeon: dAam  Kath BERNAL MD;  Location: [facility] INVASIVE LOCATION;  Service: Cardiology;;   • CHOLECYSTECTOMY     • COLONOSCOPY      2017 = TA, rech 2020 [facility]   • CYST REMOVAL      Seb cyst on neck   • TOTAL ABDOMINAL HYSTERECTOMY     • TOTAL ABDOMINAL HYSTERECTOMY WITH SALPINGO OOPHORECTOMY     • VAGINAL PROLAPSE REPAIR       Uterine Prolapse repair     /79 Comment: recheck  Pulse 55   Ht 160 cm (63\")   Wt 76.2 kg (168 lb)   SpO2 97%   BMI 29.76 kg/m²   Family History   Problem Relation Age of Onset   • Heart disease Mother    • Diabetes Mother    • Hypertension Mother    • Rheum arthritis Mother    • Coronary artery disease Mother    • Alcohol abuse Mother    • Heart disease Father    • Mental illness Father    • Hypertension Father    • Alcohol abuse Father    • Early death Father    • Heart attack Father    • Stroke Sister    • Cancer Sister 29        RENAL Cancer   • Osteoarthritis Sister    • Rheum arthritis Sister    • Hypertension Sister    • Arthritis Sister    • Diabetes Sister    • Rheum arthritis Sister    • Osteoarthritis Sister    • Diabetes Sister    • Arthritis Sister    • Liver disease Sister    • No Known Problems Brother    • Rheum arthritis Brother    • Osteoarthritis Brother    • Arthritis Brother         RA   • Hypertension Brother    • Diabetes Maternal Grandfather        Current Outpatient Medications:   •  acebutolol (SECTRAL) 200 MG capsule, Take 1 capsule by mouth Daily., Disp: 90 capsule, Rfl: 1  •  acetaminophen (TYLENOL) 325 MG tablet, Take 650 mg by mouth Every 6 (Six) Hours As Needed for Mild Pain ., Disp: , Rfl:   •  amLODIPine (NORVASC) 10 MG tablet, TAKE 1 TABLET DAILY, Disp: 90 tablet, Rfl: 1  •  aspirin 81 MG EC tablet, Take 81 mg by mouth Daily., Disp: , Rfl:   •  BD Insulin Syringe U/F 31G X 5/16\" 1 ML misc, USE TO INJECT INSULIN 4 TIMES DAILY, Disp: 150 each, Rfl: 11  •  calcium carbonate (OS-PREET) 600 MG tablet, Take 600 mg by mouth Daily., Disp: , Rfl:   •  Cholecalciferol  (Vitamin D) 50 MCG (2000 UT) capsule, 1 po qd, Disp: , Rfl:   •  Continuous Blood Gluc  (FreeStyle [place] 2 [place]) device, 1 each Every 14 (Fourteen) Days. DX E11.65, Disp: 1 each, Rfl: 0  •  Continuous Blood Gluc Sensor (FreeStyle [place] 2 Sensor) misc, 1 each Daily for 30 days, THEN 1 each Daily for 30 days. Use to test BS / DX E11.65, Disp: 2 each, Rfl: 2  •  diclofenac (VOLTAREN) 1 % gel gel, Apply 4 g topically to the appropriate area as directed 4 (Four) Times a Day As Needed., Disp: , Rfl:   •  escitalopram (LEXAPRO) 10 MG tablet, Take 0.5 tablets by mouth Every Night., Disp: 90 tablet, Rfl: 1  •  estradiol (ESTRACE) 0.1 MG/GM vaginal cream, Apply small amt (< 0.5gm) to external vaginal area QHS, Disp: 42.5 g, Rfl: 2  •  fenofibrate 160 MG tablet, Take 1 tablet by mouth Daily., Disp: 90 tablet, Rfl: 2  •  Ferrous Sulfate (IRON) 325 (65 Fe) MG tablet, Take 325 mg by mouth Daily., Disp: , Rfl:   •  fluticasone (FLONASE) 50 MCG/ACT nasal spray, 1 spray into the nostril(s) as directed by provider Daily As Needed for Allergies., Disp: , Rfl:   •  gabapentin (NEURONTIN) 300 MG capsule, Take 1 capsule by mouth 2 (Two) Times a Day., Disp: 180 capsule, Rfl: 1  •  glucose blood (Accu-Chek Jazmyne Plus) test strip, USE ONE STRIP TO TEST TWICE A DAY, Disp: 200 each, Rfl: 2  •  Humira Pen 40 MG/0.4ML Pen-injector Kit, Inject 40 mg under the skin into the appropriate area as directed Every 14 (Fourteen) Days., Disp: 2 each, Rfl: 3  •  HumuLIN 70/30 (70-30) 100 UNIT/ML injection, INJECT 45 UNITS BEFORE BREAKFAST AND 20 UNITS BEFORE SUPPER, Disp: 20 mL, Rfl: 4  •  IBUPROFEN-ACETAMINOPHEN PO, Take  by mouth., Disp: , Rfl:   •  isosorbide mononitrate (IMDUR) 30 MG 24 hr tablet, TAKE ONE TABLET BY MOUTH TWICE A DAY, Disp: 180 tablet, Rfl: 0  •  Janumet -1000 MG tablet, TAKE 1 TABLET DAILY, Disp: 90 tablet, Rfl: 1  •  lansoprazole (PREVACID) 30 MG capsule, TAKE 1 CAPSULE DAILY, Disp: 90 capsule, Rfl: 1  •  lisinopril  (PRINIVIL,ZESTRIL) 40 MG tablet, Take 1 tablet by mouth Daily., Disp: 90 tablet, Rfl: 1  •  methotrexate 2.5 MG tablet, Take 3 tablets by mouth 1 (One) Time Per Week., Disp: 12 tablet, Rfl: 1  •  ondansetron ODT (ZOFRAN-ODT) 4 MG disintegrating tablet, Place 1 tablet on the tongue Every 8 (Eight) Hours As Needed for Nausea., Disp: 30 tablet, Rfl: 0  •  rosuvastatin (CRESTOR) 5 MG tablet, TAKE 1 TABLET EVERY NIGHT, Disp: 90 tablet, Rfl: 1  •  traMADol (ULTRAM) 50 MG tablet, Take 50 mg by mouth 2 (Two) Times a Day., Disp: , Rfl:   •  traZODone (DESYREL) 50 MG tablet, Take 1 tablet by mouth every night at bedtime., Disp: 90 tablet, Rfl: 0  •  vitamin B-12 (CYANOCOBALAMIN) 1000 MCG tablet, Take 1,000 mcg by mouth Daily., Disp: , Rfl:   Allergies   Allergen Reactions   • Jardiance [Empagliflozin] Other (See Comments)     UTI's     Social History     Socioeconomic History   • Marital status:      Spouse name: Carson Rosa   • Number of children: 3   • Years of education: GED   Tobacco Use   • Smoking status: Never   • Smokeless tobacco: Never   Vaping Use   • Vaping Use: Never used   Substance and Sexual Activity   • Alcohol use: Yes     Comment: occ   • Drug use: Not Currently   • Sexual activity: Not Currently     Partners: Male     Birth control/protection: Post-menopausal     Review of Systems   Constitutional: Positive for malaise/fatigue.   Cardiovascular: Positive for palpitations. Negative for chest pain, dyspnea on exertion and leg swelling.   Respiratory: Positive for shortness of breath. Negative for cough.    Gastrointestinal: Negative for abdominal pain, nausea and vomiting.   Neurological: Positive for dizziness and numbness. Negative for focal weakness, headaches and light-headedness.   All other systems reviewed and are negative.             Objective:     Constitutional:       Appearance: Well-developed.   Eyes:      General: No scleral icterus.     Conjunctiva/sclera: Conjunctivae normal.    HENT:      Head: Normocephalic and atraumatic.   Neck:      Vascular: No carotid bruit or JVD.   Pulmonary:      Effort: Pulmonary effort is normal.      Breath sounds: Normal breath sounds. No wheezing. No rales.   Cardiovascular:      Normal rate. Regular rhythm.   Pulses:     Intact distal pulses.   Abdominal:      General: Bowel sounds are normal.      Palpations: Abdomen is soft.   Musculoskeletal:      Cervical back: Normal range of motion and neck supple. Skin:     General: Skin is warm and dry.      Findings: No rash.   Neurological:      Mental Status: Alert.       Procedures    Lab Review:         MDM  1.  Coronary disease  Patient has nonobstructive disease with normal V function is currently stable on medications  2.  Hypertension  Patient blood pressure is currently slightly high and she is on amlodipine and acebutolol and have asked her to check her blood pressure at home and follow with me  3.  Hyperlipidemia  Patient is on statins and the lipid levels are well within normal limits  4.  Diabetes  Patient is on insulin and followed by the primary care doctor    Patient's previous medical records, labs, and EKG were reviewed and discussed with the patient at today's visit.

## 2023-01-11 ENCOUNTER — DOCUMENTATION (OUTPATIENT)
Dept: ENDOCRINOLOGY | Facility: CLINIC | Age: 73
End: 2023-01-11
Payer: MEDICARE

## 2023-01-11 NOTE — PROGRESS NOTES
Benefits Investigation Summary    Prescription: Renewal     Dispensing pharmacy: [facility]    Copay amount: Janumet-$0/90 day    PLAN: [place] Part D  BIN: [id number]  PCN: [id number]  RX GROUP: [id number]    Prior Auth and Med Assistance notes: none    Leigh Cristobal CPhT

## 2023-01-16 ENCOUNTER — TELEPHONE (OUTPATIENT)
Dept: ENDOCRINOLOGY | Facility: CLINIC | Age: 73
End: 2023-01-16
Payer: MEDICARE

## 2023-01-16 NOTE — TELEPHONE ENCOUNTER
BGs scanned into phone note. Per MD s/o no changes recommended. Pt is sick with Covid and hasn't had much of an appetite. Reviewed sick day rules with pt.

## 2023-01-26 DIAGNOSIS — E11.65 TYPE 2 DIABETES MELLITUS WITH HYPERGLYCEMIA, WITH LONG-TERM CURRENT USE OF INSULIN: Primary | ICD-10-CM

## 2023-01-26 DIAGNOSIS — Z79.4 TYPE 2 DIABETES MELLITUS WITH HYPERGLYCEMIA, WITH LONG-TERM CURRENT USE OF INSULIN: Primary | ICD-10-CM

## 2023-01-26 RX ORDER — INSULIN HUMAN 100 [IU]/ML
INJECTION, SUSPENSION SUBCUTANEOUS
Qty: 57 ML | Refills: 1 | Status: SHIPPED | OUTPATIENT
Start: 2023-01-26

## 2023-01-31 ENCOUNTER — TELEMEDICINE (OUTPATIENT)
Dept: FAMILY MEDICINE CLINIC | Facility: CLINIC | Age: 73
End: 2023-01-31
Payer: MEDICARE

## 2023-01-31 DIAGNOSIS — R07.89 OTHER CHEST PAIN: ICD-10-CM

## 2023-01-31 DIAGNOSIS — R91.1 NODULE OF RIGHT LUNG: ICD-10-CM

## 2023-01-31 DIAGNOSIS — Z87.898 HISTORY OF SHORTNESS OF BREATH: ICD-10-CM

## 2023-01-31 DIAGNOSIS — M05.9 SEROPOSITIVE RHEUMATOID ARTHRITIS: Primary | ICD-10-CM

## 2023-01-31 DIAGNOSIS — Z92.21 H/O METHOTREXATE THERAPY: ICD-10-CM

## 2023-01-31 PROCEDURE — 3051F HG A1C>EQUAL 7.0%<8.0%: CPT | Performed by: FAMILY MEDICINE

## 2023-01-31 PROCEDURE — 99213 OFFICE O/P EST LOW 20 MIN: CPT | Performed by: FAMILY MEDICINE

## 2023-01-31 RX ORDER — FOLIC ACID 1 MG/1
1 TABLET ORAL DAILY
Qty: 90 TABLET | Refills: 1 | Status: SHIPPED | OUTPATIENT
Start: 2023-01-31

## 2023-01-31 RX ORDER — ALBUTEROL SULFATE 90 UG/1
2 AEROSOL, METERED RESPIRATORY (INHALATION) EVERY 6 HOURS PRN
Qty: 18 G | Refills: 0 | Status: SHIPPED | OUTPATIENT
Start: 2023-01-31

## 2023-01-31 NOTE — PROGRESS NOTES
Subjective   Jenna Rosa is a 72 y.o. female.     Chief Complaint   Patient presents with   • Rheumatoid Arthritis         Current Outpatient Medications:   •  methotrexate 2.5 MG tablet, Take 5 tablets by mouth 1 (One) Time Per Week., Disp: 20 tablet, Rfl: 1  •  acebutolol (SECTRAL) 200 MG capsule, Take 1 capsule by mouth Daily., Disp: 90 capsule, Rfl: 1  •  acetaminophen (TYLENOL) 325 MG tablet, Take 650 mg by mouth Every 6 (Six) Hours As Needed for Mild Pain ., Disp: , Rfl:   •  albuterol sulfate  (90 Base) MCG/ACT inhaler, Inhale 2 puffs Every 6 (Six) Hours As Needed for Shortness of Air (cough)., Disp: 18 g, Rfl: 0  •  amLODIPine (NORVASC) 10 MG tablet, TAKE 1 TABLET DAILY, Disp: 90 tablet, Rfl: 1  •  aspirin 81 MG EC tablet, Take 81 mg by mouth Daily., Disp: , Rfl:   •  calcium carbonate (OS-PREET) 600 MG tablet, Take 600 mg by mouth Daily., Disp: , Rfl:   •  Cholecalciferol (Vitamin D) 50 MCG (2000 UT) capsule, 1 po qd, Disp: , Rfl:   •  Continuous Blood Gluc  (FreeStyle Adelaide 2 [place]) device, 1 each Every 14 (Fourteen) Days. DX E11.65, Disp: 1 each, Rfl: 0  •  Continuous Blood Gluc Sensor (FreeStyle Adelaide 2 Sensor) misc, 1 each Daily for 30 days, THEN 1 each Daily for 30 days. Use to test BS / DX E11.65, Disp: 2 each, Rfl: 2  •  diclofenac (VOLTAREN) 1 % gel gel, Apply 4 g topically to the appropriate area as directed 4 (Four) Times a Day As Needed., Disp: , Rfl:   •  escitalopram (LEXAPRO) 10 MG tablet, Take 0.5 tablets by mouth Every Night., Disp: 90 tablet, Rfl: 1  •  estradiol (ESTRACE) 0.1 MG/GM vaginal cream, Apply small amt (< 0.5gm) to external vaginal area QHS, Disp: 42.5 g, Rfl: 2  •  fenofibrate 160 MG tablet, Take 1 tablet by mouth Daily., Disp: 90 tablet, Rfl: 2  •  Ferrous Sulfate (IRON) 325 (65 Fe) MG tablet, Take 325 mg by mouth Daily., Disp: , Rfl:   •  fluticasone (FLONASE) 50 MCG/ACT nasal spray, 1 spray into the nostril(s) as directed by provider Daily As Needed for  Allergies., Disp: , Rfl:   •  folic acid (FOLVITE) 1 MG tablet, Take 1 tablet by mouth Daily., Disp: 90 tablet, Rfl: 1  •  gabapentin (NEURONTIN) 300 MG capsule, Take 1 capsule by mouth 2 (Two) Times a Day., Disp: 180 capsule, Rfl: 1  •  glucose blood (Accu-Chek Jazmyne Plus) test strip, USE ONE STRIP TO TEST TWICE A DAY, Disp: 200 each, Rfl: 2  •  Humira Pen 40 MG/0.4ML Pen-injector Kit, Inject 40 mg under the skin into the appropriate area as directed Every 14 (Fourteen) Days., Disp: 2 each, Rfl: 3  •  IBUPROFEN-ACETAMINOPHEN PO, Take  by mouth., Disp: , Rfl:   •  Insulin NPH Isophane & Regular (HumuLIN 70/30 KwikPen) (70-30) 100 UNIT/ML suspension pen-injector, INJECT 45 UNITS BEFORE BREAKFAST AND 20 UNITS BEFORE SUPPER, Disp: 57 mL, Rfl: 1  •  Insulin Pen Needle 32G X 4 MM misc, 1 each 2 (Two) Times a Day., Disp: 200 each, Rfl: 1  •  isosorbide mononitrate (IMDUR) 30 MG 24 hr tablet, TAKE ONE TABLET BY MOUTH TWICE A DAY, Disp: 180 tablet, Rfl: 0  •  Janumet -1000 MG tablet, TAKE 1 TABLET DAILY, Disp: 90 tablet, Rfl: 1  •  lansoprazole (PREVACID) 30 MG capsule, TAKE 1 CAPSULE DAILY, Disp: 90 capsule, Rfl: 1  •  lisinopril (PRINIVIL,ZESTRIL) 40 MG tablet, Take 1 tablet by mouth Daily., Disp: 90 tablet, Rfl: 1  •  ondansetron ODT (ZOFRAN-ODT) 4 MG disintegrating tablet, Place 1 tablet on the tongue Every 8 (Eight) Hours As Needed for Nausea., Disp: 30 tablet, Rfl: 0  •  rosuvastatin (CRESTOR) 5 MG tablet, TAKE 1 TABLET EVERY NIGHT, Disp: 90 tablet, Rfl: 1  •  traMADol (ULTRAM) 50 MG tablet, Take 50 mg by mouth 2 (Two) Times a Day., Disp: , Rfl:   •  traZODone (DESYREL) 50 MG tablet, Take 1 tablet by mouth every night at bedtime., Disp: 90 tablet, Rfl: 0  •  vitamin B-12 (CYANOCOBALAMIN) 1000 MCG tablet, Take 1,000 mcg by mouth Daily., Disp: , Rfl:     Past Medical History:   Diagnosis Date   • CAD 2020   • Congenital heart disease 2017   • Depression    • DEXA    • Diabetic peripheral neuropathy    • GERD    •  Heart murmur 2017   • Hyperlipidemia    • Hypertension    • Insomnia    • Lower back pain    • MAMMO     NEG = 2021   • Obesity    • Osteoarthritis    • Osteopenia    • Palpitations    • RA    • Rheumatoid nodule 06/2011     rt. thumb x 2 and rt. & lt. 3rd fingers (Oct.2012)/ rt. elbow, left elbow, and epidermal inclusion cyst post. neck (June 2011)-----Dr. Whitmore   • Vitamin D deficiency        Past Surgical History:   Procedure Laterality Date   • BREAST SURGERY  1972   • CARDIAC CATHETERIZATION N/A 09/16/2020    Procedure: Left Heart Cath;  Surgeon: Walker Rodriguez MD;  Location:  [facility];  Service: Cardiovascular;  Laterality: N/A;   • CARDIAC CATHETERIZATION N/A 09/16/2020    Procedure: Coronary angiography;  Surgeon: Walker Rodriguez MD;  Location:  [facility];  Service: Cardiovascular;  Laterality: N/A;   • CARDIAC CATHETERIZATION  09/16/2020    Procedure: Functional Flow [place];  Surgeon: Kath Medina MD;  Location:  [facility]VAS[facility];  Service: Cardiology;;   • CHOLECYSTECTOMY     • COLONOSCOPY      2017 = TA, rech 2020 [facility]   • CYST REMOVAL      Seb cyst on neck   • TOTAL ABDOMINAL HYSTERECTOMY     • TOTAL ABDOMINAL HYSTERECTOMY WITH SALPINGO OOPHORECTOMY     • VAGINAL PROLAPSE REPAIR       Uterine Prolapse repair       Family History   Problem Relation Age of Onset   • Heart disease Mother    • Diabetes Mother    • Hypertension Mother    • Rheum arthritis Mother    • Coronary artery disease Mother    • Alcohol abuse Mother    • Heart disease Father    • Mental illness Father    • Hypertension Father    • Alcohol abuse Father    • Early death Father    • Heart attack Father    • Stroke Sister    • Cancer Sister 29        RENAL Cancer   • Osteoarthritis Sister    • Rheum arthritis Sister    • Hypertension Sister    • Arthritis Sister    • Diabetes Sister    • Rheum arthritis Sister    • Osteoarthritis Sister    • Diabetes  Sister    • Arthritis Sister    • Liver disease Sister    • No Known Problems Brother    • Rheum arthritis Brother    • Osteoarthritis Brother    • Arthritis Brother         RA   • Hypertension Brother    • Diabetes Maternal Grandfather        Social History     Socioeconomic History   • Marital status:      Spouse name: Carson Rosa   • Number of children: 3   • Years of education: GED   Tobacco Use   • Smoking status: Never   • Smokeless tobacco: Never   Vaping Use   • Vaping Use: Never used   Substance and Sexual Activity   • Alcohol use: Yes     Comment: occ   • Drug use: Not Currently   • Sexual activity: Not Currently     Partners: Male     Birth control/protection: Post-menopausal       History of Present Illness  71 yo C female attempted video from her home w/ me at home due to COVID and unable to connect so we changed it to a phone visit    Pt states she will be having Rt CTS sx on 2/20/22 w/ Dr Magaña and will stop the humira/ MTX 2 weeks before this   Pt states she has been out of her folic acid x 1 mo and was just getting it otc----    Pt states she conts to have Rt sided pain despite taking her MTX and humira; pt states she feels some better in am after using her heating pad at nite    Pt states does get some SOB w/ activity and told she had COPD on her CXR but never smoked; pt has used an alb hfa in past when she had bronchitis so she does know how to use one; she was also supposed to get a f/u CT Chest done in Oct 2022 but her insurance denied it       The following portions of the patient's history were reviewed and updated as appropriate: allergies, current medications, past family history, past medical history, past social history, past surgical history and problem list.    Review of Systems   Respiratory: Positive for chest tightness and shortness of breath.    Cardiovascular: Positive for chest pain (Rt sided pain).   Neurological: Positive for numbness (b/l hands).       There were no  vitals filed for this visit.    Objective   Physical Exam  Constitutional:       General: She is not in acute distress.  Pulmonary:      Effort: Pulmonary effort is normal.   Neurological:      Mental Status: She is alert and oriented to person, place, and time.   Psychiatric:         Attention and Perception: Attention and perception normal.         Mood and Affect: Mood and affect normal.         Speech: Speech normal.         Behavior: Behavior normal. Behavior is cooperative.         Thought Content: Thought content normal.         Cognition and Memory: Cognition and memory normal.         Judgment: Judgment normal.           Assessment & Plan   Diagnoses and all orders for this visit:    1. Seropositive rheumatoid arthritis (HCC) (Primary)  -     CT Chest Without Contrast; Future  -     CBC & Differential; Future  -     Comprehensive Metabolic Panel; Future  -     Folate; Future    2. H/O methotrexate therapy  -     Folate; Future    3. Other chest pain  -     CT Chest Without Contrast; Future    4. Nodule of right lung  -     CT Chest Without Contrast; Future    5. History of shortness of breath    Other orders  -     folic acid (FOLVITE) 1 MG tablet; Take 1 tablet by mouth Daily.  Dispense: 90 tablet; Refill: 1  -     methotrexate 2.5 MG tablet; Take 5 tablets by mouth 1 (One) Time Per Week.  Dispense: 20 tablet; Refill: 1  -     albuterol sulfate  (90 Base) MCG/ACT inhaler; Inhale 2 puffs Every 6 (Six) Hours As Needed for Shortness of Air (cough).  Dispense: 18 g; Refill: 0    F/u CBC/ CMP/ Folic acid in 1 week  Trial of 12mg MTX qweek  Rech CT Chest at priority  Trial of Alb HFA prn sob

## 2023-02-08 DIAGNOSIS — M81.0 AGE-RELATED OSTEOPOROSIS WITHOUT CURRENT PATHOLOGICAL FRACTURE: Primary | ICD-10-CM

## 2023-02-20 ENCOUNTER — ANESTHESIA EVENT (OUTPATIENT)
Dept: PERIOP | Facility: HOSPITAL | Age: 73
End: 2023-02-20
Payer: MEDICARE

## 2023-02-20 ENCOUNTER — ANESTHESIA (OUTPATIENT)
Dept: PERIOP | Facility: HOSPITAL | Age: 73
End: 2023-02-20
Payer: MEDICARE

## 2023-02-20 ENCOUNTER — HOSPITAL ENCOUNTER (OUTPATIENT)
Facility: HOSPITAL | Age: 73
Setting detail: HOSPITAL OUTPATIENT SURGERY
Discharge: HOME | End: 2023-02-20
Attending: ORTHOPAEDIC SURGERY | Admitting: ORTHOPAEDIC SURGERY
Payer: MEDICARE

## 2023-02-20 VITALS
RESPIRATION RATE: 11 BRPM | HEIGHT: 63 IN | TEMPERATURE: 98 F | SYSTOLIC BLOOD PRESSURE: 145 MMHG | OXYGEN SATURATION: 95 % | DIASTOLIC BLOOD PRESSURE: 55 MMHG | BODY MASS INDEX: 29.77 KG/M2 | HEART RATE: 57 BPM | WEIGHT: 168 LBS

## 2023-02-20 LAB
GLUCOSE BLDC GLUCOMTR-MCNC: 307 MG/DL (ref 70–105)
GLUCOSE BLDC GLUCOMTR-MCNC: 308 MG/DL (ref 70–105)

## 2023-02-20 PROCEDURE — 82962 GLUCOSE BLOOD TEST: CPT

## 2023-02-20 RX ORDER — DIAZEPAM 5 MG/1
5 TABLET ORAL ONCE
Status: COMPLETED | OUTPATIENT
Start: 2023-02-20 | End: 2023-02-20

## 2023-02-20 RX ORDER — ONDANSETRON 2 MG/ML
4 INJECTION INTRAMUSCULAR; INTRAVENOUS ONCE AS NEEDED
Status: DISCONTINUED | OUTPATIENT
Start: 2023-02-20 | End: 2023-02-20 | Stop reason: HOSPADM

## 2023-02-20 RX ADMIN — DIAZEPAM 5 MG: 5 TABLET ORAL at 07:30

## 2023-02-20 NOTE — H&P
"   History & Physical       Patient: Jenna Rosa    Proposed Surgery and date: Procedure(s) (LRB):  CARPAL TUNNEL RELEASE WITH POSSIBLE SYNOVECTOMY (Right)     YOB: 1950    Medical Record Number: [id number]  Wt Readings from Last 3 Encounters:   02/20/23 76.2 kg (168 lb)   01/17/23 76.2 kg (168 lb)   01/10/23 76.2 kg (168 lb)     Ht Readings from Last 3 Encounters:   02/20/23 160 cm (63\")   01/17/23 160 cm (63\")   01/10/23 160 cm (63\")     Body mass index is 29.76 kg/m².  Facility age limit for growth percentiles is 20 years.      Surgeon:  Dr. Amadeo Magaña M.D.        Chief Complaints: Pain    History of Present Illness: 73 y.o. female presents with right carpal tunnel syndrome wrist pain.  Triggering has resolved. Onset of symptoms was years ago and has been progressively worsening despite more conservative treatment measures.  Symptoms are associated with ability to move, exercise, and perform activities of daily living.  Symptoms are aggravated by weight bearing and ROM necessary for activities of daily living.   Symptoms improve with rest, ice and elevation only minimally.      Allergies:   Allergies   Allergen Reactions   • Jardiance [Empagliflozin] Other (See Comments)     UTI's       Medications:   Home Medications:  No current facility-administered medications on file prior to encounter.     Current Outpatient Medications on File Prior to Encounter   Medication Sig   • acebutolol (SECTRAL) 200 MG capsule Take 1 capsule by mouth Daily.   • acetaminophen (TYLENOL) 325 MG tablet Take 650 mg by mouth Every 6 (Six) Hours As Needed for Mild Pain .   • amLODIPine (NORVASC) 10 MG tablet TAKE 1 TABLET DAILY (Patient taking differently: Take 10 mg by mouth Daily.)   • aspirin 81 MG EC tablet Take 81 mg by mouth Daily.   • calcium carbonate (OS-PREET) 600 MG tablet Take 600 mg by mouth Daily.   • Cholecalciferol (Vitamin D) 50 MCG (2000 UT) capsule 1 po qd   • diclofenac (VOLTAREN) 1 % gel gel Apply 4 " g topically to the appropriate area as directed 4 (Four) Times a Day As Needed.   • escitalopram (LEXAPRO) 10 MG tablet Take 0.5 tablets by mouth Every Night.   • estradiol (ESTRACE) 0.1 MG/GM vaginal cream Apply small amt (< 0.5gm) to external vaginal area QHS (Patient taking differently: Insert  into the vagina Daily. Apply small amt (< 0.5gm) to external vaginal area QHS)   • fenofibrate 160 MG tablet Take 1 tablet by mouth Daily.   • fluticasone (FLONASE) 50 MCG/ACT nasal spray 1 spray into the nostril(s) as directed by provider Daily As Needed for Allergies.   • gabapentin (NEURONTIN) 300 MG capsule Take 1 capsule by mouth 2 (Two) Times a Day.   • Humira Pen 40 MG/0.4ML Pen-injector Kit Inject 40 mg under the skin into the appropriate area as directed Every 14 (Fourteen) Days.   • IBUPROFEN-ACETAMINOPHEN PO Take  by mouth.   • isosorbide mononitrate (IMDUR) 30 MG 24 hr tablet TAKE ONE TABLET BY MOUTH TWICE A DAY (Patient taking differently: Take 30 mg by mouth 2 (Two) Times a Day.)   • Janumet -1000 MG tablet TAKE 1 TABLET DAILY (Patient taking differently: Take 1 tablet by mouth Every Evening.)   • lansoprazole (PREVACID) 30 MG capsule TAKE 1 CAPSULE DAILY   • lisinopril (PRINIVIL,ZESTRIL) 40 MG tablet Take 1 tablet by mouth Daily.   • ondansetron ODT (ZOFRAN-ODT) 4 MG disintegrating tablet Place 1 tablet on the tongue Every 8 (Eight) Hours As Needed for Nausea.   • rosuvastatin (CRESTOR) 5 MG tablet TAKE 1 TABLET EVERY NIGHT   • traZODone (DESYREL) 50 MG tablet Take 1 tablet by mouth every night at bedtime.   • vitamin B-12 (CYANOCOBALAMIN) 1000 MCG tablet Take 1,000 mcg by mouth Daily.   • Continuous Blood Gluc  (FreeStyle [place] 2 [place]) device 1 each Every 14 (Fourteen) Days. DX E11.65   • Ferrous Sulfate (IRON) 325 (65 Fe) MG tablet Take 325 mg by mouth Daily.   • glucose blood (Accu-Chek [place] Plus) test strip USE ONE STRIP TO TEST TWICE A DAY   • traMADol (ULTRAM) 50 MG tablet Take 50 mg  by mouth 2 (Two) Times a Day.     Current Medications:  Scheduled Meds:   Continuous Infusions:   PRN Meds:.•  ondansetron    Past Medical History:   Diagnosis Date   • CAD 2020   • Congenital heart disease 2017   • Depression    • DEXA     2019= (-0.7/ -1.6); 2023= (-0.7/ -2.7)   • Diabetic peripheral neuropathy    • GERD    • Heart murmur 2017   • Hyperlipidemia    • Hypertension    • Insomnia    • Lower back pain    • MAMMO     NEG = 2021/ 2023   • Obesity    • Osteoarthritis    • Osteoporosis    • Palpitations    • RA    • Rheumatoid nodule 06/2011     rt. thumb x 2 and rt. & lt. 3rd fingers (Oct.2012)/ rt. elbow, left elbow, and epidermal inclusion cyst post. neck (June 2011)-----Dr. Whitmore   • Vitamin D deficiency         Past Surgical History:   Procedure Laterality Date   • BREAST SURGERY  1972   • CARDIAC CATHETERIZATION N/A 09/16/2020    Procedure: Left Heart Cath;  Surgeon: Walker Rodriguez MD;  Location: [facility] CATH INVASIVE LOCATION;  Service: Cardiovascular;  Laterality: N/A;   • CARDIAC CATHETERIZATION N/A 09/16/2020    Procedure: Coronary angiography;  Surgeon: Walker Rodriguez MD;  Location: [facility] CATH INVASIVE LOCATION;  Service: Cardiovascular;  Laterality: N/A;   • CARDIAC CATHETERIZATION  09/16/2020    Procedure: Functional Flow [place];  Surgeon: Kath Medina MD;  Location: [facility] CATH INVASIVE LOCATION;  Service: Cardiology;;   • CHOLECYSTECTOMY     • COLONOSCOPY      2017 = TA, rech 2020 [facility]SI   • CYST REMOVAL      Seb cyst on neck   • TOTAL ABDOMINAL HYSTERECTOMY     • TOTAL ABDOMINAL HYSTERECTOMY WITH SALPINGO OOPHORECTOMY     • VAGINAL PROLAPSE REPAIR       Uterine Prolapse repair        Social History     Occupational History   • Not on file   Tobacco Use   • Smoking status: Never   • Smokeless tobacco: Never   Vaping Use   • Vaping Use: Never used   Substance and Sexual Activity   • Alcohol use: Yes     Comment: occ   • Drug use: Not Currently   • Sexual  activity: Not Currently     Partners: Male     Birth control/protection: Post-menopausal      Social History     Social History Narrative    Ms Rosa states she lives alone, is IADLs, but has two sisters and a daughter that live nearby that can assist as needed. Also two neighbors can help.  Keeps her grandson one weekend every two weeks, daughter visits weekly.  Drives self, has car.  Denies insecurities re food, meds, transport.        Family History   Problem Relation Age of Onset   • Heart disease Mother    • Diabetes Mother    • Hypertension Mother    • Rheum arthritis Mother    • Coronary artery disease Mother    • Alcohol abuse Mother    • Heart disease Father    • Mental illness Father    • Hypertension Father    • Alcohol abuse Father    • Early death Father    • Heart attack Father    • Stroke Sister    • Cancer Sister 29        RENAL Cancer   • Osteoarthritis Sister    • Rheum arthritis Sister    • Hypertension Sister    • Arthritis Sister    • Diabetes Sister    • Rheum arthritis Sister    • Osteoarthritis Sister    • Diabetes Sister    • Arthritis Sister    • Liver disease Sister    • No Known Problems Brother    • Rheum arthritis Brother    • Osteoarthritis Brother    • Arthritis Brother         RA   • Hypertension Brother    • Diabetes Maternal Grandfather          Review of Systems: 14 point review of systems was performed and was negative except as documented in the history of present illness.      Physical Exam: 73 y.o. female    Vital signs reviewed.    General Appearance:    Alert, cooperative, in no acute distress                  General: alert, oriented  Eyes: conjunctiva clear  ENT: external ears and nose atraumatic  CV: no peripheral edema  Resp: normal respiratory effort  Skin: no rashes or wounds; normal turgor  Psych: mood and affect appropriate  Lymph: no nodes appreciated  Neuro: gross sensation intact  Vascular:  Palpable peripheral pulse in noted extremity  Musculoskeletal  Extremities:  tenderness over operative extremity. Moves all extremities well, no to minimal LE edema, no cyanosis, no redness            Diagnostic Tests:  Lab Results (last 7 days)     Procedure Component Value Units Date/Time    POC Glucose Once [[id number]]  (Abnormal) Collected: 02/20/23 0652    Specimen: Blood Updated: 02/20/23 0654     Glucose 308 mg/dL      Comment: Serial Number: [id number]:  [id number]       POC Glucose Once [[id number]]  (Abnormal) Collected: 02/20/23 0651    Specimen: Blood Updated: 02/20/23 0653     Glucose 307 mg/dL      Comment: Serial Number: [id number]:  [id number]             Radiology images/reports reviewed      Assessment: Right carpal tunnel syndrome, wrist pain, synovitis    Plan:  We will plan on proceeding with surgery at patient's request. Dr. Magaña has reviewed details of procedure with patient today and discussed risks, benefits, alternatives, and limitations of the procedure in laymen's terms with the risks including but not limited to: Allergy to medication, allergy to implant, device recall, leg length discrepancy, dislocation, chronic pain, recurrence of carpal tunnel, tendon injury, need to stop the surgery early or change to a different procedure, stiffness requiring revision surgeries neurovascular damage, foot drop, bleeding, infection, chronic pain, worsening of pain, chondrolysis, recurrent problem,  swelling, loss of motion, weakness, stiffness, instability, DVT, pulmonary embolus, death, stroke, complex regional pain syndrome, and need for additional procedures.  No guarantees were given regarding results of surgery.     Patient was given the opportunity to ask and have all questions answered today.  The patient voiced understanding of the risks, benefits, and alternative forms of treatment that were discussed and the patient consents to proceed with surgery.     Discharge Plan: Home with f/u in with Dr. Magaña  Date: 2/20/2023  Amadeo Magaña,  MD

## 2023-02-20 NOTE — OP NOTE
CARPAL TUNNEL RELEASE  Procedure Report    Patient Name:  Jenna Rosa  YOB: 1950    Date of Surgery:  2/20/2023     Indications: This is a 73 y.o. female with numbness and tingling to the hand.  EMG demonstrated median nerve compression at the wrist.Treatment options were discussed.  They desired to proceed with carpal tunnel release after discussing the risks including bleeding, scarring,infection, stiffness, nerve damage, tendon damage, artery damage, continued pain, DVT, loss of life or limb, and a need for further surgery.      Pre-op Diagnosis:   Carpal tunnel syndrome on right [G56.01]  Synovitis and tenosynovitis, unspecified [M65.9]       Post-Op Diagnosis Codes:     * Carpal tunnel syndrome on right [G56.01]     * Synovitis and tenosynovitis, unspecified [M65.9]    Procedure: Right wrist synovectomy, 79890 next right carpal tunnel release    Procedure(s):  CARPAL TUNNEL RELEASE WITH POSSIBLE SYNOVECTOMY      Anesthesia: Choice    IVF: See anesthesia record    Estimated Blood Loss: none    Implants:    None  Assistant  Tosha FOUNTAIN  Tourniquet: None      Complications: none    Description of Procedure: The patient's operative site was marked. Patient was brought to the operating room and placed on the operating room table.  anesthesia was administered.  A timeout was taken to confirm the correct operative site and procedure.  The hand was prepped and draped in the standard surgical fashion and incision marked over the carpal tunnel and injected with local anesthetic.  Arm exsanguinated and tourniquet inflated.  Skin and palmar fascia opened.  The transverse ligament was identified and opened with a knife.  A [place] was placed to release any adhesions and the ligament opened over the [place] proximally and distally.  No aberrant tissue was noted within the canal.     Attention was placed to flexor tendon synovium. A synovectomy was performed for hyperemic, pathologic synovitis. This was  contributing to the patients symptoms.     hemostasis obtained ..  Wound was irrigated and closed with Vicryl and nylon and reinjected with local anesthetic.  Sterile dressing applied. They were taken to recovery room in satisfactory condition.  All counts are correct, no complications, good capillary refill to the hand.     Amadeo Magaña M.D.

## 2023-02-20 NOTE — ANESTHESIA PREPROCEDURE EVALUATION
Anesthesia Evaluation     Patient summary reviewed and Nursing notes reviewed   NPO Solid Status: < 2 hours             Airway   Dental    (+) edentulous    Pulmonary    Cardiovascular     (+) hypertension, valvular problems/murmurs, CAD, angina, hyperlipidemia,       Neuro/Psych  (+) numbness, psychiatric history Depression,    GI/Hepatic/Renal/Endo    (+)  GERD,  diabetes mellitus type 2 well controlled,     Musculoskeletal     Abdominal    Substance History      OB/GYN          Other   arthritis,                      Anesthesia Plan    ASA 3     other     ([place] at 0500  Local only)    Anesthetic plan, risks, benefits, and alternatives have been provided, discussed and informed consent has been obtained with: patient.        CODE STATUS:

## 2023-02-22 RX ORDER — ISOSORBIDE MONONITRATE 30 MG/1
TABLET, EXTENDED RELEASE ORAL
Qty: 180 TABLET | Refills: 0 | Status: SHIPPED | OUTPATIENT
Start: 2023-02-22

## 2023-02-28 RX ORDER — ESCITALOPRAM OXALATE 10 MG/1
5 TABLET ORAL NIGHTLY
Qty: 90 TABLET | Refills: 1
Start: 2023-02-28

## 2023-02-28 RX ORDER — FENOFIBRATE 160 MG/1
160 TABLET ORAL DAILY
Qty: 90 TABLET | Refills: 2 | Status: SHIPPED | OUTPATIENT
Start: 2023-02-28

## 2023-03-10 RX ORDER — LANSOPRAZOLE 30 MG/1
30 CAPSULE, DELAYED RELEASE ORAL DAILY
Qty: 90 CAPSULE | Refills: 1 | Status: SHIPPED | OUTPATIENT
Start: 2023-03-10

## 2023-03-10 RX ORDER — SITAGLIPTIN AND METFORMIN HYDROCHLORIDE 1000; 100 MG/1; MG/1
1 TABLET, FILM COATED, EXTENDED RELEASE ORAL DAILY
Qty: 90 TABLET | Refills: 1 | Status: SHIPPED | OUTPATIENT
Start: 2023-03-10

## 2023-03-10 RX ORDER — LISINOPRIL 40 MG/1
40 TABLET ORAL DAILY
Qty: 90 TABLET | Refills: 1 | Status: SHIPPED | OUTPATIENT
Start: 2023-03-10

## 2023-03-10 NOTE — TELEPHONE ENCOUNTER
Rx Refill Note  Requested Prescriptions     Pending Prescriptions Disp Refills   • lansoprazole (PREVACID) 30 MG capsule 90 capsule 1     Sig: Take 1 capsule by mouth Daily.      Last office visit with prescribing clinician: 12/29/2022   Last telemedicine visit with prescribing clinician: 3/9/2023   Next office visit with prescribing clinician: 3/9/2023     Lipid Panel (01/10/2023 10:06)  Comprehensive Metabolic Panel (01/10/2023 10:06)  CBC & Differential (12/29/2022 11:37)                      Would you like a call back once the refill request has been completed: [] Yes [] No    If the office needs to give you a call back, can they leave a voicemail: [] Yes [] No    Brianda Toro CMA  03/10/23, 09:40 EST

## 2023-03-10 NOTE — TELEPHONE ENCOUNTER
Rx Refill Note  Requested Prescriptions     Pending Prescriptions Disp Refills   • lisinopril (PRINIVIL,ZESTRIL) 40 MG tablet 90 tablet 1     Sig: Take 1 tablet by mouth Daily.      Last office visit with prescribing clinician: 12/29/2022   Last telemedicine visit with prescribing clinician: Visit date not found   Next office visit with prescribing clinician: Visit date not found                         Would you like a call back once the refill request has been completed: [] Yes [] No    If the office needs to give you a call back, can they leave a voicemail: [] Yes [] No    Brianda Toro CMA  03/10/23, 09:41 EST

## 2023-03-10 NOTE — TELEPHONE ENCOUNTER
Rx Refill Note  Requested Prescriptions     Pending Prescriptions Disp Refills   • SITagliptin-metFORMIN HCl ER (Janumet XR) 100-1000 MG tablet 90 tablet 1     Sig: Take 1 tablet by mouth Daily.      Last office visit with prescribing clinician: 12/29/2022   Last telemedicine visit with prescribing clinician: 3/9/2023   Next office visit with prescribing clinician: 3/9/2023                         Would you like a call back once the refill request has been completed: [] Yes [] No    If the office needs to give you a call back, can they leave a voicemail: [] Yes [] No    Brianda Toro, JOAQUÍN  03/10/23, 09:41 EST

## 2023-03-22 RX ORDER — AMLODIPINE BESYLATE 10 MG/1
10 TABLET ORAL DAILY
Qty: 90 TABLET | Refills: 1 | Status: SHIPPED | OUTPATIENT
Start: 2023-03-22

## 2023-03-29 ENCOUNTER — TELEPHONE (OUTPATIENT)
Dept: FAMILY MEDICINE CLINIC | Facility: CLINIC | Age: 73
End: 2023-03-29
Payer: MEDICARE

## 2023-03-29 NOTE — TELEPHONE ENCOUNTER
----- Message from Emily Burdick DO sent at 3/29/2023  1:36 PM EDT -----  No acute issue seen on CT Chest

## 2023-03-30 DIAGNOSIS — M05.9 SEROPOSITIVE RHEUMATOID ARTHRITIS: ICD-10-CM

## 2023-03-30 DIAGNOSIS — Z79.899 LONG-TERM USE OF HIGH-RISK MEDICATION: ICD-10-CM

## 2023-03-31 RX ORDER — ADALIMUMAB 40MG/0.4ML
40 KIT SUBCUTANEOUS
Qty: 2 EACH | Refills: 3 | Status: SHIPPED | OUTPATIENT
Start: 2023-03-31

## 2023-03-31 RX ORDER — TRAZODONE HYDROCHLORIDE 50 MG/1
50 TABLET ORAL
Qty: 90 TABLET | Refills: 0 | Status: SHIPPED | OUTPATIENT
Start: 2023-03-31

## 2023-03-31 NOTE — TELEPHONE ENCOUNTER
[facility]    Rx Refill Note  Requested Prescriptions     Pending Prescriptions Disp Refills   • traZODone (DESYREL) 50 MG tablet 90 tablet 0     Sig: Take 1 tablet by mouth every night at bedtime.      Last office visit with prescribing clinician: 12/29/2022   Last telemedicine visit with prescribing clinician: 3/30/2023   Next office visit with prescribing clinician: 3/30/2023                         Would you like a call back once the refill request has been completed: [] Yes [] No    If the office needs to give you a call back, can they leave a voicemail: [] Yes [] No    Destini Snider, RT  03/31/23, 08:51 EDT

## 2023-03-31 NOTE — TELEPHONE ENCOUNTER
Rx Refill Note  Requested Prescriptions     Pending Prescriptions Disp Refills   • Humira Pen 40 MG/0.4ML Pen-injector Kit 2 each 3     Sig: Inject 40 mg under the skin into the appropriate area as directed Every 14 (Fourteen) Days.      Last office visit with prescribing clinician: 12/29/2022   Last telemedicine visit with prescribing clinician: Visit date not found   Next office visit with prescribing clinician: Visit date not found                       Lipid Panel (01/10/2023 10:06)    Would you like a call back once the refill request has been completed: [] Yes [] No    If the office needs to give you a call back, can they leave a voicemail: [] Yes [] No    Destini Snider, RT  03/31/23, 08:47 EDT

## 2023-05-02 DIAGNOSIS — Z79.4 TYPE 2 DIABETES MELLITUS WITH HYPERGLYCEMIA, WITH LONG-TERM CURRENT USE OF INSULIN: ICD-10-CM

## 2023-05-02 DIAGNOSIS — E11.65 TYPE 2 DIABETES MELLITUS WITH HYPERGLYCEMIA, WITH LONG-TERM CURRENT USE OF INSULIN: ICD-10-CM

## 2023-05-03 DIAGNOSIS — E11.65 TYPE 2 DIABETES MELLITUS WITH HYPERGLYCEMIA, WITH LONG-TERM CURRENT USE OF INSULIN: ICD-10-CM

## 2023-05-03 DIAGNOSIS — Z79.4 TYPE 2 DIABETES MELLITUS WITH HYPERGLYCEMIA, WITH LONG-TERM CURRENT USE OF INSULIN: ICD-10-CM

## 2023-05-03 RX ORDER — INSULIN HUMAN 100 [IU]/ML
INJECTION, SUSPENSION SUBCUTANEOUS
Qty: 60 ML | Refills: 1 | Status: SHIPPED | OUTPATIENT
Start: 2023-05-03

## 2023-05-04 RX ORDER — ONDANSETRON 4 MG/1
4 TABLET, ORALLY DISINTEGRATING ORAL EVERY 8 HOURS PRN
Qty: 30 TABLET | Refills: 0 | Status: SHIPPED | OUTPATIENT
Start: 2023-05-04

## 2023-05-04 RX ORDER — INSULIN HUMAN 100 [IU]/ML
INJECTION, SUSPENSION SUBCUTANEOUS
Qty: 60 ML | Refills: 1 | OUTPATIENT
Start: 2023-05-04

## 2023-05-08 RX ORDER — ACEBUTOLOL HYDROCHLORIDE 200 MG/1
CAPSULE ORAL
Qty: 90 CAPSULE | Refills: 1 | Status: SHIPPED | OUTPATIENT
Start: 2023-05-08

## 2023-05-11 ENCOUNTER — OFFICE VISIT (OUTPATIENT)
Dept: CARDIOLOGY | Facility: CLINIC | Age: 73
End: 2023-05-11
Payer: MEDICARE

## 2023-05-11 VITALS
WEIGHT: 168 LBS | DIASTOLIC BLOOD PRESSURE: 73 MMHG | OXYGEN SATURATION: 98 % | SYSTOLIC BLOOD PRESSURE: 153 MMHG | HEIGHT: 63 IN | HEART RATE: 63 BPM | BODY MASS INDEX: 29.77 KG/M2 | RESPIRATION RATE: 18 BRPM

## 2023-05-11 DIAGNOSIS — I10 ESSENTIAL HYPERTENSION: ICD-10-CM

## 2023-05-11 DIAGNOSIS — E78.2 MIXED HYPERLIPIDEMIA: ICD-10-CM

## 2023-05-11 DIAGNOSIS — I49.3 PVC (PREMATURE VENTRICULAR CONTRACTION): Primary | ICD-10-CM

## 2023-05-11 DIAGNOSIS — I25.10 CORONARY ARTERY DISEASE INVOLVING NATIVE CORONARY ARTERY OF NATIVE HEART WITHOUT ANGINA PECTORIS: ICD-10-CM

## 2023-05-11 DIAGNOSIS — I20.8 OTHER FORMS OF ANGINA PECTORIS: ICD-10-CM

## 2023-05-11 RX ORDER — OLOPATADINE HYDROCHLORIDE 1 MG/ML
SOLUTION/ DROPS OPHTHALMIC
COMMUNITY
Start: 2023-01-23

## 2023-05-11 RX ORDER — ISOSORBIDE MONONITRATE 60 MG/1
60 TABLET, EXTENDED RELEASE ORAL EVERY MORNING
Qty: 90 TABLET | Refills: 3 | Status: SHIPPED | OUTPATIENT
Start: 2023-05-11

## 2023-05-11 NOTE — PROGRESS NOTES
CC--palpitations and bradycardia    Sub--73-year-old patient with history of palpitations and bradycardia comes in for follow-up.  Patient has moderate LAD disease followed by interventional cardiology.  She denies any syncope does have hypertension which is fairly well controlled.  She has prior history of sinus bradycardia with intermittent PVCs.  Patient was on atenolol in the past and was changed to acebutolol  Patient having exertional angina          Past Medical History:   Diagnosis Date   • Depression    • Diabetic peripheral neuropathy    • GERD    • Insomnia    • Lower back pain    • Obesity    • Osteoarthritis    • Osteopenia    • Palpitations    • Pulmonary nodule    • RA    • Rheumatoid nodule 06/2011     rt. thumb x 2 and rt. & lt. 3rd fingers (Oct.2012)/ rt. elbow, left elbow, and epidermal inclusion cyst post. neck (June 2011)-----Dr. Whitmore   • Vitamin D deficiency            Physical Exam    General:      well developed, well nourished, in no acute distress.    Head:      normocephalic and atraumatic.    Eyes:      PERRL/EOM intact, conjunctivae and sclerae clear without nystagmus.    Neck:      no  thyromegaly, trachea central with normal respiratory effort  Lungs:      clear bilaterally to auscultation.    Heart:       regular rate and rhythm, S1, S2 without murmurs, rubs, or gallops  Skin:      intact without lesions or rashes.    Psych:      alert and cooperative; normal mood and affect; normal attention span and concentration.            Assessment and plan    Moderate coronary artery disease with recent and recurrent angina--will DW with  interventional cardiology  Intermittent PVCs suppressed with acebutolol  Essential hypertension well-controlled  Prior  history of sinus bradycardia without recurrence  Medications reviewed and follow-up appointments made  Aortic valve sclerosis without stenosis  Potassium of 4.2.  LDL of 85.  Creatinine hemoglobin and platelets are normal  increase IMDUR  dose --prescription given    ECG 12 Lead    Date/Time: 5/11/2023 1:55 PM  Performed by: Alberto Gotti MD  Authorized by: Alberto Gotti MD   Comparison: compared with previous ECG   Similar to previous ECG  Rhythm: sinus rhythm  Rate: normal  Conduction: conduction normal  Conduction: 1st degree AV block          Electronically signed by Alberto Gotti MD, 05/11/23, 1:55 PM EDT.

## 2023-05-11 NOTE — LETTER
May 11, 2023       No Recipients    Patient: Jenna Rosa   YOB: 1950   Date of Visit: 5/11/2023       Dear Dr. Morgan Recipients:    Thank you for referring Jenna Rosa to me for evaluation. Below are the relevant portions of my assessment and plan of care.    If you have questions, please do not hesitate to call me. I look forward to following Jenna along with you.         Sincerely,        Alberto Gotti MD        CC:   No Recipients    Alberto Gotti MD  05/11/23 1400  Signed  CC--palpitations and bradycardia    Sub--73-year-old patient with history of palpitations and bradycardia comes in for follow-up.  Patient has moderate LAD disease followed by interventional cardiology.  She denies any syncope does have hypertension which is fairly well controlled.  She has prior history of sinus bradycardia with intermittent PVCs.  Patient was on atenolol in the past and was changed to acebutolol  Patient having exertional angina          Past Medical History:   Diagnosis Date   • Depression    • Diabetic peripheral neuropathy    • GERD    • Insomnia    • Lower back pain    • Obesity    • Osteoarthritis    • Osteopenia    • Palpitations    • Pulmonary nodule    • RA    • Rheumatoid nodule 06/2011     rt. thumb x 2 and rt. & lt. 3rd fingers (Oct.2012)/ rt. elbow, left elbow, and epidermal inclusion cyst post. neck (June 2011)-----Dr. Whitmore   • Vitamin D deficiency            Physical Exam    General:      well developed, well nourished, in no acute distress.    Head:      normocephalic and atraumatic.    Eyes:      PERRL/EOM intact, conjunctivae and sclerae clear without nystagmus.    Neck:      no  thyromegaly, trachea central with normal respiratory effort  Lungs:      clear bilaterally to auscultation.    Heart:       regular rate and rhythm, S1, S2 without murmurs, rubs, or gallops  Skin:      intact without lesions or rashes.    Psych:      alert and cooperative; normal mood and affect;  normal attention span and concentration.            Assessment and plan    Moderate coronary artery disease with recent and recurrent angina--will DW with  interventional cardiology  Intermittent PVCs suppressed with acebutolol  Essential hypertension well-controlled  Prior  history of sinus bradycardia without recurrence  Medications reviewed and follow-up appointments made  Aortic valve sclerosis without stenosis  Potassium of 4.2.  LDL of 85.  Creatinine hemoglobin and platelets are normal  increase IMDUR dose --prescription given    ECG 12 Lead    Date/Time: 5/11/2023 1:55 PM  Performed by: Alberto Gotti MD  Authorized by: Alberto Gotti MD   Comparison: compared with previous ECG   Similar to previous ECG  Rhythm: sinus rhythm  Rate: normal  Conduction: conduction normal            Electronically signed by Alberto Gotti MD, 05/11/23, 1:55 PM EDT.

## 2023-05-27 DIAGNOSIS — G62.9 NEUROPATHY: ICD-10-CM

## 2023-05-30 ENCOUNTER — OFFICE VISIT (OUTPATIENT)
Dept: FAMILY MEDICINE CLINIC | Facility: CLINIC | Age: 73
End: 2023-05-30

## 2023-05-30 VITALS
SYSTOLIC BLOOD PRESSURE: 127 MMHG | DIASTOLIC BLOOD PRESSURE: 71 MMHG | HEART RATE: 70 BPM | HEIGHT: 63 IN | BODY MASS INDEX: 29.66 KG/M2 | TEMPERATURE: 98.4 F | WEIGHT: 167.4 LBS | OXYGEN SATURATION: 97 %

## 2023-05-30 DIAGNOSIS — B02.9 HERPES ZOSTER WITHOUT COMPLICATION: Primary | ICD-10-CM

## 2023-05-30 DIAGNOSIS — B36.9 FUNGAL INFECTION OF SKIN: ICD-10-CM

## 2023-05-30 PROCEDURE — 3051F HG A1C>EQUAL 7.0%<8.0%: CPT | Performed by: NURSE PRACTITIONER

## 2023-05-30 PROCEDURE — 99213 OFFICE O/P EST LOW 20 MIN: CPT | Performed by: NURSE PRACTITIONER

## 2023-05-30 PROCEDURE — 3074F SYST BP LT 130 MM HG: CPT | Performed by: NURSE PRACTITIONER

## 2023-05-30 PROCEDURE — 3078F DIAST BP <80 MM HG: CPT | Performed by: NURSE PRACTITIONER

## 2023-05-30 RX ORDER — NYSTATIN 100000 U/G
1 CREAM TOPICAL 2 TIMES DAILY
Qty: 15 G | Refills: 0 | Status: SHIPPED | OUTPATIENT
Start: 2023-05-30

## 2023-05-30 RX ORDER — HYDROXYZINE HYDROCHLORIDE 10 MG/1
10 TABLET, FILM COATED ORAL 3 TIMES DAILY PRN
Qty: 30 TABLET | Refills: 0 | Status: SHIPPED | OUTPATIENT
Start: 2023-05-30

## 2023-05-30 RX ORDER — ACYCLOVIR 400 MG/1
800 TABLET ORAL
Qty: 70 TABLET | Refills: 0 | Status: SHIPPED | OUTPATIENT
Start: 2023-05-30

## 2023-05-30 RX ORDER — GABAPENTIN 300 MG/1
300 CAPSULE ORAL 2 TIMES DAILY
Qty: 180 CAPSULE | Refills: 0 | Status: SHIPPED | OUTPATIENT
Start: 2023-05-30

## 2023-05-30 NOTE — TELEPHONE ENCOUNTER
Rx Refill Note  Requested Prescriptions     Pending Prescriptions Disp Refills   • gabapentin (NEURONTIN) 300 MG capsule 180 capsule 1     Sig: Take 1 capsule by mouth 2 (Two) Times a Day.      Last office visit with prescribing clinician: 12/29/2022   Last telemedicine visit with prescribing clinician: 1/31/2023   Next office visit with prescribing clinician: Visit date not found     Comprehensive Metabolic Panel (01/10/2023 10:06)  Lipid Panel (01/10/2023 10:06)                      Would you like a call back once the refill request has been completed: [] Yes [] No    If the office needs to give you a call back, can they leave a voicemail: [] Yes [] No    Brianda Toro CMA  05/30/23, 10:46 EDT

## 2023-05-30 NOTE — PATIENT INSTRUCTIONS
Prescribed acyclovir   Prescribed hydroxyzine as needed for itching  Prescribed nystatin cream for antifungal infection

## 2023-05-30 NOTE — PROGRESS NOTES
"Chief Complaint  Chief Complaint   Patient presents with   • Rash     Pt stated Rash in pelvic area mostly to the right side, looks like welps.noticed Thursday 5-25-23; pt tried hydrocortisone cream and calamine lotion   • Tingling     Facial tingling around nose and slight puffiness         Subjective          Jenna Rosa is a 73-year-old female who presents to the office today with complaints of rash in pelvic area.    Rash in pelvic area- Started Friday in her right groin/fold area and the next day she noticed down more in the crease of her leg. Last night she reports it has gone to her waist. She reports it hurts and red. She reports it is itchy. She has used hydrocortisone, benadryl, and calamine lotion with no relief. She reports this has not happened before. She reports she is using the same soap/detergent and has not changed anything in her routine. She reports she feels like she has \"stuff crawling on her.\" She has been around anyone with bed bugs. She has had shingles previously. She explains the rash is long welts this time. She is having tingling of her right side of face.        Review of Systems   Constitutional: Negative for chills and fever.   HENT: Negative for congestion.    Respiratory: Negative for shortness of breath.    Cardiovascular: Negative for chest pain.   Gastrointestinal: Negative for abdominal pain, nausea and vomiting.   Genitourinary: Negative for difficulty urinating.   Musculoskeletal: Positive for arthralgias.   Skin: Positive for rash.   Neurological: Positive for dizziness. Negative for light-headedness and headache.        Objective   Vital Signs:   Vitals:    05/30/23 1444   BP: 127/71   Pulse: 70   Temp: 98.4 °F (36.9 °C)   SpO2: 97%      Estimated body mass index is 29.66 kg/m² as calculated from the following:    Height as of this encounter: 160 cm (62.99\").    Weight as of this encounter: 75.9 kg (167 lb 6.4 oz).            Physical Exam  Vitals reviewed. " "  Constitutional:       Appearance: Normal appearance. She is normal weight.   HENT:      Head: Normocephalic and atraumatic.      Nose: Nose normal.   Eyes:      Extraocular Movements: Extraocular movements intact.      Conjunctiva/sclera: Conjunctivae normal.   Cardiovascular:      Rate and Rhythm: Normal rate and regular rhythm.      Pulses: Normal pulses.      Heart sounds: Normal heart sounds.      Comments: S1, S2 audible  Pulmonary:      Effort: Pulmonary effort is normal.      Breath sounds: Normal breath sounds.      Comments: On room air   Abdominal:      General: Abdomen is flat.   Musculoskeletal:         General: Normal range of motion.      Cervical back: Normal range of motion.   Skin:     General: Skin is warm and dry.      Findings: Rash present.      Comments: Rash in right groin, spots not on right buttock and red spots noted right cheek   Neurological:      General: No focal deficit present.      Mental Status: She is alert and oriented to person, place, and time. Mental status is at baseline.   Psychiatric:         Mood and Affect: Mood normal.         Behavior: Behavior normal.         Thought Content: Thought content normal.         Judgment: Judgment normal.                Physical Exam   Result Review :             Procedures       Assessment and Plan     Diagnoses and all orders for this visit:    1. Herpes zoster without complication (Primary)  Assessment & Plan:  Started Friday in her right groin/fold area and the next day she noticed down more in the crease of her leg. Last night she reports it has gone to her waist. She reports it hurts and red. She reports it is itchy. She has used hydrocortisone, benadryl, and calamine lotion with no relief. She reports this has not happened before. She reports she is using the same soap/detergent and has not changed anything in her routine. She reports she feels like she has \"stuff crawling on her.\" She has been around anyone with bed bugs. She has " had shingles previously. She explains the rash is long welts this time. She is having tingling of her right side of face.     Prescribed acyclovir       2. Fungal infection of skin  Assessment & Plan:  Noted in patients folds    Prescribed Nystatin and hydroxyzine PRN       Other orders  -     nystatin (MYCOSTATIN) 765470 UNIT/GM cream; Apply 1 application topically to the appropriate area as directed 2 (Two) Times a Day.  Dispense: 15 g; Refill: 0  -     acyclovir (Zovirax) 400 MG tablet; Take 2 tablets by mouth 5 (Five) Times a Day. Take no more than 5 doses a day.  Dispense: 70 tablet; Refill: 0  -     hydrOXYzine (ATARAX) 10 MG tablet; Take 1 tablet by mouth 3 (Three) Times a Day As Needed for Itching.  Dispense: 30 tablet; Refill: 0            Follow Up   Return if symptoms worsen or fail to improve.   Patient was given instructions and counseling regarding her condition or for health maintenance advice. Please see specific information pulled into the AVS if appropriate.

## 2023-05-30 NOTE — ASSESSMENT & PLAN NOTE
"Started Friday in her right groin/fold area and the next day she noticed down more in the crease of her leg. Last night she reports it has gone to her waist. She reports it hurts and red. She reports it is itchy. She has used hydrocortisone, benadryl, and calamine lotion with no relief. She reports this has not happened before. She reports she is using the same soap/detergent and has not changed anything in her routine. She reports she feels like she has \"stuff crawling on her.\" She has been around anyone with bed bugs. She has had shingles previously. She explains the rash is long welts this time. She is having tingling of her right side of face.     Prescribed acyclovir   "

## 2023-06-02 RX ORDER — METHYLPREDNISOLONE 4 MG/1
TABLET ORAL
Qty: 21 TABLET | Refills: 0 | Status: SHIPPED | OUTPATIENT
Start: 2023-06-02

## 2023-06-14 NOTE — TELEPHONE ENCOUNTER
HUB to share      Refused by: Emily Burdick DO     Refusal reason: Patient needs an appointment

## 2023-06-14 NOTE — TELEPHONE ENCOUNTER
Rx Refill Note  Requested Prescriptions     Pending Prescriptions Disp Refills    methotrexate 2.5 MG tablet 20 tablet 1     Sig: Take 5 tablets by mouth 1 (One) Time Per Week.      Last office visit with prescribing clinician: 12/29/2022   Last telemedicine visit with prescribing clinician: 1/31/2023   Next office visit with prescribing clinician: Visit date not found                       Lipid Panel (01/10/2023 10:06)   Would you like a call back once the refill request has been completed: [] Yes [] No    If the office needs to give you a call back, can they leave a voicemail: [] Yes [] No    Destini Snider, RT  06/14/23, 09:41 EDT

## 2023-06-19 NOTE — TELEPHONE ENCOUNTER
Rx Refill Note  Requested Prescriptions     Pending Prescriptions Disp Refills    methotrexate 2.5 MG tablet [Pharmacy Med Name: METHOTREXATE 2.5 MG TABLET] 20 tablet 1     Sig: TAKE FIVE TABLETS BY MOUTH ONCE WEEKLY      Last office visit with prescribing clinician: 12/29/2022   Last telemedicine visit with prescribing clinician: 1/31/2023   Next office visit with prescribing clinician: 7/3/2023                       Lipid Panel (01/10/2023 10:06)   Would you like a call back once the refill request has been completed: [] Yes [] No    If the office needs to give you a call back, can they leave a voicemail: [] Yes [] No    Destini Snider, RT  06/19/23, 09:55 EDT

## 2023-06-20 ENCOUNTER — ON CAMPUS - OUTPATIENT (AMBULATORY)
Dept: URBAN - METROPOLITAN AREA HOSPITAL 77 | Facility: HOSPITAL | Age: 73
End: 2023-06-20

## 2023-06-20 DIAGNOSIS — D12.0 BENIGN NEOPLASM OF CECUM: ICD-10-CM

## 2023-06-20 DIAGNOSIS — R10.13 EPIGASTRIC PAIN: ICD-10-CM

## 2023-06-20 DIAGNOSIS — K44.9 DIAPHRAGMATIC HERNIA WITHOUT OBSTRUCTION OR GANGRENE: ICD-10-CM

## 2023-06-20 DIAGNOSIS — Z86.010 PERSONAL HISTORY OF COLONIC POLYPS: ICD-10-CM

## 2023-06-20 DIAGNOSIS — K57.30 DIVERTICULOSIS OF LARGE INTESTINE WITHOUT PERFORATION OR ABS: ICD-10-CM

## 2023-06-20 DIAGNOSIS — K29.70 GASTRITIS, UNSPECIFIED, WITHOUT BLEEDING: ICD-10-CM

## 2023-06-20 PROCEDURE — 43239 EGD BIOPSY SINGLE/MULTIPLE: CPT | Performed by: INTERNAL MEDICINE

## 2023-06-20 PROCEDURE — 45385 COLONOSCOPY W/LESION REMOVAL: CPT | Mod: PT | Performed by: INTERNAL MEDICINE

## 2023-07-18 PROBLEM — R00.1 BRADYCARDIA, SINUS: Status: ACTIVE | Noted: 2023-07-18

## 2023-07-20 PROBLEM — K21.9 GASTRO-ESOPHAGEAL REFLUX DISEASE WITHOUT ESOPHAGITIS: Status: ACTIVE | Noted: 2023-07-20

## 2023-07-20 PROBLEM — R10.13 EPIGASTRIC PAIN: Status: ACTIVE | Noted: 2023-07-20

## 2023-07-20 PROBLEM — R19.7 DIARRHEA: Status: ACTIVE | Noted: 2023-07-20

## 2023-07-20 PROBLEM — R19.4 CHANGE IN BOWEL HABITS: Status: ACTIVE | Noted: 2023-07-20

## 2023-07-20 PROBLEM — D50.9 ANEMIA, IRON DEFICIENCY: Status: ACTIVE | Noted: 2023-07-20

## 2023-07-20 PROBLEM — Z86.010 HISTORY OF COLONIC POLYPS: Status: ACTIVE | Noted: 2023-07-20

## 2023-07-20 PROBLEM — Z12.11 ENCOUNTER FOR SCREENING FOR MALIGNANT NEOPLASM OF COLON: Status: ACTIVE | Noted: 2023-07-20

## 2023-07-20 PROBLEM — E78.00 PURE HYPERCHOLESTEROLEMIA: Status: ACTIVE | Noted: 2023-07-20

## 2023-07-20 PROBLEM — Z86.0100 HISTORY OF COLONIC POLYPS: Status: ACTIVE | Noted: 2023-07-20

## 2023-07-20 PROBLEM — K92.1 BLACK STOOLS: Status: ACTIVE | Noted: 2023-07-20

## 2023-07-20 PROBLEM — K59.00 CONSTIPATION: Status: ACTIVE | Noted: 2023-07-20

## 2023-07-20 PROBLEM — R05.3 CHRONIC COUGH: Status: ACTIVE | Noted: 2023-07-20

## 2023-07-24 ENCOUNTER — TELEPHONE (OUTPATIENT)
Dept: CARDIOLOGY | Facility: CLINIC | Age: 73
End: 2023-07-24
Payer: MEDICARE

## 2023-07-24 DIAGNOSIS — I10 ESSENTIAL HYPERTENSION: ICD-10-CM

## 2023-07-24 RX ORDER — LOSARTAN POTASSIUM 50 MG/1
50 TABLET ORAL DAILY
COMMUNITY

## 2023-07-24 NOTE — TELEPHONE ENCOUNTER
Called patient, she wanted to decrease her losartan so I updated her medication list. She will call back with readings.

## 2023-07-24 NOTE — TELEPHONE ENCOUNTER
Losartan should not be affected patient's heart rate as she was previously on lisinopril and that the same in the body.  She would like to decrease dose to 50 mg and monitor blood pressure that is fine to see if it helps with symptoms.

## 2023-07-24 NOTE — TELEPHONE ENCOUNTER
Called patient, she is going to take her blood pressures for a week. Patient is wanting to know if there is something she can do about her heart feeling like it is all over the place.

## 2023-07-24 NOTE — TELEPHONE ENCOUNTER
PATIENT WAS IN OFFICE 7/18. WAS STARTED ON LOSARTAN.  SHE HAS BEEN FEELING WEIRD. HEART ALL OVER THE PLACE. ASKING IF SHE NEEDS TO STOP LOSARTAN?

## 2023-07-27 RX ORDER — FOLIC ACID 1 MG/1
TABLET ORAL
Qty: 90 TABLET | Refills: 1 | Status: SHIPPED | OUTPATIENT
Start: 2023-07-27

## 2023-08-16 ENCOUNTER — OFFICE VISIT (OUTPATIENT)
Dept: FAMILY MEDICINE CLINIC | Facility: CLINIC | Age: 73
End: 2023-08-16
Payer: MEDICARE

## 2023-08-16 VITALS
HEIGHT: 63 IN | TEMPERATURE: 98.4 F | RESPIRATION RATE: 16 BRPM | WEIGHT: 162 LBS | OXYGEN SATURATION: 97 % | SYSTOLIC BLOOD PRESSURE: 145 MMHG | HEART RATE: 61 BPM | BODY MASS INDEX: 28.7 KG/M2 | DIASTOLIC BLOOD PRESSURE: 70 MMHG

## 2023-08-16 DIAGNOSIS — R39.11 URINARY HESITANCY: ICD-10-CM

## 2023-08-16 DIAGNOSIS — N81.4 UTERINE PROLAPSE: Primary | ICD-10-CM

## 2023-08-16 LAB
BILIRUB BLD-MCNC: NEGATIVE MG/DL
CLARITY, POC: CLEAR
COLOR UR: YELLOW
EXPIRATION DATE: ABNORMAL
GLUCOSE UR STRIP-MCNC: NEGATIVE MG/DL
KETONES UR QL: NEGATIVE
LEUKOCYTE EST, POC: ABNORMAL
Lab: ABNORMAL
NITRITE UR-MCNC: NEGATIVE MG/ML
PH UR: 6.5 [PH] (ref 5–8)
PROT UR STRIP-MCNC: NEGATIVE MG/DL
RBC # UR STRIP: NEGATIVE /UL
SP GR UR: 1.01 (ref 1–1.03)
UROBILINOGEN UR QL: NORMAL

## 2023-08-16 PROCEDURE — 99214 OFFICE O/P EST MOD 30 MIN: CPT | Performed by: FAMILY MEDICINE

## 2023-08-16 PROCEDURE — 3077F SYST BP >= 140 MM HG: CPT | Performed by: FAMILY MEDICINE

## 2023-08-16 PROCEDURE — 3078F DIAST BP <80 MM HG: CPT | Performed by: FAMILY MEDICINE

## 2023-08-16 PROCEDURE — 87077 CULTURE AEROBIC IDENTIFY: CPT | Performed by: FAMILY MEDICINE

## 2023-08-16 PROCEDURE — 3051F HG A1C>EQUAL 7.0%<8.0%: CPT | Performed by: FAMILY MEDICINE

## 2023-08-16 PROCEDURE — 87086 URINE CULTURE/COLONY COUNT: CPT | Performed by: FAMILY MEDICINE

## 2023-08-16 PROCEDURE — 87186 SC STD MICRODIL/AGAR DIL: CPT | Performed by: FAMILY MEDICINE

## 2023-08-16 PROCEDURE — 81003 URINALYSIS AUTO W/O SCOPE: CPT | Performed by: FAMILY MEDICINE

## 2023-08-16 NOTE — PROGRESS NOTES
Chief Complaint   Patient presents with    Urinary Tract Infection     Urinary frequency, Urinary urgency,lower back pain,dysuria that all started x 1 week        Subjective   Jenna Rosa is a 73 y.o. female.     Urinary Tract Infection   This is a recurrent problem. The current episode started in the past 7 days. The problem occurs every urination. The problem has been unchanged. The quality of the pain is described as aching. There has been no fever. Associated symptoms include frequency and urgency. Pertinent negatives include no discharge, flank pain, hematuria or vomiting. She has tried nothing for the symptoms.      Jenna follows with urology.  Last visit March 2023 for cystocele.  Additional diagnostic testing recommended.  She opted against having this done due to needed travel to [place].  Per last note, patient may have had an anterior repair and has tries pessaries w/o success.      Past Medical History :  Active Ambulatory Problems     Diagnosis Date Noted    Rheumatoid arthritis 03/30/2012    Breast mass, right 06/20/2013    Decreased hearing, bilateral 08/24/2017    Dependent edema 04/20/2012    Depression 03/19/2012    Diabetic peripheral neuropathy 05/08/2019    Difficult or painful urination 04/08/2019    Costochondritis 06/28/2016    Encounter for immunization 10/19/2017    Encounter for general adult medical examination without abnormal findings 09/19/2017    Family history of cerebrovascular accident (CVA) 07/31/2019    Flank pain 07/03/2013    Gastroesophageal reflux disease 07/31/2019    Herpes zoster 11/01/2013    High-density lipoprotein deficiency 07/12/2016    Mixed hyperlipidemia 07/31/2019    Essential hypertension 07/31/2019    Insomnia 03/19/2012    Leukopenia 02/21/2017    Microscopic hematuria 02/08/2017    Nipple discharge, bloody 06/14/2013    Obesity 01/26/2012    Osteopenia 10/31/2016    Other bursal cyst, unspecified site 10/10/2012    Other dorsalgia 06/18/2012    Low  back pain 10/02/2018    Thoracic back pain 10/02/2018    Other hereditary and idiopathic neuropathies 10/28/2014    Other long term (current) drug therapy 03/30/2012    Other specified acquired deformities of unspecified thigh 08/12/2015    Pain in limb 04/27/2015    Pain of foot 08/09/2017    Metatarsalgia 05/03/2018    Polypharmacy 07/12/2016    Primary localized osteoarthritis 02/27/2018    Type 2 diabetes mellitus with hyperglycemia, with long-term current use of insulin 07/31/2019    Urticaria 02/04/2015    Visit for screening mammogram 03/20/2018    Vitamin D deficiency 02/27/2018    Heart palpitations 05/13/2020    Major depressive disorder, recurrent episode, moderate 05/14/2020    Bereavement 05/14/2020    Unstable angina pectoris 09/02/2020    Abnormal nuclear stress test 09/02/2020    Coronary artery disease involving native coronary artery of native heart without angina pectoris 05/19/2021    Fungal infection of skin 05/30/2023    Bradycardia, sinus 07/18/2023    Encounter for screening for malignant neoplasm of colon 07/20/2023    Gastro-esophageal reflux disease without esophagitis 07/20/2023    Pure hypercholesterolemia 07/20/2023    History of colonic polyps 07/20/2023    Epigastric pain 07/20/2023    Chronic cough 07/20/2023    Diarrhea 07/20/2023    Constipation 07/20/2023    Change in bowel habits 07/20/2023    Black stools 07/20/2023    Anemia, iron deficiency 07/20/2023     Resolved Ambulatory Problems     Diagnosis Date Noted    Abnormal finding 07/12/2016    Abnormal laboratory test 07/09/2018    Abnormal urinalysis 04/08/2019    Seropositive rheumatoid arthritis (HCC) 04/11/2017    Never smoked any substance 08/29/2016    Osteoarthritis of foot 08/09/2017     Past Medical History:   Diagnosis Date    CAD 2020    Cancer 2021    Colon polyp     Congenital heart disease 2017    DEXA     Diverticulosis     GERD     Heart murmur 2017    Hyperlipidemia     Hypertension     Lower back pain     MAMMO      Osteoarthritis     Osteoporosis     Palpitations     Rheumatoid nodule 06/2011       Medication List:    Current Outpatient Medications:     acebutolol (SECTRAL) 200 MG capsule, TAKE ONE CAPSULE BY MOUTH DAILY, Disp: 90 capsule, Rfl: 1    acetaminophen (TYLENOL) 325 MG tablet, Take 2 tablets by mouth Every 6 (Six) Hours As Needed for Mild Pain., Disp: , Rfl:     albuterol sulfate  (90 Base) MCG/ACT inhaler, Inhale 2 puffs Every 6 (Six) Hours As Needed for Shortness of Air (cough)., Disp: 18 g, Rfl: 0    amLODIPine (NORVASC) 10 MG tablet, Take 1 tablet by mouth Daily., Disp: 90 tablet, Rfl: 1    aspirin 81 MG EC tablet, Take 1 tablet by mouth Daily., Disp: , Rfl:     Cholecalciferol (Vitamin D) 50 MCG (2000 UT) capsule, 1 po qd, Disp: , Rfl:     Continuous Blood Gluc  (FreeStyle Adelaide 2 [place]) device, USE WITH SENSOR EVERY 14 DAYS, Disp: 1 each, Rfl: 0    Continuous Blood Gluc Sensor (FreeStyle Adelaide 2 Sensor) misc, USE AND REPLACE ONE EVERY 14 DAYS WITH FREESTYLE ADELAIDE 2 READER, Disp: 6 each, Rfl: 3    diclofenac (VOLTAREN) 1 % gel gel, Apply 4 g topically to the appropriate area as directed 4 (Four) Times a Day As Needed., Disp: , Rfl:     fenofibrate 160 MG tablet, Take 1 tablet by mouth Daily., Disp: 90 tablet, Rfl: 2    fluticasone (FLONASE) 50 MCG/ACT nasal spray, 1 spray into the nostril(s) as directed by provider Daily As Needed for Allergies., Disp: , Rfl:     folic acid (FOLVITE) 1 MG tablet, TAKE ONE TABLET BY MOUTH DAILY, Disp: 90 tablet, Rfl: 1    gabapentin (NEURONTIN) 300 MG capsule, Take 1 capsule by mouth 2 (Two) Times a Day., Disp: 180 capsule, Rfl: 0    Humira Pen 40 MG/0.4ML Pen-injector Kit, INJECT 40MG UNDER THE SKIN EVERY 14 DAYS, Disp: 2 each, Rfl: 3    HumuLIN 70/30 KwikPen (70-30) 100 UNIT/ML suspension pen-injector, INJECT 45 UNITS BEFORE BREAKFAST AND 20 UNITS BEFORE SUPPER, Disp: 60 mL, Rfl: 1    IBUPROFEN-ACETAMINOPHEN PO, Take  by mouth., Disp: , Rfl:     Insulin Pen  Needle 32G X 4 MM misc, 1 each 2 (Two) Times a Day., Disp: 200 each, Rfl: 3    isosorbide mononitrate (IMDUR) 60 MG 24 hr tablet, Take 1 tablet by mouth Every Morning., Disp: 90 tablet, Rfl: 3    lansoprazole (PREVACID) 30 MG capsule, Take 1 capsule by mouth Daily., Disp: 90 capsule, Rfl: 1    losartan (COZAAR) 50 MG tablet, Take 1 tablet by mouth Daily., Disp: , Rfl:     methotrexate 2.5 MG tablet, Take 5 tablets by mouth 1 (One) Time Per Week., Disp: 20 tablet, Rfl: 3    Misc Natural Products (YUMVS BEET ROOT-TART CHERRY PO), Take  by mouth. 1000mg 1 po qd, Disp: , Rfl:     olopatadine (PATANOL) 0.1 % ophthalmic solution, , Disp: , Rfl:     ondansetron ODT (ZOFRAN-ODT) 4 MG disintegrating tablet, Place 1 tablet on the tongue Every 8 (Eight) Hours As Needed for Nausea., Disp: 30 tablet, Rfl: 0    Probiotic Product (PROBIOTIC DAILY PO), 1 po qd, Disp: , Rfl:     rosuvastatin (CRESTOR) 5 MG tablet, TAKE 1 TABLET EVERY NIGHT, Disp: 90 tablet, Rfl: 1    SITagliptin-metFORMIN HCl ER (Janumet XR) 100-1000 MG tablet, Take 1 tablet by mouth Daily., Disp: 90 tablet, Rfl: 1    traZODone (DESYREL) 50 MG tablet, TAKE ONE TABLET BY MOUTH EVERY NIGHT AT BEDTIME, Disp: 90 tablet, Rfl: 0    vitamin B-12 (CYANOCOBALAMIN) 1000 MCG tablet, Take 1 tablet by mouth Daily., Disp: , Rfl:     Allergies   Allergen Reactions    Jardiance [Empagliflozin] Other (See Comments)     UTI's       Social History     Tobacco Use    Smoking status: Never     Passive exposure: Never    Smokeless tobacco: Never   Substance Use Topics    Alcohol use: Yes     Comment: 3-4 a year         Review of Systems   Gastrointestinal:  Negative for vomiting.   Genitourinary:  Positive for difficulty urinating, frequency, pelvic pressure and urgency. Negative for dysuria, flank pain and hematuria.       Objective   Vitals:    08/16/23 1504   BP: 145/70   BP Location: Right arm   Patient Position: Sitting   Cuff Size: Adult   Pulse: 61   Resp: 16   Temp: 98.4 øF (36.9  "øC)   TempSrc: Infrared   SpO2: 97%   Weight: 73.5 kg (162 lb)   Height: 160 cm (63\")     Body mass index is 28.7 kg/mý.    Physical Exam  Constitutional:       Appearance: She is well-developed.   HENT:      Head: Normocephalic and atraumatic.   Eyes:      General: No scleral icterus.     Conjunctiva/sclera: Conjunctivae normal.   Cardiovascular:      Rate and Rhythm: Normal rate and regular rhythm.      Heart sounds: Normal heart sounds.   Pulmonary:      Effort: Pulmonary effort is normal.      Breath sounds: Normal breath sounds.   Abdominal:      General: Bowel sounds are normal. There is no distension.      Palpations: Abdomen is soft.      Tenderness: There is no abdominal tenderness. There is no guarding or rebound.   Genitourinary:     Exam position: Supine.      Comments: Uterine prolapse, no bleeding, no mass  Musculoskeletal:      Cervical back: Normal range of motion and neck supple. No edema.   Skin:     General: Skin is warm.      Capillary Refill: Capillary refill takes less than 2 seconds.      Findings: No rash.   Neurological:      Mental Status: She is alert.      Cranial Nerves: No cranial nerve deficit.   Psychiatric:         Behavior: Behavior normal.         Thought Content: Thought content normal.         Judgment: Judgment normal.       Brief Urine Lab Results  (Last result in the past 365 days)        Color   Clarity   Blood   Leuk Est   Nitrite   Protein   CREAT   Urine HCG        08/16/23 1516 Yellow   Clear   Negative   Trace   Negative   Negative                 Lab Results   Component Value Date    HGBA1C 7.9 (H) 01/10/2023    HGBA1C 7.4 (H) 05/19/2022    HGBA1C 7.2 (H) 11/15/2021    HGBA1C 7.7 (H) 11/17/2020         Assessment & Plan     Diagnoses and all orders for this visit:    1. Uterine prolapse (Primary)  -     Ambulatory Referral to Gynecologic Urology    2. Urinary hesitancy  -     POC Urinalysis Dipstick, Automated  -     Urine Culture - Urine, Urine, Clean Catch      UA not " diagnostic for UTI.  Culture sent.  Will treat if indicated.    Given patient's history of pelvic floor dysfunction and recurrent urinary symptoms, we discussed referral options.  She has seen urology and opts against return to a urologist at this time due to having to travel to [place] for testing.  She is not interested in returning to [facility].  However, she would consider a consultation with urogynecology as long as it is located in [facility].  Referral made.    F/U PRN.  No follow-ups on file.       Patient was given instructions and counseling regarding his/her condition or for health maintenance advice. Please see specific information pulled into the AVS if appropriate.

## 2023-08-18 ENCOUNTER — TELEPHONE (OUTPATIENT)
Dept: FAMILY MEDICINE CLINIC | Facility: CLINIC | Age: 73
End: 2023-08-18

## 2023-08-18 ENCOUNTER — HOSPITAL ENCOUNTER (OUTPATIENT)
Dept: CARDIOLOGY | Facility: HOSPITAL | Age: 73
Discharge: HOME | End: 2023-08-18
Payer: MEDICARE

## 2023-08-18 ENCOUNTER — TELEPHONE (OUTPATIENT)
Dept: CARDIOLOGY | Facility: CLINIC | Age: 73
End: 2023-08-18

## 2023-08-18 DIAGNOSIS — I25.10 CORONARY ARTERY DISEASE INVOLVING NATIVE CORONARY ARTERY OF NATIVE HEART WITHOUT ANGINA PECTORIS: ICD-10-CM

## 2023-08-18 DIAGNOSIS — R06.09 DYSPNEA ON EXERTION: ICD-10-CM

## 2023-08-18 DIAGNOSIS — R07.89 CHEST PRESSURE: ICD-10-CM

## 2023-08-18 DIAGNOSIS — N30.00 ACUTE CYSTITIS WITHOUT HEMATURIA: Primary | ICD-10-CM

## 2023-08-18 LAB
BACTERIA SPEC AEROBE CULT: ABNORMAL
BH CV ECHO MEAS - ACS: 1.74 CM
BH CV ECHO MEAS - AO MAX PG: 5.2 MMHG
BH CV ECHO MEAS - AO MEAN PG: 3.4 MMHG
BH CV ECHO MEAS - AO ROOT DIAM: 3.4 CM
BH CV ECHO MEAS - AO V2 MAX: 114.5 CM/SEC
BH CV ECHO MEAS - AO V2 VTI: 33.7 CM
BH CV ECHO MEAS - AVA(I,D): 2.15 CM2
BH CV ECHO MEAS - EDV(CUBED): 66.6 ML
BH CV ECHO MEAS - EDV(MOD-SP4): 60.5 ML
BH CV ECHO MEAS - EF(MOD-BP): 58 %
BH CV ECHO MEAS - EF(MOD-SP4): 57.6 %
BH CV ECHO MEAS - ESV(CUBED): 21.9 ML
BH CV ECHO MEAS - ESV(MOD-SP4): 25.6 ML
BH CV ECHO MEAS - FS: 30.9 %
BH CV ECHO MEAS - IVS/LVPW: 0.94 CM
BH CV ECHO MEAS - IVSD: 1.21 CM
BH CV ECHO MEAS - LA DIMENSION: 4.4 CM
BH CV ECHO MEAS - LV MASS(C)D: 179.5 GRAMS
BH CV ECHO MEAS - LV MAX PG: 4 MMHG
BH CV ECHO MEAS - LV MEAN PG: 2.19 MMHG
BH CV ECHO MEAS - LV V1 MAX: 100.1 CM/SEC
BH CV ECHO MEAS - LV V1 VTI: 25.2 CM
BH CV ECHO MEAS - LVIDD: 4.1 CM
BH CV ECHO MEAS - LVIDS: 2.8 CM
BH CV ECHO MEAS - LVOT AREA: 2.9 CM2
BH CV ECHO MEAS - LVOT DIAM: 1.91 CM
BH CV ECHO MEAS - LVPWD: 1.29 CM
BH CV ECHO MEAS - MV A MAX VEL: 98.2 CM/SEC
BH CV ECHO MEAS - MV DEC SLOPE: 178.5 CM/SEC2
BH CV ECHO MEAS - MV DEC TIME: 0.36 MSEC
BH CV ECHO MEAS - MV E MAX VEL: 63.7 CM/SEC
BH CV ECHO MEAS - MV E/A: 0.65
BH CV ECHO MEAS - MV MAX PG: 4.3 MMHG
BH CV ECHO MEAS - MV MEAN PG: 1.7 MMHG
BH CV ECHO MEAS - MV V2 VTI: 34 CM
BH CV ECHO MEAS - MVA(VTI): 2.13 CM2
BH CV ECHO MEAS - PA ACC TIME: 0.1 SEC
BH CV ECHO MEAS - PULM A REVS DUR: 0.12 SEC
BH CV ECHO MEAS - PULM A REVS VEL: 33.2 CM/SEC
BH CV ECHO MEAS - PULM DIAS VEL: 32.6 CM/SEC
BH CV ECHO MEAS - PULM S/D: 1.56
BH CV ECHO MEAS - PULM SYS VEL: 51 CM/SEC
BH CV ECHO MEAS - RAP SYSTOLE: 3 MMHG
BH CV ECHO MEAS - RV MAX PG: 2.47 MMHG
BH CV ECHO MEAS - RV V1 MAX: 78.6 CM/SEC
BH CV ECHO MEAS - RV V1 VTI: 18 CM
BH CV ECHO MEAS - RVDD: 3.8 CM
BH CV ECHO MEAS - SV(LVOT): 72.3 ML
BH CV ECHO MEAS - SV(MOD-SP4): 34.8 ML
BH CV REST NUCLEAR ISOTOPE DOSE: 10.8 MCI
BH CV STRESS COMMENTS STAGE 1: NORMAL
BH CV STRESS DOSE REGADENOSON STAGE 1: 0.4
BH CV STRESS DURATION MIN STAGE 1: 0
BH CV STRESS DURATION SEC STAGE 1: 10
BH CV STRESS NUCLEAR ISOTOPE DOSE: 30.8 MCI
BH CV STRESS PROTOCOL 1: NORMAL
BH CV STRESS RECOVERY BP: NORMAL MMHG
BH CV STRESS RECOVERY HR: 81 BPM
BH CV STRESS STAGE 1: 1
LV EF NUC BP: 82 %
MAXIMAL PREDICTED HEART RATE: 148 BPM
STRESS BASELINE BP: NORMAL MMHG
STRESS BASELINE HR: 57 BPM
STRESS TARGET HR: 126 BPM

## 2023-08-18 PROCEDURE — 0 TECHNETIUM SESTAMIBI: Performed by: INTERNAL MEDICINE

## 2023-08-18 PROCEDURE — A9500 TC99M SESTAMIBI: HCPCS | Performed by: INTERNAL MEDICINE

## 2023-08-18 PROCEDURE — 93017 CV STRESS TEST TRACING ONLY: CPT

## 2023-08-18 PROCEDURE — 25010000002 REGADENOSON 0.4 MG/5ML SOLUTION: Performed by: INTERNAL MEDICINE

## 2023-08-18 PROCEDURE — 78452 HT MUSCLE IMAGE SPECT MULT: CPT

## 2023-08-18 PROCEDURE — 93306 TTE W/DOPPLER COMPLETE: CPT

## 2023-08-18 PROCEDURE — 93306 TTE W/DOPPLER COMPLETE: CPT | Performed by: INTERNAL MEDICINE

## 2023-08-18 RX ORDER — CEFDINIR 300 MG/1
300 CAPSULE ORAL 2 TIMES DAILY
Qty: 14 CAPSULE | Refills: 0 | Status: SHIPPED | OUTPATIENT
Start: 2023-08-18 | End: 2023-08-25

## 2023-08-18 RX ORDER — REGADENOSON 0.08 MG/ML
0.4 INJECTION, SOLUTION INTRAVENOUS
Status: COMPLETED | OUTPATIENT
Start: 2023-08-18 | End: 2023-08-18

## 2023-08-18 RX ADMIN — REGADENOSON 0.4 MG: 0.08 INJECTION, SOLUTION INTRAVENOUS at 09:40

## 2023-08-18 RX ADMIN — TECHNETIUM TC 99M SESTAMIBI 1 DOSE: 1 INJECTION INTRAVENOUS at 08:52

## 2023-08-18 RX ADMIN — TECHNETIUM TC 99M SESTAMIBI 1 DOSE: 1 INJECTION INTRAVENOUS at 09:39

## 2023-08-18 NOTE — TELEPHONE ENCOUNTER
Pls call pt on cell, she is returning Dr. García's call with echo and stress results.  Had test today.

## 2023-08-18 NOTE — TELEPHONE ENCOUNTER
Caller: LINDSEY [facility]    Best call back number:     [phone]     What orders are you requesting (i.e. lab or imaging): REQUESTING A CALL TO CLARIFY FORMS FAXED ON 8/1/2023 WAS RECEIVED FOR CLARIFICATION ON STATIN MEDICATION. PLEASE CALL LINDSEY TO DISCUSS

## 2023-08-25 DIAGNOSIS — Z79.899 LONG-TERM USE OF HIGH-RISK MEDICATION: ICD-10-CM

## 2023-08-25 DIAGNOSIS — M05.9 SEROPOSITIVE RHEUMATOID ARTHRITIS: ICD-10-CM

## 2023-08-25 DIAGNOSIS — G62.9 NEUROPATHY: ICD-10-CM

## 2023-08-25 RX ORDER — ROSUVASTATIN CALCIUM 5 MG/1
5 TABLET, COATED ORAL NIGHTLY
Qty: 90 TABLET | Refills: 1 | Status: SHIPPED | OUTPATIENT
Start: 2023-08-25

## 2023-08-25 RX ORDER — FENOFIBRATE 160 MG/1
160 TABLET ORAL DAILY
Qty: 90 TABLET | Refills: 2 | Status: SHIPPED | OUTPATIENT
Start: 2023-08-25

## 2023-08-25 NOTE — TELEPHONE ENCOUNTER
Rx Refill Note  Requested Prescriptions     Pending Prescriptions Disp Refills    gabapentin (NEURONTIN) 300 MG capsule 180 capsule 0     Sig: Take 1 capsule by mouth 2 (Two) Times a Day.      Last office visit with prescribing clinician: 7/20/2023   Last telemedicine visit with prescribing clinician: Visit date not found   Next office visit with prescribing clinician: 8/25/2023        Lipid Panel (01/10/2023 10:06)                  Would you like a call back once the refill request has been completed: [] Yes [] No    If the office needs to give you a call back, can they leave a voicemail: [] Yes [] No    Destini Snider, RT  08/25/23, 10:10 EDT

## 2023-08-25 NOTE — TELEPHONE ENCOUNTER
Rx Refill Note  Requested Prescriptions     Pending Prescriptions Disp Refills    Humira Pen 40 MG/0.4ML Pen-injector Kit 2 each 3      Last office visit with prescribing clinician: 7/20/2023   Last telemedicine visit with prescribing clinician: Visit date not found   Next office visit with prescribing clinician: 8/25/2023        Lipid Panel (01/10/2023 10:06)                  Would you like a call back once the refill request has been completed: [] Yes [] No    If the office needs to give you a call back, can they leave a voicemail: [] Yes [] No    Destini Snider, RT  08/25/23, 10:10 EDT

## 2023-08-26 RX ORDER — GABAPENTIN 300 MG/1
300 CAPSULE ORAL 2 TIMES DAILY
Qty: 180 CAPSULE | Refills: 0 | Status: SHIPPED | OUTPATIENT
Start: 2023-08-26

## 2023-08-26 RX ORDER — ADALIMUMAB 40MG/0.4ML
40 KIT SUBCUTANEOUS
Qty: 2 EACH | Refills: 3 | Status: SHIPPED | OUTPATIENT
Start: 2023-08-26

## 2023-08-29 ENCOUNTER — OFFICE VISIT (OUTPATIENT)
Dept: ENDOCRINOLOGY | Facility: CLINIC | Age: 73
End: 2023-08-29
Payer: MEDICARE

## 2023-08-29 VITALS
OXYGEN SATURATION: 97 % | WEIGHT: 168 LBS | HEART RATE: 62 BPM | DIASTOLIC BLOOD PRESSURE: 75 MMHG | HEIGHT: 63 IN | BODY MASS INDEX: 29.77 KG/M2 | SYSTOLIC BLOOD PRESSURE: 135 MMHG

## 2023-08-29 DIAGNOSIS — E78.2 MIXED HYPERLIPIDEMIA: ICD-10-CM

## 2023-08-29 DIAGNOSIS — E11.42 DIABETIC PERIPHERAL NEUROPATHY: ICD-10-CM

## 2023-08-29 DIAGNOSIS — I10 ESSENTIAL HYPERTENSION: ICD-10-CM

## 2023-08-29 DIAGNOSIS — E11.65 TYPE 2 DIABETES MELLITUS WITH HYPERGLYCEMIA, WITH LONG-TERM CURRENT USE OF INSULIN: Primary | ICD-10-CM

## 2023-08-29 DIAGNOSIS — Z79.4 TYPE 2 DIABETES MELLITUS WITH HYPERGLYCEMIA, WITH LONG-TERM CURRENT USE OF INSULIN: Primary | ICD-10-CM

## 2023-08-29 LAB — GLUCOSE BLDC GLUCOMTR-MCNC: 193 MG/DL (ref 70–105)

## 2023-08-29 PROCEDURE — 82948 REAGENT STRIP/BLOOD GLUCOSE: CPT | Performed by: INTERNAL MEDICINE

## 2023-08-29 RX ORDER — LISINOPRIL 40 MG/1
40 TABLET ORAL DAILY
COMMUNITY

## 2023-08-29 RX ORDER — INSULIN HUMAN 100 [IU]/ML
INJECTION, SUSPENSION SUBCUTANEOUS
Qty: 60 ML | Refills: 3 | Status: SHIPPED | OUTPATIENT
Start: 2023-08-29

## 2023-08-29 NOTE — PATIENT INSTRUCTIONS
Change Novolin 70/30 insulin to 20 units subcu 3 times a day half an hour before each meal  Continue rest of the medications  Always keep glucose source in case of low blood sugar  Labs before follow-up.

## 2023-08-29 NOTE — PROGRESS NOTES
Endocrine Progress Note Outpatient     Patient Care Team:  Emily Burdick DO as PCP - General (Family Medicine)  Joe García MD as Consulting Physician (Cardiology)  Dano Rangel MD as Consulting Physician (Endocrinology)  Conchis Mohamud OD as Consulting Physician (Optometry)  Harinder Gresham MD as Consulting Physician (Pain Medicine)  Juan Richey MD as Consulting Physician (Gastroenterology)  Manish Graff MD as Consulting Physician (Urology)  Karla Christy APRN as Nurse Practitioner (Cardiology)    Chief Complaint: Follow-up type 2 diabetes    HPI: 73-year-old female with history of type 2 diabetes, hypertension and hyperlipidemia is here for follow-up.    For type 2 diabetes: She is currently on Janumet /100, 1 tablet daily with Novolin 70/30 insulin she takes 40 units before breakfast and 20 before supper.  She did bring in blood sugar records for review,  She was prescribed Jardiance on the last visit back in May 2021 she took it for a while but developed some UTIs and she stopped it.  I have reviewed her blood sugar records, she is having some low blood sugars but has not required any assistance or hospitalization for low blood sugar since last seen.    Hypertension: Well-controlled today.    Hyperlipidemia: On rosuvastatin.    Past Medical History:   Diagnosis Date    CAD 2020    Cancer 2021    Skin cancer    Colon polyp     Congenital heart disease 2017    Depression     DEXA     2019= (-0.7/ -1.6); 2023= (-0.7/ -2.7)    Diabetic peripheral neuropathy     Diverticulosis     GERD     Heart murmur 2017    Hyperlipidemia     Hypertension     Insomnia     Lower back pain     MAMMO     NEG = 2021/ 2023    Obesity     Osteoarthritis     Osteopenia     Osteoporosis     Palpitations     RA     Rheumatoid nodule 06/2011     rt. thumb x 2 and rt. & lt. 3rd fingers (Oct.2012)/ rt. elbow, left elbow, and epidermal inclusion cyst post. neck (June 2011)-----Dr. Whitmore    Vitamin D  deficiency        Social History     Socioeconomic History    Marital status:      Spouse name: Carson Rosa    Number of children: 3    Years of education: GED   Tobacco Use    Smoking status: Never     Passive exposure: Never    Smokeless tobacco: Never   Vaping Use    Vaping Use: Never used   Substance and Sexual Activity    Alcohol use: Yes     Comment: 3-4 a year    Drug use: Never    Sexual activity: Not Currently     Partners: Male     Birth control/protection: Hysterectomy, Same-sex partner       Family History   Problem Relation Age of Onset    Heart disease Mother     Diabetes Mother     Hypertension Mother     Rheum arthritis Mother     Coronary artery disease Mother     Alcohol abuse Mother     Heart failure Mother     Heart disease Father     Mental illness Father     Hypertension Father     Alcohol abuse Father     Early death Father     Heart attack Father     Heart failure Father     Stroke Sister     Cancer Sister 29        RENAL Cancer    Osteoarthritis Sister     Rheum arthritis Sister     Hypertension Sister     Arthritis Sister     Diabetes Sister     Rheum arthritis Sister     Osteoarthritis Sister     Diabetes Sister     Arthritis Sister     Liver disease Sister     Kidney disease Sister     No Known Problems Brother     Rheum arthritis Brother     Osteoarthritis Brother     Arthritis Brother         RA    Hypertension Brother     Diabetes Maternal Grandfather     Hypertension Sister     Hypertension Sister     Hypertension Brother     Cancer Sister     Stroke Sister        Allergies   Allergen Reactions    Jardiance [Empagliflozin] Other (See Comments)     UTI's       ROS:   Constitutional:  Denies fatigue, tiredness.    Eyes:  Denies change in visual acuity   HENT:  Denies nasal congestion or sore throat   Respiratory: denies cough, admit shortness of breath.   Cardiovascular:  denies chest pain, edema   GI:  Denies abdominal pain, nausea, vomiting.   Musculoskeletal:  Denies back  pain or joint pain   Integument:  Denies dry skin and rash   Neurologic:  Denies headache, focal weakness or sensory changes   Endocrine:  Denies polyuria or polydipsia   Psychiatric:  Denies depression or anxiety      Vitals:    08/29/23 1443   BP: 135/75   Pulse: 62   SpO2: 97%     Body mass index is 29.76 kg/mý.     Physical Exam:  GEN: NAD, conversant  EYES: EOMI, PERRL, no conjunctival erythema  NECK: no thyromegaly, full ROM   CV: RRR, no murmurs/rubs/gallops, no peripheral edema  LUNG: CTAB, no wheezes/rales/ronchi  SKIN: no rashes, no acanthosis  MSK: no deformities, full ROM of all extremities  NEURO: no tremors, DTR normal  PSYCH: AOX3, appropriate mood, affect normal      Results Review:     I reviewed the patient's new clinical results.    Lab Results   Component Value Date    HGBA1C 7.9 (H) 01/10/2023    HGBA1C 7.4 (H) 05/19/2022    HGBA1C 7.2 (H) 11/15/2021      Lab Results   Component Value Date    GLUCOSE 218 (H) 06/21/2023    BUN 9 06/21/2023    CREATININE 0.80 06/21/2023    EGFRIFNONA 78 11/15/2021    EGFRIFAFRI 59 (L) 08/13/2017    BCR 11.3 06/21/2023    K 4.3 06/21/2023    CO2 27.3 06/21/2023    CALCIUM 9.7 06/21/2023    ALBUMIN 4.2 06/21/2023    LABIL2 1.1 01/29/2019    AST 19 06/21/2023    ALT 18 06/21/2023    CHOL 160 01/10/2023    TRIG 204 (H) 01/10/2023    LDL 85 01/10/2023    HDL 40 01/10/2023     Lab Results   Component Value Date    TSH 1.270 10/26/2021         Medication Review: Reviewed.       Current Outpatient Medications:     acebutolol (SECTRAL) 200 MG capsule, TAKE ONE CAPSULE BY MOUTH DAILY, Disp: 90 capsule, Rfl: 1    acetaminophen (TYLENOL) 325 MG tablet, Take 2 tablets by mouth Every 6 (Six) Hours As Needed for Mild Pain., Disp: , Rfl:     albuterol sulfate  (90 Base) MCG/ACT inhaler, Inhale 2 puffs Every 6 (Six) Hours As Needed for Shortness of Air (cough)., Disp: 18 g, Rfl: 0    amLODIPine (NORVASC) 10 MG tablet, Take 1 tablet by mouth Daily., Disp: 90 tablet, Rfl: 1     aspirin 81 MG EC tablet, Take 1 tablet by mouth Daily., Disp: , Rfl:     Cholecalciferol (Vitamin D) 50 MCG (2000 UT) capsule, 1 po qd, Disp: , Rfl:     Continuous Blood Gluc  (FreeStyle [place] 2 [place]) device, USE WITH SENSOR EVERY 14 DAYS, Disp: 1 each, Rfl: 0    Continuous Blood Gluc Sensor (FreeStyle Adelaide 2 Sensor) misc, USE AND REPLACE ONE EVERY 14 DAYS WITH FREESTYLE ADELAIDE 2 READER, Disp: 6 each, Rfl: 3    diclofenac (VOLTAREN) 1 % gel gel, Apply 4 g topically to the appropriate area as directed 4 (Four) Times a Day As Needed., Disp: , Rfl:     fenofibrate 160 MG tablet, Take 1 tablet by mouth Daily., Disp: 90 tablet, Rfl: 2    fluticasone (FLONASE) 50 MCG/ACT nasal spray, 1 spray into the nostril(s) as directed by provider Daily As Needed for Allergies., Disp: , Rfl:     folic acid (FOLVITE) 1 MG tablet, TAKE ONE TABLET BY MOUTH DAILY, Disp: 90 tablet, Rfl: 1    gabapentin (NEURONTIN) 300 MG capsule, Take 1 capsule by mouth 2 (Two) Times a Day., Disp: 180 capsule, Rfl: 0    Humira Pen 40 MG/0.4ML Pen-injector Kit, Inject 40 mg under the skin into the appropriate area as directed Every 14 (Fourteen) Days., Disp: 2 each, Rfl: 3    HumuLIN 70/30 KwikPen (70-30) 100 UNIT/ML suspension pen-injector, INJECT 45 UNITS BEFORE BREAKFAST AND 20 UNITS BEFORE SUPPER, Disp: 60 mL, Rfl: 1    IBUPROFEN-ACETAMINOPHEN PO, Take  by mouth., Disp: , Rfl:     Insulin Pen Needle 32G X 4 MM misc, 1 each 2 (Two) Times a Day., Disp: 200 each, Rfl: 3    isosorbide mononitrate (IMDUR) 60 MG 24 hr tablet, Take 1 tablet by mouth Every Morning., Disp: 90 tablet, Rfl: 3    lansoprazole (PREVACID) 30 MG capsule, Take 1 capsule by mouth Daily., Disp: 90 capsule, Rfl: 1    lisinopril (PRINIVIL,ZESTRIL) 40 MG tablet, Take 1 tablet by mouth Daily., Disp: , Rfl:     methotrexate 2.5 MG tablet, Take 5 tablets by mouth 1 (One) Time Per Week., Disp: 20 tablet, Rfl: 3    Misc Natural Products (YUMVS BEET ROOT-TART CHERRY PO), Take  by  mouth. 1000mg 1 po qd, Disp: , Rfl:     olopatadine (PATANOL) 0.1 % ophthalmic solution, , Disp: , Rfl:     ondansetron ODT (ZOFRAN-ODT) 4 MG disintegrating tablet, Place 1 tablet on the tongue Every 8 (Eight) Hours As Needed for Nausea., Disp: 30 tablet, Rfl: 0    Probiotic Product (PROBIOTIC DAILY PO), 1 po qd, Disp: , Rfl:     rosuvastatin (CRESTOR) 5 MG tablet, Take 1 tablet by mouth Every Night., Disp: 90 tablet, Rfl: 1    SITagliptin-metFORMIN HCl ER (Janumet XR) 100-1000 MG tablet, Take 1 tablet by mouth Daily., Disp: 90 tablet, Rfl: 1    traZODone (DESYREL) 50 MG tablet, TAKE ONE TABLET BY MOUTH EVERY NIGHT AT BEDTIME, Disp: 90 tablet, Rfl: 0    vitamin B-12 (CYANOCOBALAMIN) 1000 MCG tablet, Take 1 tablet by mouth Daily., Disp: , Rfl:       Assessment & Plan   1.  Diabetes mellitus type 2 with Hyperglycemia: Uncontrolled, blood sugars up-and-down, last A1c was 7.9%.  I have asked her to change her 70/30 insulin to 20 units subcu 3 times a day before each meal and continue Janumet.  Recommend to continue to work on diet and activity and always keep glucose source in case of low blood sugars.    2.  Hypertension: Fair control, continue lisinopril 20 mg p.o. daily..    3.  Hyperlipidemia: Well-controlled, continue rosuvastatin.    4.  Diabetic peripheral neuropathy: Stable.    Assessment and plan from May 26, 2022 reviewed and updated.            Dano Rangel MD FACE.

## 2023-09-05 RX ORDER — SITAGLIPTIN AND METFORMIN HYDROCHLORIDE 1000; 100 MG/1; MG/1
TABLET, FILM COATED, EXTENDED RELEASE ORAL
Qty: 90 TABLET | Refills: 1 | Status: SHIPPED | OUTPATIENT
Start: 2023-09-05

## 2023-09-05 RX ORDER — LANSOPRAZOLE 30 MG/1
CAPSULE, DELAYED RELEASE ORAL
Qty: 90 CAPSULE | Refills: 1 | Status: SHIPPED | OUTPATIENT
Start: 2023-09-05

## 2023-09-05 RX ORDER — LISINOPRIL 40 MG/1
TABLET ORAL
Qty: 90 TABLET | Refills: 1 | Status: SHIPPED | OUTPATIENT
Start: 2023-09-05

## 2023-09-05 NOTE — TELEPHONE ENCOUNTER
Rx Refill Note  Requested Prescriptions     Pending Prescriptions Disp Refills    Janumet -1000 MG tablet [Pharmacy Med Name: JANUMET -1,000 MG TABLET] 90 tablet 1     Sig: TAKE ONE TABLET BY MOUTH DAILY    lisinopril (PRINIVIL,ZESTRIL) 40 MG tablet [Pharmacy Med Name: LISINOPRIL 40 MG TABLET] 90 tablet      Sig: TAKE ONE TABLET BY MOUTH DAILY    lansoprazole (PREVACID) 30 MG capsule [Pharmacy Med Name: LANSOPRAZOLE DR 30 MG CAPSULE] 90 capsule 1     Sig: TAKE ONE CAPSULE BY MOUTH DAILY      Last office visit with prescribing clinician: 7/20/2023   Last telemedicine visit with prescribing clinician: Visit date not found   Next office visit with prescribing clinician: Visit date not found     CBC & Differential (06/21/2023 09:42)   Comprehensive Metabolic Panel (06/21/2023 09:42  Lipid Panel (01/10/2023 10:06)                       Would you like a call back once the refill request has been completed: [] Yes [] No    If the office needs to give you a call back, can they leave a voicemail: [] Yes [] No    Brianda Toro CMA  09/05/23, 11:15 EDT

## 2023-09-15 RX ORDER — AMLODIPINE BESYLATE 10 MG/1
TABLET ORAL
Qty: 90 TABLET | Refills: 1 | Status: SHIPPED | OUTPATIENT
Start: 2023-09-15

## 2023-09-22 DIAGNOSIS — Z79.899 LONG-TERM USE OF HIGH-RISK MEDICATION: ICD-10-CM

## 2023-09-22 DIAGNOSIS — Z79.4 TYPE 2 DIABETES MELLITUS WITH HYPERGLYCEMIA, WITH LONG-TERM CURRENT USE OF INSULIN: ICD-10-CM

## 2023-09-22 DIAGNOSIS — E11.65 TYPE 2 DIABETES MELLITUS WITH HYPERGLYCEMIA, WITH LONG-TERM CURRENT USE OF INSULIN: ICD-10-CM

## 2023-09-22 DIAGNOSIS — M05.9 SEROPOSITIVE RHEUMATOID ARTHRITIS: ICD-10-CM

## 2023-09-22 RX ORDER — TRAZODONE HYDROCHLORIDE 50 MG/1
50 TABLET ORAL
Qty: 90 TABLET | Refills: 0 | Status: SHIPPED | OUTPATIENT
Start: 2023-09-22

## 2023-09-22 RX ORDER — ADALIMUMAB 40MG/0.4ML
40 KIT SUBCUTANEOUS
Qty: 2 EACH | Refills: 3 | Status: SHIPPED | OUTPATIENT
Start: 2023-09-22

## 2023-09-22 NOTE — TELEPHONE ENCOUNTER
Rx Refill Note  Requested Prescriptions     Pending Prescriptions Disp Refills    Humira Pen 40 MG/0.4ML Pen-injector Kit 2 each 3     Sig: Inject 40 mg under the skin into the appropriate area as directed Every 14 (Fourteen) Days.      Last office visit with prescribing clinician: 7/20/2023   Last telemedicine visit with prescribing clinician: Visit date not found   Next office visit with prescribing clinician: Visit date not found        Lipid Panel (01/10/2023 10:06)                  Would you like a call back once the refill request has been completed: [] Yes [] No    If the office needs to give you a call back, can they leave a voicemail: [] Yes [] No    Destini Snider, RT  09/22/23, 13:30 EDT

## 2023-09-25 RX ORDER — TRAZODONE HYDROCHLORIDE 50 MG/1
TABLET ORAL
Qty: 90 TABLET | Refills: 0 | OUTPATIENT
Start: 2023-09-25

## 2023-09-26 ENCOUNTER — PRIOR AUTHORIZATION (OUTPATIENT)
Dept: FAMILY MEDICINE CLINIC | Facility: CLINIC | Age: 73
End: 2023-09-26
Payer: MEDICARE

## 2023-09-26 NOTE — TELEPHONE ENCOUNTER
PA Case: [id number], Status: Approved, Coverage Starts on: 6/23/2023 12:00:00 AM, Coverage Ends on: 9/21/2024 12:00:00 AM.

## 2023-09-27 NOTE — PROGRESS NOTES
Endocrine Progress Note Outpatient     Patient Care Team:  Emily Burdick DO as PCP - General (Family Medicine)  Joe García MD as Consulting Physician (Cardiology)  Dano Rangel MD as Consulting Physician (Endocrinology)  Conchis Mohamud OD as Consulting Physician (Optometry)    Chief Complaint: Follow-up type 2 diabetes    HPI: 72-year-old female with history of type 2 diabetes, hypertension and hyperlipidemia is here for follow-up.    For type 2 diabetes: She is currently on Janumet /100, 1 tablet daily with Novolin 70/30 insulin she takes 40 units before breakfast and 20 before supper.  She did bring in blood sugar records for review,  She was prescribed Jardiance on the last visit back in May 2021 she took it for a while but developed some UTIs and she stopped it.  I have reviewed her blood sugar records, she is having some low blood sugars but has not required any assistance or hospitalization for low blood sugar since last seen.    Hypertension: Well-controlled today.    Hyperlipidemia: On rosuvastatin.    Past Medical History:   Diagnosis Date   • CAD 2020   • Depression    • DEXA    • Diabetic peripheral neuropathy    • GERD    • Hyperlipidemia    • Hypertension    • Insomnia    • Lower back pain    • MAMMO     NEG = 2021   • Obesity    • Osteoarthritis    • Osteopenia    • Palpitations    • RA    • Rheumatoid nodule 06/2011     rt. thumb x 2 and rt. & lt. 3rd fingers (Oct.2012)/ rt. elbow, left elbow, and epidermal inclusion cyst post. neck (June 2011)-----Dr. Whitmore   • Vitamin D deficiency        Social History     Socioeconomic History   • Marital status:      Spouse name: Carson Rosa   • Number of children: 3   • Years of education: GED   Tobacco Use   • Smoking status: Never Smoker   • Smokeless tobacco: Never Used   Vaping Use   • Vaping Use: Never used   Substance and Sexual Activity   • Alcohol use: Yes     Comment: occ   • Drug use: Never   • Sexual activity: Not  Currently     Partners: Male     Birth control/protection: Post-menopausal       Family History   Problem Relation Age of Onset   • Heart disease Mother    • Diabetes Mother    • Hypertension Mother    • Rheum arthritis Mother    • Coronary artery disease Mother    • Alcohol abuse Mother    • Heart disease Father    • Mental illness Father    • Hypertension Father    • Alcohol abuse Father    • Early death Father    • Stroke Sister    • Cancer Sister 29        RENAL Cancer   • Osteoarthritis Sister    • Rheum arthritis Sister    • Hypertension Sister    • Arthritis Sister    • Diabetes Maternal Grandfather    • No Known Problems Brother    • Rheum arthritis Sister    • Osteoarthritis Sister    • Diabetes Sister    • Arthritis Sister    • Liver disease Sister    • Rheum arthritis Brother    • Osteoarthritis Brother    • Arthritis Brother         RA   • Hypertension Brother        Allergies   Allergen Reactions   • Jardiance [Empagliflozin] Other (See Comments)     UTI's       ROS:   Constitutional:  Denies fatigue, tiredness.    Eyes:  Denies change in visual acuity   HENT:  Denies nasal congestion or sore throat   Respiratory: denies cough, admit shortness of breath.   Cardiovascular:  denies chest pain, edema   GI:  Denies abdominal pain, nausea, vomiting.   Musculoskeletal:  Denies back pain or joint pain   Integument:  Denies dry skin and rash   Neurologic:  Denies headache, focal weakness or sensory changes   Endocrine:  Denies polyuria or polydipsia   Psychiatric:  Denies depression or anxiety      Vitals:    05/26/22 1031   BP: 145/80   Pulse: 57   SpO2: 97%     Body mass index is 30.11 kg/m².     Physical Exam:  GEN: NAD, conversant  EYES: EOMI, PERRL, no conjunctival erythema  NECK: no thyromegaly, full ROM   CV: RRR, no murmurs/rubs/gallops, no peripheral edema  LUNG: CTAB, no wheezes/rales/ronchi  SKIN: no rashes, no acanthosis  MSK: no deformities, full ROM of all extremities  NEURO: no tremors, DTR  "normal  PSYCH: AOX3, appropriate mood, affect normal      Results Review:     I reviewed the patient's new clinical results.    Lab Results   Component Value Date    HGBA1C 7.4 (H) 05/19/2022    HGBA1C 7.2 (H) 11/15/2021    HGBA1C 7.7 (H) 11/17/2020      Lab Results   Component Value Date    GLUCOSE 157 (H) 05/19/2022    BUN 11 05/19/2022    CREATININE 0.65 05/19/2022    EGFRIFNONA 78 11/15/2021    EGFRIFAFRI 59 (L) 08/13/2017    BCR 16.9 05/19/2022    K 4.1 05/19/2022    CO2 25.8 05/19/2022    CALCIUM 9.5 05/19/2022    ALBUMIN 4.20 05/19/2022    LABIL2 1.1 01/29/2019    AST 17 05/19/2022    ALT 20 05/19/2022    CHOL 129 05/19/2022    TRIG 141 05/19/2022    LDL 60 05/19/2022    HDL 45 05/19/2022     Lab Results   Component Value Date    TSH 1.270 10/26/2021         Medication Review: Reviewed.       Current Outpatient Medications:   •  acebutolol (SECTRAL) 200 MG capsule, Take 1 capsule by mouth Daily., Disp: 90 capsule, Rfl: 1  •  acetaminophen (TYLENOL) 325 MG tablet, Take 650 mg by mouth Every 6 (Six) Hours As Needed for Mild Pain ., Disp: , Rfl:   •  amLODIPine (NORVASC) 10 MG tablet, Take 1 tablet by mouth Daily., Disp: 90 tablet, Rfl: 0  •  Ascorbic Acid (Vitamin C) 500 MG capsule, Take 1 capsule by mouth Daily., Disp: , Rfl:   •  aspirin 81 MG EC tablet, Take 81 mg by mouth Daily., Disp: , Rfl:   •  BD Insulin Syringe U/F 31G X 5/16\" 1 ML misc, USE TO INJECT INSULIN 4 TIMES DAILY, Disp: 150 each, Rfl: 3  •  calcium carbonate (OS-PREET) 600 MG tablet, Take 600 mg by mouth Daily., Disp: , Rfl:   •  Cholecalciferol (Vitamin D) 50 MCG (2000 UT) capsule, 1 po qd, Disp: , Rfl:   •  diclofenac (VOLTAREN) 1 % gel gel, Apply 4 g topically to the appropriate area as directed 4 (Four) Times a Day As Needed., Disp: , Rfl:   •  escitalopram (LEXAPRO) 10 MG tablet, Take 1 tablet by mouth Every Night., Disp: 90 tablet, Rfl: 1  •  estradiol (ESTRACE) 0.1 MG/GM vaginal cream, Apply small amt (< 0.5gm) to external vaginal area " QHS, Disp: 42.5 g, Rfl: 2  •  fenofibrate 160 MG tablet, Take 1 tablet by mouth Daily., Disp: 90 tablet, Rfl: 2  •  Ferrous Sulfate (IRON) 325 (65 Fe) MG tablet, Take 325 mg by mouth Daily., Disp: , Rfl:   •  fluticasone (FLONASE) 50 MCG/ACT nasal spray, 1 spray into the nostril(s) as directed by provider Daily As Needed for Allergies., Disp: , Rfl:   •  gabapentin (NEURONTIN) 300 MG capsule, Take 1 capsule by mouth 2 (Two) Times a Day., Disp: 180 capsule, Rfl: 1  •  glucose blood (Accu-Chek Jazmyne Plus) test strip, USE ONE STRIP TO TEST TWICE A DAY, Disp: 200 each, Rfl: 2  •  Humira Pen 40 MG/0.4ML Pen-injector Kit, Inject 40 mg under the skin into the appropriate area as directed Every 14 (Fourteen) Days. Sulfate Free Formula, Disp: 2 each, Rfl: 3  •  HumuLIN 70/30 (70-30) 100 UNIT/ML injection, INJECT 45 UNITS BEFORE BREAKFAST AND 20 UNITS BEFORE SUPPER DX E11.65, Disp: 20 mL, Rfl: 11  •  IBUPROFEN-ACETAMINOPHEN PO, Take  by mouth., Disp: , Rfl:   •  isosorbide mononitrate (IMDUR) 30 MG 24 hr tablet, Take 1 tablet by mouth 2 (Two) Times a Day., Disp: 180 tablet, Rfl: 0  •  lansoprazole (PREVACID) 30 MG capsule, Take 1 capsule by mouth Daily., Disp: 90 capsule, Rfl: 1  •  lisinopril (PRINIVIL,ZESTRIL) 20 MG tablet, Take 1 tablet p.o. daily., Disp: 90 tablet, Rfl: 4  •  nitrofurantoin, macrocrystal-monohydrate, (Macrobid) 100 MG capsule, Take 1 capsule by mouth 2 (Two) Times a Day., Disp: 14 capsule, Rfl: 0  •  ondansetron ODT (ZOFRAN-ODT) 4 MG disintegrating tablet, Place 1 tablet on the tongue Every 8 (Eight) Hours As Needed for Nausea., Disp: 30 tablet, Rfl: 0  •  rosuvastatin (Crestor) 5 MG tablet, Take 1 tablet by mouth Every Night., Disp: 90 tablet, Rfl: 4  •  SITagliptin-metFORMIN HCl ER (Janumet XR) 100-1000 MG tablet, Take 1 tablet by mouth Daily., Disp: 90 tablet, Rfl: 0  •  traMADol (ULTRAM) 50 MG tablet, , Disp: , Rfl:   •  traZODone (DESYREL) 50 MG tablet, TAKE 1 TABLET BY MOUTH EVERY NIGHT AT BEDTIME.,  Disp: 90 tablet, Rfl: 0  •  vitamin B-12 (CYANOCOBALAMIN) 1000 MCG tablet, Take 1,000 mcg by mouth Daily., Disp: , Rfl:   •  Zinc 50 MG tablet, Take 1 tablet by mouth Daily., Disp: , Rfl:       Assessment & Plan   1.  Diabetes mellitus type 2: Much better with A1c now at 7.4%.  I reviewed her blood sugar records, will continue Janumet XR 1 tablet p.o. daily along with 70/30 insulin, 40 units before breakfast and 20 before supper.  She does have some low blood sugars but overall doing well.  We will continue her current regimen.  Needs to work on her diet and activity. Also advised to keep glucose source in case of low blood sugar if she continues to have blood sugars less than 70 then she will call us.    2.  Hypertension: Fair control, continue lisinopril 20 mg p.o. daily..    3.  Hyperlipidemia: Well-controlled, continue rosuvastatin.    4.  Diabetic peripheral neuropathy: Stable.    Assessment and plan from November 22, 2021 reviewed and updated.            Dano Rangel MD FACE.

## 2023-09-27 NOTE — TELEPHONE ENCOUNTER
HUB to read    PA approved for Humira Pen (CF) 40MG/0.4ML pen-injector kit    PA Case: [id number], Status: Approved, Coverage Starts on: 6/23/2023 12:00:00 AM, Coverage Ends on: 9/21/2024 12:00:00 AM.

## 2023-10-04 ENCOUNTER — TRANSCRIBE ORDERS (OUTPATIENT)
Dept: LAB | Facility: HOSPITAL | Age: 73
End: 2023-10-04
Payer: MEDICARE

## 2023-10-04 ENCOUNTER — LAB (OUTPATIENT)
Dept: LAB | Facility: HOSPITAL | Age: 73
End: 2023-10-04
Payer: MEDICARE

## 2023-10-04 ENCOUNTER — HOSPITAL ENCOUNTER (OUTPATIENT)
Dept: CARDIOLOGY | Facility: HOSPITAL | Age: 73
Discharge: HOME | End: 2023-10-04
Payer: MEDICARE

## 2023-10-04 DIAGNOSIS — N36.42 URETHRAL SPHINCTER DEFICIENCY, INTRINSIC (ISD): ICD-10-CM

## 2023-10-04 DIAGNOSIS — N36.42 URETHRAL SPHINCTER DEFICIENCY, INTRINSIC (ISD): Primary | ICD-10-CM

## 2023-10-04 LAB
ANION GAP SERPL CALCULATED.3IONS-SCNC: 11 MMOL/L (ref 5–15)
BASOPHILS # BLD AUTO: 0.03 10*3/MM3 (ref 0–0.2)
BASOPHILS NFR BLD AUTO: 0.5 % (ref 0–1.5)
BUN SERPL-MCNC: 13 MG/DL (ref 8–23)
BUN/CREAT SERPL: 16.3 (ref 7–25)
CALCIUM SPEC-SCNC: 9.3 MG/DL (ref 8.6–10.5)
CHLORIDE SERPL-SCNC: 100 MMOL/L (ref 98–107)
CO2 SERPL-SCNC: 25 MMOL/L (ref 22–29)
CREAT SERPL-MCNC: 0.8 MG/DL (ref 0.57–1)
DEPRECATED RDW RBC AUTO: 42.9 FL (ref 37–54)
EGFRCR SERPLBLD CKD-EPI 2021: 78.4 ML/MIN/1.73
EOSINOPHIL # BLD AUTO: 0.38 10*3/MM3 (ref 0–0.4)
EOSINOPHIL NFR BLD AUTO: 6.3 % (ref 0.3–6.2)
ERYTHROCYTE [DISTWIDTH] IN BLOOD BY AUTOMATED COUNT: 12.6 % (ref 12.3–15.4)
GLUCOSE SERPL-MCNC: 336 MG/DL (ref 65–99)
HCT VFR BLD AUTO: 38.8 % (ref 34–46.6)
HGB BLD-MCNC: 12.5 G/DL (ref 12–15.9)
IMM GRANULOCYTES # BLD AUTO: 0.02 10*3/MM3 (ref 0–0.05)
IMM GRANULOCYTES NFR BLD AUTO: 0.3 % (ref 0–0.5)
LYMPHOCYTES # BLD AUTO: 2.51 10*3/MM3 (ref 0.7–3.1)
LYMPHOCYTES NFR BLD AUTO: 41.6 % (ref 19.6–45.3)
MCH RBC QN AUTO: 30.3 PG (ref 26.6–33)
MCHC RBC AUTO-ENTMCNC: 32.2 G/DL (ref 31.5–35.7)
MCV RBC AUTO: 94.2 FL (ref 79–97)
MONOCYTES # BLD AUTO: 0.55 10*3/MM3 (ref 0.1–0.9)
MONOCYTES NFR BLD AUTO: 9.1 % (ref 5–12)
NEUTROPHILS NFR BLD AUTO: 2.54 10*3/MM3 (ref 1.7–7)
NEUTROPHILS NFR BLD AUTO: 42.2 % (ref 42.7–76)
NRBC BLD AUTO-RTO: 0 /100 WBC (ref 0–0.2)
PLATELET # BLD AUTO: 271 10*3/MM3 (ref 140–450)
PMV BLD AUTO: 10 FL (ref 6–12)
POTASSIUM SERPL-SCNC: 4.5 MMOL/L (ref 3.5–5.2)
QT INTERVAL: 462 MS
QTC INTERVAL: 444 MS
RBC # BLD AUTO: 4.12 10*6/MM3 (ref 3.77–5.28)
SODIUM SERPL-SCNC: 136 MMOL/L (ref 136–145)
WBC NRBC COR # BLD: 6.03 10*3/MM3 (ref 3.4–10.8)

## 2023-10-04 PROCEDURE — 93005 ELECTROCARDIOGRAM TRACING: CPT | Performed by: UROLOGY

## 2023-10-04 PROCEDURE — 36415 COLL VENOUS BLD VENIPUNCTURE: CPT

## 2023-10-04 PROCEDURE — 85025 COMPLETE CBC W/AUTO DIFF WBC: CPT

## 2023-10-04 PROCEDURE — 80048 BASIC METABOLIC PNL TOTAL CA: CPT

## 2023-10-14 LAB
QT INTERVAL: 462 MS
QTC INTERVAL: 444 MS

## 2023-11-01 DIAGNOSIS — E11.65 TYPE 2 DIABETES MELLITUS WITH HYPERGLYCEMIA, WITH LONG-TERM CURRENT USE OF INSULIN: ICD-10-CM

## 2023-11-01 DIAGNOSIS — Z79.4 TYPE 2 DIABETES MELLITUS WITH HYPERGLYCEMIA, WITH LONG-TERM CURRENT USE OF INSULIN: ICD-10-CM

## 2023-11-01 RX ORDER — ACEBUTOLOL HYDROCHLORIDE 200 MG/1
CAPSULE ORAL
Qty: 90 CAPSULE | Refills: 1 | Status: SHIPPED | OUTPATIENT
Start: 2023-11-01

## 2023-11-02 DIAGNOSIS — Z79.4 TYPE 2 DIABETES MELLITUS WITH HYPERGLYCEMIA, WITH LONG-TERM CURRENT USE OF INSULIN: ICD-10-CM

## 2023-11-02 DIAGNOSIS — Z79.899 LONG-TERM USE OF HIGH-RISK MEDICATION: ICD-10-CM

## 2023-11-02 DIAGNOSIS — M05.9 SEROPOSITIVE RHEUMATOID ARTHRITIS: Primary | ICD-10-CM

## 2023-11-02 DIAGNOSIS — E11.65 TYPE 2 DIABETES MELLITUS WITH HYPERGLYCEMIA, WITH LONG-TERM CURRENT USE OF INSULIN: ICD-10-CM

## 2023-11-02 RX ORDER — INSULIN HUMAN 100 [IU]/ML
INJECTION, SUSPENSION SUBCUTANEOUS
Qty: 60 ML | Refills: 3 | Status: SHIPPED | OUTPATIENT
Start: 2023-11-02

## 2023-11-06 ENCOUNTER — CLINICAL SUPPORT (OUTPATIENT)
Dept: FAMILY MEDICINE CLINIC | Facility: CLINIC | Age: 73
End: 2023-11-06
Payer: MEDICARE

## 2023-11-06 DIAGNOSIS — M05.9 SEROPOSITIVE RHEUMATOID ARTHRITIS: Primary | ICD-10-CM

## 2023-11-06 DIAGNOSIS — Z79.899 LONG-TERM USE OF HIGH-RISK MEDICATION: ICD-10-CM

## 2023-11-06 PROCEDURE — 86480 TB TEST CELL IMMUN MEASURE: CPT | Performed by: FAMILY MEDICINE

## 2023-11-06 PROCEDURE — 36415 COLL VENOUS BLD VENIPUNCTURE: CPT | Performed by: FAMILY MEDICINE

## 2023-11-06 NOTE — PROGRESS NOTES
Venipuncture performed in L ARM by RT Aimee  with good hemostasis. Patient tolerated well. 11/06/23 Destini Snider, RT

## 2023-11-07 RX ORDER — METHOTREXATE 2.5 MG/1
TABLET ORAL
Qty: 60 TABLET | Refills: 0 | Status: SHIPPED | OUTPATIENT
Start: 2023-11-07

## 2023-11-07 NOTE — TELEPHONE ENCOUNTER
Rx Refill Note  Requested Prescriptions     Pending Prescriptions Disp Refills    methotrexate 2.5 MG tablet [Pharmacy Med Name: METHOTREXATE 2.5 MG TABLET] 60 tablet      Sig: TAKE 5 TABLETS BY MOUTH ONCE A WEEK      Last office visit with prescribing clinician: 7/20/2023   Last telemedicine visit with prescribing clinician: Visit date not found   Next office visit with prescribing clinician: 11/8/2023     Basic Metabolic Panel (10/04/2023 12:08)   CBC & Differential (10/04/2023 12:08)                       Would you like a call back once the refill request has been completed: [] Yes [] No    If the office needs to give you a call back, can they leave a voicemail: [] Yes [] No    Brianda Toro CMA  11/07/23, 08:17 EST

## 2023-11-08 ENCOUNTER — OFFICE VISIT (OUTPATIENT)
Dept: FAMILY MEDICINE CLINIC | Facility: CLINIC | Age: 73
End: 2023-11-08
Payer: MEDICARE

## 2023-11-08 VITALS
RESPIRATION RATE: 14 BRPM | HEIGHT: 63 IN | SYSTOLIC BLOOD PRESSURE: 143 MMHG | HEART RATE: 57 BPM | BODY MASS INDEX: 29.95 KG/M2 | OXYGEN SATURATION: 98 % | TEMPERATURE: 98.2 F | WEIGHT: 169 LBS | DIASTOLIC BLOOD PRESSURE: 65 MMHG

## 2023-11-08 DIAGNOSIS — Z79.4 TYPE 2 DIABETES MELLITUS WITH HYPERGLYCEMIA, WITH LONG-TERM CURRENT USE OF INSULIN: ICD-10-CM

## 2023-11-08 DIAGNOSIS — E11.65 TYPE 2 DIABETES MELLITUS WITH HYPERGLYCEMIA, WITH LONG-TERM CURRENT USE OF INSULIN: ICD-10-CM

## 2023-11-08 DIAGNOSIS — Z23 NEED FOR INFLUENZA VACCINATION: ICD-10-CM

## 2023-11-08 DIAGNOSIS — G62.9 NEUROPATHY: ICD-10-CM

## 2023-11-08 DIAGNOSIS — Z23 IMMUNIZATION DUE: ICD-10-CM

## 2023-11-08 DIAGNOSIS — R42 VERTIGO: ICD-10-CM

## 2023-11-08 DIAGNOSIS — R82.90 ABNORMAL URINALYSIS: Primary | ICD-10-CM

## 2023-11-08 DIAGNOSIS — R35.0 FREQUENCY OF URINATION: ICD-10-CM

## 2023-11-08 LAB
BILIRUB BLD-MCNC: NEGATIVE MG/DL
CLARITY, POC: CLEAR
COLOR UR: YELLOW
EXPIRATION DATE: ABNORMAL
GLUCOSE UR STRIP-MCNC: ABNORMAL MG/DL
KETONES UR QL: NEGATIVE
LEUKOCYTE EST, POC: ABNORMAL
Lab: ABNORMAL
NITRITE UR-MCNC: NEGATIVE MG/ML
PH UR: 6.5 [PH] (ref 5–8)
PROT UR STRIP-MCNC: NEGATIVE MG/DL
RBC # UR STRIP: NEGATIVE /UL
SP GR UR: 1.02 (ref 1–1.03)
UROBILINOGEN UR QL: ABNORMAL

## 2023-11-08 PROCEDURE — 1160F RVW MEDS BY RX/DR IN RCRD: CPT | Performed by: FAMILY MEDICINE

## 2023-11-08 PROCEDURE — 99213 OFFICE O/P EST LOW 20 MIN: CPT | Performed by: FAMILY MEDICINE

## 2023-11-08 PROCEDURE — 90662 IIV NO PRSV INCREASED AG IM: CPT | Performed by: FAMILY MEDICINE

## 2023-11-08 PROCEDURE — 87077 CULTURE AEROBIC IDENTIFY: CPT | Performed by: FAMILY MEDICINE

## 2023-11-08 PROCEDURE — G0008 ADMIN INFLUENZA VIRUS VAC: HCPCS | Performed by: FAMILY MEDICINE

## 2023-11-08 PROCEDURE — 83036 HEMOGLOBIN GLYCOSYLATED A1C: CPT | Performed by: FAMILY MEDICINE

## 2023-11-08 PROCEDURE — 3077F SYST BP >= 140 MM HG: CPT | Performed by: FAMILY MEDICINE

## 2023-11-08 PROCEDURE — 3051F HG A1C>EQUAL 7.0%<8.0%: CPT | Performed by: FAMILY MEDICINE

## 2023-11-08 PROCEDURE — 1159F MED LIST DOCD IN RCRD: CPT | Performed by: FAMILY MEDICINE

## 2023-11-08 PROCEDURE — 3078F DIAST BP <80 MM HG: CPT | Performed by: FAMILY MEDICINE

## 2023-11-08 PROCEDURE — 81003 URINALYSIS AUTO W/O SCOPE: CPT | Performed by: FAMILY MEDICINE

## 2023-11-08 PROCEDURE — 87086 URINE CULTURE/COLONY COUNT: CPT | Performed by: FAMILY MEDICINE

## 2023-11-08 PROCEDURE — 87186 SC STD MICRODIL/AGAR DIL: CPT | Performed by: FAMILY MEDICINE

## 2023-11-08 RX ORDER — GABAPENTIN 300 MG/1
300 CAPSULE ORAL 2 TIMES DAILY
Qty: 180 CAPSULE | Refills: 0 | Status: SHIPPED | OUTPATIENT
Start: 2023-11-08

## 2023-11-08 RX ORDER — MECLIZINE HCL 12.5 MG/1
TABLET ORAL
Qty: 20 TABLET | Refills: 0 | Status: SHIPPED | OUTPATIENT
Start: 2023-11-08

## 2023-11-08 RX ORDER — LORATADINE 10 MG/1
10 TABLET ORAL NIGHTLY
COMMUNITY

## 2023-11-08 NOTE — PROGRESS NOTES
Answers submitted by the patient for this visit:  Other (Submitted on 11/6/2023)  Please describe your symptoms.: Dizzy unbalanced  Have you had these symptoms before?: Yes  How long have you been having these symptoms?: Greater than 2 weeks  Primary Reason for Visit (Submitted on 11/6/2023)  What is the primary reason for your visit?: Other  Subjective   Jenna Rosa is a 73 y.o. female.     Chief Complaint   Patient presents with    Dizziness     Dizziness and balance issues.         Current Outpatient Medications:     acebutolol (SECTRAL) 200 MG capsule, TAKE ONE CAPSULE BY MOUTH DAILY, Disp: 90 capsule, Rfl: 1    acetaminophen (TYLENOL) 325 MG tablet, Take 2 tablets by mouth Every 6 (Six) Hours As Needed for Mild Pain., Disp: , Rfl:     albuterol sulfate  (90 Base) MCG/ACT inhaler, Inhale 2 puffs Every 6 (Six) Hours As Needed for Shortness of Air (cough)., Disp: 18 g, Rfl: 0    amLODIPine (NORVASC) 10 MG tablet, TAKE ONE TABLET BY MOUTH DAILY, Disp: 90 tablet, Rfl: 1    aspirin 81 MG EC tablet, Take 1 tablet by mouth Daily., Disp: , Rfl:     Cholecalciferol (Vitamin D) 50 MCG (2000 UT) capsule, 1 po qd, Disp: , Rfl:     Continuous Blood Gluc  (FreeStyle [place] 2 [place]) device, USE WITH SENSOR EVERY 14 DAYS, Disp: 1 each, Rfl: 0    Continuous Blood Gluc Sensor (FreeStyle [place] 2 Sensor) misc, USE AND REPLACE ONE EVERY 14 DAYS WITH FREESTYLE [place] 2 READER, Disp: 6 each, Rfl: 3    diclofenac (VOLTAREN) 1 % gel gel, Apply 4 g topically to the appropriate area as directed 4 (Four) Times a Day As Needed., Disp: , Rfl:     fenofibrate 160 MG tablet, Take 1 tablet by mouth Daily., Disp: 90 tablet, Rfl: 2    fluticasone (FLONASE) 50 MCG/ACT nasal spray, 1 spray into the nostril(s) as directed by provider Daily As Needed for Allergies., Disp: , Rfl:     folic acid (FOLVITE) 1 MG tablet, TAKE ONE TABLET BY MOUTH DAILY, Disp: 90 tablet, Rfl: 1    gabapentin (NEURONTIN) 300 MG capsule, Take 1 capsule by  mouth 2 (Two) Times a Day., Disp: 180 capsule, Rfl: 0    Humira Pen 40 MG/0.4ML Pen-injector Kit, Inject 40 mg under the skin into the appropriate area as directed Every 14 (Fourteen) Days., Disp: 2 each, Rfl: 3    IBUPROFEN-ACETAMINOPHEN PO, Take  by mouth., Disp: , Rfl:     Insulin NPH Isophane & Regular (HumuLIN 70/30 KwikPen) (70-30) 100 UNIT/ML suspension pen-injector, INJECT 45 UNITS UNDER THE SKIN BEFORE BREAKFAST AND 20 UNITS BEFORE SUPPER (Patient taking differently: Inject 20 Units under the skin into the appropriate area as directed 3 (Three) Times a Day With Meals. INJECT 20 UNITS UNDER THE SKIN BEFORE BREAKFAST AND 20 UNITS BEFORE LUNCH AND 20 UNITS BEFORE SUPPER), Disp: 60 mL, Rfl: 3    Insulin Pen Needle 32G X 4 MM misc, Use 1 each 2 (Two) Times a Day., Disp: 200 each, Rfl: 3    isosorbide mononitrate (IMDUR) 60 MG 24 hr tablet, Take 1 tablet by mouth Every Morning., Disp: 90 tablet, Rfl: 3    Janumet -1000 MG tablet, TAKE ONE TABLET BY MOUTH DAILY, Disp: 90 tablet, Rfl: 1    lansoprazole (PREVACID) 30 MG capsule, TAKE ONE CAPSULE BY MOUTH DAILY, Disp: 90 capsule, Rfl: 1    lisinopril (PRINIVIL,ZESTRIL) 40 MG tablet, TAKE ONE TABLET BY MOUTH DAILY, Disp: 90 tablet, Rfl: 1    loratadine (Claritin) 10 MG tablet, Take 1 tablet by mouth Every Night., Disp: , Rfl:     methotrexate 2.5 MG tablet, TAKE 5 TABLETS BY MOUTH ONCE A WEEK, Disp: 60 tablet, Rfl: 0    olopatadine (PATANOL) 0.1 % ophthalmic solution, , Disp: , Rfl:     ondansetron ODT (ZOFRAN-ODT) 4 MG disintegrating tablet, Place 1 tablet on the tongue Every 8 (Eight) Hours As Needed for Nausea., Disp: 30 tablet, Rfl: 0    rosuvastatin (CRESTOR) 5 MG tablet, Take 1 tablet by mouth Every Night., Disp: 90 tablet, Rfl: 1    traZODone (DESYREL) 50 MG tablet, Take 1 tablet by mouth every night at bedtime., Disp: 90 tablet, Rfl: 0    vitamin B-12 (CYANOCOBALAMIN) 1000 MCG tablet, Take 1 tablet by mouth Daily., Disp: , Rfl:     meclizine (ANTIVERT)  12.5 MG tablet, 1/2 - 1 po q6-8hrs prn dizzyness, Disp: 20 tablet, Rfl: 0    Past Medical History:   Diagnosis Date    CAD 2020    Cancer 2021    Skin cancer    Colon polyp     Congenital heart disease 2017    Depression     DEXA     2019= (-0.7/ -1.6); 2023= (-0.7/ -2.7)    Diabetic peripheral neuropathy     Diverticulosis     GERD     Heart murmur 2017    Hyperlipidemia     Hypertension     Insomnia     Lower back pain     MAMMO     NEG = 2021/ 2023    Obesity     Osteoarthritis     Osteopenia     Osteoporosis     Palpitations     RA     Rheumatoid nodule 06/2011     rt. thumb x 2 and rt. & lt. 3rd fingers (Oct.2012)/ rt. elbow, left elbow, and epidermal inclusion cyst post. neck (June 2011)-----Dr. Whitmore    Vitamin D deficiency        Past Surgical History:   Procedure Laterality Date    BLADDER SUSPENSION  10/11/2023        BREAST SURGERY  1972    CARDIAC CATHETERIZATION N/A 09/16/2020    Procedure: Left Heart Cath;  Surgeon: Walker Rodriguez MD;  Location: [facility] CATH INVASIVE LOCATION;  Service: Cardiovascular;  Laterality: N/A;    CARDIAC CATHETERIZATION N/A 09/16/2020    Procedure: Coronary angiography;  Surgeon: Walker Rodriguez MD;  Location: [facility] CATH INVASIVE LOCATION;  Service: Cardiovascular;  Laterality: N/A;    CARDIAC CATHETERIZATION  09/16/2020    Procedure: Functional Flow [place];  Surgeon: Kath Medina MD;  Location: [facility] CATH INVASIVE LOCATION;  Service: Cardiology;;    CARPAL TUNNEL RELEASE Right 02/20/2023    Procedure: CARPAL TUNNEL RELEASE WITH POSSIBLE SYNOVECTOMY;  Surgeon: Amadeo Magaña MD;  Location: [facility] MAIN OR;  Service: Orthopedics;  Laterality: Right;    CHOLECYSTECTOMY      COLONOSCOPY      2017 / 2023= TA, rech 2028? [facility]    CYST REMOVAL      Seb cyst on neck    EYE SURGERY      TOTAL ABDOMINAL HYSTERECTOMY WITH SALPINGO OOPHORECTOMY      VAGINAL PROLAPSE REPAIR       Uterine Prolapse repair       Family History   Problem  Relation Age of Onset    Heart disease Mother     Diabetes Mother     Hypertension Mother     Rheum arthritis Mother     Coronary artery disease Mother     Alcohol abuse Mother     Heart failure Mother     Heart disease Father     Mental illness Father     Hypertension Father     Alcohol abuse Father     Early death Father     Heart attack Father     Heart failure Father     Kidney cancer Sister     Stroke Sister     Osteoarthritis Sister     Rheum arthritis Sister     Hypertension Sister     Arthritis Sister     Diabetes Sister     Stroke Sister     Hypertension Sister     Rheum arthritis Sister     Osteoarthritis Sister     Diabetes Sister     Arthritis Sister     Liver disease Sister     Kidney disease Sister     No Known Problems Brother     Rheum arthritis Brother     Osteoarthritis Brother     Hypertension Brother     Diabetes Maternal Grandfather        Social History     Socioeconomic History    Marital status:      Spouse name: Carson Rosa    Number of children: 3    Years of education: GED   Tobacco Use    Smoking status: Never     Passive exposure: Never    Smokeless tobacco: Never   Vaping Use    Vaping Use: Never used   Substance and Sexual Activity    Alcohol use: Yes     Comment: 3-4 a year    Drug use: Never    Sexual activity: Not Currently     Partners: Male     Birth control/protection: Hysterectomy, Same-sex partner       Dizziness  Associated symptoms include congestion. Pertinent negatives include no chills, coughing, fatigue, fever, nausea, rash, sore throat, swollen glands, vomiting or weakness.   The patient presents for complaints of dizzy spells for more than 2 weeks. Blood pressure =143/65 mmHg.    She does monitor her blood pressure at home. Her Humulin dose was split into QAC dosing-----20 units at breakfast, 20 units at lunch and 20 units at bedtime. She still experiences low glucose levels at night because she does not eat enough protein.     She saw Dr. García recently who  told her she did not really need to continue seeing Dr. Gotti any longer.     The patient had bladder sling surgery last month with Dr. Graff and was prescribed Keflex. She is still incontinent. She has a follow-up appointment in 2024. She is not sure if she has a bladder infection currently. The surgery helped her to walk without irritation because she had a prolapsed bladder. She denies burning with urination.     The patient has had vertigo intermittently for a month. She denies nausea or vomiting. She feels like the room is spinning. She has some otalgia. She has some congestion. She denies fever. She did not try any over the counter medication for dizziness. She does have vision issues but not due to vertigo. She denies a cough. She has a cane and walker if she needs them. She has a 24-ounce water bottle and drinks 2 to 3 of those a day. She drinks coffee and occasionally tea.     She had her TB test on Monday, 2023. She has not had her annual diabetic eye examination with Dr. Mohamud.     Her blood glucose this morning was 133 mg/dL. She has her Dexcom monitor. She has an appointment with Dr. Rangel next year.     She has 2 sisters that are still living and 3 sisters that have passed away.  There is a strong family history of rheumatoid arthritis. She has one brother who  in a car accident and another who is still living, and he has rheumatoid arthritis and hypertension.     She denies changes to her medications or doses.     The following portions of the patient's history were reviewed and updated as appropriate: allergies, current medications, past family history, past medical history, past social history, past surgical history and problem list.    Review of Systems   Constitutional:  Negative for activity change, appetite change, chills, fatigue, fever, unexpected weight gain and unexpected weight loss.   HENT:  Positive for congestion and ear pain. Negative for ear discharge, postnasal  drip, rhinorrhea, sinus pressure, sneezing, sore throat and swollen glands.    Eyes:  Negative for pain, discharge, redness, itching and visual disturbance.   Respiratory:  Negative for cough, shortness of breath, wheezing and stridor.    Gastrointestinal:  Negative for nausea and vomiting.   Genitourinary:  Positive for frequency and urgency. Negative for dysuria, hematuria and urinary incontinence.   Musculoskeletal:  Negative for gait problem.   Skin:  Negative for rash.   Allergic/Immunologic: Negative for environmental allergies.   Neurological:  Positive for dizziness. Negative for seizures, syncope, weakness, light-headedness and confusion.   Psychiatric/Behavioral:  Negative for sleep disturbance.        Vitals:    11/08/23 0812   BP: 143/65   Pulse: 57   Resp: 14   Temp: 98.2 °F (36.8 °C)   SpO2: 98%       Objective   Physical Exam  Vitals and nursing note reviewed.   Constitutional:       General: She is not in acute distress.     Appearance: She is well-developed. She is not ill-appearing, toxic-appearing or diaphoretic.   HENT:      Head: Normocephalic and atraumatic.      Right Ear: Tympanic membrane, ear canal and external ear normal. There is no impacted cerumen.      Left Ear: Tympanic membrane, ear canal and external ear normal. There is no impacted cerumen.   Neck:      Thyroid: No thyromegaly.      Vascular: No carotid bruit.   Cardiovascular:      Rate and Rhythm: Normal rate and regular rhythm.      Heart sounds: Normal heart sounds. No murmur heard.  Pulmonary:      Effort: Pulmonary effort is normal. No respiratory distress.      Breath sounds: Normal breath sounds. No wheezing or rales.   Musculoskeletal:      Cervical back: Normal range of motion and neck supple.   Lymphadenopathy:      Cervical: No cervical adenopathy.   Skin:     General: Skin is warm and dry.      Coloration: Skin is not pale.      Findings: No erythema or rash.   Neurological:      Mental Status: She is alert and  oriented to person, place, and time.      Cranial Nerves: No cranial nerve deficit.   Psychiatric:         Attention and Perception: Attention normal.         Mood and Affect: Mood and affect normal.         Speech: Speech normal.         Behavior: Behavior normal. Behavior is cooperative.         Thought Content: Thought content normal.         Cognition and Memory: Cognition and memory normal.         Judgment: Judgment normal.       Assessment & Plan   Diagnoses and all orders for this visit:    1. Vertigo (Primary)    2. Frequency of urination  -     POCT urinalysis dipstick, automated    3. Type 2 diabetes mellitus with hyperglycemia, with long-term current use of insulin  -     Cancel: Hemoglobin A1c  -     Hemoglobin A1c    4. Neuropathy  -     gabapentin (NEURONTIN) 300 MG capsule; Take 1 capsule by mouth 2 (Two) Times a Day.  Dispense: 180 capsule; Refill: 0    5. Immunization due  -     Fluzone High-Dose 65+yrs ([id number])    Other orders  -     meclizine (ANTIVERT) 12.5 MG tablet; 1/2 - 1 po q6-8hrs prn dizzyness  Dispense: 20 tablet; Refill: 0      Health maintenance  Will administer influenza vaccine. Discussed Shingrix vaccine.    Diabetes mellitus  Advised to increase carbohydrate intake to avoid low glucose levels at night. Will check hemoglobin A1c.     Urinary frequency  Will order urinalysis. Advised to urinate every 2 hours to avoid bladder incontinence.     Vertigo  Will prescribe meclizine 1/2 to 1 tablet every 6 to 8 hours as needed.     Transcribed from ambient dictation for Emily Burdick DO by Polly Norton.  11/08/23   11:52 EST    Patient or patient representative verbalized consent to the visit recording.  I have personally performed the services described in this document as transcribed by the above individual, and it is both accurate and complete.

## 2023-11-08 NOTE — PROGRESS NOTES
Venipuncture performed in left arm by Brianda Toro CMA  with good hemostasis. Patient tolerated well. 11/08/23 Brianda Toro CMA

## 2023-11-09 LAB
GAMMA INTERFERON BACKGROUND BLD IA-ACNC: 0.21 IU/ML
HBA1C MFR BLD: 7.8 % (ref 4.8–5.6)
M TB IFN-G BLD-IMP: NEGATIVE
M TB IFN-G CD4+ T-CELLS BLD-ACNC: 0.25 IU/ML
M TBIFN-G CD4+ CD8+T-CELLS BLD-ACNC: 0.21 IU/ML
MITOGEN IGNF BLD-ACNC: >10 IU/ML
QUANTIFERON INCUBATION: NORMAL
SERVICE CMNT-IMP: NORMAL

## 2023-11-10 ENCOUNTER — PATIENT MESSAGE (OUTPATIENT)
Dept: FAMILY MEDICINE CLINIC | Facility: CLINIC | Age: 73
End: 2023-11-10

## 2023-11-10 LAB — BACTERIA SPEC AEROBE CULT: ABNORMAL

## 2023-11-10 RX ORDER — NITROFURANTOIN 25; 75 MG/1; MG/1
100 CAPSULE ORAL 2 TIMES DAILY
Qty: 14 CAPSULE | Refills: 0 | Status: SHIPPED | OUTPATIENT
Start: 2023-11-10

## 2023-11-18 NOTE — TELEPHONE ENCOUNTER
Last visit: 5/4/21  Next visit:10/12/21  Last labs:5/21/21     Rx requested: Gabapentin   Pharmacy: [facility] in [place]    Patient/Caregiver provided printed discharge information.

## 2023-12-06 ENCOUNTER — CLINICAL SUPPORT (OUTPATIENT)
Dept: FAMILY MEDICINE CLINIC | Facility: CLINIC | Age: 73
End: 2023-12-06
Payer: MEDICARE

## 2023-12-06 ENCOUNTER — TELEPHONE (OUTPATIENT)
Dept: FAMILY MEDICINE CLINIC | Facility: CLINIC | Age: 73
End: 2023-12-06

## 2023-12-06 DIAGNOSIS — R35.0 FREQUENCY OF URINATION: Primary | ICD-10-CM

## 2023-12-06 PROCEDURE — 81001 URINALYSIS AUTO W/SCOPE: CPT | Performed by: FAMILY MEDICINE

## 2023-12-06 PROCEDURE — 87186 SC STD MICRODIL/AGAR DIL: CPT

## 2023-12-06 PROCEDURE — 87077 CULTURE AEROBIC IDENTIFY: CPT | Performed by: FAMILY MEDICINE

## 2023-12-06 PROCEDURE — 87086 URINE CULTURE/COLONY COUNT: CPT | Performed by: FAMILY MEDICINE

## 2023-12-06 NOTE — TELEPHONE ENCOUNTER
Caller: Jenna Rosa    Relationship to patient: Self    Best call back number: [phone]    Patient is needing:     PATIENT STATES SHE IS STILL EXPERIENCING FREQUENT URINATION.     WOULD LIKE TO KNOW IF SHE SHOULD COME BACK IN FOR ANOTHER URINE CULTURE?    PLEASE ADVISE.

## 2023-12-07 LAB
BACTERIA UR QL AUTO: ABNORMAL /HPF
BILIRUB UR QL STRIP: NEGATIVE
CLARITY UR: CLEAR
COLOR UR: ABNORMAL
GLUCOSE UR STRIP-MCNC: ABNORMAL MG/DL
HGB UR QL STRIP.AUTO: NEGATIVE
HOLD SPECIMEN: NORMAL
HYALINE CASTS UR QL AUTO: ABNORMAL /LPF
KETONES UR QL STRIP: NEGATIVE
LEUKOCYTE ESTERASE UR QL STRIP.AUTO: ABNORMAL
NITRITE UR QL STRIP: POSITIVE
PH UR STRIP.AUTO: 5.5 [PH] (ref 5–8)
PROT UR QL STRIP: NEGATIVE
RBC # UR STRIP: ABNORMAL /HPF
REF LAB TEST METHOD: ABNORMAL
SP GR UR STRIP: 1.01 (ref 1–1.03)
SQUAMOUS #/AREA URNS HPF: ABNORMAL /HPF
TRANS CELLS #/AREA URNS HPF: ABNORMAL /HPF
UROBILINOGEN UR QL STRIP: ABNORMAL
WBC # UR STRIP: ABNORMAL /HPF

## 2023-12-07 RX ORDER — LEVOFLOXACIN 500 MG/1
500 TABLET, FILM COATED ORAL DAILY
Qty: 7 TABLET | Refills: 0 | Status: SHIPPED | OUTPATIENT
Start: 2023-12-07

## 2023-12-08 ENCOUNTER — PATIENT MESSAGE (OUTPATIENT)
Dept: FAMILY MEDICINE CLINIC | Facility: CLINIC | Age: 73
End: 2023-12-08
Payer: MEDICARE

## 2023-12-11 ENCOUNTER — PATIENT MESSAGE (OUTPATIENT)
Dept: FAMILY MEDICINE CLINIC | Facility: CLINIC | Age: 73
End: 2023-12-11
Payer: MEDICARE

## 2023-12-11 LAB — BACTERIA SPEC AEROBE CULT: ABNORMAL

## 2023-12-12 ENCOUNTER — PATIENT MESSAGE (OUTPATIENT)
Dept: FAMILY MEDICINE CLINIC | Facility: CLINIC | Age: 73
End: 2023-12-12
Payer: MEDICARE

## 2023-12-13 DIAGNOSIS — Z87.440 RECENT URINARY TRACT INFECTION: Primary | ICD-10-CM

## 2023-12-15 ENCOUNTER — PATIENT MESSAGE (OUTPATIENT)
Dept: FAMILY MEDICINE CLINIC | Facility: CLINIC | Age: 73
End: 2023-12-15
Payer: MEDICARE

## 2023-12-18 ENCOUNTER — CLINICAL SUPPORT (OUTPATIENT)
Dept: FAMILY MEDICINE CLINIC | Facility: CLINIC | Age: 73
End: 2023-12-18
Payer: MEDICARE

## 2023-12-18 DIAGNOSIS — Z87.440 RECENT URINARY TRACT INFECTION: Primary | ICD-10-CM

## 2023-12-18 PROCEDURE — 81003 URINALYSIS AUTO W/O SCOPE: CPT | Performed by: FAMILY MEDICINE

## 2023-12-20 RX ORDER — TRAZODONE HYDROCHLORIDE 50 MG/1
50 TABLET ORAL
Qty: 90 TABLET | Refills: 0 | Status: SHIPPED | OUTPATIENT
Start: 2023-12-20

## 2024-01-02 ENCOUNTER — OFFICE VISIT (OUTPATIENT)
Dept: FAMILY MEDICINE CLINIC | Facility: CLINIC | Age: 74
End: 2024-01-02
Payer: MEDICARE

## 2024-01-02 VITALS
DIASTOLIC BLOOD PRESSURE: 62 MMHG | BODY MASS INDEX: 29.95 KG/M2 | WEIGHT: 169 LBS | OXYGEN SATURATION: 98 % | HEART RATE: 64 BPM | TEMPERATURE: 98.6 F | RESPIRATION RATE: 14 BRPM | SYSTOLIC BLOOD PRESSURE: 143 MMHG | HEIGHT: 63 IN

## 2024-01-02 DIAGNOSIS — N30.01 ACUTE CYSTITIS WITH HEMATURIA: ICD-10-CM

## 2024-01-02 DIAGNOSIS — R35.0 URINARY FREQUENCY: ICD-10-CM

## 2024-01-02 DIAGNOSIS — Z79.4 TYPE 2 DIABETES MELLITUS WITH HYPERGLYCEMIA, WITH LONG-TERM CURRENT USE OF INSULIN: ICD-10-CM

## 2024-01-02 DIAGNOSIS — E11.65 TYPE 2 DIABETES MELLITUS WITH HYPERGLYCEMIA, WITH LONG-TERM CURRENT USE OF INSULIN: ICD-10-CM

## 2024-01-02 DIAGNOSIS — Z00.00 MEDICARE ANNUAL WELLNESS VISIT, SUBSEQUENT: Primary | ICD-10-CM

## 2024-01-02 LAB
BILIRUB BLD-MCNC: NEGATIVE MG/DL
CLARITY, POC: CLEAR
COLOR UR: YELLOW
EXPIRATION DATE: ABNORMAL
GLUCOSE UR STRIP-MCNC: ABNORMAL MG/DL
KETONES UR QL: NEGATIVE
LEUKOCYTE EST, POC: ABNORMAL
Lab: ABNORMAL
NITRITE UR-MCNC: POSITIVE MG/ML
PH UR: 6.5 [PH] (ref 5–8)
PROT UR STRIP-MCNC: NEGATIVE MG/DL
RBC # UR STRIP: ABNORMAL /UL
SP GR UR: 1.01 (ref 1–1.03)
UROBILINOGEN UR QL: ABNORMAL

## 2024-01-02 PROCEDURE — 1170F FXNL STATUS ASSESSED: CPT | Performed by: FAMILY MEDICINE

## 2024-01-02 PROCEDURE — 1160F RVW MEDS BY RX/DR IN RCRD: CPT | Performed by: FAMILY MEDICINE

## 2024-01-02 PROCEDURE — 3078F DIAST BP <80 MM HG: CPT | Performed by: FAMILY MEDICINE

## 2024-01-02 PROCEDURE — 1159F MED LIST DOCD IN RCRD: CPT | Performed by: FAMILY MEDICINE

## 2024-01-02 PROCEDURE — 87077 CULTURE AEROBIC IDENTIFY: CPT | Performed by: FAMILY MEDICINE

## 2024-01-02 PROCEDURE — 99213 OFFICE O/P EST LOW 20 MIN: CPT | Performed by: FAMILY MEDICINE

## 2024-01-02 PROCEDURE — 87086 URINE CULTURE/COLONY COUNT: CPT | Performed by: FAMILY MEDICINE

## 2024-01-02 PROCEDURE — 3077F SYST BP >= 140 MM HG: CPT | Performed by: FAMILY MEDICINE

## 2024-01-02 PROCEDURE — G0439 PPPS, SUBSEQ VISIT: HCPCS | Performed by: FAMILY MEDICINE

## 2024-01-02 PROCEDURE — 81003 URINALYSIS AUTO W/O SCOPE: CPT | Performed by: FAMILY MEDICINE

## 2024-01-02 PROCEDURE — 87186 SC STD MICRODIL/AGAR DIL: CPT | Performed by: FAMILY MEDICINE

## 2024-01-02 RX ORDER — AZELASTINE HYDROCHLORIDE 0.5 MG/ML
2 SOLUTION/ DROPS OPHTHALMIC 2 TIMES DAILY
COMMUNITY
Start: 2023-12-12

## 2024-01-02 RX ORDER — LANCETS
1 EACH MISCELLANEOUS 3 TIMES DAILY PRN
Qty: 90 EACH | Refills: 1 | Status: SHIPPED | OUTPATIENT
Start: 2024-01-02

## 2024-01-02 RX ORDER — CEFDINIR 300 MG/1
300 CAPSULE ORAL 2 TIMES DAILY
Qty: 14 CAPSULE | Refills: 0 | Status: SHIPPED | OUTPATIENT
Start: 2024-01-02

## 2024-01-02 RX ORDER — BLOOD-GLUCOSE METER
1 KIT MISCELLANEOUS 3 TIMES DAILY PRN
Qty: 1 EACH | Refills: 0 | Status: SHIPPED | OUTPATIENT
Start: 2024-01-02

## 2024-01-02 RX ORDER — IBANDRONATE SODIUM 150 MG/1
150 TABLET, FILM COATED ORAL
Qty: 1 TABLET | Refills: 3 | Status: SHIPPED | OUTPATIENT
Start: 2024-01-02

## 2024-01-02 RX ORDER — BLOOD-GLUCOSE METER
1 KIT MISCELLANEOUS 3 TIMES DAILY PRN
Qty: 1 EACH | Refills: 0 | Status: SHIPPED | OUTPATIENT
Start: 2024-01-02 | End: 2024-01-02 | Stop reason: SDUPTHER

## 2024-01-02 NOTE — PROGRESS NOTES
The ABCs of the Annual Wellness Visit  Subsequent Medicare Wellness Visit    Chief Complaint   Patient presents with    Medicare Wellness-subsequent       Subjective   History of Present Illness:  Jenna Rosa is a 73 y.o. female who presents for a Subsequent Medicare Wellness Visit.  Pt states she had her cystocele repaired by urology but still having urinary freq and wanting her bladder checked visually and poss UA----finished abx from last mo UTI but no f/u UA done yet; tried the pyridium from urology but not helpful  HEALTH RISK ASSESSMENT    Recent Hospitalizations:  No hospitalization(s) within the last year.    Current Medical Providers:  Patient Care Team:  Emily Burdick DO as PCP - General (Family Medicine)  Joe García MD as Consulting Physician (Cardiology)  Dano Rangel MD as Consulting Physician (Endocrinology)  Conchis Mohamud OD as Consulting Physician (Optometry)  Harinder Gresham MD as Consulting Physician (Pain Medicine)  Juan Richey MD as Consulting Physician (Gastroenterology)  Manish Graff MD as Consulting Physician (Urology)    Smoking Status:  Social History     Tobacco Use   Smoking Status Never    Passive exposure: Never   Smokeless Tobacco Never       Alcohol Consumption:  Social History     Substance and Sexual Activity   Alcohol Use Yes    Comment: 3-4 a year       Depression Screen:       2024    10:00 AM   PHQ-2/PHQ-9 Depression Screening   Little Interest or Pleasure in Doing Things 0-->not at all   Feeling Down, Depressed or Hopeless 1-->several days   PHQ-9: Brief Depression Severity Measure Score 1       Fall Risk Screen:  STEADI Fall Risk Assessment was completed, and patient is at LOW risk for falls.Assessment completed on:2024    Health Habits and Functional and Cognitive Screenin/2/2024    10:00 AM   Functional & Cognitive Status   Do you have difficulty preparing food and eating? No   Do you have difficulty bathing yourself,  getting dressed or grooming yourself? No   Do you have difficulty using the toilet? No   Do you have difficulty moving around from place to place? No   Do you have trouble with steps or getting out of a bed or a chair? No   Current Diet Well Balanced Diet   Dental Exam Up to date   Eye Exam Not up to date   Exercise (times per week) 7 times per week   Current Exercises Include Walking   Do you need help using the phone?  No   Are you deaf or do you have serious difficulty hearing?  No   Do you need help to go to places out of walking distance? No   Do you need help shopping? No   Do you need help preparing meals?  No   Do you need help with housework?  No   Do you need help with laundry? No   Do you need help taking your medications? No   Do you need help managing money? No   Do you ever drive or ride in a car without wearing a seat belt? No   Have you felt unusual stress, anger or loneliness in the last month? Yes   Who do you live with? Alone   If you need help, do you have trouble finding someone available to you? No   Have you been bothered in the last four weeks by sexual problems? No   Do you have difficulty concentrating, remembering or making decisions? No         Does the patient have evidence of cognitive impairment? No    Asprin use counseling:Taking ASA appropriately as indicated    Age-appropriate Screening Schedule:  Refer to the list below for future screening recommendations based on patient's age, sex and/or medical conditions. Orders for these recommended tests are listed in the plan section. The patient has been provided with a written plan.    Health Maintenance   Topic Date Due    BMI FOLLOWUP  Never done    COVID-19 Vaccine (1) Never done    TDAP/TD VACCINES (1 - Tdap) Never done    ZOSTER VACCINE (1 of 2) Never done    HEPATITIS C SCREENING  Never done    DIABETIC FOOT EXAM  Never done    ANNUAL WELLNESS VISIT  12/29/2023    LIPID PANEL  01/10/2024    URINE MICROALBUMIN  01/10/2024     HEMOGLOBIN A1C  05/08/2024    DIABETIC EYE EXAM  12/11/2024    MAMMOGRAM  02/07/2025    DXA SCAN  02/07/2025    COLORECTAL CANCER SCREENING  06/20/2033    INFLUENZA VACCINE  Completed    Pneumococcal Vaccine 65+  Completed          The following portions of the patient's history were reviewed and updated as appropriate: allergies, current medications, past family history, past medical history, past social history, past surgical history, and problem list.    Outpatient Medications Prior to Visit   Medication Sig Dispense Refill    acebutolol (SECTRAL) 200 MG capsule TAKE ONE CAPSULE BY MOUTH DAILY 90 capsule 1    acetaminophen (TYLENOL) 325 MG tablet Take 2 tablets by mouth Every 6 (Six) Hours As Needed for Mild Pain.      albuterol sulfate  (90 Base) MCG/ACT inhaler Inhale 2 puffs Every 6 (Six) Hours As Needed for Shortness of Air (cough). 18 g 0    amLODIPine (NORVASC) 10 MG tablet TAKE ONE TABLET BY MOUTH DAILY 90 tablet 1    aspirin 81 MG EC tablet Take 1 tablet by mouth Daily.      azelastine (OPTIVAR) 0.05 % ophthalmic solution Administer 2 drops to both eyes 2 (Two) Times a Day.      Cholecalciferol (Vitamin D) 50 MCG (2000 UT) capsule 1 po qd      Continuous Blood Gluc  (FreeStyle Adelaide 2 Aneta) device USE WITH SENSOR EVERY 14 DAYS 1 each 0    Continuous Blood Gluc Sensor (FreeStyle Adelaide 2 Sensor) misc Inject 1 each under the skin into the appropriate area as directed Daily. 6 each 3    diclofenac (VOLTAREN) 1 % gel gel Apply 4 g topically to the appropriate area as directed 4 (Four) Times a Day As Needed.      fenofibrate 160 MG tablet Take 1 tablet by mouth Daily. 90 tablet 2    folic acid (FOLVITE) 1 MG tablet TAKE ONE TABLET BY MOUTH DAILY 90 tablet 1    gabapentin (NEURONTIN) 300 MG capsule Take 1 capsule by mouth 2 (Two) Times a Day. 180 capsule 0    Humira Pen 40 MG/0.4ML Pen-injector Kit Inject 40 mg under the skin into the appropriate area as directed Every 14 (Fourteen) Days. 2 each  3    IBUPROFEN-ACETAMINOPHEN PO Take  by mouth.      Insulin NPH Isophane & Regular (HumuLIN 70/30 KwikPen) (70-30) 100 UNIT/ML suspension pen-injector INJECT 45 UNITS UNDER THE SKIN BEFORE BREAKFAST AND 20 UNITS BEFORE SUPPER (Patient taking differently: Inject 20 Units under the skin into the appropriate area as directed 3 (Three) Times a Day With Meals. INJECT 20 UNITS UNDER THE SKIN BEFORE BREAKFAST AND 20 UNITS BEFORE LUNCH AND 20 UNITS BEFORE SUPPER) 60 mL 3    Insulin Pen Needle 32G X 4 MM misc Use 1 each 2 (Two) Times a Day. 200 each 3    isosorbide mononitrate (IMDUR) 60 MG 24 hr tablet Take 1 tablet by mouth Every Morning. 90 tablet 3    Janumet -1000 MG tablet TAKE ONE TABLET BY MOUTH DAILY 90 tablet 1    lansoprazole (PREVACID) 30 MG capsule TAKE ONE CAPSULE BY MOUTH DAILY 90 capsule 1    lisinopril (PRINIVIL,ZESTRIL) 40 MG tablet TAKE ONE TABLET BY MOUTH DAILY 90 tablet 1    loratadine (Claritin) 10 MG tablet Take 1 tablet by mouth Every Night.      meclizine (ANTIVERT) 12.5 MG tablet 1/2 - 1 po q6-8hrs prn dizzyness 20 tablet 0    methotrexate 2.5 MG tablet TAKE 5 TABLETS BY MOUTH ONCE A WEEK 60 tablet 0    ondansetron ODT (ZOFRAN-ODT) 4 MG disintegrating tablet Place 1 tablet on the tongue Every 8 (Eight) Hours As Needed for Nausea. 30 tablet 0    rosuvastatin (CRESTOR) 5 MG tablet Take 1 tablet by mouth Every Night. 90 tablet 1    traZODone (DESYREL) 50 MG tablet TAKE ONE TABLET BY MOUTH EVERY NIGHT AT BEDTIME 90 tablet 0    vitamin B-12 (CYANOCOBALAMIN) 1000 MCG tablet Take 1 tablet by mouth Daily.      fluticasone (FLONASE) 50 MCG/ACT nasal spray 1 spray into the nostril(s) as directed by provider Daily As Needed for Allergies.      olopatadine (PATANOL) 0.1 % ophthalmic solution       levoFLOXacin (Levaquin) 500 MG tablet Take 1 tablet by mouth Daily. 7 tablet 0     No facility-administered medications prior to visit.       Patient Active Problem List   Diagnosis    Rheumatoid arthritis     Breast mass, right    Decreased hearing, bilateral    Dependent edema    Depression    Diabetic peripheral neuropathy    Difficult or painful urination    Costochondritis    Encounter for immunization    Encounter for general adult medical examination without abnormal findings    Family history of cerebrovascular accident (CVA)    Flank pain    Gastroesophageal reflux disease    Herpes zoster    High-density lipoprotein deficiency    Mixed hyperlipidemia    Essential hypertension    Insomnia    Leukopenia    Microscopic hematuria    Nipple discharge, bloody    Obesity    Osteopenia    Other bursal cyst, unspecified site    Other dorsalgia    Low back pain    Thoracic back pain    Other hereditary and idiopathic neuropathies    Other long term (current) drug therapy    Other specified acquired deformities of unspecified thigh    Pain in limb    Pain of foot    Metatarsalgia    Polypharmacy    Primary localized osteoarthritis    Type 2 diabetes mellitus with hyperglycemia, with long-term current use of insulin    Urticaria    Visit for screening mammogram    Vitamin D deficiency    Heart palpitations    Major depressive disorder, recurrent episode, moderate    Bereavement    Unstable angina pectoris    Abnormal nuclear stress test    Coronary artery disease involving native coronary artery of native heart without angina pectoris    Fungal infection of skin    Bradycardia, sinus    Encounter for screening for malignant neoplasm of colon    Gastro-esophageal reflux disease without esophagitis    Pure hypercholesterolemia    History of colonic polyps    Epigastric pain    Chronic cough    Diarrhea    Constipation    Change in bowel habits    Black stools    Anemia, iron deficiency       Advanced Care Planning:  ACP discussion was held with the patient during this visit. Patient does not have an advance directive, declines further assistance.    Review of Systems   Genitourinary:  Positive for dysuria, frequency and  "urgency. Negative for flank pain and hematuria.       Compared to one year ago, the patient feels her physical health is the same.  Compared to one year ago, the patient feels her mental health is the same.    Reviewed chart for potential of high risk medication in the elderly: yes  Reviewed chart for potential of harmful drug interactions in the elderly:yes    Objective         Vitals:    01/02/24 1047   BP: 143/62   BP Location: Right arm   Patient Position: Sitting   Cuff Size: Adult   Pulse: 64   Resp: 14   Temp: 98.6 °F (37 °C)   TempSrc: Infrared   SpO2: 98%   Weight: 76.7 kg (169 lb)   Height: 160 cm (63\")       Body mass index is 29.94 kg/m².  Discussed the patient's BMI with her. The BMI is in the acceptable range.    Physical Exam  Vitals reviewed.   Constitutional:       General: She is not in acute distress.     Appearance: She is not ill-appearing or toxic-appearing.   HENT:      Head: Normocephalic.   Pulmonary:      Effort: Pulmonary effort is normal.   Genitourinary:     General: Normal vulva.      Exam position: Supine.      Pubic Area: No rash.       Comments: No vaginal prolapse w/ valsalva  Neurological:      Mental Status: She is alert and oriented to person, place, and time.      Cranial Nerves: No cranial nerve deficit.   Psychiatric:         Mood and Affect: Mood normal.         Behavior: Behavior normal.         Thought Content: Thought content normal.         Judgment: Judgment normal.             Lab Results   Component Value Date    HGBA1C 7.80 (H) 11/08/2023        Assessment & Plan   Medicare Risks and Personalized Health Plan  CMS Preventative Services Quick Reference  Advance Directive Discussion  Breast Cancer/Mammogram Screening  Cardiovascular risk  Chronic Pain   Colon Cancer Screening  Dementia/Memory   Depression/Dysphoria  Diabetic Lab Screening   Fall Risk  Glaucoma Risk  Hearing Problem  Immunizations Discussed/Encouraged (specific immunizations; Influenza, Pneumococcal 23, " Prevnar 20 (Pneumococcal 20-valent conjugate), Vaxneuvance (Pneumococcal 15-valent conjugate), Shingrix, COVID19, and RSV (Respiratory Syncytial Virus) )  Inadequate Social Support, Isolation, Loneliness, Lack of Transportation, Financial Difficulties, or Caregiver Stress   Inactivity/Sedentary    The above risks/problems have been discussed with the patient.  Pertinent information has been shared with the patient in the After Visit Summary.  Follow up plans and orders are seen below in the Assessment/Plan Section.    Diagnoses and all orders for this visit:    1. Medicare annual wellness visit, subsequent (Primary)    2. Acute cystitis with hematuria  -     Urine Culture - Urine, Urine, Clean Catch    3. Type 2 diabetes mellitus with hyperglycemia, with long-term current use of insulin  -     glucose blood test strip; 1 each by Other route 3 (Three) Times a Day As Needed (for BS monitoring). Use as instructed  Dispense: 90 each; Refill: 1    4. Urinary frequency  -     POCT urinalysis dipstick, automated    Other orders  -     Discontinue: glucose monitor monitoring kit; Use 1 each 3 (Three) Times a Day As Needed (for BG monitoring).  Dispense: 1 each; Refill: 0  -     glucose monitor monitoring kit; Use 1 each 3 (Three) Times a Day As Needed (for BG monitoring).  Dispense: 1 each; Refill: 0  -     Lancets (onetouch ultrasoft) lancets; 1 each by Other route 3 (Three) Times a Day As Needed (for BS monitoring). Use as instructed  Dispense: 90 each; Refill: 1  -     ibandronate (Boniva) 150 MG tablet; Take 1 tablet by mouth Every 30 (Thirty) Days.  Dispense: 1 tablet; Refill: 3  -     cefdinir (OMNICEF) 300 MG capsule; Take 1 capsule by mouth 2 (Two) Times a Day.  Dispense: 14 capsule; Refill: 0      Follow Up:  No follow-ups on file.     An After Visit Summary and PPPS were given to the patient.

## 2024-01-04 LAB — BACTERIA SPEC AEROBE CULT: ABNORMAL

## 2024-01-23 RX ORDER — FOLIC ACID 1 MG/1
1000 TABLET ORAL DAILY
Qty: 90 TABLET | Refills: 1 | Status: SHIPPED | OUTPATIENT
Start: 2024-01-23

## 2024-01-29 RX ORDER — ALBUTEROL SULFATE 90 UG/1
2 AEROSOL, METERED RESPIRATORY (INHALATION) EVERY 6 HOURS PRN
Qty: 18 G | Refills: 0 | Status: ON HOLD | OUTPATIENT
Start: 2024-01-29

## 2024-01-29 NOTE — TELEPHONE ENCOUNTER
Rx Refill Note  Requested Prescriptions     Pending Prescriptions Disp Refills    methotrexate 2.5 MG tablet [Pharmacy Med Name: METHOTREXATE 2.5 MG TABLET] 60 tablet 0     Sig: TAKE 5 TABLETS BY MOUTH ONCE A WEEK      Last office visit with prescribing clinician: 1/2/2024   Last telemedicine visit with prescribing clinician: Visit date not found   Next office visit with prescribing clinician: Visit date not found        Lipid Panel (01/10/2023 10:06)                  Would you like a call back once the refill request has been completed: [] Yes [] No    If the office needs to give you a call back, can they leave a voicemail: [] Yes [] No    Destini Snider, RT  01/29/24, 08:57 EST

## 2024-01-30 RX ORDER — METHOTREXATE 2.5 MG/1
TABLET ORAL
Qty: 60 TABLET | Refills: 0 | Status: ON HOLD | OUTPATIENT
Start: 2024-01-30

## 2024-02-03 ENCOUNTER — HOSPITAL ENCOUNTER (INPATIENT)
Facility: HOSPITAL | Age: 74
LOS: 2 days | Discharge: HOME | End: 2024-02-06
Attending: EMERGENCY MEDICINE | Admitting: HOSPITALIST
Payer: MEDICARE

## 2024-02-03 DIAGNOSIS — R10.84 GENERALIZED ABDOMINAL PAIN: Primary | ICD-10-CM

## 2024-02-03 DIAGNOSIS — K57.92 DIVERTICULITIS: ICD-10-CM

## 2024-02-03 LAB
BASOPHILS # BLD AUTO: 0 10*3/MM3 (ref 0–0.2)
BASOPHILS NFR BLD AUTO: 0.5 % (ref 0–1.5)
DEPRECATED RDW RBC AUTO: 46.8 FL (ref 37–54)
EOSINOPHIL # BLD AUTO: 0.1 10*3/MM3 (ref 0–0.4)
EOSINOPHIL NFR BLD AUTO: 0.9 % (ref 0.3–6.2)
ERYTHROCYTE [DISTWIDTH] IN BLOOD BY AUTOMATED COUNT: 13.9 % (ref 12.3–15.4)
HCT VFR BLD AUTO: 38.1 % (ref 34–46.6)
HGB BLD-MCNC: 12.4 G/DL (ref 12–15.9)
LYMPHOCYTES # BLD AUTO: 2 10*3/MM3 (ref 0.7–3.1)
LYMPHOCYTES NFR BLD AUTO: 19.7 % (ref 19.6–45.3)
MCH RBC QN AUTO: 29.8 PG (ref 26.6–33)
MCHC RBC AUTO-ENTMCNC: 32.6 G/DL (ref 31.5–35.7)
MCV RBC AUTO: 91.4 FL (ref 79–97)
MONOCYTES # BLD AUTO: 0.7 10*3/MM3 (ref 0.1–0.9)
MONOCYTES NFR BLD AUTO: 7.3 % (ref 5–12)
NEUTROPHILS NFR BLD AUTO: 7.2 10*3/MM3 (ref 1.7–7)
NEUTROPHILS NFR BLD AUTO: 71.6 % (ref 42.7–76)
NRBC BLD AUTO-RTO: 0 /100 WBC (ref 0–0.2)
PLATELET # BLD AUTO: 288 10*3/MM3 (ref 140–450)
PMV BLD AUTO: 7.9 FL (ref 6–12)
RBC # BLD AUTO: 4.17 10*6/MM3 (ref 3.77–5.28)
WBC NRBC COR # BLD AUTO: 10.1 10*3/MM3 (ref 3.4–10.8)

## 2024-02-03 PROCEDURE — 80053 COMPREHEN METABOLIC PANEL: CPT | Performed by: NURSE PRACTITIONER

## 2024-02-03 PROCEDURE — 99285 EMERGENCY DEPT VISIT HI MDM: CPT

## 2024-02-03 PROCEDURE — 83690 ASSAY OF LIPASE: CPT | Performed by: NURSE PRACTITIONER

## 2024-02-03 PROCEDURE — 85025 COMPLETE CBC W/AUTO DIFF WBC: CPT | Performed by: NURSE PRACTITIONER

## 2024-02-03 RX ORDER — SODIUM CHLORIDE 0.9 % (FLUSH) 0.9 %
10 SYRINGE (ML) INJECTION AS NEEDED
Status: DISCONTINUED | OUTPATIENT
Start: 2024-02-03 | End: 2024-02-06 | Stop reason: HOSPADM

## 2024-02-03 NOTE — Clinical Note
Level of Care: Med/Surg [1]   Admitting Physician: MARION LILLY [[id number]]   Attending Physician: MARION LILLY [[id number]]

## 2024-02-04 ENCOUNTER — APPOINTMENT (OUTPATIENT)
Dept: CT IMAGING | Facility: HOSPITAL | Age: 74
End: 2024-02-04
Payer: MEDICARE

## 2024-02-04 PROBLEM — K57.92 ACUTE DIVERTICULITIS: Status: ACTIVE | Noted: 2024-02-04

## 2024-02-04 LAB
ALBUMIN SERPL-MCNC: 4 G/DL (ref 3.5–5.2)
ALBUMIN/GLOB SERPL: 1.2 G/DL
ALP SERPL-CCNC: 56 U/L (ref 39–117)
ALT SERPL W P-5'-P-CCNC: 11 U/L (ref 1–33)
ANION GAP SERPL CALCULATED.3IONS-SCNC: 10 MMOL/L (ref 5–15)
ANION GAP SERPL CALCULATED.3IONS-SCNC: 11 MMOL/L (ref 5–15)
AST SERPL-CCNC: 14 U/L (ref 1–32)
BACTERIA UR QL AUTO: ABNORMAL /HPF
BASOPHILS # BLD AUTO: 0.1 10*3/MM3 (ref 0–0.2)
BASOPHILS NFR BLD AUTO: 0.7 % (ref 0–1.5)
BILIRUB SERPL-MCNC: 0.2 MG/DL (ref 0–1.2)
BILIRUB UR QL STRIP: NEGATIVE
BUN SERPL-MCNC: 10 MG/DL (ref 8–23)
BUN SERPL-MCNC: 8 MG/DL (ref 8–23)
BUN/CREAT SERPL: 10.5 (ref 7–25)
BUN/CREAT SERPL: 13.3 (ref 7–25)
CALCIUM SPEC-SCNC: 8.8 MG/DL (ref 8.6–10.5)
CALCIUM SPEC-SCNC: 9.2 MG/DL (ref 8.6–10.5)
CHLORIDE SERPL-SCNC: 102 MMOL/L (ref 98–107)
CHLORIDE SERPL-SCNC: 98 MMOL/L (ref 98–107)
CLARITY UR: CLEAR
CO2 SERPL-SCNC: 25 MMOL/L (ref 22–29)
CO2 SERPL-SCNC: 25 MMOL/L (ref 22–29)
COLOR UR: YELLOW
CREAT SERPL-MCNC: 0.75 MG/DL (ref 0.57–1)
CREAT SERPL-MCNC: 0.76 MG/DL (ref 0.57–1)
D-LACTATE SERPL-SCNC: 1.1 MMOL/L (ref 0.5–2)
DEPRECATED RDW RBC AUTO: 43.8 FL (ref 37–54)
EGFRCR SERPLBLD CKD-EPI 2021: 83.4 ML/MIN/1.73
EGFRCR SERPLBLD CKD-EPI 2021: 84.7 ML/MIN/1.73
EOSINOPHIL # BLD AUTO: 0.1 10*3/MM3 (ref 0–0.4)
EOSINOPHIL NFR BLD AUTO: 0.7 % (ref 0.3–6.2)
ERYTHROCYTE [DISTWIDTH] IN BLOOD BY AUTOMATED COUNT: 13.7 % (ref 12.3–15.4)
GLOBULIN UR ELPH-MCNC: 3.3 GM/DL
GLUCOSE BLDC GLUCOMTR-MCNC: 136 MG/DL (ref 70–105)
GLUCOSE BLDC GLUCOMTR-MCNC: 147 MG/DL (ref 70–105)
GLUCOSE BLDC GLUCOMTR-MCNC: 175 MG/DL (ref 70–105)
GLUCOSE SERPL-MCNC: 146 MG/DL (ref 65–99)
GLUCOSE SERPL-MCNC: 197 MG/DL (ref 65–99)
GLUCOSE UR STRIP-MCNC: ABNORMAL MG/DL
HCT VFR BLD AUTO: 36.2 % (ref 34–46.6)
HGB BLD-MCNC: 11.7 G/DL (ref 12–15.9)
HGB UR QL STRIP.AUTO: NEGATIVE
HYALINE CASTS UR QL AUTO: ABNORMAL /LPF
KETONES UR QL STRIP: NEGATIVE
LEUKOCYTE ESTERASE UR QL STRIP.AUTO: ABNORMAL
LIPASE SERPL-CCNC: 24 U/L (ref 13–60)
LYMPHOCYTES # BLD AUTO: 2.3 10*3/MM3 (ref 0.7–3.1)
LYMPHOCYTES NFR BLD AUTO: 20.4 % (ref 19.6–45.3)
MCH RBC QN AUTO: 29.8 PG (ref 26.6–33)
MCHC RBC AUTO-ENTMCNC: 32.3 G/DL (ref 31.5–35.7)
MCV RBC AUTO: 92.1 FL (ref 79–97)
MONOCYTES # BLD AUTO: 1.1 10*3/MM3 (ref 0.1–0.9)
MONOCYTES NFR BLD AUTO: 9.5 % (ref 5–12)
NEUTROPHILS NFR BLD AUTO: 68.7 % (ref 42.7–76)
NEUTROPHILS NFR BLD AUTO: 7.7 10*3/MM3 (ref 1.7–7)
NITRITE UR QL STRIP: NEGATIVE
NRBC BLD AUTO-RTO: 0 /100 WBC (ref 0–0.2)
PH UR STRIP.AUTO: 7.5 [PH] (ref 5–8)
PLATELET # BLD AUTO: 296 10*3/MM3 (ref 140–450)
PMV BLD AUTO: 7.9 FL (ref 6–12)
POTASSIUM SERPL-SCNC: 3.7 MMOL/L (ref 3.5–5.2)
POTASSIUM SERPL-SCNC: 4.2 MMOL/L (ref 3.5–5.2)
PROT SERPL-MCNC: 7.3 G/DL (ref 6–8.5)
PROT UR QL STRIP: NEGATIVE
RBC # BLD AUTO: 3.93 10*6/MM3 (ref 3.77–5.28)
RBC # UR STRIP: ABNORMAL /HPF
REF LAB TEST METHOD: ABNORMAL
SODIUM SERPL-SCNC: 134 MMOL/L (ref 136–145)
SODIUM SERPL-SCNC: 137 MMOL/L (ref 136–145)
SP GR UR STRIP: 1.01 (ref 1–1.03)
SQUAMOUS #/AREA URNS HPF: ABNORMAL /HPF
UROBILINOGEN UR QL STRIP: ABNORMAL
WBC # UR STRIP: ABNORMAL /HPF
WBC NRBC COR # BLD AUTO: 11.3 10*3/MM3 (ref 3.4–10.8)

## 2024-02-04 PROCEDURE — 36415 COLL VENOUS BLD VENIPUNCTURE: CPT

## 2024-02-04 PROCEDURE — 74177 CT ABD & PELVIS W/CONTRAST: CPT

## 2024-02-04 PROCEDURE — 99222 1ST HOSP IP/OBS MODERATE 55: CPT | Performed by: SURGERY

## 2024-02-04 PROCEDURE — 25010000002 MORPHINE PER 10 MG: Performed by: NURSE PRACTITIONER

## 2024-02-04 PROCEDURE — 25010000002 PIPERACILLIN SOD-TAZOBACTAM PER 1 G: Performed by: NURSE PRACTITIONER

## 2024-02-04 PROCEDURE — 83605 ASSAY OF LACTIC ACID: CPT | Performed by: NURSE PRACTITIONER

## 2024-02-04 PROCEDURE — 25510000001 IOPAMIDOL PER 1 ML: Performed by: EMERGENCY MEDICINE

## 2024-02-04 PROCEDURE — 82948 REAGENT STRIP/BLOOD GLUCOSE: CPT

## 2024-02-04 PROCEDURE — 81001 URINALYSIS AUTO W/SCOPE: CPT | Performed by: NURSE PRACTITIONER

## 2024-02-04 PROCEDURE — 87040 BLOOD CULTURE FOR BACTERIA: CPT | Performed by: NURSE PRACTITIONER

## 2024-02-04 PROCEDURE — 85025 COMPLETE CBC W/AUTO DIFF WBC: CPT

## 2024-02-04 PROCEDURE — 80048 BASIC METABOLIC PNL TOTAL CA: CPT

## 2024-02-04 PROCEDURE — 25010000002 PIPERACILLIN SOD-TAZOBACTAM PER 1 G

## 2024-02-04 RX ORDER — FOLIC ACID 1 MG/1
1000 TABLET ORAL DAILY
Status: DISCONTINUED | OUTPATIENT
Start: 2024-02-04 | End: 2024-02-06 | Stop reason: HOSPADM

## 2024-02-04 RX ORDER — NICOTINE POLACRILEX 4 MG
15 LOZENGE BUCCAL
Status: DISCONTINUED | OUTPATIENT
Start: 2024-02-04 | End: 2024-02-06 | Stop reason: HOSPADM

## 2024-02-04 RX ORDER — TRAZODONE HYDROCHLORIDE 50 MG/1
50 TABLET ORAL NIGHTLY PRN
Status: DISCONTINUED | OUTPATIENT
Start: 2024-02-04 | End: 2024-02-06 | Stop reason: HOSPADM

## 2024-02-04 RX ORDER — PANTOPRAZOLE SODIUM 40 MG/1
40 TABLET, DELAYED RELEASE ORAL
Status: DISCONTINUED | OUTPATIENT
Start: 2024-02-04 | End: 2024-02-06 | Stop reason: HOSPADM

## 2024-02-04 RX ORDER — MORPHINE SULFATE 2 MG/ML
2 INJECTION, SOLUTION INTRAMUSCULAR; INTRAVENOUS ONCE
Status: COMPLETED | OUTPATIENT
Start: 2024-02-04 | End: 2024-02-04

## 2024-02-04 RX ORDER — ONDANSETRON 2 MG/ML
4 INJECTION INTRAMUSCULAR; INTRAVENOUS EVERY 6 HOURS PRN
Status: DISCONTINUED | OUTPATIENT
Start: 2024-02-04 | End: 2024-02-06 | Stop reason: HOSPADM

## 2024-02-04 RX ORDER — DEXTROSE MONOHYDRATE 25 G/50ML
25 INJECTION, SOLUTION INTRAVENOUS
Status: DISCONTINUED | OUTPATIENT
Start: 2024-02-04 | End: 2024-02-06 | Stop reason: HOSPADM

## 2024-02-04 RX ORDER — SODIUM CHLORIDE 9 MG/ML
40 INJECTION, SOLUTION INTRAVENOUS AS NEEDED
Status: DISCONTINUED | OUTPATIENT
Start: 2024-02-04 | End: 2024-02-06 | Stop reason: HOSPADM

## 2024-02-04 RX ORDER — GABAPENTIN 300 MG/1
300 CAPSULE ORAL 2 TIMES DAILY
Status: DISCONTINUED | OUTPATIENT
Start: 2024-02-04 | End: 2024-02-06 | Stop reason: HOSPADM

## 2024-02-04 RX ORDER — POLYETHYLENE GLYCOL 3350 17 G/17G
17 POWDER, FOR SOLUTION ORAL DAILY PRN
Status: DISCONTINUED | OUTPATIENT
Start: 2024-02-04 | End: 2024-02-06 | Stop reason: HOSPADM

## 2024-02-04 RX ORDER — AMOXICILLIN 250 MG
2 CAPSULE ORAL 2 TIMES DAILY PRN
Status: DISCONTINUED | OUTPATIENT
Start: 2024-02-04 | End: 2024-02-06 | Stop reason: HOSPADM

## 2024-02-04 RX ORDER — ACETAMINOPHEN 160 MG/5ML
650 SOLUTION ORAL EVERY 4 HOURS PRN
Status: DISCONTINUED | OUTPATIENT
Start: 2024-02-04 | End: 2024-02-06 | Stop reason: HOSPADM

## 2024-02-04 RX ORDER — ACETAMINOPHEN 650 MG/1
650 SUPPOSITORY RECTAL EVERY 4 HOURS PRN
Status: DISCONTINUED | OUTPATIENT
Start: 2024-02-04 | End: 2024-02-06 | Stop reason: HOSPADM

## 2024-02-04 RX ORDER — ISOSORBIDE MONONITRATE 60 MG/1
60 TABLET, EXTENDED RELEASE ORAL EVERY MORNING
Status: DISCONTINUED | OUTPATIENT
Start: 2024-02-04 | End: 2024-02-06 | Stop reason: HOSPADM

## 2024-02-04 RX ORDER — ALBUTEROL SULFATE 2.5 MG/3ML
2.5 SOLUTION RESPIRATORY (INHALATION) EVERY 6 HOURS PRN
Status: DISCONTINUED | OUTPATIENT
Start: 2024-02-04 | End: 2024-02-06 | Stop reason: HOSPADM

## 2024-02-04 RX ORDER — LANOLIN ALCOHOL/MO/W.PET/CERES
1000 CREAM (GRAM) TOPICAL DAILY
Status: DISCONTINUED | OUTPATIENT
Start: 2024-02-04 | End: 2024-02-06 | Stop reason: HOSPADM

## 2024-02-04 RX ORDER — AMLODIPINE BESYLATE 5 MG/1
10 TABLET ORAL DAILY
Status: DISCONTINUED | OUTPATIENT
Start: 2024-02-04 | End: 2024-02-06 | Stop reason: HOSPADM

## 2024-02-04 RX ORDER — INSULIN HUMAN 100 [IU]/ML
20 INJECTION, SUSPENSION SUBCUTANEOUS EVERY EVENING
COMMUNITY

## 2024-02-04 RX ORDER — INSULIN HUMAN 100 [IU]/ML
21 INJECTION, SUSPENSION SUBCUTANEOUS 2 TIMES DAILY
COMMUNITY

## 2024-02-04 RX ORDER — LISINOPRIL 20 MG/1
40 TABLET ORAL DAILY
Status: DISCONTINUED | OUTPATIENT
Start: 2024-02-04 | End: 2024-02-06 | Stop reason: HOSPADM

## 2024-02-04 RX ORDER — ACETAMINOPHEN 325 MG/1
650 TABLET ORAL EVERY 4 HOURS PRN
Status: DISCONTINUED | OUTPATIENT
Start: 2024-02-04 | End: 2024-02-06 | Stop reason: HOSPADM

## 2024-02-04 RX ORDER — IBUPROFEN 600 MG/1
1 TABLET ORAL
Status: DISCONTINUED | OUTPATIENT
Start: 2024-02-04 | End: 2024-02-06 | Stop reason: HOSPADM

## 2024-02-04 RX ORDER — BISACODYL 10 MG
10 SUPPOSITORY, RECTAL RECTAL DAILY PRN
Status: DISCONTINUED | OUTPATIENT
Start: 2024-02-04 | End: 2024-02-06 | Stop reason: HOSPADM

## 2024-02-04 RX ORDER — CETIRIZINE HYDROCHLORIDE 10 MG/1
10 TABLET ORAL DAILY
Status: DISCONTINUED | OUTPATIENT
Start: 2024-02-04 | End: 2024-02-06 | Stop reason: HOSPADM

## 2024-02-04 RX ORDER — SODIUM CHLORIDE 0.9 % (FLUSH) 0.9 %
10 SYRINGE (ML) INJECTION EVERY 12 HOURS SCHEDULED
Status: DISCONTINUED | OUTPATIENT
Start: 2024-02-04 | End: 2024-02-06 | Stop reason: HOSPADM

## 2024-02-04 RX ORDER — SODIUM CHLORIDE 0.9 % (FLUSH) 0.9 %
10 SYRINGE (ML) INJECTION AS NEEDED
Status: DISCONTINUED | OUTPATIENT
Start: 2024-02-04 | End: 2024-02-06 | Stop reason: HOSPADM

## 2024-02-04 RX ORDER — CHOLECALCIFEROL (VITAMIN D3) 125 MCG
5 CAPSULE ORAL NIGHTLY PRN
Status: DISCONTINUED | OUTPATIENT
Start: 2024-02-04 | End: 2024-02-06 | Stop reason: HOSPADM

## 2024-02-04 RX ORDER — BISACODYL 5 MG/1
5 TABLET, DELAYED RELEASE ORAL DAILY PRN
Status: DISCONTINUED | OUTPATIENT
Start: 2024-02-04 | End: 2024-02-06 | Stop reason: HOSPADM

## 2024-02-04 RX ORDER — NITROGLYCERIN 0.4 MG/1
0.4 TABLET SUBLINGUAL
Status: DISCONTINUED | OUTPATIENT
Start: 2024-02-04 | End: 2024-02-06 | Stop reason: HOSPADM

## 2024-02-04 RX ORDER — ROSUVASTATIN CALCIUM 5 MG/1
5 TABLET, COATED ORAL NIGHTLY
Status: DISCONTINUED | OUTPATIENT
Start: 2024-02-04 | End: 2024-02-06 | Stop reason: HOSPADM

## 2024-02-04 RX ADMIN — GABAPENTIN 300 MG: 300 CAPSULE ORAL at 21:17

## 2024-02-04 RX ADMIN — GABAPENTIN 300 MG: 300 CAPSULE ORAL at 08:30

## 2024-02-04 RX ADMIN — DOCUSATE SODIUM 50MG AND SENNOSIDES 8.6MG 2 TABLET: 8.6; 5 TABLET, FILM COATED ORAL at 17:16

## 2024-02-04 RX ADMIN — CYANOCOBALAMIN TAB 1000 MCG 1000 MCG: 1000 TAB at 08:29

## 2024-02-04 RX ADMIN — IOPAMIDOL 100 ML: 755 INJECTION, SOLUTION INTRAVENOUS at 01:33

## 2024-02-04 RX ADMIN — PANTOPRAZOLE SODIUM 40 MG: 40 TABLET, DELAYED RELEASE ORAL at 05:46

## 2024-02-04 RX ADMIN — FOLIC ACID 1000 MCG: 1 TABLET ORAL at 08:30

## 2024-02-04 RX ADMIN — PIPERACILLIN AND TAZOBACTAM 3.38 G: 3; .375 INJECTION, POWDER, FOR SOLUTION INTRAVENOUS at 02:59

## 2024-02-04 RX ADMIN — CETIRIZINE HYDROCHLORIDE 10 MG: 10 TABLET, FILM COATED ORAL at 08:30

## 2024-02-04 RX ADMIN — ROSUVASTATIN CALCIUM 5 MG: 5 TABLET, FILM COATED ORAL at 21:17

## 2024-02-04 RX ADMIN — LISINOPRIL 40 MG: 20 TABLET ORAL at 08:30

## 2024-02-04 RX ADMIN — AMLODIPINE BESYLATE 10 MG: 5 TABLET ORAL at 08:29

## 2024-02-04 RX ADMIN — ACETAMINOPHEN 650 MG: 650 SOLUTION ORAL at 17:14

## 2024-02-04 RX ADMIN — PIPERACILLIN AND TAZOBACTAM 3.38 G: 3; .375 INJECTION, POWDER, LYOPHILIZED, FOR SOLUTION INTRAVENOUS at 08:29

## 2024-02-04 RX ADMIN — ISOSORBIDE MONONITRATE 60 MG: 60 TABLET, EXTENDED RELEASE ORAL at 07:19

## 2024-02-04 RX ADMIN — PIPERACILLIN AND TAZOBACTAM 3.38 G: 3; .375 INJECTION, POWDER, LYOPHILIZED, FOR SOLUTION INTRAVENOUS at 17:14

## 2024-02-04 RX ADMIN — Medication 10 ML: at 21:17

## 2024-02-04 RX ADMIN — MORPHINE SULFATE 2 MG: 2 INJECTION, SOLUTION INTRAMUSCULAR; INTRAVENOUS at 01:31

## 2024-02-04 NOTE — ED NOTES
PT resting comfortably bedside with son. Moved pt to hospital bed . Pt still asking about diet order . Pt tolerating ice chips well without further complaints.

## 2024-02-04 NOTE — ED NOTES
Assumed Care of PT. Pt resting bedside with only c/o is without TV and awaiting for floor bed in hospital. Will monitor

## 2024-02-04 NOTE — H&P
[facility]  History & Physical    Patient Name: Jenna Rosa  : 1950  MRN: [id number]  Primary Care Physician:  Emily Burdick DO  Date of admission: 2/3/2024  Date and Time of Service: 2/3/2024 at 0300    Subjective      Chief Complaint: abdominal pain    History of Present Illness: Jenna Rosa is a 73 y.o. female with a previous medical history of Gastritis, CAD, Diabetes with Neuropathy on insulin, Hyperlipidemia, HTN, RA, who presented to [facility] on 2/3/2024 with  lower abdominal pain for 1 week which worsened today, mucous and blood in her stool today, and nausea.  She denies vomiting.  She does report that she has been on antibiotics monthly since October for UTI's.     In the ED, lab work is unremarkable.  UA shows no infection.  CT of the abdomen and pelvis shows wall thickening and a focal area of contained gas associated with the rectosigmoid colon.  Surrounding fat stranding and trace fluid suggests acute inflammatory process.  Diverticulitis is favored while an underlying contained perforated colon mass is not excluded.  She is afebrile, all vital signs are stable.  She was covered with Zosyn.  General surgery was consulted per ED provider.  Hospitalist was consulted for further care and management.    12 point ROS reviewed and negative except as mentioned above      Personal History     Past Medical History:   Diagnosis Date    CAD     Cancer     Skin cancer    Colon polyp     Congenital heart disease 2017    Depression     DEXA     = (-0.7/ -1.6); = (-0.7/ -2.7)    Diabetic peripheral neuropathy     Diverticulosis     GERD     Heart murmur     Hyperlipidemia     Hypertension     Insomnia     Lower back pain     MAMMO     NEG = 2023    Obesity     Osteoarthritis     Osteopenia     Osteoporosis     Palpitations     RA     Rheumatoid nodule 2011     rt. thumb x 2 and rt. & lt. 3rd fingers (Oct.2012)/ rt. elbow, left  elbow, and epidermal inclusion cyst post. neck (June 2011)-----Dr. Whitmore    Vitamin D deficiency        Past Surgical History:   Procedure Laterality Date    BLADDER SUSPENSION  10/11/2023        BREAST SURGERY  1972    CARDIAC CATHETERIZATION N/A 09/16/2020    Procedure: Left Heart Cath;  Surgeon: Walker Rodriguez MD;  Location: [facility] CATH INVASIVE LOCATION;  Service: Cardiovascular;  Laterality: N/A;    CARDIAC CATHETERIZATION N/A 09/16/2020    Procedure: Coronary angiography;  Surgeon: Walker Rodriguez MD;  Location: [facility] CATH INVASIVE LOCATION;  Service: Cardiovascular;  Laterality: N/A;    CARDIAC CATHETERIZATION  09/16/2020    Procedure: Functional Flow Lynn;  Surgeon: Kath Medina MD;  Location: [facility] CATH INVASIVE LOCATION;  Service: Cardiology;;    CARPAL TUNNEL RELEASE Right 02/20/2023    Procedure: CARPAL TUNNEL RELEASE WITH POSSIBLE SYNOVECTOMY;  Surgeon: Amadeo Magaña MD;  Location: [facility] MAIN OR;  Service: Orthopedics;  Laterality: Right;    CHOLECYSTECTOMY      COLONOSCOPY      2017 / 2023= TA, rech 2028? [facility]    CYST REMOVAL      Seb cyst on neck    EYE SURGERY      TOTAL ABDOMINAL HYSTERECTOMY WITH SALPINGO OOPHORECTOMY      VAGINAL PROLAPSE REPAIR       Uterine Prolapse repair       Family History: family history includes Alcohol abuse in her father and mother; Arthritis in her sister and sister; Coronary artery disease in her mother; Diabetes in her maternal grandfather, mother, sister, and sister; Early death in her father; Heart attack in her father; Heart disease in her father and mother; Heart failure in her father and mother; Hypertension in her brother, father, mother, sister, and sister; Kidney cancer in her sister; Kidney disease in her sister; Liver disease in her sister; Mental illness in her father; No Known Problems in her brother; Osteoarthritis in her brother, sister, and sister; Rheum arthritis in her brother, mother,  sister, and sister; Stroke in her sister and sister. Otherwise pertinent FHx was reviewed and not pertinent to current issue.    Social History:  reports that she has never smoked. She has never been exposed to tobacco smoke. She has never used smokeless tobacco. She reports current alcohol use. She reports that she does not use drugs.    Home Medications:  Prior to Admission Medications       Prescriptions Last Dose Informant Patient Reported? Taking?    acebutolol (SECTRAL) 200 MG capsule   No No    TAKE ONE CAPSULE BY MOUTH DAILY    acetaminophen (TYLENOL) 325 MG tablet   Yes No    Take 2 tablets by mouth Every 6 (Six) Hours As Needed for Mild Pain.    albuterol sulfate  (90 Base) MCG/ACT inhaler   No No    Inhale 2 puffs Every 6 (Six) Hours As Needed for Shortness of Air (cough).    amLODIPine (NORVASC) 10 MG tablet   No No    TAKE ONE TABLET BY MOUTH DAILY    aspirin 81 MG EC tablet   Yes No    Take 1 tablet by mouth Daily.    azelastine (OPTIVAR) 0.05 % ophthalmic solution   Yes No    Administer 2 drops to both eyes 2 (Two) Times a Day.    cefdinir (OMNICEF) 300 MG capsule   No No    Take 1 capsule by mouth 2 (Two) Times a Day.    Cholecalciferol (Vitamin D) 50 MCG (2000 UT) capsule   Yes No    1 po qd    Continuous Blood Gluc  (FreeStyle [place] 2 [place]) device   No No    USE WITH SENSOR EVERY 14 DAYS    Continuous Blood Gluc Sensor (FreeStyle Adelaide 2 Sensor) misc   No No    Inject 1 each under the skin into the appropriate area as directed Daily.    diclofenac (VOLTAREN) 1 % gel gel   Yes No    Apply 4 g topically to the appropriate area as directed 4 (Four) Times a Day As Needed.    fenofibrate 160 MG tablet   No No    Take 1 tablet by mouth Daily.    folic acid (FOLVITE) 1 MG tablet   No No    TAKE 1 TABLET BY MOUTH DAILY    gabapentin (NEURONTIN) 300 MG capsule   No No    Take 1 capsule by mouth 2 (Two) Times a Day.    glucose blood test strip   No No    1 each by Other route 3 (Three) Times  a Day As Needed (for BS monitoring). Use as instructed    glucose monitor monitoring kit   No No    Use 1 each 3 (Three) Times a Day As Needed (for BG monitoring).    Humira Pen 40 MG/0.4ML Pen-injector Kit   No No    Inject 40 mg under the skin into the appropriate area as directed Every 14 (Fourteen) Days.    ibandronate (Boniva) 150 MG tablet   No No    Take 1 tablet by mouth Every 30 (Thirty) Days.    IBUPROFEN-ACETAMINOPHEN PO   Yes No    Take  by mouth.    Insulin NPH Isophane & Regular (HumuLIN 70/30 KwikPen) (70-30) 100 UNIT/ML suspension pen-injector   No No    INJECT 45 UNITS UNDER THE SKIN BEFORE BREAKFAST AND 20 UNITS BEFORE SUPPER    Patient taking differently:  Inject 20 Units under the skin into the appropriate area as directed 3 (Three) Times a Day With Meals. INJECT 20 UNITS UNDER THE SKIN BEFORE BREAKFAST AND 20 UNITS BEFORE LUNCH AND 20 UNITS BEFORE SUPPER    Insulin Pen Needle 32G X 4 MM misc   No No    Use 1 each 2 (Two) Times a Day.    isosorbide mononitrate (IMDUR) 60 MG 24 hr tablet   No No    Take 1 tablet by mouth Every Morning.    Janumet -1000 MG tablet   No No    TAKE ONE TABLET BY MOUTH DAILY    Lancets (onetouch ultrasoft) lancets   No No    1 each by Other route 3 (Three) Times a Day As Needed (for BS monitoring). Use as instructed    lansoprazole (PREVACID) 30 MG capsule   No No    TAKE ONE CAPSULE BY MOUTH DAILY    lisinopril (PRINIVIL,ZESTRIL) 40 MG tablet   No No    TAKE ONE TABLET BY MOUTH DAILY    loratadine (Claritin) 10 MG tablet   Yes No    Take 1 tablet by mouth Every Night.    meclizine (ANTIVERT) 12.5 MG tablet   No No    1/2 - 1 po q6-8hrs prn dizzyness    methotrexate 2.5 MG tablet   No No    TAKE 5 TABLETS BY MOUTH ONCE A WEEK    ondansetron ODT (ZOFRAN-ODT) 4 MG disintegrating tablet   No No    Place 1 tablet on the tongue Every 8 (Eight) Hours As Needed for Nausea.    rosuvastatin (CRESTOR) 5 MG tablet   No No    Take 1 tablet by mouth Every Night.    traZODone  (DESYREL) 50 MG tablet   No No    TAKE ONE TABLET BY MOUTH EVERY NIGHT AT BEDTIME    vitamin B-12 (CYANOCOBALAMIN) 1000 MCG tablet   No No    Take 1 tablet by mouth Daily.              Allergies:  Allergies   Allergen Reactions    Jardiance [Empagliflozin] Other (See Comments)     UTI's       Objective      Vitals:   Temp:  [98.9 °F (37.2 °C)] 98.9 °F (37.2 °C)  Heart Rate:  [66-75] 66  Resp:  [16] 16  BP: (145-172)/(64-76) 150/64  Body mass index is 29.48 kg/m².  Physical Exam  Vitals and nursing note reviewed.   Constitutional:       Appearance: Normal appearance.   HENT:      Head: Normocephalic and atraumatic.      Nose: Nose normal.      Mouth/Throat:      Mouth: Mucous membranes are moist.   Eyes:      Extraocular Movements: Extraocular movements intact.      Pupils: Pupils are equal, round, and reactive to light.   Cardiovascular:      Rate and Rhythm: Normal rate and regular rhythm.      Pulses: Normal pulses.      Heart sounds: Normal heart sounds.   Pulmonary:      Effort: Pulmonary effort is normal.      Breath sounds: Normal breath sounds.   Abdominal:      General: Bowel sounds are normal.      Palpations: Abdomen is soft.      Tenderness: There is generalized abdominal tenderness.   Musculoskeletal:         General: Normal range of motion.      Cervical back: Normal range of motion.   Skin:     General: Skin is warm and dry.   Neurological:      General: No focal deficit present.      Mental Status: She is alert and oriented to person, place, and time. Mental status is at baseline.   Psychiatric:         Mood and Affect: Mood normal.         Behavior: Behavior normal.           Assessment & Plan        This is a 73 y.o. female with:    Active and Resolved Problems  Active Hospital Problems    Diagnosis  POA    **Acute diverticulitis [K57.92]  Yes    Coronary artery disease involving native coronary artery of native heart without angina pectoris [I25.10]  Yes    Essential hypertension [I10]  Yes    Type  2 diabetes mellitus with hyperglycemia, with long-term current use of insulin [E11.65, Z79.4]  Not Applicable    Diabetic peripheral neuropathy [E11.42]  Yes      Resolved Hospital Problems   No resolved problems to display.       Plan:    Home medications not verified at the time of assessment and plan    Acute Diverticulitis  Abdominal Pain  Stool with mucus and blood noted since yesterday  -CT of the abdomen and pelvis reviewed, acute diverticulitis noted with a focal area of contained gas associated with the rectosigmoid colon.  -WBC 10.1, lactate 1.1, monitor  -Hemoglobin 12.4, hematocrit 38.1, monitor  -Transfuse if hemoglobin below 7 point  -Will check for C. difficile due to increased use of antibiotics, abdominal pain, mucus and blood noted in loose stool  -Analgesics as needed  -General surgery consulted  -Consider gastroenterology consult    Chronic CAD with hyperlipidemia  -Chronic, stable  -Continue home Imdur, Crestor  -Hold home aspirin due to blood noted in stool    Diabetes Mellitus Type 2  -Accu-Cheks Q6H  -SSI ordered  -Continue home basal and prandial insulin when taking PO  -Hold Janumet    Essential Hypertension  -Controlled  -Monitor BP  -Continue home lisinopril, amlodipine    Rheumatoid arthritis  -Continue methotrexate when verified  -Patient also on Humira outpatient    DVT prophylaxis:  Mechanical DVT prophylaxis orders are present.    CODE STATUS:    Code Status (Patient has no pulse and is not breathing): CPR (Attempt to Resuscitate)  Medical Interventions (Patient has pulse or is breathing): Full Support    Admission Status:  I believe this patient meets inpatient status.    I discussed the patient's findings and my recommendations with patient.    Signature:     This document has been electronically signed by Kathy Blakely DNP, APRN on February 4, 2024 02:55 [facility] Hospitalist Team

## 2024-02-04 NOTE — ED PROVIDER NOTES
Subjective   Chief Complaint   Patient presents with    Abdominal Pain     Pt reports lower abdominal pain x1 week but states has worsened.  Pt reports she has a CT scan scheduled for Tuesday.  Pt reports mucousy blood in stool that started this evening.  Pt reports hx of gastritis.         History of Present Illness  Patient is a 73-year-old female presents the ED with complaint of abdominal pain, over the past week.  Reports nausea.  No vomiting.  Reports mucus in her stool, that she was concerned about it being blood.  This this was 1 occurrence today.  She does report a history of gastritis.  She reports that she has had antibiotics monthly since October secondary to UTIs, she did have a bladder sling placed in October.  She denies any fevers.  Review of Systems   Constitutional:  Negative for chills and fever.   Respiratory:  Negative for shortness of breath.    Gastrointestinal:  Positive for abdominal pain, blood in stool and nausea. Negative for diarrhea and vomiting.   Genitourinary:  Negative for dysuria.   Musculoskeletal:  Negative for back pain and neck pain.   Skin:  Negative for color change and rash.   Neurological:  Negative for dizziness, syncope, weakness and light-headedness.       Past Medical History:   Diagnosis Date    CAD 2020    Cancer 2021    Skin cancer    Colon polyp     Congenital heart disease 2017    Depression     DEXA     2019= (-0.7/ -1.6); 2023= (-0.7/ -2.7)    Diabetic peripheral neuropathy     Diverticulosis     GERD     Heart murmur 2017    Hyperlipidemia     Hypertension     Insomnia     Lower back pain     MAMMO     NEG = 2021/ 2023    Obesity     Osteoarthritis     Osteopenia     Osteoporosis     Palpitations     RA     Rheumatoid nodule 06/2011     rt. thumb x 2 and rt. & lt. 3rd fingers (Oct.2012)/ rt. elbow, left elbow, and epidermal inclusion cyst post. neck (June 2011)-----Dr. Whitmore    Vitamin D deficiency        Allergies   Allergen Reactions    Jardiance  [Empagliflozin] Other (See Comments)     UTI's       Past Surgical History:   Procedure Laterality Date    BLADDER SUSPENSION  10/11/2023        BREAST SURGERY  1972    CARDIAC CATHETERIZATION N/A 09/16/2020    Procedure: Left Heart Cath;  Surgeon: Walker Rodriguez MD;  Location: [facility] CATH INVASIVE LOCATION;  Service: Cardiovascular;  Laterality: N/A;    CARDIAC CATHETERIZATION N/A 09/16/2020    Procedure: Coronary angiography;  Surgeon: Walker Rodriguez MD;  Location: [facility] CATH INVASIVE LOCATION;  Service: Cardiovascular;  Laterality: N/A;    CARDIAC CATHETERIZATION  09/16/2020    Procedure: Functional Flow [place];  Surgeon: Kath Medina MD;  Location: [facility] CATH INVASIVE LOCATION;  Service: Cardiology;;    CARPAL TUNNEL RELEASE Right 02/20/2023    Procedure: CARPAL TUNNEL RELEASE WITH POSSIBLE SYNOVECTOMY;  Surgeon: Amadeo Magaña MD;  Location: [facility] MAIN OR;  Service: Orthopedics;  Laterality: Right;    CHOLECYSTECTOMY      COLONOSCOPY      2017 / 2023= TA, rech 2028? [facility]    CYST REMOVAL      Seb cyst on neck    EYE SURGERY      TOTAL ABDOMINAL HYSTERECTOMY WITH SALPINGO OOPHORECTOMY      VAGINAL PROLAPSE REPAIR       Uterine Prolapse repair       Family History   Problem Relation Age of Onset    Heart disease Mother     Diabetes Mother     Hypertension Mother     Rheum arthritis Mother     Coronary artery disease Mother     Alcohol abuse Mother     Heart failure Mother     Heart disease Father     Mental illness Father     Hypertension Father     Alcohol abuse Father     Early death Father     Heart attack Father     Heart failure Father     Kidney cancer Sister     Stroke Sister     Osteoarthritis Sister     Rheum arthritis Sister     Hypertension Sister     Arthritis Sister     Diabetes Sister     Stroke Sister     Hypertension Sister     Rheum arthritis Sister     Osteoarthritis Sister     Diabetes Sister     Arthritis Sister     Liver disease Sister      Kidney disease Sister     No Known Problems Brother     Rheum arthritis Brother     Osteoarthritis Brother     Hypertension Brother     Diabetes Maternal Grandfather        Social History     Socioeconomic History    Marital status:      Spouse name: Carson Rosa    Number of children: 3    Years of education: GED   Tobacco Use    Smoking status: Never     Passive exposure: Never    Smokeless tobacco: Never   Vaping Use    Vaping Use: Never used   Substance and Sexual Activity    Alcohol use: Yes     Comment: 3-4 a year    Drug use: Never    Sexual activity: Not Currently     Partners: Male     Birth control/protection: Hysterectomy, Same-sex partner       Current Facility-Administered Medications:     acetaminophen (TYLENOL) tablet 650 mg, 650 mg, Oral, Q4H PRN **OR** acetaminophen (TYLENOL) 160 MG/5ML oral solution 650 mg, 650 mg, Oral, Q4H PRN **OR** acetaminophen (TYLENOL) suppository 650 mg, 650 mg, Rectal, Q4H PRN, Kathy Blakely APRN    sennosides-docusate (PERICOLACE) 8.6-50 MG per tablet 2 tablet, 2 tablet, Oral, BID PRN **AND** polyethylene glycol (MIRALAX) packet 17 g, 17 g, Oral, Daily PRN **AND** bisacodyl (DULCOLAX) EC tablet 5 mg, 5 mg, Oral, Daily PRN **AND** bisacodyl (DULCOLAX) suppository 10 mg, 10 mg, Rectal, Daily PRN, Kathy Blakely APRN    dextrose (D50W) (25 g/50 mL) IV injection 25 g, 25 g, Intravenous, Q15 Min PRN, Kathy Blakely APRN    dextrose (GLUTOSE) oral gel 15 g, 15 g, Oral, Q15 Min PRN, Kathy Blakely APRN    glucagon (GLUCAGEN) injection 1 mg, 1 mg, Intramuscular, Q15 Min PRN, Kathy Blakely APRN    melatonin tablet 5 mg, 5 mg, Oral, Nightly PRN, Kathy Blakely APRN    nitroglycerin (NITROSTAT) SL tablet 0.4 mg, 0.4 mg, Sublingual, Q5 Min PRN, Kathy Blakely APRN    ondansetron (ZOFRAN) injection 4 mg, 4 mg, Intravenous, Q6H PRN, Kathy Blakely APRN    piperacillin-tazobactam (ZOSYN) IVPB 3.375 g in 100 mL NS (CD), 3.375 g, Intravenous, Once,  Karla Trevino, APRN, 3.375 g at 02/04/24 0259    Insert Peripheral IV, , , Once **AND** sodium chloride 0.9 % flush 10 mL, 10 mL, Intravenous, PRN, Karla Trevino, APRN    sodium chloride 0.9 % flush 10 mL, 10 mL, Intravenous, Q12H, Yahyawi, Kathy L, APRN    sodium chloride 0.9 % flush 10 mL, 10 mL, Intravenous, PRN, Yahyawi, Kathy L, APRN    sodium chloride 0.9 % infusion 40 mL, 40 mL, Intravenous, PRN, Yahyawi, Kathy L, APRN    Current Outpatient Medications:     acebutolol (SECTRAL) 200 MG capsule, TAKE ONE CAPSULE BY MOUTH DAILY, Disp: 90 capsule, Rfl: 1    acetaminophen (TYLENOL) 325 MG tablet, Take 2 tablets by mouth Every 6 (Six) Hours As Needed for Mild Pain., Disp: , Rfl:     albuterol sulfate  (90 Base) MCG/ACT inhaler, Inhale 2 puffs Every 6 (Six) Hours As Needed for Shortness of Air (cough)., Disp: 18 g, Rfl: 0    amLODIPine (NORVASC) 10 MG tablet, TAKE ONE TABLET BY MOUTH DAILY, Disp: 90 tablet, Rfl: 1    aspirin 81 MG EC tablet, Take 1 tablet by mouth Daily., Disp: , Rfl:     azelastine (OPTIVAR) 0.05 % ophthalmic solution, Administer 2 drops to both eyes 2 (Two) Times a Day., Disp: , Rfl:     cefdinir (OMNICEF) 300 MG capsule, Take 1 capsule by mouth 2 (Two) Times a Day., Disp: 14 capsule, Rfl: 0    Cholecalciferol (Vitamin D) 50 MCG (2000 UT) capsule, 1 po qd, Disp: , Rfl:     Continuous Blood Gluc  (FreeStyle Adelaide 2 Max) device, USE WITH SENSOR EVERY 14 DAYS, Disp: 1 each, Rfl: 0    Continuous Blood Gluc Sensor (FreeStyle Adelaide 2 Sensor) misc, Inject 1 each under the skin into the appropriate area as directed Daily., Disp: 6 each, Rfl: 2    diclofenac (VOLTAREN) 1 % gel gel, Apply 4 g topically to the appropriate area as directed 4 (Four) Times a Day As Needed., Disp: , Rfl:     fenofibrate 160 MG tablet, Take 1 tablet by mouth Daily., Disp: 90 tablet, Rfl: 2    folic acid (FOLVITE) 1 MG tablet, TAKE 1 TABLET BY MOUTH DAILY, Disp: 90 tablet, Rfl: 1     gabapentin (NEURONTIN) 300 MG capsule, Take 1 capsule by mouth 2 (Two) Times a Day., Disp: 180 capsule, Rfl: 0    glucose blood test strip, 1 each by Other route 3 (Three) Times a Day As Needed (for BS monitoring). Use as instructed, Disp: 90 each, Rfl: 1    glucose monitor monitoring kit, Use 1 each 3 (Three) Times a Day As Needed (for BG monitoring)., Disp: 1 each, Rfl: 0    Humira Pen 40 MG/0.4ML Pen-injector Kit, Inject 40 mg under the skin into the appropriate area as directed Every 14 (Fourteen) Days., Disp: 2 each, Rfl: 3    ibandronate (Boniva) 150 MG tablet, Take 1 tablet by mouth Every 30 (Thirty) Days., Disp: 1 tablet, Rfl: 3    IBUPROFEN-ACETAMINOPHEN PO, Take  by mouth., Disp: , Rfl:     Insulin NPH Isophane & Regular (HumuLIN 70/30 KwikPen) (70-30) 100 UNIT/ML suspension pen-injector, INJECT 45 UNITS UNDER THE SKIN BEFORE BREAKFAST AND 20 UNITS BEFORE SUPPER (Patient taking differently: Inject 20 Units under the skin into the appropriate area as directed 3 (Three) Times a Day With Meals. INJECT 20 UNITS UNDER THE SKIN BEFORE BREAKFAST AND 20 UNITS BEFORE LUNCH AND 20 UNITS BEFORE SUPPER), Disp: 60 mL, Rfl: 3    Insulin Pen Needle 32G X 4 MM misc, Use 1 each 2 (Two) Times a Day., Disp: 200 each, Rfl: 3    isosorbide mononitrate (IMDUR) 60 MG 24 hr tablet, Take 1 tablet by mouth Every Morning., Disp: 90 tablet, Rfl: 3    Janumet -1000 MG tablet, TAKE ONE TABLET BY MOUTH DAILY, Disp: 90 tablet, Rfl: 1    Lancets (onetouch ultrasoft) lancets, 1 each by Other route 3 (Three) Times a Day As Needed (for BS monitoring). Use as instructed, Disp: 90 each, Rfl: 1    lansoprazole (PREVACID) 30 MG capsule, TAKE ONE CAPSULE BY MOUTH DAILY, Disp: 90 capsule, Rfl: 1    lisinopril (PRINIVIL,ZESTRIL) 40 MG tablet, TAKE ONE TABLET BY MOUTH DAILY, Disp: 90 tablet, Rfl: 1    loratadine (Claritin) 10 MG tablet, Take 1 tablet by mouth Every Night., Disp: , Rfl:     meclizine (ANTIVERT) 12.5 MG tablet, 1/2 - 1 po  "q6-8hrs prn dizzyness, Disp: 20 tablet, Rfl: 0    methotrexate 2.5 MG tablet, TAKE 5 TABLETS BY MOUTH ONCE A WEEK, Disp: 60 tablet, Rfl: 0    ondansetron ODT (ZOFRAN-ODT) 4 MG disintegrating tablet, Place 1 tablet on the tongue Every 8 (Eight) Hours As Needed for Nausea., Disp: 30 tablet, Rfl: 0    rosuvastatin (CRESTOR) 5 MG tablet, Take 1 tablet by mouth Every Night., Disp: 90 tablet, Rfl: 1    traZODone (DESYREL) 50 MG tablet, TAKE ONE TABLET BY MOUTH EVERY NIGHT AT BEDTIME, Disp: 90 tablet, Rfl: 0    vitamin B-12 (CYANOCOBALAMIN) 1000 MCG tablet, Take 1 tablet by mouth Daily., Disp: , Rfl:         Objective   Physical Exam  Vitals and nursing note reviewed.   Constitutional:       Appearance: She is well-developed. She is not toxic-appearing.   HENT:      Head: Normocephalic and atraumatic.      Mouth/Throat:      Mouth: Mucous membranes are moist.      Pharynx: Oropharynx is clear.   Eyes:      Extraocular Movements: Extraocular movements intact.      Pupils: Pupils are equal, round, and reactive to light.   Cardiovascular:      Rate and Rhythm: Normal rate and regular rhythm.      Heart sounds: No murmur heard.     No friction rub. No gallop.   Pulmonary:      Effort: Pulmonary effort is normal.      Breath sounds: Normal breath sounds.   Abdominal:      General: Abdomen is flat. Bowel sounds are normal.      Palpations: Abdomen is soft.      Tenderness: There is abdominal tenderness. There is no guarding or rebound.   Neurological:      Mental Status: She is alert.         Procedures           ED Course  ED Course as of 02/04/24 0306   Sun Feb 04, 2024   0216 Bedside hemoccult with otis avila.  [LB]      ED Course User Index  [LB] Karla Trevino Jerry, APRN      /64   Pulse 66   Temp 98.9 °F (37.2 °C) (Oral)   Resp 16   Ht 160 cm (63\")   Wt 75.5 kg (166 lb 7.2 oz)   SpO2 95%   BMI 29.48 kg/m²   Medications   sodium chloride 0.9 % flush 10 mL (has no administration in time range) "   piperacillin-tazobactam (ZOSYN) IVPB 3.375 g in 100 mL NS (CD) (3.375 g Intravenous New Bag 2/4/24 0259)   nitroglycerin (NITROSTAT) SL tablet 0.4 mg (has no administration in time range)   sodium chloride 0.9 % flush 10 mL (has no administration in time range)   sodium chloride 0.9 % flush 10 mL (has no administration in time range)   sodium chloride 0.9 % infusion 40 mL (has no administration in time range)   acetaminophen (TYLENOL) tablet 650 mg (has no administration in time range)     Or   acetaminophen (TYLENOL) 160 MG/5ML oral solution 650 mg (has no administration in time range)     Or   acetaminophen (TYLENOL) suppository 650 mg (has no administration in time range)   sennosides-docusate (PERICOLACE) 8.6-50 MG per tablet 2 tablet (has no administration in time range)     And   polyethylene glycol (MIRALAX) packet 17 g (has no administration in time range)     And   bisacodyl (DULCOLAX) EC tablet 5 mg (has no administration in time range)     And   bisacodyl (DULCOLAX) suppository 10 mg (has no administration in time range)   ondansetron (ZOFRAN) injection 4 mg (has no administration in time range)   melatonin tablet 5 mg (has no administration in time range)   dextrose (GLUTOSE) oral gel 15 g (has no administration in time range)   dextrose (D50W) (25 g/50 mL) IV injection 25 g (has no administration in time range)   glucagon (GLUCAGEN) injection 1 mg (has no administration in time range)   morphine injection 2 mg (2 mg Intravenous Given 2/4/24 0131)   iopamidol (ISOVUE-370) 76 % injection 100 mL (100 mL Intravenous Given 2/4/24 0133)     CT Abdomen Pelvis With Contrast    Result Date: 2/4/2024  Impression: Abnormal wall thickening and a focal area of contained gas associated with the rectosigmoid colon towards the right posteriorly. The surrounding fat stranding and trace fluid suggests acute inflammatory process. Diverticulitis is favored. Underlying contained perforated colon mass is not excluded.  Electronically Signed: Woody Johnson MD  2/4/2024 1:37 AM EST  Workstation ID: [id number]   Lab Results (last 24 hours)       Procedure Component Value Units Date/Time    CBC & Differential [[id number]]  (Abnormal) Collected: 02/03/24 2350    Specimen: Blood Updated: 02/03/24 2359    Narrative:      The following orders were created for panel order CBC & Differential.  Procedure                               Abnormality         Status                     ---------                               -----------         ------                     CBC Auto Differential[[id number]]        Abnormal            Final result                 Please view results for these tests on the individual orders.    Comprehensive Metabolic Panel [[id number]]  (Abnormal) Collected: 02/03/24 2350    Specimen: Blood Updated: 02/04/24 0021     Glucose 197 mg/dL      BUN 10 mg/dL      Creatinine 0.75 mg/dL      Sodium 134 mmol/L      Potassium 4.2 mmol/L      Chloride 98 mmol/L      CO2 25.0 mmol/L      Calcium 9.2 mg/dL      Total Protein 7.3 g/dL      Albumin 4.0 g/dL      ALT (SGPT) 11 U/L      AST (SGOT) 14 U/L      Alkaline Phosphatase 56 U/L      Total Bilirubin 0.2 mg/dL      Globulin 3.3 gm/dL      A/G Ratio 1.2 g/dL      BUN/Creatinine Ratio 13.3     Anion Gap 11.0 mmol/L      eGFR 84.7 mL/min/1.73     Narrative:      GFR Normal >60  Chronic Kidney Disease <60  Kidney Failure <15    The GFR formula is only valid for adults with stable renal function between ages 18 and 70.    Lipase [[id number]]  (Normal) Collected: 02/03/24 2350    Specimen: Blood Updated: 02/04/24 0021     Lipase 24 U/L     CBC Auto Differential [[id number]]  (Abnormal) Collected: 02/03/24 2350    Specimen: Blood Updated: 02/03/24 2359     WBC 10.10 10*3/mm3      RBC 4.17 10*6/mm3      Hemoglobin 12.4 g/dL      Hematocrit 38.1 %      MCV 91.4 fL      MCH 29.8 pg      MCHC 32.6 g/dL      RDW 13.9 %      RDW-SD 46.8 fl      MPV 7.9 fL      Platelets 288 10*3/mm3      Neutrophil  % 71.6 %      Lymphocyte % 19.7 %      Monocyte % 7.3 %      Eosinophil % 0.9 %      Basophil % 0.5 %      Neutrophils, Absolute 7.20 10*3/mm3      Lymphocytes, Absolute 2.00 10*3/mm3      Monocytes, Absolute 0.70 10*3/mm3      Eosinophils, Absolute 0.10 10*3/mm3      Basophils, Absolute 0.00 10*3/mm3      nRBC 0.0 /100 WBC     Urinalysis With Microscopic If Indicated (No Culture) - Urine, Clean Catch [[id number]]  (Abnormal) Collected: 02/04/24 0024    Specimen: Urine, Clean Catch Updated: 02/04/24 0037     Color, UA Yellow     Appearance, UA Clear     pH, UA 7.5     Specific Gravity, UA 1.012     Glucose,  mg/dL (2+)     Ketones, UA Negative     Bilirubin, UA Negative     Blood, UA Negative     Protein, UA Negative     Leuk Esterase, UA Small (1+)     Nitrite, UA Negative     Urobilinogen, UA 0.2 E.U./dL    Urinalysis, Microscopic Only - Urine, Clean Catch [[id number]]  (Abnormal) Collected: 02/04/24 0024    Specimen: Urine, Clean Catch Updated: 02/04/24 0037     RBC, UA 0-2 /HPF      WBC, UA 3-5 /HPF      Bacteria, UA None Seen /HPF      Squamous Epithelial Cells, UA 0-2 /HPF      Hyaline Casts, UA None Seen /LPF      Methodology Automated Microscopy    Blood Culture - Blood, Arm, Right [[id number]] Collected: 02/04/24 0244    Specimen: Blood from Arm, Right Updated: 02/04/24 0247    Lactic Acid, Plasma [[id number]] Collected: 02/04/24 0256    Specimen: Blood Updated: 02/04/24 0257    Blood Culture - Blood, Arm, Left [[id number]] Collected: 02/04/24 0256    Specimen: Blood from Arm, Left Updated: 02/04/24 0258                                                 Medical Decision Making  Problems Addressed:  Diverticulitis: complicated acute illness or injury  Generalized abdominal pain: complicated acute illness or injury    Amount and/or Complexity of Data Reviewed  Labs: ordered.  Radiology: ordered.    Risk  Prescription drug management.  Decision regarding hospitalization.    Chart Review:   Labs: As above.   Normal white blood cell count.  UA negative.  Lipase normal  Imaging: CT Abdomen Pelvis With Contrast    Result Date: 2/4/2024  Impression: Abnormal wall thickening and a focal area of contained gas associated with the rectosigmoid colon towards the right posteriorly. The surrounding fat stranding and trace fluid suggests acute inflammatory process. Diverticulitis is favored. Underlying contained perforated colon mass is not excluded. Electronically Signed: Woody Johnson MD  2/4/2024 1:37 AM EST  Workstation ID: [id number]   73-year-old female presents the ED for the above complaints.  Differential diagnoses considered for patient presentation, this list is not all inclusive of diagnoses considered: Diverticulitis, bowel obstruction, colitis.  Patient had IV established placed on appropriate monitoring.  Workup in the ED reveals findings concerning for diverticulitis, with small perforation.  Will initiate on IV antibiotics, admit to hospital medicine, general surgery consult placed for the morning.  Discussion/Consultation with other providers: Hospitalist  Disposition: I discussed with the patient their test results, work-up here in the emergency department, and need for admission and further evaluation. Patient is agreeable to the plan of care. At time of disposition patient's VS are reviewed, and patient without acute distress.  Opportunity was provided for questions at the bedside, all questions and concerns were addressed.  Note Disclaimer: At [facility], we believe that sharing information builds trust and better relationships. You are receiving this note because you recently visited [facility]. It is possible you will see health information before a provider has talked with you about it. This kind of information can be easy to misunderstand. To help you fully understand what it means for your health, we urge you to discuss this note with your provider.Note dictated utilizing Dragon Dictation.  Appropriate PPE worn during patient interactions.        Final diagnoses:   Generalized abdominal pain   Diverticulitis       ED Disposition  ED Disposition       ED Disposition   Decision to Admit    Condition   --    Comment   Level of Care: Progressive Care [20]   Diagnosis: Acute diverticulitis [2727380]   Admitting Physician: MARION LILLY [[id number]]   Attending Physician: MARION LILLY [[id number]]   Certification: I Certify That Inpatient Hospital Services Are Medically Necessary For Greater Than 2 Midnights                 No follow-up provider specified.       Medication List      No changes were made to your prescriptions during this visit.            Karla Trevino, APRN  02/04/24 0306

## 2024-02-04 NOTE — PROGRESS NOTES
[facility]     Progress Note    Patient Name: Jenna Roas  : 1950  MRN: [id number]  Primary Care Physician:  Emily Burdick DO  Date of admission: 2/3/2024  Service date and time: 24 12:14 EST  Subjective   Subjective     Chief Complaint: abdominal pain     HPI:  Patient complains of mild left lower abdominal pain      Objective   Objective     Vitals:   Temp:  [98.9 °F (37.2 °C)] 98.9 °F (37.2 °C)  Heart Rate:  [62-75] 66  Resp:  [15-16] 15  BP: (103-172)/(49-76) 104/55  Physical Exam    Constitutional: Awake, alert in no distress    Eyes: PERRLA, sclerae anicteric, no conjunctival injection   HENT: NCAT, mucous membranes moist   Neck: Supple, no thyromegaly, no lymphadenopathy, trachea midline   Respiratory: Clear to auscultation bilaterally, nonlabored respirations    Cardiovascular: RRR, no murmurs, rubs, or gallops, palpable pedal pulses bilaterally   Gastrointestinal: left lower quadrant tenderness    Musculoskeletal: No bilateral ankle edema, no clubbing or cyanosis to extremities   Psychiatric: Appropriate affect, cooperative   Neurologic: Oriented x 3, strength symmetric in all extremities, Cranial Nerves grossly intact to confrontation, speech clear   Skin: No rashes     Result Review    Result Review:  I have personally reviewed the results from the time of this admission to 2024 12:14 EST and agree with these findings:  [x]  Laboratory list / accordion  []  Microbiology  []  Radiology  []  EKG/Telemetry   []  Cardiology/Vascular   []  Pathology  []  Old records  []  Other:  Most notable findings include: Glucose 146, BUN 8, creatinine of 0.76  WBC count 11.3      Assessment & Plan   Assessment / Plan       Active Hospital Problems:  Active Hospital Problems    Diagnosis     **Acute diverticulitis     Coronary artery disease involving native coronary artery of native heart without angina pectoris     Essential hypertension     Type 2 diabetes mellitus with hyperglycemia, with  long-term current use of insulin     Diabetic peripheral neuropathy      Plan:    Acute Diverticulitis  Abdominal Pain  Stool with mucus and blood noted since yesterday  -CT of the abdomen and pelvis reviewed, acute diverticulitis noted with a focal area of contained gas associated with the rectosigmoid colon.  -WBC 10.1, lactate 1.1, monitor  -Hemoglobin 12.4, hematocrit 38.1, monitor  -Transfuse if hemoglobin below 7 point  -Will check for C. difficile due to increased use of antibiotics, abdominal pain, mucus and blood noted in loose stool  -Analgesics as needed  -General surgery consulted  -will keep npo for now     Chronic CAD with hyperlipidemia  -Chronic, stable  -Continue home Imdur, Crestor  -Hold home aspirin due to blood noted in stool     Diabetes Mellitus Type 2  -Accu-Cheks Q6H  -SSI ordered  -Continue home basal and prandial insulin when taking PO  -Hold Janumet     Essential Hypertension  -Controlled  -Monitor BP  -Continue home lisinopril, amlodipine     Rheumatoid arthritis  -Continue methotrexate when verified  -Patient also on Humira outpatient       DVT prophylaxis:  Mechanical DVT prophylaxis orders are present.        CODE STATUS:   Code Status (Patient has no pulse and is not breathing): CPR (Attempt to Resuscitate)  Medical Interventions (Patient has pulse or is breathing): Full Support    Disposition:  I expect patient to be discharged in 2 days.    Honorio Smith MD

## 2024-02-05 LAB
ANION GAP SERPL CALCULATED.3IONS-SCNC: 10 MMOL/L (ref 5–15)
BASOPHILS # BLD AUTO: 0.1 10*3/MM3 (ref 0–0.2)
BASOPHILS NFR BLD AUTO: 1.1 % (ref 0–1.5)
BUN SERPL-MCNC: 10 MG/DL (ref 8–23)
BUN/CREAT SERPL: 13 (ref 7–25)
CALCIUM SPEC-SCNC: 8.7 MG/DL (ref 8.6–10.5)
CHLORIDE SERPL-SCNC: 100 MMOL/L (ref 98–107)
CO2 SERPL-SCNC: 26 MMOL/L (ref 22–29)
CREAT SERPL-MCNC: 0.77 MG/DL (ref 0.57–1)
DEPRECATED RDW RBC AUTO: 46.4 FL (ref 37–54)
EGFRCR SERPLBLD CKD-EPI 2021: 81.6 ML/MIN/1.73
EOSINOPHIL # BLD AUTO: 0.3 10*3/MM3 (ref 0–0.4)
EOSINOPHIL NFR BLD AUTO: 3.5 % (ref 0.3–6.2)
ERYTHROCYTE [DISTWIDTH] IN BLOOD BY AUTOMATED COUNT: 13.7 % (ref 12.3–15.4)
GLUCOSE BLDC GLUCOMTR-MCNC: 170 MG/DL (ref 70–105)
GLUCOSE BLDC GLUCOMTR-MCNC: 201 MG/DL (ref 70–105)
GLUCOSE BLDC GLUCOMTR-MCNC: 242 MG/DL (ref 70–105)
GLUCOSE BLDC GLUCOMTR-MCNC: 243 MG/DL (ref 70–105)
GLUCOSE BLDC GLUCOMTR-MCNC: 306 MG/DL (ref 70–105)
GLUCOSE SERPL-MCNC: 183 MG/DL (ref 65–99)
HCT VFR BLD AUTO: 35.2 % (ref 34–46.6)
HGB BLD-MCNC: 11.8 G/DL (ref 12–15.9)
LYMPHOCYTES # BLD AUTO: 2.9 10*3/MM3 (ref 0.7–3.1)
LYMPHOCYTES NFR BLD AUTO: 38.7 % (ref 19.6–45.3)
MCH RBC QN AUTO: 30.4 PG (ref 26.6–33)
MCHC RBC AUTO-ENTMCNC: 33.6 G/DL (ref 31.5–35.7)
MCV RBC AUTO: 90.6 FL (ref 79–97)
MONOCYTES # BLD AUTO: 0.6 10*3/MM3 (ref 0.1–0.9)
MONOCYTES NFR BLD AUTO: 7.5 % (ref 5–12)
NEUTROPHILS NFR BLD AUTO: 3.7 10*3/MM3 (ref 1.7–7)
NEUTROPHILS NFR BLD AUTO: 49.2 % (ref 42.7–76)
NRBC BLD AUTO-RTO: 0.1 /100 WBC (ref 0–0.2)
PLATELET # BLD AUTO: 271 10*3/MM3 (ref 140–450)
PMV BLD AUTO: 7.7 FL (ref 6–12)
POTASSIUM SERPL-SCNC: 3.9 MMOL/L (ref 3.5–5.2)
RBC # BLD AUTO: 3.89 10*6/MM3 (ref 3.77–5.28)
SODIUM SERPL-SCNC: 136 MMOL/L (ref 136–145)
WBC NRBC COR # BLD AUTO: 7.5 10*3/MM3 (ref 3.4–10.8)

## 2024-02-05 PROCEDURE — 85025 COMPLETE CBC W/AUTO DIFF WBC: CPT

## 2024-02-05 PROCEDURE — 82948 REAGENT STRIP/BLOOD GLUCOSE: CPT

## 2024-02-05 PROCEDURE — 99231 SBSQ HOSP IP/OBS SF/LOW 25: CPT | Performed by: SURGERY

## 2024-02-05 PROCEDURE — 97161 PT EVAL LOW COMPLEX 20 MIN: CPT

## 2024-02-05 PROCEDURE — 25010000002 MORPHINE PER 10 MG: Performed by: HOSPITALIST

## 2024-02-05 PROCEDURE — 25010000002 PIPERACILLIN SOD-TAZOBACTAM PER 1 G

## 2024-02-05 PROCEDURE — 80048 BASIC METABOLIC PNL TOTAL CA: CPT

## 2024-02-05 PROCEDURE — 63710000001 INSULIN ISOPHANE & REGULAR PER 5 UNITS: Performed by: INTERNAL MEDICINE

## 2024-02-05 RX ADMIN — ACETAMINOPHEN 650 MG: 325 TABLET, FILM COATED ORAL at 21:48

## 2024-02-05 RX ADMIN — CETIRIZINE HYDROCHLORIDE 10 MG: 10 TABLET, FILM COATED ORAL at 10:26

## 2024-02-05 RX ADMIN — LISINOPRIL 40 MG: 20 TABLET ORAL at 10:26

## 2024-02-05 RX ADMIN — PIPERACILLIN AND TAZOBACTAM 3.38 G: 3; .375 INJECTION, POWDER, LYOPHILIZED, FOR SOLUTION INTRAVENOUS at 17:26

## 2024-02-05 RX ADMIN — Medication 10 ML: at 21:48

## 2024-02-05 RX ADMIN — FOLIC ACID 1000 MCG: 1 TABLET ORAL at 10:26

## 2024-02-05 RX ADMIN — PIPERACILLIN AND TAZOBACTAM 3.38 G: 3; .375 INJECTION, POWDER, LYOPHILIZED, FOR SOLUTION INTRAVENOUS at 00:39

## 2024-02-05 RX ADMIN — INSULIN HUMAN 10 UNITS: 100 INJECTION, SUSPENSION SUBCUTANEOUS at 17:26

## 2024-02-05 RX ADMIN — AMLODIPINE BESYLATE 10 MG: 5 TABLET ORAL at 10:26

## 2024-02-05 RX ADMIN — ISOSORBIDE MONONITRATE 60 MG: 60 TABLET, EXTENDED RELEASE ORAL at 10:26

## 2024-02-05 RX ADMIN — GABAPENTIN 300 MG: 300 CAPSULE ORAL at 21:48

## 2024-02-05 RX ADMIN — PIPERACILLIN AND TAZOBACTAM 3.38 G: 3; .375 INJECTION, POWDER, LYOPHILIZED, FOR SOLUTION INTRAVENOUS at 10:25

## 2024-02-05 RX ADMIN — ACETAMINOPHEN 650 MG: 325 TABLET, FILM COATED ORAL at 00:38

## 2024-02-05 RX ADMIN — GABAPENTIN 300 MG: 300 CAPSULE ORAL at 10:26

## 2024-02-05 RX ADMIN — CYANOCOBALAMIN TAB 1000 MCG 1000 MCG: 1000 TAB at 10:26

## 2024-02-05 RX ADMIN — ROSUVASTATIN CALCIUM 5 MG: 5 TABLET, FILM COATED ORAL at 21:48

## 2024-02-05 RX ADMIN — PANTOPRAZOLE SODIUM 40 MG: 40 TABLET, DELAYED RELEASE ORAL at 05:11

## 2024-02-05 RX ADMIN — MORPHINE SULFATE 4 MG: 4 INJECTION, SOLUTION INTRAMUSCULAR; INTRAVENOUS at 02:18

## 2024-02-05 RX ADMIN — Medication 10 ML: at 10:28

## 2024-02-05 NOTE — CONSULTS
General Surgery Consult Note      Name: Jenna Rosa ADMIT: 2/3/2024   : 1950  PCP: Emily Burdick DO    MRN: [id number] LOS: 0 days   AGE/SEX: 73 y.o. female  ROOM:    [facility]      Patient Care Team:  Emily Burdick DO as PCP - General (Family Medicine)  Joe García MD as Consulting Physician (Cardiology)  Dano Rangel MD as Consulting Physician (Endocrinology)  Conchis Mohamud OD as Consulting Physician (Optometry)  Harinder Gresham MD as Consulting Physician (Pain Medicine)  Juan Richey MD as Consulting Physician (Gastroenterology)  Manish Graff MD as Consulting Physician (Urology)  Chief Complaint   Patient presents with    Abdominal Pain     Pt reports lower abdominal pain x1 week but states has worsened.  Pt reports she has a CT scan scheduled for Tuesday.  Pt reports mucousy blood in stool that started this evening.  Pt reports hx of gastritis.       Consult for: Diverticulitis    HPI  Patient is a 73-year-old female with a past medical history of coronary artery disease, diabetes, hypertension, rheumatoid arthritis on Humira who presents with a 1 week history of worsening lower abdominal pain.  Associated with nausea but no vomiting.  Denies any nausea.  1 day history of mucus and blood in the stool.  She reports last colonoscopy was about 6 months ago which was fairly unremarkable.  CT scan of the abdomen/pelvis showed diverticulitis.  General surgery consulted for recommendations.    Past Medical History:   Diagnosis Date    CAD     Cancer     Skin cancer    Colon polyp     Congenital heart disease 2017    Depression     DEXA     = (-0.7/ -1.6); = (-0.7/ -2.7)    Diabetic peripheral neuropathy     Diverticulosis     GERD     Heart murmur     Hyperlipidemia     Hypertension     Insomnia     Lower back pain     MAMMO     NEG = 2023    Obesity     Osteoarthritis     Osteopenia     Osteoporosis     Palpitations     RA     Rheumatoid  nodule 06/2011     rt. thumb x 2 and rt. & lt. 3rd fingers (Oct.2012)/ rt. elbow, left elbow, and epidermal inclusion cyst post. neck (June 2011)-----Dr. Whitmore    Vitamin D deficiency      Past Surgical History:   Procedure Laterality Date    BLADDER SUSPENSION  10/11/2023        BREAST SURGERY  1972    CARDIAC CATHETERIZATION N/A 09/16/2020    Procedure: Left Heart Cath;  Surgeon: Walker Rodriguez MD;  Location:  [facility] CATH INVASIVE [facility];  Service: Cardiovascular;  Laterality: N/A;    CARDIAC CATHETERIZATION N/A 09/16/2020    Procedure: Coronary angiography;  Surgeon: Walker Rodriguez MD;  Location:  [facility];  Service: Cardiovascular;  Laterality: N/A;    CARDIAC CATHETERIZATION  09/16/2020    Procedure: Functional Flow [place];  Surgeon: Kath Medina MD;  Location: [facility] CATH INVASIVE LOCATION;  Service: Cardiology;;    CARPAL TUNNEL RELEASE Right 02/20/2023    Procedure: CARPAL TUNNEL RELEASE WITH POSSIBLE SYNOVECTOMY;  Surgeon: Amadeo Magaña MD;  Location: [facility] MAIN OR;  Service: Orthopedics;  Laterality: Right;    CHOLECYSTECTOMY      COLONOSCOPY      2017 / 2023= TA, rech 2028? [facility]    CYST REMOVAL      Seb cyst on neck    EYE SURGERY      TOTAL ABDOMINAL HYSTERECTOMY WITH SALPINGO OOPHORECTOMY      VAGINAL PROLAPSE REPAIR       Uterine Prolapse repair     Family History   Problem Relation Age of Onset    Heart disease Mother     Diabetes Mother     Hypertension Mother     Rheum arthritis Mother     Coronary artery disease Mother     Alcohol abuse Mother     Heart failure Mother     Heart disease Father     Mental illness Father     Hypertension Father     Alcohol abuse Father     Early death Father     Heart attack Father     Heart failure Father     Kidney cancer Sister     Stroke Sister     Osteoarthritis Sister     Rheum arthritis Sister     Hypertension Sister     Arthritis Sister     Diabetes Sister     Stroke Sister      Hypertension Sister     Rheum arthritis Sister     Osteoarthritis Sister     Diabetes Sister     Arthritis Sister     Liver disease Sister     Kidney disease Sister     No Known Problems Brother     Rheum arthritis Brother     Osteoarthritis Brother     Hypertension Brother     Diabetes Maternal Grandfather        Social History     Tobacco Use    Smoking status: Never     Passive exposure: Never    Smokeless tobacco: Never   Vaping Use    Vaping Use: Never used   Substance Use Topics    Alcohol use: Yes     Comment: 3-4 a year    Drug use: Never     (Not in a hospital admission)    amLODIPine, 10 mg, Oral, Daily  cetirizine, 10 mg, Oral, Daily  folic acid, 1,000 mcg, Oral, Daily  gabapentin, 300 mg, Oral, BID  isosorbide mononitrate, 60 mg, Oral, QAM  lisinopril, 40 mg, Oral, Daily  pantoprazole, 40 mg, Oral, Q AM  piperacillin-tazobactam, 3.375 g, Intravenous, Q8H  rosuvastatin, 5 mg, Oral, Nightly  sodium chloride, 10 mL, Intravenous, Q12H  vitamin B-12, 1,000 mcg, Oral, Daily           acetaminophen **OR** acetaminophen **OR** acetaminophen    albuterol    senna-docusate sodium **AND** polyethylene glycol **AND** bisacodyl **AND** bisacodyl    dextrose    dextrose    glucagon (human recombinant)    melatonin    nitroglycerin    ondansetron    Insert Peripheral IV **AND** sodium chloride    sodium chloride    sodium chloride    traZODone  Jardiance [empagliflozin]    Review of Systems:   As noted above in HPI.  Also with intermittent epigastric pain related to hiatal hernia.    Vitals:  Temp:  [98.9 °F (37.2 °C)] 98.9 °F (37.2 °C)  Heart Rate:  [51-75] 51  Resp:  [15-16] 15  BP: (103-172)/(48-76) 132/57     Physical Exam:   No acute distress, alert  Nonlabored respirations  Abdomen soft, nondistended, mildly tender to palpation in left lower quadrant  Extremities warm well-perfused with no gross deformities    Labs:  Results from last 7 days   Lab Units 02/04/24  0547 02/03/24  2350   WBC 10*3/mm3 11.30* 10.10    HEMOGLOBIN g/dL 11.7* 12.4   HEMATOCRIT % 36.2 38.1   PLATELETS 10*3/mm3 296 288     Results from last 7 days   Lab Units 02/04/24  0547 02/03/24  2350   SODIUM mmol/L 137 134*   POTASSIUM mmol/L 3.7 4.2   CHLORIDE mmol/L 102 98   CO2 mmol/L 25.0 25.0   BUN mg/dL 8 10   CREATININE mg/dL 0.76 0.75   CALCIUM mg/dL 8.8 9.2   BILIRUBIN mg/dL  --  0.2   ALK PHOS U/L  --  56   ALT (SGPT) U/L  --  11   AST (SGOT) U/L  --  14   GLUCOSE mg/dL 146* 197*     Imaging:  CT abdomen/pelvis 2/4/2024  Impression:  Abnormal wall thickening and a focal area of contained gas associated with the rectosigmoid colon towards the right posteriorly. The surrounding fat stranding and trace fluid suggests acute inflammatory process. Diverticulitis is favored. Underlying contained perforated colon mass is not excluded.     Assessment and Plan:  73 y.o. female with a 1 week history of lower abdominal pain and imaging findings consistent with contained perforated diverticulitis.    - Discussed diverticulitis in the wide spectrum of presentations  - Given recent colonoscopy low suspicion for underlying mass/malignancy and likely diverticulitis  - Will plan to manage with bowel rest, hydration, antibiotics for now if patient shows improvement then will need to be discharged home on a course of oral antibiotics  - Will continue to follow    This note was created using Dragon Voice Recognition software.    Katie Morelos MD  02/04/24  19:28 EST

## 2024-02-05 NOTE — CASE MANAGEMENT/SOCIAL WORK
Continued Stay Note  JONATHON Chong     Patient Name: Jenna Rosa  MRN: [id number]  Today's Date: 2/5/2024    Admit Date: 2/3/2024    Plan: Home. Pending PT notes.   Discharge Plan       Row Name 02/05/24 1709       Plan    Plan Home. Pending PT notes.    Plan Comments Needs CM assessment. Barriers to discharge: IV abx.  Gen surgeon following.             Expected Discharge Date and Time       Expected Discharge Date Expected Discharge Time    Feb 6, 2024               Billie Valencia RN     Office Phone [phone]  Office Cell [phone]

## 2024-02-05 NOTE — PROGRESS NOTES
[facility]     Progress Note    Patient Name: Jenna Rosa  : 1950  MRN: [id number]  Primary Care Physician:  Emily Burdick DO  Date of admission: 2/3/2024  Service date and time: 24 09:31 EST  Subjective   Subjective     Chief Complaint: abdominal pain     HPI:  Patient complains of mild left lower abdominal pain      Patient admitted yesterday  Patient is hungry wants to try some food  Denied any abdominal pain  Will start the patient on clear liquid and advance as tolerated  Continue antibiotic  Follow-up per surgery recommendation  Objective   Objective     Vitals:   Temp:  [98.7 °F (37.1 °C)-100.2 °F (37.9 °C)] 100.2 °F (37.9 °C)  Heart Rate:  [51-64] 56  Resp:  [11-16] 13  BP: (104-144)/(48-68) 132/59  Physical Exam    Constitutional: Awake, alert in no distress    Eyes: PERRLA, sclerae anicteric, no conjunctival injection   HENT: NCAT, mucous membranes moist   Neck: Supple, no thyromegaly, no lymphadenopathy, trachea midline   Respiratory: Clear to auscultation bilaterally, nonlabored respirations    Cardiovascular: RRR, no murmurs, rubs, or gallops, palpable pedal pulses bilaterally   Gastrointestinal: left lower quadrant tenderness    Musculoskeletal: No bilateral ankle edema, no clubbing or cyanosis to extremities   Psychiatric: Appropriate affect, cooperative   Neurologic: Oriented x 3, strength symmetric in all extremities, Cranial Nerves grossly intact to confrontation, speech clear   Skin: No rashes     Result Review    Result Review:  I have personally reviewed the results from the time of this admission to 2024 09:31 EST and agree with these findings:  [x]  Laboratory list / accordion  []  Microbiology  []  Radiology  []  EKG/Telemetry   []  Cardiology/Vascular   []  Pathology  []  Old records  []  Other:  Most notable findings include: Glucose 146, BUN 8, creatinine of 0.76  WBC count 11.3      Assessment & Plan   Assessment / Plan       Active Hospital  Problems:  Active Hospital Problems    Diagnosis     **Acute diverticulitis     Coronary artery disease involving native coronary artery of native heart without angina pectoris     Essential hypertension     Type 2 diabetes mellitus with hyperglycemia, with long-term current use of insulin     Diabetic peripheral neuropathy      Plan:    Acute Diverticulitis  Abdominal Pain  Stool with mucus and blood noted since yesterday  -CT of the abdomen and pelvis reviewed, acute diverticulitis noted with a focal area of contained gas associated with the rectosigmoid colon.  -WBC 10.1, lactate 1.1, monitor  -Hemoglobin 12.4, hematocrit 38.1, monitor  -Transfuse if hemoglobin below 7 point  -Will check for C. difficile due to increased use of antibiotics, abdominal pain, mucus and blood noted in loose stool  -Analgesics as needed  -General surgery consulted  -will keep npo for now     Chronic CAD with hyperlipidemia  -Chronic, stable  -Continue home Imdur, Crestor  -Hold home aspirin due to blood noted in stool     Diabetes Mellitus Type 2  -Accu-Cheks Q6H  -SSI ordered  -Continue home basal and prandial insulin when taking PO  -Hold Janumet     Essential Hypertension  -Controlled  -Monitor BP  -Continue home lisinopril, amlodipine     Rheumatoid arthritis  -Continue methotrexate when verified  -Patient also on Humira outpatient       DVT prophylaxis:  Mechanical DVT prophylaxis orders are present.        CODE STATUS:   Code Status (Patient has no pulse and is not breathing): CPR (Attempt to Resuscitate)  Medical Interventions (Patient has pulse or is breathing): Full Support    Disposition:  I expect patient to be discharged in 2 days.

## 2024-02-05 NOTE — ED NOTES
Nursing report ED to floor  Jenna Rosa  73 y.o.  female    HPI:   Chief Complaint   Patient presents with    Abdominal Pain     Pt reports lower abdominal pain x1 week but states has worsened.  Pt reports she has a CT scan scheduled for Tuesday.  Pt reports mucousy blood in stool that started this evening.  Pt reports hx of gastritis.         Admitting doctor:   Lou Lee MD    Admitting diagnosis:   The primary encounter diagnosis was Generalized abdominal pain. A diagnosis of Diverticulitis was also pertinent to this visit.    Code status:   Current Code Status       Date Active Code Status Order ID Comments User Context       2/4/2024 0233 CPR (Attempt to Resuscitate) [id number]  Kathy Blakely, [facility]        Question Answer    Code Status (Patient has no pulse and is not breathing) CPR (Attempt to Resuscitate)    Medical Interventions (Patient has pulse or is breathing) Full Support                    Allergies:   Jardiance [empagliflozin]    Isolation:  Contact Spore     Fall Risk:  Fall Risk Assessment was completed, and patient is at moderate risk for falls.   Predictive Model Details         23 (Low) Factor Value    Calculated 2/4/2024 20:13 Age 73    Risk of Fall Model Number of Distinct Medication Classes administered 13     Musculoskeletal Assessment WDL     Active Peripheral IV Present     Imaging order in this encounter Present     Skin Assessment WDL     Financial Class Other     Magnesium not on file     Diastolic BP 63     Number of administrations of Anti-Convulsants 1     Drug Use No     Mynor Scale not on file     Calcium 8.8 mg/dL     Peripheral Vascular Assessment WDL     Days after Admission 0.875     Number of administrations of Ulcer Drugs 1     Number of administrations of Analgesic Narcotics 1     Albumin 4 g/dL     Chloride 102 mmol/L     Cardiac Assessment WDL     Number of administrations of Anti-Hypertensives 1     Gastrointestinal Assessment X     ALT 11 U/L     Total  Bilirubin 0.2 mg/dL     Respiratory Rate 15     Potassium 3.7 mmol/L     Creatinine 0.76 mg/dL         Weight:       02/03/24 2142   Weight: 75.5 kg (166 lb 7.2 oz)       Intake and Output  No intake or output data in the 24 hours ending 02/04/24 2013    Diet:   Dietary Orders (From admission, onward)       Start     Ordered    02/04/24 0232  NPO Diet NPO Type: Ice Chips, Sips with Meds  Diet Effective Now        Question Answer Comment   NPO Type Ice Chips    NPO Type Sips with Meds        02/04/24 0233                     Most recent vitals:   Vitals:    02/04/24 1330 02/04/24 1715 02/04/24 1903 02/04/24 1931   BP: 104/48 144/68 132/57 115/63   Pulse: 60 64 51 56   Resp:  15     Temp:       TempSrc:       SpO2: 95% 97% 92% 97%   Weight:       Height:           Active LDAs/IV Access:   Lines, Drains & Airways       Active LDAs       Name Placement date Placement time Site Days    Peripheral IV 02/04/24 0100 Right Antecubital 02/04/24  0100  Antecubital  less than 1                    Skin Condition:   Skin Assessments (last day)       Date/Time Skin WDL    02/04/24 13:46:49 WDL    02/04/24 13:47:58 WDL             Labs (abnormal labs have a star):   Labs Reviewed   COMPREHENSIVE METABOLIC PANEL - Abnormal; Notable for the following components:       Result Value    Glucose 197 (*)     Sodium 134 (*)     All other components within normal limits    Narrative:     GFR Normal >60  Chronic Kidney Disease <60  Kidney Failure <15    The GFR formula is only valid for adults with stable renal function between ages 18 and 70.   URINALYSIS W/ MICROSCOPIC IF INDICATED (NO CULTURE) - Abnormal; Notable for the following components:    Glucose,  mg/dL (2+) (*)     Leuk Esterase, UA Small (1+) (*)     All other components within normal limits   CBC WITH AUTO DIFFERENTIAL - Abnormal; Notable for the following components:    Neutrophils, Absolute 7.20 (*)     All other components within normal limits   URINALYSIS,  MICROSCOPIC ONLY - Abnormal; Notable for the following components:    WBC, UA 3-5 (*)     All other components within normal limits   BASIC METABOLIC PANEL - Abnormal; Notable for the following components:    Glucose 146 (*)     All other components within normal limits    Narrative:     GFR Normal >60  Chronic Kidney Disease <60  Kidney Failure <15    The GFR formula is only valid for adults with stable renal function between ages 18 and 70.   CBC WITH AUTO DIFFERENTIAL - Abnormal; Notable for the following components:    WBC 11.30 (*)     Hemoglobin 11.7 (*)     Neutrophils, Absolute 7.70 (*)     Monocytes, Absolute 1.10 (*)     All other components within normal limits   POCT GLUCOSE FINGERSTICK - Abnormal; Notable for the following components:    Glucose 147 (*)     All other components within normal limits   POCT GLUCOSE FINGERSTICK - Abnormal; Notable for the following components:    Glucose 136 (*)     All other components within normal limits   POCT GLUCOSE FINGERSTICK - Abnormal; Notable for the following components:    Glucose 175 (*)     All other components within normal limits   LIPASE - Normal   LACTIC ACID, PLASMA - Normal   BLOOD CULTURE   BLOOD CULTURE   CLOSTRIDIOIDES DIFFICILE TOXIN    Narrative:     The following orders were created for panel order Clostridioides difficile Toxin - Stool, Per Rectum.  Procedure                               Abnormality         Status                     ---------                               -----------         ------                     Clostridioides difficile...[[id number]]                                                   Please view results for these tests on the individual orders.   CLOSTRIDIOIDES DIFFICILE EIA   POCT GLUCOSE FINGERSTICK   POCT GLUCOSE FINGERSTICK   POCT GLUCOSE FINGERSTICK   POCT GLUCOSE FINGERSTICK   POCT GLUCOSE FINGERSTICK   POCT GLUCOSE FINGERSTICK   CBC AND DIFFERENTIAL    Narrative:     The following orders were created for panel  order CBC & Differential.  Procedure                               Abnormality         Status                     ---------                               -----------         ------                     CBC Auto Differential[[id number]]        Abnormal            Final result                 Please view results for these tests on the individual orders.   CBC AND DIFFERENTIAL    Narrative:     The following orders were created for panel order CBC & Differential.  Procedure                               Abnormality         Status                     ---------                               -----------         ------                     CBC Auto Differential[[id number]]        Abnormal            Final result                 Please view results for these tests on the individual orders.       LOC: Person, Place, Time, and Situation    Telemetry:  Progressive Care    Cardiac Monitoring Ordered: yes    EKG:   No orders to display       Medications Given in the ED:   Medications   sodium chloride 0.9 % flush 10 mL (has no administration in time range)   nitroglycerin (NITROSTAT) SL tablet 0.4 mg (has no administration in time range)   sodium chloride 0.9 % flush 10 mL (10 mL Intravenous Not Given 2/4/24 0824)   sodium chloride 0.9 % flush 10 mL (has no administration in time range)   sodium chloride 0.9 % infusion 40 mL (has no administration in time range)   acetaminophen (TYLENOL) tablet 650 mg ( Oral Not Given:  See Alt 2/4/24 1714)     Or   acetaminophen (TYLENOL) 160 MG/5ML oral solution 650 mg (650 mg Oral Given 2/4/24 1714)     Or   acetaminophen (TYLENOL) suppository 650 mg ( Rectal Not Given:  See Alt 2/4/24 1714)   sennosides-docusate (PERICOLACE) 8.6-50 MG per tablet 2 tablet (2 tablets Oral Given 2/4/24 1716)     And   polyethylene glycol (MIRALAX) packet 17 g (has no administration in time range)     And   bisacodyl (DULCOLAX) EC tablet 5 mg (has no administration in time range)     And   bisacodyl (DULCOLAX)  suppository 10 mg (has no administration in time range)   ondansetron (ZOFRAN) injection 4 mg (has no administration in time range)   melatonin tablet 5 mg (has no administration in time range)   dextrose (GLUTOSE) oral gel 15 g (has no administration in time range)   dextrose (D50W) (25 g/50 mL) IV injection 25 g (has no administration in time range)   glucagon (GLUCAGEN) injection 1 mg (has no administration in time range)   piperacillin-tazobactam (ZOSYN) IVPB 3.375 g in 100 mL NS (CD) (3.375 g Intravenous New Bag 2/4/24 1714)   albuterol (PROVENTIL) nebulizer solution 0.083% 2.5 mg/3mL (has no administration in time range)   amLODIPine (NORVASC) tablet 10 mg (10 mg Oral Given 2/4/24 0829)   folic acid (FOLVITE) tablet 1,000 mcg (1,000 mcg Oral Given 2/4/24 0830)   gabapentin (NEURONTIN) capsule 300 mg (300 mg Oral Given 2/4/24 0830)   isosorbide mononitrate (IMDUR) 24 hr tablet 60 mg (60 mg Oral Given 2/4/24 0719)   pantoprazole (PROTONIX) EC tablet 40 mg (40 mg Oral Given 2/4/24 0546)   lisinopril (PRINIVIL,ZESTRIL) tablet 40 mg (40 mg Oral Given 2/4/24 0830)   cetirizine (zyrTEC) tablet 10 mg (10 mg Oral Given 2/4/24 0830)   rosuvastatin (CRESTOR) tablet 5 mg (has no administration in time range)   traZODone (DESYREL) tablet 50 mg (has no administration in time range)   vitamin B-12 (CYANOCOBALAMIN) tablet 1,000 mcg (1,000 mcg Oral Given 2/4/24 0829)   morphine injection 2 mg (2 mg Intravenous Given 2/4/24 0131)   iopamidol (ISOVUE-370) 76 % injection 100 mL (100 mL Intravenous Given 2/4/24 0133)   piperacillin-tazobactam (ZOSYN) IVPB 3.375 g in 100 mL NS (CD) (0 g Intravenous Stopped 2/4/24 0329)       Imaging results:  CT Abdomen Pelvis With Contrast    Result Date: 2/4/2024  Impression: Abnormal wall thickening and a focal area of contained gas associated with the rectosigmoid colon towards the right posteriorly. The surrounding fat stranding and trace fluid suggests acute inflammatory process.  Diverticulitis is favored. Underlying contained perforated colon mass is not excluded. Electronically Signed: Woody Johnson MD  2/4/2024 1:37 AM EST  Workstation ID: [id number]     Social issues:   Social History     Socioeconomic History    Marital status:      Spouse name: Carson Rosa    Number of children: 3    Years of education: GED   Tobacco Use    Smoking status: Never     Passive exposure: Never    Smokeless tobacco: Never   Vaping Use    Vaping Use: Never used   Substance and Sexual Activity    Alcohol use: Yes     Comment: 3-4 a year    Drug use: Never    Sexual activity: Not Currently     Partners: Male     Birth control/protection: Hysterectomy, Same-sex partner       NIH Stroke Scale:  Interval: (not recorded)  1a. Level of Consciousness: (not recorded)  1b. LOC Questions: (not recorded)  1c. LOC Commands: (not recorded)  2. Best Gaze: (not recorded)  3. Visual: (not recorded)  4. Facial Palsy: (not recorded)  5a. Motor Arm, Left: (not recorded)  5b. Motor Arm, Right: (not recorded)  6a. Motor Leg, Left: (not recorded)  6b. Motor Leg, Right: (not recorded)  7. Limb Ataxia: (not recorded)  8. Sensory: (not recorded)  9. Best Language: (not recorded)  10. Dysarthria: (not recorded)  11. Extinction and Inattention (formerly Neglect): (not recorded)    Total (NIH Stroke Scale): (not recorded)     Additional notable assessment information:Report Given tele     Nursing report ED to floor:  Misa Rojas RN   02/04/24 20:13 EST

## 2024-02-05 NOTE — PLAN OF CARE
Goal Outcome Evaluation:  Plan of Care Reviewed With: patient           Outcome Evaluation: Pt is a 74 y.o.  female who presented to [facility] with abdominal  pain and nausea. Found to have acute diverticulitis. Relevant PMHx including: CAD, DM type II, HTN, and RA. Pt lives alone at baseline and is independent. She presents for PT eval with no mobility deficits. Tinetti score 26/28 indicates low fall risk. She has no therapy needs, and no DME needs. Once medically stable, pt is safe to DC home alone. No f/u therapy recommending. PT will complete order and sign off.      Anticipated Discharge Disposition (PT): home

## 2024-02-05 NOTE — PROGRESS NOTES
General Surgery Inpatient Progress Note      Name: Jenna Rosa ADMIT: 2/3/2024   : 1950  PCP: Emily Burdick DO    MRN: [id number] LOS: 1 days   AGE/SEX: 74 y.o. female  ROOM:    [facility]      Patient Care Team:  Emily Burdick DO as PCP - General (Family Medicine)  Joe García MD as Consulting Physician (Cardiology)  Dano Rangel MD as Consulting Physician (Endocrinology)  Conchis Mohamud OD as Consulting Physician (Optometry)  Harinder Gresham MD as Consulting Physician (Pain Medicine)  Juan Richey MD as Consulting Physician (Gastroenterology)  Manish Graff MD as Consulting Physician (Urology)  Chief Complaint   Patient presents with    Abdominal Pain     Pt reports lower abdominal pain x1 week but states has worsened.  Pt reports she has a CT scan scheduled for Tuesday.  Pt reports mucousy blood in stool that started this evening.  Pt reports hx of gastritis.         Subjective:  Patient seen examined.  Pain improving.  Denies any nausea or vomiting.  Without any diarrhea.    Medications:  amLODIPine, 10 mg, Oral, Daily  cetirizine, 10 mg, Oral, Daily  folic acid, 1,000 mcg, Oral, Daily  gabapentin, 300 mg, Oral, BID  isosorbide mononitrate, 60 mg, Oral, QAM  lisinopril, 40 mg, Oral, Daily  pantoprazole, 40 mg, Oral, Q AM  piperacillin-tazobactam, 3.375 g, Intravenous, Q8H  rosuvastatin, 5 mg, Oral, Nightly  sodium chloride, 10 mL, Intravenous, Q12H  vitamin B-12, 1,000 mcg, Oral, Daily         acetaminophen **OR** acetaminophen **OR** acetaminophen    albuterol    senna-docusate sodium **AND** polyethylene glycol **AND** bisacodyl **AND** bisacodyl    dextrose    dextrose    glucagon (human recombinant)    melatonin    Morphine    nitroglycerin    ondansetron    [COMPLETED] Insert Peripheral IV **AND** sodium chloride    sodium chloride    sodium chloride    traZODone     Vitals:  Temp:  [98 °F (36.7 °C)-100.2 °F (37.9 °C)] 98 °F (36.7 °C)  Heart Rate:  [51-74]  74  Resp:  [11-18] 18  BP: (115-151)/(56-68) 151/63     Physical Exam:  No acute distress, alert  Nonlabored respirations  Abdomen soft, nondistended, minimally tender to palpation left lower quadrant    Labs:  Results from last 7 days   Lab Units 02/05/24  0503 02/04/24  0547 02/03/24  2350   WBC 10*3/mm3 7.50 11.30* 10.10   HEMOGLOBIN g/dL 11.8* 11.7* 12.4   HEMATOCRIT % 35.2 36.2 38.1   PLATELETS 10*3/mm3 271 296 288     Results from last 7 days   Lab Units 02/05/24  0503 02/04/24  0547 02/03/24  2350   SODIUM mmol/L 136 137 134*   POTASSIUM mmol/L 3.9 3.7 4.2   CHLORIDE mmol/L 100 102 98   CO2 mmol/L 26.0 25.0 25.0   BUN mg/dL 10 8 10   CREATININE mg/dL 0.77 0.76 0.75   CALCIUM mg/dL 8.7 8.8 9.2   BILIRUBIN mg/dL  --   --  0.2   ALK PHOS U/L  --   --  56   ALT (SGPT) U/L  --   --  11   AST (SGOT) U/L  --   --  14   GLUCOSE mg/dL 183* 146* 197*     Assessment and Plan:  73 y.o. female with a 1 week history of lower abdominal pain and imaging findings consistent with contained perforated diverticulitis.  Clinically improving.     - Start clears and will monitor for tolerance  - Continue IV antibiotics while inpatient; will ultimately need to be on a course of antibiotics as an outpatient when ready for discharge  - Will continue to follow      This note was created using Dragon Voice Recognition software.    Katie Morelos MD  02/05/24  14:35 EST

## 2024-02-05 NOTE — THERAPY EVALUATION
Patient Name: Jenna Rosa  : 1950    MRN: [id number]                              Today's Date: 2024       Admit Date: 2/3/2024    Visit Dx:     ICD-10-CM ICD-9-CM   1. Generalized abdominal pain  R10.84 789.07   2. Diverticulitis  K57.92 562.11     Patient Active Problem List   Diagnosis    Rheumatoid arthritis    Breast mass, right    Decreased hearing, bilateral    Dependent edema    Depression    Diabetic peripheral neuropathy    Difficult or painful urination    Costochondritis    Encounter for immunization    Encounter for general adult medical examination without abnormal findings    Family history of cerebrovascular accident (CVA)    Flank pain    Gastroesophageal reflux disease    Herpes zoster    High-density lipoprotein deficiency    Mixed hyperlipidemia    Essential hypertension    Insomnia    Leukopenia    Microscopic hematuria    Nipple discharge, bloody    Obesity    Osteopenia    Other bursal cyst, unspecified site    Other dorsalgia    Low back pain    Thoracic back pain    Other hereditary and idiopathic neuropathies    Other long term (current) drug therapy    Other specified acquired deformities of unspecified thigh    Pain in limb    Pain of foot    Metatarsalgia    Polypharmacy    Primary localized osteoarthritis    Type 2 diabetes mellitus with hyperglycemia, with long-term current use of insulin    Urticaria    Visit for screening mammogram    Vitamin D deficiency    Heart palpitations    Major depressive disorder, recurrent episode, moderate    Bereavement    Unstable angina pectoris    Abnormal nuclear stress test    Coronary artery disease involving native coronary artery of native heart without angina pectoris    Fungal infection of skin    Bradycardia, sinus    Encounter for screening for malignant neoplasm of colon    Gastro-esophageal reflux disease without esophagitis    Pure hypercholesterolemia    History of colonic polyps    Epigastric pain    Chronic cough     Diarrhea    Constipation    Change in bowel habits    Black stools    Anemia, iron deficiency    Acute diverticulitis     Past Medical History:   Diagnosis Date    CAD 2020    Cancer 2021    Skin cancer    Colon polyp     Congenital heart disease 2017    Depression     DEXA     2019= (-0.7/ -1.6); 2023= (-0.7/ -2.7)    Diabetic peripheral neuropathy     Diverticulosis     GERD     Heart murmur 2017    Hyperlipidemia     Hypertension     Insomnia     Lower back pain     MAMMO     NEG = 2021/ 2023    Obesity     Osteoarthritis     Osteopenia     Osteoporosis     Palpitations     RA     Rheumatoid nodule 06/2011     rt. thumb x 2 and rt. & lt. 3rd fingers (Oct.2012)/ rt. elbow, left elbow, and epidermal inclusion cyst post. neck (June 2011)-----Dr. Whitmore    Vitamin D deficiency      Past Surgical History:   Procedure Laterality Date    BLADDER SUSPENSION  10/11/2023        BREAST SURGERY  1972    CARDIAC CATHETERIZATION N/A 09/16/2020    Procedure: Left Heart Cath;  Surgeon: Walker Rodriguez MD;  Location: [facility] CATH INVASIVE LOCATION;  Service: Cardiovascular;  Laterality: N/A;    CARDIAC CATHETERIZATION N/A 09/16/2020    Procedure: Coronary angiography;  Surgeon: Walker Rodriguez MD;  Location: [facility] CATH INVASIVE LOCATION;  Service: Cardiovascular;  Laterality: N/A;    CARDIAC CATHETERIZATION  09/16/2020    Procedure: Functional Flow [place];  Surgeon: Kath Medina MD;  Location: [facility] CATH INVASIVE LOCATION;  Service: Cardiology;;    CARPAL TUNNEL RELEASE Right 02/20/2023    Procedure: CARPAL TUNNEL RELEASE WITH POSSIBLE SYNOVECTOMY;  Surgeon: Amadeo Magaña MD;  Location: [facility] MAIN OR;  Service: Orthopedics;  Laterality: Right;    CHOLECYSTECTOMY      COLONOSCOPY      2017 / 2023= TA, rech 2028? [facility]    CYST REMOVAL      Seb cyst on neck    EYE SURGERY      TOTAL ABDOMINAL HYSTERECTOMY WITH SALPINGO OOPHORECTOMY      VAGINAL PROLAPSE REPAIR       Uterine  Prolapse repair      General Information       [facility] Name 02/05/24 [facility]          Physical Therapy Time and Intention    Document Type evaluation  -     Mode of Treatment physical therapy  -[facility] Name 02/05/24 [facility]          General Information    Patient Profile Reviewed yes  -     Prior Level of Function independent:;all household mobility;community mobility;gait;cleaning;driving  -     Existing Precautions/Restrictions no known precautions/restrictions  -     Barriers to Rehab none identified  -[facility] Name 02/05/24 1436          Living Environment    People in Home alone  -[facility] Name 02/05/24 1436          Home Main Entrance    Number of Stairs, Main Entrance one  -     Stair Railings, Main Entrance none  -[facility] Name 02/05/24 1436          Cognition    Orientation Status (Cognition) oriented x 4  -[facility] Name 02/05/24 1436          Safety Issues, Functional Mobility    Comment, Safety Issues/Impairments (Mobility) no safety issues noted.  -               User Key  (r) = Recorded By, (t) = Taken By, (c) = Cosigned By      Initials Name Provider Type     Nany Engle, PT Physical Therapist                   Mobility       [facility] Name 02/05/24 1437          Bed Mobility    Bed Mobility bed mobility (all) activities  -     All Activities, [place] (Bed Mobility) independent  -[facility] Name 02/05/24 1437          Sit-Stand Transfer    Sit-Stand [place] (Transfers) independent  -     Comment, (Sit-Stand Transfer) no AD  -AH       Row Name 02/05/24 1437          Gait/Stairs (Locomotion)    [place] Level (Gait) independent  -     Assistive Device (Gait) --  no AD  -     Patient was able to Ambulate yes  -     Distance in Feet (Gait) 50'  -     Deviations/Abnormal Patterns (Gait) --  no gait deviations  -               User Key  (r) = Recorded By, (t) = Taken By, (c) = Cosigned By      Initials Name Provider Type     Nany Engle, PT Physical  Therapist                   Obj/Interventions       Row Name 02/05/24 1438          Range of Motion Comprehensive    General Range of Motion no range of motion deficits identified  -AH       Row Name 02/05/24 1438          Strength Comprehensive (MMT)    General Manual Muscle Testing (MMT) Assessment no strength deficits identified  -AH       Row Name 02/05/24 1438          Balance    Balance Assessment standing static balance;standing dynamic balance  -     Static Standing Balance independent  -     Dynamic Standing Balance independent  -AH       Row Name 02/05/24 1438          Sensory Assessment (Somatosensory)    Sensory Assessment (Somatosensory) sensation intact  -               User Key  (r) = Recorded By, (t) = Taken By, (c) = Cosigned By      Initials Name Provider Type     Nany Engle, PT Physical Therapist                   Goals/Plan    No documentation.                  Clinical Impression       Row Name 02/05/24 1439          Pain    Pre/Posttreatment Pain Comment a little abdominal  pain.  -     Pain Intervention(s) Therapeutic presence  -AH       Row Name 02/05/24 1439          Plan of Care Review    Plan of Care Reviewed With patient  -     Outcome Evaluation Pt is a 74 y.o.  female who presented to [facility] with abdominal  pain and nausea. Found to have acute diverticulitis. Relevant PMHx including: CAD, DM type II, HTN, and RA. Pt lives alone at baseline and is independent. She presents for PT eval with no mobility deficits. Tinetti score 26/28 indicates low fall risk. She has no therapy needs, and no DME needs. Once medically stable, pt is safe to DC home alone. No f/u therapy recommending. PT will complete order and sign off.  -AH       Row Name 02/05/24 1439          Therapy Assessment/Plan (PT)    Criteria for Skilled Interventions Met (PT) no;no problems identified which require skilled intervention  -     Therapy Frequency (PT) evaluation only  -AH       Row Name 02/05/24 1434     "      Positioning and Restraints    Post Treatment Position chair  -AH     In Bed notified nsg;call light within reach;encouraged to call for assist  -AH               User Key  (r) = Recorded By, (t) = Taken By, (c) = Cosigned By      Initials Name Provider Type     Nany Engle, PT Physical Therapist                   Outcome Measures       Row Name 02/05/24 144          How much help from another person do you currently need...    Turning from your back to your side while in flat bed without using bedrails? 4  -AH     Moving from lying on back to sitting on the side of a flat bed without bedrails? 4  -AH     Moving to and from a bed to a chair (including a wheelchair)? 4  -AH     Standing up from a chair using your arms (e.g., wheelchair, bedside chair)? 4  -AH     Climbing 3-5 steps with a railing? 4  -AH     To walk in hospital room? 4  -AH     AM-PAC 6 Clicks Score (PT) 24  -AH     Highest Level of Mobility Goal 8 --> Walked 250 feet or more  -       Row Name 02/05/24 1445          Tinetti Assessment    Tinetti Assessment yes  -AH     Sitting Balance 1  -AH     Arises 1  -AH     Attempts to Rise 2  -AH     Immediate Standing Balance (first 5 sec) 2  -AH     Standing Balance 1  -AH     Sternal Nudge (feet close together) 2  -AH     Eyes Closed (feet close together) 1  -     Turning 360 Degrees- Steps 1  -     Turning 360 Degrees- Steadiness 1  -AH     Sitting Down 2  -AH     Tinetti Balance Score 14  -AH     Gait Initiation (immediate after told \"go\") 1  -AH     Step Length- Right Swing 1  -AH     Step Length- Left Swing 1  -     Foot Clearance- Right Foot 1  -AH     Foot Clearance- Left Foot 1  -AH     Step Symmetry 1  -AH     Step Continuity 1  -AH     Path (excursion) 2  -AH     Trunk 2  -     Base of Support 1  -     Gait Score 12  -     Tinetti Total Score 26  -     Tinetti Assistive Device --  no AD  -     Tinetti Assessment Comments low fall risk .  -       Row Name 02/05/24 1443 "          Functional Assessment    Outcome Measure Options -PAC 6 Clicks Basic Mobility (PT);Tinetti  -               User Key  (r) = Recorded By, (t) = Taken By, (c) = Cosigned By      Initials Name Provider Type     Nany Engle PT Physical Therapist                                 Physical Therapy Education       Title: PT OT SLP Therapies (Done)       Topic: Physical Therapy (Done)       Point: Mobility training (Done)       Learning Progress Summary             Patient ARUN Cano VU by  at 2/5/2024 1445                                         User Key       Initials Effective Dates Name Provider Type [facility] 12/04/23 -  Nany Engle PT Physical Therapist PT                  PT Recommendation and Plan     Plan of Care Reviewed With: patient  Outcome Evaluation: Pt is a 74 y.o.  female who presented to [facility] with abdominal  pain and nausea. Found to have acute diverticulitis. Relevant PMHx including: CAD, DM type II, HTN, and RA. Pt lives alone at baseline and is independent. She presents for PT eval with no mobility deficits. Tinetti score 26/28 indicates low fall risk. She has no therapy needs, and no DME needs. Once medically stable, pt is safe to DC home alone. No f/u therapy recommending. PT will complete order and sign off.     Time Calculation:         PT Charges       Row Name 02/05/24 1445             Time Calculation    Start Time 1342  -      Stop Time 1357  -      Time Calculation (min) 15 min  -      PT Non-Billable Time (min) 0 min  -      PT Received On 02/05/24  -         Time Calculation- PT    Total Timed Code Minutes- PT 0 minute(s)  -                User Key  (r) = Recorded By, (t) = Taken By, (c) = Cosigned By      Initials Name Provider Type     Nany Engle PT Physical Therapist                  Therapy Charges for Today       Code Description Service Date Service Provider Modifiers Qty    88527957939 HC PT EVAL LOW COMPLEXITY 4 2/5/2024 Nany Engle  PT GP 1            PT G-Codes  Outcome Measure Options: AM-PAC 6 Clicks Basic Mobility (PT), Tinetti  AM-PAC 6 Clicks Score (PT): 24  Tinetti Total Score: 26  PT Discharge Summary  Anticipated Discharge Disposition (PT): home    Nany Engle, PT  2/5/2024

## 2024-02-06 ENCOUNTER — READMISSION MANAGEMENT (OUTPATIENT)
Dept: CALL CENTER | Facility: HOSPITAL | Age: 74
End: 2024-02-06
Payer: MEDICARE

## 2024-02-06 VITALS
DIASTOLIC BLOOD PRESSURE: 75 MMHG | TEMPERATURE: 97.8 F | SYSTOLIC BLOOD PRESSURE: 131 MMHG | OXYGEN SATURATION: 93 % | HEIGHT: 63 IN | RESPIRATION RATE: 17 BRPM | BODY MASS INDEX: 29.49 KG/M2 | HEART RATE: 66 BPM | WEIGHT: 166.45 LBS

## 2024-02-06 LAB
ANION GAP SERPL CALCULATED.3IONS-SCNC: 14 MMOL/L (ref 5–15)
BASOPHILS # BLD AUTO: 0 10*3/MM3 (ref 0–0.2)
BASOPHILS NFR BLD AUTO: 1.1 % (ref 0–1.5)
BUN SERPL-MCNC: 6 MG/DL (ref 8–23)
BUN/CREAT SERPL: 9.5 (ref 7–25)
CALCIUM SPEC-SCNC: 9.3 MG/DL (ref 8.6–10.5)
CHLORIDE SERPL-SCNC: 107 MMOL/L (ref 98–107)
CO2 SERPL-SCNC: 21 MMOL/L (ref 22–29)
CREAT SERPL-MCNC: 0.63 MG/DL (ref 0.57–1)
DEPRECATED RDW RBC AUTO: 44.2 FL (ref 37–54)
EGFRCR SERPLBLD CKD-EPI 2021: 93.8 ML/MIN/1.73
EOSINOPHIL # BLD AUTO: 0.3 10*3/MM3 (ref 0–0.4)
EOSINOPHIL NFR BLD AUTO: 6.1 % (ref 0.3–6.2)
ERYTHROCYTE [DISTWIDTH] IN BLOOD BY AUTOMATED COUNT: 13.4 % (ref 12.3–15.4)
GLUCOSE BLDC GLUCOMTR-MCNC: 188 MG/DL (ref 70–105)
GLUCOSE BLDC GLUCOMTR-MCNC: 193 MG/DL (ref 70–105)
GLUCOSE BLDC GLUCOMTR-MCNC: 214 MG/DL (ref 70–105)
GLUCOSE SERPL-MCNC: 192 MG/DL (ref 65–99)
HCT VFR BLD AUTO: 37.1 % (ref 34–46.6)
HGB BLD-MCNC: 12.3 G/DL (ref 12–15.9)
LYMPHOCYTES # BLD AUTO: 2.1 10*3/MM3 (ref 0.7–3.1)
LYMPHOCYTES NFR BLD AUTO: 50 % (ref 19.6–45.3)
MCH RBC QN AUTO: 30.2 PG (ref 26.6–33)
MCHC RBC AUTO-ENTMCNC: 33.3 G/DL (ref 31.5–35.7)
MCV RBC AUTO: 90.6 FL (ref 79–97)
MONOCYTES # BLD AUTO: 0.4 10*3/MM3 (ref 0.1–0.9)
MONOCYTES NFR BLD AUTO: 10.5 % (ref 5–12)
NEUTROPHILS NFR BLD AUTO: 1.4 10*3/MM3 (ref 1.7–7)
NEUTROPHILS NFR BLD AUTO: 32.3 % (ref 42.7–76)
NRBC BLD AUTO-RTO: 0.2 /100 WBC (ref 0–0.2)
PLATELET # BLD AUTO: 292 10*3/MM3 (ref 140–450)
PMV BLD AUTO: 7.7 FL (ref 6–12)
POTASSIUM SERPL-SCNC: 4 MMOL/L (ref 3.5–5.2)
RBC # BLD AUTO: 4.09 10*6/MM3 (ref 3.77–5.28)
SODIUM SERPL-SCNC: 142 MMOL/L (ref 136–145)
WBC NRBC COR # BLD AUTO: 4.3 10*3/MM3 (ref 3.4–10.8)

## 2024-02-06 PROCEDURE — 82948 REAGENT STRIP/BLOOD GLUCOSE: CPT

## 2024-02-06 PROCEDURE — 63710000001 INSULIN ISOPHANE & REGULAR PER 5 UNITS: Performed by: INTERNAL MEDICINE

## 2024-02-06 PROCEDURE — 80048 BASIC METABOLIC PNL TOTAL CA: CPT

## 2024-02-06 PROCEDURE — 25010000002 PIPERACILLIN SOD-TAZOBACTAM PER 1 G

## 2024-02-06 PROCEDURE — 85025 COMPLETE CBC W/AUTO DIFF WBC: CPT

## 2024-02-06 PROCEDURE — 99231 SBSQ HOSP IP/OBS SF/LOW 25: CPT | Performed by: SURGERY

## 2024-02-06 RX ORDER — AMOXICILLIN AND CLAVULANATE POTASSIUM 875; 125 MG/1; MG/1
1 TABLET, FILM COATED ORAL 2 TIMES DAILY
Qty: 20 TABLET | Refills: 0 | Status: SHIPPED | OUTPATIENT
Start: 2024-02-06 | End: 2024-02-13

## 2024-02-06 RX ORDER — AMOXICILLIN AND CLAVULANATE POTASSIUM 875; 125 MG/1; MG/1
1 TABLET, FILM COATED ORAL 2 TIMES DAILY
Qty: 10 TABLET | Refills: 0 | Status: SHIPPED | OUTPATIENT
Start: 2024-02-06 | End: 2024-02-06 | Stop reason: SDUPTHER

## 2024-02-06 RX ADMIN — PIPERACILLIN AND TAZOBACTAM 3.38 G: 3; .375 INJECTION, POWDER, LYOPHILIZED, FOR SOLUTION INTRAVENOUS at 08:36

## 2024-02-06 RX ADMIN — LISINOPRIL 40 MG: 20 TABLET ORAL at 08:24

## 2024-02-06 RX ADMIN — Medication 10 ML: at 08:36

## 2024-02-06 RX ADMIN — PANTOPRAZOLE SODIUM 40 MG: 40 TABLET, DELAYED RELEASE ORAL at 05:06

## 2024-02-06 RX ADMIN — PIPERACILLIN AND TAZOBACTAM 3.38 G: 3; .375 INJECTION, POWDER, LYOPHILIZED, FOR SOLUTION INTRAVENOUS at 05:05

## 2024-02-06 RX ADMIN — CETIRIZINE HYDROCHLORIDE 10 MG: 10 TABLET, FILM COATED ORAL at 08:26

## 2024-02-06 RX ADMIN — FOLIC ACID 1000 MCG: 1 TABLET ORAL at 08:26

## 2024-02-06 RX ADMIN — INSULIN HUMAN 10 UNITS: 100 INJECTION, SUSPENSION SUBCUTANEOUS at 08:26

## 2024-02-06 RX ADMIN — INSULIN HUMAN 12 UNITS: 100 INJECTION, SUSPENSION SUBCUTANEOUS at 17:21

## 2024-02-06 RX ADMIN — CYANOCOBALAMIN TAB 1000 MCG 1000 MCG: 1000 TAB at 08:25

## 2024-02-06 RX ADMIN — INSULIN HUMAN 10 UNITS: 100 INJECTION, SUSPENSION SUBCUTANEOUS at 12:31

## 2024-02-06 RX ADMIN — PIPERACILLIN AND TAZOBACTAM 3.38 G: 3; .375 INJECTION, POWDER, LYOPHILIZED, FOR SOLUTION INTRAVENOUS at 16:48

## 2024-02-06 RX ADMIN — ISOSORBIDE MONONITRATE 60 MG: 60 TABLET, EXTENDED RELEASE ORAL at 08:25

## 2024-02-06 RX ADMIN — GABAPENTIN 300 MG: 300 CAPSULE ORAL at 08:24

## 2024-02-06 RX ADMIN — AMLODIPINE BESYLATE 10 MG: 5 TABLET ORAL at 08:26

## 2024-02-06 NOTE — CASE MANAGEMENT/SOCIAL WORK
Discharge Planning Assessment  [facility]     Patient Name: Jenna Rosa  MRN: [id number]  Today's Date: 2/6/2024    Admit Date: 2/3/2024    Plan: Home   Discharge Needs Assessment       Row Name 02/06/24 1633       Living Environment    People in Home alone    Current Living Arrangements home    Potentially Unsafe Housing Conditions none    In the past 12 months has the electric, gas, oil, or water company threatened to shut off services in your home? No    Primary Care Provided by self    Provides Primary Care For no one    Family Caregiver if Needed child(berta), adult    Quality of Family Relationships helpful;involved;supportive    Able to Return to Prior Arrangements yes       Resource/Environmental Concerns    Resource/Environmental Concerns none    Transportation Concerns none       Transportation Needs    In the past 12 months, has lack of transportation kept you from medical appointments or from getting medications? no    In the past 12 months, has lack of transportation kept you from meetings, work, or from getting things needed for daily living? No       Food Insecurity    Within the past 12 months, you worried that your food would run out before you got the money to buy more. Never true    Within the past 12 months, the food you bought just didn't last and you didn't have money to get more. Never true       Transition Planning    Patient/Family Anticipates Transition to home    Patient/Family Anticipated Services at Transition none    Transportation Anticipated car, drives self;family or friend will provide       Discharge Needs Assessment    Readmission Within the Last 30 Days no previous admission in last 30 days    Concerns to be Addressed discharge planning    Anticipated Changes Related to Illness none    Equipment Needed After Discharge none                   Discharge Plan       Row Name 02/06/24 1631       Plan    Plan Home    Patient/Family in Agreement with Plan yes    Plan Comments Met with  patient at bedside. Lives at home alone. IADL.  Has excellent support system. Does not use dme, but has a cane, life alert and ashower chair a home.  Verified PCP. [facility]ses [facility] as [facility].  Barriers to discharge: IV abx.  Increasing diet, may dc if able to increase from Clears at breakfast to regular diet with no issues.                  Continued Care and Services - Admitted Since 2/3/2024    Coordination has not been started for this encounter.       Selected Continued Care - Episodes Includes continued care and service providers with selected services from the active episodes listed below      [facility] Episode start date: 1/11/2023   There are no active outsourced providers for this episode.                 Expected Discharge Date and Time       Expected Discharge Date Expected Discharge Time    Feb 6, 2024            Demographic Summary       Row Name 02/06/24 1633       General Information    Admission Type inpatient    Arrived From emergency department    Required Notices Provided Important Message from Medicare    Referral Source admission list    Reason for Consult discharge planning    Preferred Language English                   Functional Status       Row Name 02/06/24 1633       Functional Status    Usual Activity Tolerance good    Current Activity Tolerance good       Functional Status, IADL    Medications independent    Meal Preparation independent    Housekeeping independent    Laundry independent    Shopping independent       Mental Status    General Appearance WDL WDL       Mental Status Summary    Recent Changes in Mental Status/Cognitive Functioning no changes           Met with patient in room wearing PPE: mask.    Maintained distance greater than six feet and spent less than 15 minutes in the room.  Billie Valencia RN     Office Phone [phone]  Office Cell [phone]

## 2024-02-06 NOTE — CONSULTS
"Diabetes Education  Assessment/Teaching    Patient Name:  Jenna Rosa  YOB: 1950  MRN: [id number]  Admit Date:  2/3/2024      Assessment Date:  2/6/2024  Flowsheet Row Most Recent Value   General Information     Referral From: --  [Pt seen due to diabetes on problem list. Last A1c in [facility] was from 11/8/2023 and result was 7.8%.]   Height 160 cm (63\")   Height Method Stated   Weight 75.5 kg (166 lb 7.2 oz)   Weight Method Bed scale   Pregnancy Assessment    Diabetes History    What type of diabetes do you have? Type 2   Length of Diabetes Diagnosis --  [Dx in 1998.]   Have you had diabetes education/teaching in the past? yes   When and where was your diabetes education? Was seen by inpatient diabetes educator at [facility] last on 5/14/2020   Do you test your blood sugar at home? yes   Frequency of checks wearing continuous glucose sensor   Meter type Freestyle [place] 2   Who performs the test? self   Have you had low blood sugar? (<70mg/dl) yes   How often do you have low blood sugar? occasionally   Education Preferences    Nutrition Information    Assessment Topics    DM Goals             Flowsheet Row Most Recent Value   DM Education Needs    Blood Glucose Target Range Discussed heallthy bs range and healthy A1c target. Discussed importance of bs control.   Medication Insulin, Pen  [Pt taking Humulin 70/30 21 units am and prelunch and 20 units presupper. Pt also takes Janumet /1000 mg daily]   Problem Solving Hypoglycemia, Hyperglycemia, Signs, Symptoms, Treatment   Reducing Risks A1C testing   Healthy Eating --  [Pt usually eating 3 meals/day]   Motivation Engaged   Teaching Method Explanation, Discussion   Patient Response Verbalized understanding              Other Comments:  Met with pt at bedside. Pt follows at the [facility]. She has follow up appt with Dr. Dano Rangel on 3/26/2024. Since pt having low bs several times/week, encouraged pt to contact [facility] to have sensor readings " reviewed. Pt states will contact MD. Pt states she also has problems with high bs. Pt states she probably needs to meet with RD regarding meal plan. Pt states she feels she consumes more CHO foods than what she should. Pt states also difficult to eat non-starchy vegetables without teeth. Discussed cooking vegetables until soft. Pt states she does not have additional questions. Pt states has insulin and sensor supplies at home.       Electronically signed by:  Alesha Walker RN  02/06/24 17:36 EST

## 2024-02-06 NOTE — PROGRESS NOTES
General Surgery Inpatient Progress Note      Name: Jenna Rosa ADMIT: 2/3/2024   : 1950  PCP: Emily Burdick DO    MRN: [id number] LOS: 2 days   AGE/SEX: 74 y.o. female  ROOM:    [facility]      Patient Care Team:  Emily Burdick DO as PCP - General (Family Medicine)  Joe García MD as Consulting Physician (Cardiology)  Dano Rangel MD as Consulting Physician (Endocrinology)  Conchis Mohamud OD as Consulting Physician (Optometry)  Harinder Gresham MD as Consulting Physician (Pain Medicine)  Juan Richey MD as Consulting Physician (Gastroenterology)  Manish Graff MD as Consulting Physician (Urology)  Chief Complaint   Patient presents with    Abdominal Pain     Pt reports lower abdominal pain x1 week but states has worsened.  Pt reports she has a CT scan scheduled for Tuesday.  Pt reports mucousy blood in stool that started this evening.  Pt reports hx of gastritis.         Subjective:  Patient seen examined.  Denies any abdominal pain at rest or with activity.  Still having mild lower abdominal pain with urination but with slight improvement.  Tolerating a diet without any nausea or vomiting.    Medications:  amLODIPine, 10 mg, Oral, Daily  cetirizine, 10 mg, Oral, Daily  folic acid, 1,000 mcg, Oral, Daily  gabapentin, 300 mg, Oral, BID  insulin NPH-insulin regular, 10 Units, Subcutaneous, TID With Meals  isosorbide mononitrate, 60 mg, Oral, QAM  lisinopril, 40 mg, Oral, Daily  pantoprazole, 40 mg, Oral, Q AM  piperacillin-tazobactam, 3.375 g, Intravenous, Q8H  rosuvastatin, 5 mg, Oral, Nightly  sodium chloride, 10 mL, Intravenous, Q12H  vitamin B-12, 1,000 mcg, Oral, Daily         acetaminophen **OR** acetaminophen **OR** acetaminophen    albuterol    senna-docusate sodium **AND** polyethylene glycol **AND** bisacodyl **AND** bisacodyl    dextrose    dextrose    glucagon (human recombinant)    melatonin    Morphine    nitroglycerin    ondansetron    [COMPLETED]  Insert Peripheral IV **AND** sodium chloride    sodium chloride    sodium chloride    traZODone     Vitals:  Temp:  [97.5 °F (36.4 °C)-97.9 °F (36.6 °C)] 97.9 °F (36.6 °C)  Heart Rate:  [61-69] 62  Resp:  [12-18] 12  BP: (133-160)/(63-70) 146/70     Physical Exam:  No acute distress, alert  Nonlabored respirations  Abdomen soft, nondistended, nontender to palpation in left lower quadrant    Labs:  Results from last 7 days   Lab Units 02/06/24  0900 02/05/24  0503 02/04/24  0547   WBC 10*3/mm3 4.30 7.50 11.30*   HEMOGLOBIN g/dL 12.3 11.8* 11.7*   HEMATOCRIT % 37.1 35.2 36.2   PLATELETS 10*3/mm3 292 271 296     Results from last 7 days   Lab Units 02/06/24  0506 02/05/24  0503 02/04/24  0547 02/03/24  2350   SODIUM mmol/L 142 136 137 134*   POTASSIUM mmol/L 4.0 3.9 3.7 4.2   CHLORIDE mmol/L 107 100 102 98   CO2 mmol/L 21.0* 26.0 25.0 25.0   BUN mg/dL 6* 10 8 10   CREATININE mg/dL 0.63 0.77 0.76 0.75   CALCIUM mg/dL 9.3 8.7 8.8 9.2   BILIRUBIN mg/dL  --   --   --  0.2   ALK PHOS U/L  --   --   --  56   ALT (SGPT) U/L  --   --   --  11   AST (SGOT) U/L  --   --   --  14   GLUCOSE mg/dL 192* 183* 146* 197*     Assessment and Plan:  73 y.o. female with a 1 week history of lower abdominal pain and imaging findings consistent with contained perforated diverticulitis.  Clinically improving.     - Advance diet as tolerated  - Continue IV antibiotics while inpatient; will ultimately need to be on a course of antibiotics as an outpatient when ready for discharge  - Likely okay to discharge home either later today or tomorrow on a course of oral antibiotics with consideration for follow-up CT scan in 1 month for re-evaluation    This note was created using Dragon Voice Recognition software.    Katie Morelos MD  02/06/24  12:28 EST

## 2024-02-06 NOTE — NURSING NOTE
Patient C/O upper gastric pain 4/10 after eating a modest portion  of lunch.   Discussed with patient and will monitor for ongoing discomfort and will administer pain medication if needed.

## 2024-02-06 NOTE — PROGRESS NOTES
[facility]     Progress Note    Patient Name: Jenna Rosa  : 1950  MRN: [id number]  Primary Care Physician:  Emily Burdick DO  Date of admission: 2/3/2024  Service date and time: 24 09:48 EST  Subjective   Subjective     Chief Complaint: abdominal pain     HPI:  Patient complains of mild left lower abdominal pain    /  Patient admitted yesterday  Patient is hungry wants to try some food  Denied any abdominal pain  Will start the patient on clear liquid and advance as tolerated  Continue antibiotic  Follow-up per surgery recommendation    /  Feeling better  Tolerating clear liquid  Wants to have diet advanced  Abdomen exam is benign  Advance diet as tolerated  Anticipate discharge home later today  Objective   Objective     Vitals:   Temp:  [97.5 °F (36.4 °C)-98 °F (36.7 °C)] 97.9 °F (36.6 °C)  Heart Rate:  [61-74] 62  Resp:  [12-18] 12  BP: (133-160)/(63-70) 146/70  Physical Exam    Constitutional: Awake, alert in no distress    Eyes: PERRLA, sclerae anicteric, no conjunctival injection   HENT: NCAT, mucous membranes moist   Neck: Supple, no thyromegaly, no lymphadenopathy, trachea midline   Respiratory: Clear to auscultation bilaterally, nonlabored respirations    Cardiovascular: RRR, no murmurs, rubs, or gallops, palpable pedal pulses bilaterally   Gastrointestinal: left lower quadrant tenderness    Musculoskeletal: No bilateral ankle edema, no clubbing or cyanosis to extremities   Psychiatric: Appropriate affect, cooperative   Neurologic: Oriented x 3, strength symmetric in all extremities, Cranial Nerves grossly intact to confrontation, speech clear   Skin: No rashes     Result Review    Result Review:  I have personally reviewed the results from the time of this admission to 2024 09:48 EST and agree with these findings:  [x]  Laboratory list / accordion  []  Microbiology  []  Radiology  []  EKG/Telemetry   []  Cardiology/Vascular   []  Pathology  []  Old records  []   Other:  Most notable findings include: Glucose 146, BUN 8, creatinine of 0.76  WBC count 11.3      Assessment & Plan   Assessment / Plan       Active Hospital Problems:  Active Hospital Problems    Diagnosis     **Acute diverticulitis     Coronary artery disease involving native coronary artery of native heart without angina pectoris     Essential hypertension     Type 2 diabetes mellitus with hyperglycemia, with long-term current use of insulin     Diabetic peripheral neuropathy      Plan:    Acute Diverticulitis  Abdominal Pain  Stool with mucus and blood noted since yesterday  -CT of the abdomen and pelvis reviewed, acute diverticulitis noted with a focal area of contained gas associated with the rectosigmoid colon.  -WBC 10.1, lactate 1.1, monitor  -Hemoglobin 12.4, hematocrit 38.1, monitor  -Transfuse if hemoglobin below 7 point  -Will check for C. difficile due to increased use of antibiotics, abdominal pain, mucus and blood noted in loose stool  -Analgesics as needed  -General surgery consulted  -will keep npo for now     Chronic CAD with hyperlipidemia  -Chronic, stable  -Continue home Imdur, Crestor  -Hold home aspirin due to blood noted in stool     Diabetes Mellitus Type 2  -Accu-Cheks Q6H  -SSI ordered  -Continue home basal and prandial insulin when taking PO  -Hold Janumet     Essential Hypertension  -Controlled  -Monitor BP  -Continue home lisinopril, amlodipine     Rheumatoid arthritis  -Continue methotrexate when verified  -Patient also on Humira outpatient       DVT prophylaxis:  Mechanical DVT prophylaxis orders are present.        CODE STATUS:   Code Status (Patient has no pulse and is not breathing): CPR (Attempt to Resuscitate)  Medical Interventions (Patient has pulse or is breathing): Full Support    Disposition:  I expect patient to be discharged in 2 days.

## 2024-02-07 ENCOUNTER — TRANSITIONAL CARE MANAGEMENT TELEPHONE ENCOUNTER (OUTPATIENT)
Dept: CALL CENTER | Facility: HOSPITAL | Age: 74
End: 2024-02-07
Payer: MEDICARE

## 2024-02-07 NOTE — DISCHARGE SUMMARY
[facility]         Patient Name: Jenna Rosa  : 1950  MRN: [id number]  Primary Care Physician:  Emily Burdick DO  Date of admission: 2/3/2024  Service date and time: 24 09:48 EST     Subjective   Subjective      Chief Complaint: abdominal pain      HPI:  Patient complains of mild left lower abdominal pain     /  Patient admitted yesterday  Patient is hungry wants to try some food  Denied any abdominal pain  Will start the patient on clear liquid and advance as tolerated  Continue antibiotic  Follow-up per surgery recommendation     /  Feeling better  Tolerating clear liquid  Wants to have diet advanced  Abdomen exam is benign  Advance diet as tolerated  Anticipate discharge home later today        Objective   Objective      Vitals:   Temp:  [97.5 °F (36.4 °C)-98 °F (36.7 °C)] 97.9 °F (36.6 °C)  Heart Rate:  [61-74] 62  Resp:  [12-18] 12  BP: (133-160)/(63-70) 146/70  Physical Exam                         Constitutional: Awake, alert in no distress               Eyes: PERRLA, sclerae anicteric, no conjunctival injection              HENT: NCAT, mucous membranes moist              Neck: Supple, no thyromegaly, no lymphadenopathy, trachea midline              Respiratory: Clear to auscultation bilaterally, nonlabored respirations               Cardiovascular: RRR, no murmurs, rubs, or gallops, palpable pedal pulses bilaterally              Gastrointestinal: left lower quadrant tenderness               Musculoskeletal: No bilateral ankle edema, no clubbing or cyanosis to extremities              Psychiatric: Appropriate affect, cooperative              Neurologic: Oriented x 3, strength symmetric in all extremities, Cranial Nerves grossly intact to confrontation, speech clear              Skin: No rashes            Result Review   Result Review:  I have personally reviewed the results from the time of this admission to 2024 09:48 EST and agree with these findings:  [x]  Laboratory list /  accordion  []  Microbiology  []  Radiology  []  EKG/Telemetry   []  Cardiology/Vascular   []  Pathology  []  Old records  []  Other:  Most notable findings include: Glucose 146, BUN 8, creatinine of 0.76  WBC count 11.3              Assessment & Plan  Assessment / Plan         Active Hospital Problems:       Active Hospital Problems     Diagnosis      **Acute diverticulitis      Coronary artery disease involving native coronary artery of native heart without angina pectoris      Essential hypertension      Type 2 diabetes mellitus with hyperglycemia, with long-term current use of insulin      Diabetic peripheral neuropathy        Plan:    Acute Diverticulitis  Abdominal Pain  Stool with mucus and blood noted since yesterday  -CT of the abdomen and pelvis reviewed, acute diverticulitis noted with a focal area of contained gas associated with the rectosigmoid colon.  -WBC 10.1, lactate 1.1, monitor  -Hemoglobin 12.4, hematocrit 38.1, monitor  -Transfuse if hemoglobin below 7 point  -Will check for C. difficile due to increased use of antibiotics, abdominal pain, mucus and blood noted in loose stool  -Analgesics as needed  -General surgery consulted  -will keep npo for now     Chronic CAD with hyperlipidemia  -Chronic, stable  -Continue home Imdur, Crestor  -Hold home aspirin due to blood noted in stool     Diabetes Mellitus Type 2  -Accu-Cheks Q6H  -SSI ordered  -Continue home basal and prandial insulin when taking PO  -Hold Janumet     Essential Hypertension  -Controlled  -Monitor BP  -Continue home lisinopril, amlodipine     Rheumatoid arthritis  -Continue methotrexate when verified  -Patient also on Humira outpatient        DVT prophylaxis:  Mechanical DVT prophylaxis orders are present.           CODE STATUS:   Code Status (Patient has no pulse and is not breathing): CPR (Attempt to Resuscitate)  Medical Interventions (Patient has pulse or is breathing): Full Support

## 2024-02-07 NOTE — OUTREACH NOTE
Call Center TCM Note      Flowsheet Row Responses   [facility] patient discharged from? [facility]   Does the patient have one of the following disease processes/diagnoses(primary or secondary)? Other   TCM attempt successful? Yes   Call start time 0846   Call end time 0851   Discharge diagnosis abdominal pain   Meds reviewed with patient/caregiver? Yes   Is the patient having any side effects they believe may be caused by any medication additions or changes? No   Does the patient have all medications ordered at discharge? Yes   Is the patient taking all medications as directed (includes completed medication regime)? Yes   Does the patient have an appointment with their PCP within 7-14 days of discharge? No   Nursing Interventions Assisted patient with making appointment per protocol   Has home health visited the patient within 72 hours of discharge? N/A   Psychosocial issues? No   Did the patient receive a copy of their discharge instructions? Yes   Nursing interventions Reviewed instructions with patient   What is the patient's perception of their health status since discharge? Improving   Is the patient/caregiver able to teach back signs and symptoms related to disease process for when to call PCP? Yes   Is the patient/caregiver able to teach back signs and symptoms related to disease process for when to call 911? Yes   Is the patient/caregiver able to teach back the hierarchy of who to call/visit for symptoms/problems? PCP, Specialist, Home health nurse, Urgent Care, ED, 911 Yes   If the patient is a current smoker, are they able to teach back resources for cessation? Not a smoker   TCM call completed? Yes   Call end time 0851   Would this patient benefit from a Referral to Amb Social Work? No   Is the patient interested in additional calls from an ambulatory ? No            Trista Greenfield LPN    2/7/2024, 08:58 EST

## 2024-02-07 NOTE — OUTREACH NOTE
Prep Survey      Flowsheet Row Responses   [facility] patient discharged from? [facility]   Is LACE score < 7 ? No   Eligibility [facility]   Date of Admission 02/03/24   Date of Discharge 02/06/24   Discharge Disposition Home or Self Care   Discharge diagnosis abdominal pain   Does the patient have one of the following disease processes/diagnoses(primary or secondary)? Other   Does the patient have Home health ordered? No   Is there a DME ordered? No   Prep survey completed? Yes            [facility] - Registered Nurse

## 2024-02-09 LAB
BACTERIA SPEC AEROBE CULT: NORMAL
BACTERIA SPEC AEROBE CULT: NORMAL

## 2024-02-13 ENCOUNTER — OFFICE VISIT (OUTPATIENT)
Dept: FAMILY MEDICINE CLINIC | Facility: CLINIC | Age: 74
End: 2024-02-13
Payer: MEDICARE

## 2024-02-13 VITALS
OXYGEN SATURATION: 99 % | HEART RATE: 54 BPM | DIASTOLIC BLOOD PRESSURE: 63 MMHG | TEMPERATURE: 97.8 F | BODY MASS INDEX: 29.59 KG/M2 | WEIGHT: 167 LBS | SYSTOLIC BLOOD PRESSURE: 152 MMHG | RESPIRATION RATE: 16 BRPM | HEIGHT: 63 IN

## 2024-02-13 DIAGNOSIS — M81.0 OSTEOPOROSIS WITHOUT CURRENT PATHOLOGICAL FRACTURE, UNSPECIFIED OSTEOPOROSIS TYPE: ICD-10-CM

## 2024-02-13 DIAGNOSIS — K57.32 DIVERTICULITIS OF COLON: Primary | ICD-10-CM

## 2024-02-13 DIAGNOSIS — K63.9 COLON WALL THICKENING: ICD-10-CM

## 2024-02-13 DIAGNOSIS — M05.79 RHEUMATOID ARTHRITIS INVOLVING MULTIPLE SITES WITH POSITIVE RHEUMATOID FACTOR: ICD-10-CM

## 2024-02-13 DIAGNOSIS — M05.9 SEROPOSITIVE RHEUMATOID ARTHRITIS: ICD-10-CM

## 2024-02-13 RX ORDER — MULTIVIT WITH MINERALS/LUTEIN
1000 TABLET ORAL DAILY
COMMUNITY

## 2024-02-13 RX ORDER — HYDROCODONE BITARTRATE AND ACETAMINOPHEN 5; 325 MG/1; MG/1
1 TABLET ORAL EVERY 6 HOURS PRN
Qty: 10 TABLET | Refills: 0 | Status: SHIPPED | OUTPATIENT
Start: 2024-02-13

## 2024-02-13 NOTE — PROGRESS NOTES
Subjective   Jenna Rosa is a 74 y.o. female.     Chief Complaint   Patient presents with    Transitional Care Management     Hospital follow up          Current Outpatient Medications:     acebutolol (SECTRAL) 200 MG capsule, TAKE ONE CAPSULE BY MOUTH DAILY, Disp: 90 capsule, Rfl: 1    acetaminophen (TYLENOL) 325 MG tablet, Take 2 tablets by mouth Every 6 (Six) Hours As Needed for Mild Pain., Disp: , Rfl:     albuterol sulfate  (90 Base) MCG/ACT inhaler, Inhale 2 puffs Every 6 (Six) Hours As Needed for Shortness of Air (cough)., Disp: 18 g, Rfl: 0    amLODIPine (NORVASC) 10 MG tablet, TAKE ONE TABLET BY MOUTH DAILY, Disp: 90 tablet, Rfl: 1    aspirin 81 MG EC tablet, Take 1 tablet by mouth Daily., Disp: , Rfl:     azelastine (OPTIVAR) 0.05 % ophthalmic solution, Administer 2 drops to both eyes 2 (Two) Times a Day As Needed., Disp: , Rfl:     Cholecalciferol (Vitamin D) 50 MCG (2000 UT) capsule, Take 1 capsule by mouth Daily., Disp: , Rfl:     Continuous Blood Gluc  (FreeStyle [place] 2 [place]) device, USE WITH SENSOR EVERY 14 DAYS, Disp: 1 each, Rfl: 0    Continuous Blood Gluc Sensor (FreeStyle [place] 2 Sensor) misc, Inject 1 each under the skin into the appropriate area as directed Daily., Disp: 6 each, Rfl: 2    D-MANNOSE PO, 1 po qd, Disp: , Rfl:     diclofenac (VOLTAREN) 1 % gel gel, Apply 4 g topically to the appropriate area as directed 4 (Four) Times a Day As Needed., Disp: , Rfl:     fenofibrate 160 MG tablet, Take 1 tablet by mouth Daily., Disp: 90 tablet, Rfl: 2    folic acid (FOLVITE) 1 MG tablet, TAKE 1 TABLET BY MOUTH DAILY, Disp: 90 tablet, Rfl: 1    glucose blood test strip, 1 each by Other route 3 (Three) Times a Day As Needed (for BS monitoring). Use as instructed, Disp: 90 each, Rfl: 1    glucose monitor monitoring kit, Use 1 each 3 (Three) Times a Day As Needed (for BG monitoring)., Disp: 1 each, Rfl: 0    Humira Pen 40 MG/0.4ML Pen-injector Kit, Inject 40 mg under the skin into  the appropriate area as directed Every 14 (Fourteen) Days., Disp: 2 each, Rfl: 3    Insulin NPH Isophane & Regular (HumuLIN 70/30 KwikPen) (70-30) 100 UNIT/ML suspension pen-injector, Inject 21 Units under the skin into the appropriate area as directed 2 (Two) Times a Day. Before breakfast and lunch, Disp: , Rfl:     Insulin NPH Isophane & Regular (HumuLIN 70/30 KwikPen) (70-30) 100 UNIT/ML suspension pen-injector, Inject 20 Units under the skin into the appropriate area as directed Every Evening. Before supper, Disp: , Rfl:     Insulin Pen Needle 32G X 4 MM misc, Use 1 each 2 (Two) Times a Day., Disp: 200 each, Rfl: 3    isosorbide mononitrate (IMDUR) 60 MG 24 hr tablet, Take 1 tablet by mouth Every Morning., Disp: 90 tablet, Rfl: 3    Janumet -1000 MG tablet, TAKE ONE TABLET BY MOUTH DAILY, Disp: 90 tablet, Rfl: 1    Lancets (onetouch ultrasoft) lancets, 1 each by Other route 3 (Three) Times a Day As Needed (for BS monitoring). Use as instructed, Disp: 90 each, Rfl: 1    lisinopril (PRINIVIL,ZESTRIL) 40 MG tablet, TAKE ONE TABLET BY MOUTH DAILY, Disp: 90 tablet, Rfl: 1    loratadine (Claritin) 10 MG tablet, Take 1 tablet by mouth Every Night., Disp: , Rfl:     meclizine (ANTIVERT) 12.5 MG tablet, 1/2 - 1 po q6-8hrs prn dizzyness, Disp: 20 tablet, Rfl: 0    methotrexate 2.5 MG tablet, TAKE 5 TABLETS BY MOUTH ONCE A WEEK, Disp: 60 tablet, Rfl: 0    multivitamin with minerals (MULTIVITAMIN ADULTS PO), Take 1 tablet by mouth Daily., Disp: , Rfl:     traZODone (DESYREL) 50 MG tablet, TAKE ONE TABLET BY MOUTH EVERY NIGHT AT BEDTIME, Disp: 90 tablet, Rfl: 0    vitamin B-12 (CYANOCOBALAMIN) 1000 MCG tablet, Take 1 tablet by mouth Daily., Disp: , Rfl:     VITAMIN E 1000 UNIT capsule, Take 1 capsule by mouth Daily., Disp: , Rfl:     gabapentin (NEURONTIN) 300 MG capsule, Take 1 capsule by mouth 2 (Two) Times a Day., Disp: 180 capsule, Rfl: 0    HYDROcodone-acetaminophen (NORCO) 5-325 MG per tablet, Take 1 tablet by  mouth Every 6 (Six) Hours As Needed for Severe Pain., Disp: 10 tablet, Rfl: 0    lansoprazole (PREVACID) 30 MG capsule, TAKE 1 CAPSULE BY MOUTH DAILY, Disp: 90 capsule, Rfl: 1    ondansetron ODT (ZOFRAN-ODT) 4 MG disintegrating tablet, Place 1 tablet on the tongue Every 8 (Eight) Hours As Needed for Nausea., Disp: 30 tablet, Rfl: 0    rosuvastatin (CRESTOR) 5 MG tablet, TAKE ONE TABLET BY MOUTH ONCE NIGHTLY, Disp: 90 tablet, Rfl: 0    Past Medical History:   Diagnosis Date    CAD 2020    Cancer 2021    Skin cancer    Colon polyp     Congenital heart disease 2017    Depression     DEXA     2019= (-0.7/ -1.6); 2023= (-0.7/ -2.7)    Diabetic peripheral neuropathy     Diverticulosis     GERD     Heart murmur 2017    Hyperlipidemia     Hypertension     Insomnia     Lower back pain     MAMMO     NEG = 2021/ 2023    Obesity     Osteoarthritis     Osteopenia     Osteoporosis     Palpitations     RA     Rheumatoid nodule 06/2011     rt. thumb x 2 and rt. & lt. 3rd fingers (Oct.2012)/ rt. elbow, left elbow, and epidermal inclusion cyst post. neck (June 2011)-----Dr. Whitmore    Vitamin D deficiency        Past Surgical History:   Procedure Laterality Date    BLADDER SUSPENSION  10/11/2023        BREAST SURGERY  1972    CARDIAC CATHETERIZATION N/A 09/16/2020    Procedure: Left Heart Cath;  Surgeon: Walker Rodriguez MD;  Location: [facility] CATH INVASIVE LOCATION;  Service: Cardiovascular;  Laterality: N/A;    CARDIAC CATHETERIZATION N/A 09/16/2020    Procedure: Coronary angiography;  Surgeon: Walker Rodriguez MD;  Location: [facility] CATH INVASIVE LOCATION;  Service: Cardiovascular;  Laterality: N/A;    CARDIAC CATHETERIZATION  09/16/2020    Procedure: Functional Flow [place];  Surgeon: Kath Medina MD;  Location: [facility] CATH INVASIVE LOCATION;  Service: Cardiology;;    CARPAL TUNNEL RELEASE Right 02/20/2023    Procedure: CARPAL TUNNEL RELEASE WITH POSSIBLE SYNOVECTOMY;  Surgeon: Archana  Amadeo Weldon MD;  Location: [facility] MAIN OR;  Service: Orthopedics;  Laterality: Right;    CHOLECYSTECTOMY      COLONOSCOPY      2017 / 2023= TA, caren 2028? [facility]    CYST REMOVAL      Seb cyst on neck    EYE SURGERY      TOTAL ABDOMINAL HYSTERECTOMY WITH SALPINGO OOPHORECTOMY      VAGINAL PROLAPSE REPAIR       Uterine Prolapse repair       Family History   Problem Relation Age of Onset    Heart disease Mother     Diabetes Mother     Hypertension Mother     Rheum arthritis Mother     Coronary artery disease Mother     Alcohol abuse Mother     Heart failure Mother     Heart disease Father     Mental illness Father     Hypertension Father     Alcohol abuse Father     Early death Father     Heart attack Father     Heart failure Father     Kidney cancer Sister     Stroke Sister     Osteoarthritis Sister     Rheum arthritis Sister     Hypertension Sister     Arthritis Sister     Diabetes Sister     Stroke Sister     Hypertension Sister     Rheum arthritis Sister     Osteoarthritis Sister     Diabetes Sister     Arthritis Sister     Liver disease Sister     Kidney disease Sister     No Known Problems Brother     Rheum arthritis Brother     Osteoarthritis Brother     Hypertension Brother     Diabetes Maternal Grandfather        Social History     Socioeconomic History    Marital status:      Spouse name: Carson Rosa    Number of children: 3    Years of education: GED   Tobacco Use    Smoking status: Never     Passive exposure: Never    Smokeless tobacco: Never   Vaping Use    Vaping status: Never Used   Substance and Sexual Activity    Alcohol use: Yes     Comment: 3-4 a year    Drug use: Never    Sexual activity: Not Currently     Partners: Male     Birth control/protection: Hysterectomy, Same-sex partner       History of Present Illness     The patient is a 74-year-old female who is here for follow-up from the hospital.    She was admitted from 02/03/2024 through 02/06/2024 for acute diverticulitis. She was  having left lower abdominal pain. They called the surgeon on standby because they thought she had an abscess. The ER doctor thought she should have another colonoscopy, but Dr. Cazares told her that she would not have to have another colonoscopy. She spiked a fever of 100 degrees Fahrenheit at home. She finished her Augmentin on 02/11/2024. She has been on a high-fiber diet. She was on clear liquids for about 24 hours. She has been taking MiraLAX every night and is having normal bowel movements.    Dr. Rangel has ordered blood work on 02/19/2024. Her blood pressure is slightly elevated today. She is not out of any of her medications. Her Humira is on hold, but she is thinking about taking it today. She held off on the methotrexate, but she will start taking it again on 02/16/2024. Her joints are painful, especially her rib cage. She does not have any pain medication. Tramadol does not work for her. She was given morphine in the hospital. She takes Tylenol Arthritis. Her DEXA scan showed osteopenia.    The following portions of the patient's history were reviewed and updated as appropriate: allergies, current medications, past family history, past medical history, past social history, past surgical history and problem list.    Review of Systems   Constitutional:  Positive for activity change and appetite change. Negative for fever, unexpected weight gain and unexpected weight loss.   Gastrointestinal:  Negative for abdominal distention, abdominal pain, constipation, diarrhea, nausea, vomiting and GERD.   Musculoskeletal:  Positive for arthralgias. Negative for joint swelling.       Vitals:    02/13/24 1558   BP: 152/63   Pulse: 54   Resp: 16   Temp: 97.8 °F (36.6 °C)   SpO2: 99%       Objective   Physical Exam  Vitals and nursing note reviewed.   Constitutional:       General: She is not in acute distress.     Appearance: She is well-developed. She is not ill-appearing or toxic-appearing.   HENT:      Head:  Normocephalic and atraumatic.   Cardiovascular:      Rate and Rhythm: Normal rate and regular rhythm.      Heart sounds: Normal heart sounds. No murmur heard.  Pulmonary:      Effort: Pulmonary effort is normal.      Breath sounds: Normal breath sounds.   Skin:     General: Skin is warm and dry.      Findings: No rash.   Neurological:      Mental Status: She is alert and oriented to person, place, and time.   Psychiatric:         Behavior: Behavior normal.         Thought Content: Thought content normal.         Judgment: Judgment normal.         BMI is >= 25 and <30. (Overweight) The following options were offered after discussion;: exercise counseling/recommendations and nutrition counseling/recommendations      Assessment & Plan   Diagnoses and all orders for this visit:    1. Diverticulitis of colon (Primary)  -     CT Abdomen Pelvis With Contrast; Future  -     HYDROcodone-acetaminophen (NORCO) 5-325 MG per tablet; Take 1 tablet by mouth Every 6 (Six) Hours As Needed for Severe Pain.  Dispense: 10 tablet; Refill: 0    2. Colon wall thickening  -     CT Abdomen Pelvis With Contrast; Future    3. Seropositive rheumatoid arthritis  -     HYDROcodone-acetaminophen (NORCO) 5-325 MG per tablet; Take 1 tablet by mouth Every 6 (Six) Hours As Needed for Severe Pain.  Dispense: 10 tablet; Refill: 0  -     Ambulatory Referral to Rheumatology    4. Rheumatoid arthritis involving multiple sites with positive rheumatoid factor  -     Ambulatory Referral to Rheumatology    5. Osteoporosis without current pathological fracture, unspecified osteoporosis type  -     Ambulatory Referral to Rheumatology    1. Acute diverticulitis/ ABN CT.  - She had a colonoscopy 6 months ago, which was normal. I will order a CT scan of the abdomen and pelvis due to the abn thickened colon. I will prescribe hydrocodone 10 mg for severe pain.    2. Rheumatoid arthritis.  - I will refer her to [facility].    3. Osteoporosis.  - Her blood  pressure is slightly elevated today.  Reviewed hosp stay w/ pt at length    Follow-up  If she runs a fever at home and feels like she is getting worse, she should go to the ER.                 Transcribed from ambient dictation for Emily Burdick DO by Carine Becker.  02/13/24   17:02 EST    Patient or patient representative verbalized consent to the visit recording.  I have personally performed the services described in this document as transcribed by the above individual, and it is both accurate and complete.

## 2024-02-15 ENCOUNTER — READMISSION MANAGEMENT (OUTPATIENT)
Dept: CALL CENTER | Facility: HOSPITAL | Age: 74
End: 2024-02-15
Payer: MEDICARE

## 2024-02-16 RX ORDER — LANSOPRAZOLE 30 MG/1
30 CAPSULE, DELAYED RELEASE ORAL DAILY
Qty: 90 CAPSULE | Refills: 1 | Status: SHIPPED | OUTPATIENT
Start: 2024-02-16

## 2024-02-19 RX ORDER — ROSUVASTATIN CALCIUM 5 MG/1
5 TABLET, COATED ORAL NIGHTLY
Qty: 90 TABLET | Refills: 0 | Status: SHIPPED | OUTPATIENT
Start: 2024-02-19

## 2024-02-19 NOTE — TELEPHONE ENCOUNTER
Rx Refill Note  Requested Prescriptions     Pending Prescriptions Disp Refills    rosuvastatin (CRESTOR) 5 MG tablet [Pharmacy Med Name: ROSUVASTATIN CALCIUM 5 MG TAB] 90 tablet 1     Sig: TAKE ONE TABLET BY MOUTH ONCE NIGHTLY      Last office visit with prescribing clinician: 2/13/2024   Last telemedicine visit with prescribing clinician: Visit date not found   Next office visit with prescribing clinician: Visit date not found        Lipid Panel (01/10/2023 10:06)                  Would you like a call back once the refill request has been completed: [] Yes [] No    If the office needs to give you a call back, can they leave a voicemail: [] Yes [] No    Destini Snider, RT  02/19/24, 09:17 EST

## 2024-02-22 ENCOUNTER — READMISSION MANAGEMENT (OUTPATIENT)
Dept: CALL CENTER | Facility: HOSPITAL | Age: 74
End: 2024-02-22
Payer: MEDICARE

## 2024-02-22 NOTE — OUTREACH NOTE
Medical Week 3 Survey      Flowsheet Row Responses   [facility] patient discharged from? [facility]   Does the patient have one of the following disease processes/diagnoses(primary or secondary)? Other   Week 3 attempt successful? Yes   Call start time 1151   Call end time 1151   Discharge diagnosis abdominal pain   Is the patient taking all medications as directed (includes completed medication regime)? Yes   Does the patient have a primary care provider?  Yes   Comments regarding PCP seen PCP on 2/13   Has the patient kept scheduled appointments due by today? Yes   Has home health visited the patient within 72 hours of discharge? N/A   Psychosocial issues? No   What is the patient's perception of their health status since discharge? Improving   Is the patient/caregiver able to teach back signs and symptoms related to disease process for when to call PCP? Yes   Is the patient/caregiver able to teach back signs and symptoms related to disease process for when to call 911? Yes   Week 3 Call Completed? Yes   Graduated Yes   Is the patient interested in additional calls from an ambulatory ? No   Would this patient benefit from a Referral to Amb Social Work? No   Graduated/Revoked comments Brief call, patient is doing well.   Call end time 1151            Sofie WILKINSON - Registered Nurse

## 2024-02-26 DIAGNOSIS — G62.9 NEUROPATHY: ICD-10-CM

## 2024-02-26 RX ORDER — GABAPENTIN 300 MG/1
300 CAPSULE ORAL 2 TIMES DAILY
Qty: 180 CAPSULE | Refills: 0 | Status: SHIPPED | OUTPATIENT
Start: 2024-02-26

## 2024-02-26 NOTE — TELEPHONE ENCOUNTER
Rx Refill Note  Requested Prescriptions     Pending Prescriptions Disp Refills    gabapentin (NEURONTIN) 300 MG capsule 180 capsule 0     Sig: Take 1 capsule by mouth 2 (Two) Times a Day.      Last office visit with prescribing clinician: 2/13/2024   Last telemedicine visit with prescribing clinician: Visit date not found   Next office visit with prescribing clinician: Visit date not found        Lipid Panel (01/10/2023 10:06)                  Would you like a call back once the refill request has been completed: [] Yes [] No    If the office needs to give you a call back, can they leave a voicemail: [] Yes [] No    Destini Snider, RT  02/26/24, 10:39 EST

## 2024-03-08 RX ORDER — ONDANSETRON 4 MG/1
4 TABLET, ORALLY DISINTEGRATING ORAL EVERY 8 HOURS PRN
Qty: 30 TABLET | Refills: 0 | Status: SHIPPED | OUTPATIENT
Start: 2024-03-08

## 2024-03-11 RX ORDER — AMLODIPINE BESYLATE 10 MG/1
10 TABLET ORAL DAILY
Qty: 90 TABLET | Refills: 1 | Status: SHIPPED | OUTPATIENT
Start: 2024-03-11

## 2024-03-18 RX ORDER — TRAZODONE HYDROCHLORIDE 50 MG/1
50 TABLET ORAL
Qty: 90 TABLET | Refills: 0 | Status: SHIPPED | OUTPATIENT
Start: 2024-03-18

## 2024-03-21 ENCOUNTER — LAB (OUTPATIENT)
Dept: LAB | Facility: HOSPITAL | Age: 74
End: 2024-03-21
Payer: MEDICARE

## 2024-03-21 DIAGNOSIS — E11.65 TYPE 2 DIABETES MELLITUS WITH HYPERGLYCEMIA, WITH LONG-TERM CURRENT USE OF INSULIN: ICD-10-CM

## 2024-03-21 DIAGNOSIS — Z79.4 TYPE 2 DIABETES MELLITUS WITH HYPERGLYCEMIA, WITH LONG-TERM CURRENT USE OF INSULIN: ICD-10-CM

## 2024-03-21 LAB
ALBUMIN SERPL-MCNC: 4.2 G/DL (ref 3.5–5.2)
ALBUMIN UR-MCNC: 3.2 MG/DL
ALBUMIN/GLOB SERPL: 1.4 G/DL
ALP SERPL-CCNC: 55 U/L (ref 39–117)
ALT SERPL W P-5'-P-CCNC: 7 U/L (ref 1–33)
ANION GAP SERPL CALCULATED.3IONS-SCNC: 12.9 MMOL/L (ref 5–15)
AST SERPL-CCNC: 17 U/L (ref 1–32)
BILIRUB SERPL-MCNC: <0.2 MG/DL (ref 0–1.2)
BUN SERPL-MCNC: 12 MG/DL (ref 8–23)
BUN/CREAT SERPL: 14.3 (ref 7–25)
CALCIUM SPEC-SCNC: 9.9 MG/DL (ref 8.6–10.5)
CHLORIDE SERPL-SCNC: 105 MMOL/L (ref 98–107)
CHOLEST SERPL-MCNC: 145 MG/DL (ref 0–200)
CO2 SERPL-SCNC: 25.1 MMOL/L (ref 22–29)
CREAT SERPL-MCNC: 0.84 MG/DL (ref 0.57–1)
CREAT UR-MCNC: 47.6 MG/DL
EGFRCR SERPLBLD CKD-EPI 2021: 73.5 ML/MIN/1.73
GLOBULIN UR ELPH-MCNC: 3.1 GM/DL
GLUCOSE SERPL-MCNC: 104 MG/DL (ref 65–99)
HBA1C MFR BLD: 7.5 % (ref 4.8–5.6)
HDLC SERPL-MCNC: 52 MG/DL (ref 40–60)
LDLC SERPL CALC-MCNC: 74 MG/DL (ref 0–100)
LDLC/HDLC SERPL: 1.38 {RATIO}
MICROALBUMIN/CREAT UR: 67.2 MG/G (ref 0–29)
POTASSIUM SERPL-SCNC: 4.4 MMOL/L (ref 3.5–5.2)
PROT SERPL-MCNC: 7.3 G/DL (ref 6–8.5)
SODIUM SERPL-SCNC: 143 MMOL/L (ref 136–145)
TRIGL SERPL-MCNC: 105 MG/DL (ref 0–150)
VLDLC SERPL-MCNC: 19 MG/DL (ref 5–40)

## 2024-03-21 PROCEDURE — 80053 COMPREHEN METABOLIC PANEL: CPT

## 2024-03-21 PROCEDURE — 82570 ASSAY OF URINE CREATININE: CPT

## 2024-03-21 PROCEDURE — 80061 LIPID PANEL: CPT

## 2024-03-21 PROCEDURE — 82043 UR ALBUMIN QUANTITATIVE: CPT

## 2024-03-21 PROCEDURE — 36415 COLL VENOUS BLD VENIPUNCTURE: CPT

## 2024-03-21 PROCEDURE — 83036 HEMOGLOBIN GLYCOSYLATED A1C: CPT

## 2024-03-25 ENCOUNTER — HOSPITAL ENCOUNTER (OUTPATIENT)
Dept: CT IMAGING | Facility: HOSPITAL | Age: 74
Discharge: HOME | End: 2024-03-25
Admitting: FAMILY MEDICINE
Payer: MEDICARE

## 2024-03-25 DIAGNOSIS — K57.32 DIVERTICULITIS OF COLON: ICD-10-CM

## 2024-03-25 DIAGNOSIS — K63.9 COLON WALL THICKENING: ICD-10-CM

## 2024-03-25 PROCEDURE — 25510000001 IOPAMIDOL PER 1 ML: Performed by: FAMILY MEDICINE

## 2024-03-25 PROCEDURE — 74177 CT ABD & PELVIS W/CONTRAST: CPT

## 2024-03-25 RX ADMIN — IOPAMIDOL 100 ML: 755 INJECTION, SOLUTION INTRAVENOUS at 15:00

## 2024-03-26 ENCOUNTER — OFFICE VISIT (OUTPATIENT)
Dept: ENDOCRINOLOGY | Facility: CLINIC | Age: 74
End: 2024-03-26
Payer: MEDICARE

## 2024-03-26 VITALS
SYSTOLIC BLOOD PRESSURE: 135 MMHG | DIASTOLIC BLOOD PRESSURE: 75 MMHG | WEIGHT: 162 LBS | HEART RATE: 56 BPM | HEIGHT: 63 IN | OXYGEN SATURATION: 95 % | BODY MASS INDEX: 28.7 KG/M2

## 2024-03-26 DIAGNOSIS — Z79.4 TYPE 2 DIABETES MELLITUS WITH HYPERGLYCEMIA, WITH LONG-TERM CURRENT USE OF INSULIN: Primary | ICD-10-CM

## 2024-03-26 DIAGNOSIS — E78.2 MIXED HYPERLIPIDEMIA: ICD-10-CM

## 2024-03-26 DIAGNOSIS — E11.42 DIABETIC PERIPHERAL NEUROPATHY: ICD-10-CM

## 2024-03-26 DIAGNOSIS — I10 ESSENTIAL HYPERTENSION: ICD-10-CM

## 2024-03-26 DIAGNOSIS — E11.65 TYPE 2 DIABETES MELLITUS WITH HYPERGLYCEMIA, WITH LONG-TERM CURRENT USE OF INSULIN: Primary | ICD-10-CM

## 2024-03-26 PROCEDURE — G2211 COMPLEX E/M VISIT ADD ON: HCPCS | Performed by: INTERNAL MEDICINE

## 2024-03-26 PROCEDURE — 3075F SYST BP GE 130 - 139MM HG: CPT | Performed by: INTERNAL MEDICINE

## 2024-03-26 PROCEDURE — 1159F MED LIST DOCD IN RCRD: CPT | Performed by: INTERNAL MEDICINE

## 2024-03-26 PROCEDURE — 3078F DIAST BP <80 MM HG: CPT | Performed by: INTERNAL MEDICINE

## 2024-03-26 PROCEDURE — 1160F RVW MEDS BY RX/DR IN RCRD: CPT | Performed by: INTERNAL MEDICINE

## 2024-03-26 PROCEDURE — 3051F HG A1C>EQUAL 7.0%<8.0%: CPT | Performed by: INTERNAL MEDICINE

## 2024-03-26 PROCEDURE — 99214 OFFICE O/P EST MOD 30 MIN: CPT | Performed by: INTERNAL MEDICINE

## 2024-03-26 RX ORDER — IBANDRONATE SODIUM 150 MG/1
TABLET, FILM COATED ORAL
COMMUNITY
Start: 2024-03-25

## 2024-03-26 RX ORDER — ESTRADIOL 0.1 MG/G
CREAM VAGINAL
COMMUNITY
Start: 2024-03-04

## 2024-03-26 RX ORDER — FLUCONAZOLE 150 MG/1
TABLET ORAL
COMMUNITY
Start: 2024-03-04

## 2024-03-26 NOTE — PROGRESS NOTES
Endocrine Progress Note Outpatient     Patient Care Team:  Emily Burdick DO as PCP - General (Family Medicine)  Joe García MD as Consulting Physician (Cardiology)  Dano Rangel MD as Consulting Physician (Endocrinology)  Conchis Mohamud OD as Consulting Physician (Optometry)  Harinder Gresham MD as Consulting Physician (Pain Medicine)  Juan Richey MD as Consulting Physician (Gastroenterology)  Manish Graff MD as Consulting Physician (Urology)    Chief Complaint: Follow-up type 2 diabetes    HPI: 74-year-old female with history of type 2 diabetes, hypertension and hyperlipidemia is here for follow-up.    For type 2 diabetes: She is currently on Janumet /100, 1 tablet daily with Novolin 70/30 insulin, 20 units subcu 3 times a day.  She did bring in blood sugar records for review.  Most of them are running below 200.  She was prescribed Jardiance on the last visit back in May 2021 she took it for a while but developed some UTIs and she stopped it.      Hypertension: Well-controlled today.    Hyperlipidemia: On rosuvastatin.    Past Medical History:   Diagnosis Date    CAD 2020    Cancer 2021    Skin cancer    Colon polyp     Congenital heart disease 2017    Depression     DEXA     2019= (-0.7/ -1.6); 2023= (-0.7/ -2.7)    Diabetic peripheral neuropathy     Diverticulosis     GERD     Heart murmur 2017    Hyperlipidemia     Hypertension     Insomnia     Lower back pain     MAMMO     NEG = 2021/ 2023    Obesity     Osteoarthritis     Osteopenia     Osteoporosis     Palpitations     RA     Rheumatoid nodule 06/2011     rt. thumb x 2 and rt. & lt. 3rd fingers (Oct.2012)/ rt. elbow, left elbow, and epidermal inclusion cyst post. neck (June 2011)-----Dr. Whitmore    Vitamin D deficiency        Social History     Socioeconomic History    Marital status:      Spouse name: Carson Rosa    Number of children: 3    Years of education: GED   Tobacco Use    Smoking status: Never      Passive exposure: Never    Smokeless tobacco: Never   Vaping Use    Vaping status: Never Used   Substance and Sexual Activity    Alcohol use: Yes     Comment: 3-4 a year    Drug use: Never    Sexual activity: Not Currently     Partners: Male     Birth control/protection: Hysterectomy, Same-sex partner       Family History   Problem Relation Age of Onset    Heart disease Mother     Diabetes Mother     Hypertension Mother     Rheum arthritis Mother     Coronary artery disease Mother     Alcohol abuse Mother     Heart failure Mother     Heart disease Father     Mental illness Father     Hypertension Father     Alcohol abuse Father     Early death Father     Heart attack Father     Heart failure Father     Kidney cancer Sister     Stroke Sister     Osteoarthritis Sister     Rheum arthritis Sister     Hypertension Sister     Arthritis Sister     Diabetes Sister     Stroke Sister     Hypertension Sister     Rheum arthritis Sister     Osteoarthritis Sister     Diabetes Sister     Arthritis Sister     Liver disease Sister     Kidney disease Sister     No Known Problems Brother     Rheum arthritis Brother     Osteoarthritis Brother     Hypertension Brother     Diabetes Maternal Grandfather        Allergies   Allergen Reactions    Jardiance [Empagliflozin] Other (See Comments)     UTI's       ROS:   Constitutional:  Denies fatigue, tiredness.    Eyes:  Denies change in visual acuity   HENT:  Denies nasal congestion or sore throat   Respiratory: denies cough, admit shortness of breath.   Cardiovascular:  denies chest pain, edema   GI:  Denies abdominal pain, nausea, vomiting.   Musculoskeletal:  Denies back pain or joint pain   Integument:  Denies dry skin and rash   Neurologic:  Denies headache, focal weakness or sensory changes   Endocrine:  Denies polyuria or polydipsia   Psychiatric:  Denies depression or anxiety      Vitals:    03/26/24 1322   BP: 135/75   Pulse: 56   SpO2: 95%     Body mass index is 28.7 kg/m².      Physical Exam:  GEN: NAD, conversant  EYES: EOMI,  NECK: no thyromegaly,  CV: RRR,   LUNG: CTA  PSYCH: AOX3, appropriate mood, affect normal      Results Review:     I reviewed the patient's new clinical results.    Lab Results   Component Value Date    HGBA1C 7.50 (H) 03/21/2024    HGBA1C 7.80 (H) 11/08/2023    HGBA1C 7.9 (H) 01/10/2023      Lab Results   Component Value Date    GLUCOSE 104 (H) 03/21/2024    BUN 12 03/21/2024    CREATININE 0.84 03/21/2024    EGFRIFNONA 78 11/15/2021    EGFRIFAFRI 59 (L) 08/13/2017    BCR 14.3 03/21/2024    K 4.4 03/21/2024    CO2 25.1 03/21/2024    CALCIUM 9.9 03/21/2024    ALBUMIN 4.2 03/21/2024    LABIL2 1.1 01/29/2019    AST 17 03/21/2024    ALT 7 03/21/2024    CHOL 145 03/21/2024    TRIG 105 03/21/2024    LDL 74 03/21/2024    HDL 52 03/21/2024     Lab Results   Component Value Date    TSH 1.270 10/26/2021         Medication Review: Reviewed.       Current Outpatient Medications:     acebutolol (SECTRAL) 200 MG capsule, TAKE ONE CAPSULE BY MOUTH DAILY, Disp: 90 capsule, Rfl: 1    acetaminophen (TYLENOL) 325 MG tablet, Take 2 tablets by mouth Every 6 (Six) Hours As Needed for Mild Pain., Disp: , Rfl:     albuterol sulfate  (90 Base) MCG/ACT inhaler, Inhale 2 puffs Every 6 (Six) Hours As Needed for Shortness of Air (cough)., Disp: 18 g, Rfl: 0    amLODIPine (NORVASC) 10 MG tablet, TAKE 1 TABLET BY MOUTH DAILY, Disp: 90 tablet, Rfl: 1    aspirin 81 MG EC tablet, Take 1 tablet by mouth Daily., Disp: , Rfl:     azelastine (OPTIVAR) 0.05 % ophthalmic solution, Administer 2 drops to both eyes 2 (Two) Times a Day As Needed., Disp: , Rfl:     Cholecalciferol (Vitamin D) 50 MCG (2000 UT) capsule, Take 1 capsule by mouth Daily., Disp: , Rfl:     Continuous Blood Gluc  (FreeStyle Adelaide 2 [place]) device, USE WITH SENSOR EVERY 14 DAYS, Disp: 1 each, Rfl: 0    Continuous Blood Gluc Sensor (FreeStyle Adelaide 2 Sensor) misc, Inject 1 each under the skin into the appropriate area  as directed Daily., Disp: 6 each, Rfl: 2    D-MANNOSE PO, 1 po qd, Disp: , Rfl:     diclofenac (VOLTAREN) 1 % gel gel, Apply 4 g topically to the appropriate area as directed 4 (Four) Times a Day As Needed., Disp: , Rfl:     estradiol (ESTRACE) 0.1 MG/GM vaginal cream, , Disp: , Rfl:     fenofibrate 160 MG tablet, Take 1 tablet by mouth Daily., Disp: 90 tablet, Rfl: 2    fluconazole (DIFLUCAN) 150 MG tablet, , Disp: , Rfl:     folic acid (FOLVITE) 1 MG tablet, TAKE 1 TABLET BY MOUTH DAILY, Disp: 90 tablet, Rfl: 1    gabapentin (NEURONTIN) 300 MG capsule, Take 1 capsule by mouth 2 (Two) Times a Day., Disp: 180 capsule, Rfl: 0    glucose monitor monitoring kit, Use 1 each 3 (Three) Times a Day As Needed (for BG monitoring)., Disp: 1 each, Rfl: 0    Humira Pen 40 MG/0.4ML Pen-injector Kit, Inject 40 mg under the skin into the appropriate area as directed Every 14 (Fourteen) Days., Disp: 2 each, Rfl: 3    ibandronate (BONIVA) 150 MG tablet, , Disp: , Rfl:     Insulin NPH Isophane & Regular (HumuLIN 70/30 KwikPen) (70-30) 100 UNIT/ML suspension pen-injector, Inject 21 Units under the skin into the appropriate area as directed 2 (Two) Times a Day. Before breakfast and lunch, Disp: , Rfl:     Insulin NPH Isophane & Regular (HumuLIN 70/30 KwikPen) (70-30) 100 UNIT/ML suspension pen-injector, Inject 20 Units under the skin into the appropriate area as directed Every Evening. Before supper, Disp: , Rfl:     Insulin Pen Needle 32G X 4 MM misc, Use 1 each 2 (Two) Times a Day., Disp: 200 each, Rfl: 3    isosorbide mononitrate (IMDUR) 60 MG 24 hr tablet, Take 1 tablet by mouth Every Morning., Disp: 90 tablet, Rfl: 3    Janumet -1000 MG tablet, TAKE ONE TABLET BY MOUTH DAILY, Disp: 90 tablet, Rfl: 1    lansoprazole (PREVACID) 30 MG capsule, TAKE 1 CAPSULE BY MOUTH DAILY, Disp: 90 capsule, Rfl: 1    lisinopril (PRINIVIL,ZESTRIL) 40 MG tablet, TAKE ONE TABLET BY MOUTH DAILY, Disp: 90 tablet, Rfl: 1    loratadine (Claritin) 10  MG tablet, Take 1 tablet by mouth Every Night., Disp: , Rfl:     meclizine (ANTIVERT) 12.5 MG tablet, 1/2 - 1 po q6-8hrs prn dizzyness, Disp: 20 tablet, Rfl: 0    methotrexate 2.5 MG tablet, TAKE 5 TABLETS BY MOUTH ONCE A WEEK, Disp: 60 tablet, Rfl: 0    multivitamin with minerals (MULTIVITAMIN ADULTS PO), Take 1 tablet by mouth Daily., Disp: , Rfl:     ondansetron ODT (ZOFRAN-ODT) 4 MG disintegrating tablet, Place 1 tablet on the tongue Every 8 (Eight) Hours As Needed for Nausea., Disp: 30 tablet, Rfl: 0    rosuvastatin (CRESTOR) 5 MG tablet, TAKE ONE TABLET BY MOUTH ONCE NIGHTLY, Disp: 90 tablet, Rfl: 0    traZODone (DESYREL) 50 MG tablet, TAKE ONE TABLET BY MOUTH EVERY NIGHT AT BEDTIME, Disp: 90 tablet, Rfl: 0    vitamin B-12 (CYANOCOBALAMIN) 1000 MCG tablet, Take 1 tablet by mouth Daily., Disp: , Rfl:   No current facility-administered medications for this visit.      Assessment & Plan   1.  Diabetes mellitus type 2 with Hyperglycemia: Uncontrolled but improving with A1c at 7.5%.  At this time we will continue Janumet along with 70/30 insulin at 20 units subcu 3 times a day before meals.  Will follow blood sugars.  Advised to always keep glucose source in case of low blood sugars.    2.  Hypertension: Fair control, continue lisinopril 20 mg p.o. daily..    3.  Hyperlipidemia: Well-controlled, continue rosuvastatin.    4.  Diabetic peripheral neuropathy: Stable.    Assessment and plan from August 29, 2023 reviewed and updated.            Dano Rangel MD FACE.

## 2024-03-27 RX ORDER — LISINOPRIL 40 MG/1
40 TABLET ORAL DAILY
Qty: 90 TABLET | Refills: 1 | Status: SHIPPED | OUTPATIENT
Start: 2024-03-27

## 2024-03-27 NOTE — TELEPHONE ENCOUNTER
Rx Refill Note  Requested Prescriptions     Pending Prescriptions Disp Refills    lisinopril (PRINIVIL,ZESTRIL) 40 MG tablet [Pharmacy Med Name: LISINOPRIL 40 MG TABLET] 90 tablet 1     Sig: TAKE 1 TABLET BY MOUTH DAILY      Last office visit with prescribing clinician: 2/13/2024   Last telemedicine visit with prescribing clinician: Visit date not found   Next office visit with prescribing clinician: Visit date not found                         Would you like a call back once the refill request has been completed: [] Yes [] No    If the office needs to give you a call back, can they leave a voicemail: [] Yes [] No    Brianda Toro, JOAQUÍN  03/27/24, 13:58 EDT

## 2024-04-03 RX ORDER — SUCRALFATE 1 G/1
1 TABLET ORAL
Qty: 90 TABLET | Refills: 0 | Status: SHIPPED | OUTPATIENT
Start: 2024-04-03

## 2024-04-09 ENCOUNTER — OFFICE VISIT (OUTPATIENT)
Dept: CARDIOLOGY | Facility: CLINIC | Age: 74
End: 2024-04-09
Payer: MEDICARE

## 2024-04-09 VITALS
HEART RATE: 61 BPM | OXYGEN SATURATION: 98 % | DIASTOLIC BLOOD PRESSURE: 74 MMHG | WEIGHT: 162 LBS | HEIGHT: 63 IN | SYSTOLIC BLOOD PRESSURE: 132 MMHG | BODY MASS INDEX: 28.7 KG/M2

## 2024-04-09 DIAGNOSIS — E78.00 PURE HYPERCHOLESTEROLEMIA: ICD-10-CM

## 2024-04-09 DIAGNOSIS — I10 PRIMARY HYPERTENSION: ICD-10-CM

## 2024-04-09 DIAGNOSIS — I25.10 CORONARY ARTERY DISEASE INVOLVING NATIVE CORONARY ARTERY OF NATIVE HEART WITHOUT ANGINA PECTORIS: Primary | ICD-10-CM

## 2024-04-09 DIAGNOSIS — E11.9 TYPE 2 DIABETES MELLITUS WITHOUT COMPLICATION, WITHOUT LONG-TERM CURRENT USE OF INSULIN: ICD-10-CM

## 2024-04-09 PROCEDURE — 99214 OFFICE O/P EST MOD 30 MIN: CPT | Performed by: INTERNAL MEDICINE

## 2024-04-09 PROCEDURE — 1160F RVW MEDS BY RX/DR IN RCRD: CPT | Performed by: INTERNAL MEDICINE

## 2024-04-09 PROCEDURE — 3078F DIAST BP <80 MM HG: CPT | Performed by: INTERNAL MEDICINE

## 2024-04-09 PROCEDURE — 1159F MED LIST DOCD IN RCRD: CPT | Performed by: INTERNAL MEDICINE

## 2024-04-09 PROCEDURE — 93000 ELECTROCARDIOGRAM COMPLETE: CPT | Performed by: INTERNAL MEDICINE

## 2024-04-09 PROCEDURE — 3075F SYST BP GE 130 - 139MM HG: CPT | Performed by: INTERNAL MEDICINE

## 2024-04-09 NOTE — PROGRESS NOTES
Subjective:     Encounter Date:04/09/2024      Patient ID: Jenna Rosa is a 74 y.o. female.    Chief Complaint:  Coronary Artery Disease  Symptoms include palpitations and shortness of breath. Pertinent negatives include no chest pain, dizziness or leg swelling.   74-year-old white female with history of coronary disease hypertension diabetes hyperlipidemia presents to the office for a follow-up.  Patient is currently stable without any symptoms of chest pain but has some shortness of breath with exertion.  No comes any PND orthopnea.  Patient has some palpitations without any dizziness syncope or swelling of the feet.  Patient is taking all the medicines regularly.  Patient does not smoke.    The following portions of the patient's history were reviewed and updated as appropriate: allergies, current medications, past family history, past medical history, past social history, past surgical history, and problem list.  Past Medical History:   Diagnosis Date    CAD 2020    Cancer 2021    Skin cancer    Colon polyp     Congenital heart disease 2017    Depression     DEXA     2019= (-0.7/ -1.6); 2023= (-0.7/ -2.7)    Diabetic peripheral neuropathy     Diverticulosis     GERD     Heart murmur 2017    Hyperlipidemia     Hypertension     Insomnia     Lower back pain     MAMMO     NEG = 2021/ 2023    Obesity     Osteoarthritis     Osteopenia     Osteoporosis     Palpitations     RA     Rheumatoid nodule 06/2011     rt. thumb x 2 and rt. & lt. 3rd fingers (Oct.2012)/ rt. elbow, left elbow, and epidermal inclusion cyst post. neck (June 2011)-----Dr. Whitmore    Vitamin D deficiency      Past Surgical History:   Procedure Laterality Date    BLADDER SUSPENSION  10/11/2023        BLADDER SUSPENSION      BREAST SURGERY  1972    CARDIAC CATHETERIZATION N/A 09/16/2020    Procedure: Left Heart Cath;  Surgeon: Walker Rodriguez MD;  Location: [facility] CATH INVASIVE LOCATION;  Service: Cardiovascular;   "Laterality: N/A;    CARDIAC CATHETERIZATION N/A 09/16/2020    Procedure: Coronary angiography;  Surgeon: Walker Rodriguez MD;  Location: [facility] CATH INVASIVE LOCATION;  Service: Cardiovascular;  Laterality: N/A;    CARDIAC CATHETERIZATION  09/16/2020    Procedure: Functional Flow [place];  Surgeon: Kath Medina MD;  Location: [facility] CATH INVASIVE LOCATION;  Service: Cardiology;;    CARPAL TUNNEL RELEASE Right 02/20/2023    Procedure: CARPAL TUNNEL RELEASE WITH POSSIBLE SYNOVECTOMY;  Surgeon: Amadeo Magaña MD;  Location: [facility] MAIN OR;  Service: Orthopedics;  Laterality: Right;    CHOLECYSTECTOMY      COLONOSCOPY      2017 / 2023= TA, rech 2028? [facility]    CYST REMOVAL      Seb cyst on neck    EYE SURGERY      TOTAL ABDOMINAL HYSTERECTOMY WITH SALPINGO OOPHORECTOMY      VAGINAL PROLAPSE REPAIR       Uterine Prolapse repair     /74   Pulse 61   Ht 160 cm (62.99\")   Wt 73.5 kg (162 lb)   SpO2 98%   BMI 28.70 kg/m²   Family History   Problem Relation Age of Onset    Heart disease Mother     Diabetes Mother     Hypertension Mother     Rheum arthritis Mother     Coronary artery disease Mother     Alcohol abuse Mother     Heart failure Mother     Heart disease Father     Mental illness Father     Hypertension Father     Alcohol abuse Father     Early death Father     Heart attack Father     Heart failure Father     Kidney cancer Sister     Stroke Sister     Osteoarthritis Sister     Rheum arthritis Sister     Hypertension Sister     Arthritis Sister     Diabetes Sister     Stroke Sister     Hypertension Sister     Rheum arthritis Sister     Osteoarthritis Sister     Diabetes Sister     Arthritis Sister     Liver disease Sister     Kidney disease Sister     No Known Problems Brother     Rheum arthritis Brother     Osteoarthritis Brother     Hypertension Brother     Diabetes Maternal Grandfather        Current Outpatient Medications:     acebutolol (SECTRAL) 200 MG capsule, TAKE ONE " CAPSULE BY MOUTH DAILY, Disp: 90 capsule, Rfl: 1    acetaminophen (TYLENOL) 325 MG tablet, Take 2 tablets by mouth Every 6 (Six) Hours As Needed for Mild Pain., Disp: , Rfl:     albuterol sulfate  (90 Base) MCG/ACT inhaler, Inhale 2 puffs Every 6 (Six) Hours As Needed for Shortness of Air (cough)., Disp: 18 g, Rfl: 0    amLODIPine (NORVASC) 10 MG tablet, TAKE 1 TABLET BY MOUTH DAILY, Disp: 90 tablet, Rfl: 1    aspirin 81 MG EC tablet, Take 1 tablet by mouth Daily., Disp: , Rfl:     azelastine (OPTIVAR) 0.05 % ophthalmic solution, Administer 2 drops to both eyes 2 (Two) Times a Day As Needed., Disp: , Rfl:     Cholecalciferol (Vitamin D) 50 MCG (2000 UT) capsule, Take 1 capsule by mouth Daily., Disp: , Rfl:     Continuous Blood Gluc  (FreeStyle Adelaide 2 [place]) device, USE WITH SENSOR EVERY 14 DAYS, Disp: 1 each, Rfl: 0    Continuous Blood Gluc Sensor (FreeStyle Adelaide 2 Sensor) misc, Inject 1 each under the skin into the appropriate area as directed Daily., Disp: 6 each, Rfl: 2    D-MANNOSE PO, 1 po qd, Disp: , Rfl:     diclofenac (VOLTAREN) 1 % gel gel, Apply 4 g topically to the appropriate area as directed 4 (Four) Times a Day As Needed., Disp: , Rfl:     estradiol (ESTRACE) 0.1 MG/GM vaginal cream, , Disp: , Rfl:     fenofibrate 160 MG tablet, Take 1 tablet by mouth Daily., Disp: 90 tablet, Rfl: 2    fluconazole (DIFLUCAN) 150 MG tablet, , Disp: , Rfl:     folic acid (FOLVITE) 1 MG tablet, TAKE 1 TABLET BY MOUTH DAILY, Disp: 90 tablet, Rfl: 1    gabapentin (NEURONTIN) 300 MG capsule, Take 1 capsule by mouth 2 (Two) Times a Day., Disp: 180 capsule, Rfl: 0    glucose monitor monitoring kit, Use 1 each 3 (Three) Times a Day As Needed (for BG monitoring)., Disp: 1 each, Rfl: 0    Humira Pen 40 MG/0.4ML Pen-injector Kit, Inject 40 mg under the skin into the appropriate area as directed Every 14 (Fourteen) Days., Disp: 2 each, Rfl: 3    ibandronate (BONIVA) 150 MG tablet, , Disp: , Rfl:     Insulin NPH  Isophane & Regular (HumuLIN 70/30 KwikPen) (70-30) 100 UNIT/ML suspension pen-injector, Inject 21 Units under the skin into the appropriate area as directed 2 (Two) Times a Day. Before breakfast and lunch, Disp: , Rfl:     Insulin NPH Isophane & Regular (HumuLIN 70/30 KwikPen) (70-30) 100 UNIT/ML suspension pen-injector, Inject 20 Units under the skin into the appropriate area as directed Every Evening. Before supper, Disp: , Rfl:     Insulin Pen Needle 32G X 4 MM misc, Use 1 each 2 (Two) Times a Day., Disp: 200 each, Rfl: 3    isosorbide mononitrate (IMDUR) 60 MG 24 hr tablet, Take 1 tablet by mouth Every Morning., Disp: 90 tablet, Rfl: 3    Janumet -1000 MG tablet, TAKE ONE TABLET BY MOUTH DAILY, Disp: 90 tablet, Rfl: 1    lansoprazole (PREVACID) 30 MG capsule, TAKE 1 CAPSULE BY MOUTH DAILY, Disp: 90 capsule, Rfl: 1    lisinopril (PRINIVIL,ZESTRIL) 40 MG tablet, TAKE 1 TABLET BY MOUTH DAILY, Disp: 90 tablet, Rfl: 1    loratadine (Claritin) 10 MG tablet, Take 1 tablet by mouth Every Night., Disp: , Rfl:     meclizine (ANTIVERT) 12.5 MG tablet, 1/2 - 1 po q6-8hrs prn dizzyness, Disp: 20 tablet, Rfl: 0    methotrexate 2.5 MG tablet, TAKE 5 TABLETS BY MOUTH ONCE A WEEK, Disp: 60 tablet, Rfl: 0    multivitamin with minerals (MULTIVITAMIN ADULTS PO), Take 1 tablet by mouth Daily., Disp: , Rfl:     ondansetron ODT (ZOFRAN-ODT) 4 MG disintegrating tablet, Place 1 tablet on the tongue Every 8 (Eight) Hours As Needed for Nausea., Disp: 30 tablet, Rfl: 0    rosuvastatin (CRESTOR) 5 MG tablet, TAKE ONE TABLET BY MOUTH ONCE NIGHTLY, Disp: 90 tablet, Rfl: 0    sucralfate (Carafate) 1 g tablet, Take 1 tablet by mouth 3 (Three) Times a Day Before Meals., Disp: 90 tablet, Rfl: 0    traZODone (DESYREL) 50 MG tablet, TAKE ONE TABLET BY MOUTH EVERY NIGHT AT BEDTIME, Disp: 90 tablet, Rfl: 0    vitamin B-12 (CYANOCOBALAMIN) 1000 MCG tablet, Take 1 tablet by mouth Daily., Disp: , Rfl:   Allergies   Allergen Reactions    Jardiance  [Empagliflozin] Other (See Comments)     UTI's     Social History     Socioeconomic History    Marital status:      Spouse name: Carson Rosa    Number of children: 3    Years of education: GED   Tobacco Use    Smoking status: Never     Passive exposure: Never    Smokeless tobacco: Never   Vaping Use    Vaping status: Never Used   Substance and Sexual Activity    Alcohol use: Yes     Comment: 3-4 a year    Drug use: Never    Sexual activity: Not Currently     Partners: Male     Birth control/protection: Hysterectomy, Same-sex partner     Review of Systems   Constitutional: Negative for malaise/fatigue.   Cardiovascular:  Positive for palpitations. Negative for chest pain, dyspnea on exertion and leg swelling.   Respiratory:  Positive for shortness of breath. Negative for cough.    Gastrointestinal:  Positive for nausea. Negative for abdominal pain and vomiting.   Neurological:  Negative for dizziness, focal weakness, headaches, light-headedness and numbness.   All other systems reviewed and are negative.             Objective:     Constitutional:       Appearance: Well-developed.   Eyes:      General: No scleral icterus.     Conjunctiva/sclera: Conjunctivae normal.   HENT:      Head: Normocephalic and atraumatic.   Neck:      Vascular: No carotid bruit or JVD.   Pulmonary:      Effort: Pulmonary effort is normal.      Breath sounds: Normal breath sounds. No wheezing. No rales.   Cardiovascular:      Normal rate. Regular rhythm.   Pulses:     Intact distal pulses.   Abdominal:      General: Bowel sounds are normal.      Palpations: Abdomen is soft.   Musculoskeletal:      Cervical back: Normal range of motion and neck supple. Skin:     General: Skin is warm and dry.      Findings: No rash.   Neurological:      Mental Status: Alert.         ECG 12 Lead    Date/Time: 4/9/2024 12:49 PM  Performed by: Joe García MD    Authorized by: Joe García MD  Comments: Sinus rhythm  Poor R wave progression anterior  leads  Abnormal EKG  No new changes from previous ECG          Lab Review:         MDM    #1 coronary disease  Patient had 50% disease in the LAD and does not have any angina and is currently stable.  Patient also had a stress moistly recently which did not show any ischemia and also had an echocardiogram which showed normal LV function  2.  Hypertension  Patient blood pressure currently stable on medications including lisinopril and isosorbide  3.  Diabetes  Patient is on insulin and oral medicines also  4.  Hyperlipidemia  Patient is on Crestor and the lipid levels are well within normal meds    Patient's previous medical records, labs, and EKG were reviewed and discussed with the patient at today's visit.

## 2024-04-11 ENCOUNTER — HOSPITAL ENCOUNTER (OUTPATIENT)
Facility: HOSPITAL | Age: 74
Setting detail: OBSERVATION
Discharge: HOME | End: 2024-04-12
Attending: EMERGENCY MEDICINE | Admitting: EMERGENCY MEDICINE
Payer: MEDICARE

## 2024-04-11 ENCOUNTER — APPOINTMENT (OUTPATIENT)
Dept: GENERAL RADIOLOGY | Facility: HOSPITAL | Age: 74
End: 2024-04-11
Payer: MEDICARE

## 2024-04-11 DIAGNOSIS — R07.89 CHEST DISCOMFORT: ICD-10-CM

## 2024-04-11 DIAGNOSIS — R00.2 PALPITATIONS: Primary | ICD-10-CM

## 2024-04-11 DIAGNOSIS — F41.9 ANXIETY: ICD-10-CM

## 2024-04-11 LAB
ALBUMIN SERPL-MCNC: 4.4 G/DL (ref 3.5–5.2)
ALBUMIN/GLOB SERPL: 1.3 G/DL
ALP SERPL-CCNC: 61 U/L (ref 39–117)
ALT SERPL W P-5'-P-CCNC: 11 U/L (ref 1–33)
ANION GAP SERPL CALCULATED.3IONS-SCNC: 12 MMOL/L (ref 5–15)
AST SERPL-CCNC: 14 U/L (ref 1–32)
BASOPHILS # BLD AUTO: 0.04 10*3/MM3 (ref 0–0.2)
BASOPHILS NFR BLD AUTO: 0.6 % (ref 0–1.5)
BILIRUB SERPL-MCNC: 0.2 MG/DL (ref 0–1.2)
BUN SERPL-MCNC: 15 MG/DL (ref 8–23)
BUN/CREAT SERPL: 17.9 (ref 7–25)
CALCIUM SPEC-SCNC: 10.3 MG/DL (ref 8.6–10.5)
CHLORIDE SERPL-SCNC: 102 MMOL/L (ref 98–107)
CO2 SERPL-SCNC: 26 MMOL/L (ref 22–29)
CREAT SERPL-MCNC: 0.84 MG/DL (ref 0.57–1)
DEPRECATED RDW RBC AUTO: 44.7 FL (ref 37–54)
EGFRCR SERPLBLD CKD-EPI 2021: 73.5 ML/MIN/1.73
EOSINOPHIL # BLD AUTO: 0.35 10*3/MM3 (ref 0–0.4)
EOSINOPHIL NFR BLD AUTO: 5 % (ref 0.3–6.2)
ERYTHROCYTE [DISTWIDTH] IN BLOOD BY AUTOMATED COUNT: 13.3 % (ref 12.3–15.4)
GLOBULIN UR ELPH-MCNC: 3.3 GM/DL
GLUCOSE BLDC GLUCOMTR-MCNC: 149 MG/DL (ref 70–105)
GLUCOSE BLDC GLUCOMTR-MCNC: 193 MG/DL (ref 70–105)
GLUCOSE BLDC GLUCOMTR-MCNC: 198 MG/DL (ref 70–105)
GLUCOSE BLDC GLUCOMTR-MCNC: 206 MG/DL (ref 70–105)
GLUCOSE BLDC GLUCOMTR-MCNC: 225 MG/DL (ref 70–105)
GLUCOSE SERPL-MCNC: 234 MG/DL (ref 65–99)
HCT VFR BLD AUTO: 44.2 % (ref 34–46.6)
HGB BLD-MCNC: 14.5 G/DL (ref 12–15.9)
HOLD SPECIMEN: NORMAL
HOLD SPECIMEN: NORMAL
IMM GRANULOCYTES # BLD AUTO: 0.02 10*3/MM3 (ref 0–0.05)
IMM GRANULOCYTES NFR BLD AUTO: 0.3 % (ref 0–0.5)
LYMPHOCYTES # BLD AUTO: 3.14 10*3/MM3 (ref 0.7–3.1)
LYMPHOCYTES NFR BLD AUTO: 44.4 % (ref 19.6–45.3)
MAGNESIUM SERPL-MCNC: 2 MG/DL (ref 1.6–2.4)
MCH RBC QN AUTO: 30.1 PG (ref 26.6–33)
MCHC RBC AUTO-ENTMCNC: 32.8 G/DL (ref 31.5–35.7)
MCV RBC AUTO: 91.7 FL (ref 79–97)
MONOCYTES # BLD AUTO: 0.61 10*3/MM3 (ref 0.1–0.9)
MONOCYTES NFR BLD AUTO: 8.6 % (ref 5–12)
NEUTROPHILS NFR BLD AUTO: 2.91 10*3/MM3 (ref 1.7–7)
NEUTROPHILS NFR BLD AUTO: 41.1 % (ref 42.7–76)
NRBC BLD AUTO-RTO: 0 /100 WBC (ref 0–0.2)
NT-PROBNP SERPL-MCNC: 92.1 PG/ML (ref 0–900)
PLATELET # BLD AUTO: 337 10*3/MM3 (ref 140–450)
PMV BLD AUTO: 9.4 FL (ref 6–12)
POTASSIUM SERPL-SCNC: 3.6 MMOL/L (ref 3.5–5.2)
PROT SERPL-MCNC: 7.7 G/DL (ref 6–8.5)
RBC # BLD AUTO: 4.82 10*6/MM3 (ref 3.77–5.28)
SODIUM SERPL-SCNC: 140 MMOL/L (ref 136–145)
TROPONIN T SERPL HS-MCNC: 10 NG/L
TROPONIN T SERPL HS-MCNC: 8 NG/L
TSH SERPL DL<=0.05 MIU/L-ACNC: 3.84 UIU/ML (ref 0.27–4.2)
WBC NRBC COR # BLD AUTO: 7.07 10*3/MM3 (ref 3.4–10.8)
WHOLE BLOOD HOLD COAG: NORMAL
WHOLE BLOOD HOLD SPECIMEN: NORMAL

## 2024-04-11 PROCEDURE — 71045 X-RAY EXAM CHEST 1 VIEW: CPT

## 2024-04-11 PROCEDURE — 82948 REAGENT STRIP/BLOOD GLUCOSE: CPT | Performed by: PHYSICIAN ASSISTANT

## 2024-04-11 PROCEDURE — 99285 EMERGENCY DEPT VISIT HI MDM: CPT

## 2024-04-11 PROCEDURE — 63710000001 INSULIN LISPRO (HUMAN) PER 5 UNITS: Performed by: PHYSICIAN ASSISTANT

## 2024-04-11 PROCEDURE — 25810000003 SODIUM CHLORIDE 0.9 % SOLUTION: Performed by: PHYSICIAN ASSISTANT

## 2024-04-11 PROCEDURE — G0378 HOSPITAL OBSERVATION PER HR: HCPCS

## 2024-04-11 PROCEDURE — 82948 REAGENT STRIP/BLOOD GLUCOSE: CPT

## 2024-04-11 PROCEDURE — 83880 ASSAY OF NATRIURETIC PEPTIDE: CPT | Performed by: PHYSICIAN ASSISTANT

## 2024-04-11 PROCEDURE — 93005 ELECTROCARDIOGRAM TRACING: CPT | Performed by: EMERGENCY MEDICINE

## 2024-04-11 PROCEDURE — 63710000001 INSULIN ISOPHANE & REGULAR PER 5 UNITS: Performed by: PHYSICIAN ASSISTANT

## 2024-04-11 PROCEDURE — 83735 ASSAY OF MAGNESIUM: CPT | Performed by: PHYSICIAN ASSISTANT

## 2024-04-11 PROCEDURE — 84443 ASSAY THYROID STIM HORMONE: CPT | Performed by: PHYSICIAN ASSISTANT

## 2024-04-11 PROCEDURE — 80053 COMPREHEN METABOLIC PANEL: CPT | Performed by: PHYSICIAN ASSISTANT

## 2024-04-11 PROCEDURE — 99214 OFFICE O/P EST MOD 30 MIN: CPT | Performed by: INTERNAL MEDICINE

## 2024-04-11 PROCEDURE — 36415 COLL VENOUS BLD VENIPUNCTURE: CPT

## 2024-04-11 PROCEDURE — 84484 ASSAY OF TROPONIN QUANT: CPT | Performed by: PHYSICIAN ASSISTANT

## 2024-04-11 PROCEDURE — 85025 COMPLETE CBC W/AUTO DIFF WBC: CPT | Performed by: PHYSICIAN ASSISTANT

## 2024-04-11 RX ORDER — ONDANSETRON 2 MG/ML
4 INJECTION INTRAMUSCULAR; INTRAVENOUS EVERY 6 HOURS PRN
Status: DISCONTINUED | OUTPATIENT
Start: 2024-04-11 | End: 2024-04-12 | Stop reason: HOSPADM

## 2024-04-11 RX ORDER — ALUMINA, MAGNESIA, AND SIMETHICONE 2400; 2400; 240 MG/30ML; MG/30ML; MG/30ML
15 SUSPENSION ORAL EVERY 6 HOURS PRN
Status: DISCONTINUED | OUTPATIENT
Start: 2024-04-11 | End: 2024-04-12 | Stop reason: HOSPADM

## 2024-04-11 RX ORDER — SODIUM CHLORIDE 0.9 % (FLUSH) 0.9 %
10 SYRINGE (ML) INJECTION AS NEEDED
Status: DISCONTINUED | OUTPATIENT
Start: 2024-04-11 | End: 2024-04-12 | Stop reason: HOSPADM

## 2024-04-11 RX ORDER — METFORMIN HYDROCHLORIDE 500 MG/1
1000 TABLET, EXTENDED RELEASE ORAL DAILY
Status: DISCONTINUED | OUTPATIENT
Start: 2024-04-11 | End: 2024-04-12 | Stop reason: HOSPADM

## 2024-04-11 RX ORDER — GABAPENTIN 300 MG/1
300 CAPSULE ORAL 2 TIMES DAILY
Status: DISCONTINUED | OUTPATIENT
Start: 2024-04-11 | End: 2024-04-12 | Stop reason: HOSPADM

## 2024-04-11 RX ORDER — IBUPROFEN 600 MG/1
1 TABLET ORAL
Status: DISCONTINUED | OUTPATIENT
Start: 2024-04-11 | End: 2024-04-12 | Stop reason: HOSPADM

## 2024-04-11 RX ORDER — ISOSORBIDE MONONITRATE 60 MG/1
60 TABLET, EXTENDED RELEASE ORAL EVERY MORNING
Status: DISCONTINUED | OUTPATIENT
Start: 2024-04-11 | End: 2024-04-12 | Stop reason: HOSPADM

## 2024-04-11 RX ORDER — ONDANSETRON 4 MG/1
4 TABLET, ORALLY DISINTEGRATING ORAL EVERY 6 HOURS PRN
Status: DISCONTINUED | OUTPATIENT
Start: 2024-04-11 | End: 2024-04-12 | Stop reason: HOSPADM

## 2024-04-11 RX ORDER — ASPIRIN 81 MG/1
324 TABLET, CHEWABLE ORAL ONCE
Status: COMPLETED | OUTPATIENT
Start: 2024-04-11 | End: 2024-04-11

## 2024-04-11 RX ORDER — ROSUVASTATIN CALCIUM 5 MG/1
5 TABLET, COATED ORAL NIGHTLY
Status: DISCONTINUED | OUTPATIENT
Start: 2024-04-11 | End: 2024-04-12 | Stop reason: HOSPADM

## 2024-04-11 RX ORDER — FENOFIBRATE 145 MG/1
145 TABLET, COATED ORAL DAILY
Status: DISCONTINUED | OUTPATIENT
Start: 2024-04-11 | End: 2024-04-12 | Stop reason: HOSPADM

## 2024-04-11 RX ORDER — SODIUM CHLORIDE 0.9 % (FLUSH) 0.9 %
10 SYRINGE (ML) INJECTION EVERY 12 HOURS SCHEDULED
Status: DISCONTINUED | OUTPATIENT
Start: 2024-04-11 | End: 2024-04-12 | Stop reason: HOSPADM

## 2024-04-11 RX ORDER — SODIUM CHLORIDE 9 MG/ML
40 INJECTION, SOLUTION INTRAVENOUS AS NEEDED
Status: DISCONTINUED | OUTPATIENT
Start: 2024-04-11 | End: 2024-04-12 | Stop reason: HOSPADM

## 2024-04-11 RX ORDER — DEXTROSE MONOHYDRATE 25 G/50ML
25 INJECTION, SOLUTION INTRAVENOUS
Status: DISCONTINUED | OUTPATIENT
Start: 2024-04-11 | End: 2024-04-12 | Stop reason: HOSPADM

## 2024-04-11 RX ORDER — FOLIC ACID 1 MG/1
1000 TABLET ORAL DAILY
Status: DISCONTINUED | OUTPATIENT
Start: 2024-04-11 | End: 2024-04-12 | Stop reason: HOSPADM

## 2024-04-11 RX ORDER — LISINOPRIL 20 MG/1
40 TABLET ORAL DAILY
Status: DISCONTINUED | OUTPATIENT
Start: 2024-04-11 | End: 2024-04-12 | Stop reason: HOSPADM

## 2024-04-11 RX ORDER — ACETAMINOPHEN 325 MG/1
650 TABLET ORAL EVERY 4 HOURS PRN
Status: DISCONTINUED | OUTPATIENT
Start: 2024-04-11 | End: 2024-04-12 | Stop reason: HOSPADM

## 2024-04-11 RX ORDER — SUCRALFATE 1 G/1
1 TABLET ORAL
Status: DISCONTINUED | OUTPATIENT
Start: 2024-04-11 | End: 2024-04-12 | Stop reason: HOSPADM

## 2024-04-11 RX ORDER — INSULIN LISPRO 100 [IU]/ML
2-9 INJECTION, SOLUTION INTRAVENOUS; SUBCUTANEOUS EVERY 6 HOURS SCHEDULED
Status: DISCONTINUED | OUTPATIENT
Start: 2024-04-11 | End: 2024-04-12

## 2024-04-11 RX ORDER — NICOTINE POLACRILEX 4 MG
15 LOZENGE BUCCAL
Status: DISCONTINUED | OUTPATIENT
Start: 2024-04-11 | End: 2024-04-12 | Stop reason: HOSPADM

## 2024-04-11 RX ORDER — AMLODIPINE BESYLATE 5 MG/1
10 TABLET ORAL DAILY
Status: DISCONTINUED | OUTPATIENT
Start: 2024-04-11 | End: 2024-04-12 | Stop reason: HOSPADM

## 2024-04-11 RX ORDER — TRAZODONE HYDROCHLORIDE 50 MG/1
50 TABLET ORAL NIGHTLY
Status: DISCONTINUED | OUTPATIENT
Start: 2024-04-11 | End: 2024-04-12 | Stop reason: HOSPADM

## 2024-04-11 RX ORDER — ASPIRIN 81 MG/1
81 TABLET ORAL DAILY
Status: DISCONTINUED | OUTPATIENT
Start: 2024-04-11 | End: 2024-04-12 | Stop reason: HOSPADM

## 2024-04-11 RX ADMIN — LINAGLIPTIN 5 MG: 5 TABLET, FILM COATED ORAL at 08:40

## 2024-04-11 RX ADMIN — INSULIN LISPRO 4 UNITS: 100 INJECTION, SOLUTION INTRAVENOUS; SUBCUTANEOUS at 08:39

## 2024-04-11 RX ADMIN — SUCRALFATE 1 G: 1 TABLET ORAL at 16:52

## 2024-04-11 RX ADMIN — ROSUVASTATIN CALCIUM 5 MG: 5 TABLET, COATED ORAL at 20:51

## 2024-04-11 RX ADMIN — ACETAMINOPHEN 650 MG: 325 TABLET, FILM COATED ORAL at 20:51

## 2024-04-11 RX ADMIN — METFORMIN HYDROCHLORIDE 1000 MG: 500 TABLET, EXTENDED RELEASE ORAL at 08:40

## 2024-04-11 RX ADMIN — SODIUM CHLORIDE 500 ML: 9 INJECTION, SOLUTION INTRAVENOUS at 05:25

## 2024-04-11 RX ADMIN — SUCRALFATE 1 G: 1 TABLET ORAL at 08:39

## 2024-04-11 RX ADMIN — HUMAN INSULIN 20 UNITS: 100 INJECTION, SUSPENSION SUBCUTANEOUS at 16:52

## 2024-04-11 RX ADMIN — TRAZODONE HYDROCHLORIDE 50 MG: 50 TABLET ORAL at 20:51

## 2024-04-11 RX ADMIN — FENOFIBRATE 145 MG: 145 TABLET ORAL at 08:39

## 2024-04-11 RX ADMIN — INSULIN LISPRO 2 UNITS: 100 INJECTION, SOLUTION INTRAVENOUS; SUBCUTANEOUS at 18:13

## 2024-04-11 RX ADMIN — ISOSORBIDE MONONITRATE 60 MG: 60 TABLET, EXTENDED RELEASE ORAL at 08:40

## 2024-04-11 RX ADMIN — ASPIRIN 81 MG: 81 TABLET, COATED ORAL at 08:39

## 2024-04-11 RX ADMIN — FOLIC ACID 1000 MCG: 1 TABLET ORAL at 08:40

## 2024-04-11 RX ADMIN — LISINOPRIL 40 MG: 20 TABLET ORAL at 08:40

## 2024-04-11 RX ADMIN — GABAPENTIN 300 MG: 300 CAPSULE ORAL at 20:51

## 2024-04-11 RX ADMIN — AMLODIPINE BESYLATE 10 MG: 5 TABLET ORAL at 08:41

## 2024-04-11 RX ADMIN — GABAPENTIN 300 MG: 300 CAPSULE ORAL at 08:39

## 2024-04-11 RX ADMIN — Medication 10 ML: at 08:42

## 2024-04-11 RX ADMIN — ASPIRIN 81 MG CHEWABLE TABLET 324 MG: 81 TABLET CHEWABLE at 05:24

## 2024-04-11 RX ADMIN — Medication 10 ML: at 20:51

## 2024-04-11 NOTE — CONSULTS
CARDIOLOGY CONSULT:    Jenna Rosa  1950  female  [id number]      Referring Provider: Hospitalist  Reason for Consultation: Chest pain and palpitation    Patient Care Team:  Emily Burdick DO as PCP - General (Family Medicine)  Joe García MD as Consulting Physician (Cardiology)  Dano Rangel MD as Consulting Physician (Endocrinology)  Conchis Mohamud OD as Consulting Physician (Optometry)  Harinder Gresham MD as Consulting Physician (Pain Medicine)  Juan Richey MD as Consulting Physician (Gastroenterology)  Manish Graff MD as Consulting Physician (Urology)    Chief complaint chest pain and palpitations    Subjective .     History of present illness:  Jenna Rosa is a 74 y.o. female with history of coronary disease hypertension hyperlipidemia diabetes presented to the hospital complains of chest pain and palpitations all of a sudden.  Patient was seen in the office recently and was doing well without any symptoms but suddenly she started having these 2 symptoms without any shortness of breath.  No dizziness syncope or swelling of the feet.  In the ER patient was admitted and being ruled out for MI by EKG and enzymes.  Patient states that she is been taking her medicines regularly.  She does not smoke.    Review of Systems   Constitutional: Negative for fever and malaise/fatigue.   HENT:  Negative for ear pain and nosebleeds.    Eyes:  Negative for blurred vision and double vision.   Cardiovascular:  Positive for chest pain and palpitations. Negative for dyspnea on exertion.   Respiratory:  Negative for cough and shortness of breath.    Skin:  Negative for rash.   Musculoskeletal:  Negative for joint pain.   Gastrointestinal:  Negative for abdominal pain, nausea and vomiting.   Neurological:  Negative for focal weakness and headaches.   Psychiatric/Behavioral:  Negative for depression. The patient is not nervous/anxious.    All other systems reviewed and are  negative.      History  Past Medical History:   Diagnosis Date    CAD 2020    Cancer 2021    Skin cancer    Colon polyp     Congenital heart disease 2017    Depression     DEXA     2019= (-0.7/ -1.6); 2023= (-0.7/ -2.7)    Diabetic peripheral neuropathy     Diverticulosis     GERD     Heart murmur 2017    Hyperlipidemia     Hypertension     Insomnia     Lower back pain     MAMMO     NEG = 2021/ 2023    Obesity     Osteoarthritis     Osteopenia     Osteoporosis     Palpitations     RA     Rheumatoid nodule 06/2011     rt. thumb x 2 and rt. & lt. 3rd fingers (Oct.2012)/ rt. elbow, left elbow, and epidermal inclusion cyst post. neck (June 2011)-----Dr. Whitmore    Vitamin D deficiency        Past Surgical History:   Procedure Laterality Date    BLADDER SUSPENSION  10/11/2023        BLADDER SUSPENSION      BREAST SURGERY  1972    CARDIAC CATHETERIZATION N/A 09/16/2020    Procedure: Left Heart Cath;  Surgeon: Walker Rodriguez MD;  Location: [facility] CATH INVASIVE LOCATION;  Service: Cardiovascular;  Laterality: N/A;    CARDIAC CATHETERIZATION N/A 09/16/2020    Procedure: Coronary angiography;  Surgeon: Walker Rodriguez MD;  Location: [facility] CATH INVASIVE LOCATION;  Service: Cardiovascular;  Laterality: N/A;    CARDIAC CATHETERIZATION  09/16/2020    Procedure: Functional Flow [place];  Surgeon: Kath Medina MD;  Location: [facility] CATH INVASIVE LOCATION;  Service: Cardiology;;    CARPAL TUNNEL RELEASE Right 02/20/2023    Procedure: CARPAL TUNNEL RELEASE WITH POSSIBLE SYNOVECTOMY;  Surgeon: Amadeo Magaña MD;  Location: [facility] MAIN OR;  Service: Orthopedics;  Laterality: Right;    CHOLECYSTECTOMY      COLONOSCOPY      2017 / 2023= TA, rech 2028? [facility]    CYST REMOVAL      Seb cyst on neck    EYE SURGERY      TOTAL ABDOMINAL HYSTERECTOMY WITH SALPINGO OOPHORECTOMY      VAGINAL PROLAPSE REPAIR       Uterine Prolapse repair       Family History   Problem Relation Age of Onset    Heart  disease Mother     Diabetes Mother     Hypertension Mother     Rheum arthritis Mother     Coronary artery disease Mother     Alcohol abuse Mother     Heart failure Mother     Heart disease Father     Mental illness Father     Hypertension Father     Alcohol abuse Father     Early death Father     Heart attack Father     Heart failure Father     Kidney cancer Sister     Stroke Sister     Osteoarthritis Sister     Rheum arthritis Sister     Hypertension Sister     Arthritis Sister     Diabetes Sister     Stroke Sister     Hypertension Sister     Rheum arthritis Sister     Osteoarthritis Sister     Diabetes Sister     Arthritis Sister     Liver disease Sister     Kidney disease Sister     No Known Problems Brother     Rheum arthritis Brother     Osteoarthritis Brother     Hypertension Brother     Diabetes Maternal Grandfather        Social History     Tobacco Use    Smoking status: Never     Passive exposure: Never    Smokeless tobacco: Never   Vaping Use    Vaping status: Never Used   Substance Use Topics    Alcohol use: Yes     Comment: 3-4 a year    Drug use: Never        Medications Prior to Admission   Medication Sig Dispense Refill Last Dose    acebutolol (SECTRAL) 200 MG capsule TAKE ONE CAPSULE BY MOUTH DAILY 90 capsule 1     acetaminophen (TYLENOL) 325 MG tablet Take 2 tablets by mouth Every 6 (Six) Hours As Needed for Mild Pain.       albuterol sulfate  (90 Base) MCG/ACT inhaler Inhale 2 puffs Every 6 (Six) Hours As Needed for Shortness of Air (cough). 18 g 0     amLODIPine (NORVASC) 10 MG tablet TAKE 1 TABLET BY MOUTH DAILY 90 tablet 1     aspirin 81 MG EC tablet Take 1 tablet by mouth Daily.       azelastine (OPTIVAR) 0.05 % ophthalmic solution Administer 2 drops to both eyes 2 (Two) Times a Day As Needed.       Cholecalciferol (Vitamin D) 50 MCG (2000 UT) capsule Take 1 capsule by mouth Daily.       Continuous Blood Gluc  (FreeStyle [place] 2 [place]) device USE WITH SENSOR EVERY 14 DAYS 1 each  0     Continuous Blood Gluc Sensor (FreeStyle [place] 2 Sensor) misc Inject 1 each under the skin into the appropriate area as directed Daily. 6 each 2     D-MANNOSE PO 1 po qd       diclofenac (VOLTAREN) 1 % gel gel Apply 4 g topically to the appropriate area as directed 4 (Four) Times a Day As Needed.       estradiol (ESTRACE) 0.1 MG/GM vaginal cream        fenofibrate 160 MG tablet Take 1 tablet by mouth Daily. 90 tablet 2     fluconazole (DIFLUCAN) 150 MG tablet        folic acid (FOLVITE) 1 MG tablet TAKE 1 TABLET BY MOUTH DAILY 90 tablet 1     gabapentin (NEURONTIN) 300 MG capsule Take 1 capsule by mouth 2 (Two) Times a Day. 180 capsule 0     glucose monitor monitoring kit Use 1 each 3 (Three) Times a Day As Needed (for BG monitoring). 1 each 0     Humira Pen 40 MG/0.4ML Pen-injector Kit Inject 40 mg under the skin into the appropriate area as directed Every 14 (Fourteen) Days. 2 each 3     ibandronate (BONIVA) 150 MG tablet        Insulin NPH Isophane & Regular (HumuLIN 70/30 KwikPen) (70-30) 100 UNIT/ML suspension pen-injector Inject 21 Units under the skin into the appropriate area as directed 2 (Two) Times a Day. Before breakfast and lunch       Insulin NPH Isophane & Regular (HumuLIN 70/30 KwikPen) (70-30) 100 UNIT/ML suspension pen-injector Inject 20 Units under the skin into the appropriate area as directed Every Evening. Before supper       Insulin Pen Needle 32G X 4 MM misc Use 1 each 2 (Two) Times a Day. 200 each 3     isosorbide mononitrate (IMDUR) 60 MG 24 hr tablet Take 1 tablet by mouth Every Morning. 90 tablet 3     Janumet -1000 MG tablet TAKE ONE TABLET BY MOUTH DAILY 90 tablet 1     lansoprazole (PREVACID) 30 MG capsule TAKE 1 CAPSULE BY MOUTH DAILY 90 capsule 1     lisinopril (PRINIVIL,ZESTRIL) 40 MG tablet TAKE 1 TABLET BY MOUTH DAILY 90 tablet 1     loratadine (Claritin) 10 MG tablet Take 1 tablet by mouth Every Night.       meclizine (ANTIVERT) 12.5 MG tablet 1/2 - 1 po q6-8hrs prn  dizzyness 20 tablet 0     methotrexate 2.5 MG tablet TAKE 5 TABLETS BY MOUTH ONCE A WEEK 60 tablet 0     multivitamin with minerals (MULTIVITAMIN ADULTS PO) Take 1 tablet by mouth Daily.       ondansetron ODT (ZOFRAN-ODT) 4 MG disintegrating tablet Place 1 tablet on the tongue Every 8 (Eight) Hours As Needed for Nausea. 30 tablet 0     rosuvastatin (CRESTOR) 5 MG tablet TAKE ONE TABLET BY MOUTH ONCE NIGHTLY 90 tablet 0     sucralfate (Carafate) 1 g tablet Take 1 tablet by mouth 3 (Three) Times a Day Before Meals. 90 tablet 0     traZODone (DESYREL) 50 MG tablet TAKE ONE TABLET BY MOUTH EVERY NIGHT AT BEDTIME 90 tablet 0     vitamin B-12 (CYANOCOBALAMIN) 1000 MCG tablet Take 1 tablet by mouth Daily.          Allergies: Jardiance [empagliflozin]    Scheduled Meds:amLODIPine, 10 mg, Oral, Daily  aspirin, 81 mg, Oral, Daily  fenofibrate, 145 mg, Oral, Daily  folic acid, 1,000 mcg, Oral, Daily  gabapentin, 300 mg, Oral, BID  insulin lispro, 2-9 Units, Subcutaneous, Q6H  insulin NPH-insulin regular, 20 Units, Subcutaneous, Q PM  insulin NPH-insulin regular, 21 Units, Subcutaneous, BID  isosorbide mononitrate, 60 mg, Oral, QAM  linagliptin, 5 mg, Oral, Daily   And  metFORMIN ER, 1,000 mg, Oral, Daily  lisinopril, 40 mg, Oral, Daily  rosuvastatin, 5 mg, Oral, Nightly  sodium chloride, 10 mL, Intravenous, Q12H  sucralfate, 1 g, Oral, TID AC  traZODone, 50 mg, Oral, Nightly      Continuous Infusions:   PRN Meds:.  acetaminophen    aluminum-magnesium hydroxide-simethicone    dextrose    dextrose    glucagon (human recombinant)    ondansetron ODT **OR** ondansetron    sodium chloride    sodium chloride    sodium chloride    Objective     VITAL SIGNS  Vitals:    04/11/24 0516 04/11/24 0738 04/11/24 0938 04/11/24 1421   BP: 119/57 134/71 128/61 119/74   BP Location:  Right arm Right arm Right arm   Patient Position:  Lying Lying Lying   Pulse: 58 60 62 64   Resp:  15 15 13   Temp:  97.6 °F (36.4 °C) 97.5 °F (36.4 °C) 97.3 °F  "(36.3 °C)   TempSrc:  Oral Oral Oral   SpO2: 94% 98% 98% 97%   Weight:  73.5 kg (162 lb 0.6 oz)     Height:  160 cm (62.99\")         Flowsheet Rows      Flowsheet Row First Filed Value   Admission Height 160 cm (63\") Documented at 04/11/2024 0326   Admission Weight 73.5 kg (162 lb) Documented at 04/11/2024 0326             TELEMETRY: Sinus rhythm with nonspecific ST segment abnormality    Physical Exam:  Constitutional:       Appearance: Well-developed.   Eyes:      General: No scleral icterus.     Conjunctiva/sclera: Conjunctivae normal.      Pupils: Pupils are equal, round, and reactive to light.   HENT:      Head: Normocephalic and atraumatic.   Neck:      Vascular: No carotid bruit or JVD.   Pulmonary:      Effort: Pulmonary effort is normal.      Breath sounds: Normal breath sounds. No wheezing. No rales.   Cardiovascular:      Normal rate. Regular rhythm.   Pulses:     Intact distal pulses.   Abdominal:      General: Bowel sounds are normal.      Palpations: Abdomen is soft.   Musculoskeletal: Normal range of motion.      Cervical back: Normal range of motion and neck supple. Skin:     General: Skin is warm and dry.      Findings: No rash.   Neurological:      Mental Status: Alert.      Comments: No focal deficits          Results Review:   I reviewed the patient's new clinical results.  Lab Results (last 24 hours)       Procedure Component Value Units Date/Time    POC Glucose Once [[id number]]  (Abnormal) Collected: 04/11/24 1631    Specimen: Blood Updated: 04/11/24 1632     Glucose 198 mg/dL      Comment: Serial Number: [id number]:  [id number]       POC Glucose 4x Daily Before Meals & at Bedtime [[id number]]  (Abnormal) Collected: 04/11/24 1115    Specimen: Blood Updated: 04/11/24 1117     Glucose 149 mg/dL      Comment: Serial Number: [id number]:  [id number]       POC Glucose Once [[id number]]  (Abnormal) Collected: 04/11/24 0755    Specimen: Blood Updated: 04/11/24 0758     Glucose 206 mg/dL      " Comment: Serial Number: [id number]:  [id number]       Single High Sensitivity Troponin T [[id number]]  (Normal) Collected: 04/11/24 0608    Specimen: Blood Updated: 04/11/24 0630     HS Troponin T 10 ng/L     Narrative:      High Sensitive Troponin T Reference Range:  <14.0 ng/L- Negative Female for AMI  <22.0 ng/L- Negative Male for AMI  >=14 - Abnormal Female indicating possible myocardial injury.  >=22 - Abnormal Male indicating possible myocardial injury.   Clinicians would have to utilize clinical acumen, EKG, Troponin, and serial changes to determine if it is an Acute Myocardial Infarction or myocardial injury due to an underlying chronic condition.         [facility] [[id number]] Collected: 04/11/24 0411    Specimen: Blood Updated: 04/11/24 0515    Narrative:      The following orders were created for panel order [facility].  Procedure                               Abnormality         Status                     ---------                               -----------         ------                     Green Top (Gel)[[id number]]                                  Final result               Lavender Top[[id number]]                                     Final result               Gold Top - SST[[id number]]                                   Final result               Light Blue Top[[id number]]                                   Final result                 Please view results for these tests on the individual orders.    Green Top (Gel) [[id number]] Collected: 04/11/24 0411    Specimen: Blood Updated: 04/11/24 0515     Extra Tube Hold for add-ons.     Comment: Auto resulted.       Lavender Top [[id number]] Collected: 04/11/24 0411    Specimen: Blood Updated: 04/11/24 0515     Extra Tube hold for add-on     Comment: Auto resulted       Gold Top - SST [[id number]] Collected: 04/11/24 0411    Specimen: Blood Updated: 04/11/24 0515     Extra Tube Hold for add-ons.     Comment: Auto resulted.       Light Blue Top [[id number]]  Collected: 04/11/24 0411    Specimen: Blood Updated: 04/11/24 0515     Extra Tube Hold for add-ons.     Comment: Auto resulted       Single High Sensitivity Troponin T [[id number]]  (Normal) Collected: 04/11/24 0411    Specimen: Blood Updated: 04/11/24 0446     HS Troponin T 8 ng/L     Narrative:      High Sensitive Troponin T Reference Range:  <14.0 ng/L- Negative Female for AMI  <22.0 ng/L- Negative Male for AMI  >=14 - Abnormal Female indicating possible myocardial injury.  >=22 - Abnormal Male indicating possible myocardial injury.   Clinicians would have to utilize clinical acumen, EKG, Troponin, and serial changes to determine if it is an Acute Myocardial Infarction or myocardial injury due to an underlying chronic condition.         TSH [[id number]]  (Normal) Collected: 04/11/24 0411    Specimen: Blood Updated: 04/11/24 0446     TSH 3.840 uIU/mL     BNP [[id number]]  (Normal) Collected: 04/11/24 0411    Specimen: Blood Updated: 04/11/24 0446     proBNP 92.1 pg/mL     Narrative:      This assay is used as an aid in the diagnosis of individuals suspected of having heart failure. It can be used as an aid in the diagnosis of acute decompensated heart failure (ADHF) in patients presenting with signs and symptoms of ADHF to the emergency department (ED). In addition, NT-proBNP of <300 pg/mL indicates ADHF is not likely.    Age Range Result Interpretation  NT-proBNP Concentration (pg/mL:      <50             Positive            >450                   Gray                 300-450                    Negative             <300    50-75           Positive            >900                  Gray                300-900                  Negative            <300      >75             Positive            >1800                  Gray                300-1800                  Negative            <300    Comprehensive Metabolic Panel [[id number]]  (Abnormal) Collected: 04/11/24 0411    Specimen: Blood Updated: 04/11/24 0446     Glucose  234 mg/dL      BUN 15 mg/dL      Creatinine 0.84 mg/dL      Sodium 140 mmol/L      Potassium 3.6 mmol/L      Chloride 102 mmol/L      CO2 26.0 mmol/L      Calcium 10.3 mg/dL      Total Protein 7.7 g/dL      Albumin 4.4 g/dL      ALT (SGPT) 11 U/L      AST (SGOT) 14 U/L      Alkaline Phosphatase 61 U/L      Total Bilirubin 0.2 mg/dL      Globulin 3.3 gm/dL      A/G Ratio 1.3 g/dL      BUN/Creatinine Ratio 17.9     Anion Gap 12.0 mmol/L      eGFR 73.5 mL/min/1.73     Narrative:      GFR Normal >60  Chronic Kidney Disease <60  Kidney Failure <15    The GFR formula is only valid for adults with stable renal function between ages 18 and 70.    Magnesium [[id number]]  (Normal) Collected: 04/11/24 0411    Specimen: Blood Updated: 04/11/24 0446     Magnesium 2.0 mg/dL     CBC & Differential [[id number]]  (Abnormal) Collected: 04/11/24 0411    Specimen: Blood Updated: 04/11/24 0417    Narrative:      The following orders were created for panel order CBC & Differential.  Procedure                               Abnormality         Status                     ---------                               -----------         ------                     CBC Auto Differential[[id number]]        Abnormal            Final result                 Please view results for these tests on the individual orders.    CBC Auto Differential [[id number]]  (Abnormal) Collected: 04/11/24 0411    Specimen: Blood Updated: 04/11/24 0417     WBC 7.07 10*3/mm3      RBC 4.82 10*6/mm3      Hemoglobin 14.5 g/dL      Hematocrit 44.2 %      MCV 91.7 fL      MCH 30.1 pg      MCHC 32.8 g/dL      RDW 13.3 %      RDW-SD 44.7 fl      MPV 9.4 fL      Platelets 337 10*3/mm3      Neutrophil % 41.1 %      Lymphocyte % 44.4 %      Monocyte % 8.6 %      Eosinophil % 5.0 %      Basophil % 0.6 %      Immature Grans % 0.3 %      Neutrophils, Absolute 2.91 10*3/mm3      Lymphocytes, Absolute 3.14 10*3/mm3      Monocytes, Absolute 0.61 10*3/mm3      Eosinophils, Absolute 0.35  10*3/mm3      Basophils, Absolute 0.04 10*3/mm3      Immature Grans, Absolute 0.02 10*3/mm3      nRBC 0.0 /100 WBC             Imaging Results (Last 24 Hours)       Procedure Component Value Units Date/Time    XR Chest 1 View [[id number]] Collected: 04/11/24 0358     Updated: 04/11/24 0400    Narrative:      XR CHEST 1 VW    Date of Exam: 4/11/2024 3:50 AM EDT    Indication: CHF/COPD Protocol  CHF/COPD Protocol    Comparison: Chest radiograph dated December 29, 2022    Findings:  The heart size and pulmonary vascular markings are normal. There is diffuse emphysema. There are no focal infiltrates.      Impression:      Impression:  No active disease      Electronically Signed: Woody Johnson MD    4/11/2024 3:58 AM EDT    Workstation ID: [id number]            EKG      I personally viewed and interpreted the patient's EKG/Telemetry data:    ECHOCARDIOGRAM:  Results for orders placed during the hospital encounter of 08/18/23    Adult Transthoracic Echo Complete W/ Color, Spectral and Contrast if Necessary Per Protocol    Interpretation Summary    Left ventricular ejection fraction appears to be 56 - 60%.    The right ventricular cavity is mildly dilated.    The left atrial cavity is mildly dilated.         STRESS MYOVIEW:  Results for orders placed during the hospital encounter of 08/18/23    Stress Test With Myocardial Perfusion - One Day    Interpretation Summary    Left ventricular ejection fraction is hyperdynamic (Calculated EF > 70%).    Myocardial perfusion imaging indicates a normal myocardial perfusion study with no evidence of ischemia.    Impressions are consistent with a low risk study.       CARDIAC CATHETERIZATION:    Results for orders placed during the hospital encounter of 09/16/20    Cardiac Catheterization/Vascular Study    Narrative  Table formatting from the original result was not included.  Percutaneous Coronary Revascularization Report    Jenna Rosa  [id number]  9/16/2020  @PCP@    Indications  for the procedure include: abnormal stress test.    Procedure Details:  The risks, benefits, complications, treatment options, and expected outcomes were discussed with the patient. The patient and/or family concurred with the proposed plan, giving informed consent prior to diagnostic catheterization.    After informed consentShe was brought to the cath lab and diagnostic catheterization was performed by Dr. Figueroa.  Complete diagnostic catheterization findings will be discussed in their operative report.    The decision was then made to proceed with intervention. She received Heparin as per protocol.  The interventional procedure is as follows:    For the IFR evaluation of the LAD we used a XB 3.5 guide catheter with a [place] IFR pressure wire to measure the IFR value of the mid LAD lesion.  Procedure anticoagulation was heparin patient tolerated procedure well with no mucosal complication plan to obtain hemostasis by using Angio-Seal closure device.    After the procedure was completed, sedation was stopped and the sheaths and catheters were all removed according to established hospital protocols.    Findings:    Interventions  IFR evaluation of the mid LAD  Target Vessel  mid LAD  Pre % Stenosis 50  Post % Stenosis  0  Pre-procedure TONI flow  3  Post procedure TONI flow  3  Closure Device  none  Complications  none    Estimated Blood Loss:  Minimal    Complications:  None; patient tolerated the procedure well.    Disposition: PACU - hemodynamically stable.    Condition: stable    Impressions:  IFR evaluation of the mid LAD showing IFR value of 0.93 suggestive of no physiologically significant stenosis involving the mid LAD    Recommendations:  Medical management for obstructive coronary artery disease  Test results discussed with the patient and family       OTHER:         MDM      Chest pain/coronary artery disease  Patient presented with chest pain she has both typical and atypical features but also has some  palpitations  Patient is ruled out for MI by EKG and enzymes  Patient had a stress Myoview study recently which did not show any ischemia  Patient also had a 50% disease in the past in the LAD  Patient will be managed with conservative medical therapy only  Patient also will monitor for her palpitations and may need a Holter monitor at discharge    Hypertension  Patient blood pressure currently stable and will continue home medicines    Hyperlipidemia  Patient is currently on statins and the lipid levels are well within normal limits    Diabetes  Patient is on insulin and Janumet and followed by the primary care doctor.    Patient will have workup for other cause of chest pain      I discussed the patients findings and my recommendations with patient and nurse    Joe García MD  04/11/24  17:28 EDT

## 2024-04-11 NOTE — PLAN OF CARE
Problem: Adult Inpatient Plan of Care  Goal: Plan of Care Review  Outcome: Ongoing, Progressing  Goal: Patient-Specific Goal (Individualized)  Outcome: Ongoing, Progressing  Goal: Absence of Hospital-Acquired Illness or Injury  Outcome: Ongoing, Progressing  Intervention: Identify and Manage Fall Risk  Recent Flowsheet Documentation  Taken 4/11/2024 0736 by Yifan Ahn RN  Safety Promotion/Fall Prevention: safety round/check completed  Intervention: Prevent Skin Injury  Recent Flowsheet Documentation  Taken 4/11/2024 0736 by Yifan Ahn RN  Body Position: position changed independently  Intervention: Prevent and Manage VTE (Venous Thromboembolism) Risk  Recent Flowsheet Documentation  Taken 4/11/2024 0736 by Yifan Ahn RN  Activity Management: up ad luke  Goal: Optimal Comfort and Wellbeing  Outcome: Ongoing, Progressing  Intervention: Monitor Pain and Promote Comfort  Recent Flowsheet Documentation  Taken 4/11/2024 0736 by Yifan Ahn RN  Pain Management Interventions: see MAR  Intervention: Provide Person-Centered Care  Recent Flowsheet Documentation  Taken 4/11/2024 0736 by Yifan Ahn RN  Trust Relationship/Rapport: care explained  Goal: Readiness for Transition of Care  Outcome: Ongoing, Progressing  Intervention: Mutually Develop Transition Plan  Recent Flowsheet Documentation  Taken 4/11/2024 0733 by Yifan Ahn RN  Transportation Anticipated: family or friend will provide  Patient/Family Anticipated Services at Transition: none  Patient/Family Anticipates Transition to: home with family  Taken 4/11/2024 0732 by Yifan Ahn RN  Equipment Currently Used at Home:   none   cane, straight   bp cuff   shower chair  Taken 4/11/2024 0731 by Yifan Ahn RN  Equipment Currently Used at Home:   shower chair   cane, straight   bp cuff   Goal Outcome Evaluation:      Pt admitted by ambulance for palpitations

## 2024-04-11 NOTE — CONSULTS
"Diabetes Education  Assessment/Teaching    Patient Name:  Jenna Rosa  YOB: 1950  MRN: [id number]  Admit Date:  4/11/2024      Assessment Date:  4/11/2024  Flowsheet Row Most Recent Value   General Information     Referral From: A1c  [Pt seen due to A1c on 3/21/2024 of 7.5%. Adm bs 234.]   Height 160 cm (62.99\")   Height Method Stated   Weight 73.5 kg (162 lb 0.6 oz)   Weight Method Bed scale   Pregnancy Assessment    Diabetes History    What type of diabetes do you have? Type 2   Length of Diabetes Diagnosis --  [Dx in 1998.]   Have you had diabetes education/teaching in the past? yes   When and where was your diabetes education? Was last seen by inpatient diabetes educator at [facility] on 2/6/2024.   Do you test your blood sugar at home? yes   Frequency of checks wears continuous glucose sensor   Meter type Freestyle [place] 2, pt currently has on upper right arm   Who performs the test? self   Have you had low blood sugar? (<70mg/dl) yes   How often do you have low blood sugar? frequently  [Pt states low bs happening during the night.]   Education Preferences    Nutrition Information    Assessment Topics    DM Goals             Flowsheet Row Most Recent Value   DM Education Needs    Meter Has own   Frequency of Testing --  [Discussed importance of sharing sensor readings with [facility] if she continues to have low bs. Discussed MD would want to decrease insulin doses.]   Blood Glucose Target Range Discussed A1c result from 3/21/2024 of 7.5%. Reviewed healthy bs range and healthy A1c target. Discussed importance of bs control.   Medication Insulin, Oral, Pen  [Pt taking Humulin 70/30 20 units 3 times/day premeals and Janumet /1000 mg daily.]   Problem Solving Hypoglycemia, Hyperglycemia, Signs, Symptoms, Treatment  [Discussed importance of keeping low bs tx at bedside and always with her. Pt states she does have glucose tabs.]   Reducing Risks A1C testing  [Gave A1c info sheet.]   Healthy " Eating --  [Pt usually eating 3 meals/day. Pt states difficult to eat vegetables without teeth. Discussed cooking vegetables until soft so easy to chew.]   Motivation Engaged   Teaching Method Explanation, Discussion, Handouts   Patient Response Verbalized understanding              Other Comments:  Met with pt at bedside. Pt follows at the [facility]. She saw Dr. Dano Rangel last on 3/26/2024. Pt has follow up appt scheduled for 10/14/2024. Pt has Humulin 70/30 insulin ordered for this hospitalization to be given in the evening and then also to be given am and hs. Educator sent secure chat to hospitalist to recommend order be changed to Humulin 70/30 20 units 3 times/day premeals. Nurse included in secure chat. Discussed with pt that Humulin 70/30 should not be given at bedtime. Discussed this increases risk of low bs during the night since 70/30 has mealtime insulin combined with the longer-acting insulin.     Pt states has insulin pens and sensors at home. Pt understands importance of contacting [facility] if continues to have low bs. Pt with no additional questions.       Electronically signed by:  Alesha Walker RN  04/11/24 18:59 EDT

## 2024-04-11 NOTE — ED PROVIDER NOTES
Subjective   History of Present Illness    Patient is 74-year-old female comes in complaining of palpitations and chest pressure.  Patient states her palpitations will come and go.  Patient states she was recently seen by her cardiologist, Dr. García, and they are following her for this.  Patient states that she all of a sudden had palpitations that awoke him from sleep.  Patient does report some chest tightness and discomfort that started along with the palpitations tonight however patient denies any pain currently.  Patient states sometimes the chest discomfort will radiate into her neck.  Patient denies any shortness of breath or pain with deep breathing.  Patient denies any recent vomiting, lightheadedness, syncopal episode, abdominal pain or diarrhea or urinary symptoms.    Review of Systems   Constitutional:  Negative for chills, fatigue and fever.   Respiratory:  Negative for cough and shortness of breath.    Cardiovascular:  Positive for chest pain (resolved) and palpitations. Negative for leg swelling.   Gastrointestinal:  Negative for abdominal pain, blood in stool, nausea and vomiting.   Genitourinary:  Negative for dysuria.   Neurological:  Negative for syncope, light-headedness and headaches.       Past Medical History:   Diagnosis Date    CAD 2020    Cancer 2021    Skin cancer    Colon polyp     Congenital heart disease 2017    Depression     DEXA     2019= (-0.7/ -1.6); 2023= (-0.7/ -2.7)    Diabetic peripheral neuropathy     Diverticulosis     GERD     Heart murmur 2017    Hyperlipidemia     Hypertension     Insomnia     Lower back pain     MAMMO     NEG = 2021/ 2023    Obesity     Osteoarthritis     Osteopenia     Osteoporosis     Palpitations     RA     Rheumatoid nodule 06/2011     rt. thumb x 2 and rt. & lt. 3rd fingers (Oct.2012)/ rt. elbow, left elbow, and epidermal inclusion cyst post. neck (June 2011)-----Dr. Whitmore    Vitamin D deficiency        Allergies   Allergen Reactions    Jardiance  [Empagliflozin] Other (See Comments)     UTI's       Past Surgical History:   Procedure Laterality Date    BLADDER SUSPENSION  10/11/2023        BLADDER SUSPENSION      BREAST SURGERY  1972    CARDIAC CATHETERIZATION N/A 09/16/2020    Procedure: Left Heart Cath;  Surgeon: Walker Rodriguez MD;  Location: [facility] CATH INVASIVE LOCATION;  Service: Cardiovascular;  Laterality: N/A;    CARDIAC CATHETERIZATION N/A 09/16/2020    Procedure: Coronary angiography;  Surgeon: Walker Rodriguez MD;  Location: [facility] CATH INVASIVE LOCATION;  Service: Cardiovascular;  Laterality: N/A;    CARDIAC CATHETERIZATION  09/16/2020    Procedure: Functional Flow [place];  Surgeon: Kath Medina MD;  Location: [facility] CATH INVASIVE LOCATION;  Service: Cardiology;;    CARPAL TUNNEL RELEASE Right 02/20/2023    Procedure: CARPAL TUNNEL RELEASE WITH POSSIBLE SYNOVECTOMY;  Surgeon: Amadeo Magaña MD;  Location: [facility] MAIN OR;  Service: Orthopedics;  Laterality: Right;    CHOLECYSTECTOMY      COLONOSCOPY      2017 / 2023= TA, rech 2028? [facility]    CYST REMOVAL      Seb cyst on neck    EYE SURGERY      TOTAL ABDOMINAL HYSTERECTOMY WITH SALPINGO OOPHORECTOMY      VAGINAL PROLAPSE REPAIR       Uterine Prolapse repair       Family History   Problem Relation Age of Onset    Heart disease Mother     Diabetes Mother     Hypertension Mother     Rheum arthritis Mother     Coronary artery disease Mother     Alcohol abuse Mother     Heart failure Mother     Heart disease Father     Mental illness Father     Hypertension Father     Alcohol abuse Father     Early death Father     Heart attack Father     Heart failure Father     Kidney cancer Sister     Stroke Sister     Osteoarthritis Sister     Rheum arthritis Sister     Hypertension Sister     Arthritis Sister     Diabetes Sister     Stroke Sister     Hypertension Sister     Rheum arthritis Sister     Osteoarthritis Sister     Diabetes Sister     Arthritis Sister      Liver disease Sister     Kidney disease Sister     No Known Problems Brother     Rheum arthritis Brother     Osteoarthritis Brother     Hypertension Brother     Diabetes Maternal Grandfather        Social History     Socioeconomic History    Marital status:      Spouse name: Carson Rosa    Number of children: 3    Years of education: GED   Tobacco Use    Smoking status: Never     Passive exposure: Never    Smokeless tobacco: Never   Vaping Use    Vaping status: Never Used   Substance and Sexual Activity    Alcohol use: Yes     Comment: 3-4 a year    Drug use: Never    Sexual activity: Not Currently     Partners: Male     Birth control/protection: Hysterectomy, Same-sex partner           Objective   Physical Exam  Vitals and nursing note reviewed.   Constitutional:       General: She is not in acute distress.     Appearance: She is well-developed. She is not diaphoretic.   HENT:      Head: Normocephalic and atraumatic.      Right Ear: External ear normal.      Left Ear: External ear normal.      Nose: Nose normal.      Mouth/Throat:      Mouth: Mucous membranes are moist.   Eyes:      Extraocular Movements: Extraocular movements intact.      Conjunctiva/sclera: Conjunctivae normal.      Pupils: Pupils are equal, round, and reactive to light.   Cardiovascular:      Rate and Rhythm: Normal rate and regular rhythm.      Heart sounds: Normal heart sounds.      Comments: S1, S2 audible.  Pulmonary:      Effort: Pulmonary effort is normal. No respiratory distress.      Breath sounds: Normal breath sounds. No wheezing, rhonchi or rales.      Comments: On room air.  Abdominal:      General: Bowel sounds are normal. There is no distension.      Palpations: Abdomen is soft.      Tenderness: There is no abdominal tenderness. There is no guarding or rebound.   Musculoskeletal:         General: No tenderness or deformity. Normal range of motion.      Cervical back: Normal range of motion.   Skin:     General: Skin is  warm.      Capillary Refill: Capillary refill takes less than 2 seconds.      Findings: No erythema or rash.   Neurological:      General: No focal deficit present.      Mental Status: She is alert and oriented to person, place, and time.      Cranial Nerves: No cranial nerve deficit.   Psychiatric:         Mood and Affect: Mood normal.         Behavior: Behavior normal.         Procedures           ED Course  ED Course as of 04/11/24 0456   u Apr 11, 2024   0410 Chest x-ray independently interpreted by myself shows borderline cardiomegaly, otherwise no acute pulmonary infiltrates. [RL]   0411 EKG independently interpreted by myself shows sinus rhythm 79 bpm, no ST elevation apparent.  Some artifact at baseline.  PVC present.  Similar to previous study on 10/4/2023 showed sinus rhythm 55 bpm, no ST elevation apparent at that time.  EKG independently interpreted by Dr. Funes shows sinus rhythm 79 bpm, PVC present, prolonged UT interval, LVH, no ST elevation apparent. [RL]   0412 Awaiting labs [RL]      ED Course User Index  [RL] Addy Washington PA                                 Labs Reviewed   COMPREHENSIVE METABOLIC PANEL - Abnormal; Notable for the following components:       Result Value    Glucose 234 (*)     All other components within normal limits    Narrative:     GFR Normal >60  Chronic Kidney Disease <60  Kidney Failure <15    The GFR formula is only valid for adults with stable renal function between ages 18 and 70.   CBC WITH AUTO DIFFERENTIAL - Abnormal; Notable for the following components:    Neutrophil % 41.1 (*)     Lymphocytes, Absolute 3.14 (*)     All other components within normal limits   BNP (IN-HOUSE) - Normal    Narrative:     This assay is used as an aid in the diagnosis of individuals suspected of having heart failure. It can be used as an aid in the diagnosis of acute decompensated heart failure (ADHF) in patients presenting with signs and symptoms of ADHF to the emergency department (ED).  In addition, NT-proBNP of <300 pg/mL indicates ADHF is not likely.    Age Range Result Interpretation  NT-proBNP Concentration (pg/mL:      <50             Positive            >450                   Gray                 300-450                    Negative             <300    50-75           Positive            >900                  Gray                300-900                  Negative            <300      >75             Positive            >1800                  Gray                300-1800                  Negative            <300   SINGLE HS TROPONIN T - Normal    Narrative:     High Sensitive Troponin T Reference Range:  <14.0 ng/L- Negative Female for AMI  <22.0 ng/L- Negative Male for AMI  >=14 - Abnormal Female indicating possible myocardial injury.  >=22 - Abnormal Male indicating possible myocardial injury.   Clinicians would have to utilize clinical acumen, EKG, Troponin, and serial changes to determine if it is an Acute Myocardial Infarction or myocardial injury due to an underlying chronic condition.        TSH - Normal   MAGNESIUM - Normal   RAINBOW DRAW    Narrative:     The following orders were created for panel order [place] Draw.  Procedure                               Abnormality         Status                     ---------                               -----------         ------                     Green Top (Gel)[[id number]]                                  In process                 Lavender Top[[id number]]                                     In process                 Gold Top - SST[[id number]]                                   In process                 Light Blue Top[[id number]]                                   In process                   Please view results for these tests on the individual orders.   SINGLE HS TROPONIN T   CBC AND DIFFERENTIAL    Narrative:     The following orders were created for panel order CBC & Differential.  Procedure                               Abnormality          Status                     ---------                               -----------         ------                     CBC Auto Differential[[id number]]        Abnormal            Final result                 Please view results for these tests on the individual orders.   GREEN TOP   LAVENDER TOP   GOLD TOP - SST   LIGHT BLUE TOP                 Medical Decision Making  Problems Addressed:  Anxiety: complicated acute illness or injury  Chest discomfort: complicated acute illness or injury  Palpitations: complicated acute illness or injury    Amount and/or Complexity of Data Reviewed  Labs: ordered.  Radiology: ordered.  ECG/medicine tests: ordered.    Risk  OTC drugs.  Prescription drug management.  Decision regarding hospitalization.      Differential diagnosis: Anxiety, ACS, electrolyte abnormality, not meant to be an all inclusive list.    Chart review: Upon chart review, last cardiology visit on 4/9/2024, patient was seen by   For follow-up regarding palpitations and shortness of breath.  Patient has a history of CAD, hypertension, hyperlipidemia and diabetes.  Patient had a 50% disease in the LAD and does not have any angina and is currently stable from a CAD standpoint.  Most recent stress did not show any ischemia also had an echo that showed normal LV function.  Last echo stress test done in August 2023.    EKG: See above    Imaging: If applicable see my interpretation above.  XR Chest 1 View   Final Result   Impression:   No active disease         Electronically Signed: Woody Johnson MD     4/11/2024 3:58 AM EDT     Workstation ID: [id number]        Labs:  Lab work independently interpreted by myself shows initial troponin negative.  BNP and TSH normal, magnesium normal.  CBC and CMP largely unremarkable for acute findings although glucose elevated at 234.  500 normal saline bolus was ordered.      Vitals:  /69 (BP Location: Left arm, Patient Position: Sitting)   Pulse 71   Temp 97.9 °F (36.6 °C)    "Resp 16   Ht 160 cm (63\")   Wt 73.5 kg (162 lb)   SpO2 94%   BMI 28.70 kg/m²    Medications given:    Medications   sodium chloride 0.9 % flush 10 mL (has no administration in time range)   aspirin chewable tablet 324 mg (has no administration in time range)   sodium chloride 0.9 % bolus 500 mL (has no administration in time range)       Procedures:  not indicated    MDM: Patient is 74-year-old female comes in complaining of palpitations and some chest discomfort.  Chest discomfort is now resolved.  Lab work independently interpreted by myself shows initial troponin negative.  BNP and TSH normal, magnesium normal.  CBC and CMP largely unremarkable for acute findings although glucose elevated at 234.  500 normal saline bolus was ordered.  Full dose aspirin ordered.  Chest x-ray and EKG shows no acute findings.  I offered patient ED observation unit for further ACS rule out and cardiology consultation in the morning and patient would like to do this rather than following up outpatient.  Patient states her palpitations have improved on reevaluation as well.  Patient does note that she got worried when she was at home when this happened and I do believe there to be an anxiety component to some of patient's symptoms.  Patient not in DKA.  Patient be placed in ED observation unit. Plan discussed with patient and is agreeable with plan.      Final diagnoses:   Palpitations   Chest discomfort   Anxiety       ED Disposition  ED Disposition       ED Disposition   Decision to Admit    Condition   --    Comment   --               No follow-up provider specified.       Medication List      No changes were made to your prescriptions during this visit.            Addy Washington PA  04/11/24 0456    "

## 2024-04-11 NOTE — H&P
[facility] Observation Unit H&P    Patient Name: Jenna Rosa  : 1950  MRN: [id number]  Primary Care Physician: Emily Burdick DO  Date of admission: 2024     Patient Care Team:  Emily Burdick DO as PCP - General (Family Medicine)  Joe García MD as Consulting Physician (Cardiology)  Dano Rangel MD as Consulting Physician (Endocrinology)  Conchis Mohamud OD as Consulting Physician (Optometry)  Harinder Gresham MD as Consulting Physician (Pain Medicine)  Juan Richey MD as Consulting Physician (Gastroenterology)  Manish Graff MD as Consulting Physician (Urology)          Subjective   History Present Illness     Chief Complaint:   Chief Complaint   Patient presents with    Palpitations     hyperte    Hypertension     palpitations    Palpitations   Associated symptoms include diaphoresis. Pertinent negatives include no nausea, shortness of breath or vomiting.   Hypertension  Associated symptoms include palpitations. Pertinent negatives include no shortness of breath.     ED  74-year-old female comes in complaining of palpitations and chest pressure.  Patient states her palpitations will come and go.  Patient states she was recently seen by her cardiologist, Dr. García, and they are following her for this.  Patient states that she all of a sudden had palpitations that awoke him from sleep.  Patient does report some chest tightness and discomfort that started along with the palpitations tonight however patient denies any pain currently.  Patient states sometimes the chest discomfort will radiate into her neck.  Patient denies any shortness of breath or pain with deep breathing.  Patient denies any recent vomiting, lightheadedness, syncopal episode, abdominal pain or diarrhea or urinary symptoms     Observation 24  Pt concurs with er hpi. Pt had palpitations, sweating in ems. No soa. Pt seen in cardiology office earlier in the week. Cardiology consulted. Echo  pending.      Review of Systems   Constitutional: Positive for diaphoresis.   Cardiovascular:  Positive for palpitations.   Respiratory:  Negative for shortness of breath.    Gastrointestinal:  Negative for nausea and vomiting.             Personal History     Past Medical History:   Past Medical History:   Diagnosis Date    CAD 2020    Cancer 2021    Skin cancer    Colon polyp     Congenital heart disease 2017    Depression     DEXA     2019= (-0.7/ -1.6); 2023= (-0.7/ -2.7)    Diabetic peripheral neuropathy     Diverticulosis     GERD     Heart murmur 2017    Hyperlipidemia     Hypertension     Insomnia     Lower back pain     MAMMO     NEG = 2021/ 2023    Obesity     Osteoarthritis     Osteopenia     Osteoporosis     Palpitations     RA     Rheumatoid nodule 06/2011     rt. thumb x 2 and rt. & lt. 3rd fingers (Oct.2012)/ rt. elbow, left elbow, and epidermal inclusion cyst post. neck (June 2011)-----Dr. Whitmore    Vitamin D deficiency        Surgical History:      Past Surgical History:   Procedure Laterality Date    BLADDER SUSPENSION  10/11/2023        BLADDER SUSPENSION      BREAST SURGERY  1972    CARDIAC CATHETERIZATION N/A 09/16/2020    Procedure: Left Heart Cath;  Surgeon: Walker Rodriguez MD;  Location: [facility] CATH INVASIVE LOCATION;  Service: Cardiovascular;  Laterality: N/A;    CARDIAC CATHETERIZATION N/A 09/16/2020    Procedure: Coronary angiography;  Surgeon: Walker Rodriguez MD;  Location: [facility] CATH INVASIVE LOCATION;  Service: Cardiovascular;  Laterality: N/A;    CARDIAC CATHETERIZATION  09/16/2020    Procedure: Functional Flow [place];  Surgeon: Kath Medina MD;  Location: [facility] CATH INVASIVE LOCATION;  Service: Cardiology;;    CARPAL TUNNEL RELEASE Right 02/20/2023    Procedure: CARPAL TUNNEL RELEASE WITH POSSIBLE SYNOVECTOMY;  Surgeon: Amadeo Magaña MD;  Location: [facility] MAIN OR;  Service: Orthopedics;  Laterality: Right;    CHOLECYSTECTOMY       COLONOSCOPY      2017 / 2023= caren VENEGAS 2028? [facility]    CYST REMOVAL      Seb cyst on neck    EYE SURGERY      TOTAL ABDOMINAL HYSTERECTOMY WITH SALPINGO OOPHORECTOMY      VAGINAL PROLAPSE REPAIR       Uterine Prolapse repair           Family History: family history includes Alcohol abuse in her father and mother; Arthritis in her sister and sister; Coronary artery disease in her mother; Diabetes in her maternal grandfather, mother, sister, and sister; Early death in her father; Heart attack in her father; Heart disease in her father and mother; Heart failure in her father and mother; Hypertension in her brother, father, mother, sister, and sister; Kidney cancer in her sister; Kidney disease in her sister; Liver disease in her sister; Mental illness in her father; No Known Problems in her brother; Osteoarthritis in her brother, sister, and sister; Rheum arthritis in her brother, mother, sister, and sister; Stroke in her sister and sister. Otherwise pertinent FHx was reviewed and unremarkable.     Social History:  reports that she has never smoked. She has never been exposed to tobacco smoke. She has never used smokeless tobacco. She reports current alcohol use. She reports that she does not use drugs.      Medications:  Prior to Admission medications    Medication Sig Start Date End Date Taking? Authorizing Provider   acebutolol (SECTRAL) 200 MG capsule TAKE ONE CAPSULE BY MOUTH DAILY 11/1/23   Alberto Gotti MD   acetaminophen (TYLENOL) 325 MG tablet Take 2 tablets by mouth Every 6 (Six) Hours As Needed for Mild Pain.    Provider, MD Evangelina   albuterol sulfate  (90 Base) MCG/ACT inhaler Inhale 2 puffs Every 6 (Six) Hours As Needed for Shortness of Air (cough). 1/29/24   Emily Burdick, DO   amLODIPine (NORVASC) 10 MG tablet TAKE 1 TABLET BY MOUTH DAILY 3/11/24   Emily Burdick, DO   aspirin 81 MG EC tablet Take 1 tablet by mouth Daily. 2/21/17   Emergency, Nurse Luis, RN   azelastine (OPTIVAR) 0.05 %  ophthalmic solution Administer 2 drops to both eyes 2 (Two) Times a Day As Needed. 12/12/23   Evangelina Fontana MD   Cholecalciferol (Vitamin D) 50 MCG (2000 UT) capsule Take 1 capsule by mouth Daily.    Evangelina Fontana MD   Continuous Blood Gluc  (FreeStyle [place] 2 [place]) device USE WITH SENSOR EVERY 14 DAYS 5/30/23   Dano Rangel MD   Continuous Blood Gluc Sensor (FreeStyle [place] 2 Sensor) misc Inject 1 each under the skin into the appropriate area as directed Daily. 1/23/24   Dano Rangel MD   D-MANNOSE PO 1 po qd    Evangelina Fontana MD   diclofenac (VOLTAREN) 1 % gel gel Apply 4 g topically to the appropriate area as directed 4 (Four) Times a Day As Needed.    Evangelina Fontana MD   estradiol (ESTRACE) 0.1 MG/GM vaginal cream  3/4/24   Evangelina Fontana MD   fenofibrate 160 MG tablet Take 1 tablet by mouth Daily. 8/25/23   Emily Burdick DO   fluconazole (DIFLUCAN) 150 MG tablet  3/4/24   Evangelina Fontana MD   folic acid (FOLVITE) 1 MG tablet TAKE 1 TABLET BY MOUTH DAILY 1/23/24   Emily Burdick DO   gabapentin (NEURONTIN) 300 MG capsule Take 1 capsule by mouth 2 (Two) Times a Day. 2/26/24   Emily Burdick DO   glucose monitor monitoring kit Use 1 each 3 (Three) Times a Day As Needed (for BG monitoring). 1/2/24   Emily Burdick DO   Humira Pen 40 MG/0.4ML Pen-injector Kit Inject 40 mg under the skin into the appropriate area as directed Every 14 (Fourteen) Days. 9/22/23   Emily Burdick DO   ibandronate (BONIVA) 150 MG tablet  3/25/24   Evangelina Fontana MD   Insulin NPH Isophane & Regular (HumuLIN 70/30 KwikPen) (70-30) 100 UNIT/ML suspension pen-injector Inject 21 Units under the skin into the appropriate area as directed 2 (Two) Times a Day. Before breakfast and lunch    Evangelina Fontana MD   Insulin NPH Isophane & Regular (HumuLIN 70/30 KwikPen) (70-30) 100 UNIT/ML suspension pen-injector Inject 20 Units under the skin into the appropriate area as  directed Every Evening. Before supper    ProviderEvangelina MD   Insulin Pen Needle 32G X 4 MM misc Use 1 each 2 (Two) Times a Day. 11/3/23   Dano Rangel MD   isosorbide mononitrate (IMDUR) 60 MG 24 hr tablet Take 1 tablet by mouth Every Morning. 5/11/23   Alberto Gotti MD   Janumet -1000 MG tablet TAKE ONE TABLET BY MOUTH DAILY 9/5/23   Emily Burdick DO   lansoprazole (PREVACID) 30 MG capsule TAKE 1 CAPSULE BY MOUTH DAILY 2/16/24   Emily Burdick DO   lisinopril (PRINIVIL,ZESTRIL) 40 MG tablet TAKE 1 TABLET BY MOUTH DAILY 3/27/24   Emily Burdick DO   loratadine (Claritin) 10 MG tablet Take 1 tablet by mouth Every Night.    ProviderEvangelina MD   meclizine (ANTIVERT) 12.5 MG tablet 1/2 - 1 po q6-8hrs prn dizzyness 11/8/23   Emily Burdick DO   methotrexate 2.5 MG tablet TAKE 5 TABLETS BY MOUTH ONCE A WEEK 1/30/24   Emily Burdick DO   multivitamin with minerals (MULTIVITAMIN ADULTS PO) Take 1 tablet by mouth Daily.    ProviderEvangelina MD   ondansetron ODT (ZOFRAN-ODT) 4 MG disintegrating tablet Place 1 tablet on the tongue Every 8 (Eight) Hours As Needed for Nausea. 3/8/24   Emily Burdick DO   rosuvastatin (CRESTOR) 5 MG tablet TAKE ONE TABLET BY MOUTH ONCE NIGHTLY 2/19/24   Emily Burdick DO   sucralfate (Carafate) 1 g tablet Take 1 tablet by mouth 3 (Three) Times a Day Before Meals. 4/3/24   Emily Burdick DO   traZODone (DESYREL) 50 MG tablet TAKE ONE TABLET BY MOUTH EVERY NIGHT AT BEDTIME 3/18/24   Emily Burdick DO   vitamin B-12 (CYANOCOBALAMIN) 1000 MCG tablet Take 1 tablet by mouth Daily. 7/20/23   Emily Burdick DO       Allergies:    Allergies   Allergen Reactions    Jardiance [Empagliflozin] Other (See Comments)     UTI's       Objective   Objective     Vital Signs  Temp:  [97.5 °F (36.4 °C)-97.9 °F (36.6 °C)] 97.5 °F (36.4 °C)  Heart Rate:  [] 62  Resp:  [15-18] 15  BP: (119-242)/() 128/61  SpO2:  [94 %-98 %] 98 %  on   ;   Device (Oxygen  Therapy): room air  Body mass index is 28.71 kg/m².    Physical Exam  HENT:      Mouth/Throat:      Mouth: Mucous membranes are moist.   Cardiovascular:      Rate and Rhythm: Normal rate and regular rhythm.      Pulses: Normal pulses.      Heart sounds: Normal heart sounds.   Pulmonary:      Effort: Pulmonary effort is normal.      Breath sounds: Normal breath sounds.   Skin:     General: Skin is warm and dry.   Neurological:      General: No focal deficit present.      Mental Status: She is alert and oriented to person, place, and time.   Psychiatric:         Mood and Affect: Mood normal.         Behavior: Behavior normal.           Results Review:  I have personally reviewed most recent cardiac tracings, lab results, and radiology images and interpretations and agree with findings, most notably: cbc, cmp, bnp, trop, mag, EKG, tsh, chest xray.    Results from last 7 days   Lab Units 04/11/24  0411   WBC 10*3/mm3 7.07   HEMOGLOBIN g/dL 14.5   HEMATOCRIT % 44.2   PLATELETS 10*3/mm3 337     Results from last 7 days   Lab Units 04/11/24  0608 04/11/24  0411   SODIUM mmol/L  --  140   POTASSIUM mmol/L  --  3.6   CHLORIDE mmol/L  --  102   CO2 mmol/L  --  26.0   BUN mg/dL  --  15   CREATININE mg/dL  --  0.84   GLUCOSE mg/dL  --  234*   CALCIUM mg/dL  --  10.3   ALK PHOS U/L  --  61   ALT (SGPT) U/L  --  11   AST (SGOT) U/L  --  14   HSTROP T ng/L 10 8   PROBNP pg/mL  --  92.1     Estimated Creatinine Clearance: 57.3 mL/min (by C-G formula based on SCr of 0.84 mg/dL).  Brief Urine Lab Results  (Last result in the past 365 days)        Color   Clarity   Blood   Leuk Est   Nitrite   Protein   CREAT   Urine HCG        03/21/24 0944             47.6                 Microbiology Results (last 10 days)       ** No results found for the last 240 hours. **            ECG/EMG Results (most recent)       Procedure Component Value Units Date/Time    ECG 12 Lead Chest Pain [[id number]] Collected: 04/11/24 0339     Updated: 04/11/24  0340     QT Interval 392 ms      QTC Interval 448 ms     Narrative:      HEART RATE= 79  bpm  RR Interval= 764  ms  OK Interval= 277  ms  P Horizontal Axis= 17  deg  P Front Axis= -29  deg  QRSD Interval= 87  ms  QT Interval= 392  ms  QTcB= 448  ms  QRS Axis= -37  deg  T Wave Axis= 46  deg  - ABNORMAL ECG -  Sinus rhythm  Ventricular premature complex  Prolonged OK interval  Left ventricular hypertrophy  When compared with ECG of 04-Oct-2023 12:11:07,  Significant change in rhythm  Significant axis, voltage or hypertrophy change  Electronically Signed By:   Date and Time of Study: 2024-04-11 03:39:08                Results for orders placed during the hospital encounter of 08/18/23    Adult Transthoracic Echo Complete W/ Color, Spectral and Contrast if Necessary Per Protocol    Interpretation Summary    Left ventricular ejection fraction appears to be 56 - 60%.    The right ventricular cavity is mildly dilated.    The left atrial cavity is mildly dilated.      XR Chest 1 View    Result Date: 4/11/2024  Impression: No active disease Electronically Signed: Woody Johnson MD  4/11/2024 3:58 AM EDT  Workstation ID: [id number]       Estimated Creatinine Clearance: 57.3 mL/min (by C-G formula based on SCr of 0.84 mg/dL).    Assessment & Plan   Assessment/Plan       Active Hospital Problems    Diagnosis  POA    **Palpitations [R00.2]  Yes      Resolved Hospital Problems   No resolved problems to display.       Chest pain  Lab Results   Component Value Date    TROPONINT 10 04/11/2024    TROPONINT 8 04/11/2024    TROPONINT <0.010 10/26/2021     - home meds asa, fenofibrate, imdur  -cbc, tsh, bnp, and mag are unremarkable  - glucose is 234  - last lipid profile 3/2024 is unremarkable  - A1c 7.5 3/2024  -Chest X-ray:reviewed and showing no acute cardiopulmonary process  -EKG:rate 79, sinus, pvcs, prolonged OK, LVH  -In the ED pt given asa  -Last stress 8/2023 that was negative  -Telemetry  -NPO     Hypertension  -well Controlled    BP Readings from Last 1 Encounters:   04/11/24 128/61     - Continue lisinopril, norvasc  - Monitor while admitted     Diabetes mellitus  -moderately controlled   Lab Results   Component Value Date    GLUCOSE 234 (H) 04/11/2024    GLUCOSE 104 (H) 03/21/2024    GLUCOSE 192 (H) 02/06/2024    GLUCOSE 183 (H) 02/05/2024     - 21u 70/30 prandial breakfast and lunch, 20u evening  -janumet  -Diabetic diet  -Monitor before meals and at bedtime     RA  - home meds methotrexate    HPL  - statin      VTE Prophylaxis -   Mechanical Order History:        Ordered        04/11/24 0752  Place Sequential Compression Device  Once            04/11/24 0752  Maintain Sequential Compression Device  Continuous                          Pharmalogical Order History:       None            CODE STATUS:    There are no questions and answers to display.       This patient has been examined wearing personal protective equipment.     I discussed the patient's findings and my recommendations with patient and nursing staff.      Signature:Electronically signed by Yeimy Reyes PA-C, 04/11/24, 12:59 PM EDT.

## 2024-04-12 ENCOUNTER — APPOINTMENT (OUTPATIENT)
Dept: RESPIRATORY THERAPY | Facility: HOSPITAL | Age: 74
End: 2024-04-12
Payer: MEDICARE

## 2024-04-12 ENCOUNTER — READMISSION MANAGEMENT (OUTPATIENT)
Dept: CALL CENTER | Facility: HOSPITAL | Age: 74
End: 2024-04-12
Payer: MEDICARE

## 2024-04-12 ENCOUNTER — APPOINTMENT (OUTPATIENT)
Dept: CARDIOLOGY | Facility: HOSPITAL | Age: 74
End: 2024-04-12
Payer: MEDICARE

## 2024-04-12 VITALS
WEIGHT: 162 LBS | HEIGHT: 63 IN | OXYGEN SATURATION: 95 % | RESPIRATION RATE: 15 BRPM | HEART RATE: 66 BPM | DIASTOLIC BLOOD PRESSURE: 76 MMHG | TEMPERATURE: 97.9 F | BODY MASS INDEX: 28.7 KG/M2 | SYSTOLIC BLOOD PRESSURE: 138 MMHG

## 2024-04-12 LAB
ANION GAP SERPL CALCULATED.3IONS-SCNC: 11 MMOL/L (ref 5–15)
AORTIC DIMENSIONLESS INDEX: 0.98 (DI)
BASOPHILS # BLD AUTO: 0.04 10*3/MM3 (ref 0–0.2)
BASOPHILS NFR BLD AUTO: 0.6 % (ref 0–1.5)
BH CV ECHO LEFT VENTRICLE GLOBAL LONGITUDINAL STRAIN: -24.2 %
BH CV ECHO MEAS - ACS: 2 CM
BH CV ECHO MEAS - AO MAX PG: 9.9 MMHG
BH CV ECHO MEAS - AO MEAN PG: 6 MMHG
BH CV ECHO MEAS - AO ROOT DIAM: 3.5 CM
BH CV ECHO MEAS - AO V2 MAX: 157 CM/SEC
BH CV ECHO MEAS - AO V2 VTI: 34.3 CM
BH CV ECHO MEAS - AVA(I,D): 3.2 CM2
BH CV ECHO MEAS - EDV(CUBED): 117.6 ML
BH CV ECHO MEAS - EDV(MOD-SP2): 83.7 ML
BH CV ECHO MEAS - EDV(MOD-SP4): 70.9 ML
BH CV ECHO MEAS - EF(MOD-BP): 62.4 %
BH CV ECHO MEAS - EF(MOD-SP2): 62 %
BH CV ECHO MEAS - EF(MOD-SP4): 64 %
BH CV ECHO MEAS - ESV(CUBED): 17.6 ML
BH CV ECHO MEAS - ESV(MOD-SP2): 31.8 ML
BH CV ECHO MEAS - ESV(MOD-SP4): 25.5 ML
BH CV ECHO MEAS - FS: 46.9 %
BH CV ECHO MEAS - IVS/LVPW: 1.09 CM
BH CV ECHO MEAS - IVSD: 1.2 CM
BH CV ECHO MEAS - LA DIMENSION: 3.6 CM
BH CV ECHO MEAS - LAT PEAK E' VEL: 11.1 CM/SEC
BH CV ECHO MEAS - LV DIASTOLIC VOL/BSA (35-75): 40.6 CM2
BH CV ECHO MEAS - LV MASS(C)D: 213.3 GRAMS
BH CV ECHO MEAS - LV MAX PG: 9.5 MMHG
BH CV ECHO MEAS - LV MEAN PG: 5 MMHG
BH CV ECHO MEAS - LV SYSTOLIC VOL/BSA (12-30): 14.6 CM2
BH CV ECHO MEAS - LV V1 MAX: 154 CM/SEC
BH CV ECHO MEAS - LV V1 VTI: 35.4 CM
BH CV ECHO MEAS - LVIDD: 4.9 CM
BH CV ECHO MEAS - LVIDS: 2.6 CM
BH CV ECHO MEAS - LVOT AREA: 3.1 CM2
BH CV ECHO MEAS - LVOT DIAM: 2 CM
BH CV ECHO MEAS - LVPWD: 1.1 CM
BH CV ECHO MEAS - MED PEAK E' VEL: 7.3 CM/SEC
BH CV ECHO MEAS - MV A MAX VEL: 110 CM/SEC
BH CV ECHO MEAS - MV DEC SLOPE: 389 CM/SEC2
BH CV ECHO MEAS - MV DEC TIME: 0.27 SEC
BH CV ECHO MEAS - MV E MAX VEL: 67.8 CM/SEC
BH CV ECHO MEAS - MV E/A: 0.62
BH CV ECHO MEAS - MV MAX PG: 5 MMHG
BH CV ECHO MEAS - MV MEAN PG: 2 MMHG
BH CV ECHO MEAS - MV P1/2T: 68.7 MSEC
BH CV ECHO MEAS - MV V2 VTI: 26.8 CM
BH CV ECHO MEAS - MVA(P1/2T): 3.2 CM2
BH CV ECHO MEAS - MVA(VTI): 4.1 CM2
BH CV ECHO MEAS - PA ACC TIME: 0.12 SEC
BH CV ECHO MEAS - PA V2 MAX: 126 CM/SEC
BH CV ECHO MEAS - PI END-D VEL: 159 CM/SEC
BH CV ECHO MEAS - PULM A REVS DUR: 0.13 SEC
BH CV ECHO MEAS - PULM A REVS VEL: 35 CM/SEC
BH CV ECHO MEAS - PULM DIAS VEL: 36 CM/SEC
BH CV ECHO MEAS - PULM S/D: 1.53
BH CV ECHO MEAS - PULM SYS VEL: 55.2 CM/SEC
BH CV ECHO MEAS - QP/QS: 0.81
BH CV ECHO MEAS - RV MAX PG: 5.2 MMHG
BH CV ECHO MEAS - RV V1 MAX: 114 CM/SEC
BH CV ECHO MEAS - RV V1 VTI: 20 CM
BH CV ECHO MEAS - RVDD: 2.6 CM
BH CV ECHO MEAS - RVOT DIAM: 2.4 CM
BH CV ECHO MEAS - SI(MOD-SP2): 29.7 ML/M2
BH CV ECHO MEAS - SI(MOD-SP4): 26 ML/M2
BH CV ECHO MEAS - SV(LVOT): 111.2 ML
BH CV ECHO MEAS - SV(MOD-SP2): 51.9 ML
BH CV ECHO MEAS - SV(MOD-SP4): 45.4 ML
BH CV ECHO MEAS - SV(RVOT): 90.5 ML
BH CV ECHO MEAS - TAPSE (>1.6): 3.2 CM
BH CV ECHO MEAS - TR MAX PG: 24.6 MMHG
BH CV ECHO MEAS - TR MAX VEL: 248 CM/SEC
BH CV ECHO MEASUREMENTS AVERAGE E/E' RATIO: 7.37
BH CV XLRA - RV BASE: 3 CM
BH CV XLRA - RV LENGTH: 7.3 CM
BH CV XLRA - RV MID: 2.6 CM
BH CV XLRA - TDI S': 13.3 CM/SEC
BUN SERPL-MCNC: 16 MG/DL (ref 8–23)
BUN/CREAT SERPL: 20 (ref 7–25)
CALCIUM SPEC-SCNC: 10 MG/DL (ref 8.6–10.5)
CHLORIDE SERPL-SCNC: 104 MMOL/L (ref 98–107)
CO2 SERPL-SCNC: 26 MMOL/L (ref 22–29)
CREAT SERPL-MCNC: 0.8 MG/DL (ref 0.57–1)
DEPRECATED RDW RBC AUTO: 46.4 FL (ref 37–54)
EGFRCR SERPLBLD CKD-EPI 2021: 77.9 ML/MIN/1.73
EOSINOPHIL # BLD AUTO: 0.33 10*3/MM3 (ref 0–0.4)
EOSINOPHIL NFR BLD AUTO: 5 % (ref 0.3–6.2)
ERYTHROCYTE [DISTWIDTH] IN BLOOD BY AUTOMATED COUNT: 13.7 % (ref 12.3–15.4)
GLUCOSE BLDC GLUCOMTR-MCNC: 165 MG/DL (ref 70–105)
GLUCOSE BLDC GLUCOMTR-MCNC: 245 MG/DL (ref 70–105)
GLUCOSE SERPL-MCNC: 144 MG/DL (ref 65–99)
HCT VFR BLD AUTO: 38.9 % (ref 34–46.6)
HGB BLD-MCNC: 12.4 G/DL (ref 12–15.9)
IMM GRANULOCYTES # BLD AUTO: 0.01 10*3/MM3 (ref 0–0.05)
IMM GRANULOCYTES NFR BLD AUTO: 0.2 % (ref 0–0.5)
LEFT ATRIUM VOLUME INDEX: 25 ML/M2
LYMPHOCYTES # BLD AUTO: 3.29 10*3/MM3 (ref 0.7–3.1)
LYMPHOCYTES NFR BLD AUTO: 49.8 % (ref 19.6–45.3)
MCH RBC QN AUTO: 29.5 PG (ref 26.6–33)
MCHC RBC AUTO-ENTMCNC: 31.9 G/DL (ref 31.5–35.7)
MCV RBC AUTO: 92.4 FL (ref 79–97)
MONOCYTES # BLD AUTO: 0.69 10*3/MM3 (ref 0.1–0.9)
MONOCYTES NFR BLD AUTO: 10.5 % (ref 5–12)
NEUTROPHILS NFR BLD AUTO: 2.24 10*3/MM3 (ref 1.7–7)
NEUTROPHILS NFR BLD AUTO: 33.9 % (ref 42.7–76)
NRBC BLD AUTO-RTO: 0 /100 WBC (ref 0–0.2)
PLATELET # BLD AUTO: 297 10*3/MM3 (ref 140–450)
PMV BLD AUTO: 9.7 FL (ref 6–12)
POTASSIUM SERPL-SCNC: 4.1 MMOL/L (ref 3.5–5.2)
RBC # BLD AUTO: 4.21 10*6/MM3 (ref 3.77–5.28)
SINUS: 3.4 CM
SODIUM SERPL-SCNC: 141 MMOL/L (ref 136–145)
STJ: 2.7 CM
WBC NRBC COR # BLD AUTO: 6.6 10*3/MM3 (ref 3.4–10.8)

## 2024-04-12 PROCEDURE — 93306 TTE W/DOPPLER COMPLETE: CPT | Performed by: INTERNAL MEDICINE

## 2024-04-12 PROCEDURE — 93356 MYOCRD STRAIN IMG SPCKL TRCK: CPT

## 2024-04-12 PROCEDURE — 93356 MYOCRD STRAIN IMG SPCKL TRCK: CPT | Performed by: INTERNAL MEDICINE

## 2024-04-12 PROCEDURE — 82948 REAGENT STRIP/BLOOD GLUCOSE: CPT

## 2024-04-12 PROCEDURE — 82948 REAGENT STRIP/BLOOD GLUCOSE: CPT | Performed by: PHYSICIAN ASSISTANT

## 2024-04-12 PROCEDURE — G0378 HOSPITAL OBSERVATION PER HR: HCPCS

## 2024-04-12 PROCEDURE — 80048 BASIC METABOLIC PNL TOTAL CA: CPT | Performed by: PHYSICIAN ASSISTANT

## 2024-04-12 PROCEDURE — 63710000001 INSULIN ISOPHANE & REGULAR PER 5 UNITS: Performed by: NURSE PRACTITIONER

## 2024-04-12 PROCEDURE — 93306 TTE W/DOPPLER COMPLETE: CPT

## 2024-04-12 PROCEDURE — 99214 OFFICE O/P EST MOD 30 MIN: CPT | Performed by: INTERNAL MEDICINE

## 2024-04-12 PROCEDURE — 85025 COMPLETE CBC W/AUTO DIFF WBC: CPT | Performed by: PHYSICIAN ASSISTANT

## 2024-04-12 PROCEDURE — 93246 EXT ECG>7D<15D RECORDING: CPT

## 2024-04-12 RX ORDER — INSULIN LISPRO 100 [IU]/ML
2-9 INJECTION, SOLUTION INTRAVENOUS; SUBCUTANEOUS
Status: DISCONTINUED | OUTPATIENT
Start: 2024-04-12 | End: 2024-04-12

## 2024-04-12 RX ADMIN — AMLODIPINE BESYLATE 10 MG: 5 TABLET ORAL at 08:29

## 2024-04-12 RX ADMIN — ISOSORBIDE MONONITRATE 60 MG: 60 TABLET, EXTENDED RELEASE ORAL at 08:29

## 2024-04-12 RX ADMIN — Medication 10 ML: at 08:28

## 2024-04-12 RX ADMIN — ASPIRIN 81 MG: 81 TABLET, COATED ORAL at 08:29

## 2024-04-12 RX ADMIN — HUMAN INSULIN 20 UNITS: 100 INJECTION, SUSPENSION SUBCUTANEOUS at 12:28

## 2024-04-12 RX ADMIN — FOLIC ACID 1000 MCG: 1 TABLET ORAL at 08:28

## 2024-04-12 RX ADMIN — METFORMIN HYDROCHLORIDE 1000 MG: 500 TABLET, EXTENDED RELEASE ORAL at 08:28

## 2024-04-12 RX ADMIN — SUCRALFATE 1 G: 1 TABLET ORAL at 12:28

## 2024-04-12 RX ADMIN — LISINOPRIL 40 MG: 20 TABLET ORAL at 08:30

## 2024-04-12 RX ADMIN — ACETAMINOPHEN 650 MG: 325 TABLET, FILM COATED ORAL at 06:15

## 2024-04-12 RX ADMIN — SUCRALFATE 1 G: 1 TABLET ORAL at 08:29

## 2024-04-12 RX ADMIN — FENOFIBRATE 145 MG: 145 TABLET ORAL at 08:29

## 2024-04-12 RX ADMIN — LINAGLIPTIN 5 MG: 5 TABLET, FILM COATED ORAL at 08:29

## 2024-04-12 RX ADMIN — GABAPENTIN 300 MG: 300 CAPSULE ORAL at 08:29

## 2024-04-12 NOTE — PLAN OF CARE
Goal Outcome Evaluation:  Discharge instructions given. Patient verbalized understanding. Denies further questions or needs at this time. Patient waiting for her daughter to come pick her up. No distress noted.

## 2024-04-12 NOTE — OUTREACH NOTE
Prep Survey      Flowsheet Row Responses   [facility] patient discharged from? [facility]   Is LACE score < 7 ? No   Eligibility [facility]   Date of Admission 04/11/24   Date of Discharge 04/12/24   Discharge Disposition Home or Self Care   Discharge diagnosis Palpitations, chest pain   Does the patient have one of the following disease processes/diagnoses(primary or secondary)? Other   Does the patient have Home health ordered? No   Is there a DME ordered? No   Prep survey completed? Yes            Harmony ANGEL - Registered Nurse

## 2024-04-12 NOTE — PROGRESS NOTES
CARDIOLOGY PROGRESS NOTE:    Jenna Rosa  74 y.o.  female  1950  [id number]      Referring Provider: Hospitalist    Reason for follow-up: Chest pain and palpitations     Patient Care Team:  Emily Burdick DO as PCP - General (Family Medicine)  Joe García MD as Consulting Physician (Cardiology)  Dano Rangel MD as Consulting Physician (Endocrinology)  Conchis Mohamud OD as Consulting Physician (Optometry)  Harinder Gresham MD as Consulting Physician (Pain Medicine)  Juan Richey MD as Consulting Physician (Gastroenterology)  Manish Graff MD as Consulting Physician (Urology)    Subjective .  Patient is doing well without any symptoms    Objective lying in bed comfortably     Review of Systems   Constitutional: Negative for malaise/fatigue.   Cardiovascular:  Negative for chest pain, dyspnea on exertion, leg swelling and palpitations.   Respiratory:  Negative for cough and shortness of breath.    Gastrointestinal:  Negative for abdominal pain, nausea and vomiting.   Neurological:  Negative for dizziness, focal weakness, headaches, light-headedness and numbness.   All other systems reviewed and are negative.      Allergies: Jardiance [empagliflozin]    Scheduled Meds:amLODIPine, 10 mg, Oral, Daily  aspirin, 81 mg, Oral, Daily  fenofibrate, 145 mg, Oral, Daily  folic acid, 1,000 mcg, Oral, Daily  gabapentin, 300 mg, Oral, BID  insulin NPH-insulin regular, 20 Units, Subcutaneous, TID With Meals  isosorbide mononitrate, 60 mg, Oral, QAM  linagliptin, 5 mg, Oral, Daily   And  metFORMIN ER, 1,000 mg, Oral, Daily  lisinopril, 40 mg, Oral, Daily  rosuvastatin, 5 mg, Oral, Nightly  sodium chloride, 10 mL, Intravenous, Q12H  sucralfate, 1 g, Oral, TID AC  traZODone, 50 mg, Oral, Nightly      Continuous Infusions:   PRN Meds:.  acetaminophen    aluminum-magnesium hydroxide-simethicone    dextrose    dextrose    glucagon (human recombinant)    ondansetron ODT **OR** ondansetron    sodium  "chloride    sodium chloride    sodium chloride        VITAL SIGNS  Vitals:    04/12/24 0300 04/12/24 0608 04/12/24 0833 04/12/24 1020   BP: 135/65 120/69 131/63 138/76   BP Location: Right arm Right arm  Right arm   Patient Position: Lying Lying  Sitting   Pulse:  72 57 66   Resp: 16 16  15   Temp: 97.8 °F (36.6 °C) 98 °F (36.7 °C)  97.9 °F (36.6 °C)   TempSrc: Oral Oral  Oral   SpO2:  96% 95% 95%   Weight:    73.5 kg (162 lb)   Height:    159.8 cm (62.9\")       Flowsheet Rows      Flowsheet Row First Filed Value   Admission Height 160 cm (63\") Documented at 04/11/2024 0326   Admission Weight 73.5 kg (162 lb) Documented at 04/11/2024 0326             TELEMETRY: Sinus rhythm    Physical Exam:  Constitutional:       Appearance: Well-developed.   Eyes:      General: No scleral icterus.     Conjunctiva/sclera: Conjunctivae normal.   HENT:      Head: Normocephalic and atraumatic.   Neck:      Vascular: No carotid bruit or JVD.   Pulmonary:      Effort: Pulmonary effort is normal.      Breath sounds: Normal breath sounds. No wheezing. No rales.   Cardiovascular:      Normal rate. Regular rhythm.   Pulses:     Intact distal pulses.   Abdominal:      General: Bowel sounds are normal.      Palpations: Abdomen is soft.   Musculoskeletal:      Cervical back: Normal range of motion and neck supple. Skin:     General: Skin is warm and dry.      Findings: No rash.   Neurological:      Mental Status: Alert.          Results Review:   I reviewed the patient's new clinical results.  Lab Results (last 24 hours)       Procedure Component Value Units Date/Time    POC Glucose Once [[id number]]  (Abnormal) Collected: 04/12/24 1159    Specimen: Blood Updated: 04/12/24 1201     Glucose 245 mg/dL      Comment: Serial Number: [id number]:  [id number]       POC Glucose 4x Daily Before Meals & at Bedtime [[id number]]  (Abnormal) Collected: 04/12/24 0731    Specimen: Blood Updated: 04/12/24 0732     Glucose 165 mg/dL      Comment: Serial " Number: [id number]:  [id number]       Basic Metabolic Panel [[id number]]  (Abnormal) Collected: 04/12/24 0404    Specimen: Blood from Arm, Right Updated: 04/12/24 0505     Glucose 144 mg/dL      BUN 16 mg/dL      Creatinine 0.80 mg/dL      Sodium 141 mmol/L      Potassium 4.1 mmol/L      Chloride 104 mmol/L      CO2 26.0 mmol/L      Calcium 10.0 mg/dL      BUN/Creatinine Ratio 20.0     Anion Gap 11.0 mmol/L      eGFR 77.9 mL/min/1.73     Narrative:      GFR Normal >60  Chronic Kidney Disease <60  Kidney Failure <15    The GFR formula is only valid for adults with stable renal function between ages 18 and 70.    CBC & Differential [[id number]]  (Abnormal) Collected: 04/12/24 0404    Specimen: Blood from Arm, Right Updated: 04/12/24 0448    Narrative:      The following orders were created for panel order CBC & Differential.  Procedure                               Abnormality         Status                     ---------                               -----------         ------                     CBC Auto Differential[[id number]]        Abnormal            Final result                 Please view results for these tests on the individual orders.    CBC Auto Differential [[id number]]  (Abnormal) Collected: 04/12/24 0404    Specimen: Blood from Arm, Right Updated: 04/12/24 0448     WBC 6.60 10*3/mm3      RBC 4.21 10*6/mm3      Hemoglobin 12.4 g/dL      Comment: Result checked          Hematocrit 38.9 %      MCV 92.4 fL      MCH 29.5 pg      MCHC 31.9 g/dL      RDW 13.7 %      RDW-SD 46.4 fl      MPV 9.7 fL      Platelets 297 10*3/mm3      Neutrophil % 33.9 %      Lymphocyte % 49.8 %      Monocyte % 10.5 %      Eosinophil % 5.0 %      Basophil % 0.6 %      Immature Grans % 0.2 %      Neutrophils, Absolute 2.24 10*3/mm3      Lymphocytes, Absolute 3.29 10*3/mm3      Monocytes, Absolute 0.69 10*3/mm3      Eosinophils, Absolute 0.33 10*3/mm3      Basophils, Absolute 0.04 10*3/mm3      Immature Grans, Absolute 0.01 10*3/mm3       nRBC 0.0 /100 WBC     POC Glucose Once [[id number]]  (Abnormal) Collected: 04/11/24 2021    Specimen: Blood Updated: 04/11/24 2023     Glucose 225 mg/dL      Comment: Serial Number: [id number]:  [id number]       POC Glucose Once [[id number]]  (Abnormal) Collected: 04/11/24 1809    Specimen: Blood Updated: 04/11/24 1811     Glucose 193 mg/dL      Comment: Serial Number: [id number]:  [id number]       POC Glucose Once [[id number]]  (Abnormal) Collected: 04/11/24 1631    Specimen: Blood Updated: 04/11/24 1632     Glucose 198 mg/dL      Comment: Serial Number: [id number]:  [id number]               Imaging Results (Last 24 Hours)       ** No results found for the last 24 hours. **            EKG      I personally viewed and interpreted the patient's EKG/Telemetry data:    ECHOCARDIOGRAM:  Results for orders placed during the hospital encounter of 08/18/23    Adult Transthoracic Echo Complete W/ Color, Spectral and Contrast if Necessary Per Protocol    Interpretation Summary    Left ventricular ejection fraction appears to be 56 - 60%.    The right ventricular cavity is mildly dilated.    The left atrial cavity is mildly dilated.       STRESS MYOVIEW:  Results for orders placed during the hospital encounter of 08/18/23    Stress Test With Myocardial Perfusion - One Day    Interpretation Summary    Left ventricular ejection fraction is hyperdynamic (Calculated EF > 70%).    Myocardial perfusion imaging indicates a normal myocardial perfusion study with no evidence of ischemia.    Impressions are consistent with a low risk study.       CARDIAC CATHETERIZATION:  Results for orders placed during the hospital encounter of 09/16/20    Cardiac Catheterization/Vascular Study    Narrative  Table formatting from the original result was not included.  Percutaneous Coronary Revascularization Report    Jenna Rosa  [id number]  9/16/2020  @PCP@    Indications for the procedure include: abnormal stress  test.    Procedure Details:  The risks, benefits, complications, treatment options, and expected outcomes were discussed with the patient. The patient and/or family concurred with the proposed plan, giving informed consent prior to diagnostic catheterization.    After informed consentShe was brought to the cath lab and diagnostic catheterization was performed by Dr. Figueroa.  Complete diagnostic catheterization findings will be discussed in their operative report.    The decision was then made to proceed with intervention. She received Heparin as per protocol.  The interventional procedure is as follows:    For the IFR evaluation of the LAD we used a XB 3.5 guide catheter with a Volcano IFR pressure wire to measure the IFR value of the mid LAD lesion.  Procedure anticoagulation was heparin patient tolerated procedure well with no mucosal complication plan to obtain hemostasis by using Angio-Seal closure device.    After the procedure was completed, sedation was stopped and the sheaths and catheters were all removed according to established hospital protocols.    Findings:    Interventions  IFR evaluation of the mid LAD  Target Vessel  mid LAD  Pre % Stenosis 50  Post % Stenosis  0  Pre-procedure TONI flow  3  Post procedure TONI flow  3  Closure Device  none  Complications  none    Estimated Blood Loss:  Minimal    Complications:  None; patient tolerated the procedure well.    Disposition: PACU - hemodynamically stable.    Condition: stable    Impressions:  IFR evaluation of the mid LAD showing IFR value of 0.93 suggestive of no physiologically significant stenosis involving the mid LAD    Recommendations:  Medical management for obstructive coronary artery disease  Test results discussed with the patient and family       OTHER:         Assessment & Plan     Chest pain/coronary artery disease  Patient presented with chest pain she has both typical and atypical features but also has some palpitations  Patient is ruled  out for MI by EKG and enzymes  Patient had a stress Myoview study recently which did not show any ischemia  Patient also had a 50% disease in the past in the LAD  Patient will be managed with conservative medical therapy only  Patient also will monitor for her palpitations and may need a Holter monitor at discharge  Patient had an echocardiogram which showed normal LV function     Hypertension  Patient blood pressure currently stable and will continue home medicines     Hyperlipidemia  Patient is currently on statins and the lipid levels are well within normal limits     Diabetes  Patient is on insulin and Janumet and followed by the primary care doctor.    I discussed the patients findings and my recommendations with patient and nurse    Joe García MD  04/12/24  15:18 EDT

## 2024-04-12 NOTE — PLAN OF CARE
Problem: Adult Inpatient Plan of Care  Goal: Plan of Care Review  Outcome: Ongoing, Progressing  Goal: Patient-Specific Goal (Individualized)  Outcome: Ongoing, Progressing  Goal: Absence of Hospital-Acquired Illness or Injury  Outcome: Ongoing, Progressing  Intervention: Identify and Manage Fall Risk  Recent Flowsheet Documentation  Taken 4/12/2024 0400 by Maricarmen Law RN  Safety Promotion/Fall Prevention:   safety round/check completed   room organization consistent   fall prevention program maintained   clutter free environment maintained   assistive device/personal items within reach  Intervention: Prevent Skin Injury  Recent Flowsheet Documentation  Taken 4/12/2024 0400 by Maricarmen Law RN  Body Position: position changed independently  Intervention: Prevent and Manage VTE (Venous Thromboembolism) Risk  Recent Flowsheet Documentation  Taken 4/12/2024 0400 by Maricarmen Law RN  Activity Management: up ad luke  Intervention: Prevent Infection  Recent Flowsheet Documentation  Taken 4/12/2024 0400 by Maricarmen Law RN  Infection Prevention:   single patient room provided   rest/sleep promoted   personal protective equipment utilized   hand hygiene promoted   equipment surfaces disinfected   environmental surveillance performed  Goal: Optimal Comfort and Wellbeing  Outcome: Ongoing, Progressing  Goal: Readiness for Transition of Care  Outcome: Ongoing, Progressing     Problem: Diabetes Comorbidity  Goal: Blood Glucose Level Within Targeted Range  Outcome: Ongoing, Progressing     Problem: Hypertension Comorbidity  Goal: Blood Pressure in Desired Range  Outcome: Ongoing, Progressing  Intervention: Maintain Blood Pressure Management  Recent Flowsheet Documentation  Taken 4/12/2024 0400 by Maricarmen Law RN  Medication Review/Management:   medications reviewed   high-risk medications identified     Problem: Nausea and Vomiting  Goal: Fluid and Electrolyte Balance  Outcome: Ongoing,  Progressing     Problem: Chest Pain  Goal: Resolution of Chest Pain Symptoms  Outcome: Ongoing, Progressing   Goal Outcome Evaluation:                                               WDL

## 2024-04-12 NOTE — PLAN OF CARE
Problem: Diabetes Comorbidity  Goal: Blood Glucose Level Within Targeted Range  Outcome: Ongoing, Progressing  Intervention: Monitor and Manage Glycemia  Recent Flowsheet Documentation  Taken 4/12/2024 1341 by Conchis Allison RN  Glycemic Management: blood glucose monitored     Problem: Hypertension Comorbidity  Goal: Blood Pressure in Desired Range  Outcome: Ongoing, Progressing     Problem: Chest Pain  Goal: Resolution of Chest Pain Symptoms  Outcome: Ongoing, Progressing     Problem: Fall Injury Risk  Goal: Absence of Fall and Fall-Related Injury  Outcome: Ongoing, Progressing  Intervention: Promote Injury-Free Environment  Recent Flowsheet Documentation  Taken 4/12/2024 1341 by Conchis Allison RN  Safety Promotion/Fall Prevention:   safety round/check completed   activity supervised   assistive device/personal items within reach   clutter free environment maintained   fall prevention program maintained   gait belt   nonskid shoes/slippers when out of bed   Goal Outcome Evaluation: Patient up walking in room with family. Denies questions or needs at this time.

## 2024-04-12 NOTE — DISCHARGE SUMMARY
[facility]    Emily Burdick,     CHIEF COMPLAINT:     Chest Pain/Palpitations     HISTORY OF PRESENT ILLNESS:    Palpitations     Hypertension  Associated symptoms include palpitations.       Palpitations   Associated symptoms include diaphoresis. Pertinent negatives include no nausea, shortness of breath or vomiting.   Hypertension  Associated symptoms include palpitations. Pertinent negatives include no shortness of breath.      ED  74-year-old female comes in complaining of palpitations and chest pressure.  Patient states her palpitations will come and go.  Patient states she was recently seen by her cardiologist, Dr. García, and they are following her for this.  Patient states that she all of a sudden had palpitations that awoke him from sleep.  Patient does report some chest tightness and discomfort that started along with the palpitations tonight however patient denies any pain currently.  Patient states sometimes the chest discomfort will radiate into her neck.  Patient denies any shortness of breath or pain with deep breathing.  Patient denies any recent vomiting, lightheadedness, syncopal episode, abdominal pain or diarrhea or urinary symptoms      Observation 4/11/24  Pt concurs with er hpi. Pt had palpitations, sweating in ems. No soa. Pt seen in cardiology office earlier in the week. Cardiology consulted. Echo pending.    Observation 4/12/24 1443pm  Patient denies any acute distress or complaints and eager for discharge. Having echo done now. Cardiology just saw patient and cleared her for discharge.     Past Medical History:   Diagnosis Date    CAD 2020    Cancer 2021    Skin cancer    Colon polyp     Congenital heart disease 2017    Depression     DEXA     2019= (-0.7/ -1.6); 2023= (-0.7/ -2.7)    Diabetic peripheral neuropathy     Diverticulosis     GERD     Heart murmur 2017    Hyperlipidemia     Hypertension     Insomnia     Lower back pain     MAMMO     NEG = 2021/ 2023     Obesity     Osteoarthritis     Osteopenia     Osteoporosis     Palpitations     RA     Rheumatoid nodule 06/2011     rt. thumb x 2 and rt. & lt. 3rd fingers (Oct.2012)/ rt. elbow, left elbow, and epidermal inclusion cyst post. neck (June 2011)-----Dr. Whitmore    Vitamin D deficiency      Past Surgical History:   Procedure Laterality Date    BLADDER SUSPENSION  10/11/2023        BLADDER SUSPENSION      BREAST SURGERY  1972    CARDIAC CATHETERIZATION N/A 09/16/2020    Procedure: Left Heart Cath;  Surgeon: Walker Rodriguez MD;  Location: [facility] CATH INVASIVE LOCATION;  Service: Cardiovascular;  Laterality: N/A;    CARDIAC CATHETERIZATION N/A 09/16/2020    Procedure: Coronary angiography;  Surgeon: Walker Rodriguez MD;  Location: [facility] CATH INVASIVE LOCATION;  Service: Cardiovascular;  Laterality: N/A;    CARDIAC CATHETERIZATION  09/16/2020    Procedure: Functional Flow [place];  Surgeon: Kath Medina MD;  Location: [facility] CATH INVASIVE LOCATION;  Service: Cardiology;;    CARPAL TUNNEL RELEASE Right 02/20/2023    Procedure: CARPAL TUNNEL RELEASE WITH POSSIBLE SYNOVECTOMY;  Surgeon: Amadeo Magaña MD;  Location: [facility] MAIN OR;  Service: Orthopedics;  Laterality: Right;    CHOLECYSTECTOMY      COLONOSCOPY      2017 / 2023= TA, rech 2028? [facility]    CYST REMOVAL      Seb cyst on neck    EYE SURGERY      TOTAL ABDOMINAL HYSTERECTOMY WITH SALPINGO OOPHORECTOMY      VAGINAL PROLAPSE REPAIR       Uterine Prolapse repair     Family History   Problem Relation Age of Onset    Heart disease Mother     Diabetes Mother     Hypertension Mother     Rheum arthritis Mother     Coronary artery disease Mother     Alcohol abuse Mother     Heart failure Mother     Heart disease Father     Mental illness Father     Hypertension Father     Alcohol abuse Father     Early death Father     Heart attack Father     Heart failure Father     Kidney cancer Sister     Stroke Sister     Osteoarthritis  Sister     Rheum arthritis Sister     Hypertension Sister     Arthritis Sister     Diabetes Sister     Stroke Sister     Hypertension Sister     Rheum arthritis Sister     Osteoarthritis Sister     Diabetes Sister     Arthritis Sister     Liver disease Sister     Kidney disease Sister     No Known Problems Brother     Rheum arthritis Brother     Osteoarthritis Brother     Hypertension Brother     Diabetes Maternal Grandfather      Social History     Tobacco Use    Smoking status: Never     Passive exposure: Never    Smokeless tobacco: Never   Vaping Use    Vaping status: Never Used   Substance Use Topics    Alcohol use: Yes     Comment: 3-4 a year    Drug use: Never     Medications Prior to Admission   Medication Sig Dispense Refill Last Dose    acebutolol (SECTRAL) 200 MG capsule TAKE ONE CAPSULE BY MOUTH DAILY 90 capsule 1     acetaminophen (TYLENOL) 325 MG tablet Take 2 tablets by mouth Every 6 (Six) Hours As Needed for Mild Pain.       albuterol sulfate  (90 Base) MCG/ACT inhaler Inhale 2 puffs Every 6 (Six) Hours As Needed for Shortness of Air (cough). 18 g 0     amLODIPine (NORVASC) 10 MG tablet TAKE 1 TABLET BY MOUTH DAILY 90 tablet 1     aspirin 81 MG EC tablet Take 1 tablet by mouth Daily.       azelastine (OPTIVAR) 0.05 % ophthalmic solution Administer 2 drops to both eyes 2 (Two) Times a Day As Needed.       Cholecalciferol (Vitamin D) 50 MCG (2000 UT) capsule Take 1 capsule by mouth Daily.       Continuous Blood Gluc  (FreeStyle [place] 2 [place]) device USE WITH SENSOR EVERY 14 DAYS 1 each 0     Continuous Blood Gluc Sensor (FreeStyle Adelaide 2 Sensor) misc Inject 1 each under the skin into the appropriate area as directed Daily. 6 each 2     D-MANNOSE PO 1 po qd       diclofenac (VOLTAREN) 1 % gel gel Apply 4 g topically to the appropriate area as directed 4 (Four) Times a Day As Needed.       estradiol (ESTRACE) 0.1 MG/GM vaginal cream        fenofibrate 160 MG tablet Take 1 tablet by mouth  Daily. 90 tablet 2     fluconazole (DIFLUCAN) 150 MG tablet        folic acid (FOLVITE) 1 MG tablet TAKE 1 TABLET BY MOUTH DAILY 90 tablet 1     gabapentin (NEURONTIN) 300 MG capsule Take 1 capsule by mouth 2 (Two) Times a Day. 180 capsule 0     glucose monitor monitoring kit Use 1 each 3 (Three) Times a Day As Needed (for BG monitoring). 1 each 0     Humira Pen 40 MG/0.4ML Pen-injector Kit Inject 40 mg under the skin into the appropriate area as directed Every 14 (Fourteen) Days. 2 each 3     ibandronate (BONIVA) 150 MG tablet        Insulin NPH Isophane & Regular (HumuLIN 70/30 KwikPen) (70-30) 100 UNIT/ML suspension pen-injector Inject 21 Units under the skin into the appropriate area as directed 2 (Two) Times a Day. Before breakfast and lunch       Insulin NPH Isophane & Regular (HumuLIN 70/30 KwikPen) (70-30) 100 UNIT/ML suspension pen-injector Inject 20 Units under the skin into the appropriate area as directed Every Evening. Before supper       Insulin Pen Needle 32G X 4 MM misc Use 1 each 2 (Two) Times a Day. 200 each 3     isosorbide mononitrate (IMDUR) 60 MG 24 hr tablet Take 1 tablet by mouth Every Morning. 90 tablet 3     Janumet -1000 MG tablet TAKE ONE TABLET BY MOUTH DAILY 90 tablet 1     lansoprazole (PREVACID) 30 MG capsule TAKE 1 CAPSULE BY MOUTH DAILY 90 capsule 1     lisinopril (PRINIVIL,ZESTRIL) 40 MG tablet TAKE 1 TABLET BY MOUTH DAILY 90 tablet 1     loratadine (Claritin) 10 MG tablet Take 1 tablet by mouth Every Night.       meclizine (ANTIVERT) 12.5 MG tablet 1/2 - 1 po q6-8hrs prn dizzyness 20 tablet 0     methotrexate 2.5 MG tablet TAKE 5 TABLETS BY MOUTH ONCE A WEEK 60 tablet 0     multivitamin with minerals (MULTIVITAMIN ADULTS PO) Take 1 tablet by mouth Daily.       ondansetron ODT (ZOFRAN-ODT) 4 MG disintegrating tablet Place 1 tablet on the tongue Every 8 (Eight) Hours As Needed for Nausea. 30 tablet 0     rosuvastatin (CRESTOR) 5 MG tablet TAKE ONE TABLET BY MOUTH ONCE NIGHTLY  90 tablet 0     sucralfate (Carafate) 1 g tablet Take 1 tablet by mouth 3 (Three) Times a Day Before Meals. 90 tablet 0     traZODone (DESYREL) 50 MG tablet TAKE ONE TABLET BY MOUTH EVERY NIGHT AT BEDTIME 90 tablet 0     vitamin B-12 (CYANOCOBALAMIN) 1000 MCG tablet Take 1 tablet by mouth Daily.        Allergies:  Jardiance [empagliflozin]    Immunization History   Administered Date(s) Administered    FLUAD TRI 65YR+ 12/30/2019    Fluad Quad 65+ 11/19/2020    Fluzone High Dose =>65 Years ([facility] ONLY) 10/28/2014, 09/15/2015, 10/19/2017    Fluzone High-Dose 65+yrs 11/15/2021, 12/29/2022, 11/08/2023    Influenza Quad Vaccine (Inpatient) 09/15/2015    Influenza, Unspecified 02/06/2006, 11/30/2018, 02/01/2022    Pneumococcal Conjugate 13-Valent (PCV13) 10/19/2017    Pneumococcal Conjugate 20-Valent (PCV20) 07/20/2023    Pneumococcal Polysaccharide (PPSV23) 01/20/2015, 02/04/2015, 07/12/2016           REVIEW OF SYSTEMS:    Review of Systems   Cardiovascular:  Positive for palpitations.       Review of Systems   Constitutional: Positive for diaphoresis.   Cardiovascular:  Positive for palpitations.   Respiratory:  Negative for shortness of breath.    Gastrointestinal:  Negative for nausea and vomiting.        Vital Signs  Temp:  [97.2 °F (36.2 °C)-98 °F (36.7 °C)] 97.9 °F (36.6 °C)  Heart Rate:  [57-72] 66  Resp:  [15-16] 15  BP: (120-138)/(54-76) 138/76          Physical Exam:  Physical Exam  Vitals and nursing note reviewed.   Constitutional:       Appearance: Normal appearance.   HENT:      Head: Normocephalic and atraumatic.      Right Ear: External ear normal.      Left Ear: External ear normal.      Nose: Nose normal.      Mouth/Throat:      Mouth: Mucous membranes are moist.      Pharynx: Oropharynx is clear.   Eyes:      Extraocular Movements: Extraocular movements intact.   Cardiovascular:      Rate and Rhythm: Normal rate and regular rhythm.      Pulses: Normal pulses.      Heart sounds: Normal heart sounds.    Pulmonary:      Effort: Pulmonary effort is normal.      Breath sounds: Normal breath sounds.   Abdominal:      General: Abdomen is flat. Bowel sounds are normal.      Palpations: Abdomen is soft.   Musculoskeletal:         General: Normal range of motion.      Cervical back: Normal range of motion.   Skin:     General: Skin is warm.   Neurological:      General: No focal deficit present.      Mental Status: She is alert and oriented to person, place, and time.   Psychiatric:         Mood and Affect: Mood normal.         Behavior: Behavior normal.         Emotional Behavior:    Normal    Debilities:   None  Results Review:    I reviewed the patient's new clinical results.  Lab Results (most recent)       Procedure Component Value Units Date/Time    POC Glucose Once [[id number]]  (Abnormal) Collected: 04/12/24 1159    Specimen: Blood Updated: 04/12/24 1201     Glucose 245 mg/dL      Comment: Serial Number: [id number]:  [id number]       POC Glucose 4x Daily Before Meals & at Bedtime [[id number]]  (Abnormal) Collected: 04/12/24 0731    Specimen: Blood Updated: 04/12/24 0732     Glucose 165 mg/dL      Comment: Serial Number: [id number]:  [id number]       Basic Metabolic Panel [[id number]]  (Abnormal) Collected: 04/12/24 0404    Specimen: Blood from Arm, Right Updated: 04/12/24 0505     Glucose 144 mg/dL      BUN 16 mg/dL      Creatinine 0.80 mg/dL      Sodium 141 mmol/L      Potassium 4.1 mmol/L      Chloride 104 mmol/L      CO2 26.0 mmol/L      Calcium 10.0 mg/dL      BUN/Creatinine Ratio 20.0     Anion Gap 11.0 mmol/L      eGFR 77.9 mL/min/1.73     Narrative:      GFR Normal >60  Chronic Kidney Disease <60  Kidney Failure <15    The GFR formula is only valid for adults with stable renal function between ages 18 and 70.    CBC & Differential [[id number]]  (Abnormal) Collected: 04/12/24 0404    Specimen: Blood from Arm, Right Updated: 04/12/24 0448    Narrative:      The following orders were created for panel  order CBC & Differential.  Procedure                               Abnormality         Status                     ---------                               -----------         ------                     CBC Auto Differential[[id number]]        Abnormal            Final result                 Please view results for these tests on the individual orders.    CBC Auto Differential [[id number]]  (Abnormal) Collected: 04/12/24 0404    Specimen: Blood from Arm, Right Updated: 04/12/24 0448     WBC 6.60 10*3/mm3      RBC 4.21 10*6/mm3      Hemoglobin 12.4 g/dL      Comment: Result checked          Hematocrit 38.9 %      MCV 92.4 fL      MCH 29.5 pg      MCHC 31.9 g/dL      RDW 13.7 %      RDW-SD 46.4 fl      MPV 9.7 fL      Platelets 297 10*3/mm3      Neutrophil % 33.9 %      Lymphocyte % 49.8 %      Monocyte % 10.5 %      Eosinophil % 5.0 %      Basophil % 0.6 %      Immature Grans % 0.2 %      Neutrophils, Absolute 2.24 10*3/mm3      Lymphocytes, Absolute 3.29 10*3/mm3      Monocytes, Absolute 0.69 10*3/mm3      Eosinophils, Absolute 0.33 10*3/mm3      Basophils, Absolute 0.04 10*3/mm3      Immature Grans, Absolute 0.01 10*3/mm3      nRBC 0.0 /100 WBC     Single High Sensitivity Troponin T [[id number]]  (Normal) Collected: 04/11/24 0608    Specimen: Blood Updated: 04/11/24 0630     HS Troponin T 10 ng/L     Narrative:      High Sensitive Troponin T Reference Range:  <14.0 ng/L- Negative Female for AMI  <22.0 ng/L- Negative Male for AMI  >=14 - Abnormal Female indicating possible myocardial injury.  >=22 - Abnormal Male indicating possible myocardial injury.   Clinicians would have to utilize clinical acumen, EKG, Troponin, and serial changes to determine if it is an Acute Myocardial Infarction or myocardial injury due to an underlying chronic condition.         [place] Draw [[id number]] Collected: 04/11/24 0411    Specimen: Blood Updated: 04/11/24 0515    Narrative:      The following orders were created for panel order [place]  Draw.  Procedure                               Abnormality         Status                     ---------                               -----------         ------                     Green Top (Gel)[[id number]]                                  Final result               Lavender Top[[id number]]                                     Final result               Gold Top - SST[[id number]]                                   Final result               Light Blue Top[[id number]]                                   Final result                 Please view results for these tests on the individual orders.    Green Top (Gel) [[id number]] Collected: 04/11/24 0411    Specimen: Blood Updated: 04/11/24 0515     Extra Tube Hold for add-ons.     Comment: Auto resulted.       Lavender Top [[id number]] Collected: 04/11/24 0411    Specimen: Blood Updated: 04/11/24 0515     Extra Tube hold for add-on     Comment: Auto resulted       Gold Top - SST [[id number]] Collected: 04/11/24 0411    Specimen: Blood Updated: 04/11/24 0515     Extra Tube Hold for add-ons.     Comment: Auto resulted.       Light Blue Top [[id number]] Collected: 04/11/24 0411    Specimen: Blood Updated: 04/11/24 0515     Extra Tube Hold for add-ons.     Comment: Auto resulted       Single High Sensitivity Troponin T [[id number]]  (Normal) Collected: 04/11/24 0411    Specimen: Blood Updated: 04/11/24 0446     HS Troponin T 8 ng/L     Narrative:      High Sensitive Troponin T Reference Range:  <14.0 ng/L- Negative Female for AMI  <22.0 ng/L- Negative Male for AMI  >=14 - Abnormal Female indicating possible myocardial injury.  >=22 - Abnormal Male indicating possible myocardial injury.   Clinicians would have to utilize clinical acumen, EKG, Troponin, and serial changes to determine if it is an Acute Myocardial Infarction or myocardial injury due to an underlying chronic condition.         TSH [[id number]]  (Normal) Collected: 04/11/24 0411    Specimen: Blood Updated: 04/11/24 0446      TSH 3.840 uIU/mL     BNP [[id number]]  (Normal) Collected: 04/11/24 0411    Specimen: Blood Updated: 04/11/24 0446     proBNP 92.1 pg/mL     Narrative:      This assay is used as an aid in the diagnosis of individuals suspected of having heart failure. It can be used as an aid in the diagnosis of acute decompensated heart failure (ADHF) in patients presenting with signs and symptoms of ADHF to the emergency department (ED). In addition, NT-proBNP of <300 pg/mL indicates ADHF is not likely.    Age Range Result Interpretation  NT-proBNP Concentration (pg/mL:      <50             Positive            >450                   Gray                 300-450                    Negative             <300    50-75           Positive            >900                  Gray                300-900                  Negative            <300      >75             Positive            >1800                  Gray                300-1800                  Negative            <300    Comprehensive Metabolic Panel [[id number]]  (Abnormal) Collected: 04/11/24 0411    Specimen: Blood Updated: 04/11/24 0446     Glucose 234 mg/dL      BUN 15 mg/dL      Creatinine 0.84 mg/dL      Sodium 140 mmol/L      Potassium 3.6 mmol/L      Chloride 102 mmol/L      CO2 26.0 mmol/L      Calcium 10.3 mg/dL      Total Protein 7.7 g/dL      Albumin 4.4 g/dL      ALT (SGPT) 11 U/L      AST (SGOT) 14 U/L      Alkaline Phosphatase 61 U/L      Total Bilirubin 0.2 mg/dL      Globulin 3.3 gm/dL      A/G Ratio 1.3 g/dL      BUN/Creatinine Ratio 17.9     Anion Gap 12.0 mmol/L      eGFR 73.5 mL/min/1.73     Narrative:      GFR Normal >60  Chronic Kidney Disease <60  Kidney Failure <15    The GFR formula is only valid for adults with stable renal function between ages 18 and 70.    Magnesium [[id number]]  (Normal) Collected: 04/11/24 0411    Specimen: Blood Updated: 04/11/24 0446     Magnesium 2.0 mg/dL     CBC & Differential [[id number]]  (Abnormal) Collected: 04/11/24 0411     Specimen: Blood Updated: 04/11/24 0417    Narrative:      The following orders were created for panel order CBC & Differential.  Procedure                               Abnormality         Status                     ---------                               -----------         ------                     CBC Auto Differential[[id number]]        Abnormal            Final result                 Please view results for these tests on the individual orders.    CBC Auto Differential [[id number]]  (Abnormal) Collected: 04/11/24 0411    Specimen: Blood Updated: 04/11/24 0417     WBC 7.07 10*3/mm3      RBC 4.82 10*6/mm3      Hemoglobin 14.5 g/dL      Hematocrit 44.2 %      MCV 91.7 fL      MCH 30.1 pg      MCHC 32.8 g/dL      RDW 13.3 %      RDW-SD 44.7 fl      MPV 9.4 fL      Platelets 337 10*3/mm3      Neutrophil % 41.1 %      Lymphocyte % 44.4 %      Monocyte % 8.6 %      Eosinophil % 5.0 %      Basophil % 0.6 %      Immature Grans % 0.3 %      Neutrophils, Absolute 2.91 10*3/mm3      Lymphocytes, Absolute 3.14 10*3/mm3      Monocytes, Absolute 0.61 10*3/mm3      Eosinophils, Absolute 0.35 10*3/mm3      Basophils, Absolute 0.04 10*3/mm3      Immature Grans, Absolute 0.02 10*3/mm3      nRBC 0.0 /100 WBC             Imaging Results (Most Recent)       Procedure Component Value Units Date/Time    XR Chest 1 View [[id number]] Collected: 04/11/24 0358     Updated: 04/11/24 0400    Narrative:      XR CHEST 1 VW    Date of Exam: 4/11/2024 3:50 AM EDT    Indication: CHF/COPD Protocol  CHF/COPD Protocol    Comparison: Chest radiograph dated December 29, 2022    Findings:  The heart size and pulmonary vascular markings are normal. There is diffuse emphysema. There are no focal infiltrates.      Impression:      Impression:  No active disease      Electronically Signed: Woody Johnson MD    4/11/2024 3:58 AM EDT    Workstation ID: [id number]          reviewed    ECG/EMG Results (most recent)       Procedure Component Value Units Date/Time     ECG 12 Lead Chest Pain [[id number]] Collected: 04/11/24 0339     Updated: 04/11/24 0340     QT Interval 392 ms      QTC Interval 448 ms     Narrative:      HEART RATE= 79  bpm  RR Interval= 764  ms  NH Interval= 277  ms  P Horizontal Axis= 17  deg  P Front Axis= -29  deg  QRSD Interval= 87  ms  QT Interval= 392  ms  QTcB= 448  ms  QRS Axis= -37  deg  T Wave Axis= 46  deg  - ABNORMAL ECG -  Sinus rhythm  Ventricular premature complex  Prolonged NH interval  Left ventricular hypertrophy  When compared with ECG of 04-Oct-2023 12:11:07,  Significant change in rhythm  Significant axis, voltage or hypertrophy change  Electronically Signed By:   Date and Time of Study: 2024-04-11 03:39:08    Telemetry Scan [[id number]] Resulted: 04/11/24     Updated: 04/12/24 0656    Telemetry Scan [[id number]] Resulted: 04/11/24     Updated: 04/12/24 1306    Telemetry Scan [[id number]] Resulted: 04/11/24     Updated: 04/12/24 1401    Adult Transthoracic Echo Complete w/ Color, Spectral and Contrast if Necessary Per Protocol [[id number]] Resulted: 04/12/24 1413     Updated: 04/12/24 1413    Telemetry Scan [[id number]] Resulted: 04/11/24     Updated: 04/12/24 1417          reviewed        Results for orders placed during the hospital encounter of 08/18/23    Adult Transthoracic Echo Complete W/ Color, Spectral and Contrast if Necessary Per Protocol    Interpretation Summary    Left ventricular ejection fraction appears to be 56 - 60%.    The right ventricular cavity is mildly dilated.    The left atrial cavity is mildly dilated.      Microbiology Results (last 10 days)       ** No results found for the last 240 hours. **            Assessment & Plan     Palpitations             Chest pain        Lab Results   Component Value Date     TROPONINT 10 04/11/2024     TROPONINT 8 04/11/2024     TROPONINT <0.010 10/26/2021    - home meds asa, fenofibrate, imdur  -cbc, tsh, bnp, and mag are unremarkable  - glucose is 234  - last lipid profile 3/2024 is  unremarkable  - A1c 7.5 3/2024  -Chest X-ray:reviewed and showing no acute cardiopulmonary process  -EKG:rate 79, sinus, pvcs, prolonged TX, LVH  -In the ED pt given asa  -Last stress 8/2023 that was negative  -Telemetry  -2D echo pending results  -Holter monitor at discharge     Hypertension  -well Controlled   BP Readings from Last 1 Encounters:   04/12/24 138/76   - Continue lisinopril, norvasc  - Monitor while admitted      Diabetes mellitus  -moderately controlled   Lab Results   Component Value Date    GLUCOSE 144 (H) 04/12/2024    GLUCOSE 234 (H) 04/11/2024    GLUCOSE 104 (H) 03/21/2024    GLUCOSE 192 (H) 02/06/2024   - 21u 70/30 prandial breakfast and lunch, 20u evening  -janumet  -Diabetic diet  -Monitor before meals and at bedtime      RA  - home meds methotrexate     HPL  - statin       I discussed the patients findings and my recommendations with patient and nursing staff.     Discharge Diagnosis:      Palpitations      Hospital Course  Patient is a 74 y.o. female presented with chest pain and palpitations as noted on HPI above.  Labs unremarkable including CBC, TSH, BMP, magnesium and.  Glucose 234 on admission and diabetes educator was consulted.  Recent A1c 7.5 and patient is on Humulin 70/30 21 units breakfast and lunch and 20 units in the evening.  Patient received aspirin in the ED and cardiology was consulted.  He recommended 2D echo inpatient and Holter monitor at discharge.  2D echo pending results. At this time, patient felt to be in good condition for discharge with close follow up with PCP and cardiology. Instructed to take all medications as prescribed and to return to ED if any concerning signs/symptoms. All test/lab results were discussed with patient. All questions were answered and patient verbalizes understanding.   ..      Past Medical History:     Past Medical History:   Diagnosis Date    CAD 2020    Cancer 2021    Skin cancer    Colon polyp     Congenital heart disease 2017     Depression     DEXA     2019= (-0.7/ -1.6); 2023= (-0.7/ -2.7)    Diabetic peripheral neuropathy     Diverticulosis     GERD     Heart murmur 2017    Hyperlipidemia     Hypertension     Insomnia     Lower back pain     MAMMO     NEG = 2021/ 2023    Obesity     Osteoarthritis     Osteopenia     Osteoporosis     Palpitations     RA     Rheumatoid nodule 06/2011     rt. thumb x 2 and rt. & lt. 3rd fingers (Oct.2012)/ rt. elbow, left elbow, and epidermal inclusion cyst post. neck (June 2011)-----Dr. Whitmore    Vitamin D deficiency        Past Surgical History:     Past Surgical History:   Procedure Laterality Date    BLADDER SUSPENSION  10/11/2023        BLADDER SUSPENSION      BREAST SURGERY  1972    CARDIAC CATHETERIZATION N/A 09/16/2020    Procedure: Left Heart Cath;  Surgeon: Walker Rodriguez MD;  Location: [facility] CATH INVASIVE LOCATION;  Service: Cardiovascular;  Laterality: N/A;    CARDIAC CATHETERIZATION N/A 09/16/2020    Procedure: Coronary angiography;  Surgeon: Walker Rodriguez MD;  Location: [facility] CATH INVASIVE LOCATION;  Service: Cardiovascular;  Laterality: N/A;    CARDIAC CATHETERIZATION  09/16/2020    Procedure: Functional Flow [place];  Surgeon: Kath Medina MD;  Location: [facility] CATH INVASIVE LOCATION;  Service: Cardiology;;    CARPAL TUNNEL RELEASE Right 02/20/2023    Procedure: CARPAL TUNNEL RELEASE WITH POSSIBLE SYNOVECTOMY;  Surgeon: Amadeo Magaña MD;  Location: [facility] MAIN OR;  Service: Orthopedics;  Laterality: Right;    CHOLECYSTECTOMY      COLONOSCOPY      2017 / 2023= TA, rech 2028? [facility]    CYST REMOVAL      Seb cyst on neck    EYE SURGERY      TOTAL ABDOMINAL HYSTERECTOMY WITH SALPINGO OOPHORECTOMY      VAGINAL PROLAPSE REPAIR       Uterine Prolapse repair       Social History:   Social History     Socioeconomic History    Marital status:      Spouse name: Carson Rosa    Number of children: 3    Years of education: GED   Tobacco Use     Smoking status: Never     Passive exposure: Never    Smokeless tobacco: Never   Vaping Use    Vaping status: Never Used   Substance and Sexual Activity    Alcohol use: Yes     Comment: 3-4 a year    Drug use: Never    Sexual activity: Not Currently     Partners: Male     Birth control/protection: Hysterectomy, Same-sex partner       Procedures Performed         Consults:   Consults       Date and Time Order Name Status Description    4/11/2024  4:54 AM Inpatient Cardiology Consult Completed             Condition on Discharge:     Stable    Discharge Disposition  Home or Self Care    Discharge Medications     Discharge Medications        Continue These Medications        Instructions Start Date   acebutolol 200 MG capsule  Commonly known as: SECTRAL   TAKE ONE CAPSULE BY MOUTH DAILY      acetaminophen 325 MG tablet  Commonly known as: TYLENOL   650 mg, Oral, Every 6 Hours PRN      albuterol sulfate  (90 Base) MCG/ACT inhaler  Commonly known as: PROVENTIL HFA;VENTOLIN HFA;PROAIR HFA   2 puffs, Inhalation, Every 6 Hours PRN      amLODIPine 10 MG tablet  Commonly known as: NORVASC   10 mg, Oral, Daily      aspirin 81 MG EC tablet   81 mg, Oral, Daily      azelastine 0.05 % ophthalmic solution  Commonly known as: OPTIVAR   2 drops, Both Eyes, 2 Times Daily PRN      Claritin 10 MG tablet  Generic drug: loratadine   10 mg, Oral, Nightly      D-MANNOSE PO   1 po qd       diclofenac 1 % gel gel  Commonly known as: VOLTAREN   4 g, Topical, 4 Times Daily PRN      estradiol 0.1 MG/GM vaginal cream  Commonly known as: ESTRACE       fenofibrate 160 MG tablet   160 mg, Oral, Daily      fluconazole 150 MG tablet  Commonly known as: DIFLUCAN       folic acid 1 MG tablet  Commonly known as: FOLVITE   1,000 mcg, Oral, Daily      FreeStyle Adelaide 2 Freeman device   USE WITH SENSOR EVERY 14 DAYS      FreeStyle Adelaide 2 Sensor misc   1 each, Subcutaneous, Daily      gabapentin 300 MG capsule  Commonly known as: NEURONTIN   300 mg,  Oral, 2 Times Daily      glucose monitor monitoring kit   1 each, Does not apply, 3 Times Daily PRN      Humira (2 Pen) 40 MG/0.4ML Pen-injector Kit  Generic drug: Adalimumab   40 mg, Subcutaneous, Every 14 Days      HumuLIN 70/30 KwikPen (70-30) 100 UNIT/ML suspension pen-injector  Generic drug: Insulin NPH Isophane & Regular   21 Units, Subcutaneous, 2 Times Daily, Before breakfast and lunch      HumuLIN 70/30 KwikPen (70-30) 100 UNIT/ML suspension pen-injector  Generic drug: Insulin NPH Isophane & Regular   20 Units, Subcutaneous, Every Evening, Before supper      ibandronate 150 MG tablet  Commonly known as: BONIVA       Insulin Pen Needle 32G X 4 MM misc   1 each, Does not apply, 2 Times Daily      isosorbide mononitrate 60 MG 24 hr tablet  Commonly known as: IMDUR   60 mg, Oral, Every Morning      Janumet -1000 MG tablet  Generic drug: SITagliptin-metFORMIN HCl ER   TAKE ONE TABLET BY MOUTH DAILY      lansoprazole 30 MG capsule  Commonly known as: PREVACID   30 mg, Oral, Daily      lisinopril 40 MG tablet  Commonly known as: PRINIVIL,ZESTRIL   40 mg, Oral, Daily      meclizine 12.5 MG tablet  Commonly known as: ANTIVERT   1/2 - 1 po q6-8hrs prn dizzyness      methotrexate 2.5 MG tablet   TAKE 5 TABLETS BY MOUTH ONCE A WEEK      multivitamin with minerals tablet tablet   1 tablet, Oral, Daily      ondansetron ODT 4 MG disintegrating tablet  Commonly known as: ZOFRAN-ODT   4 mg, Translingual, Every 8 Hours PRN      rosuvastatin 5 MG tablet  Commonly known as: CRESTOR   5 mg, Oral, Nightly      sucralfate 1 g tablet  Commonly known as: Carafate   1 g, Oral, 3 Times Daily Before Meals      traZODone 50 MG tablet  Commonly known as: DESYREL   50 mg, Oral, Every Night at Bedtime      vitamin B-12 1000 MCG tablet  Commonly known as: CYANOCOBALAMIN   1,000 mcg, Oral, Daily      Vitamin D 50 MCG (2000 UT) capsule   2,000 Units, Oral, Daily               Discharge Diet:   Diet Instructions       Diet: Cardiac  Diets; Healthy Heart (2-3 Na+); Regular (IDDSI 7); Thin (IDDSI 0)      Discharge Diet: Cardiac Diets    Cardiac Diet: Healthy Heart (2-3 Na+)    Texture: Regular (IDDSI 7)    Fluid Consistency: Thin (IDDSI 0)            Activity at Discharge:     Follow-up Appointments  Future Appointments   Date Time Provider Department Center   10/7/2024  9:40 AM [facility] None   10/14/2024  2:00 PM Dano Rangel MD [facility]   10/22/2024  1:30 PM Joe García MD [facility]     Additional Instructions for the Follow-ups that You Need to Schedule       Discharge Follow-up with PCP   As directed       Currently Documented PCP:    Emily Burdick DO    PCP Phone Number:    [phone]     Follow Up Details: 5-7 days        Discharge Follow-up with Specified Provider: Cardiology; 2 Weeks   As directed      To: Cardiology   Follow Up: 2 Weeks                Test Results Pending at Discharge       Risk for Readmission (LACE) Score: 8 (4/12/2024  6:00 AM)      Greater than 30 minutes spent in discharge activities for this patient    Signature:Electronically signed by RIN Hudson, 04/12/24, 2:46 PM EDT.

## 2024-04-13 LAB
QT INTERVAL: 392 MS
QTC INTERVAL: 448 MS

## 2024-04-15 ENCOUNTER — TRANSITIONAL CARE MANAGEMENT TELEPHONE ENCOUNTER (OUTPATIENT)
Dept: CALL CENTER | Facility: HOSPITAL | Age: 74
End: 2024-04-15
Payer: MEDICARE

## 2024-04-15 NOTE — OUTREACH NOTE
Call Center TCM Note      Flowsheet Row Responses   [facility] facility patient discharged from? [facility]   Does the patient have one of the following disease processes/diagnoses(primary or secondary)? Other   TCM attempt successful? No   Unsuccessful attempts Attempt 1            Leigh Sethi MA    4/15/2024, 10:10 EDT

## 2024-04-15 NOTE — OUTREACH NOTE
Call Center TCM Note      Flowsheet Row Responses   [facility] facility patient discharged from? [facility]   Does the patient have one of the following disease processes/diagnoses(primary or secondary)? Other   TCM attempt successful? No   Unsuccessful attempts Attempt 2            Leigh Sethi MA    4/15/2024, 15:55 EDT

## 2024-04-16 ENCOUNTER — TRANSITIONAL CARE MANAGEMENT TELEPHONE ENCOUNTER (OUTPATIENT)
Dept: CALL CENTER | Facility: HOSPITAL | Age: 74
End: 2024-04-16
Payer: MEDICARE

## 2024-04-16 NOTE — OUTREACH NOTE
Call Center TCM Note      Flowsheet Row Responses   [facility] patient discharged from? [facility]   Does the patient have one of the following disease processes/diagnoses(primary or secondary)? Other   TCM attempt successful? Yes   Call start time 0959   Call end time 1006   Discharge diagnosis Palpitations, chest pain   Person spoke with today (if not patient) and relationship pt   Meds reviewed with patient/caregiver? Yes   Is the patient having any side effects they believe may be caused by any medication additions or changes? No   Does the patient have all medications ordered at discharge? N/A   Is the patient taking all medications as directed (includes completed medication regime)? Yes   Does the patient have an appointment with their PCP within 7-14 days of discharge? Yes  [4/19/2024 10:45 AM]   Psychosocial issues? No   Did the patient receive a copy of their discharge instructions? Yes   Nursing interventions Reviewed instructions with patient   What is the patient's perception of their health status since discharge? Improving   Is the patient/caregiver able to teach back signs and symptoms related to disease process for when to call PCP? Yes   Is the patient/caregiver able to teach back signs and symptoms related to disease process for when to call 911? Yes   Is the patient/caregiver able to teach back the hierarchy of who to call/visit for symptoms/problems? PCP, Specialist, Home health nurse, Urgent Care, ED, 911 Yes   If the patient is a current smoker, are they able to teach back resources for cessation? Not a smoker   TCM call completed? Yes   Wrap up additional comments Pt states she is still having irregular heartbeats/palpitations. No medication changes,  pt reports BG levels drop into the 60s and pt is holding metformin until she speaks with Dr Rangel, endocrinologist. Pt advised to call Dr Rangel's office today to report low BG levels,  Pt verbalized understanding. Pt verified PCP fu appt.    Call end time 1006   Would this patient benefit from a Referral to [facility] Social Work? No   Is the patient interested in additional calls from an ambulatory ? No            Maria Eugenia Jones RN    4/16/2024, 10:10 EDT

## 2024-04-19 ENCOUNTER — OFFICE VISIT (OUTPATIENT)
Dept: FAMILY MEDICINE CLINIC | Facility: CLINIC | Age: 74
End: 2024-04-19
Payer: MEDICARE

## 2024-04-19 VITALS
DIASTOLIC BLOOD PRESSURE: 55 MMHG | HEIGHT: 63 IN | BODY MASS INDEX: 28.53 KG/M2 | SYSTOLIC BLOOD PRESSURE: 107 MMHG | HEART RATE: 58 BPM | RESPIRATION RATE: 14 BRPM | WEIGHT: 161 LBS | OXYGEN SATURATION: 97 % | TEMPERATURE: 98 F

## 2024-04-19 DIAGNOSIS — G89.29 CHRONIC MIDLINE LOW BACK PAIN, UNSPECIFIED WHETHER SCIATICA PRESENT: ICD-10-CM

## 2024-04-19 DIAGNOSIS — M54.50 CHRONIC MIDLINE LOW BACK PAIN, UNSPECIFIED WHETHER SCIATICA PRESENT: ICD-10-CM

## 2024-04-19 DIAGNOSIS — N39.0 RECURRENT URINARY TRACT INFECTION: ICD-10-CM

## 2024-04-19 DIAGNOSIS — R35.0 FREQUENCY OF URINATION: ICD-10-CM

## 2024-04-19 DIAGNOSIS — R00.2 INTERMITTENT PALPITATIONS: Primary | ICD-10-CM

## 2024-04-19 LAB
BILIRUB BLD-MCNC: NEGATIVE MG/DL
CLARITY, POC: ABNORMAL
COLOR UR: YELLOW
EXPIRATION DATE: ABNORMAL
GLUCOSE UR STRIP-MCNC: ABNORMAL MG/DL
KETONES UR QL: NEGATIVE
LEUKOCYTE EST, POC: ABNORMAL
Lab: ABNORMAL
NITRITE UR-MCNC: POSITIVE MG/ML
PH UR: 6 [PH] (ref 5–8)
PROT UR STRIP-MCNC: ABNORMAL MG/DL
RBC # UR STRIP: ABNORMAL /UL
SP GR UR: 1.02 (ref 1–1.03)
UROBILINOGEN UR QL: ABNORMAL

## 2024-04-19 RX ORDER — INSULIN HUMAN 100 [IU]/ML
21 INJECTION, SUSPENSION SUBCUTANEOUS
Status: SHIPPED
Start: 2024-04-19

## 2024-04-19 RX ORDER — ACEBUTOLOL HYDROCHLORIDE 200 MG/1
200 CAPSULE ORAL NIGHTLY
Start: 2024-04-19

## 2024-04-19 RX ORDER — THIAMINE HCL 50 MG
50 TABLET ORAL DAILY
COMMUNITY

## 2024-04-19 RX ORDER — ISOSORBIDE MONONITRATE 60 MG/1
60 TABLET, EXTENDED RELEASE ORAL
Status: SHIPPED
Start: 2024-04-19

## 2024-04-19 RX ORDER — LISINOPRIL 40 MG/1
40 TABLET ORAL
Status: SHIPPED
Start: 2024-04-19

## 2024-04-19 RX ORDER — AMLODIPINE BESYLATE 10 MG/1
10 TABLET ORAL NIGHTLY
Qty: 90 TABLET | Refills: 1 | Status: SHIPPED | OUTPATIENT
Start: 2024-04-19

## 2024-04-19 RX ORDER — INSULIN HUMAN 100 [IU]/ML
18 INJECTION, SUSPENSION SUBCUTANEOUS
Status: SHIPPED
Start: 2024-04-19

## 2024-04-19 NOTE — PROGRESS NOTES
Subjective   Jenna Rosa is a 74 y.o. female.     Chief Complaint   Patient presents with    Transitional Care Management     Hospital follow up on palpitations.         Current Outpatient Medications:     acebutolol (SECTRAL) 200 MG capsule, Take 1 capsule by mouth Every Night., Disp: , Rfl:     acetaminophen (TYLENOL) 325 MG tablet, Take 2 tablets by mouth Every 6 (Six) Hours As Needed for Mild Pain., Disp: , Rfl:     albuterol sulfate  (90 Base) MCG/ACT inhaler, Inhale 2 puffs Every 6 (Six) Hours As Needed for Shortness of Air (cough)., Disp: 18 g, Rfl: 0    amLODIPine (NORVASC) 10 MG tablet, Take 1 tablet by mouth Every Night., Disp: 90 tablet, Rfl: 1    aspirin 81 MG EC tablet, Take 1 tablet by mouth Daily., Disp: , Rfl:     azelastine (OPTIVAR) 0.05 % ophthalmic solution, Administer 2 drops to both eyes 2 (Two) Times a Day As Needed., Disp: , Rfl:     Cholecalciferol (Vitamin D) 50 MCG (2000 UT) capsule, Take 1 capsule by mouth Daily., Disp: , Rfl:     Continuous Blood Gluc  (FreeStyle Adelaide 2 Columbia) device, USE WITH SENSOR EVERY 14 DAYS, Disp: 1 each, Rfl: 0    Continuous Blood Gluc Sensor (FreeStyle Adelaide 2 Sensor) misc, Inject 1 each under the skin into the appropriate area as directed Daily., Disp: 6 each, Rfl: 2    diclofenac (VOLTAREN) 1 % gel gel, Apply 4 g topically to the appropriate area as directed 4 (Four) Times a Day As Needed., Disp: , Rfl:     estradiol (ESTRACE) 0.1 MG/GM vaginal cream, , Disp: , Rfl:     fenofibrate 160 MG tablet, Take 1 tablet by mouth Daily., Disp: 90 tablet, Rfl: 2    folic acid (FOLVITE) 1 MG tablet, TAKE 1 TABLET BY MOUTH DAILY, Disp: 90 tablet, Rfl: 1    gabapentin (NEURONTIN) 300 MG capsule, Take 1 capsule by mouth 2 (Two) Times a Day., Disp: 180 capsule, Rfl: 0    glucose monitor monitoring kit, Use 1 each 3 (Three) Times a Day As Needed (for BG monitoring)., Disp: 1 each, Rfl: 0    Humira Pen 40 MG/0.4ML Pen-injector Kit, Inject 40 mg under the  skin into the appropriate area as directed Every 14 (Fourteen) Days., Disp: 2 each, Rfl: 3    Insulin NPH Isophane & Regular (HumuLIN 70/30 KwikPen) (70-30) 100 UNIT/ML suspension pen-injector, Inject 18 Units under the skin into the appropriate area as directed Daily Before Supper., Disp: , Rfl:     Insulin NPH Isophane & Regular (HumuLIN 70/30 KwikPen) (70-30) 100 UNIT/ML suspension pen-injector, Inject 21 Units under the skin into the appropriate area as directed 2 (Two) Times a Day Before Meals. Before breakfast and lunch, Disp: , Rfl:     Insulin Pen Needle 32G X 4 MM misc, Use 1 each 2 (Two) Times a Day., Disp: 200 each, Rfl: 3    isosorbide mononitrate (IMDUR) 60 MG 24 hr tablet, Take 1 tablet by mouth Daily With Breakfast., Disp: , Rfl:     Janumet -1000 MG tablet, TAKE ONE TABLET BY MOUTH DAILY, Disp: 90 tablet, Rfl: 1    lansoprazole (PREVACID) 30 MG capsule, TAKE 1 CAPSULE BY MOUTH DAILY, Disp: 90 capsule, Rfl: 1    lisinopril (PRINIVIL,ZESTRIL) 40 MG tablet, Take 1 tablet by mouth Daily With Breakfast., Disp: , Rfl:     loratadine (Claritin) 10 MG tablet, Take 1 tablet by mouth Every Night., Disp: , Rfl:     meclizine (ANTIVERT) 12.5 MG tablet, 1/2 - 1 po q6-8hrs prn dizzyness, Disp: 20 tablet, Rfl: 0    methotrexate 2.5 MG tablet, TAKE 5 TABLETS BY MOUTH ONCE A WEEK, Disp: 60 tablet, Rfl: 0    multivitamin with minerals (MULTIVITAMIN ADULTS PO), Take 1 tablet by mouth Daily., Disp: , Rfl:     ondansetron ODT (ZOFRAN-ODT) 4 MG disintegrating tablet, Place 1 tablet on the tongue Every 8 (Eight) Hours As Needed for Nausea., Disp: 30 tablet, Rfl: 0    rosuvastatin (CRESTOR) 5 MG tablet, TAKE ONE TABLET BY MOUTH ONCE NIGHTLY, Disp: 90 tablet, Rfl: 0    Thiamine HCl (vitamin B-1) 50 MG tablet, Take 1 tablet by mouth Daily., Disp: , Rfl:     traZODone (DESYREL) 50 MG tablet, TAKE ONE TABLET BY MOUTH EVERY NIGHT AT BEDTIME, Disp: 90 tablet, Rfl: 0    vitamin B-12 (CYANOCOBALAMIN) 1000 MCG tablet, Take 1  tablet by mouth Daily., Disp: , Rfl:     Past Medical History:   Diagnosis Date    CAD 2020    Cancer 2021    Skin cancer    Colon polyp     Congenital heart disease 2017    Depression     DEXA     2019= (-0.7/ -1.6); 2023= (-0.7/ -2.7)    Diabetic peripheral neuropathy     Diverticulosis     GERD     Heart murmur 2017    Hyperlipidemia     Hypertension     Insomnia     Lower back pain     MAMMO     NEG = 2021/ 2023    Obesity     Osteoarthritis     Osteopenia     Osteoporosis     Palpitations     RA     Rheumatoid nodule 06/2011     rt. thumb x 2 and rt. & lt. 3rd fingers (Oct.2012)/ rt. elbow, left elbow, and epidermal inclusion cyst post. neck (June 2011)-----Dr. Whitmore    Vitamin D deficiency        Past Surgical History:   Procedure Laterality Date    BLADDER SUSPENSION  10/11/2023        BLADDER SUSPENSION      BREAST SURGERY  1972    CARDIAC CATHETERIZATION N/A 09/16/2020    Procedure: Left Heart Cath;  Surgeon: Walker Rodriguez MD;  Location: [facility] CATH INVASIVE LOCATION;  Service: Cardiovascular;  Laterality: N/A;    CARDIAC CATHETERIZATION N/A 09/16/2020    Procedure: Coronary angiography;  Surgeon: Walker Rodriguez MD;  Location: [facility] CATH INVASIVE LOCATION;  Service: Cardiovascular;  Laterality: N/A;    CARDIAC CATHETERIZATION  09/16/2020    Procedure: Functional Flow [place];  Surgeon: Kath Medina MD;  Location: [facility] CATH INVASIVE LOCATION;  Service: Cardiology;;    CARPAL TUNNEL RELEASE Right 02/20/2023    Procedure: CARPAL TUNNEL RELEASE WITH POSSIBLE SYNOVECTOMY;  Surgeon: Amadeo Magaña MD;  Location: [facility] MAIN OR;  Service: Orthopedics;  Laterality: Right;    CHOLECYSTECTOMY      COLONOSCOPY      2017 / 2023= TA, rech 2028? [facility]    CYST REMOVAL      Seb cyst on neck    EYE SURGERY      TOTAL ABDOMINAL HYSTERECTOMY WITH SALPINGO OOPHORECTOMY      VAGINAL PROLAPSE REPAIR       Uterine Prolapse repair       Family History   Problem Relation Age  of Onset    Heart disease Mother     Diabetes Mother     Hypertension Mother     Rheum arthritis Mother     Coronary artery disease Mother     Alcohol abuse Mother     Heart failure Mother     Heart disease Father     Mental illness Father     Hypertension Father     Alcohol abuse Father     Early death Father     Heart attack Father     Heart failure Father     Kidney cancer Sister     Stroke Sister     Osteoarthritis Sister     Rheum arthritis Sister     Hypertension Sister     Arthritis Sister     Diabetes Sister     Stroke Sister     Hypertension Sister     Rheum arthritis Sister     Osteoarthritis Sister     Diabetes Sister     Arthritis Sister     Liver disease Sister     Kidney disease Sister     No Known Problems Brother     Rheum arthritis Brother     Osteoarthritis Brother     Hypertension Brother     Diabetes Maternal Grandfather        Social History     Socioeconomic History    Marital status:      Spouse name: Carson Rosa    Number of children: 3    Years of education: GED   Tobacco Use    Smoking status: Never     Passive exposure: Never    Smokeless tobacco: Never   Vaping Use    Vaping status: Never Used   Substance and Sexual Activity    Alcohol use: Yes     Comment: 3-4 a year    Drug use: Never    Sexual activity: Not Currently     Partners: Male     Birth control/protection: Hysterectomy, Same-sex partner       History of Present Illness  The patient is a 73-year-old female who is here for follow-up from the hospital for palpitations.    The patient reported experiencing chest pressure, which disrupted her sleep. Pt went to ER for eval-----Pt underwent blood work and an EKG, both of which yielded normal results; a myocardial infarction was ruled out. A 2-week heart monitor was utilized to rule out atrial fibrillation. An echocardiogram was performed, yielding normal results. The patient was prescribed Carafate, which she found beneficial, but discontinued its use due to concerns the  poss heart palpitations side effect. She has since reduced her caffeine intake from 1 to 2 cups to avoid palpitations.    The patient's blood pressure was noted to be low during today's visit. She attributes this to taking both BP meds in am like the hosp did.  She was previously on lisinopril at night, but switched to daytime lisinopril.    The patient has discontinued her metformin regimen due to frequent episodes of hypoglycemia, predominantly at night. She is currently on 70/30 insulin, administered thrice daily per ENDO, with a previous dosage of 40 units in the morning and 20 units at night. He split her dosing to 21 units Q breakfast/ lunch and 20 Units @ dinner. She reported a blood sugar drop to 58 after consuming a balanced meal. Her evening meal typically consists of a small meal w/ lunch her biggest meal.    The patient underwent a Bladder tack sx in 04/2023, which initially alleviated her back pain. However, her pain has since recurred. She attended physical therapy at [facility] on 4/03/2024 in [place] and is wanting to retry therapy.    The patient continues to experience frequent urinary tract infections (UTIs) and occasional dysuria.    Supplemental Information  She is not going to take Boniva for her bones due to some of the poss side effects of causing more bone damage.      The following portions of the patient's history were reviewed and updated as appropriate: allergies, current medications, past family history, past medical history, past social history, past surgical history and problem list.    Review of Systems   Constitutional:  Negative for activity change, appetite change, fatigue, unexpected weight gain and unexpected weight loss.   Respiratory:  Negative for cough, chest tightness, shortness of breath and wheezing.    Cardiovascular:  Positive for palpitations. Negative for chest pain and leg swelling.   Genitourinary:  Negative for frequency, urgency and urinary incontinence.    Musculoskeletal:  Positive for back pain. Negative for arthralgias and myalgias.   Neurological:  Negative for dizziness, facial asymmetry, speech difficulty, weakness, light-headedness, headache, memory problem and confusion.       Vitals:    04/19/24 1055   BP: 107/55   Pulse: 58   Resp: 14   Temp: 98 °F (36.7 °C)   SpO2: 97%       Objective   Physical Exam  Vitals and nursing note reviewed.   Constitutional:       General: She is not in acute distress.     Appearance: She is well-developed and normal weight. She is not ill-appearing or toxic-appearing.   HENT:      Head: Normocephalic and atraumatic.   Cardiovascular:      Rate and Rhythm: Normal rate and regular rhythm.      Heart sounds: Normal heart sounds. No murmur heard.  Pulmonary:      Effort: Pulmonary effort is normal. No respiratory distress.      Breath sounds: Normal breath sounds. No wheezing, rhonchi or rales.   Skin:     General: Skin is warm and dry.      Findings: No rash.   Neurological:      Mental Status: She is alert and oriented to person, place, and time.      Cranial Nerves: No cranial nerve deficit.   Psychiatric:         Mood and Affect: Mood normal.         Behavior: Behavior normal.         Thought Content: Thought content normal.         Judgment: Judgment normal.       Physical Exam       Assessment & Plan   Diagnoses and all orders for this visit:    1. Intermittent palpitations (Primary)    2. Chronic midline low back pain, unspecified whether sciatica present  -     Ambulatory Referral to Physical Therapy Pelvic Floor, Evaluate and treat; Soft Tissue Mobilizaton; ROM, Stretching    3. Recurrent urinary tract infection  -     Ambulatory Referral to Physical Therapy Pelvic Floor, Evaluate and treat; Soft Tissue Mobilizaton; ROM, Stretching    4. Frequency of urination  -     POCT urinalysis dipstick, automated    Other orders  -     Insulin NPH Isophane & Regular (HumuLIN 70/30 KwikPen) (70-30) 100 UNIT/ML suspension  pen-injector; Inject 18 Units under the skin into the appropriate area as directed Daily Before Supper.  -     Insulin NPH Isophane & Regular (HumuLIN 70/30 KwikPen) (70-30) 100 UNIT/ML suspension pen-injector; Inject 21 Units under the skin into the appropriate area as directed 2 (Two) Times a Day Before Meals. Before breakfast and lunch  -     amLODIPine (NORVASC) 10 MG tablet; Take 1 tablet by mouth Every Night.  Dispense: 90 tablet; Refill: 1  -     acebutolol (SECTRAL) 200 MG capsule; Take 1 capsule by mouth Every Night.  -     lisinopril (PRINIVIL,ZESTRIL) 40 MG tablet; Take 1 tablet by mouth Daily With Breakfast.  -     isosorbide mononitrate (IMDUR) 60 MG 24 hr tablet; Take 1 tablet by mouth Daily With Breakfast.      Assessment & Plan  1. Palpitations.    2. Hypertension.  The patient's blood pressure was noted to be low during today's visit. The patient is advised to split her BP meds up to am/ pm    3. Diabetes.  The patient is instructed to reduce her insulin dosage from 70/30 to 18 units w/ dinner.  Additionally, FreeStyle Adelaide 3 sample will be provided.    4. Recurrent urinary tract infections.  A urinalysis will be conducted today.    5. Low back pain.  A referral for physical therapy at [facility] [id number] in [place] will be made.     Results  Imaging  Ejection fraction is stable at 61 to 65. Right ventricle and left atrium are mildly dilated.    Testing  Heart monitor shows no atrial fibrillation.          Patient or patient representative verbalized consent for the use of Ambient Listening during the visit with  Emily Burdcik DO for chart documentation. 4/20/2024  11:30 EDT

## 2024-04-20 DIAGNOSIS — N30.01 ACUTE CYSTITIS WITH HEMATURIA: Primary | ICD-10-CM

## 2024-04-20 RX ORDER — CEFDINIR 300 MG/1
300 CAPSULE ORAL 2 TIMES DAILY
Qty: 14 CAPSULE | Refills: 0 | Status: SHIPPED | OUTPATIENT
Start: 2024-04-20

## 2024-04-22 ENCOUNTER — CLINICAL SUPPORT (OUTPATIENT)
Dept: FAMILY MEDICINE CLINIC | Facility: CLINIC | Age: 74
End: 2024-04-22
Payer: MEDICARE

## 2024-04-22 DIAGNOSIS — N30.01 ACUTE CYSTITIS WITH HEMATURIA: ICD-10-CM

## 2024-04-22 PROCEDURE — 87077 CULTURE AEROBIC IDENTIFY: CPT | Performed by: FAMILY MEDICINE

## 2024-04-22 PROCEDURE — 87086 URINE CULTURE/COLONY COUNT: CPT | Performed by: FAMILY MEDICINE

## 2024-04-22 PROCEDURE — 87186 SC STD MICRODIL/AGAR DIL: CPT | Performed by: FAMILY MEDICINE

## 2024-04-22 RX ORDER — IBANDRONATE SODIUM 150 MG/1
TABLET, FILM COATED ORAL
Qty: 1 TABLET | Refills: 3 | OUTPATIENT
Start: 2024-04-22

## 2024-04-22 NOTE — TELEPHONE ENCOUNTER
The original prescription was discontinued on 2/4/2024 by Davi Torres for the following reason: *Error. Renewing this prescription may not be appropriate.

## 2024-04-23 RX ORDER — METHOTREXATE 2.5 MG/1
12.5 TABLET ORAL WEEKLY
Qty: 60 TABLET | Refills: 0 | Status: SHIPPED | OUTPATIENT
Start: 2024-04-23

## 2024-04-25 LAB — BACTERIA SPEC AEROBE CULT: ABNORMAL

## 2024-04-25 RX ORDER — ACEBUTOLOL HYDROCHLORIDE 200 MG/1
200 CAPSULE ORAL DAILY
Qty: 90 CAPSULE | Refills: 1 | Status: SHIPPED | OUTPATIENT
Start: 2024-04-25

## 2024-05-02 RX ORDER — SITAGLIPTIN AND METFORMIN HYDROCHLORIDE 1000; 100 MG/1; MG/1
1 TABLET, FILM COATED, EXTENDED RELEASE ORAL DAILY
Qty: 90 TABLET | Refills: 1 | Status: SHIPPED | OUTPATIENT
Start: 2024-05-02

## 2024-05-02 RX ORDER — ISOSORBIDE MONONITRATE 60 MG/1
60 TABLET, EXTENDED RELEASE ORAL
Qty: 90 TABLET | Refills: 1 | Status: SHIPPED | OUTPATIENT
Start: 2024-05-02

## 2024-05-02 RX ORDER — ISOSORBIDE MONONITRATE 60 MG/1
60 TABLET, EXTENDED RELEASE ORAL EVERY MORNING
Qty: 90 TABLET | OUTPATIENT
Start: 2024-05-02

## 2024-05-02 NOTE — TELEPHONE ENCOUNTER
Please send in the prescription because we are going to see her in the office now and she is my patient

## 2024-05-07 ENCOUNTER — OFFICE VISIT (OUTPATIENT)
Dept: FAMILY MEDICINE CLINIC | Facility: CLINIC | Age: 74
End: 2024-05-07
Payer: MEDICARE

## 2024-05-07 VITALS
TEMPERATURE: 98.4 F | HEIGHT: 63 IN | BODY MASS INDEX: 28.53 KG/M2 | SYSTOLIC BLOOD PRESSURE: 174 MMHG | OXYGEN SATURATION: 96 % | WEIGHT: 161 LBS | DIASTOLIC BLOOD PRESSURE: 68 MMHG | HEART RATE: 54 BPM

## 2024-05-07 DIAGNOSIS — J02.9 SORE THROAT: ICD-10-CM

## 2024-05-07 DIAGNOSIS — R05.9 COUGH, UNSPECIFIED TYPE: Primary | ICD-10-CM

## 2024-05-07 DIAGNOSIS — R09.81 NASAL CONGESTION: ICD-10-CM

## 2024-05-07 DIAGNOSIS — J20.9 COPD WITH ACUTE BRONCHITIS: ICD-10-CM

## 2024-05-07 DIAGNOSIS — J44.0 COPD WITH ACUTE BRONCHITIS: ICD-10-CM

## 2024-05-07 LAB
EXPIRATION DATE: NORMAL
EXPIRATION DATE: NORMAL
INTERNAL CONTROL: NORMAL
INTERNAL CONTROL: NORMAL
Lab: NORMAL
Lab: NORMAL
S PYO AG THROAT QL: NEGATIVE
SARS-COV-2 AG UPPER RESP QL IA.RAPID: NOT DETECTED

## 2024-05-07 PROCEDURE — 99213 OFFICE O/P EST LOW 20 MIN: CPT | Performed by: FAMILY MEDICINE

## 2024-05-07 PROCEDURE — 3077F SYST BP >= 140 MM HG: CPT | Performed by: FAMILY MEDICINE

## 2024-05-07 PROCEDURE — 1126F AMNT PAIN NOTED NONE PRSNT: CPT | Performed by: FAMILY MEDICINE

## 2024-05-07 PROCEDURE — 3078F DIAST BP <80 MM HG: CPT | Performed by: FAMILY MEDICINE

## 2024-05-07 PROCEDURE — 87426 SARSCOV CORONAVIRUS AG IA: CPT | Performed by: FAMILY MEDICINE

## 2024-05-07 PROCEDURE — 1159F MED LIST DOCD IN RCRD: CPT | Performed by: FAMILY MEDICINE

## 2024-05-07 PROCEDURE — 1160F RVW MEDS BY RX/DR IN RCRD: CPT | Performed by: FAMILY MEDICINE

## 2024-05-07 PROCEDURE — 87880 STREP A ASSAY W/OPTIC: CPT | Performed by: FAMILY MEDICINE

## 2024-05-07 PROCEDURE — 3051F HG A1C>EQUAL 7.0%<8.0%: CPT | Performed by: FAMILY MEDICINE

## 2024-05-07 RX ORDER — GUAIFENESIN 600 MG/1
1200 TABLET, EXTENDED RELEASE ORAL 2 TIMES DAILY
Start: 2024-05-07

## 2024-05-07 RX ORDER — AZITHROMYCIN 250 MG/1
TABLET, FILM COATED ORAL
Qty: 6 TABLET | Refills: 0 | Status: SHIPPED | OUTPATIENT
Start: 2024-05-07

## 2024-05-14 ENCOUNTER — OFFICE VISIT (OUTPATIENT)
Dept: FAMILY MEDICINE CLINIC | Facility: CLINIC | Age: 74
End: 2024-05-14
Payer: MEDICARE

## 2024-05-14 VITALS
WEIGHT: 159 LBS | OXYGEN SATURATION: 96 % | HEART RATE: 74 BPM | DIASTOLIC BLOOD PRESSURE: 73 MMHG | TEMPERATURE: 98.6 F | BODY MASS INDEX: 28.17 KG/M2 | RESPIRATION RATE: 14 BRPM | SYSTOLIC BLOOD PRESSURE: 149 MMHG | HEIGHT: 63 IN

## 2024-05-14 DIAGNOSIS — R05.9 COUGH, UNSPECIFIED TYPE: ICD-10-CM

## 2024-05-14 DIAGNOSIS — J98.01 ACUTE BRONCHOSPASM: Primary | ICD-10-CM

## 2024-05-14 PROCEDURE — 99213 OFFICE O/P EST LOW 20 MIN: CPT | Performed by: FAMILY MEDICINE

## 2024-05-14 PROCEDURE — 1159F MED LIST DOCD IN RCRD: CPT | Performed by: FAMILY MEDICINE

## 2024-05-14 PROCEDURE — 3077F SYST BP >= 140 MM HG: CPT | Performed by: FAMILY MEDICINE

## 2024-05-14 PROCEDURE — 1160F RVW MEDS BY RX/DR IN RCRD: CPT | Performed by: FAMILY MEDICINE

## 2024-05-14 PROCEDURE — 3078F DIAST BP <80 MM HG: CPT | Performed by: FAMILY MEDICINE

## 2024-05-14 PROCEDURE — 3051F HG A1C>EQUAL 7.0%<8.0%: CPT | Performed by: FAMILY MEDICINE

## 2024-05-14 PROCEDURE — 1126F AMNT PAIN NOTED NONE PRSNT: CPT | Performed by: FAMILY MEDICINE

## 2024-05-14 RX ORDER — BUDESONIDE, GLYCOPYRROLATE, AND FORMOTEROL FUMARATE 160; 9; 4.8 UG/1; UG/1; UG/1
2 AEROSOL, METERED RESPIRATORY (INHALATION) 2 TIMES DAILY
Qty: 10.7 G | Refills: 0 | COMMUNITY
Start: 2024-05-14

## 2024-05-14 RX ORDER — IPRATROPIUM BROMIDE AND ALBUTEROL SULFATE 2.5; .5 MG/3ML; MG/3ML
3 SOLUTION RESPIRATORY (INHALATION) ONCE
Status: SHIPPED | OUTPATIENT
Start: 2024-05-14

## 2024-05-14 NOTE — PROGRESS NOTES
Subjective   Jenna Rosa is a 74 y.o. female.     Chief Complaint   Patient presents with    Cough     Patient states that sometimes her cough is dry and sometimes its productive and wheezing on and off. Patient has been taking cough syrup and mucinex twice a day. Patient states that the inhaler doesn't seem to be making a difference. Patient states that the antibiotic seemed to help but once she finished it the cough went back to how it was before.          Current Outpatient Medications:     acebutolol (SECTRAL) 200 MG capsule, TAKE 1 CAPSULE BY MOUTH DAILY, Disp: 90 capsule, Rfl: 1    acetaminophen (TYLENOL) 325 MG tablet, Take 2 tablets by mouth Every 6 (Six) Hours As Needed for Mild Pain., Disp: , Rfl:     albuterol sulfate  (90 Base) MCG/ACT inhaler, Inhale 2 puffs Every 6 (Six) Hours As Needed for Shortness of Air (cough)., Disp: 18 g, Rfl: 0    amLODIPine (NORVASC) 10 MG tablet, Take 1 tablet by mouth Every Night., Disp: 90 tablet, Rfl: 1    aspirin 81 MG EC tablet, Take 1 tablet by mouth Daily., Disp: , Rfl:     azelastine (OPTIVAR) 0.05 % ophthalmic solution, Administer 2 drops to both eyes 2 (Two) Times a Day As Needed., Disp: , Rfl:     Cholecalciferol (Vitamin D) 50 MCG (2000 UT) capsule, Take 1 capsule by mouth Daily., Disp: , Rfl:     Continuous Blood Gluc  (FreeStyle Adelaide 2 [place]) device, USE WITH SENSOR EVERY 14 DAYS, Disp: 1 each, Rfl: 0    Continuous Blood Gluc Sensor (FreeStyle Adelaide 2 Sensor) misc, Inject 1 each under the skin into the appropriate area as directed Daily., Disp: 6 each, Rfl: 2    diclofenac (VOLTAREN) 1 % gel gel, Apply 4 g topically to the appropriate area as directed 4 (Four) Times a Day As Needed., Disp: , Rfl:     estradiol (ESTRACE) 0.1 MG/GM vaginal cream, , Disp: , Rfl:     fenofibrate 160 MG tablet, Take 1 tablet by mouth Daily., Disp: 90 tablet, Rfl: 2    folic acid (FOLVITE) 1 MG tablet, TAKE 1 TABLET BY MOUTH DAILY, Disp: 90 tablet, Rfl: 1     gabapentin (NEURONTIN) 300 MG capsule, Take 1 capsule by mouth 2 (Two) Times a Day., Disp: 180 capsule, Rfl: 0    glucose monitor monitoring kit, Use 1 each 3 (Three) Times a Day As Needed (for BG monitoring)., Disp: 1 each, Rfl: 0    guaiFENesin (Mucinex) 600 MG 12 hr tablet, Take 2 tablets by mouth 2 (Two) Times a Day., Disp: , Rfl:     Humira Pen 40 MG/0.4ML Pen-injector Kit, Inject 40 mg under the skin into the appropriate area as directed Every 14 (Fourteen) Days., Disp: 2 each, Rfl: 3    Insulin NPH Isophane & Regular (HumuLIN 70/30 KwikPen) (70-30) 100 UNIT/ML suspension pen-injector, Inject 18 Units under the skin into the appropriate area as directed Daily Before Supper., Disp: , Rfl:     Insulin NPH Isophane & Regular (HumuLIN 70/30 KwikPen) (70-30) 100 UNIT/ML suspension pen-injector, Inject 21 Units under the skin into the appropriate area as directed 2 (Two) Times a Day Before Meals. Before breakfast and lunch, Disp: , Rfl:     Insulin Pen Needle 32G X 4 MM misc, Use 1 each 2 (Two) Times a Day., Disp: 200 each, Rfl: 3    isosorbide mononitrate (IMDUR) 60 MG 24 hr tablet, Take 1 tablet by mouth Daily With Breakfast., Disp: 90 tablet, Rfl: 1    Janumet -1000 MG tablet, TAKE 1 TABLET BY MOUTH DAILY, Disp: 90 tablet, Rfl: 1    lansoprazole (PREVACID) 30 MG capsule, TAKE 1 CAPSULE BY MOUTH DAILY, Disp: 90 capsule, Rfl: 1    lisinopril (PRINIVIL,ZESTRIL) 40 MG tablet, Take 1 tablet by mouth Daily With Breakfast., Disp: , Rfl:     loratadine (Claritin) 10 MG tablet, Take 1 tablet by mouth Every Night., Disp: , Rfl:     meclizine (ANTIVERT) 12.5 MG tablet, 1/2 - 1 po q6-8hrs prn dizzyness, Disp: 20 tablet, Rfl: 0    methotrexate 2.5 MG tablet, Take 5 tablets by mouth 1 (One) Time Per Week., Disp: 60 tablet, Rfl: 0    multivitamin with minerals (MULTIVITAMIN ADULTS PO), Take 1 tablet by mouth Daily., Disp: , Rfl:     ondansetron ODT (ZOFRAN-ODT) 4 MG disintegrating tablet, Place 1 tablet on the tongue  Every 8 (Eight) Hours As Needed for Nausea., Disp: 30 tablet, Rfl: 0    rosuvastatin (CRESTOR) 5 MG tablet, TAKE ONE TABLET BY MOUTH ONCE NIGHTLY, Disp: 90 tablet, Rfl: 0    Thiamine HCl (vitamin B-1) 50 MG tablet, Take 1 tablet by mouth Daily., Disp: , Rfl:     traZODone (DESYREL) 50 MG tablet, TAKE ONE TABLET BY MOUTH EVERY NIGHT AT BEDTIME, Disp: 90 tablet, Rfl: 0    vitamin B-12 (CYANOCOBALAMIN) 1000 MCG tablet, Take 1 tablet by mouth Daily., Disp: , Rfl:     Budeson-Glycopyrrol-Formoterol (Breztri Aerosphere) 160-9-4.8 MCG/ACT aerosol inhaler, Inhale 2 puffs 2 (Two) Times a Day. RINSE MOUTH AFTER USING, Disp: 10.7 g, Rfl: 0    Current Facility-Administered Medications:     ipratropium-albuterol (DUO-NEB) nebulizer solution 3 mL, 3 mL, Nebulization, Once, Stroot, Emily, DO    Past Medical History:   Diagnosis Date    CAD 2020    Colon polyp     Congenital heart disease 2017    Depression     DEXA     2019= (-0.7/ -1.6); 2023= (-0.7/ -2.7)    Diabetic peripheral neuropathy     Diverticulosis     GERD     Heart murmur 2017    Hyperlipidemia     Hypertension     Insomnia     Lower back pain     MAMMO     NEG = 2021/ 2023    Obesity     Osteoarthritis     Osteopenia     Osteoporosis     Palpitations     RA     Rheumatoid nodule 06/2011     rt. thumb x 2 and rt. & lt. 3rd fingers (Oct.2012)/ rt. elbow, left elbow, and epidermal inclusion cyst post. neck (June 2011)-----Dr. Whitmore    Skin Cancer 2021    Vitamin D deficiency        Past Surgical History:   Procedure Laterality Date    BLADDER SUSPENSION  10/11/2023        BREAST SURGERY  1972    CARDIAC CATHETERIZATION N/A 09/16/2020    Procedure: Left Heart Cath;  Surgeon: Walker Rodriguez MD;  Location: [facility] CATH INVASIVE LOCATION;  Service: Cardiovascular;  Laterality: N/A;    CARDIAC CATHETERIZATION N/A 09/16/2020    Procedure: Coronary angiography;  Surgeon: Walker Rodriguez MD;  Location: [facility] CATH INVASIVE LOCATION;   Service: Cardiovascular;  Laterality: N/A;    CARDIAC CATHETERIZATION  09/16/2020    Procedure: Functional Flow [place];  Surgeon: Kath Medina MD;  Location: [facility] CATH INVASIVE LOCATION;  Service: Cardiology;;    CARPAL TUNNEL RELEASE Right 02/20/2023    Procedure: CARPAL TUNNEL RELEASE WITH POSSIBLE SYNOVECTOMY;  Surgeon: Amadeo Magaña MD;  Location: [facility] MAIN OR;  Service: Orthopedics;  Laterality: Right;    CHOLECYSTECTOMY      COLONOSCOPY      2017 / 2023= TA, rech 2028? [facility]    CYST REMOVAL      Seb cyst on neck    EYE SURGERY      TOTAL ABDOMINAL HYSTERECTOMY WITH SALPINGO OOPHORECTOMY      VAGINAL PROLAPSE REPAIR       Uterine Prolapse repair       Family History   Problem Relation Age of Onset    Heart disease Mother     Diabetes Mother     Hypertension Mother     Rheum arthritis Mother     Coronary artery disease Mother     Alcohol abuse Mother     Heart failure Mother     Heart disease Father     Mental illness Father     Hypertension Father     Alcohol abuse Father     Early death Father     Heart attack Father     Heart failure Father     Kidney cancer Sister     Stroke Sister     Osteoarthritis Sister     Rheum arthritis Sister     Hypertension Sister     Arthritis Sister     Diabetes Sister     Stroke Sister     Hypertension Sister     Rheum arthritis Sister     Osteoarthritis Sister     Diabetes Sister     Arthritis Sister     Liver disease Sister     Kidney disease Sister     No Known Problems Brother     Rheum arthritis Brother     Osteoarthritis Brother     Hypertension Brother     Diabetes Maternal Grandfather        Social History     Socioeconomic History    Marital status:      Spouse name: Carson Rosa    Number of children: 3    Years of education: GED   Tobacco Use    Smoking status: Never     Passive exposure: Never    Smokeless tobacco: Never   Vaping Use    Vaping status: Never Used   Substance and Sexual Activity    Alcohol use: Yes     Comment: 3-4 a year     Drug use: Never    Sexual activity: Not Currently     Partners: Male     Birth control/protection: Hysterectomy, Same-sex partner       History of Present Illness  The patient is a 72-year-old female who presents with complaints of persistent cough.    The patient was evaluated at an urgent care facility on 05/07/2024 for a persistent cough, for which she was prescribed a Z-Felton and advised to continue Combivent and Mucinex. Prior to this, she sought medical attention at an urgent care facility where she was prescribed dexamethasone, cefdinir, and Combivent. Despite completing the Z-Felton course, she reports no improvement in her condition. She perceives a slight improvement in her condition with the administration of the antibiotic and Mucinex. She denies any difficulty in inhaling or coordinating inhalation with the Combivent, with the most recent use being last night. She also denies experiencing fatigue.        The following portions of the patient's history were reviewed and updated as appropriate: allergies, current medications, past family history, past medical history, past social history, past surgical history and problem list.    Review of Systems   Constitutional:  Positive for activity change. Negative for appetite change, chills, fever, unexpected weight gain and unexpected weight loss.   HENT:  Positive for postnasal drip and rhinorrhea. Negative for congestion, ear pain, sinus pressure and sore throat.    Respiratory:  Positive for cough and wheezing. Negative for shortness of breath.    Cardiovascular:  Negative for chest pain and palpitations.   Musculoskeletal:  Positive for myalgias.   Skin:  Negative for rash.       Vitals:    05/14/24 1428   BP: 149/73   Pulse: 74   Resp: 14   Temp: 98.6 °F (37 °C)   SpO2: 96%       Objective   Physical Exam  Vitals and nursing note reviewed.   Constitutional:       General: She is not in acute distress.     Appearance: She is not ill-appearing or  toxic-appearing.      Comments: Tight bronchial cough during appt   HENT:      Head: Normocephalic and atraumatic.   Pulmonary:      Effort: Pulmonary effort is normal. No respiratory distress.      Breath sounds: No wheezing, rhonchi or rales.   Neurological:      Mental Status: She is alert and oriented to person, place, and time.      Cranial Nerves: No cranial nerve deficit.   Psychiatric:         Mood and Affect: Mood normal.         Behavior: Behavior normal.         Thought Content: Thought content normal.         Judgment: Judgment normal.       Physical Exam       Assessment & Plan   Diagnoses and all orders for this visit:    1. Acute bronchospasm (Primary)  -     Nebulizer Treatment; Future  -     ipratropium-albuterol (DUO-NEB) nebulizer solution 3 mL    2. Cough, unspecified type  -     Nebulizer Treatment; Future  -     ipratropium-albuterol (DUO-NEB) nebulizer solution 3 mL    Other orders  -     Budeson-Glycopyrrol-Formoterol (Breztri Aerosphere) 160-9-4.8 MCG/ACT aerosol inhaler; Inhale 2 puffs 2 (Two) Times a Day. RINSE MOUTH AFTER USING  Dispense: 10.7 g; Refill: 0      Assessment & Plan  1. Bronchospasm.  The patient was provided with a sample of a maintenance inhaler, to be used as 2 puffs in the morning and 2 puffs in the evening for a duration of 2 weeks. Post-inhalation, she was instructed to rinse her mouth to prevent thrush. She may continue to use Albuterol HFA as needed. A breathing treatment will be administered today. Should the inhaler prove ineffective, consideration will be given to initiating a steroid taper.     Results            Patient or patient representative verbalized consent for the use of Ambient Listening during the visit with  Emily Burdick DO for chart documentation. 5/15/2024  15:02 EDT  Answers submitted by the patient for this visit:  Primary Reason for Visit (Submitted on 5/13/2024)  What is the primary reason for your visit?: Cough  Cough Questionnaire (Submitted  on 5/13/2024)  Chief Complaint: Cough  Chronicity: recurrent  Onset: 1 to 4 weeks ago  Progression since onset: unchanged  Frequency: every few minutes  Cough characteristics: rattling  ear congestion: No  headaches: Yes  heartburn: No  hemoptysis: No  nasal congestion: Yes  sweats: No  Aggravated by: nothing

## 2024-05-16 RX ORDER — ROSUVASTATIN CALCIUM 5 MG/1
5 TABLET, COATED ORAL NIGHTLY
Qty: 90 TABLET | Refills: 2 | Status: SHIPPED | OUTPATIENT
Start: 2024-05-16

## 2024-05-17 RX ORDER — PREDNISONE 20 MG/1
TABLET ORAL
Qty: 11 TABLET | Refills: 0 | Status: SHIPPED | OUTPATIENT
Start: 2024-05-17

## 2024-05-20 RX ORDER — INSULIN HUMAN 100 [IU]/ML
INJECTION, SUSPENSION SUBCUTANEOUS
Qty: 21 ML | Refills: 3 | Status: SHIPPED | OUTPATIENT
Start: 2024-05-20

## 2024-05-20 NOTE — TELEPHONE ENCOUNTER
Patient states she started a steroid several days ago and had to increase her insulin. She is now almost out and insurance states it is too early.  Patient is now taking 70/30 18 units in am and 23 in pm.  Sent in a new rx.  BG attached.    Her current dose 18 breakfast, 21 lunch, 23 supper.

## 2024-05-22 ENCOUNTER — OFFICE VISIT (OUTPATIENT)
Dept: CARDIOLOGY | Facility: CLINIC | Age: 74
End: 2024-05-22
Payer: MEDICARE

## 2024-05-22 VITALS
SYSTOLIC BLOOD PRESSURE: 144 MMHG | HEIGHT: 63 IN | WEIGHT: 159 LBS | DIASTOLIC BLOOD PRESSURE: 76 MMHG | OXYGEN SATURATION: 96 % | HEART RATE: 59 BPM | BODY MASS INDEX: 28.17 KG/M2

## 2024-05-22 DIAGNOSIS — R00.2 PALPITATIONS: ICD-10-CM

## 2024-05-22 DIAGNOSIS — E78.00 PURE HYPERCHOLESTEROLEMIA: ICD-10-CM

## 2024-05-22 DIAGNOSIS — I10 PRIMARY HYPERTENSION: ICD-10-CM

## 2024-05-22 DIAGNOSIS — E11.9 TYPE 2 DIABETES MELLITUS WITHOUT COMPLICATION, WITHOUT LONG-TERM CURRENT USE OF INSULIN: ICD-10-CM

## 2024-05-22 DIAGNOSIS — I25.10 CORONARY ARTERY DISEASE INVOLVING NATIVE CORONARY ARTERY OF NATIVE HEART WITHOUT ANGINA PECTORIS: Primary | ICD-10-CM

## 2024-05-22 NOTE — PROGRESS NOTES
Subjective:     Encounter Date:05/22/2024      Patient ID: Jenna Rosa is a 74 y.o. female.    Chief Complaint:  History of Present Illness 74-year-old white female with history of coronary disease hypertension hyperlipidemia and diabetes presents to the office for a follow-up.  Patient is currently stable without any symptoms of chest pain or shortness of breath at rest on exertion.  No complaint of any PND or orthopnea.  No palpitation dizziness syncope or swelling of the feet.  Patient is taking all the medicines regular.  Patient does not smoke.    The following portions of the patient's history were reviewed and updated as appropriate: allergies, current medications, past family history, past medical history, past social history, past surgical history, and problem list.  Past Medical History:   Diagnosis Date    CAD 2020    Colon polyp     Congenital heart disease 2017    Depression     DEXA     2019= (-0.7/ -1.6); 2023= (-0.7/ -2.7)    Diabetic peripheral neuropathy     Diverticulosis     GERD     Heart murmur 2017    Hyperlipidemia     Hypertension     Insomnia     Lower back pain     MAMMO     NEG = 2021/ 2023    Obesity     Osteoarthritis     Osteopenia     Osteoporosis     Palpitations     RA     Rheumatoid nodule 06/2011     rt. thumb x 2 and rt. & lt. 3rd fingers (Oct.2012)/ rt. elbow, left elbow, and epidermal inclusion cyst post. neck (June 2011)-----Dr. Whitmore    Skin Cancer 2021    Vitamin D deficiency      Past Surgical History:   Procedure Laterality Date    BLADDER SUSPENSION  10/11/2023        BREAST SURGERY  1972    CARDIAC CATHETERIZATION N/A 09/16/2020    Procedure: Left Heart Cath;  Surgeon: Walker Rodriguez MD;  Location: [facility] CATH INVASIVE LOCATION;  Service: Cardiovascular;  Laterality: N/A;    CARDIAC CATHETERIZATION N/A 09/16/2020    Procedure: Coronary angiography;  Surgeon: Walker Rodriguez MD;  Location: [facility] CATH INVASIVE LOCATION;   "Service: Cardiovascular;  Laterality: N/A;    CARDIAC CATHETERIZATION  09/16/2020    Procedure: Functional Flow [place];  Surgeon: Kath Medina MD;  Location: [facility] CATH INVASIVE LOCATION;  Service: Cardiology;;    CARPAL TUNNEL RELEASE Right 02/20/2023    Procedure: CARPAL TUNNEL RELEASE WITH POSSIBLE SYNOVECTOMY;  Surgeon: Amadeo Magaña MD;  Location: [facility] MAIN OR;  Service: Orthopedics;  Laterality: Right;    CHOLECYSTECTOMY      COLONOSCOPY      2017 / 2023= TA, rech 2028? [facility]    CYST REMOVAL      Seb cyst on neck    EYE SURGERY      TOTAL ABDOMINAL HYSTERECTOMY WITH SALPINGO OOPHORECTOMY      VAGINAL PROLAPSE REPAIR       Uterine Prolapse repair     /76 (BP Location: Right arm, Patient Position: Sitting, Cuff Size: Adult)   Pulse 59   Ht 160 cm (63\")   Wt 72.1 kg (159 lb)   SpO2 96%   BMI 28.17 kg/m²   Family History   Problem Relation Age of Onset    Heart disease Mother     Diabetes Mother     Hypertension Mother     Rheum arthritis Mother     Coronary artery disease Mother     Alcohol abuse Mother     Heart failure Mother     Heart disease Father     Mental illness Father     Hypertension Father     Alcohol abuse Father     Early death Father     Heart attack Father     Heart failure Father     Kidney cancer Sister     Stroke Sister     Osteoarthritis Sister     Rheum arthritis Sister     Hypertension Sister     Arthritis Sister     Diabetes Sister     Stroke Sister     Hypertension Sister     Rheum arthritis Sister     Osteoarthritis Sister     Diabetes Sister     Arthritis Sister     Liver disease Sister     Kidney disease Sister     No Known Problems Brother     Rheum arthritis Brother     Osteoarthritis Brother     Hypertension Brother     Diabetes Maternal Grandfather     Hypertension Sister        Current Outpatient Medications:     acebutolol (SECTRAL) 200 MG capsule, TAKE 1 CAPSULE BY MOUTH DAILY, Disp: 90 capsule, Rfl: 1    acetaminophen (TYLENOL) 325 MG tablet, Take " 2 tablets by mouth Every 6 (Six) Hours As Needed for Mild Pain., Disp: , Rfl:     albuterol sulfate  (90 Base) MCG/ACT inhaler, Inhale 2 puffs Every 6 (Six) Hours As Needed for Shortness of Air (cough)., Disp: 18 g, Rfl: 0    amLODIPine (NORVASC) 10 MG tablet, Take 1 tablet by mouth Every Night., Disp: 90 tablet, Rfl: 1    aspirin 81 MG EC tablet, Take 1 tablet by mouth Daily., Disp: , Rfl:     azelastine (OPTIVAR) 0.05 % ophthalmic solution, Administer 2 drops to both eyes 2 (Two) Times a Day As Needed., Disp: , Rfl:     Budeson-Glycopyrrol-Formoterol (Breztri Aerosphere) 160-9-4.8 MCG/ACT aerosol inhaler, Inhale 2 puffs 2 (Two) Times a Day. RINSE MOUTH AFTER USING, Disp: 10.7 g, Rfl: 0    Cholecalciferol (Vitamin D) 50 MCG (2000 UT) capsule, Take 1 capsule by mouth Daily., Disp: , Rfl:     Continuous Blood Gluc  (FreeStyle [place] 2 [place]) device, USE WITH SENSOR EVERY 14 DAYS, Disp: 1 each, Rfl: 0    Continuous Blood Gluc Sensor (FreeStyle [place] 2 Sensor) misc, Inject 1 each under the skin into the appropriate area as directed Daily., Disp: 6 each, Rfl: 2    diclofenac (VOLTAREN) 1 % gel gel, Apply 4 g topically to the appropriate area as directed 4 (Four) Times a Day As Needed., Disp: , Rfl:     estradiol (ESTRACE) 0.1 MG/GM vaginal cream, , Disp: , Rfl:     fenofibrate 160 MG tablet, Take 1 tablet by mouth Daily., Disp: 90 tablet, Rfl: 2    folic acid (FOLVITE) 1 MG tablet, TAKE 1 TABLET BY MOUTH DAILY, Disp: 90 tablet, Rfl: 1    gabapentin (NEURONTIN) 300 MG capsule, Take 1 capsule by mouth 2 (Two) Times a Day., Disp: 180 capsule, Rfl: 0    glucose monitor monitoring kit, Use 1 each 3 (Three) Times a Day As Needed (for BG monitoring)., Disp: 1 each, Rfl: 0    guaiFENesin (Mucinex) 600 MG 12 hr tablet, Take 2 tablets by mouth 2 (Two) Times a Day., Disp: , Rfl:     Humira Pen 40 MG/0.4ML Pen-injector Kit, Inject 40 mg under the skin into the appropriate area as directed Every 14 (Fourteen) Days.,  Disp: 2 each, Rfl: 3    Insulin NPH Isophane & Regular (HumuLIN 70/30 KwikPen) (70-30) 100 UNIT/ML suspension pen-injector, Patient to take 18 units before breakfast, 21 units before lunch and 23 units before dinner., Disp: 21 mL, Rfl: 3    Insulin Pen Needle 32G X 4 MM misc, Use 1 each 2 (Two) Times a Day., Disp: 200 each, Rfl: 3    isosorbide mononitrate (IMDUR) 60 MG 24 hr tablet, Take 1 tablet by mouth Daily With Breakfast., Disp: 90 tablet, Rfl: 1    Janumet -1000 MG tablet, TAKE 1 TABLET BY MOUTH DAILY, Disp: 90 tablet, Rfl: 1    lansoprazole (PREVACID) 30 MG capsule, TAKE 1 CAPSULE BY MOUTH DAILY, Disp: 90 capsule, Rfl: 1    lisinopril (PRINIVIL,ZESTRIL) 40 MG tablet, Take 1 tablet by mouth Daily With Breakfast., Disp: , Rfl:     loratadine (Claritin) 10 MG tablet, Take 1 tablet by mouth Every Night., Disp: , Rfl:     meclizine (ANTIVERT) 12.5 MG tablet, 1/2 - 1 po q6-8hrs prn dizzyness, Disp: 20 tablet, Rfl: 0    methotrexate 2.5 MG tablet, Take 5 tablets by mouth 1 (One) Time Per Week., Disp: 60 tablet, Rfl: 0    multivitamin with minerals (MULTIVITAMIN ADULTS PO), Take 1 tablet by mouth Daily., Disp: , Rfl:     nystatin (MYCOSTATIN) 100,000 unit/mL suspension, Swish and swallow 5 mL 4 (Four) Times a Day., Disp: 200 mL, Rfl: 0    ondansetron ODT (ZOFRAN-ODT) 4 MG disintegrating tablet, Place 1 tablet on the tongue Every 8 (Eight) Hours As Needed for Nausea., Disp: 30 tablet, Rfl: 0    predniSONE (DELTASONE) 20 MG tablet, 2 po qam x 3 days then 1 po qamx 3days then 1/2 tab po qam x 4 days, Disp: 11 tablet, Rfl: 0    rosuvastatin (CRESTOR) 5 MG tablet, TAKE ONE TABLET BY MOUTH ONCE NIGHTLY, Disp: 90 tablet, Rfl: 2    Thiamine HCl (vitamin B-1) 50 MG tablet, Take 1 tablet by mouth Daily., Disp: , Rfl:     traZODone (DESYREL) 50 MG tablet, TAKE ONE TABLET BY MOUTH EVERY NIGHT AT BEDTIME, Disp: 90 tablet, Rfl: 0    vitamin B-12 (CYANOCOBALAMIN) 1000 MCG tablet, Take 1 tablet by mouth Daily., Disp: , Rfl:      Current Facility-Administered Medications:     ipratropium-albuterol (DUO-NEB) nebulizer solution 3 mL, 3 mL, Nebulization, Once, Stroot, Emily, DO  Allergies   Allergen Reactions    Jardiance [Empagliflozin] Other (See Comments)     UTI's     Social History     Socioeconomic History    Marital status:      Spouse name: Carson Rosa    Number of children: 3    Years of education: GED   Tobacco Use    Smoking status: Never     Passive exposure: Never    Smokeless tobacco: Never   Vaping Use    Vaping status: Never Used   Substance and Sexual Activity    Alcohol use: Yes     Comment: 3-4 a year    Drug use: Never    Sexual activity: Not Currently     Partners: Male     Birth control/protection: Hysterectomy     Review of Systems   Constitutional: Negative for malaise/fatigue.   Cardiovascular:  Negative for chest pain, dyspnea on exertion, leg swelling and palpitations (VERY FEW).   Respiratory:  Negative for cough and shortness of breath.    Gastrointestinal:  Negative for abdominal pain, nausea and vomiting.   Neurological:  Negative for dizziness, focal weakness, headaches, light-headedness and numbness.   All other systems reviewed and are negative.             Objective:     Constitutional:       Appearance: Well-developed.   Eyes:      General: No scleral icterus.     Conjunctiva/sclera: Conjunctivae normal.   HENT:      Head: Normocephalic and atraumatic.   Neck:      Vascular: No carotid bruit or JVD.   Pulmonary:      Effort: Pulmonary effort is normal.      Breath sounds: Normal breath sounds. No wheezing. No rales.   Cardiovascular:      Normal rate. Regular rhythm.   Pulses:     Intact distal pulses.   Abdominal:      General: Bowel sounds are normal.      Palpations: Abdomen is soft.   Musculoskeletal:      Cervical back: Normal range of motion and neck supple. Skin:     General: Skin is warm and dry.      Findings: No rash.   Neurological:      Mental Status: Alert.       Procedures    Lab  Review:         MDM    #1 coronary disease  Patient had nonobstructive disease in the past with normal LV function is currently stable on medications  2.  Hypertension  Patient blood pressure currently slightly high and I will adjust medicines accordingly.  Patient is already on a schedule also.  3 palpitations  Patient has atrial arrhythmias and is currently on ACE Butalan.  4.  Hyperlipidemia  Patient on statins and the lipids are well within normal limits  5.  Diabetes  Patient on oral medicines and followed by the primary care doctor  Patient's previous medical records, labs, and EKG were reviewed and discussed with the patient at today's visit.

## 2024-05-26 PROCEDURE — 87186 SC STD MICRODIL/AGAR DIL: CPT | Performed by: NURSE PRACTITIONER

## 2024-05-26 PROCEDURE — 87077 CULTURE AEROBIC IDENTIFY: CPT | Performed by: NURSE PRACTITIONER

## 2024-05-26 PROCEDURE — 87086 URINE CULTURE/COLONY COUNT: CPT | Performed by: NURSE PRACTITIONER

## 2024-06-07 DIAGNOSIS — G62.9 NEUROPATHY: ICD-10-CM

## 2024-06-07 RX ORDER — GABAPENTIN 300 MG/1
300 CAPSULE ORAL 2 TIMES DAILY
Qty: 180 CAPSULE | Refills: 0 | Status: SHIPPED | OUTPATIENT
Start: 2024-06-07

## 2024-06-07 NOTE — TELEPHONE ENCOUNTER
Rx Refill Note  Requested Prescriptions     Pending Prescriptions Disp Refills    gabapentin (NEURONTIN) 300 MG capsule 180 capsule 0     Sig: Take 1 capsule by mouth 2 (Two) Times a Day.      Last office visit with prescribing clinician: 5/14/2024   Last telemedicine visit with prescribing clinician: Visit date not found   Next office visit with prescribing clinician: Visit date not found        Lipid Panel (03/21/2024 09:44)                  Would you like a call back once the refill request has been completed: [] Yes [] No    If the office needs to give you a call back, can they leave a voicemail: [] Yes [] No    Destini Snider, RT  06/07/24, 15:26 EDT

## 2024-06-12 RX ORDER — TRAZODONE HYDROCHLORIDE 50 MG/1
50 TABLET ORAL
Qty: 90 TABLET | Refills: 0 | Status: SHIPPED | OUTPATIENT
Start: 2024-06-12

## 2024-06-19 RX ORDER — BLOOD-GLUCOSE SENSOR
1 EACH MISCELLANEOUS
Qty: 6 EACH | Refills: 3 | Status: SHIPPED | OUTPATIENT
Start: 2024-06-19

## 2024-06-19 RX ORDER — KETOROLAC TROMETHAMINE 30 MG/ML
1 INJECTION, SOLUTION INTRAMUSCULAR; INTRAVENOUS DAILY
Qty: 1 EACH | Refills: 3 | Status: SHIPPED | OUTPATIENT
Start: 2024-06-19

## 2024-06-19 RX ORDER — KETOROLAC TROMETHAMINE 30 MG/ML
1 INJECTION, SOLUTION INTRAMUSCULAR; INTRAVENOUS DAILY
Qty: 1 EACH | Refills: 3 | Status: SHIPPED | OUTPATIENT
Start: 2024-06-19 | End: 2024-06-19

## 2024-07-15 RX ORDER — METHOTREXATE 2.5 MG/1
TABLET ORAL
Qty: 60 TABLET | Refills: 0 | Status: SHIPPED | OUTPATIENT
Start: 2024-07-15

## 2024-07-15 NOTE — TELEPHONE ENCOUNTER
Rx Refill Note  Requested Prescriptions     Pending Prescriptions Disp Refills    methotrexate 2.5 MG tablet [Pharmacy Med Name: METHOTREXATE 2.5 MG TABLET] 60 tablet 0     Sig: TAKE FIVE TABLETS BY MOUTH ONCE WEEKLY      Last office visit with prescribing clinician: 5/14/2024   Last telemedicine visit with prescribing clinician: Visit date not found   Next office visit with prescribing clinician: Visit date not found        Lipid Panel (03/21/2024 09:44)                  Would you like a call back once the refill request has been completed: [] Yes [] No    If the office needs to give you a call back, can they leave a voicemail: [] Yes [] No    Destini Snider, RT  07/15/24, 09:03 EDT

## 2024-07-17 RX ORDER — FOLIC ACID 1 MG/1
1000 TABLET ORAL DAILY
Qty: 90 TABLET | Refills: 1 | Status: SHIPPED | OUTPATIENT
Start: 2024-07-17

## 2024-07-22 ENCOUNTER — CLINICAL SUPPORT (OUTPATIENT)
Dept: FAMILY MEDICINE CLINIC | Facility: CLINIC | Age: 74
End: 2024-07-22
Payer: MEDICARE

## 2024-07-22 DIAGNOSIS — R30.0 DYSURIA: Primary | ICD-10-CM

## 2024-07-22 DIAGNOSIS — R35.0 URINE FREQUENCY: ICD-10-CM

## 2024-07-22 LAB
BILIRUB BLD-MCNC: NEGATIVE MG/DL
CLARITY, POC: ABNORMAL
COLOR UR: YELLOW
EXPIRATION DATE: ABNORMAL
GLUCOSE UR STRIP-MCNC: ABNORMAL MG/DL
KETONES UR QL: NEGATIVE
LEUKOCYTE EST, POC: ABNORMAL
Lab: ABNORMAL
NITRITE UR-MCNC: NEGATIVE MG/ML
PH UR: 7.5 [PH] (ref 5–8)
PROT UR STRIP-MCNC: ABNORMAL MG/DL
RBC # UR STRIP: ABNORMAL /UL
SP GR UR: 1.02 (ref 1–1.03)
UROBILINOGEN UR QL: NORMAL

## 2024-07-22 PROCEDURE — 87086 URINE CULTURE/COLONY COUNT: CPT | Performed by: FAMILY MEDICINE

## 2024-07-22 PROCEDURE — 87077 CULTURE AEROBIC IDENTIFY: CPT | Performed by: FAMILY MEDICINE

## 2024-07-22 PROCEDURE — 81003 URINALYSIS AUTO W/O SCOPE: CPT | Performed by: FAMILY MEDICINE

## 2024-07-22 PROCEDURE — 87186 SC STD MICRODIL/AGAR DIL: CPT | Performed by: FAMILY MEDICINE

## 2024-07-22 RX ORDER — AMOXICILLIN AND CLAVULANATE POTASSIUM 500; 125 MG/1; MG/1
1 TABLET, FILM COATED ORAL 2 TIMES DAILY
Qty: 10 TABLET | Refills: 0 | Status: SHIPPED | OUTPATIENT
Start: 2024-07-22

## 2024-07-25 LAB
BACTERIA SPEC AEROBE CULT: ABNORMAL
BACTERIA SPEC AEROBE CULT: ABNORMAL

## 2024-07-29 ENCOUNTER — CLINICAL SUPPORT (OUTPATIENT)
Dept: FAMILY MEDICINE CLINIC | Facility: CLINIC | Age: 74
End: 2024-07-29
Payer: MEDICARE

## 2024-07-29 DIAGNOSIS — R35.0 FREQUENCY OF URINATION: ICD-10-CM

## 2024-07-29 DIAGNOSIS — Z87.440 RECENT URINARY TRACT INFECTION: Primary | ICD-10-CM

## 2024-07-29 LAB — HOLD SPECIMEN: NORMAL

## 2024-07-29 PROCEDURE — 87086 URINE CULTURE/COLONY COUNT: CPT | Performed by: FAMILY MEDICINE

## 2024-07-29 PROCEDURE — 81001 URINALYSIS AUTO W/SCOPE: CPT | Performed by: FAMILY MEDICINE

## 2024-07-30 LAB
BACTERIA UR QL AUTO: ABNORMAL /HPF
BILIRUB UR QL STRIP: NEGATIVE
CLARITY UR: CLEAR
COLOR UR: YELLOW
GLUCOSE UR STRIP-MCNC: NEGATIVE MG/DL
HGB UR QL STRIP.AUTO: NEGATIVE
HYALINE CASTS UR QL AUTO: ABNORMAL /LPF
KETONES UR QL STRIP: NEGATIVE
LEUKOCYTE ESTERASE UR QL STRIP.AUTO: ABNORMAL
NITRITE UR QL STRIP: NEGATIVE
PH UR STRIP.AUTO: 7.5 [PH] (ref 5–8)
PROT UR QL STRIP: NEGATIVE
RBC # UR STRIP: ABNORMAL /HPF
REF LAB TEST METHOD: ABNORMAL
SP GR UR STRIP: 1.01 (ref 1–1.03)
SQUAMOUS #/AREA URNS HPF: ABNORMAL /HPF
UROBILINOGEN UR QL STRIP: ABNORMAL
WBC # UR STRIP: ABNORMAL /HPF

## 2024-07-31 LAB — BACTERIA SPEC AEROBE CULT: NO GROWTH

## 2024-08-05 ENCOUNTER — TELEPHONE (OUTPATIENT)
Dept: FAMILY MEDICINE CLINIC | Facility: CLINIC | Age: 74
End: 2024-08-05

## 2024-08-05 DIAGNOSIS — R30.0 DYSURIA: Primary | ICD-10-CM

## 2024-08-05 NOTE — TELEPHONE ENCOUNTER
Caller: Jenna Rosa    Relationship: Self    Best call back number: [phone]     What orders are you requesting (i.e. lab or imaging): URINALYSIS   PAINFUL URINATION    Where will you receive your lab/imaging services: IN OFFICE

## 2024-08-06 ENCOUNTER — CLINICAL SUPPORT (OUTPATIENT)
Dept: FAMILY MEDICINE CLINIC | Facility: CLINIC | Age: 74
End: 2024-08-06
Payer: MEDICARE

## 2024-08-06 DIAGNOSIS — Z87.440 RECENT URINARY TRACT INFECTION: Primary | ICD-10-CM

## 2024-08-06 LAB — HOLD SPECIMEN: NORMAL

## 2024-08-06 PROCEDURE — 81001 URINALYSIS AUTO W/SCOPE: CPT | Performed by: FAMILY MEDICINE

## 2024-08-06 PROCEDURE — 87077 CULTURE AEROBIC IDENTIFY: CPT | Performed by: FAMILY MEDICINE

## 2024-08-06 PROCEDURE — 87086 URINE CULTURE/COLONY COUNT: CPT | Performed by: FAMILY MEDICINE

## 2024-08-06 PROCEDURE — 87186 SC STD MICRODIL/AGAR DIL: CPT

## 2024-08-07 LAB
AMORPH URATE CRY URNS QL MICRO: ABNORMAL /HPF
BACTERIA UR QL AUTO: ABNORMAL /HPF
BILIRUB UR QL STRIP: NEGATIVE
CLARITY UR: ABNORMAL
COLOR UR: YELLOW
GLUCOSE UR STRIP-MCNC: NEGATIVE MG/DL
HGB UR QL STRIP.AUTO: NEGATIVE
HYALINE CASTS UR QL AUTO: ABNORMAL /LPF
KETONES UR QL STRIP: NEGATIVE
LEUKOCYTE ESTERASE UR QL STRIP.AUTO: ABNORMAL
NITRITE UR QL STRIP: POSITIVE
PH UR STRIP.AUTO: >=9 [PH] (ref 5–8)
PROT UR QL STRIP: ABNORMAL
RBC # UR STRIP: ABNORMAL /HPF
REF LAB TEST METHOD: ABNORMAL
SP GR UR STRIP: 1.01 (ref 1–1.03)
SQUAMOUS #/AREA URNS HPF: ABNORMAL /HPF
UROBILINOGEN UR QL STRIP: ABNORMAL
WBC # UR STRIP: ABNORMAL /HPF

## 2024-08-07 RX ORDER — LEVOFLOXACIN 750 MG/1
750 TABLET, FILM COATED ORAL DAILY
Qty: 5 TABLET | Refills: 0 | Status: SHIPPED | OUTPATIENT
Start: 2024-08-07

## 2024-08-08 LAB — BACTERIA SPEC AEROBE CULT: ABNORMAL

## 2024-08-08 RX ORDER — CEFDINIR 300 MG/1
300 CAPSULE ORAL 2 TIMES DAILY
Qty: 14 CAPSULE | Refills: 0 | Status: SHIPPED | OUTPATIENT
Start: 2024-08-08

## 2024-08-12 RX ORDER — LANSOPRAZOLE 30 MG/1
30 CAPSULE, DELAYED RELEASE ORAL DAILY
Qty: 90 CAPSULE | Refills: 1 | Status: SHIPPED | OUTPATIENT
Start: 2024-08-12

## 2024-08-19 RX ORDER — FENOFIBRATE 160 MG/1
160 TABLET ORAL DAILY
Qty: 90 TABLET | Refills: 2 | Status: SHIPPED | OUTPATIENT
Start: 2024-08-19

## 2024-08-26 ENCOUNTER — PRIOR AUTHORIZATION (OUTPATIENT)
Dept: FAMILY MEDICINE CLINIC | Facility: CLINIC | Age: 74
End: 2024-08-26
Payer: MEDICARE

## 2024-08-26 DIAGNOSIS — M05.9 SEROPOSITIVE RHEUMATOID ARTHRITIS: ICD-10-CM

## 2024-08-26 DIAGNOSIS — Z79.899 LONG-TERM USE OF HIGH-RISK MEDICATION: ICD-10-CM

## 2024-08-26 NOTE — TELEPHONE ENCOUNTER
HUB to read    PA approved for Humira Pen (CF) 40MG/0.4ML pen-injector kit    PA approval was faxed to [facility]    Outcome  Approved today by [employer] 2017  PA Case: [id number], Status: Approved, Coverage Starts on: 5/27/2024 12:00:00 AM, Coverage Ends on: 8/26/2025 12:00:00 AM.  Authorization Expiration Date: 8/25/2025

## 2024-08-27 DIAGNOSIS — Z79.899 LONG-TERM USE OF HIGH-RISK MEDICATION: Primary | ICD-10-CM

## 2024-08-27 RX ORDER — ADALIMUMAB 40MG/0.4ML
KIT SUBCUTANEOUS
Qty: 2 EACH | Refills: 3 | Status: SHIPPED | OUTPATIENT
Start: 2024-08-27

## 2024-08-27 NOTE — TELEPHONE ENCOUNTER
Rx Refill Note  Requested Prescriptions     Pending Prescriptions Disp Refills    Humira, 2 Pen, 40 MG/0.4ML Pen-injector Kit [Pharmacy Med Name: HUMIRA(CF) PEN 40 MG/0.4 ML] 2 each 3     Sig: INJECT 40MG UNDER THE SKIN EVERY 14 DAYS      Last office visit with prescribing clinician: 5/14/2024   Last telemedicine visit with prescribing clinician: Visit date not found   Next office visit with prescribing clinician: Visit date not found     CBC & Differential (04/12/2024 04:04)   Basic Metabolic Panel (04/12/2024 04:04)   Lipid Panel (03/21/2024 09:44)                     Would you like a call back once the refill request has been completed: [] Yes [] No    If the office needs to give you a call back, can they leave a voicemail: [] Yes [] No    Brianda Patino CMA  08/27/24, 09:21 EDT

## 2024-08-28 RX ORDER — BLOOD-GLUCOSE SENSOR
1 EACH MISCELLANEOUS
Qty: 6 EACH | Refills: 3 | Status: SHIPPED | OUTPATIENT
Start: 2024-08-28

## 2024-08-28 RX ORDER — BLOOD-GLUCOSE SENSOR
1 EACH MISCELLANEOUS
Qty: 6 EACH | Refills: 3 | Status: SHIPPED | OUTPATIENT
Start: 2024-08-28 | End: 2024-08-28 | Stop reason: SDUPTHER

## 2024-08-28 RX ORDER — KETOROLAC TROMETHAMINE 30 MG/ML
1 INJECTION, SOLUTION INTRAMUSCULAR; INTRAVENOUS DAILY
Qty: 1 EACH | Refills: 3 | Status: SHIPPED | OUTPATIENT
Start: 2024-08-28

## 2024-09-03 DIAGNOSIS — G62.9 NEUROPATHY: ICD-10-CM

## 2024-09-03 RX ORDER — ACEBUTOLOL HYDROCHLORIDE 200 MG/1
200 CAPSULE ORAL DAILY
Qty: 30 CAPSULE | Refills: 0 | Status: SHIPPED | OUTPATIENT
Start: 2024-09-03

## 2024-09-03 RX ORDER — GABAPENTIN 300 MG/1
300 CAPSULE ORAL 2 TIMES DAILY
Qty: 180 CAPSULE | Refills: 0 | Status: SHIPPED | OUTPATIENT
Start: 2024-09-03

## 2024-09-03 RX ORDER — AMLODIPINE BESYLATE 10 MG/1
10 TABLET ORAL NIGHTLY
Qty: 90 TABLET | Refills: 0 | Status: SHIPPED | OUTPATIENT
Start: 2024-09-03 | End: 2024-09-05 | Stop reason: SDUPTHER

## 2024-09-03 RX ORDER — SITAGLIPTIN AND METFORMIN HYDROCHLORIDE 1000; 100 MG/1; MG/1
1 TABLET, FILM COATED, EXTENDED RELEASE ORAL DAILY
Qty: 90 TABLET | Refills: 0 | Status: SHIPPED | OUTPATIENT
Start: 2024-09-03

## 2024-09-03 RX ORDER — LISINOPRIL 40 MG/1
40 TABLET ORAL
Qty: 90 TABLET | Refills: 0 | Status: SHIPPED | OUTPATIENT
Start: 2024-09-03

## 2024-09-03 RX ORDER — ISOSORBIDE MONONITRATE 60 MG/1
60 TABLET, EXTENDED RELEASE ORAL
Qty: 90 TABLET | Refills: 0 | Status: SHIPPED | OUTPATIENT
Start: 2024-09-03

## 2024-09-05 RX ORDER — TRAZODONE HYDROCHLORIDE 50 MG/1
50 TABLET, FILM COATED ORAL
Qty: 90 TABLET | Refills: 0 | Status: SHIPPED | OUTPATIENT
Start: 2024-09-05

## 2024-09-05 RX ORDER — AMLODIPINE BESYLATE 10 MG/1
10 TABLET ORAL NIGHTLY
Qty: 90 TABLET | Refills: 1 | Status: SHIPPED | OUTPATIENT
Start: 2024-09-05

## 2024-09-05 NOTE — TELEPHONE ENCOUNTER
Incoming Refill Request      Medication requested (name and dose): AMLODIPINE BESYLATE 10MG    Pharm[facility] where request should be sent: [facility]    Additional details provided by patient: N/A    Best call back number:     Does the patient have less than a 3 day supply:  [] Yes  [x] No    Yoanna Gutierrez Rep  09/05/24, 14:54 EDT

## 2024-09-10 ENCOUNTER — TELEPHONE (OUTPATIENT)
Dept: ENDOCRINOLOGY | Facility: CLINIC | Age: 74
End: 2024-09-10

## 2024-09-10 DIAGNOSIS — Z79.4 TYPE 2 DIABETES MELLITUS WITH HYPERGLYCEMIA, WITH LONG-TERM CURRENT USE OF INSULIN: Primary | ICD-10-CM

## 2024-09-10 DIAGNOSIS — E11.65 TYPE 2 DIABETES MELLITUS WITH HYPERGLYCEMIA, WITH LONG-TERM CURRENT USE OF INSULIN: Primary | ICD-10-CM

## 2024-09-18 ENCOUNTER — TELEPHONE (OUTPATIENT)
Dept: ENDOCRINOLOGY | Facility: CLINIC | Age: 74
End: 2024-09-18

## 2024-09-18 NOTE — TELEPHONE ENCOUNTER
PROVIDER CALLING TO NOTIFY CLINICAL TEAM: DR TAYLOR NO LONGER WITH [facility].   NEW FAX: [phone]  NEW PHONE: [phone]

## 2024-10-03 ENCOUNTER — OFFICE VISIT (OUTPATIENT)
Dept: FAMILY MEDICINE CLINIC | Facility: CLINIC | Age: 74
End: 2024-10-03
Payer: MEDICARE

## 2024-10-03 VITALS
HEIGHT: 63 IN | WEIGHT: 163 LBS | TEMPERATURE: 98 F | DIASTOLIC BLOOD PRESSURE: 60 MMHG | RESPIRATION RATE: 16 BRPM | BODY MASS INDEX: 28.88 KG/M2 | OXYGEN SATURATION: 96 % | HEART RATE: 57 BPM | SYSTOLIC BLOOD PRESSURE: 115 MMHG

## 2024-10-03 DIAGNOSIS — Z12.31 SCREENING MAMMOGRAM FOR BREAST CANCER: ICD-10-CM

## 2024-10-03 DIAGNOSIS — R42 DIZZY SPELLS: Primary | ICD-10-CM

## 2024-10-03 DIAGNOSIS — Z23 NEED FOR INFLUENZA VACCINATION: ICD-10-CM

## 2024-10-03 DIAGNOSIS — R06.02 SHORTNESS OF BREATH: ICD-10-CM

## 2024-10-03 DIAGNOSIS — R68.2 DRY MOUTH: ICD-10-CM

## 2024-10-03 PROCEDURE — 99214 OFFICE O/P EST MOD 30 MIN: CPT | Performed by: FAMILY MEDICINE

## 2024-10-03 PROCEDURE — 3051F HG A1C>EQUAL 7.0%<8.0%: CPT | Performed by: FAMILY MEDICINE

## 2024-10-03 PROCEDURE — 1125F AMNT PAIN NOTED PAIN PRSNT: CPT | Performed by: FAMILY MEDICINE

## 2024-10-03 PROCEDURE — G0008 ADMIN INFLUENZA VIRUS VAC: HCPCS | Performed by: FAMILY MEDICINE

## 2024-10-03 PROCEDURE — 3074F SYST BP LT 130 MM HG: CPT | Performed by: FAMILY MEDICINE

## 2024-10-03 PROCEDURE — 3078F DIAST BP <80 MM HG: CPT | Performed by: FAMILY MEDICINE

## 2024-10-03 PROCEDURE — 90662 IIV NO PRSV INCREASED AG IM: CPT | Performed by: FAMILY MEDICINE

## 2024-10-03 RX ORDER — TRAMADOL HYDROCHLORIDE 50 MG/1
TABLET ORAL
COMMUNITY
Start: 2024-09-24

## 2024-10-03 RX ORDER — VIBEGRON 75 MG/1
1 TABLET, FILM COATED ORAL DAILY
Qty: 21 TABLET | Refills: 0 | COMMUNITY
Start: 2024-10-03

## 2024-10-03 RX ORDER — TOLTERODINE 4 MG/1
4 CAPSULE, EXTENDED RELEASE ORAL DAILY
COMMUNITY
Start: 2024-09-03 | End: 2024-10-03

## 2024-10-03 RX ORDER — LIDOCAINE 40 MG/G
1 CREAM TOPICAL AS NEEDED
COMMUNITY

## 2024-10-03 NOTE — PROGRESS NOTES
Answers submitted by the patient for this visit:  Other (Submitted on 10/1/2024)  Please describe your symptoms.: Dizzy making me unstable, joints hurting  get short of breath  Have you had these symptoms before?: No  How long have you been having these symptoms?: 5-7 days  Please list any medications you are currently taking for this condition.: None  Please describe any probable cause for these symptoms. : Hard to do my housework  Primary Reason for Visit (Submitted on 10/1/2024)  What is the primary reason for your visit?: Problem Not Listed  Subjective   Jenna Rosa is a 74 y.o. female.     Chief Complaint   Patient presents with    Dizziness     Dizzy,lightheaded,dry mouth, feels off balance.          Current Outpatient Medications:     acebutolol (SECTRAL) 200 MG capsule, TAKE 1 CAPSULE BY MOUTH DAILY, Disp: 30 capsule, Rfl: 0    acetaminophen (TYLENOL) 325 MG tablet, Take 2 tablets by mouth Every 6 (Six) Hours As Needed for Mild Pain., Disp: , Rfl:     albuterol sulfate  (90 Base) MCG/ACT inhaler, Inhale 2 puffs Every 6 (Six) Hours As Needed for Shortness of Air (cough)., Disp: 18 g, Rfl: 0    amLODIPine (NORVASC) 10 MG tablet, Take 1 tablet by mouth Every Night., Disp: 90 tablet, Rfl: 1    aspirin 81 MG EC tablet, Take 1 tablet by mouth Daily., Disp: , Rfl:     azelastine (OPTIVAR) 0.05 % ophthalmic solution, Administer 2 drops to both eyes 2 (Two) Times a Day As Needed., Disp: , Rfl:     Budeson-Glycopyrrol-Formoterol (Breztri Aerosphere) 160-9-4.8 MCG/ACT aerosol inhaler, Inhale 2 puffs 2 (Two) Times a Day. RINSE MOUTH AFTER USING, Disp: 10.7 g, Rfl: 0    Continuous Glucose  (FreeStyle Adelaide 3 [place]) device, Use 1 package Daily., Disp: 1 each, Rfl: 3    Continuous Glucose Sensor (FreeStyle Adelaide 3 Sensor) misc, Use 1 package Every 14 (Fourteen) Days., Disp: 6 each, Rfl: 3    estradiol (ESTRACE) 0.1 MG/GM vaginal cream, , Disp: , Rfl:     fenofibrate 160 MG tablet, TAKE 1 TABLET BY MOUTH  DAILY, Disp: 90 tablet, Rfl: 2    folic acid (FOLVITE) 1 MG tablet, TAKE 1 TABLET BY MOUTH DAILY, Disp: 90 tablet, Rfl: 1    gabapentin (NEURONTIN) 300 MG capsule, Take 1 capsule by mouth 2 (Two) Times a Day., Disp: 180 capsule, Rfl: 0    Humira, 2 Pen, 40 MG/0.4ML Pen-injector Kit, INJECT 40MG UNDER THE SKIN EVERY 14 DAYS, Disp: 2 each, Rfl: 3    Insulin NPH Isophane & Regular (HumuLIN 70/30 KwikPen) (70-30) 100 UNIT/ML suspension pen-injector, Patient to take 18 units before breakfast, 21 units before lunch and 23 units before dinner., Disp: 21 mL, Rfl: 3    Insulin Pen Needle 32G X 4 MM misc, Use 1 each 2 (Two) Times a Day., Disp: 200 each, Rfl: 3    isosorbide mononitrate (IMDUR) 60 MG 24 hr tablet, Take 1 tablet by mouth Daily With Breakfast., Disp: 90 tablet, Rfl: 0    lansoprazole (PREVACID) 30 MG capsule, TAKE 1 CAPSULE BY MOUTH DAILY, Disp: 90 capsule, Rfl: 1    lisinopril (PRINIVIL,ZESTRIL) 40 MG tablet, Take 1 tablet by mouth Daily With Breakfast., Disp: 90 tablet, Rfl: 0    loratadine (Claritin) 10 MG tablet, Take 1 tablet by mouth Every Night., Disp: , Rfl:     meclizine (ANTIVERT) 12.5 MG tablet, 1/2 - 1 po q6-8hrs prn dizzyness, Disp: 20 tablet, Rfl: 0    methotrexate 2.5 MG tablet, TAKE FIVE TABLETS BY MOUTH ONCE WEEKLY, Disp: 60 tablet, Rfl: 0    ondansetron ODT (ZOFRAN-ODT) 4 MG disintegrating tablet, Place 1 tablet on the tongue Every 8 (Eight) Hours As Needed for Nausea., Disp: 30 tablet, Rfl: 0    rosuvastatin (CRESTOR) 5 MG tablet, TAKE ONE TABLET BY MOUTH ONCE NIGHTLY, Disp: 90 tablet, Rfl: 2    Salicylic Acid (COMPOUND W EX), Gabapentin/Ibuprofen/Lidocaine/Baclofen 10/3/4/2% APPLY AREA 3 OR 4 TIMES DAILY, Disp: , Rfl:     SITagliptin-metFORMIN HCl ER (Janumet XR) 100-1000 MG tablet, Take 1 tablet by mouth Daily., Disp: 90 tablet, Rfl: 0    Thiamine HCl (vitamin B-1) 50 MG tablet, Take 1 tablet by mouth Daily., Disp: , Rfl:     tolterodine LA (DETROL LA) 4 MG 24 hr capsule, Take 1 capsule by  mouth Daily., Disp: , Rfl:     traMADol (ULTRAM) 50 MG tablet, Take 1-2 tablets by mouth twice a day as needed for pain, Disp: , Rfl:     traZODone (DESYREL) 50 MG tablet, TAKE 1 TABLET BY MOUTH EVERY NIGHT AT BEDTIME, Disp: 90 tablet, Rfl: 0    vitamin B-12 (CYANOCOBALAMIN) 1000 MCG tablet, Take 1 tablet by mouth Daily., Disp: , Rfl:     cefdinir (OMNICEF) 300 MG capsule, Take 1 capsule by mouth 2 (Two) Times a Day., Disp: 14 capsule, Rfl: 0    Cholecalciferol (Vitamin D) 50 MCG (2000 UT) capsule, Take 1 capsule by mouth Daily., Disp: , Rfl:     diclofenac (VOLTAREN) 1 % gel gel, Apply 4 g topically to the appropriate area as directed 4 (Four) Times a Day As Needed., Disp: , Rfl:     glucose monitor monitoring kit, Use 1 each 3 (Three) Times a Day As Needed (for BG monitoring)., Disp: 1 each, Rfl: 0    guaiFENesin (Mucinex) 600 MG 12 hr tablet, Take 2 tablets by mouth 2 (Two) Times a Day., Disp: , Rfl:     levoFLOXacin (Levaquin) 750 MG tablet, Take 1 tablet by mouth Daily., Disp: 5 tablet, Rfl: 0    multivitamin with minerals (MULTIVITAMIN ADULTS PO), Take 1 tablet by mouth Daily., Disp: , Rfl:     nystatin (MYCOSTATIN) 100,000 unit/mL suspension, Swish and swallow 5 mL 4 (Four) Times a Day., Disp: 200 mL, Rfl: 0    predniSONE (DELTASONE) 20 MG tablet, 2 po qam x 3 days then 1 po qamx 3days then 1/2 tab po qam x 4 days, Disp: 11 tablet, Rfl: 0    Current Facility-Administered Medications:     ipratropium-albuterol (DUO-NEB) nebulizer solution 3 mL, 3 mL, Nebulization, Once, Stroot, Emily, DO    Past Medical History:   Diagnosis Date    CAD 2020    Colon polyp     Congenital heart disease 2017    Depression     DEXA     2019= (-0.7/ -1.6); 2023= (-0.7/ -2.7)    Diabetic peripheral neuropathy     Diverticulosis     GERD     Heart murmur 2017    Hyperlipidemia     Hypertension     Insomnia     Lower back pain     MAMMO     NEG = 2021/ 2023    Obesity     Osteoarthritis     Osteopenia     Osteoporosis      Palpitations     RA     Rheumatoid nodule 06/2011     rt. thumb x 2 and rt. & lt. 3rd fingers (Oct.2012)/ rt. elbow, left elbow, and epidermal inclusion cyst post. neck (June 2011)-----Dr. Whitmore    Skin Cancer 2021    Vitamin D deficiency        Past Surgical History:   Procedure Laterality Date    BLADDER SUSPENSION  10/11/2023        BREAST SURGERY  1972    CARDIAC CATHETERIZATION N/A 09/16/2020    Procedure: Left Heart Cath;  Surgeon: Walker Rodriguez MD;  Location: [facility] CATH INVASIVE LOCATION;  Service: Cardiovascular;  Laterality: N/A;    CARDIAC CATHETERIZATION N/A 09/16/2020    Procedure: Coronary angiography;  Surgeon: Walker Rodriguez MD;  Location:  [facility] CATH INVASIVE LOCATION;  Service: Cardiovascular;  Laterality: N/A;    CARDIAC CATHETERIZATION  09/16/2020    Procedure: Functional Flow [place];  Surgeon: Kath Medina MD;  Location: [facility] CATH INVASIVE LOCATION;  Service: Cardiology;;    CARPAL TUNNEL RELEASE Right 02/20/2023    Procedure: CARPAL TUNNEL RELEASE WITH POSSIBLE SYNOVECTOMY;  Surgeon: Amadeo Magaña MD;  Location: [facility] MAIN OR;  Service: Orthopedics;  Laterality: Right;    CHOLECYSTECTOMY      COLONOSCOPY      2017 / 2023= TA, rech 2028? [facility]    CYST REMOVAL      Seb cyst on neck    EYE SURGERY      TOTAL ABDOMINAL HYSTERECTOMY WITH SALPINGO OOPHORECTOMY      VAGINAL PROLAPSE REPAIR       Uterine Prolapse repair       Family History   Problem Relation Age of Onset    Heart disease Mother     Diabetes Mother     Hypertension Mother     Rheum arthritis Mother     Coronary artery disease Mother     Alcohol abuse Mother     Heart failure Mother     Heart disease Father     Mental illness Father     Hypertension Father     Alcohol abuse Father     Early death Father     Heart attack Father     Heart failure Father     Kidney cancer Sister     Stroke Sister     Osteoarthritis Sister     Rheum arthritis Sister     Hypertension Sister      Arthritis Sister     Diabetes Sister     Stroke Sister     Hypertension Sister     Rheum arthritis Sister     Osteoarthritis Sister     Diabetes Sister     Arthritis Sister     Liver disease Sister     Kidney disease Sister     No Known Problems Brother     Rheum arthritis Brother     Osteoarthritis Brother     Hypertension Brother     Diabetes Maternal Grandfather     Hypertension Sister        Social History     Socioeconomic History    Marital status:      Spouse name: Carson Rosa    Number of children: 3    Years of education: GED   Tobacco Use    Smoking status: Never     Passive exposure: Never    Smokeless tobacco: Never   Vaping Use    Vaping status: Never Used   Substance and Sexual Activity    Alcohol use: Yes     Comment: 3-4 a year    Drug use: Never    Sexual activity: Not Currently     Partners: Male     Birth control/protection: Hysterectomy       History of Present Illness  The patient is a 74-year-old  female who presents for evaluation of dizziness, joint pain, shortness of breath, and dry mouth.    She has been experiencing lightheadedness and imbalance for about a week, occurring 2 to 3 times daily. She has tried meclizine for her dizziness, but it did not provide relief.    She continues to experience breathing difficulties despite using Breztri twice daily and relies heavily on her rescue inhaler. She also reports a persistently runny nose.    She has severe dry mouth, which she attributes to her bladder medication, Detrol. She has been taking this medication daily since the beginning of the year and believes it helps her control her urination. She is considering stopping the medication to see if it alleviates her dry mouth symptoms.    She has arthritis nodules in her feet and uses topical creams for relief. She is scheduled to receive injections in her back from Dr. Gresham due to two bulging discs at L4 and L5, as revealed by an MRI. She takes one tramadol during the day  and two at night for pain management. She has tried using foot cream on her hand and wrapping it, which seemed to provide some relief. She is also on methotrexate and Humira for rheumatoid arthritis.    Her current medications include acebutolol 200 mg daily, amlodipine 10 mg daily, baby aspirin, Astelin eyedrops as needed, fenofibric acid 160 mg, folic acid, gabapentin 300 mg twice daily, Humira every 2 weeks, Imdur 60 mg, Prevacid 30 mg, lisinopril 40 mg, Claritin, Zofran as needed, Crestor 5 mg for cholesterol, compounded pain medicine, Detrol LA 4 mg, tramadol 50 mg 1 to 2 twice daily as needed, trazodone for sleep, and B12. She has discontinued her multivitamin but continues to take vitamin D, vitamin B, and folic acid. She uses Estradiol cream a few times a week and NPH 70/30 at varying doses. She occasionally skips the full dose of NPH 70/30 if her pre-meal blood sugar is around 106. She has reduced her trazodone dosage from 100 mg to 50 mg. She continues to take thiamine and monitors her blood sugar levels, which have not been low.    She has an upcoming appointment with Dr. Rangel and will have blood work done. She has previously had anemia and was hospitalized for it. She is not currently taking any thyroid medication.    ALLERGIES  She is allergic to JARDIANCE.        The following portions of the patient's history were reviewed and updated as appropriate: allergies, current medications, past family history, past medical history, past social history, past surgical history and problem list.    Review of Systems   HENT:  Positive for rhinorrhea.    Respiratory:  Positive for shortness of breath. Negative for cough and wheezing.    Musculoskeletal:  Positive for arthralgias.   Neurological:  Positive for dizziness and light-headedness.       Vitals:    10/03/24 0832   BP: 115/60   Pulse: 57   Resp: 16   Temp: 98 °F (36.7 °C)   SpO2: 96%       Objective   Physical Exam  Vitals and nursing note reviewed.    HENT:      Right Ear: Tympanic membrane, ear canal and external ear normal. There is no impacted cerumen.      Left Ear: Tympanic membrane, ear canal and external ear normal. There is no impacted cerumen.      Mouth/Throat:      Mouth: Mucous membranes are dry.   Pulmonary:      Effort: Pulmonary effort is normal.       Physical Exam    Vital Signs  BP= 115/60. P= 57.     Assessment & Plan   There are no diagnoses linked to this encounter.  Assessment & Plan  1. Dizziness.  She reports dizziness for about a week, occurring two to three times a day, and feeling both lightheaded and off-balance. Meclizine was tried but did not help. Her ears were checked and appeared clear with a little fluid. She was advised to avoid sudden movements and to stand still if dizziness occurs.    2. Shortness of breath.  She has been using her rescue inhaler more frequently this week due to trouble breathing. She uses Breztri twice a day but cannot tell if it helps. She was advised to continue using the rescue inhaler as needed and to monitor her symptoms.    3. Dry mouth.  Her dry mouth may be due to her bladder medication, Detrol, or a combination of Detrol and her pain medication, tramadol. Detrol was removed from her medication list and replaced with Gemtesa, to be taken once daily. She was advised to monitor for any changes in symptoms.    4. Osteoarthritis.  Her symptoms suggest regular osteoarthritis, causing daily discomfort, sometimes exacerbated by weather conditions. The pain is described as a dull ache, especially in her hands. She was advised to apply topical creams to other joints as needed, rest the affected area, and consider using fingerless gloves or heating pads to alleviate joint pain.    5. Health Maintenance.  She expressed interest in receiving the influenza vaccine and declined the shingles vaccine. Her pneumonia vaccination status is up-to-date. A mammogram was ordered. She was advised to continue using saline  rinses and consider steam inhalation for congestion relief. Information on the shingles and RSV vaccines was provided.       Results  Laboratory Studies  A1c was 7.5 in March. Vitamin D and folic acid levels were good in June 2023.    Imaging  MRI showed two bulging discs at L4 and L5.          Patient or patient representative verbalized consent for the use of Ambient Listening during the visit with  Emily Burdick DO for chart documentation. 10/20/2024  22:11 EDT

## 2024-10-04 PROCEDURE — 87086 URINE CULTURE/COLONY COUNT: CPT | Performed by: STUDENT IN AN ORGANIZED HEALTH CARE EDUCATION/TRAINING PROGRAM

## 2024-10-04 PROCEDURE — 87077 CULTURE AEROBIC IDENTIFY: CPT | Performed by: STUDENT IN AN ORGANIZED HEALTH CARE EDUCATION/TRAINING PROGRAM

## 2024-10-04 PROCEDURE — 87186 SC STD MICRODIL/AGAR DIL: CPT | Performed by: STUDENT IN AN ORGANIZED HEALTH CARE EDUCATION/TRAINING PROGRAM

## 2024-10-05 RX ORDER — METHOTREXATE 2.5 MG/1
TABLET ORAL
Qty: 60 TABLET | Refills: 0 | Status: SHIPPED | OUTPATIENT
Start: 2024-10-05

## 2024-10-05 NOTE — TELEPHONE ENCOUNTER
Rx Refill Note  Requested Prescriptions     Pending Prescriptions Disp Refills    methotrexate 2.5 MG tablet [Pharmacy Med Name: METHOTREXATE 2.5 MG TABLET] 60 tablet 0     Sig: TAKE 5 TABLETS BY MOUTH ONCE WEEKLY      Last office visit with prescribing clinician: 10/3/2024   Last telemedicine visit with prescribing clinician: Visit date not found   Next office visit with prescribing clinician: 1/3/2025        Lipid Panel (03/21/2024 09:44)                  Would you like a call back once the refill request has been completed: [] Yes [] No    If the office needs to give you a call back, can they leave a voicemail: [] Yes [] No    Destini Snider, RT  10/05/24, 07:37 EDT

## 2024-10-07 ENCOUNTER — LAB (OUTPATIENT)
Dept: LAB | Facility: HOSPITAL | Age: 74
End: 2024-10-07
Payer: MEDICARE

## 2024-10-07 DIAGNOSIS — E11.65 TYPE 2 DIABETES MELLITUS WITH HYPERGLYCEMIA, WITH LONG-TERM CURRENT USE OF INSULIN: ICD-10-CM

## 2024-10-07 DIAGNOSIS — Z79.4 TYPE 2 DIABETES MELLITUS WITH HYPERGLYCEMIA, WITH LONG-TERM CURRENT USE OF INSULIN: ICD-10-CM

## 2024-10-07 LAB
ALBUMIN SERPL-MCNC: 4 G/DL (ref 3.5–5.2)
ALBUMIN UR-MCNC: 9.3 MG/DL
ALBUMIN/GLOB SERPL: 1.3 G/DL
ALP SERPL-CCNC: 46 U/L (ref 39–117)
ALT SERPL W P-5'-P-CCNC: 12 U/L (ref 1–33)
ANION GAP SERPL CALCULATED.3IONS-SCNC: 8 MMOL/L (ref 5–15)
AST SERPL-CCNC: 10 U/L (ref 1–32)
BILIRUB SERPL-MCNC: 0.2 MG/DL (ref 0–1.2)
BUN SERPL-MCNC: 10 MG/DL (ref 8–23)
BUN/CREAT SERPL: 12.5 (ref 7–25)
CALCIUM SPEC-SCNC: 10 MG/DL (ref 8.6–10.5)
CHLORIDE SERPL-SCNC: 103 MMOL/L (ref 98–107)
CHOLEST SERPL-MCNC: 115 MG/DL (ref 0–200)
CO2 SERPL-SCNC: 27 MMOL/L (ref 22–29)
CREAT SERPL-MCNC: 0.8 MG/DL (ref 0.57–1)
CREAT UR-MCNC: 59.9 MG/DL
EGFRCR SERPLBLD CKD-EPI 2021: 77.9 ML/MIN/1.73
GLOBULIN UR ELPH-MCNC: 3 GM/DL
GLUCOSE SERPL-MCNC: 218 MG/DL (ref 65–99)
HBA1C MFR BLD: 7.68 % (ref 4.8–5.6)
HDLC SERPL-MCNC: 40 MG/DL (ref 40–60)
LDLC SERPL CALC-MCNC: 55 MG/DL (ref 0–100)
LDLC/HDLC SERPL: 1.32 {RATIO}
MICROALBUMIN/CREAT UR: 155.3 MG/G (ref 0–29)
POTASSIUM SERPL-SCNC: 4.6 MMOL/L (ref 3.5–5.2)
PROT SERPL-MCNC: 7 G/DL (ref 6–8.5)
SODIUM SERPL-SCNC: 138 MMOL/L (ref 136–145)
TRIGL SERPL-MCNC: 111 MG/DL (ref 0–150)
VLDLC SERPL-MCNC: 20 MG/DL (ref 5–40)

## 2024-10-07 PROCEDURE — 82570 ASSAY OF URINE CREATININE: CPT

## 2024-10-07 PROCEDURE — 83036 HEMOGLOBIN GLYCOSYLATED A1C: CPT

## 2024-10-07 PROCEDURE — 80061 LIPID PANEL: CPT

## 2024-10-07 PROCEDURE — 80053 COMPREHEN METABOLIC PANEL: CPT

## 2024-10-07 PROCEDURE — 36415 COLL VENOUS BLD VENIPUNCTURE: CPT

## 2024-10-07 PROCEDURE — 82043 UR ALBUMIN QUANTITATIVE: CPT

## 2024-10-09 ENCOUNTER — HOSPITAL ENCOUNTER (EMERGENCY)
Facility: HOSPITAL | Age: 74
Discharge: HOME | End: 2024-10-09
Payer: MEDICARE

## 2024-10-09 ENCOUNTER — APPOINTMENT (OUTPATIENT)
Dept: CT IMAGING | Facility: HOSPITAL | Age: 74
End: 2024-10-09
Payer: MEDICARE

## 2024-10-09 VITALS
WEIGHT: 161.38 LBS | HEART RATE: 62 BPM | HEIGHT: 63 IN | OXYGEN SATURATION: 98 % | TEMPERATURE: 97.6 F | BODY MASS INDEX: 28.59 KG/M2 | DIASTOLIC BLOOD PRESSURE: 78 MMHG | SYSTOLIC BLOOD PRESSURE: 130 MMHG | RESPIRATION RATE: 18 BRPM

## 2024-10-09 DIAGNOSIS — N30.90 CYSTITIS: Primary | ICD-10-CM

## 2024-10-09 LAB
ALBUMIN SERPL-MCNC: 4.2 G/DL (ref 3.5–5.2)
ALBUMIN/GLOB SERPL: 1.4 G/DL
ALP SERPL-CCNC: 46 U/L (ref 39–117)
ALT SERPL W P-5'-P-CCNC: 8 U/L (ref 1–33)
ANION GAP SERPL CALCULATED.3IONS-SCNC: 13.8 MMOL/L (ref 5–15)
AST SERPL-CCNC: 15 U/L (ref 1–32)
BACTERIA UR QL AUTO: ABNORMAL /HPF
BASOPHILS # BLD AUTO: 0.03 10*3/MM3 (ref 0–0.2)
BASOPHILS NFR BLD AUTO: 0.4 % (ref 0–1.5)
BILIRUB SERPL-MCNC: 0.2 MG/DL (ref 0–1.2)
BILIRUB UR QL STRIP: NEGATIVE
BUN SERPL-MCNC: 12 MG/DL (ref 8–23)
BUN/CREAT SERPL: 13.2 (ref 7–25)
CALCIUM SPEC-SCNC: 9.9 MG/DL (ref 8.6–10.5)
CHLORIDE SERPL-SCNC: 100 MMOL/L (ref 98–107)
CLARITY UR: ABNORMAL
CO2 SERPL-SCNC: 24.2 MMOL/L (ref 22–29)
COLOR UR: YELLOW
CREAT SERPL-MCNC: 0.91 MG/DL (ref 0.57–1)
DEPRECATED RDW RBC AUTO: 44.4 FL (ref 37–54)
EGFRCR SERPLBLD CKD-EPI 2021: 66.8 ML/MIN/1.73
EOSINOPHIL # BLD AUTO: 0.45 10*3/MM3 (ref 0–0.4)
EOSINOPHIL NFR BLD AUTO: 5.8 % (ref 0.3–6.2)
ERYTHROCYTE [DISTWIDTH] IN BLOOD BY AUTOMATED COUNT: 13.4 % (ref 12.3–15.4)
GLOBULIN UR ELPH-MCNC: 3.1 GM/DL
GLUCOSE SERPL-MCNC: 171 MG/DL (ref 65–99)
GLUCOSE UR STRIP-MCNC: NEGATIVE MG/DL
HCT VFR BLD AUTO: 38.7 % (ref 34–46.6)
HGB BLD-MCNC: 12.6 G/DL (ref 12–15.9)
HGB UR QL STRIP.AUTO: ABNORMAL
HOLD SPECIMEN: NORMAL
HYALINE CASTS UR QL AUTO: ABNORMAL /LPF
IMM GRANULOCYTES # BLD AUTO: 0.02 10*3/MM3 (ref 0–0.05)
IMM GRANULOCYTES NFR BLD AUTO: 0.3 % (ref 0–0.5)
KETONES UR QL STRIP: ABNORMAL
LEUKOCYTE ESTERASE UR QL STRIP.AUTO: ABNORMAL
LIPASE SERPL-CCNC: 24 U/L (ref 13–60)
LYMPHOCYTES # BLD AUTO: 3.09 10*3/MM3 (ref 0.7–3.1)
LYMPHOCYTES NFR BLD AUTO: 39.5 % (ref 19.6–45.3)
MCH RBC QN AUTO: 29.6 PG (ref 26.6–33)
MCHC RBC AUTO-ENTMCNC: 32.6 G/DL (ref 31.5–35.7)
MCV RBC AUTO: 91.1 FL (ref 79–97)
MONOCYTES # BLD AUTO: 0.83 10*3/MM3 (ref 0.1–0.9)
MONOCYTES NFR BLD AUTO: 10.6 % (ref 5–12)
NEUTROPHILS NFR BLD AUTO: 3.4 10*3/MM3 (ref 1.7–7)
NEUTROPHILS NFR BLD AUTO: 43.4 % (ref 42.7–76)
NITRITE UR QL STRIP: POSITIVE
NRBC BLD AUTO-RTO: 0 /100 WBC (ref 0–0.2)
PH UR STRIP.AUTO: 5.5 [PH] (ref 5–8)
PLATELET # BLD AUTO: 284 10*3/MM3 (ref 140–450)
PMV BLD AUTO: 9.6 FL (ref 6–12)
POTASSIUM SERPL-SCNC: 4.5 MMOL/L (ref 3.5–5.2)
PROT SERPL-MCNC: 7.3 G/DL (ref 6–8.5)
PROT UR QL STRIP: ABNORMAL
RBC # BLD AUTO: 4.25 10*6/MM3 (ref 3.77–5.28)
RBC # UR STRIP: ABNORMAL /HPF
REF LAB TEST METHOD: ABNORMAL
SODIUM SERPL-SCNC: 138 MMOL/L (ref 136–145)
SP GR UR STRIP: 1.01 (ref 1–1.03)
SQUAMOUS #/AREA URNS HPF: ABNORMAL /HPF
TRANS CELLS #/AREA URNS HPF: ABNORMAL /HPF
UROBILINOGEN UR QL STRIP: ABNORMAL
WBC # UR STRIP: ABNORMAL /HPF
WBC NRBC COR # BLD AUTO: 7.82 10*3/MM3 (ref 3.4–10.8)
WHOLE BLOOD HOLD COAG: NORMAL

## 2024-10-09 PROCEDURE — 96374 THER/PROPH/DIAG INJ IV PUSH: CPT

## 2024-10-09 PROCEDURE — 87086 URINE CULTURE/COLONY COUNT: CPT | Performed by: PHYSICIAN ASSISTANT

## 2024-10-09 PROCEDURE — 87186 SC STD MICRODIL/AGAR DIL: CPT | Performed by: PHYSICIAN ASSISTANT

## 2024-10-09 PROCEDURE — 25010000002 ONDANSETRON PER 1 MG: Performed by: PHYSICIAN ASSISTANT

## 2024-10-09 PROCEDURE — 81001 URINALYSIS AUTO W/SCOPE: CPT | Performed by: PHYSICIAN ASSISTANT

## 2024-10-09 PROCEDURE — 85025 COMPLETE CBC W/AUTO DIFF WBC: CPT | Performed by: PHYSICIAN ASSISTANT

## 2024-10-09 PROCEDURE — 83690 ASSAY OF LIPASE: CPT | Performed by: PHYSICIAN ASSISTANT

## 2024-10-09 PROCEDURE — 87088 URINE BACTERIA CULTURE: CPT | Performed by: PHYSICIAN ASSISTANT

## 2024-10-09 PROCEDURE — 74176 CT ABD & PELVIS W/O CONTRAST: CPT

## 2024-10-09 PROCEDURE — 99284 EMERGENCY DEPT VISIT MOD MDM: CPT

## 2024-10-09 PROCEDURE — 80053 COMPREHEN METABOLIC PANEL: CPT | Performed by: PHYSICIAN ASSISTANT

## 2024-10-09 RX ORDER — ONDANSETRON 2 MG/ML
4 INJECTION INTRAMUSCULAR; INTRAVENOUS ONCE
Status: COMPLETED | OUTPATIENT
Start: 2024-10-09 | End: 2024-10-09

## 2024-10-09 RX ORDER — NITROFURANTOIN 25; 75 MG/1; MG/1
100 CAPSULE ORAL 2 TIMES DAILY
Qty: 10 CAPSULE | Refills: 0 | Status: SHIPPED | OUTPATIENT
Start: 2024-10-09 | End: 2024-10-19

## 2024-10-09 RX ORDER — SODIUM CHLORIDE 0.9 % (FLUSH) 0.9 %
10 SYRINGE (ML) INJECTION AS NEEDED
Status: DISCONTINUED | OUTPATIENT
Start: 2024-10-09 | End: 2024-10-09 | Stop reason: HOSPADM

## 2024-10-09 RX ORDER — PHENAZOPYRIDINE HYDROCHLORIDE 200 MG/1
200 TABLET, FILM COATED ORAL 3 TIMES DAILY PRN
Qty: 6 TABLET | Refills: 0 | Status: SHIPPED | OUTPATIENT
Start: 2024-10-09

## 2024-10-09 RX ORDER — MELOXICAM 15 MG/1
TABLET ORAL
COMMUNITY
End: 2024-10-14

## 2024-10-09 RX ORDER — IPRATROPIUM BROMIDE AND ALBUTEROL 20; 100 UG/1; UG/1
SPRAY, METERED RESPIRATORY (INHALATION)
COMMUNITY

## 2024-10-09 RX ORDER — ADALIMUMAB 40MG/0.4ML
KIT SUBCUTANEOUS
COMMUNITY

## 2024-10-09 RX ORDER — TOLTERODINE 4 MG/1
1 CAPSULE, EXTENDED RELEASE ORAL DAILY
COMMUNITY

## 2024-10-09 RX ADMIN — ONDANSETRON 4 MG: 2 INJECTION, SOLUTION INTRAMUSCULAR; INTRAVENOUS at 14:31

## 2024-10-09 NOTE — ED PROVIDER NOTES
Subjective   History of Present Illness  Patient is a 74-year-old female presents to the ED with continued urinary complaints.  Patient states that she was seen at urgent care over the weekend was diagnosed UTI and started on cefuroxime.  Patient states she then saw her PCP 2 days ago for reassessment cannot really elaborate on this visit.  She reports continued suprapubic abdominal pain with some bilateral flank pain.  She reports associated nausea no vomiting.  No fever or chills.  No diarrhea or constipation.  She describes her pain as a constant sharp type pain worse with urination.     History provided by:  Patient      Review of Systems   Constitutional: Negative.    Respiratory: Negative.     Cardiovascular: Negative.    Gastrointestinal:  Positive for abdominal pain and nausea. Negative for abdominal distention, blood in stool, constipation, diarrhea and vomiting.   Genitourinary:  Positive for dysuria, flank pain and urgency.   Musculoskeletal:  Negative for back pain, neck pain and neck stiffness.   Skin: Negative.        Past Medical History:   Diagnosis Date    CAD 2020    Colon polyp     Congenital heart disease 2017    Depression     DEXA     2019= (-0.7/ -1.6); 2023= (-0.7/ -2.7)    Diabetic peripheral neuropathy     Diverticulosis     GERD     Heart murmur 2017    Hyperlipidemia     Hypertension     Insomnia     Lower back pain     MAMMO     NEG = 2021/ 2023    Obesity     Osteoarthritis     Osteopenia     Osteoporosis     Palpitations     RA     Rheumatoid nodule 06/2011     rt. thumb x 2 and rt. & lt. 3rd fingers (Oct.2012)/ rt. elbow, left elbow, and epidermal inclusion cyst post. neck (June 2011)-----Dr. Whitmore    Skin Cancer 2021    Vitamin D deficiency        Allergies   Allergen Reactions    Jardiance [Empagliflozin] Other (See Comments)     UTI's    Sitagliptin-Metformin Hcl Diarrhea       Past Surgical History:   Procedure Laterality Date    BLADDER SUSPENSION  10/11/2023         BREAST SURGERY  1972    CARDIAC CATHETERIZATION N/A 09/16/2020    Procedure: Left Heart Cath;  Surgeon: Walker Rodriguez MD;  Location: [facility] CATH INVASIVE LOCATION;  Service: Cardiovascular;  Laterality: N/A;    CARDIAC CATHETERIZATION N/A 09/16/2020    Procedure: Coronary angiography;  Surgeon: Walker Rodriguez MD;  Location: [facility] CATH INVASIVE LOCATION;  Service: Cardiovascular;  Laterality: N/A;    CARDIAC CATHETERIZATION  09/16/2020    Procedure: Functional Flow [place];  Surgeon: Kath Medina MD;  Location: [facility] CATH INVASIVE LOCATION;  Service: Cardiology;;    CARPAL TUNNEL RELEASE Right 02/20/2023    Procedure: CARPAL TUNNEL RELEASE WITH POSSIBLE SYNOVECTOMY;  Surgeon: Amadeo Magaña MD;  Location: [facility] MAIN OR;  Service: Orthopedics;  Laterality: Right;    CHOLECYSTECTOMY      COLONOSCOPY      2017 / 2023= TA, rech 2028? [facility]    CYST REMOVAL      Seb cyst on neck    EYE SURGERY      TOTAL ABDOMINAL HYSTERECTOMY WITH SALPINGO OOPHORECTOMY      VAGINAL PROLAPSE REPAIR       Uterine Prolapse repair       Family History   Problem Relation Age of Onset    Heart disease Mother     Diabetes Mother     Hypertension Mother     Rheum arthritis Mother     Coronary artery disease Mother     Alcohol abuse Mother     Heart failure Mother     Heart disease Father     Mental illness Father     Hypertension Father     Alcohol abuse Father     Early death Father     Heart attack Father     Heart failure Father     Kidney cancer Sister     Stroke Sister     Osteoarthritis Sister     Rheum arthritis Sister     Hypertension Sister     Arthritis Sister     Diabetes Sister     Stroke Sister     Hypertension Sister     Rheum arthritis Sister     Osteoarthritis Sister     Diabetes Sister     Arthritis Sister     Liver disease Sister     Kidney disease Sister     No Known Problems Brother     Rheum arthritis Brother     Osteoarthritis Brother     Hypertension Brother     Diabetes  Maternal Grandfather     Hypertension Sister        Social History     Socioeconomic History    Marital status:      Spouse name: Carson Rosa    Number of children: 3    Years of education: GED   Tobacco Use    Smoking status: Never     Passive exposure: Never    Smokeless tobacco: Never   Vaping Use    Vaping status: Never Used   Substance and Sexual Activity    Alcohol use: Yes     Comment: 3-4 a year    Drug use: Never    Sexual activity: Not Currently     Partners: Male     Birth control/protection: Hysterectomy           Objective   Physical Exam  Vitals and nursing note reviewed.   Constitutional:       General: She is not in acute distress.     Appearance: Normal appearance. She is well-developed. She is not ill-appearing, toxic-appearing or diaphoretic.   HENT:      Head: Normocephalic and atraumatic.      Mouth/Throat:      Mouth: Mucous membranes are moist.      Pharynx: Oropharynx is clear.   Eyes:      General: No scleral icterus.     Extraocular Movements: Extraocular movements intact.      Pupils: Pupils are equal, round, and reactive to light.   Cardiovascular:      Rate and Rhythm: Normal rate and regular rhythm.      Pulses: Normal pulses.      Heart sounds: No murmur heard.     No friction rub. No gallop.   Pulmonary:      Effort: Pulmonary effort is normal. No respiratory distress.      Breath sounds: Normal breath sounds. No stridor. No wheezing, rhonchi or rales.   Chest:      Chest wall: No tenderness.   Abdominal:      General: Bowel sounds are normal. There is no distension. There are no signs of injury.      Palpations: Abdomen is soft.      Tenderness: There is abdominal tenderness in the suprapubic area. There is no right CVA tenderness, left CVA tenderness, guarding or rebound.      Hernia: No hernia is present.   Skin:     General: Skin is warm.      Capillary Refill: Capillary refill takes less than 2 seconds.      Coloration: Skin is not cyanotic, jaundiced or pale.       "Findings: No rash.   Neurological:      General: No focal deficit present.      Mental Status: She is alert and oriented to person, place, and time.   Psychiatric:         Mood and Affect: Mood normal.         Behavior: Behavior normal.         Procedures           ED Course      /70   Pulse 60   Temp 97.6 °F (36.4 °C) (Oral)   Resp 18   Ht 160 cm (63\")   Wt 73.2 kg (161 lb 6 oz)   SpO2 98%   BMI 28.59 kg/m²   Medications   sodium chloride 0.9 % flush 10 mL (has no administration in time range)   ondansetron (ZOFRAN) injection 4 mg (4 mg Intravenous Given 10/9/24 1431)     Labs Reviewed   COMPREHENSIVE METABOLIC PANEL - Abnormal; Notable for the following components:       Result Value    Glucose 171 (*)     All other components within normal limits    Narrative:     GFR Normal >60  Chronic Kidney Disease <60  Kidney Failure <15    The GFR formula is only valid for adults with stable renal function between ages 18 and 70.   URINALYSIS W/ CULTURE IF INDICATED - Abnormal; Notable for the following components:    Appearance, UA Cloudy (*)     Ketones, UA Trace (*)     Blood, UA Small (1+) (*)     Protein,  mg/dL (2+) (*)     Leuk Esterase, UA Large (3+) (*)     Nitrite, UA Positive (*)     All other components within normal limits    Narrative:     In absence of clinical symptoms, the presence of pyuria, bacteria, and/or nitrites on the urinalysis result does not correlate with infection.   CBC WITH AUTO DIFFERENTIAL - Abnormal; Notable for the following components:    Eosinophils, Absolute 0.45 (*)     All other components within normal limits   URINALYSIS, MICROSCOPIC ONLY - Abnormal; Notable for the following components:    WBC, UA Too Numerous to Count (*)     Bacteria, UA 3+ (*)     Squamous Epithelial Cells, UA 3-6 (*)     All other components within normal limits   LIPASE - Normal   URINE CULTURE   CBC AND DIFFERENTIAL    Narrative:     The following orders were created for panel order CBC & " Differential.  Procedure                               Abnormality         Status                     ---------                               -----------         ------                     CBC Auto Differential[[id number]]        Abnormal            Final result                 Please view results for these tests on the individual orders.   EXTRA TUBES    Narrative:     The following orders were created for panel order Extra Tubes.  Procedure                               Abnormality         Status                     ---------                               -----------         ------                     Gold Top - SST[[id number]]                                   Final result               Light Blue Top[[id number]]                                   Final result                 Please view results for these tests on the individual orders.   GOLD TOP - SST   LIGHT BLUE TOP     CT Abdomen Pelvis Without Contrast   Final Result   Impression:   1.The bladder is incompletely distended with questionable mild wall thickening versus incomplete distention. Correlation with urinalysis is recommended. No nephroureterolithiasis or hydronephrosis.   2.Extensive diverticulosis without CT signs of acute diverticulitis. There is a moderate amount of stool throughout the colon.            Electronically Signed: Michelet Banks DO     10/9/2024 2:15 PM EDT     Workstation ID: [id number]                                               Medical Decision Making  Chart review Urgent care note reviewed from 10/4/2024 presented with UTI symptoms urine dip was significant for large leukocyte esterase positive nitrites was started on Ceftin urine culture from this urine dipstick is positive for ESBL E. coli it is susceptible to Macrobid    Differentials: UTI, cystitis, pyelonephritis, obstructing kidney stone     ;this list is not all inclusive and does not constitute the entirety of considered causes  Labs: As above  Radiology:   CT Abdomen Pelvis  Without Contrast   Final Result    Impression:    1.The bladder is incompletely distended with questionable mild wall thickening versus incomplete distention. Correlation with urinalysis is recommended. No nephroureterolithiasis or hydronephrosis.    2.Extensive diverticulosis without CT signs of acute diverticulitis. There is a moderate amount of stool throughout the colon.        Electronically Signed: Michelet Banks, DO      10/9/2024 2:15 PM EDT      Workstation ID: [id number]    Disposition/Treatment:  Appropriate PPE was worn during exam and throughout all encounters with the patient.  While in the ED IV was placed and labs were obtained patient was placed on proper monitor she was afebrile and appeared nontoxic was given Zofran for her nausea she presented to the ED with continued urinary complaints.  On chart review she did have urine culture sent on 10/4/2024 was positive for ESBL E. coli.  It is susceptible to Macrobid.  Labs and imaging were obtained CT showed signs of cystitis no signs of pyelonephritis or obstruction as above.Labs otherwise showed no leukocytosis or anemia.  Metabolic panel showed glucose 171 otherwise unremarkable.  Urinalysis shows continued UTI likely secondary to initial antibiotic not covering ESBL E. coli.  Lipase normal.    Upon reassessment patient is resting quietly findings were discussed with the patient at bedside voiced understanding and discharge along with signs and symptoms to return.  She will be get sent home on Macrobid, Zofran, Prodium.  All questions were answered    This document is intended for medical expert use only. Reading of this document by patients and/or patient's family without participating medical staff guidance may result in misinterpretation and unintended morbidity.  Any interpretation of such data is the responsibility of the patient and/or family member responsible for the patient in concert with their primary or specialist providers, not to be left  for sources of online searches such as [employer], [employer] or similar queries. Relying on these approaches to knowledge may result in misinterpretation, misguided goals of care and even death should patients or family members try recommendations outside of the realm of professional medical care in a supervised inpatient environment.       Amount and/or Complexity of Data Reviewed  Labs: ordered.  Radiology: ordered.    Risk  Prescription drug management.        Final diagnoses:   None       ED Disposition  ED Disposition       None            No follow-up provider specified.       Medication List      No changes were made to your prescriptions during this visit.            Dyllan Bellamy PA  10/09/24 8627

## 2024-10-09 NOTE — ED TRIAGE NOTES
Pt to ED via [facility] from home. Pt went to  Friday and was diagnosed with a UTI. Pt is having worsening symptoms.

## 2024-10-09 NOTE — DISCHARGE INSTRUCTIONS
Stop current antibiotic and start taking nitrofurantoin for the next 5 days.  Consider taking probiotic or eating yogurt daily while on antibiotic and up to 10 days after to replace the good bacteria in your gastrointestinal tract; this can reduce chances of developing a GI infection such as C difficile    Take Pyridium as needed for bladder spasms.  Continue nausea medication as previously directed    Follow-up with your primary care provider in 3-5 days.  If you do not have a primary care provider call [phone] for help in finding one, or you may follow up with [facility] at [phone].    Return to ED for any new or worsening symptom

## 2024-10-10 DIAGNOSIS — Z79.4 TYPE 2 DIABETES MELLITUS WITH HYPERGLYCEMIA, WITH LONG-TERM CURRENT USE OF INSULIN: Primary | ICD-10-CM

## 2024-10-10 DIAGNOSIS — E11.65 TYPE 2 DIABETES MELLITUS WITH HYPERGLYCEMIA, WITH LONG-TERM CURRENT USE OF INSULIN: Primary | ICD-10-CM

## 2024-10-11 LAB — BACTERIA SPEC AEROBE CULT: ABNORMAL

## 2024-10-11 NOTE — PROGRESS NOTES
Patient urine culture resulted with E. coli. Susceptible to Nitrofurantoin. Patient was given Rx for nitrofurantoin. Therapy is appropriate coverage. No further follow-up required.    Microbiology Results (last 10 days)       Procedure Component Value - Date/Time    Urine Culture - Urine, Urine, Clean Catch [[id number]]  (Abnormal)  (Susceptibility) Collected: 10/09/24 1323    Lab Status: Final result Specimen: Urine, Clean Catch Updated: 10/11/24 1355     Urine Culture >100,000 CFU/mL Escherichia coli ESBL    Narrative:      Colonization of the urinary tract without infection is common. Treatment is discouraged unless the patient is symptomatic, pregnant, or undergoing an invasive urologic procedure.  Recent outcomes data supports the use of pip/tazo in the treatment of susceptible ESBL infections for uncomplicated UTI. Consider use of pip/tazo as a carbapenem-sparing regimen in applicable patients.    Susceptibility        Escherichia coli ESBL      ISIDRO      Ertapenem <=0.5 ug/ml Susceptible      Gentamicin <=1 ug/ml Susceptible      Levofloxacin >=8 ug/ml Resistant      Meropenem <=0.25 ug/ml Susceptible      Nitrofurantoin <=16 ug/ml Susceptible      Piperacillin + Tazobactam <=4 ug/ml Susceptible      Trimethoprim + Sulfamethoxazole >=320 ug/ml Resistant                           Urine Culture - Urine, Urine, Clean Catch [[id number]]  (Abnormal)  (Susceptibility) Collected: 10/04/24 1111    Lab Status: Final result Specimen: Urine, Clean Catch Updated: 10/07/24 1137     Urine Culture >100,000 CFU/mL Escherichia coli ESBL     Comment:          Narrative:      Colonization of the urinary tract without infection is common. Treatment is discouraged unless the patient is symptomatic, pregnant, or undergoing an invasive urologic procedure.  Recent outcomes data supports the use of pip/tazo in the treatment of susceptible ESBL infections for uncomplicated UTI. Consider use of pip/tazo as a carbapenem-sparing regimen in  applicable patients.    Susceptibility        Escherichia coli ESBL      ISIDRO      Ertapenem <=0.5 ug/ml Susceptible      Gentamicin <=1 ug/ml Susceptible      Levofloxacin >=8 ug/ml Resistant      Meropenem <=0.25 ug/ml Susceptible      Nitrofurantoin <=16 ug/ml Susceptible      Piperacillin + Tazobactam <=4 ug/ml Susceptible      Trimethoprim + Sulfamethoxazole >=320 ug/ml Resistant                                   Margarita Villanueva [facility]  10/11/2024 16:31 EDT

## 2024-10-12 ENCOUNTER — DOCUMENTATION (OUTPATIENT)
Dept: FAMILY MEDICINE CLINIC | Facility: CLINIC | Age: 74
End: 2024-10-12
Payer: MEDICARE

## 2024-10-12 DIAGNOSIS — Z79.4 TYPE 2 DIABETES MELLITUS WITH HYPERGLYCEMIA, WITH LONG-TERM CURRENT USE OF INSULIN: Primary | ICD-10-CM

## 2024-10-12 DIAGNOSIS — E11.65 TYPE 2 DIABETES MELLITUS WITH HYPERGLYCEMIA, WITH LONG-TERM CURRENT USE OF INSULIN: Primary | ICD-10-CM

## 2024-10-12 RX ORDER — INSULIN HUMAN 100 [IU]/ML
INJECTION, SUSPENSION SUBCUTANEOUS
Qty: 21 ML | Refills: 0 | Status: SHIPPED | OUTPATIENT
Start: 2024-10-12 | End: 2024-10-14 | Stop reason: SDUPTHER

## 2024-10-14 ENCOUNTER — OFFICE VISIT (OUTPATIENT)
Dept: ENDOCRINOLOGY | Facility: CLINIC | Age: 74
End: 2024-10-14
Payer: MEDICARE

## 2024-10-14 ENCOUNTER — TELEPHONE (OUTPATIENT)
Dept: FAMILY MEDICINE CLINIC | Facility: CLINIC | Age: 74
End: 2024-10-14
Payer: MEDICARE

## 2024-10-14 VITALS
SYSTOLIC BLOOD PRESSURE: 140 MMHG | WEIGHT: 159 LBS | BODY MASS INDEX: 28.17 KG/M2 | OXYGEN SATURATION: 98 % | DIASTOLIC BLOOD PRESSURE: 75 MMHG | HEART RATE: 67 BPM | HEIGHT: 63 IN

## 2024-10-14 DIAGNOSIS — E11.65 TYPE 2 DIABETES MELLITUS WITH HYPERGLYCEMIA, WITH LONG-TERM CURRENT USE OF INSULIN: Primary | ICD-10-CM

## 2024-10-14 DIAGNOSIS — E11.42 DIABETIC PERIPHERAL NEUROPATHY: ICD-10-CM

## 2024-10-14 DIAGNOSIS — E78.2 MIXED HYPERLIPIDEMIA: ICD-10-CM

## 2024-10-14 DIAGNOSIS — Z79.4 TYPE 2 DIABETES MELLITUS WITH HYPERGLYCEMIA, WITH LONG-TERM CURRENT USE OF INSULIN: Primary | ICD-10-CM

## 2024-10-14 DIAGNOSIS — I10 ESSENTIAL HYPERTENSION: ICD-10-CM

## 2024-10-14 RX ORDER — INSULIN HUMAN 100 [IU]/ML
INJECTION, SUSPENSION SUBCUTANEOUS
Qty: 21 ML | Refills: 3 | Status: SHIPPED | OUTPATIENT
Start: 2024-10-14

## 2024-10-14 NOTE — PROGRESS NOTES
Endocrine Progress Note Outpatient     Patient Care Team:  Emily Burdick DO as PCP - General (Family Medicine)  Joe García MD as Consulting Physician (Cardiology)  Dano Rangel MD as Consulting Physician (Endocrinology)  Conchis Mohamud OD as Consulting Physician (Optometry)  Harinder Gresham MD as Consulting Physician (Pain Medicine)  Juan Richey MD as Consulting Physician (Gastroenterology)  Manish Graff MD as Consulting Physician (Urology)  Caleb Gamino RN as Ambulatory  ([facility])    Chief Complaint: Follow-up type 2 diabetes    HPI: 74-year-old female with history of type 2 diabetes, hypertension and hyperlipidemia is here for follow-up.    For type 2 diabetes: She is currently on Janumet /100, 1 tablet daily with Novolin 70/30 insulin, 20 units subcu 3 times a day.  She did bring in blood sugar records for review mostly running between 1 20-1 60.  No low blood sugars noted.    Hypertension: Well-controlled today.    Hyperlipidemia: On rosuvastatin.    Past Medical History:   Diagnosis Date    CAD 2020    Colon polyp     Congenital heart disease 2017    Depression     DEXA     2019= (-0.7/ -1.6); 2023= (-0.7/ -2.7)    Diabetic peripheral neuropathy     Diverticulosis     GERD     Heart murmur 2017    Hyperlipidemia     Hypertension     Insomnia     Lower back pain     MAMMO     NEG = 2021/ 2023    Obesity     Osteoarthritis     Osteopenia     Osteoporosis     Palpitations     RA     Rheumatoid nodule 06/2011     rt. thumb x 2 and rt. & lt. 3rd fingers (Oct.2012)/ rt. elbow, left elbow, and epidermal inclusion cyst post. neck (June 2011)-----Dr. Whitmore    Skin Cancer 2021    Vitamin D deficiency        Social History     Socioeconomic History    Marital status:      Spouse name: Carson Rosa    Number of children: 3    Years of education: GED   Tobacco Use    Smoking status: Never     Passive exposure: Never    Smokeless tobacco: Never    Vaping Use    Vaping status: Never Used   Substance and Sexual Activity    Alcohol use: Yes     Comment: 3-4 a year    Drug use: Never    Sexual activity: Not Currently     Partners: Male     Birth control/protection: Hysterectomy       Family History   Problem Relation Age of Onset    Heart disease Mother     Diabetes Mother     Hypertension Mother     Rheum arthritis Mother     Coronary artery disease Mother     Alcohol abuse Mother     Heart failure Mother     Heart disease Father     Mental illness Father     Hypertension Father     Alcohol abuse Father     Early death Father     Heart attack Father     Heart failure Father     Kidney cancer Sister     Stroke Sister     Osteoarthritis Sister     Rheum arthritis Sister     Hypertension Sister     Arthritis Sister     Diabetes Sister     Stroke Sister     Hypertension Sister     Rheum arthritis Sister     Osteoarthritis Sister     Diabetes Sister     Arthritis Sister     Liver disease Sister     Kidney disease Sister     No Known Problems Brother     Rheum arthritis Brother     Osteoarthritis Brother     Hypertension Brother     Diabetes Maternal Grandfather     Hypertension Sister        Allergies   Allergen Reactions    Jardiance [Empagliflozin] Other (See Comments)     UTI's    Sitagliptin-Metformin Hcl Diarrhea       ROS:   Constitutional:  Denies fatigue, tiredness.    Eyes:  Denies change in visual acuity   HENT:  Denies nasal congestion or sore throat   Respiratory: denies cough, admit shortness of breath.   Cardiovascular:  denies chest pain, edema   GI:  Denies abdominal pain, nausea, vomiting.   Musculoskeletal:  Denies back pain or joint pain   Integument:  Denies dry skin and rash   Neurologic:  Denies headache, focal weakness or sensory changes   Endocrine:  Denies polyuria or polydipsia   Psychiatric:  Denies depression or anxiety      Vitals:    10/14/24 1345   BP: 140/75   Pulse: 67   SpO2: 98%     Body mass index is 28.17 kg/m².     Physical  Exam:  GEN: NAD, conversant  EYES: EOMI,  NECK: no thyromegaly,  CV: RRR,   LUNG: CTA  PSYCH: AOX3, appropriate mood, affect normal      Results Review:     I reviewed the patient's new clinical results.    Lab Results   Component Value Date    HGBA1C 7.68 (H) 10/07/2024    HGBA1C 7.50 (H) 03/21/2024    HGBA1C 7.80 (H) 11/08/2023      Lab Results   Component Value Date    GLUCOSE 171 (H) 10/09/2024    BUN 12 10/09/2024    CREATININE 0.91 10/09/2024    EGFRIFNONA 78 11/15/2021    EGFRIFAFRI 59 (L) 08/13/2017    BCR 13.2 10/09/2024    K 4.5 10/09/2024    CO2 24.2 10/09/2024    CALCIUM 9.9 10/09/2024    ALBUMIN 4.2 10/09/2024    LABIL2 1.1 01/29/2019    AST 15 10/09/2024    ALT 8 10/09/2024    CHOL 115 10/07/2024    TRIG 111 10/07/2024    LDL 55 10/07/2024    HDL 40 10/07/2024     Lab Results   Component Value Date    TSH 3.840 04/11/2024     Freestyle stephany download interpretation: Dates covered was between October 1, 2024 to October 14, 2024.  Average blood sugar is 152.  Spending 80% in the range and 20% above range.  0% below range.  Glucose graph did not show significant fluctuations.    Medication Review: Reviewed.       Current Outpatient Medications:     acebutolol (SECTRAL) 200 MG capsule, TAKE 1 CAPSULE BY MOUTH DAILY, Disp: 30 capsule, Rfl: 0    acetaminophen (TYLENOL) 325 MG tablet, Take 2 tablets by mouth Every 6 (Six) Hours As Needed for Mild Pain., Disp: , Rfl:     Adalimumab (Humira, 2 Syringe,) 40 MG/0.4ML Prefilled Syringe Kit injection, Inject 0.4 mL every 2 weeks by subcutaneous route., Disp: , Rfl:     albuterol sulfate  (90 Base) MCG/ACT inhaler, Inhale 2 puffs Every 6 (Six) Hours As Needed for Shortness of Air (cough)., Disp: 18 g, Rfl: 0    amLODIPine (NORVASC) 10 MG tablet, Take 1 tablet by mouth Every Night., Disp: 90 tablet, Rfl: 1    aspirin 81 MG EC tablet, Take 1 tablet by mouth Daily., Disp: , Rfl:     azelastine (OPTIVAR) 0.05 % ophthalmic solution, Administer 2 drops to both eyes  2 (Two) Times a Day As Needed., Disp: , Rfl:     Continuous Glucose  (FreeStyle Adelaide 3 [place]) device, Use 1 package Daily., Disp: 1 each, Rfl: 3    Continuous Glucose Sensor (FreeStyle Adelaide 3 Sensor) misc, Use 1 package Every 14 (Fourteen) Days., Disp: 6 each, Rfl: 3    estradiol (ESTRACE) 0.1 MG/GM vaginal cream, , Disp: , Rfl:     fenofibrate 160 MG tablet, TAKE 1 TABLET BY MOUTH DAILY, Disp: 90 tablet, Rfl: 2    folic acid (FOLVITE) 1 MG tablet, TAKE 1 TABLET BY MOUTH DAILY, Disp: 90 tablet, Rfl: 1    gabapentin (NEURONTIN) 300 MG capsule, Take 1 capsule by mouth 2 (Two) Times a Day., Disp: 180 capsule, Rfl: 0    Humira, 2 Pen, 40 MG/0.4ML Pen-injector Kit, INJECT 40MG UNDER THE SKIN EVERY 14 DAYS, Disp: 2 each, Rfl: 3    Insulin NPH Isophane & Regular (HumuLIN 70/30 KwikPen) (70-30) 100 UNIT/ML suspension pen-injector, INJECT 18 UNITS UNDER THE SKIN BEFORE BREAKFAST, 21 UNITS BEFORE LUNCH, AND 23 UNITS BEFORE DINNER, Disp: 21 mL, Rfl: 3    Insulin Pen Needle 32G X 4 MM misc, Use 1 each 2 (Two) Times a Day., Disp: 200 each, Rfl: 3    ipratropium-albuterol (Combivent Respimat)  MCG/ACT inhaler, , Disp: , Rfl:     isosorbide mononitrate (IMDUR) 60 MG 24 hr tablet, Take 1 tablet by mouth Daily With Breakfast., Disp: 90 tablet, Rfl: 0    lansoprazole (PREVACID) 30 MG capsule, TAKE 1 CAPSULE BY MOUTH DAILY, Disp: 90 capsule, Rfl: 1    lidocaine (LMX) 4 % cream, Apply 1 Application topically to the appropriate area as directed As Needed for Moderate Pain. Compunded Pain Cream, Disp: , Rfl:     lisinopril (PRINIVIL,ZESTRIL) 40 MG tablet, Take 1 tablet by mouth Daily With Breakfast., Disp: 90 tablet, Rfl: 0    loratadine (Claritin) 10 MG tablet, Take 1 tablet by mouth Every Night., Disp: , Rfl:     meclizine (ANTIVERT) 12.5 MG tablet, 1/2 - 1 po q6-8hrs prn dizzyness, Disp: 20 tablet, Rfl: 0    methotrexate 2.5 MG tablet, TAKE 5 TABLETS BY MOUTH ONCE WEEKLY, Disp: 60 tablet, Rfl: 0    nitrofurantoin,  macrocrystal-monohydrate, (Macrobid) 100 MG capsule, Take 1 capsule by mouth 2 (Two) Times a Day., Disp: 10 capsule, Rfl: 0    ondansetron ODT (ZOFRAN-ODT) 4 MG disintegrating tablet, Place 1 tablet on the tongue Every 8 (Eight) Hours As Needed for Nausea., Disp: 30 tablet, Rfl: 0    phenazopyridine (PYRIDIUM) 200 MG tablet, Take 1 tablet by mouth 3 (Three) Times a Day As Needed for Bladder Spasms., Disp: 6 tablet, Rfl: 0    rosuvastatin (CRESTOR) 5 MG tablet, TAKE ONE TABLET BY MOUTH ONCE NIGHTLY, Disp: 90 tablet, Rfl: 2    SITagliptin-metFORMIN HCl ER (Janumet XR) 100-1000 MG tablet, Take 1 tablet by mouth Daily., Disp: 90 tablet, Rfl: 0    Thiamine HCl (vitamin B-1) 50 MG tablet, Take 1 tablet by mouth Daily., Disp: , Rfl:     tolterodine LA (DETROL LA) 4 MG 24 hr capsule, Take 1 capsule by mouth Daily., Disp: , Rfl:     traMADol (ULTRAM) 50 MG tablet, Take 1-2 tablets by mouth twice a day as needed for pain, Disp: , Rfl:     traZODone (DESYREL) 50 MG tablet, TAKE 1 TABLET BY MOUTH EVERY NIGHT AT BEDTIME, Disp: 90 tablet, Rfl: 0    Vibegron (Gemtesa) 75 MG tablet, Take 1 tablet by mouth Daily., Disp: 21 tablet, Rfl: 0    vitamin B-12 (CYANOCOBALAMIN) 1000 MCG tablet, Take 1 tablet by mouth Daily., Disp: , Rfl:       Assessment & Plan   1.  Diabetes mellitus type 2 with Hyperglycemia: Uncontrolled but is stable with A1c now at 7.6%.  I did review her freestyle stephany download.  No low blood sugars noted.  Will continue current management.  She is advised to continue to work on diet and activity and always keep glucose source in case of low blood sugars.  He tells me that she is going to get a steroid injection in her spine, advised her that she can take maybe 2 to 4 units extra for each dose for 2 days after the injection and then goes down to 20 units 3 times a day before meals.  She verbalized understanding.    2.  Hypertension: Fair control, continue lisinopril 20 mg p.o. daily..    3.  Hyperlipidemia:  Well-controlled, continue rosuvastatin.    4.  Diabetic peripheral neuropathy: Stable.    Assessment and plan from March 26, 2024 reviewed and updated.            Dano Rangel MD FACE.

## 2024-10-14 NOTE — PATIENT INSTRUCTIONS
Continue current management  Always keep glucose source in case of low blood sugars  Continue to work on diet and activity  Labs before follow-up.

## 2024-10-15 ENCOUNTER — CLINICAL SUPPORT (OUTPATIENT)
Dept: FAMILY MEDICINE CLINIC | Facility: CLINIC | Age: 74
End: 2024-10-15
Payer: MEDICARE

## 2024-10-15 DIAGNOSIS — Z87.440 RECENT URINARY TRACT INFECTION: Primary | ICD-10-CM

## 2024-10-15 PROCEDURE — 87088 URINE BACTERIA CULTURE: CPT | Performed by: FAMILY MEDICINE

## 2024-10-15 PROCEDURE — 87086 URINE CULTURE/COLONY COUNT: CPT | Performed by: FAMILY MEDICINE

## 2024-10-15 PROCEDURE — 87186 SC STD MICRODIL/AGAR DIL: CPT

## 2024-10-15 PROCEDURE — 81001 URINALYSIS AUTO W/SCOPE: CPT | Performed by: FAMILY MEDICINE

## 2024-10-16 ENCOUNTER — PATIENT OUTREACH (OUTPATIENT)
Dept: CASE MANAGEMENT | Facility: OTHER | Age: 74
End: 2024-10-16
Payer: MEDICARE

## 2024-10-16 LAB
BACTERIA UR QL AUTO: ABNORMAL /HPF
BILIRUB UR QL STRIP: NEGATIVE
CLARITY UR: ABNORMAL
COLOR UR: ABNORMAL
GLUCOSE UR STRIP-MCNC: NEGATIVE MG/DL
HGB UR QL STRIP.AUTO: NEGATIVE
HOLD SPECIMEN: NORMAL
HYALINE CASTS UR QL AUTO: ABNORMAL /LPF
KETONES UR QL STRIP: ABNORMAL
LEUKOCYTE ESTERASE UR QL STRIP.AUTO: ABNORMAL
NITRITE UR QL STRIP: POSITIVE
PH UR STRIP.AUTO: 6.5 [PH] (ref 5–8)
PROT UR QL STRIP: ABNORMAL
RBC # UR STRIP: ABNORMAL /HPF
REF LAB TEST METHOD: ABNORMAL
SP GR UR STRIP: 1.02 (ref 1–1.03)
SQUAMOUS #/AREA URNS HPF: ABNORMAL /HPF
UROBILINOGEN UR QL STRIP: ABNORMAL
WBC # UR STRIP: ABNORMAL /HPF

## 2024-10-16 NOTE — OUTREACH NOTE
AMBULATORY CASE MANAGEMENT NOTE    Names and Relationships of Patient/Support Persons: Contact: Jenna Rosa; Relationship: Self -     Patient Outreach    ACM completed successful outreach to patient. RN-ACM outreach call made to pt. and explained role of RN-MACIEM and reason for call. Pt. Agreed to case management. ACM following up with patient on ED visit on 10/4/24 for UTI and 10/9/24 for cystitis. Pt. reports feeling better now with completion of antibiotic treatment as of 10/14/24. Pt. completed labs/urine specimen for recent UTI at PCP office on 10/15/24. Pt. understands and acknowledges discharge orders with no further questions. Denies SOA, CP or fever. Reviewed ED AVS with pt. Education provided. She reports to be taking medications as directed. RN-ACM emphasized importance of medication adherence. Disease education provided.  Reviewed SDOH. She denies any needs. She reports to have good family support. Pt. reports managing processes currently. Pt. reports BP running within normal limits upon checking. Pt. utilizes a continuous monitor (Adelaide) and reports understanding of proper utilization to best support her DM management. Pt. reports understanding of healthy diet/lifestyle. Pt's previous A1c - 7.68. ACM provided vernal and written education. Pt. Reports that she tries to exercise as much as possible. Pt. walks daily. ACM provided patient with all ACP information. Pt. reports RA managed currently. No questions per pt. She expresses appreciation for the call. Pt engaged in HRCM services. Care plan created this call. Advised pt to call RN-ACM or [facility] 24 hour nurse line with any needs. Follow up outreach scheduled for 3-4 weeks. AC provided all contact information for all needs/inquiries.    Adult Patient Profile  Questions/Answers      Flowsheet Row Most Recent Value   Symptoms/Conditions Managed at Home cardiovascular, diabetes, type 2, gastrointestinal, musculoskeletal   Barriers to Managing Health  none   Cardiovascular Symptoms/Conditions coronary artery disease, hypertension, other (see comments)  [Hx. of angina pectoris/ hx. palpitations]   Cardiovascular Management Strategies coping strategies, diet modification, medication therapy, routine screening   Cardiovascular Self-Management Outcome unsure   Diabetes Management Strategies blood glucose testing, coping strategies, diet modification, medication therapy, routine screenings, weight management   Frequency of Blood Glucose Testing continuous   Last A1C Result 7.68   Diabetes Self-Management Outcome unsure   Gastrointestinal Symptoms/Conditions reflux/heartburn   Gastrointestinal Management Strategies coping strategies, medication therapy   Gastrointestinal Self-Management Outcome unsure   Musculoskeletal Symptoms/Conditions osteoarthritis   Musculoskeletal Management Strategies coping strategies, routine screening   Musculoskeletal Self-Management Outcome unsure   Barriers to Taking Medication as Prescribed none   Equipment Currently Used at Home glucometer, bp cuff, scales   Primary Source of Support/Comfort child(berta), sibling(s)   People in Home alone   Family Caregiver if Needed none   Current Living Arrangements home   Resource/Environmental Concerns none        Adult Wellness Assessment  Questions/Answers      Flowsheet Row Most Recent Value   How often do you have someone help you read facts about your health? never   How often do you have trouble learning about your health because you don't know what the written words mean? never   How confident are you filling out forms by yourself? always        Send Education  Questions/Answers      Flowsheet Row Most Recent Value   Annual Wellness Visit:  Patient Will Schedule   Other Patient Education/Resources  24/7 [facility] Nurse Call Line, Advanced Care Planning   24/7 Nurse Call Line Education Method Verbal   ACP Education Method Verbal   Advanced Directives: Send Materials        SDOH updated  and reviewed with the patient during this program:  Financial Resource Strain: Low Risk  (10/16/2024)    Overall Financial Resource Strain (CARDIA)     Difficulty of Paying Living Expenses: Not hard at all      --     Food Insecurity: No Food Insecurity (10/16/2024)    Hunger Vital Sign     Worried About Running Out of Food in the Last Year: Never true     Ran Out of Food in the Last Year: Never true      --     Transportation Needs: No Transportation Needs (10/16/2024)    PRAPARE - Transportation     Lack of Transportation (Medical): No     Lack of Transportation (Non-Medical): No      --     Utilities: Not At Risk (10/16/2024)    [facility] Utilities     Threatened with loss of utilities: No       Education Documentation  Self-Care, taught by Caleb Gamino RN at 10/16/2024  3:25 PM.  Learner: Patient  Readiness: Acceptance  Method: Explanation  Response: Verbalizes Understanding    Medication Management, taught by Caleb Gamino RN at 10/16/2024  3:25 PM.  Learner: Patient  Readiness: Acceptance  Method: Explanation  Response: Verbalizes Understanding    Blood Pressure Monitoring, taught by Caleb Gamino RN at 10/16/2024  3:25 PM.  Learner: Patient  Readiness: Acceptance  Method: Explanation  Response: Verbalizes Understanding    Description, taught by Caleb Gamino RN at 10/16/2024  3:25 PM.  Learner: Patient  Readiness: Acceptance  Method: Explanation  Response: Verbalizes Understanding    Monitoring Carbohydrate Intake, taught by Caleb Gamino RN at 10/16/2024  3:25 PM.  Learner: Patient  Readiness: Acceptance  Method: Explanation  Response: Verbalizes Understanding    Healthy Food Choices, taught by Caleb Gamino RN at 10/16/2024  3:25 PM.  Learner: Patient  Readiness: Acceptance  Method: Explanation  Response: Verbalizes Understanding    Carbohydrate-Containing Foods, taught by Caleb Gamino RN at 10/16/2024  3:25 PM.  Learner: Patient  Readiness: Acceptance  Method: Explanation  Response: Verbalizes  Understanding    Hypoglycemia Identification and Treatment, taught by Caleb Gamino, RN at 10/16/2024  3:25 PM.  Learner: Patient  Readiness: Acceptance  Method: Explanation  Response: Verbalizes Understanding    Hyperglycemia Identification and Treatment, taught by Caleb Gamino RN at 10/16/2024  3:25 PM.  Learner: Patient  Readiness: Acceptance  Method: Explanation  Response: Verbalizes Understanding    Blood Glucose Monitor Use, taught by Caleb Gamino RN at 10/16/2024  3:25 PM.  Learner: Patient  Readiness: Acceptance  Method: Explanation  Response: Verbalizes Understanding    Blood Glucose Monitoring, taught by Caleb Gamino RN at 10/16/2024  3:25 PM.  Learner: Patient  Readiness: Acceptance  Method: Explanation  Response: Verbalizes Understanding    Blood Glucose Goal, taught by Caleb Gamino RN at 10/16/2024  3:25 PM.  Learner: Patient  Readiness: Acceptance  Method: Explanation  Response: Verbalizes Understanding    A1C Goal, taught by Caleb Gamino RN at 10/16/2024  3:25 PM.  Learner: Patient  Readiness: Acceptance  Method: Explanation  Response: Verbalizes Understanding    Diabetes, Type 2, taught by Caleb Gamino, RN at 10/16/2024  3:25 PM.  Learner: Patient  Readiness: Acceptance  Method: Explanation  Response: Verbalizes Understanding          Caleb GTZ  Ambulatory Case Management    10/16/2024, 15:25 EDT

## 2024-10-18 LAB — BACTERIA SPEC AEROBE CULT: ABNORMAL

## 2024-10-23 DIAGNOSIS — E11.65 TYPE 2 DIABETES MELLITUS WITH HYPERGLYCEMIA, WITH LONG-TERM CURRENT USE OF INSULIN: ICD-10-CM

## 2024-10-23 DIAGNOSIS — Z79.4 TYPE 2 DIABETES MELLITUS WITH HYPERGLYCEMIA, WITH LONG-TERM CURRENT USE OF INSULIN: ICD-10-CM

## 2024-10-24 RX ORDER — PEN NEEDLE, DIABETIC 32GX 5/32"
NEEDLE, DISPOSABLE MISCELLANEOUS
Qty: 100 EACH | Refills: 1 | Status: SHIPPED | OUTPATIENT
Start: 2024-10-24 | End: 2024-10-28

## 2024-10-28 ENCOUNTER — CLINICAL SUPPORT (OUTPATIENT)
Dept: FAMILY MEDICINE CLINIC | Facility: CLINIC | Age: 74
End: 2024-10-28
Payer: MEDICARE

## 2024-10-28 DIAGNOSIS — R82.90 ABNORMAL URINALYSIS: Primary | ICD-10-CM

## 2024-10-28 DIAGNOSIS — E11.65 TYPE 2 DIABETES MELLITUS WITH HYPERGLYCEMIA, WITH LONG-TERM CURRENT USE OF INSULIN: ICD-10-CM

## 2024-10-28 DIAGNOSIS — Z87.440 RECENT URINARY TRACT INFECTION: ICD-10-CM

## 2024-10-28 DIAGNOSIS — Z79.4 TYPE 2 DIABETES MELLITUS WITH HYPERGLYCEMIA, WITH LONG-TERM CURRENT USE OF INSULIN: ICD-10-CM

## 2024-10-28 LAB
BILIRUB BLD-MCNC: NEGATIVE MG/DL
CLARITY, POC: CLEAR
COLOR UR: YELLOW
EXPIRATION DATE: ABNORMAL
GLUCOSE UR STRIP-MCNC: ABNORMAL MG/DL
KETONES UR QL: NEGATIVE
LEUKOCYTE EST, POC: NEGATIVE
Lab: ABNORMAL
NITRITE UR-MCNC: NEGATIVE MG/ML
PH UR: 7 [PH] (ref 5–8)
PROT UR STRIP-MCNC: NEGATIVE MG/DL
RBC # UR STRIP: NEGATIVE /UL
SP GR UR: 1.01 (ref 1–1.03)
UROBILINOGEN UR QL: NORMAL

## 2024-10-28 PROCEDURE — 81003 URINALYSIS AUTO W/O SCOPE: CPT | Performed by: FAMILY MEDICINE

## 2024-10-28 RX ORDER — SITAGLIPTIN AND METFORMIN HYDROCHLORIDE 1000; 100 MG/1; MG/1
1 TABLET, FILM COATED, EXTENDED RELEASE ORAL DAILY
Qty: 90 TABLET | Refills: 0 | Status: SHIPPED | OUTPATIENT
Start: 2024-10-28

## 2024-10-28 RX ORDER — PEN NEEDLE, DIABETIC 32GX 5/32"
NEEDLE, DISPOSABLE MISCELLANEOUS
Qty: 200 EACH | Refills: 1 | Status: SHIPPED | OUTPATIENT
Start: 2024-10-28

## 2024-10-28 NOTE — TELEPHONE ENCOUNTER
Incoming Refill Request      Medication requested (name and dose): MET KATHRYN -1000MG    Pharm[facility] where request should be sent: [facility]    Additional details provided by patient: N/A    Best call back number:     Does the patient have less than a 3 day supply:  [] Yes  [x] No    Yoanna Gutierrez Rep  10/28/24, 08:38 EDT

## 2024-11-04 ENCOUNTER — TELEPHONE (OUTPATIENT)
Dept: FAMILY MEDICINE CLINIC | Facility: CLINIC | Age: 74
End: 2024-11-04
Payer: MEDICARE

## 2024-11-04 NOTE — TELEPHONE ENCOUNTER
Patient called in requesting a pelvic exam as she feels her bladder has fallen and can feel a bulge. Was sending patient to ER when she stated she does see Dr Graff for Urology and has a bladder mesh placed by them. Patient was told to reach out to his office and follow directions given by them.

## 2024-11-06 ENCOUNTER — TELEPHONE (OUTPATIENT)
Dept: FAMILY MEDICINE CLINIC | Facility: CLINIC | Age: 74
End: 2024-11-06
Payer: MEDICARE

## 2024-11-13 RX ORDER — ACEBUTOLOL HYDROCHLORIDE 200 MG/1
200 CAPSULE ORAL DAILY
Qty: 30 CAPSULE | Refills: 0 | Status: SHIPPED | OUTPATIENT
Start: 2024-11-13

## 2024-11-19 ENCOUNTER — OFFICE VISIT (OUTPATIENT)
Dept: FAMILY MEDICINE CLINIC | Facility: CLINIC | Age: 74
End: 2024-11-19
Payer: MEDICARE

## 2024-11-19 VITALS
OXYGEN SATURATION: 98 % | TEMPERATURE: 98.7 F | RESPIRATION RATE: 12 BRPM | WEIGHT: 158 LBS | SYSTOLIC BLOOD PRESSURE: 171 MMHG | HEIGHT: 63 IN | BODY MASS INDEX: 28 KG/M2 | DIASTOLIC BLOOD PRESSURE: 78 MMHG

## 2024-11-19 DIAGNOSIS — L50.9 URTICARIA: Primary | ICD-10-CM

## 2024-11-19 PROCEDURE — 3051F HG A1C>EQUAL 7.0%<8.0%: CPT | Performed by: FAMILY MEDICINE

## 2024-11-19 PROCEDURE — 3078F DIAST BP <80 MM HG: CPT | Performed by: FAMILY MEDICINE

## 2024-11-19 PROCEDURE — 1160F RVW MEDS BY RX/DR IN RCRD: CPT | Performed by: FAMILY MEDICINE

## 2024-11-19 PROCEDURE — 1125F AMNT PAIN NOTED PAIN PRSNT: CPT | Performed by: FAMILY MEDICINE

## 2024-11-19 PROCEDURE — 3077F SYST BP >= 140 MM HG: CPT | Performed by: FAMILY MEDICINE

## 2024-11-19 PROCEDURE — 99213 OFFICE O/P EST LOW 20 MIN: CPT | Performed by: FAMILY MEDICINE

## 2024-11-19 PROCEDURE — 1159F MED LIST DOCD IN RCRD: CPT | Performed by: FAMILY MEDICINE

## 2024-11-19 RX ORDER — METHYLPREDNISOLONE 4 MG/1
TABLET ORAL
Qty: 21 TABLET | Refills: 0 | Status: SHIPPED | OUTPATIENT
Start: 2024-11-19

## 2024-11-19 RX ORDER — TRIAMCINOLONE ACETONIDE 1 MG/G
1 CREAM TOPICAL 2 TIMES DAILY PRN
Qty: 45 G | Refills: 0 | Status: SHIPPED | OUTPATIENT
Start: 2024-11-19

## 2024-11-19 RX ORDER — CHOLECALCIFEROL (VITAMIN D3) 25 MCG
1000 TABLET ORAL DAILY
COMMUNITY

## 2024-11-19 NOTE — PROGRESS NOTES
Answers submitted by the patient for this visit:  Primary Reason for Visit (Submitted on 11/16/2024)  What is the primary reason for your visit?: Rash  Rash Questionnaire (Submitted on 11/16/2024)  Chief Complaint: Rash  Chronicity: recurrent  Onset: in the past 7 days  Progression since onset: coming and going  Characteristics: burning, pain, itchiness  Exposed to: nothing  anorexia: No  congestion: No  cough: No  diarrhea: No  facial edema: No  fatigue: No  fever: No  joint pain: No  nail changes: No  rhinorrhea: No  shortness of breath: No  sore throat: No  vomiting: No  Subjective   Jenna Rosa is a 74 y.o. female.     Chief Complaint   Patient presents with    Rash     Follow up from urgent care on painful itchy rash on back,thigh and neck.         Current Outpatient Medications:     acebutolol (SECTRAL) 200 MG capsule, Take 1 capsule by mouth Daily., Disp: 30 capsule, Rfl: 0    acetaminophen (TYLENOL) 325 MG tablet, Take 2 tablets by mouth Every 6 (Six) Hours As Needed for Mild Pain., Disp: , Rfl:     Adalimumab (Humira, 2 Syringe,) 40 MG/0.4ML Prefilled Syringe Kit injection, Inject 0.4 mL every 2 weeks by subcutaneous route., Disp: , Rfl:     albuterol sulfate  (90 Base) MCG/ACT inhaler, Inhale 2 puffs Every 6 (Six) Hours As Needed for Shortness of Air (cough)., Disp: 18 g, Rfl: 0    amLODIPine (NORVASC) 10 MG tablet, Take 1 tablet by mouth Every Night., Disp: 90 tablet, Rfl: 1    aspirin 81 MG EC tablet, Take 1 tablet by mouth Daily., Disp: , Rfl:     azelastine (OPTIVAR) 0.05 % ophthalmic solution, Administer 2 drops to both eyes 2 (Two) Times a Day As Needed., Disp: , Rfl:     Cholecalciferol 25 MCG (1000 UT) tablet, Take 1 tablet by mouth Daily., Disp: , Rfl:     Continuous Glucose  (FreeStyle [place] 3 [place]) device, Use 1 package Daily., Disp: 1 each, Rfl: 3    Cranberry 125 MG tablet, 1 po qd, Disp: , Rfl:     estradiol (ESTRACE) 0.1 MG/GM vaginal cream, , Disp: , Rfl:      fenofibrate 160 MG tablet, TAKE 1 TABLET BY MOUTH DAILY, Disp: 90 tablet, Rfl: 2    folic acid (FOLVITE) 1 MG tablet, TAKE 1 TABLET BY MOUTH DAILY, Disp: 90 tablet, Rfl: 1    gabapentin (NEURONTIN) 300 MG capsule, Take 1 capsule by mouth 2 (Two) Times a Day., Disp: 180 capsule, Rfl: 0    Humira, 2 Pen, 40 MG/0.4ML Pen-injector Kit, INJECT 40MG UNDER THE SKIN EVERY 14 DAYS, Disp: 2 each, Rfl: 3    Insulin NPH Isophane & Regular (HumuLIN 70/30 KwikPen) (70-30) 100 UNIT/ML suspension pen-injector, INJECT 18 UNITS UNDER THE SKIN BEFORE BREAKFAST, 21 UNITS BEFORE LUNCH, AND 23 UNITS BEFORE DINNER, Disp: 21 mL, Rfl: 3    Insulin Pen Needle (Droplet Pen Needles) 32G X 4 MM misc, USE 1 PEN NEEDLE THREE TIMES A DAY, Disp: 200 each, Rfl: 1    ipratropium-albuterol (Combivent Respimat)  MCG/ACT inhaler, , Disp: , Rfl:     isosorbide mononitrate (IMDUR) 60 MG 24 hr tablet, Take 1 tablet by mouth Daily With Breakfast., Disp: 90 tablet, Rfl: 0    lansoprazole (PREVACID) 30 MG capsule, TAKE 1 CAPSULE BY MOUTH DAILY, Disp: 90 capsule, Rfl: 1    lidocaine (LMX) 4 % cream, Apply 1 Application topically to the appropriate area as directed As Needed for Moderate Pain. Compunded Pain Cream, Disp: , Rfl:     loratadine (Claritin) 10 MG tablet, Take 1 tablet by mouth Every Night., Disp: , Rfl:     meclizine (ANTIVERT) 12.5 MG tablet, 1/2 - 1 po q6-8hrs prn dizzyness, Disp: 20 tablet, Rfl: 0    methotrexate 2.5 MG tablet, TAKE 5 TABLETS BY MOUTH ONCE WEEKLY, Disp: 60 tablet, Rfl: 0    Misc Natural Products (BEET ROOT PO), 1 po qd, Disp: , Rfl:     ondansetron ODT (ZOFRAN-ODT) 4 MG disintegrating tablet, Place 1 tablet on the tongue Every 8 (Eight) Hours As Needed for Nausea., Disp: 30 tablet, Rfl: 0    rosuvastatin (CRESTOR) 5 MG tablet, TAKE ONE TABLET BY MOUTH ONCE NIGHTLY, Disp: 90 tablet, Rfl: 2    SITagliptin-metFORMIN HCl ER (Janumet XR) 100-1000 MG tablet, Take 1 tablet by mouth Daily., Disp: 90 tablet, Rfl: 0    Thiamine HCl  (vitamin B-1) 50 MG tablet, Take 1 tablet by mouth Daily., Disp: , Rfl:     tolterodine LA (DETROL LA) 4 MG 24 hr capsule, Take 1 capsule by mouth Daily., Disp: , Rfl:     traMADol (ULTRAM) 50 MG tablet, Take 1-2 tablets by mouth twice a day as needed for pain, Disp: , Rfl:     Vibegron (Gemtesa) 75 MG tablet, Take 1 tablet by mouth Daily., Disp: 21 tablet, Rfl: 0    vitamin B-12 (CYANOCOBALAMIN) 1000 MCG tablet, Take 1 tablet by mouth Daily., Disp: , Rfl:     Continuous Glucose Sensor (FreeStyle Adelaide 3 Plus Sensor), Use Every 15 (Fifteen) Days., Disp: 6 each, Rfl: 84    lisinopril (PRINIVIL,ZESTRIL) 40 MG tablet, TAKE 1 TABLET BY MOUTH DAILY WITH BREAKFAST, Disp: 90 tablet, Rfl: 1    methylPREDNISolone (MEDROL) 4 MG dose pack, Take as directed on package instructions., Disp: 21 tablet, Rfl: 0    traZODone (DESYREL) 50 MG tablet, TAKE 1 TABLET BY MOUTH EVERY NIGHT AT BEDTIME, Disp: 90 tablet, Rfl: 1    triamcinolone (KENALOG) 0.1 % cream, Apply 1 Application topically to the appropriate area as directed 2 (Two) Times a Day As Needed for Irritation or Rash., Disp: 45 g, Rfl: 0    Past Medical History:   Diagnosis Date    CAD 2020    Colon polyp     Congenital heart disease 2017    Depression     DEXA     2019= (-0.7/ -1.6); 2023= (-0.7/ -2.7)    Diabetic peripheral neuropathy     Diverticulosis     GERD     Heart murmur 2017    Hyperlipidemia     Hypertension     Insomnia     Lower back pain     MAMMO     NEG = 2021/ 2023    Obesity     Osteoarthritis     Osteopenia     Osteoporosis     Palpitations     RA     Rheumatoid nodule 06/2011     rt. thumb x 2 and rt. & lt. 3rd fingers (Oct.2012)/ rt. elbow, left elbow, and epidermal inclusion cyst post. neck (June 2011)-----Dr. Whitmore    Skin Cancer 2021    Vitamin D deficiency        Past Surgical History:   Procedure Laterality Date    BLADDER SUSPENSION  10/11/2023        BREAST SURGERY  1972    CARDIAC CATHETERIZATION N/A 09/16/2020    Procedure: Left Heart  Cath;  Surgeon: Walker Rodriguez MD;  Location: [facility] CATH INVASIVE LOCATION;  Service: Cardiovascular;  Laterality: N/A;    CARDIAC CATHETERIZATION N/A 09/16/2020    Procedure: Coronary angiography;  Surgeon: Walker Rodriguez MD;  Location: [facility] CATH INVASIVE LOCATION;  Service: Cardiovascular;  Laterality: N/A;    CARDIAC CATHETERIZATION  09/16/2020    Procedure: Functional Flow [place];  Surgeon: Kath Medina MD;  Location: [facility] CATH INVASIVE LOCATION;  Service: Cardiology;;    CARPAL TUNNEL RELEASE Right 02/20/2023    Procedure: CARPAL TUNNEL RELEASE WITH POSSIBLE SYNOVECTOMY;  Surgeon: Amadeo Magaña MD;  Location: [facility] MAIN OR;  Service: Orthopedics;  Laterality: Right;    CHOLECYSTECTOMY      COLONOSCOPY      2017 / 2023= TA, rech 2028? [facility]    CYST REMOVAL      Seb cyst on neck    EYE SURGERY      TOTAL ABDOMINAL HYSTERECTOMY WITH SALPINGO OOPHORECTOMY      VAGINAL PROLAPSE REPAIR       Uterine Prolapse repair       Family History   Problem Relation Age of Onset    Heart disease Mother     Diabetes Mother     Hypertension Mother     Rheum arthritis Mother     Coronary artery disease Mother     Alcohol abuse Mother     Heart failure Mother     Heart disease Father     Mental illness Father     Hypertension Father     Alcohol abuse Father     Early death Father     Heart attack Father     Heart failure Father     Kidney cancer Sister     Stroke Sister     Osteoarthritis Sister     Rheum arthritis Sister     Hypertension Sister     Arthritis Sister     Diabetes Sister     Stroke Sister     Hypertension Sister     Rheum arthritis Sister     Osteoarthritis Sister     Diabetes Sister     Arthritis Sister     Liver disease Sister     Kidney disease Sister     No Known Problems Brother     Rheum arthritis Brother     Osteoarthritis Brother     Hypertension Brother     Diabetes Maternal Grandfather     Hypertension Sister        Social History     Socioeconomic History     Marital status:      Spouse name: Carson Rosa    Number of children: 3    Years of education: GED   Tobacco Use    Smoking status: Never     Passive exposure: Never    Smokeless tobacco: Never   Vaping Use    Vaping status: Never Used   Substance and Sexual Activity    Alcohol use: Yes     Comment: 3-4 a year    Drug use: Never    Sexual activity: Not Currently     Partners: Male     Birth control/protection: Hysterectomy       History of Present Illness  The patient is a 74-year-old female who presents with complaints of a painful, itchy rash on her back, thighs, and neck. She had a recent urgent care visit for the same issue about a week ago.    She reports a red rash on her lower abdomen and right groin, which appears to be a yeast infection. Despite treatment with Diflucan and nystatin powder or cream, the rash has only partially improved. The rash on her neck has shown some improvement. She reports skin breakdown in the affected areas.    The rash first appeared last Thursday and was accompanied by itching. She has not used any new lotions, shampoos, soaps, or face creams, and has not washed her face with any new cloths. She has been wearing the same waistband material for over a year without any issues. She does not believe she is under any unusual stress and has not consumed any new foods recently. She has tried Benadryl for the itching, but it did not provide relief. She has also started using cortisone cream.    She reports no excessive sweating, no history of similar rashes, and no swelling of the mouth or lips. She has not been prescribed any steroid pills. She is not on any current antibiotics. She has been taking tolterodine for bladder incontinence.    She has an appointment next month for a scope procedure. She mentions a previous 3-day powder treatment that she had to mix with water and take one every other day for 3 days.        The following portions of the patient's history were reviewed  and updated as appropriate: allergies, current medications, past family history, past medical history, past social history, past surgical history and problem list.    Review of Systems   Constitutional:  Negative for fatigue and fever.   HENT:  Negative for congestion, rhinorrhea and sore throat.    Respiratory:  Negative for cough and shortness of breath.    Gastrointestinal:  Negative for diarrhea and vomiting.   Skin:  Positive for color change and rash.       Vitals:    11/19/24 1100   BP: 171/78   Resp: 12   Temp: 98.7 °F (37.1 °C)   SpO2: 98%       Objective   Physical Exam  Vitals and nursing note reviewed.   Constitutional:       General: She is not in acute distress.     Appearance: She is not ill-appearing or toxic-appearing.   HENT:      Head: Normocephalic and atraumatic.   Pulmonary:      Effort: Pulmonary effort is normal.   Musculoskeletal:        Legs:       Comments: +welps in small patches on b/l thighs and waist w/ min. erythema   Skin:     Findings: Rash present.   Neurological:      Mental Status: She is alert and oriented to person, place, and time.      Cranial Nerves: No cranial nerve deficit.      Gait: Gait normal.   Psychiatric:         Attention and Perception: Attention and perception normal.         Mood and Affect: Mood and affect normal.         Speech: Speech normal.         Behavior: Behavior normal. Behavior is cooperative.         Thought Content: Thought content normal.         Cognition and Memory: Cognition and memory normal.         Judgment: Judgment normal.       Physical Exam       Assessment & Plan   Diagnoses and all orders for this visit:    1. Urticaria (Primary)    Other orders  -     methylPREDNISolone (MEDROL) 4 MG dose pack; Take as directed on package instructions.  Dispense: 21 tablet; Refill: 0  -     triamcinolone (KENALOG) 0.1 % cream; Apply 1 Application topically to the appropriate area as directed 2 (Two) Times a Day As Needed for Irritation or Rash.   Dispense: 45 g; Refill: 0      Assessment & Plan  1. Urticaria.  The patient's symptoms are consistent with urticaria, commonly known as hives. She reports that some areas have improved while others have not. She has tried Benadryl, calamine lotion, aloe vera, and cortisone cream without significant relief. A low-dose steroid pack, Medrol Dosepak, has been prescribed to alleviate the symptoms. Additionally, a moderate strength steroid cream has been prescribed for application twice daily as needed for irritation, rash, or hives. If the current treatment proves insufficient, a stronger dose of Medrol Dosepak may be considered. She is advised to avoid potential triggers such as detergents, softeners, and dryer sheets.     2. Medication Management.  She is currently taking tolterodine (Detrol LA) for bladder incontinence, which seems to have fewer side effects compared to previous medications. This medication has been added to her list.       Results            Patient or patient representative verbalized consent for the use of Ambient Listening during the visit with  Emily Burdick DO for chart documentation. 12/8/2024  20:40 EST

## 2024-11-21 ENCOUNTER — PATIENT OUTREACH (OUTPATIENT)
Dept: CASE MANAGEMENT | Facility: OTHER | Age: 74
End: 2024-11-21
Payer: MEDICARE

## 2024-11-21 DIAGNOSIS — Z79.4 TYPE 2 DIABETES MELLITUS WITH HYPERGLYCEMIA, WITH LONG-TERM CURRENT USE OF INSULIN: Primary | ICD-10-CM

## 2024-11-21 DIAGNOSIS — E11.65 TYPE 2 DIABETES MELLITUS WITH HYPERGLYCEMIA, WITH LONG-TERM CURRENT USE OF INSULIN: Primary | ICD-10-CM

## 2024-11-21 RX ORDER — HYDROCHLOROTHIAZIDE 12.5 MG/1
CAPSULE ORAL
Qty: 6 EACH | Refills: 84 | Status: SHIPPED | OUTPATIENT
Start: 2024-11-21

## 2024-11-21 NOTE — OUTREACH NOTE
AMBULATORY CASE MANAGEMENT NOTE    Names and Relationships of Patient/Support Persons: Contact: Jenna Rosa; Relationship: Self -     ACM completed a successful follow up outreach to patient. Pt. reports feeling better post UTI treatment. Pt. reports of having bladder sling, but still reporting issues/ UTI. Pt. understands the key reasons of UTI. Pt. drinking plenty of fluids/water, cranberry, keeping clean,dry/ wiping front to back after urinating and managing gut health. Pt. on probiotics due to GI issues. Pt. reports upcoming cystoscopy with  in December. Pt. managing glucose. Pt. managing BP.  Pt. reports BP running within normal limits upon checking. Pt. received all education ans ACP information. Pt. reports full understanding of all care gaps to be completed. No questions per pt. She expresses appreciation for the call. Follow up outreach scheduled for 3-4 weeks. ACM provided all contact information for all needs/inquiries.   Caleb GTZ  Ambulatory Case Management    11/21/2024, 09:27 EST

## 2024-12-02 RX ORDER — LISINOPRIL 40 MG/1
40 TABLET ORAL
Qty: 90 TABLET | Refills: 1 | Status: SHIPPED | OUTPATIENT
Start: 2024-12-02

## 2024-12-02 RX ORDER — TRAZODONE HYDROCHLORIDE 50 MG/1
50 TABLET, FILM COATED ORAL
Qty: 90 TABLET | Refills: 1 | Status: SHIPPED | OUTPATIENT
Start: 2024-12-02

## 2024-12-05 DIAGNOSIS — Z79.4 TYPE 2 DIABETES MELLITUS WITH HYPERGLYCEMIA, WITH LONG-TERM CURRENT USE OF INSULIN: ICD-10-CM

## 2024-12-05 DIAGNOSIS — E11.65 TYPE 2 DIABETES MELLITUS WITH HYPERGLYCEMIA, WITH LONG-TERM CURRENT USE OF INSULIN: ICD-10-CM

## 2024-12-05 RX ORDER — HYDROCHLOROTHIAZIDE 12.5 MG/1
CAPSULE ORAL
Qty: 6 EACH | Refills: 84 | Status: SHIPPED | OUTPATIENT
Start: 2024-12-05

## 2024-12-06 ENCOUNTER — CLINICAL SUPPORT (OUTPATIENT)
Dept: FAMILY MEDICINE CLINIC | Facility: CLINIC | Age: 74
End: 2024-12-06
Payer: MEDICARE

## 2024-12-06 DIAGNOSIS — N39.0 RECURRENT URINARY TRACT INFECTION: Primary | ICD-10-CM

## 2024-12-06 LAB — HOLD SPECIMEN: NORMAL

## 2024-12-06 PROCEDURE — 81001 URINALYSIS AUTO W/SCOPE: CPT | Performed by: FAMILY MEDICINE

## 2024-12-06 PROCEDURE — 87186 SC STD MICRODIL/AGAR DIL: CPT

## 2024-12-06 PROCEDURE — 87086 URINE CULTURE/COLONY COUNT: CPT | Performed by: FAMILY MEDICINE

## 2024-12-06 PROCEDURE — 87088 URINE BACTERIA CULTURE: CPT | Performed by: FAMILY MEDICINE

## 2024-12-07 LAB

## 2024-12-09 ENCOUNTER — PATIENT MESSAGE (OUTPATIENT)
Dept: FAMILY MEDICINE CLINIC | Facility: CLINIC | Age: 74
End: 2024-12-09
Payer: MEDICARE

## 2024-12-10 LAB — BACTERIA SPEC AEROBE CULT: ABNORMAL

## 2024-12-10 RX ORDER — NITROFURANTOIN 25; 75 MG/1; MG/1
100 CAPSULE ORAL 2 TIMES DAILY
Qty: 14 CAPSULE | Refills: 0 | Status: SHIPPED | OUTPATIENT
Start: 2024-12-10 | End: 2024-12-16

## 2024-12-12 DIAGNOSIS — G62.9 NEUROPATHY: ICD-10-CM

## 2024-12-12 NOTE — TELEPHONE ENCOUNTER
Rx Refill Note  Requested Prescriptions     Pending Prescriptions Disp Refills    gabapentin (NEURONTIN) 300 MG capsule [Pharmacy Med Name: GABAPENTIN 300 MG CAPSULE] 180 capsule 0     Sig: TAKE 1 CAPSULE BY MOUTH 2 TIMES A DAY      Last office visit with prescribing clinician: 11/19/2024   Last telemedicine visit with prescribing clinician: Visit date not found   Next office visit with prescribing clinician: 12/16/2024        Lipid Panel (10/07/2024 09:26)                  Would you like a call back once the refill request has been completed: [] Yes [] No    If the office needs to give you a call back, can they leave a voicemail: [] Yes [] No    Destini Snider, RT  12/12/24, 10:08 EST

## 2024-12-13 RX ORDER — GABAPENTIN 300 MG/1
300 CAPSULE ORAL 2 TIMES DAILY
Qty: 180 CAPSULE | Refills: 0 | Status: SHIPPED | OUTPATIENT
Start: 2024-12-13

## 2024-12-16 ENCOUNTER — APPOINTMENT (OUTPATIENT)
Dept: GENERAL RADIOLOGY | Facility: HOSPITAL | Age: 74
End: 2024-12-16
Payer: MEDICARE

## 2024-12-16 ENCOUNTER — OFFICE VISIT (OUTPATIENT)
Dept: FAMILY MEDICINE CLINIC | Facility: CLINIC | Age: 74
End: 2024-12-16
Payer: MEDICARE

## 2024-12-16 ENCOUNTER — HOSPITAL ENCOUNTER (EMERGENCY)
Facility: HOSPITAL | Age: 74
Discharge: HOME | End: 2024-12-16
Attending: EMERGENCY MEDICINE | Admitting: EMERGENCY MEDICINE
Payer: MEDICARE

## 2024-12-16 VITALS
HEART RATE: 69 BPM | SYSTOLIC BLOOD PRESSURE: 146 MMHG | HEIGHT: 63 IN | OXYGEN SATURATION: 100 % | WEIGHT: 161 LBS | RESPIRATION RATE: 18 BRPM | DIASTOLIC BLOOD PRESSURE: 73 MMHG | TEMPERATURE: 98.4 F | BODY MASS INDEX: 28.53 KG/M2

## 2024-12-16 VITALS
HEIGHT: 63 IN | BODY MASS INDEX: 28.52 KG/M2 | RESPIRATION RATE: 20 BRPM | WEIGHT: 160.94 LBS | OXYGEN SATURATION: 96 % | SYSTOLIC BLOOD PRESSURE: 132 MMHG | DIASTOLIC BLOOD PRESSURE: 67 MMHG | TEMPERATURE: 97.7 F | HEART RATE: 67 BPM

## 2024-12-16 DIAGNOSIS — K59.04 CHRONIC IDIOPATHIC CONSTIPATION: ICD-10-CM

## 2024-12-16 DIAGNOSIS — N39.46 MIXED STRESS AND URGE URINARY INCONTINENCE: ICD-10-CM

## 2024-12-16 DIAGNOSIS — Z79.4 TYPE 2 DIABETES MELLITUS WITH HYPERGLYCEMIA, WITH LONG-TERM CURRENT USE OF INSULIN: ICD-10-CM

## 2024-12-16 DIAGNOSIS — E11.65 TYPE 2 DIABETES MELLITUS WITH HYPERGLYCEMIA, WITH LONG-TERM CURRENT USE OF INSULIN: ICD-10-CM

## 2024-12-16 DIAGNOSIS — K59.01 SLOW TRANSIT CONSTIPATION: Primary | ICD-10-CM

## 2024-12-16 DIAGNOSIS — G47.62 NOCTURNAL LEG CRAMPS: ICD-10-CM

## 2024-12-16 DIAGNOSIS — N39.0 RECURRENT URINARY TRACT INFECTION: Primary | ICD-10-CM

## 2024-12-16 LAB
ANION GAP SERPL CALCULATED.3IONS-SCNC: 10.4 MMOL/L (ref 5–15)
BASOPHILS # BLD AUTO: 0.02 10*3/MM3 (ref 0–0.2)
BASOPHILS NFR BLD AUTO: 0.2 % (ref 0–1.5)
BILIRUB BLD-MCNC: NEGATIVE MG/DL
BUN SERPL-MCNC: 8 MG/DL (ref 8–23)
BUN/CREAT SERPL: 10.4 (ref 7–25)
CALCIUM SPEC-SCNC: 9.7 MG/DL (ref 8.6–10.5)
CHLORIDE SERPL-SCNC: 101 MMOL/L (ref 98–107)
CLARITY, POC: CLEAR
CO2 SERPL-SCNC: 24.6 MMOL/L (ref 22–29)
COLOR UR: YELLOW
CREAT SERPL-MCNC: 0.77 MG/DL (ref 0.57–1)
DEPRECATED RDW RBC AUTO: 46.9 FL (ref 37–54)
EGFRCR SERPLBLD CKD-EPI 2021: 81.6 ML/MIN/1.73
EOSINOPHIL # BLD AUTO: 0.08 10*3/MM3 (ref 0–0.4)
EOSINOPHIL NFR BLD AUTO: 0.9 % (ref 0.3–6.2)
ERYTHROCYTE [DISTWIDTH] IN BLOOD BY AUTOMATED COUNT: 13.9 % (ref 12.3–15.4)
EXPIRATION DATE: ABNORMAL
GLUCOSE SERPL-MCNC: 195 MG/DL (ref 65–99)
GLUCOSE UR STRIP-MCNC: ABNORMAL MG/DL
HCT VFR BLD AUTO: 40.6 % (ref 34–46.6)
HGB BLD-MCNC: 12.9 G/DL (ref 12–15.9)
IMM GRANULOCYTES # BLD AUTO: 0.02 10*3/MM3 (ref 0–0.05)
IMM GRANULOCYTES NFR BLD AUTO: 0.2 % (ref 0–0.5)
KETONES UR QL: NEGATIVE
LEUKOCYTE EST, POC: NEGATIVE
LYMPHOCYTES # BLD AUTO: 2.16 10*3/MM3 (ref 0.7–3.1)
LYMPHOCYTES NFR BLD AUTO: 24.5 % (ref 19.6–45.3)
Lab: ABNORMAL
MCH RBC QN AUTO: 29.5 PG (ref 26.6–33)
MCHC RBC AUTO-ENTMCNC: 31.8 G/DL (ref 31.5–35.7)
MCV RBC AUTO: 92.7 FL (ref 79–97)
MONOCYTES # BLD AUTO: 0.99 10*3/MM3 (ref 0.1–0.9)
MONOCYTES NFR BLD AUTO: 11.2 % (ref 5–12)
NEUTROPHILS NFR BLD AUTO: 5.55 10*3/MM3 (ref 1.7–7)
NEUTROPHILS NFR BLD AUTO: 63 % (ref 42.7–76)
NITRITE UR-MCNC: NEGATIVE MG/ML
NRBC BLD AUTO-RTO: 0 /100 WBC (ref 0–0.2)
PH UR: 7 [PH] (ref 5–8)
PLATELET # BLD AUTO: 326 10*3/MM3 (ref 140–450)
PMV BLD AUTO: 8.9 FL (ref 6–12)
POTASSIUM SERPL-SCNC: 4.1 MMOL/L (ref 3.5–5.2)
PROT UR STRIP-MCNC: NEGATIVE MG/DL
RBC # BLD AUTO: 4.38 10*6/MM3 (ref 3.77–5.28)
RBC # UR STRIP: NEGATIVE /UL
SODIUM SERPL-SCNC: 136 MMOL/L (ref 136–145)
SP GR UR: 1.01 (ref 1–1.03)
UROBILINOGEN UR QL: NORMAL
WBC NRBC COR # BLD AUTO: 8.82 10*3/MM3 (ref 3.4–10.8)

## 2024-12-16 PROCEDURE — 85025 COMPLETE CBC W/AUTO DIFF WBC: CPT

## 2024-12-16 PROCEDURE — 99214 OFFICE O/P EST MOD 30 MIN: CPT | Performed by: FAMILY MEDICINE

## 2024-12-16 PROCEDURE — 74018 RADEX ABDOMEN 1 VIEW: CPT

## 2024-12-16 PROCEDURE — 99283 EMERGENCY DEPT VISIT LOW MDM: CPT

## 2024-12-16 PROCEDURE — 3077F SYST BP >= 140 MM HG: CPT | Performed by: FAMILY MEDICINE

## 2024-12-16 PROCEDURE — 1125F AMNT PAIN NOTED PAIN PRSNT: CPT | Performed by: FAMILY MEDICINE

## 2024-12-16 PROCEDURE — 3078F DIAST BP <80 MM HG: CPT | Performed by: FAMILY MEDICINE

## 2024-12-16 PROCEDURE — 63710000001 ONDANSETRON ODT 4 MG TABLET DISPERSIBLE: Performed by: EMERGENCY MEDICINE

## 2024-12-16 PROCEDURE — 80048 BASIC METABOLIC PNL TOTAL CA: CPT

## 2024-12-16 PROCEDURE — 81003 URINALYSIS AUTO W/O SCOPE: CPT | Performed by: FAMILY MEDICINE

## 2024-12-16 RX ORDER — BISACODYL 5 MG/1
10 TABLET, DELAYED RELEASE ORAL DAILY PRN
Status: DISCONTINUED | OUTPATIENT
Start: 2024-12-16 | End: 2024-12-16 | Stop reason: HOSPADM

## 2024-12-16 RX ORDER — HYDROCHLOROTHIAZIDE 12.5 MG/1
CAPSULE ORAL
Qty: 6 EACH | Refills: 3 | Status: SHIPPED | OUTPATIENT
Start: 2024-12-16

## 2024-12-16 RX ORDER — LACTULOSE 10 G/15ML
20 SOLUTION ORAL NIGHTLY
Qty: 237 ML | Refills: 2 | Status: SHIPPED | OUTPATIENT
Start: 2024-12-16

## 2024-12-16 RX ORDER — ACEBUTOLOL HYDROCHLORIDE 200 MG/1
200 CAPSULE ORAL DAILY
Qty: 30 CAPSULE | Refills: 0 | OUTPATIENT
Start: 2024-12-16

## 2024-12-16 RX ORDER — SORBITOL SOLUTION 70 %
50 SOLUTION, ORAL MISCELLANEOUS ONCE
Status: COMPLETED | OUTPATIENT
Start: 2024-12-16 | End: 2024-12-16

## 2024-12-16 RX ORDER — ONDANSETRON 4 MG/1
4 TABLET, ORALLY DISINTEGRATING ORAL ONCE
Status: COMPLETED | OUTPATIENT
Start: 2024-12-16 | End: 2024-12-16

## 2024-12-16 RX ORDER — FESOTERODINE FUMARATE 8 MG/1
8 TABLET, FILM COATED, EXTENDED RELEASE ORAL
Start: 2024-12-16

## 2024-12-16 RX ORDER — CYCLOBENZAPRINE HCL 5 MG
5 TABLET ORAL NIGHTLY PRN
Qty: 30 TABLET | Refills: 0 | Status: SHIPPED | OUTPATIENT
Start: 2024-12-16

## 2024-12-16 RX ADMIN — ONDANSETRON 4 MG: 4 TABLET, ORALLY DISINTEGRATING ORAL at 17:49

## 2024-12-16 RX ADMIN — BISACODYL 5 MG: 5 TABLET, COATED ORAL at 17:50

## 2024-12-16 RX ADMIN — SORBITOL SOLUTION (BULK) 50 ML: 70 SOLUTION at 17:51

## 2024-12-16 NOTE — TELEPHONE ENCOUNTER
Patient was provided with a one time fill from [facility] for acebutolol. Patient was advised to follow up with Dr. Gotti for refills of this medication. Refill will be denied.

## 2024-12-16 NOTE — ED PROVIDER NOTES
"Subjective     Provider in Triage Note  Pt is 74 year old with hx diverticulitis presenting with complaints of generalized abdominal pain and constipation x 4 days.  She had 2 very small bowel movements of \"rock like\" stool this morning after taking mag citrate and had an episode of vomiting about 2 hours after.  She had a colonoscopy about 2 years ago. No fever, dysuria    PCP: [facility]      History of Present Illness  Provider in triage information is included in the history of present illness.  The patient reports that she has been constipated recently.  She states that she got sick and vomited when trying to take magnesium citrate.  The patient reports no fever or chills.  Reports some nausea reports no vomiting or diarrhea.  Patient states that she is edentulous and has trouble chewing some foods      Review of Systems   Constitutional:  Negative for chills and fever.   Gastrointestinal:  Positive for abdominal pain and constipation.   All other systems reviewed and are negative.      Past Medical History:   Diagnosis Date    CAD 2020    Colon polyp     Congenital heart disease 2017    Depression     DEXA     2019= (-0.7/ -1.6); 2023= (-0.7/ -2.7)    Diabetic peripheral neuropathy     Diverticulosis     GERD     Heart murmur 2017    Hyperlipidemia     Hypertension     Insomnia     Lower back pain     MAMMO     NEG = 2021/ 2023    Obesity     Osteoarthritis     Osteopenia     Osteoporosis     Palpitations     RA     Rheumatoid nodule 06/2011     rt. thumb x 2 and rt. & lt. 3rd fingers (Oct.2012)/ rt. elbow, left elbow, and epidermal inclusion cyst post. neck (June 2011)-----Dr. Whitmore    Skin Cancer 2021    Vitamin D deficiency        Allergies   Allergen Reactions    Jardiance [Empagliflozin] Other (See Comments)     UTI's    Metformin Diarrhea       Past Surgical History:   Procedure Laterality Date    BLADDER SUSPENSION  10/11/2023        BREAST SURGERY  1972    CARDIAC CATHETERIZATION " N/A 09/16/2020    Procedure: Left Heart Cath;  Surgeon: Walker Rodriguez MD;  Location: [facility] CATH INVASIVE LOCATION;  Service: Cardiovascular;  Laterality: N/A;    CARDIAC CATHETERIZATION N/A 09/16/2020    Procedure: Coronary angiography;  Surgeon: Walker Rodriguez MD;  Location: [facility] CATH INVASIVE LOCATION;  Service: Cardiovascular;  Laterality: N/A;    CARDIAC CATHETERIZATION  09/16/2020    Procedure: Functional Flow [place];  Surgeon: Kath Medina MD;  Location: [facility] CATH INVASIVE LOCATION;  Service: Cardiology;;    CARPAL TUNNEL RELEASE Right 02/20/2023    Procedure: CARPAL TUNNEL RELEASE WITH POSSIBLE SYNOVECTOMY;  Surgeon: Amadeo Magaña MD;  Location: [facility] MAIN OR;  Service: Orthopedics;  Laterality: Right;    CHOLECYSTECTOMY      COLONOSCOPY      2017 / 2023= TA, rech 2028? [facility]    CYST REMOVAL      Seb cyst on neck    EYE SURGERY      TOTAL ABDOMINAL HYSTERECTOMY WITH SALPINGO OOPHORECTOMY      VAGINAL PROLAPSE REPAIR       Uterine Prolapse repair       Family History   Problem Relation Age of Onset    Heart disease Mother     Diabetes Mother     Hypertension Mother     Rheum arthritis Mother     Coronary artery disease Mother     Alcohol abuse Mother     Heart failure Mother     Heart disease Father     Mental illness Father     Hypertension Father     Alcohol abuse Father     Early death Father     Heart attack Father     Heart failure Father     Kidney cancer Sister     Stroke Sister     Osteoarthritis Sister     Rheum arthritis Sister     Hypertension Sister     Arthritis Sister     Diabetes Sister     Stroke Sister     Hypertension Sister     Rheum arthritis Sister     Osteoarthritis Sister     Diabetes Sister     Arthritis Sister     Liver disease Sister     Kidney disease Sister     No Known Problems Brother     Rheum arthritis Brother     Osteoarthritis Brother     Hypertension Brother     Diabetes Maternal Grandfather     Hypertension Sister         Social History     Socioeconomic History    Marital status:      Spouse name: Carson Rosa    Number of children: 3    Years of education: GED   Tobacco Use    Smoking status: Never     Passive exposure: Never    Smokeless tobacco: Never   Vaping Use    Vaping status: Never Used   Substance and Sexual Activity    Alcohol use: Yes     Comment: 3-4 a year    Drug use: Never    Sexual activity: Not Currently     Partners: Male     Birth control/protection: Hysterectomy     No safety issues no recent unusual food water travel or activity      Objective   Physical Exam  Alert [place] Coma Scale 15   HEENT: Pupils equal and reactive to light. Conjunctivae are not injected. Normal tympanic membranes. Oropharynx and nares are normal.   Neck: Supple. Midline trachea. No JVD. No goiter.   Chest: Clear and equal breath sounds bilaterally, regular rate and rhythm without murmur or rub.   Abdomen: Positive bowel sounds, nontender, nondistended. No rebound or peritoneal signs. No CVA tenderness.   Extremities no clubbing. cyanosis or edema. Motor sensory exam is normal. The full range of motion is intact   Skin: Warm and dry, no rashes or petechia.   Lymphatic: No regional lymphadenopathy. No calf pain, swelling or Homans sign    Procedures           ED Course      Labs Reviewed   BASIC METABOLIC PANEL - Abnormal; Notable for the following components:       Result Value    Glucose 195 (*)     All other components within normal limits    Narrative:     GFR Categories in Chronic Kidney Disease (CKD)      GFR Category          GFR (mL/min/1.73)    Interpretation  G1                     90 or greater         Normal or high (1)  G2                      60-89                Mild decrease (1)  G3a                   45-59                Mild to moderate decrease  G3b                   30-44                Moderate to severe decrease  G4                    15-29                Severe decrease  G5                    14 or  less           Kidney failure          (1)In the absence of evidence of kidney disease, neither GFR category G1 or G2 fulfill the criteria for CKD.    eGFR calculation 2021 CKD-EPI creatinine equation, which does not include race as a factor   CBC WITH AUTO DIFFERENTIAL - Abnormal; Notable for the following components:    Monocytes, Absolute 0.99 (*)     All other components within normal limits   CBC AND DIFFERENTIAL    Narrative:     The following orders were created for panel order CBC & Differential.  Procedure                               Abnormality         Status                     ---------                               -----------         ------                     CBC Auto Differential[[id number]]        Abnormal            Final result                 Please view results for these tests on the individual orders.     .EDS  XR Abdomen KUB    Result Date: 12/16/2024  Impression: Mild to moderate colonic stool burden. Electronically Signed: Carmen Mckinney MD  12/16/2024 3:38 PM EST  Workstation ID: [id number]                                                    Medical Decision Making  Patient will be started on lactulose.  Patient's ER throughput was stated as the patient did not want to take all of the medication at the same time.  The patient ultimately had a large bowel movement.  The patient was stable at discharge and vocalized understanding of discharge instructions warnings including dietary recommendations    Risk  OTC drugs.  Prescription drug management.        Final diagnoses:   Slow transit constipation       ED Disposition  ED Disposition       ED Disposition   Discharge    Condition   Stable    Comment   --               No follow-up provider specified.       Medication List      No changes were made to your prescriptions during this visit.            Eric Rankin MD  12/16/24 1939

## 2024-12-16 NOTE — PROGRESS NOTES
Subjective   Jenna Rosa is a 74 y.o. female.     Chief Complaint   Patient presents with    Urinary Tract Infection     Patient wants to know why she is getting UTI's and why she is having bladder leakage. She isn't getting any answers from from .          Current Outpatient Medications:     acebutolol (SECTRAL) 200 MG capsule, Take 1 capsule by mouth Daily., Disp: 30 capsule, Rfl: 0    acetaminophen (TYLENOL) 325 MG tablet, Take 2 tablets by mouth Every 6 (Six) Hours As Needed for Mild Pain., Disp: , Rfl:     Adalimumab (Humira, 2 Syringe,) 40 MG/0.4ML Prefilled Syringe Kit injection, Inject 0.4 mL every 2 weeks by subcutaneous route., Disp: , Rfl:     amLODIPine (NORVASC) 10 MG tablet, Take 1 tablet by mouth Every Night., Disp: 90 tablet, Rfl: 1    aspirin 81 MG EC tablet, Take 1 tablet by mouth Daily., Disp: , Rfl:     azelastine (OPTIVAR) 0.05 % ophthalmic solution, Administer 2 drops to both eyes 2 (Two) Times a Day As Needed., Disp: , Rfl:     Cholecalciferol 25 MCG (1000 UT) tablet, Take 1 tablet by mouth Daily., Disp: , Rfl:     Continuous Glucose  (FreeStyle Adelaide 3 [place]) device, Use 1 package Daily., Disp: 1 each, Rfl: 3    Cranberry 125 MG tablet, 1 po qd, Disp: , Rfl:     estradiol (ESTRACE) 0.1 MG/GM vaginal cream, , Disp: , Rfl:     fenofibrate 160 MG tablet, TAKE 1 TABLET BY MOUTH DAILY, Disp: 90 tablet, Rfl: 2    folic acid (FOLVITE) 1 MG tablet, TAKE 1 TABLET BY MOUTH DAILY, Disp: 90 tablet, Rfl: 1    gabapentin (NEURONTIN) 300 MG capsule, TAKE 1 CAPSULE BY MOUTH 2 TIMES A DAY, Disp: 180 capsule, Rfl: 0    Humira, 2 Pen, 40 MG/0.4ML Pen-injector Kit, INJECT 40MG UNDER THE SKIN EVERY 14 DAYS, Disp: 2 each, Rfl: 3    Insulin NPH Isophane & Regular (HumuLIN 70/30 KwikPen) (70-30) 100 UNIT/ML suspension pen-injector, INJECT 18 UNITS UNDER THE SKIN BEFORE BREAKFAST, 21 UNITS BEFORE LUNCH, AND 23 UNITS BEFORE DINNER, Disp: 21 mL, Rfl: 3    Insulin Pen Needle (Droplet Pen Needles)  32G X 4 MM misc, USE 1 PEN NEEDLE THREE TIMES A DAY, Disp: 200 each, Rfl: 1    ipratropium-albuterol (Combivent Respimat)  MCG/ACT inhaler, , Disp: , Rfl:     isosorbide mononitrate (IMDUR) 60 MG 24 hr tablet, Take 1 tablet by mouth Daily With Breakfast., Disp: 90 tablet, Rfl: 0    lansoprazole (PREVACID) 30 MG capsule, TAKE 1 CAPSULE BY MOUTH DAILY, Disp: 90 capsule, Rfl: 1    lidocaine (LMX) 4 % cream, Apply 1 Application topically to the appropriate area as directed As Needed for Moderate Pain. Compunded Pain Cream, Disp: , Rfl:     lisinopril (PRINIVIL,ZESTRIL) 40 MG tablet, TAKE 1 TABLET BY MOUTH DAILY WITH BREAKFAST, Disp: 90 tablet, Rfl: 1    loratadine (Claritin) 10 MG tablet, Take 1 tablet by mouth Every Night., Disp: , Rfl:     meclizine (ANTIVERT) 12.5 MG tablet, 1/2 - 1 po q6-8hrs prn dizzyness, Disp: 20 tablet, Rfl: 0    Misc Natural Products (BEET ROOT PO), 1 po qd, Disp: , Rfl:     rosuvastatin (CRESTOR) 5 MG tablet, TAKE ONE TABLET BY MOUTH ONCE NIGHTLY, Disp: 90 tablet, Rfl: 2    SITagliptin-metFORMIN HCl ER (Janumet XR) 100-1000 MG tablet, Take 1 tablet by mouth Daily., Disp: 90 tablet, Rfl: 0    Thiamine HCl (vitamin B-1) 50 MG tablet, Take 1 tablet by mouth Daily., Disp: , Rfl:     traMADol (ULTRAM) 50 MG tablet, Take 1-2 tablets by mouth twice a day as needed for pain, Disp: , Rfl:     traZODone (DESYREL) 50 MG tablet, TAKE 1 TABLET BY MOUTH EVERY NIGHT AT BEDTIME, Disp: 90 tablet, Rfl: 1    vitamin B-12 (CYANOCOBALAMIN) 1000 MCG tablet, Take 1 tablet by mouth Daily., Disp: , Rfl:     albuterol sulfate  (90 Base) MCG/ACT inhaler, Inhale 2 puffs Every 6 (Six) Hours As Needed for Shortness of Air (cough)., Disp: 18 g, Rfl: 0    Continuous Glucose Sensor (FreeStyle Adelaide 3 Plus Sensor), Use Every 15 (Fifteen) Days., Disp: 6 each, Rfl: 3    cyclobenzaprine (FLEXERIL) 5 MG tablet, Take 1 tablet by mouth At Night As Needed for Muscle Spasms., Disp: 30 tablet, Rfl: 0    fesoterodine  fumarate (TOVIAZ ER) 8 MG tablet sustained-release 24 hour tablet, Take 1 tablet by mouth Daily., Disp: , Rfl:     lactulose (CHRONULAC) 10 GM/15ML solution, Take 30 mL by mouth Every Night., Disp: 237 mL, Rfl: 2    methotrexate 2.5 MG tablet, TAKE 5 TABLETS BY MOUTH ONCE WEEKLY, Disp: 60 tablet, Rfl: 0    nitrofurantoin, macrocrystal-monohydrate, (Macrobid) 100 MG capsule, Take 1 capsule by mouth 2 (Two) Times a Day. TAKE W/ FOOD, Disp: 20 capsule, Rfl: 0    ondansetron ODT (ZOFRAN-ODT) 4 MG disintegrating tablet, Place 1 tablet on the tongue Every 8 (Eight) Hours As Needed for Nausea., Disp: 30 tablet, Rfl: 0    Past Medical History:   Diagnosis Date    CAD 2020    Colon polyp     Congenital heart disease 2017    Depression     DEXA     2019= (-0.7/ -1.6); 2023= (-0.7/ -2.7)    Diabetic peripheral neuropathy     Diverticulosis     GERD     Heart murmur 2017    Hyperlipidemia     Hypertension     Insomnia     Lower back pain     MAMMO     NEG = 2021/ 2023    Obesity     Osteoarthritis     Osteopenia     Osteoporosis     Palpitations     RA     Rheumatoid nodule 06/2011     rt. thumb x 2 and rt. & lt. 3rd fingers (Oct.2012)/ rt. elbow, left elbow, and epidermal inclusion cyst post. neck (June 2011)-----Dr. Whitmore    Skin Cancer 2021    Vitamin D deficiency        Past Surgical History:   Procedure Laterality Date    BLADDER SUSPENSION  10/11/2023        BREAST SURGERY  1972    CARDIAC CATHETERIZATION N/A 09/16/2020    Procedure: Left Heart Cath;  Surgeon: Walker Rodriguez MD;  Location: [facility] CATH INVASIVE LOCATION;  Service: Cardiovascular;  Laterality: N/A;    CARDIAC CATHETERIZATION N/A 09/16/2020    Procedure: Coronary angiography;  Surgeon: Walker Rodriguez MD;  Location: [facility] CATH INVASIVE LOCATION;  Service: Cardiovascular;  Laterality: N/A;    CARDIAC CATHETERIZATION  09/16/2020    Procedure: Functional Flow [place];  Surgeon: Kath Medina MD;  Location: [facility]  CATH INVASIVE LOCATION;  Service: Cardiology;;    CARPAL TUNNEL RELEASE Right 02/20/2023    Procedure: CARPAL TUNNEL RELEASE WITH POSSIBLE SYNOVECTOMY;  Surgeon: Amadeo Magaña MD;  Location: [facility] MAIN OR;  Service: Orthopedics;  Laterality: Right;    CHOLECYSTECTOMY      COLONOSCOPY      2017 / 2023= TA, rech 2028? [facility]    CYST REMOVAL      Seb cyst on neck    EYE SURGERY      TOTAL ABDOMINAL HYSTERECTOMY WITH SALPINGO OOPHORECTOMY      VAGINAL PROLAPSE REPAIR       Uterine Prolapse repair       Family History   Problem Relation Age of Onset    Heart disease Mother     Diabetes Mother     Hypertension Mother     Rheum arthritis Mother     Coronary artery disease Mother     Alcohol abuse Mother     Heart failure Mother     Heart disease Father     Mental illness Father     Hypertension Father     Alcohol abuse Father     Early death Father     Heart attack Father     Heart failure Father     Kidney cancer Sister     Stroke Sister     Osteoarthritis Sister     Rheum arthritis Sister     Hypertension Sister     Arthritis Sister     Diabetes Sister     Stroke Sister     Hypertension Sister     Rheum arthritis Sister     Osteoarthritis Sister     Diabetes Sister     Arthritis Sister     Liver disease Sister     Kidney disease Sister     No Known Problems Brother     Rheum arthritis Brother     Osteoarthritis Brother     Hypertension Brother     Diabetes Maternal Grandfather     Hypertension Sister        Social History     Socioeconomic History    Marital status:      Spouse name: Carson Rosa    Number of children: 3    Years of education: GED   Tobacco Use    Smoking status: Never     Passive exposure: Never    Smokeless tobacco: Never   Vaping Use    Vaping status: Never Used   Substance and Sexual Activity    Alcohol use: Yes     Comment: 3-4 a year    Drug use: Never    Sexual activity: Not Currently     Partners: Male     Birth control/protection: Hysterectomy       History of Present Illness  The  patient is a 74-year-old female who presents with complaints of recurrent urinary tract infections, urinary incontinence, constipation, diabetes mellitus, and leg cramps.    She has been experiencing persistent postnasal drainage. She has been managing with Mucinex and Allegra nasal spray.    She reports that her blood pressure remains uncontrolled.    She was prescribed Gemtesa in October, but it was ineffective and caused xerostomia. A subsequent prescription for Myrbetriq 25 or 50 mg was given, which she has not yet started. She underwent a cystoscopy two weeks ago, revealing an intact bladder. Despite this, she continues to experience recurrent UTIs. She was advised against daily antibiotic use due to potential resistance. She has been prescribed Toviaz 8 mg, but has not started this regimen. She reports urinary leakage and incontinence.    She also reports constipation. She manages with MiraLAX. Over the weekend, she took two laxatives and consumed a large quantity of spinach. She has previously taken magnesium citrate, which was effective.    She is currently on Janumet XR 1000/1000 once daily, which she tolerates well. She was previously on a higher dose of 2000, but it was too potent for her. She is also on insulin therapy. She has been unable to obtain her FreeStyle Adelaide 3+ due to discontinuation and is seeking a refill. She has been using the FreeStyle Adelaide 3, which lasts for 14 days. She has the reader for the FreeStyle Adelaide 3 and is requesting a refill for three months.    She has been experiencing severe leg cramps, which she attributes to her bladder issues. She maintains good hydration, consuming six 16-ounce bottles of water daily. She uses compression socks at night and takes over-the-counter medication for leg cramps. She has previously taken Flexeril, which was effective. She experiences toe curling and cramping, particularly at night. She is considering a return visit to Dr. Funes. She is  uncertain about her calcium intake but consumes yogurt, cheese, and milk daily. She also takes magnesium with calcium supplements. She attempts to stretch her toes and feet while seated and is considering purchasing a stationary bike.    ALLERGIES  She is allergic to METFORMIN.    MEDICATIONS  Current: Mucinex, Allegra, Janumet XR, gabapentin, Humira, insulin, vaginal hormone cream.  Discontinued: Jardiance, Gemtesa.        The following portions of the patient's history were reviewed and updated as appropriate: allergies, current medications, past family history, past medical history, past social history, past surgical history and problem list.    Review of Systems   Constitutional:  Positive for fatigue. Negative for chills, diaphoresis and fever.   HENT:  Positive for postnasal drip. Negative for congestion, sore throat and swollen glands.    Respiratory:  Negative for cough.    Cardiovascular:  Negative for chest pain.   Gastrointestinal:  Positive for constipation. Negative for abdominal pain, diarrhea, nausea and vomiting.   Genitourinary:  Positive for dysuria and urinary incontinence.   Musculoskeletal:  Positive for myalgias. Negative for neck pain.   Skin:  Negative for rash.   Neurological:  Negative for weakness and numbness.       Vitals:    12/16/24 0842   BP: 146/73   Pulse: 69   Resp: 18   Temp: 98.4 °F (36.9 °C)   SpO2: 100%       Objective   Physical Exam  Vitals and nursing note reviewed.   Constitutional:       General: She is not in acute distress.     Appearance: She is not ill-appearing or toxic-appearing.   HENT:      Head: Normocephalic and atraumatic.   Pulmonary:      Effort: Pulmonary effort is normal.   Neurological:      Mental Status: She is alert and oriented to person, place, and time.      Cranial Nerves: No cranial nerve deficit.      Gait: Gait normal.   Psychiatric:         Attention and Perception: Attention and perception normal.         Mood and Affect: Mood and affect normal.          Speech: Speech normal.         Behavior: Behavior normal. Behavior is cooperative.         Thought Content: Thought content normal.         Cognition and Memory: Cognition and memory normal.         Judgment: Judgment normal.       Physical Exam  Vital Signs  CD=460/73.    Assessment & Plan   Diagnoses and all orders for this visit:    1. Recurrent urinary tract infection (Primary)  -     POC Urinalysis Dipstick, Automated    2. Mixed stress and urge urinary incontinence    3. Chronic idiopathic constipation    4. Nocturnal leg cramps    5. Type 2 diabetes mellitus with hyperglycemia, with long-term current use of insulin    Other orders  -     fesoterodine fumarate (TOVIAZ ER) 8 MG tablet sustained-release 24 hour tablet; Take 1 tablet by mouth Daily.  -     Continuous Glucose Sensor (FreeStyle Adelaide 3 Plus Sensor); Use Every 15 (Fifteen) Days.  Dispense: 6 each; Refill: 3  -     cyclobenzaprine (FLEXERIL) 5 MG tablet; Take 1 tablet by mouth At Night As Needed for Muscle Spasms.  Dispense: 30 tablet; Refill: 0      Assessment & Plan  1. Recurrent urinary tract infections.  The patient reports recurrent UTIs. She was previously advised against long-term antibiotic use due to concerns about antibiotic resistance. She will start Toviaz 8 mg to manage her bladder issues.    2. Urinary incontinence.  The patient experiences urinary incontinence, likely due to a weakened pelvic floor. She will start Toviaz 8 mg to help with bladder control. She is also advised to use vaginal hormone cream to help strengthen the urethra.    3. Constipation.  The patient reports constipation, which may be affecting her bladder function. She will use magnesium citrate to alleviate constipation and ensure regular bowel movements.    4. Diabetes mellitus.  The patient is currently on Janumet /1000 mg once a day and reports no issues with this medication. A FreeStyle Adelaide 3+ sensor will be provided for a trial period of 2 weeks  to monitor her glucose levels. She is advised to continue her current insulin regimen.    5. Leg cramps.  The patient experiences leg cramps, particularly at night. She will be prescribed Flexeril 5 mg to be taken at night to help alleviate the cramps. She is also advised to wear compression socks during the day to prevent nighttime symptoms.    6. Elevated blood pressure.  Her blood pressure is slightly elevated at 146/73.    PROCEDURE  Cystoscopy performed two weeks ago revealed an intact bladder.     Results            Patient or patient representative verbalized consent for the use of Ambient Listening during the visit with  Emily Burdick DO for chart documentation. 1/4/2025  12:01 EST

## 2024-12-16 NOTE — TELEPHONE ENCOUNTER
Rx Refill Note  Requested Prescriptions     Refused Prescriptions Disp Refills    acebutolol (SECTRAL) 200 MG capsule 30 capsule 0     Sig: Take 1 capsule by mouth Daily.     Refused By: NANY ARSHAD     Reason for Refusal: Refill not appropriate      Last office visit with prescribing clinician: 7/18/2023   Last telemedicine visit with prescribing clinician: Visit date not found   Next office visit with prescribing clinician: Visit date not found                         Would you like a call back once the refill request has been completed: [] Yes [] No    If the office needs to give you a call back, can they leave a voicemail: [] Yes [] No    Nany Arshad MA  12/16/24, 09:58 EST

## 2024-12-17 ENCOUNTER — PRIOR AUTHORIZATION (OUTPATIENT)
Dept: FAMILY MEDICINE CLINIC | Facility: CLINIC | Age: 74
End: 2024-12-17
Payer: MEDICARE

## 2024-12-17 ENCOUNTER — TELEPHONE (OUTPATIENT)
Dept: FAMILY MEDICINE CLINIC | Facility: CLINIC | Age: 74
End: 2024-12-17

## 2024-12-17 NOTE — DISCHARGE INSTRUCTIONS
Increase fruits fluid fiber and activity in your diet  Tylenol as needed for discomfort  Medication as directed  Follow-up with your primary care provider

## 2024-12-17 NOTE — TELEPHONE ENCOUNTER
Caller: LINDSEY CARNEY    Relationship to patient:     Best call back number: [phone]     Patient is needing:   CALLING TO REPORT PATIENT WAS SEEN AT [facility] ER ON 12.16 FOR CONSTIPATION. PATIENT HAS BEEN ADVISED TO SCHEDULE FOLLOW UP.

## 2024-12-17 NOTE — TELEPHONE ENCOUNTER
HUB to read    PA was sent for Cyclobenzaprine HCl 5MG tablets    Waiting on the outcome from insurance.

## 2024-12-17 NOTE — TELEPHONE ENCOUNTER
HUB to read    PA approved for Cyclobenzaprine HCl 5MG tablets    PA approval was faxed to Kathya in [place]     Outcome  Approved today by [employer] 2017  PA Case: [id number], Status: Approved, Coverage Starts on: 9/17/2024 12:00:00 AM, Coverage Ends on: 12/17/2025 12:00:00 AM.  Authorization Expiration Date: 12/16/2025

## 2024-12-20 ENCOUNTER — PATIENT OUTREACH (OUTPATIENT)
Dept: CASE MANAGEMENT | Facility: OTHER | Age: 74
End: 2024-12-20
Payer: MEDICARE

## 2024-12-20 ENCOUNTER — CLINICAL SUPPORT (OUTPATIENT)
Dept: FAMILY MEDICINE CLINIC | Facility: CLINIC | Age: 74
End: 2024-12-20
Payer: MEDICARE

## 2024-12-20 DIAGNOSIS — N39.0 RECURRENT URINARY TRACT INFECTION: Primary | ICD-10-CM

## 2024-12-20 LAB — HOLD SPECIMEN: NORMAL

## 2024-12-20 PROCEDURE — 87086 URINE CULTURE/COLONY COUNT: CPT | Performed by: FAMILY MEDICINE

## 2024-12-20 PROCEDURE — 87181 SC STD AGAR DILUTION PER AGT: CPT | Performed by: FAMILY MEDICINE

## 2024-12-20 PROCEDURE — 81001 URINALYSIS AUTO W/SCOPE: CPT | Performed by: FAMILY MEDICINE

## 2024-12-20 PROCEDURE — 87077 CULTURE AEROBIC IDENTIFY: CPT | Performed by: FAMILY MEDICINE

## 2024-12-20 PROCEDURE — 87186 SC STD MICRODIL/AGAR DIL: CPT

## 2024-12-20 NOTE — OUTREACH NOTE
AMBULATORY CASE MANAGEMENT NOTE    Names and Relationships of Patient/Support Persons: Contact: Jenna Rosa; Relationship: Self -     ACM completed a successful follow up outreach to patient. Pt. Visited ED on 12/16/24 for slow transit constipation. Pt. reports much better currently. Pt. experiencing regular bowel movements. Pt. drinking 1/2 cup daily of prune juice and administering Lactulose as prescribed. Pt. reports still managing and treating frequent UTI's. Pt. experiencing burning with urination. Pt. treated for infection, but continues to have S/S of UTI. ACM educated on UTI prevention and treatment. Pt. will be outreaching PCP today to request further assessment/treatment. Pt. understands the key reasons of UTI. Pt. drinking plenty of fluids/water, cranberry, keeping clean and dry/ correct wiping technique after voiding and managing gut health. Pt. on probiotics due to GI issues. Pt. managing glucose. Pt. managing BP.  Pt. reports BP running within normal limits upon checking. Pt. Has upcoming wellness visit with PCP on 1/3/25. Pt. reports full understanding of all care gaps to be completed. No questions per pt. She expresses appreciation for the call. Follow up outreach scheduled for 3-4 weeks. ACM provided all contact information for all needs/inquiries.     Caleb GTZ  Ambulatory Case Management    12/20/2024, 09:42 EST

## 2024-12-21 LAB
BACTERIA UR QL AUTO: ABNORMAL /HPF
BILIRUB UR QL STRIP: NEGATIVE
CLARITY UR: ABNORMAL
COLOR UR: YELLOW
GLUCOSE UR STRIP-MCNC: NEGATIVE MG/DL
HGB UR QL STRIP.AUTO: ABNORMAL
HYALINE CASTS UR QL AUTO: ABNORMAL /LPF
KETONES UR QL STRIP: NEGATIVE
LEUKOCYTE ESTERASE UR QL STRIP.AUTO: ABNORMAL
NITRITE UR QL STRIP: POSITIVE
PH UR STRIP.AUTO: 6.5 [PH] (ref 5–8)
PROT UR QL STRIP: ABNORMAL
RBC # UR STRIP: ABNORMAL /HPF
REF LAB TEST METHOD: ABNORMAL
SP GR UR STRIP: 1.01 (ref 1–1.03)
SQUAMOUS #/AREA URNS HPF: ABNORMAL /HPF
UROBILINOGEN UR QL STRIP: ABNORMAL
WBC # UR STRIP: ABNORMAL /HPF

## 2024-12-24 DIAGNOSIS — Z87.440 RECENT URINARY TRACT INFECTION: Primary | ICD-10-CM

## 2024-12-24 LAB — BACTERIA SPEC AEROBE CULT: ABNORMAL

## 2024-12-24 RX ORDER — NITROFURANTOIN 25; 75 MG/1; MG/1
100 CAPSULE ORAL 2 TIMES DAILY
Qty: 20 CAPSULE | Refills: 0 | Status: SHIPPED | OUTPATIENT
Start: 2024-12-24

## 2024-12-26 DIAGNOSIS — Z87.440 RECENT URINARY TRACT INFECTION: Primary | ICD-10-CM

## 2024-12-26 RX ORDER — METHOTREXATE 2.5 MG/1
TABLET ORAL
Qty: 60 TABLET | Refills: 0 | Status: SHIPPED | OUTPATIENT
Start: 2024-12-26

## 2024-12-26 NOTE — TELEPHONE ENCOUNTER
Rx Refill Note  Requested Prescriptions     Pending Prescriptions Disp Refills    methotrexate 2.5 MG tablet [Pharmacy Med Name: METHOTREXATE 2.5 MG TABLET] 60 tablet 0     Sig: TAKE 5 TABLETS BY MOUTH ONCE WEEKLY      Last office visit with prescribing clinician: 12/16/2024   Last telemedicine visit with prescribing clinician: Visit date not found   Next office visit with prescribing clinician: 1/3/2025     Office Visit with Emily Burdick DO (12/16/2024)   Lipase (10/09/2024 13:23)  Comprehensive Metabolic Panel (10/09/2024 13:23)  CBC & Differential (10/09/2024 13:23)  Microalbumin / Creatinine Urine Ratio - Urine, Clean Catch (10/07/2024 09:26)  Comprehensive Metabolic Panel (10/07/2024 09:26)  Lipid Panel (10/07/2024 09:26)  Hemoglobin A1c (10/07/2024 09:26)                    Would you like a call back once the refill request has been completed: [] Yes [] No    If the office needs to give you a call back, can they leave a voicemail: [] Yes [] No    Michelet Mccall  12/26/24, 10:15 EST

## 2024-12-27 RX ORDER — ALBUTEROL SULFATE 90 UG/1
2 INHALANT RESPIRATORY (INHALATION) EVERY 6 HOURS PRN
Qty: 18 G | Refills: 0 | Status: SHIPPED | OUTPATIENT
Start: 2024-12-27

## 2024-12-27 RX ORDER — ONDANSETRON 4 MG/1
4 TABLET, ORALLY DISINTEGRATING ORAL EVERY 8 HOURS PRN
Qty: 30 TABLET | Refills: 0 | Status: SHIPPED | OUTPATIENT
Start: 2024-12-27

## 2024-12-27 NOTE — TELEPHONE ENCOUNTER
Rx Refill Note  Requested Prescriptions     Pending Prescriptions Disp Refills    ondansetron ODT (ZOFRAN-ODT) 4 MG disintegrating tablet 30 tablet 0     Sig: Place 1 tablet on the tongue Every 8 (Eight) Hours As Needed for Nausea.      Last office visit with prescribing clinician: 12/16/2024   Last telemedicine visit with prescribing clinician: Visit date not found   Next office visit with prescribing clinician: 12/27/2024     Office Visit with Emily Burdick DO (12/16/2024)                     Would you like a call back once the refill request has been completed: [] Yes [] No    If the office needs to give you a call back, can they leave a voicemail: [] Yes [] No    Michelet Mccall  12/27/24, 14:33 EST

## 2025-01-07 PROCEDURE — 87088 URINE BACTERIA CULTURE: CPT | Performed by: STUDENT IN AN ORGANIZED HEALTH CARE EDUCATION/TRAINING PROGRAM

## 2025-01-07 PROCEDURE — 87086 URINE CULTURE/COLONY COUNT: CPT | Performed by: STUDENT IN AN ORGANIZED HEALTH CARE EDUCATION/TRAINING PROGRAM

## 2025-01-07 PROCEDURE — 87186 SC STD MICRODIL/AGAR DIL: CPT | Performed by: STUDENT IN AN ORGANIZED HEALTH CARE EDUCATION/TRAINING PROGRAM

## 2025-01-07 PROCEDURE — 87181 SC STD AGAR DILUTION PER AGT: CPT | Performed by: STUDENT IN AN ORGANIZED HEALTH CARE EDUCATION/TRAINING PROGRAM

## 2025-01-07 RX ORDER — ACEBUTOLOL HYDROCHLORIDE 200 MG/1
200 CAPSULE ORAL DAILY
Qty: 30 CAPSULE | Refills: 0 | Status: SHIPPED | OUTPATIENT
Start: 2025-01-07

## 2025-01-07 RX ORDER — FOLIC ACID 1 MG/1
1000 TABLET ORAL DAILY
Qty: 90 TABLET | Refills: 1 | Status: SHIPPED | OUTPATIENT
Start: 2025-01-07

## 2025-01-07 NOTE — TELEPHONE ENCOUNTER
Rx Refill Note  Requested Prescriptions     Pending Prescriptions Disp Refills    acebutolol (SECTRAL) 200 MG capsule [Pharmacy Med Name: ACEBUTOLOL 200 MG CAPSULE] 30 capsule 0     Sig: TAKE 1 CAPSULE BY MOUTH DAILY      Last office visit with prescribing clinician: 05/22/2024 -    Next office visit with prescribing clinician: 5/22/2025 - Dr.Manchi Lilia Thomas MA  01/07/25, 14:10 EST

## 2025-01-14 ENCOUNTER — OFFICE VISIT (OUTPATIENT)
Dept: FAMILY MEDICINE CLINIC | Facility: CLINIC | Age: 75
End: 2025-01-14
Payer: MEDICARE

## 2025-01-14 VITALS
DIASTOLIC BLOOD PRESSURE: 70 MMHG | HEART RATE: 65 BPM | HEIGHT: 63 IN | WEIGHT: 158 LBS | TEMPERATURE: 97.8 F | OXYGEN SATURATION: 99 % | SYSTOLIC BLOOD PRESSURE: 121 MMHG | BODY MASS INDEX: 28 KG/M2 | RESPIRATION RATE: 14 BRPM

## 2025-01-14 DIAGNOSIS — I10 ESSENTIAL HYPERTENSION: ICD-10-CM

## 2025-01-14 DIAGNOSIS — R82.90 ABNORMAL URINALYSIS: ICD-10-CM

## 2025-01-14 DIAGNOSIS — Z79.4 TYPE 2 DIABETES MELLITUS WITH HYPOGLYCEMIA WITHOUT COMA, WITH LONG-TERM CURRENT USE OF INSULIN: ICD-10-CM

## 2025-01-14 DIAGNOSIS — Z87.440 RECENT URINARY TRACT INFECTION: ICD-10-CM

## 2025-01-14 DIAGNOSIS — E11.649 TYPE 2 DIABETES MELLITUS WITH HYPOGLYCEMIA WITHOUT COMA, WITH LONG-TERM CURRENT USE OF INSULIN: ICD-10-CM

## 2025-01-14 DIAGNOSIS — Z00.00 MEDICARE ANNUAL WELLNESS VISIT, SUBSEQUENT: Primary | ICD-10-CM

## 2025-01-14 LAB
BILIRUB BLD-MCNC: NEGATIVE MG/DL
CLARITY, POC: ABNORMAL
COLOR UR: YELLOW
EXPIRATION DATE: ABNORMAL
GLUCOSE UR STRIP-MCNC: NEGATIVE MG/DL
KETONES UR QL: NEGATIVE
LEUKOCYTE EST, POC: ABNORMAL
Lab: ABNORMAL
NITRITE UR-MCNC: POSITIVE MG/ML
PH UR: 7 [PH] (ref 5–8)
PROT UR STRIP-MCNC: NEGATIVE MG/DL
RBC # UR STRIP: ABNORMAL /UL
SP GR UR: 1.01 (ref 1–1.03)
UROBILINOGEN UR QL: ABNORMAL

## 2025-01-14 PROCEDURE — 81003 URINALYSIS AUTO W/O SCOPE: CPT | Performed by: FAMILY MEDICINE

## 2025-01-14 PROCEDURE — G0439 PPPS, SUBSEQ VISIT: HCPCS | Performed by: FAMILY MEDICINE

## 2025-01-14 PROCEDURE — 99213 OFFICE O/P EST LOW 20 MIN: CPT | Performed by: FAMILY MEDICINE

## 2025-01-14 PROCEDURE — 87086 URINE CULTURE/COLONY COUNT: CPT | Performed by: FAMILY MEDICINE

## 2025-01-14 PROCEDURE — 3078F DIAST BP <80 MM HG: CPT | Performed by: FAMILY MEDICINE

## 2025-01-14 PROCEDURE — 87181 SC STD AGAR DILUTION PER AGT: CPT | Performed by: FAMILY MEDICINE

## 2025-01-14 PROCEDURE — 1125F AMNT PAIN NOTED PAIN PRSNT: CPT | Performed by: FAMILY MEDICINE

## 2025-01-14 PROCEDURE — 3074F SYST BP LT 130 MM HG: CPT | Performed by: FAMILY MEDICINE

## 2025-01-14 PROCEDURE — 1159F MED LIST DOCD IN RCRD: CPT | Performed by: FAMILY MEDICINE

## 2025-01-14 PROCEDURE — 1160F RVW MEDS BY RX/DR IN RCRD: CPT | Performed by: FAMILY MEDICINE

## 2025-01-14 PROCEDURE — 87088 URINE BACTERIA CULTURE: CPT | Performed by: FAMILY MEDICINE

## 2025-01-14 PROCEDURE — 87186 SC STD MICRODIL/AGAR DIL: CPT | Performed by: FAMILY MEDICINE

## 2025-01-14 RX ORDER — LACTULOSE 10 G/15ML
20 SOLUTION ORAL NIGHTLY PRN
Qty: 237 ML | Refills: 2 | Status: SHIPPED | OUTPATIENT
Start: 2025-01-14

## 2025-01-14 RX ORDER — NITROFURANTOIN 25; 75 MG/1; MG/1
100 CAPSULE ORAL 2 TIMES DAILY
Qty: 20 CAPSULE | Refills: 0 | Status: SHIPPED | OUTPATIENT
Start: 2025-01-14

## 2025-01-14 RX ORDER — INSULIN HUMAN 100 [IU]/ML
INJECTION, SUSPENSION SUBCUTANEOUS
Qty: 21 ML | Refills: 3 | Status: SHIPPED | OUTPATIENT
Start: 2025-01-14

## 2025-01-14 NOTE — PROGRESS NOTES
The ABCs of the Annual Wellness Visit  Medicare Visit    Subjective     Jenna Rosa is a 74 y.o. female who presents for an Annual Medicare Visit.    The following portions of the patient's history were reviewed and   updated as appropriate: allergies, current medications, past family history, past medical history, past social history, past surgical history, and problem list.     Compared to one year ago, the patient feels her physical   health is worse.    Compared to one year ago, the patient feels her mental   health is the same.    Recent Hospitalizations:  This patient has had a [facility] admission record on file within the last 365 days.    Current Medical Providers:  Patient Care Team:  Emily Burdick DO as PCP - General (Family Medicine)  Joe García MD as Consulting Physician (Cardiology)  Dano Rangel MD as Consulting Physician (Endocrinology)  Conchis Mohamud OD as Consulting Physician (Optometry)  Harinder Gresham MD as Consulting Physician (Pain Medicine)  Juan Richey MD as Consulting Physician (Gastroenterology)  Manish Graff MD as Consulting Physician (Urology)  Caleb Gamino RN as Ambulatory  ([facility])    Outpatient Medications Prior to Visit   Medication Sig Dispense Refill    acebutolol (SECTRAL) 200 MG capsule TAKE 1 CAPSULE BY MOUTH DAILY 30 capsule 0    acetaminophen (TYLENOL) 325 MG tablet Take 2 tablets by mouth Every 6 (Six) Hours As Needed for Mild Pain.      Adalimumab (Humira, 2 Syringe,) 40 MG/0.4ML Prefilled Syringe Kit injection Inject 0.4 mL every 2 weeks by subcutaneous route.      albuterol sulfate  (90 Base) MCG/ACT inhaler Inhale 2 puffs Every 6 (Six) Hours As Needed for Shortness of Air (cough). 18 g 0    amLODIPine (NORVASC) 10 MG tablet Take 1 tablet by mouth Every Night. 90 tablet 1    aspirin 81 MG EC tablet Take 1 tablet by mouth Daily.      azelastine (OPTIVAR) 0.05 % ophthalmic solution Administer 2  drops to both eyes 2 (Two) Times a Day As Needed.      Cholecalciferol 25 MCG (1000 UT) tablet Take 1 tablet by mouth Daily.      Continuous Glucose  (FreeStyle Adelaide 3 [place]) device Use 1 package Daily. 1 each 3    Continuous Glucose Sensor (FreeStyle Adelaide 3 Plus Sensor) Use Every 15 (Fifteen) Days. 6 each 3    Cranberry 125 MG tablet 1 po qd      cyclobenzaprine (FLEXERIL) 5 MG tablet Take 1 tablet by mouth At Night As Needed for Muscle Spasms. 30 tablet 0    estradiol (ESTRACE) 0.1 MG/GM vaginal cream       fenofibrate 160 MG tablet TAKE 1 TABLET BY MOUTH DAILY 90 tablet 2    fesoterodine fumarate (TOVIAZ ER) 8 MG tablet sustained-release 24 hour tablet Take 1 tablet by mouth Daily.      folic acid (FOLVITE) 1 MG tablet TAKE 1 TABLET BY MOUTH DAILY 90 tablet 1    gabapentin (NEURONTIN) 300 MG capsule TAKE 1 CAPSULE BY MOUTH 2 TIMES A  capsule 0    Insulin Pen Needle (Droplet Pen Needles) 32G X 4 MM misc USE 1 PEN NEEDLE THREE TIMES A  each 1    ipratropium-albuterol (Combivent Respimat)  MCG/ACT inhaler       isosorbide mononitrate (IMDUR) 60 MG 24 hr tablet Take 1 tablet by mouth Daily With Breakfast. 90 tablet 0    lansoprazole (PREVACID) 30 MG capsule TAKE 1 CAPSULE BY MOUTH DAILY 90 capsule 1    lidocaine (LMX) 4 % cream Apply 1 Application topically to the appropriate area as directed As Needed for Moderate Pain. Compunded Pain Cream      lisinopril (PRINIVIL,ZESTRIL) 40 MG tablet TAKE 1 TABLET BY MOUTH DAILY WITH BREAKFAST 90 tablet 1    loratadine (Claritin) 10 MG tablet Take 1 tablet by mouth Every Night.      meclizine (ANTIVERT) 12.5 MG tablet 1/2 - 1 po q6-8hrs prn dizzyness 20 tablet 0    methotrexate 2.5 MG tablet TAKE 5 TABLETS BY MOUTH ONCE WEEKLY 60 tablet 0    ondansetron ODT (ZOFRAN-ODT) 4 MG disintegrating tablet Place 1 tablet on the tongue Every 8 (Eight) Hours As Needed for Nausea. 30 tablet 0    rosuvastatin (CRESTOR) 5 MG tablet TAKE ONE TABLET BY MOUTH ONCE  NIGHTLY 90 tablet 2    SITagliptin-metFORMIN HCl ER (Janumet XR) 100-1000 MG tablet Take 1 tablet by mouth Daily. 90 tablet 0    Thiamine HCl (vitamin B-1) 50 MG tablet Take 1 tablet by mouth Daily.      traZODone (DESYREL) 50 MG tablet TAKE 1 TABLET BY MOUTH EVERY NIGHT AT BEDTIME 90 tablet 1    vitamin B-12 (CYANOCOBALAMIN) 1000 MCG tablet Take 1 tablet by mouth Daily.      Humira, 2 Pen, 40 MG/0.4ML Auto-injector Kit       Insulin NPH Isophane & Regular (HumuLIN 70/30 KwikPen) (70-30) 100 UNIT/ML suspension pen-injector INJECT 18 UNITS UNDER THE SKIN BEFORE BREAKFAST, 21 UNITS BEFORE LUNCH, AND 23 UNITS BEFORE DINNER 21 mL 3    lactulose (CHRONULAC) 10 GM/15ML solution Take 30 mL by mouth Every Night. 237 mL 2    Misc Natural Products (BEET ROOT PO) 1 po qd      traMADol (ULTRAM) 50 MG tablet Take 1-2 tablets by mouth twice a day as needed for pain       No facility-administered medications prior to visit.       No opioid medication identified on active medication list. I have reviewed chart for other potential  high risk medication/s and harmful drug interactions in the elderly.        Aspirin is on active medication list. Aspirin use is indicated based on review of current medical condition/s. Pros and cons of this therapy have been discussed today. Benefits of this medication outweigh potential harm.  Patient has been encouraged to continue taking this medication.  .      Patient Active Problem List   Diagnosis    Rheumatoid arthritis    Breast mass, right    Decreased hearing, bilateral    Dependent edema    Depression    Diabetic peripheral neuropathy    Difficult or painful urination    Costochondritis    Encounter for immunization    Encounter for general adult medical examination without abnormal findings    Family history of cerebrovascular accident (CVA)    Flank pain    Gastroesophageal reflux disease    Herpes zoster    High-density lipoprotein deficiency    Mixed hyperlipidemia    Essential  "hypertension    Insomnia    Leukopenia    Microscopic hematuria    Nipple discharge, bloody    Obesity    Osteopenia    Other bursal cyst, unspecified site    Other dorsalgia    Low back pain    Thoracic back pain    Other hereditary and idiopathic neuropathies    Other long term (current) drug therapy    Other specified acquired deformities of unspecified thigh    Pain in limb    Pain of foot    Metatarsalgia    Polypharmacy    Primary localized osteoarthritis    Type 2 diabetes mellitus with hyperglycemia, with long-term current use of insulin    Urticaria    Visit for screening mammogram    Vitamin D deficiency    Heart palpitations    Major depressive disorder, recurrent episode, moderate    Bereavement    Unstable angina pectoris    Abnormal nuclear stress test    Coronary artery disease involving native coronary artery of native heart without angina pectoris    Fungal infection of skin    Bradycardia, sinus    Encounter for screening for malignant neoplasm of colon    Gastro-esophageal reflux disease without esophagitis    Pure hypercholesterolemia    History of colonic polyps    Epigastric pain    Chronic cough    Diarrhea    Constipation    Change in bowel habits    Black stools    Anemia, iron deficiency    Acute diverticulitis    Palpitations     Advance Care Planning   Advance Care Planning     Advance Directive is not on file.  ACP discussion was held with the patient during this visit. Patient does not have an advance directive, information provided.       Objective   Vitals:    01/14/25 1011   BP: 121/70   BP Location: Right arm   Patient Position: Sitting   Cuff Size: Adult   Pulse: 65   Resp: 14   Temp: 97.8 °F (36.6 °C)   TempSrc: Infrared   SpO2: 99%   Weight: 71.7 kg (158 lb)   Height: 160 cm (63\")   PainSc:   6   PainLoc: Comment: Bladder     Estimated body mass index is 27.99 kg/m² as calculated from the following:    Height as of this encounter: 160 cm (63\").    Weight as of this encounter: " 71.7 kg (158 lb).           Does the patient have evidence of cognitive impairment?   No         Procedures       HEALTH RISK ASSESSMENT    Smoking Status:  Social History     Tobacco Use   Smoking Status Never    Passive exposure: Never   Smokeless Tobacco Never     Alcohol Consumption:  Social History     Substance and Sexual Activity   Alcohol Use Yes    Comment: 3-4 a year       Fall Risk Screen:    JESSICA Fall Risk Assessment was completed, and patient is at LOW risk for falls.Assessment completed on:2025    Depression Screen:       2025    10:20 AM   PHQ-2/PHQ-9 Depression Screening   Little interest or pleasure in doing things Not at all   Feeling down, depressed, or hopeless Not at all   How difficult have these problems made it for you to do your work, take care of things at home, or get along with other people? Not difficult at all       Health Habits and Functional and Cognitive Screenin/14/2025    10:00 AM   Functional & Cognitive Status   Do you have difficulty preparing food and eating? No   Do you have difficulty bathing yourself, getting dressed or grooming yourself? No   Do you have difficulty using the toilet? No   Do you have difficulty moving around from place to place? No   Do you have trouble with steps or getting out of a bed or a chair? No   Current Diet Unhealthy Diet   Dental Exam Other   Eye Exam Not up to date   Exercise (times per week) 7 times per week   Current Exercises Include Walking   Do you need help using the phone?  No   Are you deaf or do you have serious difficulty hearing?  No   Do you need help to go to places out of walking distance? No   Do you need help shopping? No   Do you need help preparing meals?  No   Do you need help with housework?  No   Do you need help with laundry? No   Do you need help taking your medications? No   Do you need help managing money? No   Do you ever drive or ride in a car without wearing a seat belt? No   Have you felt unusual  stress, anger or loneliness in the last month? Yes   Who do you live with? Alone   If you need help, do you have trouble finding someone available to you? No   Have you been bothered in the last four weeks by sexual problems? No   Do you have difficulty concentrating, remembering or making decisions? No       Visual Acuity:    No results found.    Age-appropriate Screening Schedule:  Refer to the list below for future screening recommendations based on patient's age, sex and/or medical conditions. Orders for these recommended tests are listed in the plan section. The patient has been provided with a written plan.    Health Maintenance   Topic Date Due    TDAP/TD VACCINES (1 - Tdap) Never done    ZOSTER VACCINE (1 of 2) Never done    HEPATITIS C SCREENING  Never done    COVID-19 Vaccine (1 - 2024-25 season) Never done    DIABETIC EYE EXAM  12/11/2024    MAMMOGRAM  02/07/2025    DXA SCAN  02/07/2025    HEMOGLOBIN A1C  04/07/2025    DIABETIC FOOT EXAM  02/13/2025    BMI FOLLOWUP  04/19/2025    LIPID PANEL  10/07/2025    ANNUAL WELLNESS VISIT  01/14/2026    COLORECTAL CANCER SCREENING  06/20/2033    INFLUENZA VACCINE  Completed    Pneumococcal Vaccine 65+  Completed    URINE MICROALBUMIN  Discontinued        CMS Preventative Services Quick Reference  Risk Factors Identified During Encounter    Hearing Problem:  Declines  Immunizations Discussed/Encouraged: Influenza, Prevnar 20 (Pneumococcal 20-valent conjugate), Shingrix, COVID19, and RSV (Respiratory Syncytial Virus)  Vision Screening Recommended  The above risks/problems have been discussed with the patient.  Pertinent information has been shared with the patient in the After Visit Summary.    Follow Up:   Initial Medicare Visit in one year    An After Visit Summary and PPPS were made available to the patient.      Additional E&M Note during same encounter follows:  Patient has multiple medical problems which are significant and separately identifiable that require  additional work above and beyond the Medicare Wellness Visit.      Chief Complaint  Medicare Wellness-subsequent    Subjective        History of Present Illness  The patient is a 74-year-old female who presents for a Medicare wellness visit.    She reports experiencing significant fluctuations in her blood pressure. She has discontinued the use of beetroot due to running out of supply. She is currently on a regimen of acebutolol 200 mg once daily, amlodipine 10 mg, and baby aspirin. She also takes lisinopril for blood pressure management.    She has been managing her diabetes with Humulin 70/30, administered at doses of 18 units in the morning and evening, and 21 units at lunch. However, she has been adjusting the dosage based on her blood sugar levels, reducing it to 16 to 18 units at night if necessary. Despite these adjustments, she has been experiencing severe hypoglycemic episodes at night, even after consuming a square meal. She is considering reducing her insulin administration to twice daily instead of three times. Her diabetes is managed by Dr. Rangel, whom she has not yet informed about her recent difficulties. She is also on Janumet 100/1000.    She has been grappling with recurrent urinary tract infections (UTIs), which have been less severe recently but persist. She was contacted by an urgent care nurse over the weekend to arrange a specialist consultation for long-term UTI management. She completed a 5-day course of nitrofurantoin last Thursday. She has been under the care of Dr. Graff, who previously hospitalized her for intravenous antibiotic treatment in 10/2024. She has been using estrogen cream to maintain vaginal health and has found some relief from using a heating pad. She has been supplementing her diet with cranberry pills and 6 ounces of cranberry juice daily. She has not experienced any diarrhea. She has been taking MiraLAX with a small amount of protein every night to manage constipation,  which has been effective. She underwent a cystogram on 11/07/2024, which revealed no abnormalities. She has an upcoming appointment with urology in 03/2025. She has not been experiencing any fevers or back pain. She has been using baby wipes for personal hygiene. She has been trying to stay active and engaged with activities such as puzzles, adrianne, reading, and caring for her pets.    She has an appointment for a mammogram, which she had to cancel and has not yet rescheduled. She has received her influenza and pneumonia vaccines but has not received the shingles or COVID-19 vaccines. She reports that her physical health has slightly declined compared to the previous year due to her ongoing UTI issues. Her mental health remains stable. She has not been hospitalized overnight in the past year. She does not have a living will for the hospital but has one at home. She does not report any hearing issues. She does not require dental visits. She needs to schedule an eye doctor appointment, as she has not had one in the past two years. She does not wish to have her hearing checked.     She has been taking Tylenol every 6 hours as needed, Humira every 2 weeks, Astelin eyedrops, vitamin D 1000, cranberry tablets, Flexeril 5 mg at night as needed, estradiol vaginal cream a few times a week, fenofibrate 160 once a day, Toviaz, folic acid, gabapentin 300 twice a day, methotrexate 5 a week, meclizine as needed for dizziness, Claritin for allergies, Prevacid for heartburn, lactulose for constipation as needed, Imdur 60 once a day, B12, trazodone, thiamine, and vitamins.    Supplemental Information  She has been experiencing upper back pain, which she attributes to arthritis.    SOCIAL HISTORY  She is a non-smoker, consumes alcohol 3 to 4 times a year, and does not use drugs.    FAMILY HISTORY  She does not report any new family medical problems for her 2 sisters and 1 brother. She does not have a family history of  "glaucoma.    ALLERGIES  She is allergic to JARDIANCE and METFORMIN.    MEDICATIONS  Current: Acebutolol, Tylenol, Humira, rescue inhaler, amlodipine, baby aspirin, Astelin eyedrops, vitamin D, FreeStyle Adelaide, cranberry tablet, Flexeril, estradiol vaginal cream, fenofibrate, Toviaz, folic acid, gabapentin, Humulin, Janumet, Crestor, methotrexate, meclizine, Claritin, lisinopril, Prevacid, MiraLAX, Imdur, B12, trazodone, thiamine, Tylenol, meclizine, Claritin, Prevacid, lactulose, Imdur, B12, trazodone, thiamine, vitamins.  Discontinued: Beetroot.    IMMUNIZATIONS  She had her influenza vaccine and pneumonia booster. She has not received the shingles or COVID-19 vaccines.     Jenna Rosa is also being seen today for Recurrent UTI's    Review of Systems   Constitutional:  Negative for fever.   Respiratory:  Negative for cough.    Gastrointestinal:  Positive for constipation. Negative for diarrhea.   Endocrine:        Low BS epidodes   Musculoskeletal:  Positive for arthralgias. Negative for back pain.       Objective   Vital Signs:  /70 (BP Location: Right arm, Patient Position: Sitting, Cuff Size: Adult)   Pulse 65   Temp 97.8 °F (36.6 °C) (Infrared)   Resp 14   Ht 160 cm (63\")   Wt 71.7 kg (158 lb)   SpO2 99%   BMI 27.99 kg/m²     Physical Exam  Vitals and nursing note reviewed.   Constitutional:       General: She is not in acute distress.     Appearance: She is not ill-appearing or toxic-appearing.   Pulmonary:      Effort: Pulmonary effort is normal.   Neurological:      Mental Status: She is alert and oriented to person, place, and time.      Cranial Nerves: No cranial nerve deficit.      Gait: Gait normal.   Psychiatric:         Attention and Perception: Attention and perception normal.         Mood and Affect: Mood normal.         Speech: Speech normal.         Behavior: Behavior normal. Behavior is cooperative.         Thought Content: Thought content normal.         Cognition and Memory: " Cognition and memory normal.         Judgment: Judgment normal.        Physical Exam  Vital Signs  Blood pressure is 121/70. Weight is 158 pounds.        Assessment and Plan   Diagnoses and all orders for this visit:    1. Medicare annual wellness visit, subsequent (Primary)    2. Recent urinary tract infection  -     POC Urinalysis Dipstick, Automated    3. Abnormal urinalysis  -     Urine Culture - Urine, Urine, Clean Catch    4. Type 2 diabetes mellitus with hypoglycemia without coma, with long-term current use of insulin  -     Insulin NPH Isophane & Regular (HumuLIN 70/30 KwikPen) (70-30) 100 UNIT/ML suspension pen-injector; INJECT 18 UNITS UNDER THE SKIN BEFORE BREAKFAST, 21 UNITS BEFORE LUNCH, AND 23 UNITS BEFORE DINNER  Dispense: 21 mL; Refill: 3    5. Essential hypertension    Other orders  -     nitrofurantoin, macrocrystal-monohydrate, (Macrobid) 100 MG capsule; Take 1 capsule by mouth 2 (Two) Times a Day.  Dispense: 20 capsule; Refill: 0  -     lactulose (CHRONULAC) 10 GM/15ML solution; Take 30 mL by mouth At Night As Needed (constipation).  Dispense: 237 mL; Refill: 2      Assessment & Plan  1. Hypertension.  Her blood pressure is well-controlled at 121/70 mmHg. She should continue her current medications, including acebutolol 200 mg once a day, amlodipine 10 mg, and lisinopril.    2. Diabetes Mellitus.  She is currently on Humulin 70/30, with a sliding scale adjustment due to nocturnal hypoglycemia. She should continue her current insulin regimen and monitor her blood glucose levels closely. She is also on Janumet 100/1000 mg. She should contact Dr. Machado to discuss her insulin management.    3. Recurrent Urinary Tract Infections.  She has persistent E. coli infections. A urine culture will be sent for analysis. She will continue Macrobid (nitrofurantoin) for 10 days. A single dose of gentamicin will be administered intravenously at the infusion center. She should continue using estrogen cream to  maintain vaginal health and prevent UTIs. She should also continue taking cranberry pills and drinking cranberry juice.    4. Medicare wellness visit.  She is advised to receive the shingles vaccine and the RSV vaccine, which can be administered at her local pharmacy. She should continue her current medications, including Tylenol as needed, Humira every 2 weeks, Astelin eyedrops, vitamin D 1000 IU, Flexeril 5 mg at night as needed, estradiol vaginal cream a few times a week, fenofibrate 160 mg once a day, Toviaz, folic acid, gabapentin 300 mg twice a day, methotrexate 5 mg weekly, meclizine as needed for dizziness, Claritin for allergies, Prevacid for heartburn, lactulose as needed for constipation, Imdur 60 mg once a day, B12, trazodone, thiamine, and vitamins. She should reschedule her mammogram appointment.     Results            Follow Up   No follow-ups on file.  Patient was given instructions and counseling regarding her condition or for health maintenance advice. Please see specific information pulled into the AVS if appropriate.   Patient or patient representative verbalized consent for the use of Ambient Listening during the visit with  Emily Burdick DO for chart documentation. 1/17/2025  08:00 EST

## 2025-01-15 ENCOUNTER — TELEPHONE (OUTPATIENT)
Dept: FAMILY MEDICINE CLINIC | Facility: CLINIC | Age: 75
End: 2025-01-15

## 2025-01-15 NOTE — TELEPHONE ENCOUNTER
Emily Burdick, Brianda Bowens, CMA  Can we have pt [facility] infusion center for a dose of Gentamicin for her uti?

## 2025-01-15 NOTE — TELEPHONE ENCOUNTER
I called [facility] infusion center and they said yes the can do the infusion for Gentamicin for the UTI.   They need a order with a diagnosis on it and a doctors signature.    [facility] fax:[phone]

## 2025-01-17 LAB — BACTERIA SPEC AEROBE CULT: ABNORMAL

## 2025-01-19 DIAGNOSIS — Z79.4 TYPE 2 DIABETES MELLITUS WITH HYPERGLYCEMIA, WITH LONG-TERM CURRENT USE OF INSULIN: ICD-10-CM

## 2025-01-19 DIAGNOSIS — E11.65 TYPE 2 DIABETES MELLITUS WITH HYPERGLYCEMIA, WITH LONG-TERM CURRENT USE OF INSULIN: ICD-10-CM

## 2025-01-20 ENCOUNTER — PATIENT OUTREACH (OUTPATIENT)
Dept: CASE MANAGEMENT | Facility: OTHER | Age: 75
End: 2025-01-20
Payer: MEDICARE

## 2025-01-20 RX ORDER — SITAGLIPTIN AND METFORMIN HYDROCHLORIDE 1000; 100 MG/1; MG/1
1 TABLET, FILM COATED, EXTENDED RELEASE ORAL DAILY
Qty: 90 TABLET | Refills: 0 | Status: SHIPPED | OUTPATIENT
Start: 2025-01-20

## 2025-01-20 RX ORDER — ISOSORBIDE MONONITRATE 60 MG/1
60 TABLET, EXTENDED RELEASE ORAL
Qty: 90 TABLET | Refills: 0 | Status: SHIPPED | OUTPATIENT
Start: 2025-01-20

## 2025-01-20 RX ORDER — PEN NEEDLE, DIABETIC 32GX 5/32"
NEEDLE, DISPOSABLE MISCELLANEOUS
Qty: 100 EACH | Refills: 3 | Status: SHIPPED | OUTPATIENT
Start: 2025-01-20

## 2025-01-20 NOTE — OUTREACH NOTE
AMBULATORY CASE MANAGEMENT NOTE    Names and Relationships of Patient/Support Persons: Contact: Jenna Rosa; Relationship: Self -        ACM completed a successful follow up outreach to patient. Pt. still continuing to experiencing regular bowel movements. Pt. completed wellness check with PCP. Pt. still experiencing UTI issues.Pt. on antibiotic currently for treatment (Microbid). Pt. spoke to PCP and next treatment will be an antibiotic infusion of Gentamicin to support treatment. ACM educated on UTI prevention and treatment. Pt. understands the key reasons of UTI. Pt. drinking plenty of fluids/water, cranberry, keeping clean and dry/ correct wiping technique after voiding and managing gut health. Pt. managing glucose. Pt. managing BP.  Pt. reports BP running within normal limits upon checking. Pt. reports full understanding of all care gaps to be completed. No questions per pt. She expresses appreciation for the call. Follow up outreach scheduled for 3-4 weeks. ACM provided all contact information for all needs/inquiries.     Caleb GTZ  Ambulatory Case Management    1/20/2025, 10:05 EST

## 2025-01-24 ENCOUNTER — CLINICAL SUPPORT (OUTPATIENT)
Dept: FAMILY MEDICINE CLINIC | Facility: CLINIC | Age: 75
End: 2025-01-24
Payer: MEDICARE

## 2025-01-24 DIAGNOSIS — R82.90 ABNORMAL URINALYSIS: Primary | ICD-10-CM

## 2025-01-24 LAB
BILIRUB BLD-MCNC: NEGATIVE MG/DL
CLARITY, POC: CLEAR
COLOR UR: YELLOW
EXPIRATION DATE: ABNORMAL
GLUCOSE UR STRIP-MCNC: NEGATIVE MG/DL
KETONES UR QL: NEGATIVE
LEUKOCYTE EST, POC: ABNORMAL
Lab: ABNORMAL
NITRITE UR-MCNC: NEGATIVE MG/ML
PH UR: 7 [PH] (ref 5–8)
PROT UR STRIP-MCNC: NEGATIVE MG/DL
RBC # UR STRIP: NEGATIVE /UL
SP GR UR: 1.01 (ref 1–1.03)
UROBILINOGEN UR QL: NORMAL

## 2025-01-24 PROCEDURE — 87086 URINE CULTURE/COLONY COUNT: CPT | Performed by: FAMILY MEDICINE

## 2025-01-24 PROCEDURE — 81003 URINALYSIS AUTO W/O SCOPE: CPT | Performed by: FAMILY MEDICINE

## 2025-01-26 LAB — BACTERIA SPEC AEROBE CULT: NO GROWTH

## 2025-02-04 RX ORDER — ROSUVASTATIN CALCIUM 5 MG/1
5 TABLET, COATED ORAL NIGHTLY
Qty: 90 TABLET | Refills: 2 | Status: SHIPPED | OUTPATIENT
Start: 2025-02-04

## 2025-02-04 RX ORDER — LANSOPRAZOLE 30 MG/1
30 CAPSULE, DELAYED RELEASE ORAL DAILY
Qty: 90 CAPSULE | Refills: 1 | Status: SHIPPED | OUTPATIENT
Start: 2025-02-04

## 2025-02-14 RX ORDER — ACEBUTOLOL HYDROCHLORIDE 200 MG/1
200 CAPSULE ORAL DAILY
Qty: 30 CAPSULE | Refills: 0 | OUTPATIENT
Start: 2025-02-14

## 2025-02-18 ENCOUNTER — TELEPHONE (OUTPATIENT)
Dept: CARDIOLOGY | Facility: CLINIC | Age: 75
End: 2025-02-18
Payer: MEDICARE

## 2025-02-18 RX ORDER — ACEBUTOLOL HYDROCHLORIDE 200 MG/1
200 CAPSULE ORAL DAILY
Qty: 90 CAPSULE | Refills: 0 | Status: SHIPPED | OUTPATIENT
Start: 2025-02-18

## 2025-02-18 NOTE — TELEPHONE ENCOUNTER
Incoming Refill Request      Medication requested (name and dose): Acebutolol 200 mg     Ph[facility] where [facility] sent[facility]    Additional details provided by patient: 1 left     Best call back number: [phone]    Does the patient have less than a 3 day supply:  [x] Yes  [] No

## 2025-02-18 NOTE — TELEPHONE ENCOUNTER
Rx Refill Note  Requested Prescriptions     Signed Prescriptions Disp Refills    acebutolol (SECTRAL) 200 MG capsule 90 capsule 0     Sig: Take 1 capsule by mouth Daily.     Authorizing Provider: TASHI TAMAYO     Ordering User: DARIANA MENDENHALL      Last office visit with prescribing clinician: 5/22/2024   Last telemedicine visit with prescribing clinician: Visit date not found   Next office visit with prescribing clinician: 5/22/2025                         Would you like a call back once the refill request has been completed: [] Yes [] No    If the office needs to give you a call back, can they leave a voicemail: [] Yes [] No    Dariana Mendenhall MA  02/18/25, 14:47 EST

## 2025-02-22 PROCEDURE — 87086 URINE CULTURE/COLONY COUNT: CPT | Performed by: NURSE PRACTITIONER

## 2025-02-22 PROCEDURE — 87077 CULTURE AEROBIC IDENTIFY: CPT | Performed by: NURSE PRACTITIONER

## 2025-02-22 PROCEDURE — 87186 SC STD MICRODIL/AGAR DIL: CPT | Performed by: NURSE PRACTITIONER

## 2025-02-28 ENCOUNTER — OFFICE (AMBULATORY)
Dept: URBAN - METROPOLITAN AREA CLINIC 64 | Facility: CLINIC | Age: 75
End: 2025-02-28
Payer: COMMERCIAL

## 2025-02-28 VITALS
DIASTOLIC BLOOD PRESSURE: 75 MMHG | WEIGHT: 158 LBS | SYSTOLIC BLOOD PRESSURE: 154 MMHG | HEART RATE: 62 BPM | SYSTOLIC BLOOD PRESSURE: 150 MMHG | HEIGHT: 64 IN | DIASTOLIC BLOOD PRESSURE: 72 MMHG

## 2025-02-28 DIAGNOSIS — K59.00 CONSTIPATION, UNSPECIFIED: ICD-10-CM

## 2025-02-28 DIAGNOSIS — R10.13 EPIGASTRIC PAIN: ICD-10-CM

## 2025-02-28 PROCEDURE — 99214 OFFICE O/P EST MOD 30 MIN: CPT | Performed by: INTERNAL MEDICINE

## 2025-03-10 ENCOUNTER — PATIENT OUTREACH (OUTPATIENT)
Dept: CASE MANAGEMENT | Facility: OTHER | Age: 75
End: 2025-03-10
Payer: MEDICARE

## 2025-03-10 NOTE — OUTREACH NOTE
AMBULATORY CASE MANAGEMENT NOTE    Names and Relationships of Patient/Support Persons: Contact: Jenna Rosa; Relationship: Self -     ACM completed a successful follow up outreach to patient. Pt. still experiencing UTI issues. Pt. visited  on 2/22/25. Pt. has persistent E. coli infections. She will continue Macrobid for 20 days per Dr. Graff. Pt. spoke to PCP and next treatment will be an antibiotic infusion of Gentamicin to support treatment. ACM educated on UTI prevention and treatment. Pt. understands the key reasons of UTI. Pt. continuing drinking plenty of fluids/water, cranberry, keeping clean and dry/ correct wiping technique after voiding, estrogen cream, washing hands, and managing gut health. Pt. managing glucose. Pt. managing BP.  Pt. reports BP running within normal limits but sporadic at times upon checking. Pt. reports full understanding of all care gaps to be completed. No questions per pt. She expresses appreciation for the call. Follow up outreach scheduled for 3-4 weeks. ACM provided all contact information for all needs/inquiries.     Caleb GTZ  Ambulatory Case Management    3/10/2025, 09:42 EDT

## 2025-03-17 DIAGNOSIS — G62.9 NEUROPATHY: ICD-10-CM

## 2025-03-17 RX ORDER — GABAPENTIN 300 MG/1
300 CAPSULE ORAL 2 TIMES DAILY
Qty: 180 CAPSULE | Refills: 0 | Status: SHIPPED | OUTPATIENT
Start: 2025-03-17

## 2025-03-17 RX ORDER — METHOTREXATE 2.5 MG/1
TABLET ORAL
Qty: 60 TABLET | Refills: 0 | Status: SHIPPED | OUTPATIENT
Start: 2025-03-17

## 2025-03-17 NOTE — TELEPHONE ENCOUNTER
Rx Refill Note  Requested Prescriptions     Pending Prescriptions Disp Refills    gabapentin (NEURONTIN) 300 MG capsule [Pharmacy Med Name: GABAPENTIN 300 MG CAPSULE] 180 capsule 0     Sig: TAKE 1 CAPSULE BY MOUTH 2 TIMES A DAY      Last office visit with prescribing clinician: 1/14/2025   Last telemedicine visit with prescribing clinician: Visit date not found   Next office visit with prescribing clinician: 4/22/2025        Lipid Panel (10/07/2024 09:26)                  Would you like a call back once the refill request has been completed: [] Yes [] No    If the office needs to give you a call back, can they leave a voicemail: [] Yes [] No    Destini Snider, RT  03/17/25, 14:46 EDT

## 2025-03-17 NOTE — TELEPHONE ENCOUNTER
Rx Refill Note  Requested Prescriptions     Pending Prescriptions Disp Refills    methotrexate 2.5 MG tablet [Pharmacy Med Name: METHOTREXATE 2.5 MG TABLET] 60 tablet 0     Sig: TAKE 5 TABLETS BY MOUTH ONCE WEEKLY      Last office visit with prescribing clinician: 1/14/2025   Last telemedicine visit with prescribing clinician: Visit date not found   Next office visit with prescribing clinician: 4/22/2025        Lipid Panel (10/07/2024 09:26)                  Would you like a call back once the refill request has been completed: [] Yes [] No    If the office needs to give you a call back, can they leave a voicemail: [] Yes [] No    Destini Snider, RT  03/17/25, 10:51 EDT

## 2025-03-27 RX ORDER — TRAZODONE HYDROCHLORIDE 50 MG/1
50 TABLET ORAL
Qty: 90 TABLET | Refills: 1 | Status: SHIPPED | OUTPATIENT
Start: 2025-03-27 | End: 2025-03-27 | Stop reason: SDUPTHER

## 2025-03-27 RX ORDER — TRAZODONE HYDROCHLORIDE 50 MG/1
50 TABLET ORAL
Qty: 90 TABLET | Refills: 1 | Status: SHIPPED | OUTPATIENT
Start: 2025-03-27

## 2025-03-27 NOTE — TELEPHONE ENCOUNTER
Rx Refill Note  Requested Prescriptions     Pending Prescriptions Disp Refills    traZODone (DESYREL) 50 MG tablet 90 tablet 1     Sig: Take 1 tablet by mouth every night at bedtime.      Last office visit with prescribing clinician: 1/14/2025   Last telemedicine visit with prescribing clinician: Visit date not found   Next office visit with prescribing clinician: 4/22/2025                         Would you like a call back once the refill request has been completed: [] Yes [] No    If the office needs to give you a call back, can they leave a voicemail: [] Yes [] No    Brianda Patino CMA  03/27/25, 09:12 EDT

## 2025-03-27 NOTE — TELEPHONE ENCOUNTER
Rx Refill Note  Requested Prescriptions     Pending Prescriptions Disp Refills    traZODone (DESYREL) 50 MG tablet 90 tablet 1     Sig: Take 1 tablet by mouth every night at bedtime.      Last office visit with prescribing clinician: 1/14/2025   Last telemedicine visit with prescribing clinician: Visit date not found   Next office visit with prescribing clinician: 4/22/2025                         Would you like a call back once the refill request has been completed: [] Yes [] No    If the office needs to give you a call back, can they leave a voicemail: [] Yes [] No    Brianda Patino CMA  03/27/25, 13:34 EDT

## 2025-04-06 DIAGNOSIS — Z79.4 TYPE 2 DIABETES MELLITUS WITH HYPOGLYCEMIA WITHOUT COMA, WITH LONG-TERM CURRENT USE OF INSULIN: ICD-10-CM

## 2025-04-06 DIAGNOSIS — E11.649 TYPE 2 DIABETES MELLITUS WITH HYPOGLYCEMIA WITHOUT COMA, WITH LONG-TERM CURRENT USE OF INSULIN: ICD-10-CM

## 2025-04-07 RX ORDER — INSULIN HUMAN 100 [IU]/ML
INJECTION, SUSPENSION SUBCUTANEOUS
Qty: 18 ML | Refills: 0 | Status: SHIPPED | OUTPATIENT
Start: 2025-04-07

## 2025-04-08 ENCOUNTER — APPOINTMENT (OUTPATIENT)
Dept: RESPIRATORY THERAPY | Facility: HOSPITAL | Age: 75
End: 2025-04-08
Payer: MEDICARE

## 2025-04-08 ENCOUNTER — TELEPHONE (OUTPATIENT)
Dept: CARDIOLOGY | Facility: CLINIC | Age: 75
End: 2025-04-08
Payer: MEDICARE

## 2025-04-08 ENCOUNTER — HOSPITAL ENCOUNTER (EMERGENCY)
Facility: HOSPITAL | Age: 75
Discharge: HOME | End: 2025-04-08
Attending: EMERGENCY MEDICINE | Admitting: EMERGENCY MEDICINE
Payer: MEDICARE

## 2025-04-08 ENCOUNTER — APPOINTMENT (OUTPATIENT)
Dept: GENERAL RADIOLOGY | Facility: HOSPITAL | Age: 75
End: 2025-04-08
Payer: MEDICARE

## 2025-04-08 VITALS
BODY MASS INDEX: 28.32 KG/M2 | OXYGEN SATURATION: 94 % | SYSTOLIC BLOOD PRESSURE: 146 MMHG | RESPIRATION RATE: 20 BRPM | TEMPERATURE: 97.4 F | HEART RATE: 55 BPM | DIASTOLIC BLOOD PRESSURE: 66 MMHG | HEIGHT: 63 IN | WEIGHT: 159.83 LBS

## 2025-04-08 DIAGNOSIS — R00.2 PALPITATIONS: ICD-10-CM

## 2025-04-08 LAB
ALBUMIN SERPL-MCNC: 4.4 G/DL (ref 3.5–5.2)
ALBUMIN/GLOB SERPL: 1.6 G/DL
ALP SERPL-CCNC: 46 U/L (ref 39–117)
ALT SERPL W P-5'-P-CCNC: 17 U/L (ref 1–33)
ANION GAP SERPL CALCULATED.3IONS-SCNC: 10.1 MMOL/L (ref 5–15)
AST SERPL-CCNC: 17 U/L (ref 1–32)
BASOPHILS # BLD AUTO: 0.03 10*3/MM3 (ref 0–0.2)
BASOPHILS NFR BLD AUTO: 0.6 % (ref 0–1.5)
BILIRUB SERPL-MCNC: 0.2 MG/DL (ref 0–1.2)
BUN SERPL-MCNC: 11 MG/DL (ref 8–23)
BUN/CREAT SERPL: 13.4 (ref 7–25)
CALCIUM SPEC-SCNC: 9.6 MG/DL (ref 8.6–10.5)
CHLORIDE SERPL-SCNC: 101 MMOL/L (ref 98–107)
CO2 SERPL-SCNC: 25.9 MMOL/L (ref 22–29)
CREAT SERPL-MCNC: 0.82 MG/DL (ref 0.57–1)
DEPRECATED RDW RBC AUTO: 64.1 FL (ref 37–54)
EGFRCR SERPLBLD CKD-EPI 2021: 75.2 ML/MIN/1.73
EOSINOPHIL # BLD AUTO: 0.25 10*3/MM3 (ref 0–0.4)
EOSINOPHIL NFR BLD AUTO: 4.6 % (ref 0.3–6.2)
ERYTHROCYTE [DISTWIDTH] IN BLOOD BY AUTOMATED COUNT: 17.8 % (ref 12.3–15.4)
GEN 5 1HR TROPONIN T REFLEX: <6 NG/L
GLOBULIN UR ELPH-MCNC: 2.8 GM/DL
GLUCOSE SERPL-MCNC: 228 MG/DL (ref 65–99)
HCT VFR BLD AUTO: 35.7 % (ref 34–46.6)
HGB BLD-MCNC: 11.6 G/DL (ref 12–15.9)
IMM GRANULOCYTES # BLD AUTO: 0.01 10*3/MM3 (ref 0–0.05)
IMM GRANULOCYTES NFR BLD AUTO: 0.2 % (ref 0–0.5)
LYMPHOCYTES # BLD AUTO: 2.57 10*3/MM3 (ref 0.7–3.1)
LYMPHOCYTES NFR BLD AUTO: 47.7 % (ref 19.6–45.3)
MAGNESIUM SERPL-MCNC: 2 MG/DL (ref 1.6–2.4)
MCH RBC QN AUTO: 32 PG (ref 26.6–33)
MCHC RBC AUTO-ENTMCNC: 32.5 G/DL (ref 31.5–35.7)
MCV RBC AUTO: 98.6 FL (ref 79–97)
MONOCYTES # BLD AUTO: 0.46 10*3/MM3 (ref 0.1–0.9)
MONOCYTES NFR BLD AUTO: 8.5 % (ref 5–12)
NEUTROPHILS NFR BLD AUTO: 2.07 10*3/MM3 (ref 1.7–7)
NEUTROPHILS NFR BLD AUTO: 38.4 % (ref 42.7–76)
NRBC BLD AUTO-RTO: 0 /100 WBC (ref 0–0.2)
PLATELET # BLD AUTO: 361 10*3/MM3 (ref 140–450)
PMV BLD AUTO: 9 FL (ref 6–12)
POTASSIUM SERPL-SCNC: 4 MMOL/L (ref 3.5–5.2)
PROT SERPL-MCNC: 7.2 G/DL (ref 6–8.5)
RBC # BLD AUTO: 3.62 10*6/MM3 (ref 3.77–5.28)
SODIUM SERPL-SCNC: 137 MMOL/L (ref 136–145)
TROPONIN T NUMERIC DELTA: NORMAL
TROPONIN T SERPL HS-MCNC: <6 NG/L
TSH SERPL DL<=0.05 MIU/L-ACNC: 2.34 UIU/ML (ref 0.27–4.2)
WBC NRBC COR # BLD AUTO: 5.39 10*3/MM3 (ref 3.4–10.8)

## 2025-04-08 PROCEDURE — 93005 ELECTROCARDIOGRAM TRACING: CPT | Performed by: EMERGENCY MEDICINE

## 2025-04-08 PROCEDURE — 84484 ASSAY OF TROPONIN QUANT: CPT | Performed by: EMERGENCY MEDICINE

## 2025-04-08 PROCEDURE — 80053 COMPREHEN METABOLIC PANEL: CPT | Performed by: EMERGENCY MEDICINE

## 2025-04-08 PROCEDURE — 85025 COMPLETE CBC W/AUTO DIFF WBC: CPT | Performed by: EMERGENCY MEDICINE

## 2025-04-08 PROCEDURE — 84443 ASSAY THYROID STIM HORMONE: CPT | Performed by: EMERGENCY MEDICINE

## 2025-04-08 PROCEDURE — 99284 EMERGENCY DEPT VISIT MOD MDM: CPT

## 2025-04-08 PROCEDURE — 71045 X-RAY EXAM CHEST 1 VIEW: CPT

## 2025-04-08 PROCEDURE — 93246 EXT ECG>7D<15D RECORDING: CPT

## 2025-04-08 PROCEDURE — 36415 COLL VENOUS BLD VENIPUNCTURE: CPT

## 2025-04-08 PROCEDURE — 83735 ASSAY OF MAGNESIUM: CPT | Performed by: EMERGENCY MEDICINE

## 2025-04-08 RX ORDER — SODIUM CHLORIDE 0.9 % (FLUSH) 0.9 %
10 SYRINGE (ML) INJECTION AS NEEDED
Status: DISCONTINUED | OUTPATIENT
Start: 2025-04-08 | End: 2025-04-08 | Stop reason: HOSPADM

## 2025-04-08 NOTE — DISCHARGE INSTRUCTIONS
Holter monitor as directed.  Follow-up with your cardiologist on Thursday as scheduled.  Return for chest pain neck arm jaw pain shortness of breath sustained elevated heart rate dizziness passing out altered mental status or any other new or worsening problems or concerns return immediately to ER.  Limit caffeine.

## 2025-04-08 NOTE — TELEPHONE ENCOUNTER
Called the patient and scheduled her for an appt on 4/10 with Karla Christy. Patient was considering going to the hospital because she said the palpitations feel a little hard to manage. Can someone from the medical staff call her and talk to her about her symptoms? Seemed a little SOB on the phone.

## 2025-04-08 NOTE — TELEPHONE ENCOUNTER
Caller: Jenna Rosa    Relationship to patient: Self    Best call back number: [phone]    Chief complaint: PALPITATIONS    Type of visit: FU    Requested date: ASAP     If rescheduling, when is the original appointment: 05.22.25     Additional notes:PATIENT NOT FEELING WELL HAVING ISSUES WITH PALPITATIONS AND WANTED TO BE SEEN ASAP THIS WEEK IF POSSIBLE. PLEASE CALL AND ADVISE.

## 2025-04-08 NOTE — ED PROVIDER NOTES
Subjective   History of Present Illness  Chief complaint palpitations    History of present illness this is a 75-year-old female multiple health problems who states she has had a 1 day history of palpitations and heart flip-flopping around throughout the day.  There is no chest pain neck arm jaw pain dizziness or syncope.  Denies any fever chills sweats cough congestion recent injury illness flus viruses vaccinations long car ride plane ride immobilization leg pain or swelling or vaccinations.  No change in medications.  She is on a beta-blocker.  And she has had this issue in the past.      Review of Systems   Constitutional:  Negative for chills and fever.   Respiratory:  Negative for chest tightness and shortness of breath.    Cardiovascular:  Positive for palpitations. Negative for chest pain and leg swelling.   Gastrointestinal:  Negative for abdominal pain and vomiting.   Musculoskeletal:  Negative for back pain and neck pain.   Neurological:  Negative for dizziness, syncope and light-headedness.   Psychiatric/Behavioral:  Negative for confusion.        Past Medical History:   Diagnosis Date    CAD 2020    Colon polyp     Congenital heart disease 2017    Depression     DEXA     2019= (-0.7/ -1.6); 2023= (-0.7/ -2.7)    Diabetic peripheral neuropathy     Diverticulosis     GERD     Heart murmur 2017    Hyperlipidemia     Hypertension     Insomnia     Lower back pain     MAMMO     NEG = 2021/ 2023    Obesity     Osteoarthritis     Osteopenia     Osteoporosis     Palpitations     RA     Rheumatoid nodule 06/2011     rt. thumb x 2 and rt. & lt. 3rd fingers (Oct.2012)/ rt. elbow, left elbow, and epidermal inclusion cyst post. neck (June 2011)-----Dr. Whitmore    Skin Cancer 2021    Vitamin D deficiency        Allergies   Allergen Reactions    Jardiance [Empagliflozin] Other (See Comments)     UTI's    Metformin Diarrhea       Past Surgical History:   Procedure Laterality Date    BLADDER SUSPENSION  10/11/2023         BREAST SURGERY  1972    CARDIAC CATHETERIZATION N/A 09/16/2020    Procedure: Left Heart Cath;  Surgeon: Walker Rodriguez MD;  Location: [facility] CATH INVASIVE LOCATION;  Service: Cardiovascular;  Laterality: N/A;    CARDIAC CATHETERIZATION N/A 09/16/2020    Procedure: Coronary angiography;  Surgeon: Walker Rodriguez MD;  Location: [facility] CATH INVASIVE LOCATION;  Service: Cardiovascular;  Laterality: N/A;    CARDIAC CATHETERIZATION  09/16/2020    Procedure: Functional Flow [place];  Surgeon: Kath Medina MD;  Location: [facility] CATH INVASIVE LOCATION;  Service: Cardiology;;    CARPAL TUNNEL RELEASE Right 02/20/2023    Procedure: CARPAL TUNNEL RELEASE WITH POSSIBLE SYNOVECTOMY;  Surgeon: Amadeo Magaña MD;  Location: [facility] MAIN OR;  Service: Orthopedics;  Laterality: Right;    CHOLECYSTECTOMY      COLONOSCOPY      2017 / 2023= TA, rech 2028? [facility]SI    CYST REMOVAL      Seb cyst on neck    EYE SURGERY      TOTAL ABDOMINAL HYSTERECTOMY WITH SALPINGO OOPHORECTOMY      VAGINAL PROLAPSE REPAIR       Uterine Prolapse repair       Family History   Problem Relation Age of Onset    Heart disease Mother     Diabetes Mother     Hypertension Mother     Rheum arthritis Mother     Coronary artery disease Mother     Alcohol abuse Mother     Heart failure Mother     Heart disease Father     Mental illness Father     Hypertension Father     Alcohol abuse Father     Early death Father     Heart attack Father     Heart failure Father     Kidney cancer Sister     Stroke Sister     Osteoarthritis Sister     Rheum arthritis Sister     Hypertension Sister     Arthritis Sister     Diabetes Sister     Stroke Sister     Hypertension Sister     Rheum arthritis Sister     Osteoarthritis Sister     Diabetes Sister     Arthritis Sister     Liver disease Sister     Kidney disease Sister     No Known Problems Brother     Rheum arthritis Brother     Osteoarthritis Brother     Hypertension Brother      Diabetes Maternal Grandfather        Social History     Socioeconomic History    Marital status:      Spouse name: Carson Rosa    Number of children: 3    Years of education: GED   Tobacco Use    Smoking status: Never     Passive exposure: Never    Smokeless tobacco: Never   Vaping Use    Vaping status: Never Used   Substance and Sexual Activity    Alcohol use: Yes     Comment: 3-4 a year    Drug use: Never    Sexual activity: Not Currently     Partners: Male     Birth control/protection: Hysterectomy         Prior to Admission medications    Medication Sig Start Date End Date Taking? Authorizing Provider   acebutolol (SECTRAL) 200 MG capsule Take 1 capsule by mouth Daily. 2/18/25   Joe García MD   acetaminophen (TYLENOL) 325 MG tablet Take 2 tablets by mouth Every 6 (Six) Hours As Needed for Mild Pain.    Evangelina Fontana MD   Adalimumab (Humira, 2 Syringe,) 40 MG/0.4ML Prefilled Syringe Kit injection Inject 0.4 mL every 2 weeks by subcutaneous route.    Evangelina Fontana MD   albuterol sulfate  (90 Base) MCG/ACT inhaler Inhale 2 puffs Every 6 (Six) Hours As Needed for Shortness of Air (cough). 12/27/24   Emily Burdick DO   amLODIPine (NORVASC) 10 MG tablet Take 1 tablet by mouth Every Night. 9/5/24   Emily Burdick DO   aspirin 81 MG EC tablet Take 1 tablet by mouth Daily. 2/21/17   Emergency, Nurse uLis, RN   azelastine (OPTIVAR) 0.05 % ophthalmic solution Administer 2 drops to both eyes 2 (Two) Times a Day As Needed. 12/12/23   Evangelina Fontana MD   Cholecalciferol 25 MCG (1000 UT) tablet Take 1 tablet by mouth Daily.    Evangelina Fontana MD   Continuous Glucose  (FreeStyle [place] 3 [place]) device Use 1 package Daily. 8/28/24   Emily Burdick DO   Continuous Glucose Sensor (FreeStyle Adelaide 3 Plus Sensor) Use Every 15 (Fifteen) Days. 12/16/24   Emily Burdick DO   Cranberry 125 MG tablet 1 po qd    Evangelina Fontana MD   cyclobenzaprine (FLEXERIL) 5 MG  tablet Take 1 tablet by mouth At Night As Needed for Muscle Spasms. 12/16/24   Emily Burdick DO   estradiol (ESTRACE) 0.1 MG/GM vaginal cream  3/4/24   ProviderEvangelina MD   fenofibrate 160 MG tablet TAKE 1 TABLET BY MOUTH DAILY 8/19/24   Emily Burdick DO   fesoterodine fumarate (TOVIAZ ER) 8 MG tablet sustained-release 24 hour tablet Take 1 tablet by mouth Daily. 12/16/24   Emily Burdick DO   folic acid (FOLVITE) 1 MG tablet TAKE 1 TABLET BY MOUTH DAILY 1/7/25   Emily Burdick DO   gabapentin (NEURONTIN) 300 MG capsule TAKE 1 CAPSULE BY MOUTH 2 TIMES A DAY 3/17/25   Emily Burdick DO   Insulin NPH Isophane & Regular (HumuLIN 70/30 KwikPen) (70-30) 100 UNIT/ML suspension pen-injector TAKE 18 UNITS UNDER THE SKIN BEFORE BREAKFAST, TAKE 21 UNITS BEFORE LUNCH, AND TAKE 23 UNITS BEFORE DINNER 4/7/25   Angie Young APRN   Insulin Pen Needle (Droplet Pen Needles) 32G X 4 MM misc USE 1 PEN NEED 2 TIMES A DAY 1/20/25   Dano Rangel MD   ipratropium-albuterol (Combivent Respimat)  MCG/ACT inhaler     ProviderEvangelina MD   isosorbide mononitrate (IMDUR) 60 MG 24 hr tablet TAKE 1 TABLET BY MOUTH DAILY WITH BREAKFAST 1/20/25   Emily Burdick DO   Janumet -1000 MG tablet TAKE 1 TABLET BY MOUTH DAILY 1/20/25   Emily Burdick DO   lactulose (CHRONULAC) 10 GM/15ML solution Take 30 mL by mouth At Night As Needed (constipation). 1/14/25   Emily Burdick DO   lansoprazole (PREVACID) 30 MG capsule TAKE 1 CAPSULE BY MOUTH DAILY 2/4/25   Emily Burdick DO   lidocaine (LMX) 4 % cream Apply 1 Application topically to the appropriate area as directed As Needed for Moderate Pain. Compunded Pain Cream    ProviderEvangelina MD   lisinopril (PRINIVIL,ZESTRIL) 40 MG tablet TAKE 1 TABLET BY MOUTH DAILY WITH BREAKFAST 12/2/24   Emily Burdick DO   loratadine (Claritin) 10 MG tablet Take 1 tablet by mouth Every Night.    Provider, MD Evangelina   meclizine (ANTIVERT) 12.5 MG tablet 1/2 - 1  po q6-8hrs prn dizzyness 11/8/23   Emily Burdick DO   methotrexate 2.5 MG tablet TAKE 5 TABLETS BY MOUTH ONCE WEEKLY 3/17/25   Emily Burdick DO   nabumetone (RELAFEN) 750 MG tablet Take 1 tablet by mouth 2 (Two) Times a Day As Needed for Mild Pain. 3/28/25   Emily Burdick DO   ondansetron ODT (ZOFRAN-ODT) 4 MG disintegrating tablet Place 1 tablet on the tongue Every 8 (Eight) Hours As Needed for Nausea. 12/27/24   Emily Burdick DO   rosuvastatin (CRESTOR) 5 MG tablet TAKE ONE TABLET BY MOUTH ONCE NIGHTLY 2/4/25   Emily Burdick DO   Thiamine HCl (vitamin B-1) 50 MG tablet Take 1 tablet by mouth Daily.    Provider, MD Evangelina   traZODone (DESYREL) 50 MG tablet Take 1 tablet by mouth every night at bedtime. 3/27/25   Emily Burdick DO   vitamin B-12 (CYANOCOBALAMIN) 1000 MCG tablet Take 1 tablet by mouth Daily. 7/20/23   Emily Burdick DO      Objective   Physical Exam  Constitutional 75-year-old female awake alert no distress triage vital signs reviewed sinus rhythm on the monitor.  No ectopy.  HEENT extraocular muscles are intact pupils equal and reactive no photophobia mouth clear neck supple no adenopathy no JV no bruits no thyroid masses lungs are clear no retraction heart was regular without murmur rub abdomen soft nontender good bowel sounds no peritoneal findings or pulsatile masses extremities pulses equal upper and lower extremities no edema no cords no Homans' sign no evidence of DVT skin warm dry without rashes neurologic awake alert follows commands motor strength normal without focal weakness  Procedures           ED Course      Results for orders placed or performed during the hospital encounter of 04/08/25   ECG 12 Lead Other; palpitations    Collection Time: 04/08/25  4:43 PM   Result Value Ref Range    QT Interval 449 ms    QTC Interval 454 ms   Comprehensive Metabolic Panel    Collection Time: 04/08/25  4:59 PM    Specimen: Blood   Result Value Ref Range    Glucose 228 (H) 65 -  99 mg/dL    BUN 11 8 - 23 mg/dL    Creatinine 0.82 0.57 - 1.00 mg/dL    Sodium 137 136 - 145 mmol/L    Potassium 4.0 3.5 - 5.2 mmol/L    Chloride 101 98 - 107 mmol/L    CO2 25.9 22.0 - 29.0 mmol/L    Calcium 9.6 8.6 - 10.5 mg/dL    Total Protein 7.2 6.0 - 8.5 g/dL    Albumin 4.4 3.5 - 5.2 g/dL    ALT (SGPT) 17 1 - 33 U/L    AST (SGOT) 17 1 - 32 U/L    Alkaline Phosphatase 46 39 - 117 U/L    Total Bilirubin 0.2 0.0 - 1.2 mg/dL    Globulin 2.8 gm/dL    A/G Ratio 1.6 g/dL    BUN/Creatinine Ratio 13.4 7.0 - 25.0    Anion Gap 10.1 5.0 - 15.0 mmol/L    eGFR 75.2 >60.0 mL/min/1.73   High Sensitivity Troponin T    Collection Time: 04/08/25  4:59 PM    Specimen: Blood   Result Value Ref Range    HS Troponin T <6 <14 ng/L   TSH Rfx On Abnormal To Free T4    Collection Time: 04/08/25  4:59 PM    Specimen: Blood   Result Value Ref Range    TSH 2.340 0.270 - 4.200 uIU/mL   Magnesium    Collection Time: 04/08/25  4:59 PM    Specimen: Blood   Result Value Ref Range    Magnesium 2.0 1.6 - 2.4 mg/dL   CBC Auto Differential    Collection Time: 04/08/25  4:59 PM    Specimen: Blood   Result Value Ref Range    WBC 5.39 3.40 - 10.80 10*3/mm3    RBC 3.62 (L) 3.77 - 5.28 10*6/mm3    Hemoglobin 11.6 (L) 12.0 - 15.9 g/dL    Hematocrit 35.7 34.0 - 46.6 %    MCV 98.6 (H) 79.0 - 97.0 fL    MCH 32.0 26.6 - 33.0 pg    MCHC 32.5 31.5 - 35.7 g/dL    RDW 17.8 (H) 12.3 - 15.4 %    RDW-SD 64.1 (H) 37.0 - 54.0 fl    MPV 9.0 6.0 - 12.0 fL    Platelets 361 140 - 450 10*3/mm3    Neutrophil % 38.4 (L) 42.7 - 76.0 %    Lymphocyte % 47.7 (H) 19.6 - 45.3 %    Monocyte % 8.5 5.0 - 12.0 %    Eosinophil % 4.6 0.3 - 6.2 %    Basophil % 0.6 0.0 - 1.5 %    Immature Grans % 0.2 0.0 - 0.5 %    Neutrophils, Absolute 2.07 1.70 - 7.00 10*3/mm3    Lymphocytes, Absolute 2.57 0.70 - 3.10 10*3/mm3    Monocytes, Absolute 0.46 0.10 - 0.90 10*3/mm3    Eosinophils, Absolute 0.25 0.00 - 0.40 10*3/mm3    Basophils, Absolute 0.03 0.00 - 0.20 10*3/mm3    Immature Grans, Absolute  0.01 0.00 - 0.05 10*3/mm3    nRBC 0.0 0.0 - 0.2 /100 WBC   High Sensitivity Troponin T 1Hr    Collection Time: 04/08/25  5:57 PM    Specimen: Blood   Result Value Ref Range    HS Troponin T <6 <14 ng/L    Troponin T Numeric Delta       XR Chest 1 View  Result Date: 4/8/2025  Impression: No evidence of acute disease. Electronically Signed: Honorio Macdonald MD  4/8/2025 5:15 PM EDT  Workstation ID: [id number]    Medications - No data to display                                           EKG my interpretation normal sinus rhythm rate of 62 PAC is noted first-degree AV block QTc of 454 normal axis no hypertrophy no significant change when compared to 4/11/2024 abnormal EKG            Medical Decision Making  Medical Decision Making.  IV established monitor placed my review of sinus rhythm EKG obtained my interpretation normal sinus rhythm rate of 62's PAC noted first AV block QTc of 454 normal axis no hypertrophy no change compared to 4/11/2024 abnormal EKG.  Chest x-ray obtained my independent review no pneumonia pneumothorax failure acute findings radiology review unremarkable.  Labs obtained by independent review comprehensive metabolic profile unremarkable and a blood sugar 228 potassium normal magnesium normal TSH normal troponin negative CBC unremarkable hemoglobin 11.6 troponin was normal as well.  Patient had no dysrhythmia on the monitor throughout her ER stay she was in sinus rhythm.  I do not see any evidence of acute myocardial infarction DVT pulmonary embolism and dissection pericarditis myocarditis pericardial effusion sepsis bacteremia electrolyte abnormality based on my history and physical and clinical findings although not a complete list of all possibilities.  At this point we had a discussion about outpatient versus inpatient management after shared medical decision making patient would rather go home.  She has an appoint with her cardiologist on Thursday 2 days from now.  She had a Holter monitor placed  for 14 days we talked about what to return for.  She voiced understanding talked about limiting caffeine and discharged home for outpatient management stable otherwise unremarkable ER course.  Record review shows in August 2023 normal stress test and echo.    Problems Addressed:  Palpitations: complicated acute illness or injury    Amount and/or Complexity of Data Reviewed  External Data Reviewed: ECG.     Details: Stress test and echo  Labs: ordered. Decision-making details documented in ED Course.  Radiology: ordered and independent interpretation performed. Decision-making details documented in ED Course.  ECG/medicine tests: ordered and independent interpretation performed. Decision-making details documented in ED Course.        Final diagnoses:   Palpitations       ED Disposition  ED Disposition       ED Disposition   Discharge    Condition   Stable    Comment   --               Emily Burdick,   [address]  [address]  [facility] [address]  [phone]    In 1 day           Medication List      No changes were made to your prescriptions during this visit.            Candido Lora MD  04/09/25 0023

## 2025-04-08 NOTE — TELEPHONE ENCOUNTER
Called patient, she is having palpitations constantly and chest feels weird, shortness of breath, extreme fatigue. Advised the ED, patient has an appointment with you 4/10/2025. Patient stated she will go to ED today. Just DONNA

## 2025-04-09 LAB
QT INTERVAL: 449 MS
QTC INTERVAL: 454 MS

## 2025-04-09 NOTE — PROGRESS NOTES
Subjective:     Encounter Date:04/10/2025      Patient ID: Jenna Rosa is a 75 y.o. female.    Chief Complaint:  History of Present Illness    Jenna Rosa is a 75 y.o. female who presents to the office today for evaluation of palpitations. Patient presented to the ED on Tuesday with palpations x 1 day. Per ED provider note no associate symptoms. EKG and telemetry revealed SR with PACs. Troponin, TSH, BMP unremarkable except glucose of 228.  CXR without acute cardiopulmonary process. Echocardiogram 1 year ago revealed normal LVEF with grade 1 diastolic dysfunction and moderate concentric LVH.  Holter monitor last April relatively benign with occasional PACs/PVCs and nonspecified atrial arrhythmias noted.  No evidence of atrial fibrillation/flutter, high degree AV block, ventricular tachycardia noted.  Patient was discharged from the ED with repeat Holter monitor study.  Patient seen and examined, labs and chart reviewed. Patient states her symptoms began Tuesday.  She reports frequent palpitations even occurring at rest, so much so they caused her to have lightheadedness/dizziness.  She denies chest pain, shortness of breath, syncope.  She reports previously evaluated by EP for similar symptoms and possible need for pacemaker.  Patient takes all medications as prescribed including acebutolol.  Her heart rate today is 59. ED workup reviewed with patient in detail.        The following portions of the patient's history were reviewed and updated as appropriate: allergies, current medications, past family history, past medical history, past social history, past surgical history, and problem list.    Past Medical History:   Diagnosis Date    CAD 2020    Colon polyp     Congenital heart disease 2017    Depression     DEXA     2019= (-0.7/ -1.6); 2023= (-0.7/ -2.7)    Diabetic peripheral neuropathy     Diverticulosis     GERD     Heart murmur 2017    Hyperlipidemia     Hypertension     Insomnia     Lower back  "pain     MAMMO     NEG = 2021/ 2023    Obesity     Osteoarthritis     Osteopenia     Osteoporosis     Palpitations     RA     Rheumatoid nodule 06/2011     rt. thumb x 2 and rt. & lt. 3rd fingers (Oct.2012)/ rt. elbow, left elbow, and epidermal inclusion cyst post. neck (June 2011)-----Dr. Whitmore    Skin Cancer 2021    Type 2 diabetes mellitus 1988    Urinary tract infection     Vitamin D deficiency      Past Surgical History:   Procedure Laterality Date    BLADDER SUSPENSION  10/11/2023        BREAST SURGERY  1972    CARDIAC CATHETERIZATION N/A 09/16/2020    Procedure: Left Heart Cath;  Surgeon: Walker Rodriguez MD;  Location: [facility] CATH INVASIVE LOCATION;  Service: Cardiovascular;  Laterality: N/A;    CARDIAC CATHETERIZATION N/A 09/16/2020    Procedure: Coronary angiography;  Surgeon: Walker Rodriguez MD;  Location: [facility] CATH INVASIVE LOCATION;  Service: Cardiovascular;  Laterality: N/A;    CARDIAC CATHETERIZATION  09/16/2020    Procedure: Functional Flow [place];  Surgeon: Kath Medina MD;  Location: [facility] CATH INVASIVE LOCATION;  Service: Cardiology;;    CARPAL TUNNEL RELEASE Right 02/20/2023    Procedure: CARPAL TUNNEL RELEASE WITH POSSIBLE SYNOVECTOMY;  Surgeon: Amadeo Magaña MD;  Location: [facility] MAIN OR;  Service: Orthopedics;  Laterality: Right;    CHOLECYSTECTOMY      COLONOSCOPY      2017 / 2023= TA, rech 2028? [facility]    CYST REMOVAL      Seb cyst on neck    EYE SURGERY      TOTAL ABDOMINAL HYSTERECTOMY WITH SALPINGO OOPHORECTOMY      VAGINAL PROLAPSE REPAIR       Uterine Prolapse repair     /60 (BP Location: Left arm, Patient Position: Sitting, Cuff Size: Adult)   Pulse 59   Ht 160 cm (63\")   Wt 71.8 kg (158 lb 3.2 oz)   SpO2 97%   BMI 28.02 kg/m²     Family History   Problem Relation Age of Onset    Heart disease Mother     Diabetes Mother     Hypertension Mother     Rheum arthritis Mother     Coronary artery disease Mother     Alcohol abuse " Mother     Heart failure Mother     Heart disease Father     Mental illness Father     Hypertension Father     Alcohol abuse Father     Early death Father         Heart disease    Heart attack Father     Heart failure Father     Kidney cancer Sister     Stroke Sister     Osteoarthritis Sister     Rheum arthritis Sister     Hypertension Sister     Arthritis Sister     Diabetes Sister     Stroke Sister     Hypertension Sister     Rheum arthritis Sister     Osteoarthritis Sister     Diabetes Sister     Arthritis Sister     Liver disease Sister     Kidney disease Sister     No Known Problems Brother     Rheum arthritis Brother     Osteoarthritis Brother     Hypertension Brother     Arthritis Brother     Diabetes Maternal Grandfather     Cancer Sister     Cancer Sister     Early death Sister         Cancer    Stroke Sister     Early death Sister     Thyroid disease Daughter     Early death Brother     Hypertension Sister     Arthritis Sister     Diabetes Sister     Liver disease Sister        Current Outpatient Medications:     acebutolol (SECTRAL) 200 MG capsule, Take 1 capsule by mouth Daily., Disp: 90 capsule, Rfl: 0    acetaminophen (TYLENOL) 325 MG tablet, Take 2 tablets by mouth Every 6 (Six) Hours As Needed for Mild Pain., Disp: , Rfl:     albuterol sulfate  (90 Base) MCG/ACT inhaler, Inhale 2 puffs Every 6 (Six) Hours As Needed for Shortness of Air (cough)., Disp: 18 g, Rfl: 0    amLODIPine (NORVASC) 10 MG tablet, Take 1 tablet by mouth Every Night., Disp: 90 tablet, Rfl: 1    aspirin 81 MG EC tablet, Take 1 tablet by mouth Daily., Disp: , Rfl:     azelastine (OPTIVAR) 0.05 % ophthalmic solution, Administer 2 drops to both eyes 2 (Two) Times a Day As Needed., Disp: , Rfl:     Cholecalciferol 25 MCG (1000 UT) tablet, Take 1 tablet by mouth Daily., Disp: , Rfl:     Continuous Glucose  (FreeStyle [place] 3 [place]) device, Use 1 package Daily., Disp: 1 each, Rfl: 3    Continuous Glucose Sensor (FreeStyle  Adelaide 3 Plus Sensor), Use Every 15 (Fifteen) Days., Disp: 6 each, Rfl: 3    Cranberry 125 MG tablet, 1 po qd, Disp: , Rfl:     cyclobenzaprine (FLEXERIL) 5 MG tablet, Take 1 tablet by mouth At Night As Needed for Muscle Spasms., Disp: 30 tablet, Rfl: 0    estradiol (ESTRACE) 0.1 MG/GM vaginal cream, , Disp: , Rfl:     fenofibrate 160 MG tablet, TAKE 1 TABLET BY MOUTH DAILY, Disp: 90 tablet, Rfl: 2    folic acid (FOLVITE) 1 MG tablet, TAKE 1 TABLET BY MOUTH DAILY, Disp: 90 tablet, Rfl: 1    gabapentin (NEURONTIN) 300 MG capsule, TAKE 1 CAPSULE BY MOUTH 2 TIMES A DAY, Disp: 180 capsule, Rfl: 0    Insulin NPH Isophane & Regular (HumuLIN 70/30 KwikPen) (70-30) 100 UNIT/ML suspension pen-injector, TAKE 18 UNITS UNDER THE SKIN BEFORE BREAKFAST, TAKE 21 UNITS BEFORE LUNCH, AND TAKE 23 UNITS BEFORE DINNER, Disp: 18 mL, Rfl: 0    Insulin Pen Needle (Droplet Pen Needles) 32G X 4 MM misc, USE 1 PEN NEED 2 TIMES A DAY, Disp: 100 each, Rfl: 3    ipratropium-albuterol (Combivent Respimat)  MCG/ACT inhaler, , Disp: , Rfl:     isosorbide mononitrate (IMDUR) 60 MG 24 hr tablet, TAKE 1 TABLET BY MOUTH DAILY WITH BREAKFAST, Disp: 90 tablet, Rfl: 0    Janumet -1000 MG tablet, TAKE 1 TABLET BY MOUTH DAILY, Disp: 90 tablet, Rfl: 0    lactulose (CHRONULAC) 10 GM/15ML solution, Take 30 mL by mouth At Night As Needed (constipation)., Disp: 237 mL, Rfl: 2    lansoprazole (PREVACID) 30 MG capsule, TAKE 1 CAPSULE BY MOUTH DAILY, Disp: 90 capsule, Rfl: 1    lidocaine (LMX) 4 % cream, Apply 1 Application topically to the appropriate area as directed As Needed for Moderate Pain. Compunded Pain Cream, Disp: , Rfl:     lisinopril (PRINIVIL,ZESTRIL) 40 MG tablet, TAKE 1 TABLET BY MOUTH DAILY WITH BREAKFAST, Disp: 90 tablet, Rfl: 1    loratadine (Claritin) 10 MG tablet, Take 1 tablet by mouth Every Night., Disp: , Rfl:     meclizine (ANTIVERT) 12.5 MG tablet, 1/2 - 1 po q6-8hrs prn dizzyness, Disp: 20 tablet, Rfl: 0    methotrexate 2.5 MG  tablet, TAKE 5 TABLETS BY MOUTH ONCE WEEKLY, Disp: 60 tablet, Rfl: 0    Misc Natural Products (BEET ROOT PO), Take 1 tablet/day by mouth Daily., Disp: , Rfl:     nabumetone (RELAFEN) 750 MG tablet, Take 1 tablet by mouth 2 (Two) Times a Day As Needed for Mild Pain., Disp: 60 tablet, Rfl: 0    ondansetron ODT (ZOFRAN-ODT) 4 MG disintegrating tablet, Place 1 tablet on the tongue Every 8 (Eight) Hours As Needed for Nausea., Disp: 30 tablet, Rfl: 0    rosuvastatin (CRESTOR) 5 MG tablet, TAKE ONE TABLET BY MOUTH ONCE NIGHTLY, Disp: 90 tablet, Rfl: 2    tolterodine LA (DETROL LA) 4 MG 24 hr capsule, Take 1 capsule by mouth Daily., Disp: , Rfl:     traZODone (DESYREL) 50 MG tablet, Take 1 tablet by mouth every night at bedtime., Disp: 90 tablet, Rfl: 1    Turmeric 500 MG capsule, Take 1 capsule by mouth Daily., Disp: , Rfl:     vitamin B-12 (CYANOCOBALAMIN) 1000 MCG tablet, Take 1 tablet by mouth Daily., Disp: , Rfl:     Adalimumab (Humira, 2 Syringe,) 40 MG/0.4ML Prefilled Syringe Kit injection, Inject 0.4 mL every 2 weeks by subcutaneous route., Disp: , Rfl:     fesoterodine fumarate (TOVIAZ ER) 8 MG tablet sustained-release 24 hour tablet, Take 1 tablet by mouth Daily., Disp: , Rfl:     Thiamine HCl (vitamin B-1) 50 MG tablet, Take 1 tablet by mouth Daily., Disp: , Rfl:     Allergies   Allergen Reactions    Jardiance [Empagliflozin] Other (See Comments)     UTI's    Metformin Diarrhea     Social History     Socioeconomic History    Marital status:      Spouse name: Carson Rosa    Number of children: 3    Years of education: GED   Tobacco Use    Smoking status: Never     Passive exposure: Never    Smokeless tobacco: Never   Vaping Use    Vaping status: Never Used   Substance and Sexual Activity    Alcohol use: Not Currently     Comment: 3-4 a year    Drug use: Never    Sexual activity: Not Currently     Partners: Male     Birth control/protection: Vasectomy, Hysterectomy     Review of Systems   Constitutional:  Negative for malaise/fatigue.   Cardiovascular:  Positive for palpitations. Negative for chest pain, dyspnea on exertion and leg swelling.   Respiratory:  Negative for cough and shortness of breath.    Gastrointestinal:  Negative for abdominal pain, nausea and vomiting.   Neurological:  Positive for dizziness and light-headedness. Negative for headaches, numbness and weakness.   All other systems reviewed and are negative.         Objective:     Vitals reviewed.   Constitutional:       Appearance: Overweight and not in distress.   Eyes:      Pupils: Pupils are equal, round, and reactive to light.   HENT:      Nose: Nose normal.    Mouth/Throat:      Pharynx: Oropharynx is clear.   Pulmonary:      Effort: Pulmonary effort is normal.      Breath sounds: Normal breath sounds. No wheezing.   Cardiovascular:      Normal rate. Regular rhythm.   Pulses:     Intact distal pulses.   Edema:     Peripheral edema absent.   Abdominal:      Palpations: Abdomen is soft.   Musculoskeletal: Normal range of motion.      Cervical back: Normal range of motion and neck supple. Skin:     General: Skin is warm and dry.   Neurological:      General: No focal deficit present.      Mental Status: Alert and oriented to person, place and time.         Procedures      LAB RESULTS (LAST 7 DAYS)  Lab Review:   Echocardiogram   Results for orders placed during the hospital encounter of 04/11/24    Adult Transthoracic Echo Complete w/ Color, Spectral and Contrast if Necessary Per Protocol    Interpretation Summary    Left ventricular ejection fraction appears to be 61 - 65%.    Left ventricular wall thickness is consistent with moderate concentric hypertrophy.    Left ventricular diastolic function is consistent with (grade I) impaired relaxation.      Cardiac Catheterization   Results for orders placed during the hospital encounter of 09/16/20    Cardiac Catheterization/Vascular Study    Narrative  Table formatting from the original result was not  included.  Percutaneous Coronary Revascularization Report    Jenna Rosa  [id number]  9/16/2020  @PCP@    Indications for the procedure include: abnormal stress test.    Procedure Details:  The risks, benefits, complications, treatment options, and expected outcomes were discussed with the patient. The patient and/or family concurred with the proposed plan, giving informed consent prior to diagnostic catheterization.    After informed consentShe was brought to the cath lab and diagnostic catheterization was performed by Dr. Figueroa.  Complete diagnostic catheterization findings will be discussed in their operative report.    The decision was then made to proceed with intervention. She received Heparin as per protocol.  The interventional procedure is as follows:    For the IFR evaluation of the LAD we used a XB 3.5 guide catheter with a [place] IFR pressure wire to measure the IFR value of the mid LAD lesion.  Procedure anticoagulation was heparin patient tolerated procedure well with no mucosal complication plan to obtain hemostasis by using Angio-Seal closure device.    After the procedure was completed, sedation was stopped and the sheaths and catheters were all removed according to established hospital protocols.    Findings:    Interventions  IFR evaluation of the mid LAD  Target Vessel  mid LAD  Pre % Stenosis 50  Post % Stenosis  0  Pre-procedure TONI flow  3  Post procedure TONI flow  3  Closure Device  none  Complications  none    Estimated Blood Loss:  Minimal    Complications:  None; patient tolerated the procedure well.    Disposition: PACU - hemodynamically stable.    Condition: stable    Impressions:  IFR evaluation of the mid LAD showing IFR value of 0.93 suggestive of no physiologically significant stenosis involving the mid LAD    Recommendations:  Medical management for obstructive coronary artery disease  Test results discussed with the patient and family      CBC  Results from last 7 days   Lab  Units 04/08/25  1659   WBC 10*3/mm3 5.39   RBC 10*6/mm3 3.62*   HEMOGLOBIN g/dL 11.6*   HEMATOCRIT % 35.7   MCV fL 98.6*   PLATELETS 10*3/mm3 361       BMP  Results from last 7 days   Lab Units 04/08/25  1659   SODIUM mmol/L 137   POTASSIUM mmol/L 4.0   CHLORIDE mmol/L 101   CO2 mmol/L 25.9   BUN mg/dL 11   CREATININE mg/dL 0.82   GLUCOSE mg/dL 228*   MAGNESIUM mg/dL 2.0       CMP   Results from last 7 days   Lab Units 04/08/25  1659   SODIUM mmol/L 137   POTASSIUM mmol/L 4.0   CHLORIDE mmol/L 101   CO2 mmol/L 25.9   BUN mg/dL 11   CREATININE mg/dL 0.82   GLUCOSE mg/dL 228*   ALBUMIN g/dL 4.4   BILIRUBIN mg/dL 0.2   ALK PHOS U/L 46   AST (SGOT) U/L 17   ALT (SGPT) U/L 17         BNP        TROPONIN  Results from last 7 days   Lab Units 04/08/25  1757   HSTROP T ng/L <6       CoAg        Creatinine Clearance  Estimated Creatinine Clearance: 57.2 mL/min (by C-G formula based on SCr of 0.82 mg/dL).    ABG        Radiology  XR Chest 1 View  Result Date: 4/8/2025  Impression: No evidence of acute disease. Electronically Signed: Honorio Macdonald MD  4/8/2025 5:15 PM EDT  Workstation ID: [id number]            Assessment    Diagnoses and all orders for this visit:    1. Palpitations (Primary)    2. Pure hypercholesterolemia    3. Coronary artery disease involving native coronary artery of native heart without angina pectoris    4. Type 2 diabetes mellitus with hyperglycemia, with long-term current use of insulin    5. Essential hypertension                  MDM    Palpitations  Recent ED visit for palpitations x 1 day  Troponin negative  BMP unremarkable except glucose of 228  TSH unremarkable  EKG revealing PAC with first-degree AV block, no significant change when compared to previous 1 year ago  Recent Holter monitor without evidence of A-fib/flutter, ventricular tachycardia, high degree AV block  Occasional PACs/PVCs noted as well as nonspecified atrial arrhythmias  Patient currently wearing Holter monitor  Acebutolol 200  mg   Resting HR mid 50s-low 60s, unable to increase beta blocker at this time   Caffeine restriction  Encourage proper hydration     Hypertension  Blood pressure 133/60  Amlodipine 10 mg, Imdur 60 mg, lisinopril 40 mg    Dyslipidemia  Statin therapy  Fenofibrate  LDL 55  Goal LDL less than 70    CAD  Cardiac catheterization from 2020 showing nonobstructive disease to LAD, RCA  Myoview study from August 2023 without evidence of ischemia  Echocardiogram with normal LVEF  Aspirin, Imdur, statin, CCB, beta blocker   Consider ischemic workup pending monitor results     Diabetes mellitus  With long-term insulin therapy  Janumet  A1c 7.68%     Recurrent UTI   Follows with urology     Patient to be seen at next scheduled visit with cardiology to review monitor results     Patient's previous medical records, labs, and EKG were reviewed and discussed with the patient at today's visit.       Electronically signed by RIN Cochran, 04/10/25, 2:37 PM EDT.

## 2025-04-10 ENCOUNTER — OFFICE VISIT (OUTPATIENT)
Dept: CARDIOLOGY | Facility: CLINIC | Age: 75
End: 2025-04-10
Payer: MEDICARE

## 2025-04-10 VITALS
HEART RATE: 59 BPM | BODY MASS INDEX: 28.03 KG/M2 | HEIGHT: 63 IN | OXYGEN SATURATION: 97 % | WEIGHT: 158.2 LBS | DIASTOLIC BLOOD PRESSURE: 60 MMHG | SYSTOLIC BLOOD PRESSURE: 133 MMHG

## 2025-04-10 DIAGNOSIS — Z79.4 TYPE 2 DIABETES MELLITUS WITH HYPERGLYCEMIA, WITH LONG-TERM CURRENT USE OF INSULIN: ICD-10-CM

## 2025-04-10 DIAGNOSIS — E78.00 PURE HYPERCHOLESTEROLEMIA: ICD-10-CM

## 2025-04-10 DIAGNOSIS — I10 ESSENTIAL HYPERTENSION: ICD-10-CM

## 2025-04-10 DIAGNOSIS — E11.65 TYPE 2 DIABETES MELLITUS WITH HYPERGLYCEMIA, WITH LONG-TERM CURRENT USE OF INSULIN: ICD-10-CM

## 2025-04-10 DIAGNOSIS — R00.2 PALPITATIONS: Primary | ICD-10-CM

## 2025-04-10 DIAGNOSIS — I25.10 CORONARY ARTERY DISEASE INVOLVING NATIVE CORONARY ARTERY OF NATIVE HEART WITHOUT ANGINA PECTORIS: ICD-10-CM

## 2025-04-10 PROCEDURE — 3075F SYST BP GE 130 - 139MM HG: CPT

## 2025-04-10 PROCEDURE — 3078F DIAST BP <80 MM HG: CPT

## 2025-04-10 PROCEDURE — 1160F RVW MEDS BY RX/DR IN RCRD: CPT

## 2025-04-10 PROCEDURE — 99213 OFFICE O/P EST LOW 20 MIN: CPT

## 2025-04-10 PROCEDURE — 1159F MED LIST DOCD IN RCRD: CPT

## 2025-04-10 RX ORDER — TOLTERODINE 4 MG/1
4 CAPSULE, EXTENDED RELEASE ORAL DAILY
COMMUNITY
Start: 2025-03-06

## 2025-04-10 RX ORDER — VIT C/B6/B5/MAGNESIUM/HERB 173 50-5-6-5MG
1 CAPSULE ORAL DAILY
COMMUNITY

## 2025-04-17 RX ORDER — ISOSORBIDE MONONITRATE 60 MG/1
60 TABLET, EXTENDED RELEASE ORAL
Qty: 90 TABLET | Refills: 0 | Status: SHIPPED | OUTPATIENT
Start: 2025-04-17

## 2025-04-22 ENCOUNTER — OFFICE VISIT (OUTPATIENT)
Dept: FAMILY MEDICINE CLINIC | Facility: CLINIC | Age: 75
End: 2025-04-22
Payer: MEDICARE

## 2025-04-22 ENCOUNTER — PATIENT OUTREACH (OUTPATIENT)
Dept: CASE MANAGEMENT | Facility: OTHER | Age: 75
End: 2025-04-22
Payer: MEDICARE

## 2025-04-22 VITALS
RESPIRATION RATE: 16 BRPM | HEIGHT: 63 IN | BODY MASS INDEX: 27.64 KG/M2 | TEMPERATURE: 97.5 F | OXYGEN SATURATION: 98 % | DIASTOLIC BLOOD PRESSURE: 60 MMHG | SYSTOLIC BLOOD PRESSURE: 133 MMHG | HEART RATE: 55 BPM | WEIGHT: 156 LBS

## 2025-04-22 DIAGNOSIS — Z79.4 TYPE 2 DIABETES MELLITUS WITH HYPOGLYCEMIA WITHOUT COMA, WITH LONG-TERM CURRENT USE OF INSULIN: ICD-10-CM

## 2025-04-22 DIAGNOSIS — E55.9 VITAMIN D DEFICIENCY: ICD-10-CM

## 2025-04-22 DIAGNOSIS — R30.0 DYSURIA: ICD-10-CM

## 2025-04-22 DIAGNOSIS — E78.2 MIXED HYPERLIPIDEMIA: ICD-10-CM

## 2025-04-22 DIAGNOSIS — D64.9 ANEMIA, UNSPECIFIED TYPE: ICD-10-CM

## 2025-04-22 DIAGNOSIS — N30.01 ACUTE CYSTITIS WITH HEMATURIA: Primary | ICD-10-CM

## 2025-04-22 DIAGNOSIS — E11.649 TYPE 2 DIABETES MELLITUS WITH HYPOGLYCEMIA WITHOUT COMA, WITH LONG-TERM CURRENT USE OF INSULIN: ICD-10-CM

## 2025-04-22 DIAGNOSIS — I10 ESSENTIAL HYPERTENSION: ICD-10-CM

## 2025-04-22 LAB
BILIRUB BLD-MCNC: NEGATIVE MG/DL
CLARITY, POC: ABNORMAL
COLOR UR: YELLOW
EXPIRATION DATE: ABNORMAL
GLUCOSE UR STRIP-MCNC: NEGATIVE MG/DL
KETONES UR QL: NEGATIVE
LEUKOCYTE EST, POC: ABNORMAL
Lab: ABNORMAL
NITRITE UR-MCNC: POSITIVE MG/ML
PH UR: 7 [PH] (ref 5–8)
PROT UR STRIP-MCNC: NEGATIVE MG/DL
RBC # UR STRIP: ABNORMAL /UL
SP GR UR: 1.01 (ref 1–1.03)
UROBILINOGEN UR QL: NORMAL

## 2025-04-22 PROCEDURE — 87186 SC STD MICRODIL/AGAR DIL: CPT | Performed by: FAMILY MEDICINE

## 2025-04-22 PROCEDURE — 85025 COMPLETE CBC W/AUTO DIFF WBC: CPT | Performed by: FAMILY MEDICINE

## 2025-04-22 PROCEDURE — 82306 VITAMIN D 25 HYDROXY: CPT | Performed by: FAMILY MEDICINE

## 2025-04-22 PROCEDURE — 82746 ASSAY OF FOLIC ACID SERUM: CPT | Performed by: FAMILY MEDICINE

## 2025-04-22 PROCEDURE — 82607 VITAMIN B-12: CPT | Performed by: FAMILY MEDICINE

## 2025-04-22 PROCEDURE — 87088 URINE BACTERIA CULTURE: CPT | Performed by: FAMILY MEDICINE

## 2025-04-22 PROCEDURE — 87086 URINE CULTURE/COLONY COUNT: CPT | Performed by: FAMILY MEDICINE

## 2025-04-22 RX ORDER — PHENAZOPYRIDINE HYDROCHLORIDE 200 MG/1
200 TABLET, FILM COATED ORAL 3 TIMES DAILY PRN
Qty: 20 TABLET | Refills: 0 | Status: SHIPPED | OUTPATIENT
Start: 2025-04-22

## 2025-04-22 RX ORDER — NITROFURANTOIN 25; 75 MG/1; MG/1
100 CAPSULE ORAL 2 TIMES DAILY
Qty: 28 CAPSULE | Refills: 0 | Status: SHIPPED | OUTPATIENT
Start: 2025-04-22

## 2025-04-22 RX ORDER — VITAMIN B COMPLEX
1 CAPSULE ORAL DAILY
COMMUNITY

## 2025-04-22 NOTE — OUTREACH NOTE
AMBULATORY CASE MANAGEMENT NOTE    Names and Relationships of Patient/Support Persons: Contact: Jenna Rosa; Relationship: Self -     ACM completed a successful follow up outreach to patient. Pt. still experiencing UTI issues. Pt. Completed follow up with urologist. Pt. continuing to be treated per additional PO antibiotic. Pt. reports pain urination. ACM educated further on UTI prevention and treatment. Pt. continuing drinking plenty of fluids/water, cranberry, keeping clean and dry/ correct wiping technique after voiding, estrogen cream, washing hands, and managing gut health. Pt. In process of managing glucose. Pt. Reports blood glucose sporadic up/down at this time. Pt. Visited ED ob 4.8/25 for palpitations. Pt. reports better with no new onset episodes. Pt. completed cardiologist appointment on 4/10/25 for follow up. Pt. placed on Holter monitor. Pt. Currently wearing. Pt. reports BP running within normal limits but sporadic at times upon checking. Pt. Monitoring, treating, journaling. Pt. reports full understanding of all care gaps to be completed. No questions per pt. She expresses appreciation for the call. Follow up outreach scheduled for 3-4 weeks. ACM provided all contact information for all needs/inquiries.     Caleb GTZ  Ambulatory Case Management    4/22/2025, 08:36 EDT

## 2025-04-22 NOTE — PROGRESS NOTES
Subjective   Jenna Rosa is a 75 y.o. female.     Chief Complaint   Patient presents with    Hypertension    Difficulty Urinating         Current Outpatient Medications:     acebutolol (SECTRAL) 200 MG capsule, Take 1 capsule by mouth Daily., Disp: 90 capsule, Rfl: 0    acetaminophen (TYLENOL) 325 MG tablet, Take 2 tablets by mouth Every 6 (Six) Hours As Needed for Mild Pain., Disp: , Rfl:     albuterol sulfate  (90 Base) MCG/ACT inhaler, Inhale 2 puffs Every 6 (Six) Hours As Needed for Shortness of Air (cough)., Disp: 18 g, Rfl: 0    amLODIPine (NORVASC) 10 MG tablet, Take 1 tablet by mouth Every Night., Disp: 90 tablet, Rfl: 1    aspirin 81 MG EC tablet, Take 1 tablet by mouth Daily., Disp: , Rfl:     B Complex Vitamins (vitamin b complex) capsule capsule, Take 1 capsule by mouth Daily., Disp: , Rfl:     CALCIUM MAGNESIUM ZINC PO, Take 1 tablet by mouth Daily., Disp: , Rfl:     Continuous Glucose  (FreeStyle [place] 3 [place]) device, Use 1 package Daily., Disp: 1 each, Rfl: 3    Continuous Glucose Sensor (FreeStyle [place] 3 Plus Sensor), Use Every 15 (Fifteen) Days., Disp: 6 each, Rfl: 3    Cranberry 125 MG tablet, 1 po qd, Disp: , Rfl:     estradiol (ESTRACE) 0.1 MG/GM vaginal cream, , Disp: , Rfl:     fenofibrate 160 MG tablet, TAKE 1 TABLET BY MOUTH DAILY, Disp: 90 tablet, Rfl: 2    folic acid (FOLVITE) 1 MG tablet, TAKE 1 TABLET BY MOUTH DAILY, Disp: 90 tablet, Rfl: 1    gabapentin (NEURONTIN) 300 MG capsule, TAKE 1 CAPSULE BY MOUTH 2 TIMES A DAY, Disp: 180 capsule, Rfl: 0    Insulin NPH Isophane & Regular (HumuLIN 70/30 KwikPen) (70-30) 100 UNIT/ML suspension pen-injector, TAKE 18 UNITS UNDER THE SKIN BEFORE BREAKFAST, TAKE 21 UNITS BEFORE LUNCH, AND TAKE 23 UNITS BEFORE DINNER, Disp: 18 mL, Rfl: 0    Insulin Pen Needle (Droplet Pen Needles) 32G X 4 MM misc, USE 1 PEN NEED 2 TIMES A DAY, Disp: 100 each, Rfl: 3    ipratropium-albuterol (Combivent Respimat)  MCG/ACT inhaler, , Disp: , Rfl:      isosorbide mononitrate (IMDUR) 60 MG 24 hr tablet, TAKE 1 TABLET BY MOUTH DAILY WITH BREAKFAST, Disp: 90 tablet, Rfl: 0    Janumet -1000 MG tablet, TAKE 1 TABLET BY MOUTH DAILY, Disp: 90 tablet, Rfl: 0    lactulose (CHRONULAC) 10 GM/15ML solution, Take 30 mL by mouth At Night As Needed (constipation)., Disp: 237 mL, Rfl: 2    lansoprazole (PREVACID) 30 MG capsule, TAKE 1 CAPSULE BY MOUTH DAILY, Disp: 90 capsule, Rfl: 1    lisinopril (PRINIVIL,ZESTRIL) 40 MG tablet, TAKE 1 TABLET BY MOUTH DAILY WITH BREAKFAST, Disp: 90 tablet, Rfl: 1    loratadine (Claritin) 10 MG tablet, Take 1 tablet by mouth Every Night., Disp: , Rfl:     meclizine (ANTIVERT) 12.5 MG tablet, 1/2 - 1 po q6-8hrs prn dizzyness, Disp: 20 tablet, Rfl: 0    methotrexate 2.5 MG tablet, TAKE 5 TABLETS BY MOUTH ONCE WEEKLY, Disp: 60 tablet, Rfl: 0    Misc Natural Products (BEET ROOT PO), Take 1 tablet/day by mouth Daily., Disp: , Rfl:     Misc Natural Products (BEET ROOT PO), Take 1 tablet by mouth Daily., Disp: , Rfl:     nabumetone (RELAFEN) 750 MG tablet, Take 1 tablet by mouth 2 (Two) Times a Day As Needed for Mild Pain., Disp: 60 tablet, Rfl: 0    ondansetron ODT (ZOFRAN-ODT) 4 MG disintegrating tablet, Place 1 tablet on the tongue Every 8 (Eight) Hours As Needed for Nausea., Disp: 30 tablet, Rfl: 0    Probiotic Product (PROBIOTIC BLEND PO), Take 1 tablet by mouth Daily., Disp: , Rfl:     rosuvastatin (CRESTOR) 5 MG tablet, TAKE ONE TABLET BY MOUTH ONCE NIGHTLY, Disp: 90 tablet, Rfl: 2    traZODone (DESYREL) 50 MG tablet, Take 1 tablet by mouth every night at bedtime., Disp: 90 tablet, Rfl: 1    Turmeric 500 MG capsule, Take 1 capsule by mouth Daily., Disp: , Rfl:     Vitamin D-Vitamin K (VITAMIN K2-VITAMIN D3 PO), Take 1 tablet by mouth Daily., Disp: , Rfl:     nitrofurantoin, macrocrystal-monohydrate, (Macrobid) 100 MG capsule, Take 1 capsule by mouth 2 (Two) Times a Day., Disp: 28 capsule, Rfl: 0    phenazopyridine (Pyridium) 200 MG  tablet, Take 1 tablet by mouth 3 (Three) Times a Day As Needed for Bladder Spasms or Dysuria., Disp: 20 tablet, Rfl: 0    Past Medical History:   Diagnosis Date    CAD 2020    Colon polyp     Congenital heart disease 2017    Depression     DEXA     2019= (-0.7/ -1.6); 2023= (-0.7/ -2.7)    Diabetic peripheral neuropathy     Diverticulosis     GERD     Heart murmur 2017    Hyperlipidemia     Hypertension     Insomnia     Lower back pain     MAMMO     NEG = 2021/ 2023    Obesity     Osteoarthritis     Osteopenia     Osteoporosis     Palpitations     RA     Rheumatoid nodule 06/2011     rt. thumb x 2 and rt. & lt. 3rd fingers (Oct.2012)/ rt. elbow, left elbow, and epidermal inclusion cyst post. neck (June 2011)-----Dr. Whitmore    Skin Cancer 2021    Type 2 diabetes mellitus 1988    Urinary tract infection     Vitamin D deficiency        Past Surgical History:   Procedure Laterality Date    BLADDER SUSPENSION  10/11/2023        BREAST SURGERY  1972    CARDIAC CATHETERIZATION N/A 09/16/2020    Procedure: Left Heart Cath;  Surgeon: Walker Rodriguez MD;  Location: [facility] CATH INVASIVE LOCATION;  Service: Cardiovascular;  Laterality: N/A;    CARDIAC CATHETERIZATION N/A 09/16/2020    Procedure: Coronary angiography;  Surgeon: Walker Rodriguez MD;  Location: [facility] CATH INVASIVE LOCATION;  Service: Cardiovascular;  Laterality: N/A;    CARDIAC CATHETERIZATION  09/16/2020    Procedure: Functional Flow Cameron;  Surgeon: Kath Medina MD;  Location: [facility] CATH INVASIVE LOCATION;  Service: Cardiology;;    CARPAL TUNNEL RELEASE Right 02/20/2023    Procedure: CARPAL TUNNEL RELEASE WITH POSSIBLE SYNOVECTOMY;  Surgeon: Amadeo Magaña MD;  Location: [facility] MAIN OR;  Service: Orthopedics;  Laterality: Right;    CHOLECYSTECTOMY      COLONOSCOPY      2017 / 2023= TA, rech 2028? [facility]    CYST REMOVAL      Seb cyst on neck    EYE SURGERY      TOTAL ABDOMINAL HYSTERECTOMY WITH SALPINGO  OOPHORECTOMY      VAGINAL PROLAPSE REPAIR       Uterine Prolapse repair       Family History   Problem Relation Age of Onset    Heart disease Mother     Diabetes Mother     Hypertension Mother     Rheum arthritis Mother     Coronary artery disease Mother     Alcohol abuse Mother     Heart failure Mother     Heart disease Father     Mental illness Father     Hypertension Father     Alcohol abuse Father     Early death Father         Heart disease    Heart attack Father     Heart failure Father     Kidney cancer Sister     Stroke Sister     Osteoarthritis Sister     Rheum arthritis Sister     Hypertension Sister     Arthritis Sister     Diabetes Sister     Stroke Sister     Hypertension Sister     Rheum arthritis Sister     Osteoarthritis Sister     Diabetes Sister     Arthritis Sister     Liver disease Sister     Kidney disease Sister     No Known Problems Brother     Rheum arthritis Brother     Osteoarthritis Brother     Hypertension Brother     Arthritis Brother     Diabetes Maternal Grandfather     Cancer Sister     Cancer Sister     Early death Sister         Cancer    Stroke Sister     Early death Sister     Thyroid disease Daughter     Early death Brother     Hypertension Sister     Arthritis Sister     Diabetes Sister     Liver disease Sister        Social History     Socioeconomic History    Marital status:      Spouse name: Carson Rosa    Number of children: 3    Years of education: GED   Tobacco Use    Smoking status: Never     Passive exposure: Never    Smokeless tobacco: Never   Vaping Use    Vaping status: Never Used   Substance and Sexual Activity    Alcohol use: Not Currently     Comment: 3-4 a year    Drug use: Never    Sexual activity: Not Currently     Partners: Male     Birth control/protection: Vasectomy, Hysterectomy       History of Present Illness  The patient is a 75-year-old female here for follow-up on hypertension with complaints of a possible urinary tract infection and a history  of recurrent urinary tract infections.    A history of recurrent urinary tract infections, typically caused by E. coli, is noted. Intravenous antibiotics have not been received in the past. Macrobid was previously prescribed by Dr. Lorenz and completed on Saturday, but symptoms persisted. A 20-day supply of Macrobid from an urgent care facility initially alleviated symptoms, but they returned upon completion. Fatigue, depression, and sleep disturbances due to frequent urination are reported. Severe pain limits the ability to perform routine activities such as yard work. Pyridium has not been taken recently but resumption is being considered. An upcoming appointment with Dr. Lorenz is scheduled for May 2025. Cranberry supplements are being taken, and a lower dose of Macrobid as a preventative measure appears beneficial.    Blood pressure is monitored at home, typically ranging from 130s to 140s. The most recent reading was 133/60 during a visit to Cardio. Lisinopril 40 mg is currently being taken.    An appointment with Dr. Richey, a gastroenterologist, is scheduled to investigate stomach pain. Omeprazole and Carafate are taken when the pain becomes severe.    Blood work was done at the hospital on 04/08/2025 due to an irregular heartbeat. Severe palpitations were experienced, and a Holter monitor was applied. The nurse practitioner at Dr Madison's advised reducing caffeine intake, resulting in a switch to more decaf coffee and limiting regular coffee to 2 or 3 cups a day.    Humira has not been taken for a while due to the UTI. Tumeric is taken, which seems to help. Nabumetone is taken when in pain, usually at night to aid sleep. Tylenol is not effective.    The muscle relaxer was discontinued due to adverse effects. Vitamin D3 K2 500 mcg is taken once a day. A B complex containing B1, B12, B5, B9, and iron is also taken.        The following portions of the patient's history were reviewed and updated as appropriate:  allergies, current medications, past family history, past medical history, past social history, past surgical history and problem list.    Review of Systems   Constitutional:  Positive for fatigue. Negative for activity change and unexpected weight gain.   Respiratory:  Positive for cough. Negative for chest tightness and shortness of breath.    Cardiovascular:  Negative for chest pain, palpitations and leg swelling.   Gastrointestinal:  Positive for abdominal pain.   Genitourinary:  Positive for dysuria and frequency. Negative for urgency and urinary incontinence.   Musculoskeletal:  Positive for myalgias. Negative for arthralgias.   Neurological:  Negative for dizziness, facial asymmetry, speech difficulty, weakness, light-headedness, headache, memory problem and confusion.   Psychiatric/Behavioral:  Positive for depressed mood.        Vitals:    04/22/25 1329   BP: 133/60   Pulse:    Resp:    Temp:    SpO2:        Objective   Physical Exam  Vitals and nursing note reviewed.   Constitutional:       General: She is not in acute distress.     Appearance: She is not ill-appearing or toxic-appearing.   HENT:      Head: Normocephalic and atraumatic.   Pulmonary:      Effort: Pulmonary effort is normal.   Neurological:      Mental Status: She is alert and oriented to person, place, and time.      Cranial Nerves: No cranial nerve deficit.   Psychiatric:         Attention and Perception: Attention and perception normal.         Mood and Affect: Mood and affect normal.         Speech: Speech normal.         Behavior: Behavior normal. Behavior is cooperative.         Thought Content: Thought content normal.         Cognition and Memory: Cognition and memory normal.         Judgment: Judgment normal.       Physical Exam        Assessment & Plan   Diagnoses and all orders for this visit:    1. Acute cystitis with hematuria (Primary)  -     Urine Culture - Urine, Urine, Clean Catch    2. Dysuria  -     POCT urinalysis  dipstick, automated  -     Urine Culture - Urine, Urine, Clean Catch    3. Type 2 diabetes mellitus with hypoglycemia without coma, with long-term current use of insulin  -     Microalbumin / Creatinine Urine Ratio - Urine, Clean Catch; Future  -     Hemoglobin A1c; Future    4. Essential hypertension    5. Mixed hyperlipidemia  -     Lipid Panel; Future  -     Comprehensive Metabolic Panel; Future    6. Anemia, unspecified type  -     CBC & Differential  -     Vitamin B12  -     Folate    7. Vitamin D deficiency  -     Vitamin D,25-Hydroxy    Other orders  -     nitrofurantoin, macrocrystal-monohydrate, (Macrobid) 100 MG capsule; Take 1 capsule by mouth 2 (Two) Times a Day.  Dispense: 28 capsule; Refill: 0  -     phenazopyridine (Pyridium) 200 MG tablet; Take 1 tablet by mouth 3 (Three) Times a Day As Needed for Bladder Spasms or Dysuria.  Dispense: 20 tablet; Refill: 0      Assessment & Plan  1. Recurrent urinary tract infections.  - Urinalysis today revealed the presence of nitrites, leukocytes, and blood, necessitating further investigation through culture.  - The most recent urine culture from 01/2025 did not yield any growth, while the previous one identified E. coli, a common pathogen in bladder infections.  - She has been on a prophylactic regimen of Macrobid, which appears to be beneficial.  - A prescription for Macrobid 100 mg will be issued based on the last culture results. The urine sample will be sent for culture. A prescription for phenazopyridine 200 mg will also be provided to alleviate discomfort.    2. Hypertension.  - Her blood pressure readings have been slightly elevated recently, with today's reading at 153/61 mmHg.  - She is currently on lisinopril 40 mg.  - Blood pressure monitoring at home shows readings in the 130s to 140s.  - Continue current medication and monitor blood pressure regularly.    3. Anemia.  - Recent blood work indicated mild anemia.  - A B12 level test will be conducted  today to investigate potential causes.  - She is currently taking a B complex vitamin that includes B1, B2, B6, B12, and iron.  - Continue current supplementation and follow up on lab results.    4. Palpitations.  - She reported experiencing severe palpitations and was advised to reduce caffeine intake.  - A Holter monitor was previously placed, and she is awaiting results.  - Blood work from 04/08/2025 showed normal cardiac enzymes and magnesium levels.  - Continue monitoring symptoms and follow up with cardiology as needed.    5. Arthritis.  - She experiences arthritis pain and finds relief with nabumetone, which she takes as needed.  - She is advised to continue using a heating pad for additional relief.  - Tylenol has been ineffective for her pain.  - Continue current pain management regimen and consider additional non-pharmacological interventions.    6. Gastrointestinal issues.  - She has an upcoming appointment with Dr. Penelope Aponte for a stomach scope to investigate ongoing abdominal pain.  - She is currently taking omeprazole and Carafate as needed for severe pain.  - Continue current medications and follow up with gastroenterology.    7. Health maintenance.  - A lipid panel, CMP, A1c, and urinalysis will be ordered today.  - She will have these tests done at the clinic in late May.  - Ensure fasting prior to lipid panel and CMP.  - Continue routine health maintenance and follow up on lab results.     Results  Labs   - Urine test: Nitrites, leukocytes, and blood present   - Urine culture: 01/2025, No growth   - Blood work: 04/08/2025, Magnesium normal, cardiac enzyme normal, CMP showed kidneys and liver were good, sugar was 228, CBC showed white count was normal, mild anemia, platelets were normal          Patient or patient representative verbalized consent for the use of Ambient Listening during the visit with  Emily Burdick DO for chart documentation. 5/4/2025  19:07 EDT

## 2025-04-22 NOTE — PROGRESS NOTES
Venipuncture performed in L ARM by RT Aimee  with good hemostasis. Patient tolerated well. 04/22/25 Destini Snider, RT

## 2025-04-23 LAB
25(OH)D3 SERPL-MCNC: 69.1 NG/ML (ref 30–100)
BASOPHILS # BLD AUTO: 0.04 10*3/MM3 (ref 0–0.2)
BASOPHILS NFR BLD AUTO: 0.6 % (ref 0–1.5)
DEPRECATED RDW RBC AUTO: 62.5 FL (ref 37–54)
EOSINOPHIL # BLD AUTO: 0.28 10*3/MM3 (ref 0–0.4)
EOSINOPHIL NFR BLD AUTO: 4.5 % (ref 0.3–6.2)
ERYTHROCYTE [DISTWIDTH] IN BLOOD BY AUTOMATED COUNT: 17.4 % (ref 12.3–15.4)
FOLATE SERPL-MCNC: >20 NG/ML (ref 4.78–24.2)
HCT VFR BLD AUTO: 35.9 % (ref 34–46.6)
HGB BLD-MCNC: 12.1 G/DL (ref 12–15.9)
IMM GRANULOCYTES # BLD AUTO: 0.02 10*3/MM3 (ref 0–0.05)
IMM GRANULOCYTES NFR BLD AUTO: 0.3 % (ref 0–0.5)
LYMPHOCYTES # BLD AUTO: 2.26 10*3/MM3 (ref 0.7–3.1)
LYMPHOCYTES NFR BLD AUTO: 36.6 % (ref 19.6–45.3)
MCH RBC QN AUTO: 33.2 PG (ref 26.6–33)
MCHC RBC AUTO-ENTMCNC: 33.7 G/DL (ref 31.5–35.7)
MCV RBC AUTO: 98.6 FL (ref 79–97)
MONOCYTES # BLD AUTO: 0.31 10*3/MM3 (ref 0.1–0.9)
MONOCYTES NFR BLD AUTO: 5 % (ref 5–12)
NEUTROPHILS NFR BLD AUTO: 3.26 10*3/MM3 (ref 1.7–7)
NEUTROPHILS NFR BLD AUTO: 53 % (ref 42.7–76)
NRBC BLD AUTO-RTO: 0 /100 WBC (ref 0–0.2)
PLATELET # BLD AUTO: 278 10*3/MM3 (ref 140–450)
PMV BLD AUTO: 10.3 FL (ref 6–12)
RBC # BLD AUTO: 3.64 10*6/MM3 (ref 3.77–5.28)
VIT B12 BLD-MCNC: 679 PG/ML (ref 211–946)
WBC NRBC COR # BLD AUTO: 6.17 10*3/MM3 (ref 3.4–10.8)

## 2025-04-25 LAB — BACTERIA SPEC AEROBE CULT: ABNORMAL

## 2025-04-30 LAB
CV ZIO BASELINE AVG BPM: 58 BPM
CV ZIO BASELINE BPM HIGH: 126 BPM
CV ZIO BASELINE BPM LOW: 48 BPM
CV ZIO DEVICE ANALYSIS TIME: NORMAL
CV ZIO ECT SVE COUNT: 7062 EPISODES
CV ZIO ECT SVE CPLT COUNT: 193 EPISODES
CV ZIO ECT SVE CPLT FREQ: NORMAL
CV ZIO ECT SVE FREQ: NORMAL
CV ZIO ECT SVE TPLT COUNT: 41 EPISODES
CV ZIO ECT SVE TPLT FREQ: NORMAL
CV ZIO ECT VE COUNT: 182 EPISODES
CV ZIO ECT VE CPLT COUNT: 0 EPISODES
CV ZIO ECT VE CPLT FREQ: 0
CV ZIO ECT VE FREQ: NORMAL
CV ZIO ECT VE TPLT COUNT: 0 EPISODES
CV ZIO ECT VE TPLT FREQ: 0
CV ZIO ECTOPIC SVE COUPLET RAW PERCENT: 0.04 %
CV ZIO ECTOPIC SVE ISOLATED PERCENT: 0.76 %
CV ZIO ECTOPIC SVE TRIPLET RAW PERCENT: 0.01 %
CV ZIO ECTOPIC VE COUPLET RAW PERCENT: 0 %
CV ZIO ECTOPIC VE ISOLATED PERCENT: 0.02 %
CV ZIO ECTOPIC VE TRIPLET RAW PERCENT: 0 %
CV ZIO ENROLLMENT END: NORMAL
CV ZIO ENROLLMENT START: NORMAL
CV ZIO PATIENT EVENTS DIARIES: 0
CV ZIO PATIENT EVENTS TRIGGERS: 14
CV ZIO PAUSE COUNT: 0
CV ZIO PRESCRIPTION STATUS: NORMAL
CV ZIO SVT AVG BPM: 94 BPM
CV ZIO SVT BPM HIGH: 126 BPM
CV ZIO SVT BPM LOW: 65 BPM
CV ZIO SVT COUNT: 15
CV ZIO SVT F EPI AVG BPM: 100 BPM
CV ZIO SVT F EPI BEATS: 17 BEATS
CV ZIO SVT F EPI BPM HIGH: 126 BPM
CV ZIO SVT F EPI BPM LOW: 65 BPM
CV ZIO SVT F EPI DUR: 10.6 SEC
CV ZIO SVT F EPI END: NORMAL
CV ZIO SVT F EPI START: NORMAL
CV ZIO SVT L EPI AVG BPM: 100 BPM
CV ZIO SVT L EPI BEATS: 17 BEATS
CV ZIO SVT L EPI BPM HIGH: 126 BPM
CV ZIO SVT L EPI BPM LOW: 65 BPM
CV ZIO SVT L EPI DUR: 10.6 SEC
CV ZIO SVT L EPI END: NORMAL
CV ZIO SVT L EPI START: NORMAL
CV ZIO SVT SYMPT IN PT: NORMAL
CV ZIO TOTAL  ENROLLMENT PERIOD: NORMAL
CV ZIO VT COUNT: 0

## 2025-05-08 DIAGNOSIS — Z79.4 TYPE 2 DIABETES MELLITUS WITH HYPOGLYCEMIA WITHOUT COMA, WITH LONG-TERM CURRENT USE OF INSULIN: ICD-10-CM

## 2025-05-08 DIAGNOSIS — E11.649 TYPE 2 DIABETES MELLITUS WITH HYPOGLYCEMIA WITHOUT COMA, WITH LONG-TERM CURRENT USE OF INSULIN: ICD-10-CM

## 2025-05-08 RX ORDER — INSULIN HUMAN 100 [IU]/ML
INJECTION, SUSPENSION SUBCUTANEOUS
Qty: 18 ML | Refills: 0 | Status: CANCELLED | OUTPATIENT
Start: 2025-05-08

## 2025-05-08 RX ORDER — INSULIN HUMAN 100 [IU]/ML
INJECTION, SUSPENSION SUBCUTANEOUS
Qty: 18 ML | Refills: 2 | Status: SHIPPED | OUTPATIENT
Start: 2025-05-08

## 2025-05-08 NOTE — TELEPHONE ENCOUNTER
Incoming Refill Request      Medication requested (name and dose): HUMULIN 70/30 [facility]    Pharmacy where request should be sent: [facility]    Additional details provided by patient: N/A    Best call back number:     Does the patient have less than a 3 day supply:  [] Yes  [x] No    Yoanna Gutierrez Rep  05/08/25, 14:29 EDT

## 2025-05-08 NOTE — TELEPHONE ENCOUNTER
Rx Refill Note  Requested Prescriptions     Pending Prescriptions Disp Refills    Insulin NPH Isophane & Regular (HumuLIN 70/30 KwikPen) (70-30) 100 UNIT/ML suspension pen-injector 18 mL 0     Sig: TAKE 18 UNITS UNDER THE SKIN BEFORE BREAKFAST, TAKE 21 UNITS BEFORE LUNCH, AND TAKE 23 UNITS BEFORE DINNER      Last office visit with prescribing clinician: 4/22/2025   Last telemedicine visit with prescribing clinician: Visit date not found   Next office visit with prescribing clinician: 8/5/2025        Lipid Panel (10/07/2024 09:26)                  Would you like a call back once the refill request has been completed: [] Yes [] No    If the office needs to give you a call back, can they leave a voicemail: [] Yes [] No    Destini Snider, RT  05/08/25, 14:42 EDT

## 2025-05-14 RX ORDER — ACEBUTOLOL HYDROCHLORIDE 200 MG/1
200 CAPSULE ORAL DAILY
Qty: 90 CAPSULE | Refills: 0 | Status: SHIPPED | OUTPATIENT
Start: 2025-05-14

## 2025-05-14 RX ORDER — FENOFIBRATE 160 MG/1
160 TABLET ORAL DAILY
Qty: 90 TABLET | Refills: 0 | Status: SHIPPED | OUTPATIENT
Start: 2025-05-14

## 2025-05-14 NOTE — TELEPHONE ENCOUNTER
Rx Refill Note  Requested Prescriptions     Signed Prescriptions Disp Refills    acebutolol (SECTRAL) 200 MG capsule 90 capsule 0     Sig: TAKE 1 CAPSULE BY MOUTH DAILY     Authorizing Provider: TASHI TAMAYO     Ordering User: DARIANA MENDENHALL      Last office visit with prescribing clinician: 5/22/2024   Last telemedicine visit with prescribing clinician: Visit date not found   Next office visit with prescribing clinician: 5/29/2025                         Would you like a call back once the refill request has been completed: [] Yes [] No    If the office needs to give you a call back, can they leave a voicemail: [] Yes [] No    Dariana Mendenhall MA  05/14/25, 08:50 EDT

## 2025-05-28 ENCOUNTER — CLINICAL SUPPORT (OUTPATIENT)
Dept: FAMILY MEDICINE CLINIC | Facility: CLINIC | Age: 75
End: 2025-05-28
Payer: MEDICARE

## 2025-05-28 DIAGNOSIS — E11.649 TYPE 2 DIABETES MELLITUS WITH HYPOGLYCEMIA WITHOUT COMA, WITH LONG-TERM CURRENT USE OF INSULIN: ICD-10-CM

## 2025-05-28 DIAGNOSIS — Z79.4 TYPE 2 DIABETES MELLITUS WITH HYPOGLYCEMIA WITHOUT COMA, WITH LONG-TERM CURRENT USE OF INSULIN: ICD-10-CM

## 2025-05-28 DIAGNOSIS — E78.2 MIXED HYPERLIPIDEMIA: ICD-10-CM

## 2025-05-28 PROCEDURE — 82043 UR ALBUMIN QUANTITATIVE: CPT | Performed by: FAMILY MEDICINE

## 2025-05-28 PROCEDURE — 82570 ASSAY OF URINE CREATININE: CPT | Performed by: FAMILY MEDICINE

## 2025-05-28 PROCEDURE — 36415 COLL VENOUS BLD VENIPUNCTURE: CPT | Performed by: FAMILY MEDICINE

## 2025-05-28 PROCEDURE — 80053 COMPREHEN METABOLIC PANEL: CPT | Performed by: FAMILY MEDICINE

## 2025-05-28 PROCEDURE — 83036 HEMOGLOBIN GLYCOSYLATED A1C: CPT | Performed by: INTERNAL MEDICINE

## 2025-05-28 PROCEDURE — 80061 LIPID PANEL: CPT | Performed by: FAMILY MEDICINE

## 2025-05-28 RX ORDER — LISINOPRIL 40 MG/1
40 TABLET ORAL
Qty: 90 TABLET | Refills: 0 | Status: SHIPPED | OUTPATIENT
Start: 2025-05-28

## 2025-05-28 RX ORDER — AMLODIPINE BESYLATE 10 MG/1
10 TABLET ORAL NIGHTLY
Qty: 90 TABLET | Refills: 0 | Status: SHIPPED | OUTPATIENT
Start: 2025-05-28

## 2025-05-28 NOTE — PROGRESS NOTES
Venipuncture performed in L ARM by RT Aimee  with good hemostasis. Patient tolerated well. 05/28/25 Destini Snider, RT

## 2025-05-29 ENCOUNTER — OFFICE VISIT (OUTPATIENT)
Dept: CARDIOLOGY | Facility: CLINIC | Age: 75
End: 2025-05-29
Payer: MEDICARE

## 2025-05-29 VITALS
SYSTOLIC BLOOD PRESSURE: 139 MMHG | HEART RATE: 63 BPM | DIASTOLIC BLOOD PRESSURE: 68 MMHG | OXYGEN SATURATION: 100 % | WEIGHT: 158 LBS | HEIGHT: 63 IN | BODY MASS INDEX: 28 KG/M2

## 2025-05-29 DIAGNOSIS — I10 ESSENTIAL HYPERTENSION: ICD-10-CM

## 2025-05-29 DIAGNOSIS — I25.10 CORONARY ARTERY DISEASE INVOLVING NATIVE CORONARY ARTERY OF NATIVE HEART WITHOUT ANGINA PECTORIS: Primary | ICD-10-CM

## 2025-05-29 DIAGNOSIS — E11.65 TYPE 2 DIABETES MELLITUS WITH HYPERGLYCEMIA, WITH LONG-TERM CURRENT USE OF INSULIN: ICD-10-CM

## 2025-05-29 DIAGNOSIS — Z79.4 TYPE 2 DIABETES MELLITUS WITH HYPERGLYCEMIA, WITH LONG-TERM CURRENT USE OF INSULIN: ICD-10-CM

## 2025-05-29 DIAGNOSIS — E78.00 PURE HYPERCHOLESTEROLEMIA: ICD-10-CM

## 2025-05-29 LAB
ALBUMIN SERPL-MCNC: 4.3 G/DL (ref 3.5–5.2)
ALBUMIN UR-MCNC: <1.2 MG/DL
ALBUMIN/GLOB SERPL: 1.5 G/DL
ALP SERPL-CCNC: 58 U/L (ref 39–117)
ALT SERPL W P-5'-P-CCNC: 68 U/L (ref 1–33)
ANION GAP SERPL CALCULATED.3IONS-SCNC: 10 MMOL/L (ref 5–15)
AST SERPL-CCNC: 50 U/L (ref 1–32)
BILIRUB SERPL-MCNC: 0.3 MG/DL (ref 0–1.2)
BUN SERPL-MCNC: 10 MG/DL (ref 8–23)
BUN/CREAT SERPL: 11.9 (ref 7–25)
CALCIUM SPEC-SCNC: 10 MG/DL (ref 8.6–10.5)
CHLORIDE SERPL-SCNC: 105 MMOL/L (ref 98–107)
CHOLEST SERPL-MCNC: 148 MG/DL (ref 0–200)
CO2 SERPL-SCNC: 28 MMOL/L (ref 22–29)
CREAT SERPL-MCNC: 0.84 MG/DL (ref 0.57–1)
CREAT UR-MCNC: 65.9 MG/DL
EGFRCR SERPLBLD CKD-EPI 2021: 73 ML/MIN/1.73
GLOBULIN UR ELPH-MCNC: 2.9 GM/DL
GLUCOSE SERPL-MCNC: 98 MG/DL (ref 65–99)
HBA1C MFR BLD: 6.8 % (ref 4.8–5.6)
HDLC SERPL-MCNC: 52 MG/DL (ref 40–60)
LDLC SERPL CALC-MCNC: 78 MG/DL (ref 0–100)
LDLC/HDLC SERPL: 1.47 {RATIO}
MICROALBUMIN/CREAT UR: NORMAL MG/G{CREAT}
POTASSIUM SERPL-SCNC: 4.5 MMOL/L (ref 3.5–5.2)
PROT SERPL-MCNC: 7.2 G/DL (ref 6–8.5)
SODIUM SERPL-SCNC: 143 MMOL/L (ref 136–145)
TRIGL SERPL-MCNC: 99 MG/DL (ref 0–150)
VLDLC SERPL-MCNC: 18 MG/DL (ref 5–40)

## 2025-05-29 PROCEDURE — 99214 OFFICE O/P EST MOD 30 MIN: CPT | Performed by: INTERNAL MEDICINE

## 2025-05-29 PROCEDURE — 1160F RVW MEDS BY RX/DR IN RCRD: CPT | Performed by: INTERNAL MEDICINE

## 2025-05-29 PROCEDURE — 1159F MED LIST DOCD IN RCRD: CPT | Performed by: INTERNAL MEDICINE

## 2025-05-29 PROCEDURE — 3075F SYST BP GE 130 - 139MM HG: CPT | Performed by: INTERNAL MEDICINE

## 2025-05-29 PROCEDURE — 3078F DIAST BP <80 MM HG: CPT | Performed by: INTERNAL MEDICINE

## 2025-05-29 RX ORDER — FERROUS SULFATE 324(65)MG
324 TABLET, DELAYED RELEASE (ENTERIC COATED) ORAL
COMMUNITY

## 2025-05-29 NOTE — PROGRESS NOTES
Subjective:     Encounter Date:05/29/2025      Patient ID: Jenna Rosa is a 75 y.o. female.    Chief Complaint:  Coronary Artery Disease  Symptoms include dizziness. Pertinent negatives include no chest pain, leg swelling, palpitations or shortness of breath.   75-year-old white female with history of coronary artery disease hypertension hyperlipidemia diabetes presents to the office for a follow-up.  Patient is currently stable without any symptoms of chest pain or shortness of breath at rest or exertion.  No compressively PND orthopnea.  No palpitation but has some dizziness.  No syncope or swelling of the feet.  Patient is taking all the medicines regular.  Patient does not smoke.    The following portions of the patient's history were reviewed and updated as appropriate: allergies, current medications, past family history, past medical history, past social history, past surgical history, and problem list.  Past Medical History:   Diagnosis Date    CAD 2020    Colon polyp     Congenital heart disease 2017    Depression     DEXA     2019= (-0.7/ -1.6); 2023= (-0.7/ -2.7)    Diabetic peripheral neuropathy     Diverticulosis     GERD     Heart murmur 2017    Hyperlipidemia     Hypertension     Insomnia     Lower back pain     MAMMO     NEG = 2021/ 2023    Obesity     Osteoarthritis     Osteopenia     Osteoporosis     Palpitations     RA     Rheumatoid nodule 06/2011     rt. thumb x 2 and rt. & lt. 3rd fingers (Oct.2012)/ rt. elbow, left elbow, and epidermal inclusion cyst post. neck (June 2011)-----Dr. Whitmore    Skin Cancer 2021    Type 2 diabetes mellitus 1988    Urinary tract infection     Vitamin D deficiency      Past Surgical History:   Procedure Laterality Date    BLADDER SUSPENSION  10/11/2023        BREAST SURGERY  1972    CARDIAC CATHETERIZATION N/A 09/16/2020    Procedure: Left Heart Cath;  Surgeon: Walker Rodriguez MD;  Location: [facility] CATH INVASIVE LOCATION;  Service:  "Cardiovascular;  Laterality: N/A;    CARDIAC CATHETERIZATION N/A 09/16/2020    Procedure: Coronary angiography;  Surgeon: Walker Rodriguez MD;  Location: [facility] CATH INVASIVE LOCATION;  Service: Cardiovascular;  Laterality: N/A;    CARDIAC CATHETERIZATION  09/16/2020    Procedure: Functional Flow [place];  Surgeon: Kath Medina MD;  Location: [facility] CATH INVASIVE LOCATION;  Service: Cardiology;;    CARPAL TUNNEL RELEASE Right 02/20/2023    Procedure: CARPAL TUNNEL RELEASE WITH POSSIBLE SYNOVECTOMY;  Surgeon: Amadeo Magaña MD;  Location: [facility] MAIN OR;  Service: Orthopedics;  Laterality: Right;    CHOLECYSTECTOMY      COLONOSCOPY      2017 / 2023= TA, rech 2028? [facility]    CYST REMOVAL      Seb cyst on neck    EYE SURGERY      TOTAL ABDOMINAL HYSTERECTOMY WITH SALPINGO OOPHORECTOMY      VAGINAL PROLAPSE REPAIR       Uterine Prolapse repair     /68   Pulse 63   Ht 160 cm (62.99\")   Wt 71.7 kg (158 lb)   SpO2 100%   BMI 28.00 kg/m²   Family History   Problem Relation Age of Onset    Heart disease Mother     Diabetes Mother     Hypertension Mother     Rheum arthritis Mother     Coronary artery disease Mother     Alcohol abuse Mother     Heart failure Mother     Heart disease Father     Mental illness Father     Hypertension Father     Alcohol abuse Father     Early death Father         Heart disease    Heart attack Father     Heart failure Father     Kidney cancer Sister     Stroke Sister     Osteoarthritis Sister     Rheum arthritis Sister     Hypertension Sister     Arthritis Sister     Diabetes Sister     Stroke Sister     Hypertension Sister     Rheum arthritis Sister     Osteoarthritis Sister     Diabetes Sister     Arthritis Sister     Liver disease Sister     Kidney disease Sister     No Known Problems Brother     Rheum arthritis Brother     Osteoarthritis Brother     Hypertension Brother     Arthritis Brother     Diabetes Maternal Grandfather     Cancer Sister     Cancer " Sister     Early death Sister         Cancer    Stroke Sister     Early death Sister     Thyroid disease Daughter     Early death Brother     Hypertension Sister     Arthritis Sister     Diabetes Sister     Liver disease Sister        Current Outpatient Medications:     acebutolol (SECTRAL) 200 MG capsule, TAKE 1 CAPSULE BY MOUTH DAILY, Disp: 90 capsule, Rfl: 0    acetaminophen (TYLENOL) 325 MG tablet, Take 2 tablets by mouth Every 6 (Six) Hours As Needed for Mild Pain., Disp: , Rfl:     albuterol sulfate  (90 Base) MCG/ACT inhaler, Inhale 2 puffs Every 6 (Six) Hours As Needed for Shortness of Air (cough)., Disp: 18 g, Rfl: 0    amLODIPine (NORVASC) 10 MG tablet, TAKE ONE TABLET BY MOUTH ONCE NIGHTLY, Disp: 90 tablet, Rfl: 0    aspirin 81 MG EC tablet, Take 1 tablet by mouth Daily., Disp: , Rfl:     B Complex Vitamins (vitamin b complex) capsule capsule, Take 1 capsule by mouth Daily., Disp: , Rfl:     CALCIUM MAGNESIUM ZINC PO, Take 1 tablet by mouth Daily., Disp: , Rfl:     Continuous Glucose  (FreeStyle Adelaide 3 [place]) device, Use 1 package Daily., Disp: 1 each, Rfl: 3    Continuous Glucose Sensor (FreeStyle Adelaide 3 Plus Sensor), Use Every 15 (Fifteen) Days., Disp: 6 each, Rfl: 3    Cranberry 125 MG tablet, 1 po qd, Disp: , Rfl:     estradiol (ESTRACE) 0.1 MG/GM vaginal cream, , Disp: , Rfl:     fenofibrate 160 MG tablet, TAKE 1 TABLET BY MOUTH DAILY, Disp: 90 tablet, Rfl: 0    ferrous sulfate 324 (65 Fe) MG tablet delayed-release EC tablet, Take 1 tablet by mouth Daily With Breakfast., Disp: , Rfl:     folic acid (FOLVITE) 1 MG tablet, TAKE 1 TABLET BY MOUTH DAILY, Disp: 90 tablet, Rfl: 1    gabapentin (NEURONTIN) 300 MG capsule, TAKE 1 CAPSULE BY MOUTH 2 TIMES A DAY, Disp: 180 capsule, Rfl: 0    Insulin NPH Isophane & Regular (HumuLIN 70/30 KwikPen) (70-30) 100 UNIT/ML suspension pen-injector, TAKE 18 UNITS UNDER THE SKIN BEFORE BREAKFAST, TAKE 21 UNITS BEFORE LUNCH, AND TAKE 23 UNITS BEFORE  DINNER, Disp: 18 mL, Rfl: 2    Insulin Pen Needle (Droplet Pen Needles) 32G X 4 MM misc, USE 1 PEN NEED 2 TIMES A DAY, Disp: 100 each, Rfl: 3    ipratropium-albuterol (Combivent Respimat)  MCG/ACT inhaler, , Disp: , Rfl:     isosorbide mononitrate (IMDUR) 60 MG 24 hr tablet, TAKE 1 TABLET BY MOUTH DAILY WITH BREAKFAST, Disp: 90 tablet, Rfl: 0    Janumet -1000 MG tablet, TAKE 1 TABLET BY MOUTH DAILY, Disp: 90 tablet, Rfl: 0    lactulose (CHRONULAC) 10 GM/15ML solution, Take 30 mL by mouth At Night As Needed (constipation)., Disp: 237 mL, Rfl: 2    lansoprazole (PREVACID) 30 MG capsule, TAKE 1 CAPSULE BY MOUTH DAILY, Disp: 90 capsule, Rfl: 1    lisinopril (PRINIVIL,ZESTRIL) 40 MG tablet, TAKE 1 TABLET BY MOUTH DAILY WITH BREAKFAST, Disp: 90 tablet, Rfl: 0    loratadine (Claritin) 10 MG tablet, Take 1 tablet by mouth Every Night., Disp: , Rfl:     meclizine (ANTIVERT) 12.5 MG tablet, 1/2 - 1 po q6-8hrs prn dizzyness, Disp: 20 tablet, Rfl: 0    methotrexate 2.5 MG tablet, TAKE 5 TABLETS BY MOUTH ONCE WEEKLY, Disp: 60 tablet, Rfl: 0    Misc Natural Products (BEET ROOT PO), Take 1 tablet/day by mouth Daily., Disp: , Rfl:     Misc Natural Products (BEET ROOT PO), Take 1 tablet by mouth Daily., Disp: , Rfl:     nabumetone (RELAFEN) 750 MG tablet, Take 1 tablet by mouth 2 (Two) Times a Day As Needed for Mild Pain., Disp: 60 tablet, Rfl: 0    ondansetron ODT (ZOFRAN-ODT) 4 MG disintegrating tablet, Place 1 tablet on the tongue Every 8 (Eight) Hours As Needed for Nausea., Disp: 30 tablet, Rfl: 0    phenazopyridine (Pyridium) 200 MG tablet, Take 1 tablet by mouth 3 (Three) Times a Day As Needed for Bladder Spasms or Dysuria., Disp: 20 tablet, Rfl: 0    Probiotic Product (PROBIOTIC BLEND PO), Take 1 tablet by mouth Daily., Disp: , Rfl:     traZODone (DESYREL) 50 MG tablet, Take 1 tablet by mouth every night at bedtime., Disp: 90 tablet, Rfl: 1    Turmeric 500 MG capsule, Take 1 capsule by mouth Daily., Disp: , Rfl:      Vitamin D-Vitamin K (VITAMIN K2-VITAMIN D3 PO), Take 1 tablet by mouth Daily., Disp: , Rfl:     nitrofurantoin, macrocrystal-monohydrate, (Macrobid) 100 MG capsule, Take 1 capsule by mouth 2 (Two) Times a Day. (Patient not taking: Reported on 5/29/2025), Disp: 28 capsule, Rfl: 0    rosuvastatin (CRESTOR) 5 MG tablet, TAKE ONE TABLET BY MOUTH ONCE NIGHTLY (Patient not taking: Reported on 5/29/2025), Disp: 90 tablet, Rfl: 2  Allergies   Allergen Reactions    Jardiance [Empagliflozin] Other (See Comments)     UTI's    Metformin Diarrhea     Social History     Socioeconomic History    Marital status:      Spouse name: Carson Rosa    Number of children: 3    Years of education: GED   Tobacco Use    Smoking status: Never     Passive exposure: Never    Smokeless tobacco: Never   Vaping Use    Vaping status: Never Used   Substance and Sexual Activity    Alcohol use: Not Currently     Comment: 3-4 a year    Drug use: Never    Sexual activity: Not Currently     Partners: Male     Birth control/protection: Vasectomy, Hysterectomy     Review of Systems   Constitutional: Negative for malaise/fatigue.   Cardiovascular:  Negative for chest pain, dyspnea on exertion, leg swelling and palpitations.   Respiratory:  Negative for cough and shortness of breath.    Gastrointestinal:  Positive for nausea. Negative for abdominal pain and vomiting.   Neurological:  Positive for dizziness and light-headedness. Negative for headaches, numbness and weakness.   All other systems reviewed and are negative.             Objective:     Constitutional:       Appearance: Well-developed.   Eyes:      General: No scleral icterus.     Conjunctiva/sclera: Conjunctivae normal.   HENT:      Head: Normocephalic and atraumatic.   Neck:      Vascular: No carotid bruit or JVD.   Pulmonary:      Effort: Pulmonary effort is normal.      Breath sounds: Normal breath sounds. No wheezing. No rales.   Cardiovascular:      Normal rate. Regular rhythm.    Pulses:     Intact distal pulses.   Abdominal:      General: Bowel sounds are normal.      Palpations: Abdomen is soft.   Musculoskeletal:      Cervical back: Normal range of motion and neck supple. Skin:     General: Skin is warm and dry.      Findings: No rash.   Neurological:      Mental Status: Alert.       Procedures    Lab Review:         MDM    #1 coronary artery disease  Patient has nonobstructive disease in the past and is currently stable without any angina  2.  Hypertension  Patient blood pressure currently stable on medications including lisinopril and amlodipine  3.  Hyperlipidemia  Patient on statins as well as fenofibrate and followed by primary care doctor  4.  Diabetes  Patient is on insulin and oral medicines and followed by the primary care doctor.    Patient's previous medical records, labs, and EKG were reviewed and discussed with the patient at today's visit.

## 2025-06-03 ENCOUNTER — OFFICE VISIT (OUTPATIENT)
Dept: ENDOCRINOLOGY | Facility: CLINIC | Age: 75
End: 2025-06-03
Payer: MEDICARE

## 2025-06-03 VITALS
HEART RATE: 62 BPM | HEIGHT: 63 IN | WEIGHT: 160 LBS | OXYGEN SATURATION: 96 % | SYSTOLIC BLOOD PRESSURE: 130 MMHG | BODY MASS INDEX: 28.35 KG/M2 | DIASTOLIC BLOOD PRESSURE: 70 MMHG

## 2025-06-03 DIAGNOSIS — E11.65 TYPE 2 DIABETES MELLITUS WITH HYPERGLYCEMIA, WITH LONG-TERM CURRENT USE OF INSULIN: Primary | ICD-10-CM

## 2025-06-03 DIAGNOSIS — Z79.4 TYPE 2 DIABETES MELLITUS WITH HYPERGLYCEMIA, WITH LONG-TERM CURRENT USE OF INSULIN: Primary | ICD-10-CM

## 2025-06-03 DIAGNOSIS — E11.649 TYPE 2 DIABETES MELLITUS WITH HYPOGLYCEMIA WITHOUT COMA, WITH LONG-TERM CURRENT USE OF INSULIN: ICD-10-CM

## 2025-06-03 DIAGNOSIS — E78.2 MIXED HYPERLIPIDEMIA: ICD-10-CM

## 2025-06-03 DIAGNOSIS — Z79.4 TYPE 2 DIABETES MELLITUS WITH HYPOGLYCEMIA WITHOUT COMA, WITH LONG-TERM CURRENT USE OF INSULIN: ICD-10-CM

## 2025-06-03 DIAGNOSIS — E11.42 DIABETIC PERIPHERAL NEUROPATHY: ICD-10-CM

## 2025-06-03 DIAGNOSIS — I10 ESSENTIAL HYPERTENSION: ICD-10-CM

## 2025-06-03 PROCEDURE — 95251 CONT GLUC MNTR ANALYSIS I&R: CPT | Performed by: INTERNAL MEDICINE

## 2025-06-03 PROCEDURE — 1160F RVW MEDS BY RX/DR IN RCRD: CPT | Performed by: INTERNAL MEDICINE

## 2025-06-03 PROCEDURE — 3075F SYST BP GE 130 - 139MM HG: CPT | Performed by: INTERNAL MEDICINE

## 2025-06-03 PROCEDURE — 3078F DIAST BP <80 MM HG: CPT | Performed by: INTERNAL MEDICINE

## 2025-06-03 PROCEDURE — 1159F MED LIST DOCD IN RCRD: CPT | Performed by: INTERNAL MEDICINE

## 2025-06-03 PROCEDURE — 3044F HG A1C LEVEL LT 7.0%: CPT | Performed by: INTERNAL MEDICINE

## 2025-06-03 PROCEDURE — 99214 OFFICE O/P EST MOD 30 MIN: CPT | Performed by: INTERNAL MEDICINE

## 2025-06-03 RX ORDER — INSULIN HUMAN 100 [IU]/ML
INJECTION, SUSPENSION SUBCUTANEOUS
Qty: 30 ML | Refills: 5 | Status: SHIPPED | OUTPATIENT
Start: 2025-06-03

## 2025-06-03 NOTE — PATIENT INSTRUCTIONS
Please change your 70/30 insulin to 40 units half an hour before breakfast and 18 units half an hour before supper  Continue rest of the medication  Continue to work on diet and activity  Always keep glucose source in case of low blood sugar  Labs before follow-up.

## 2025-06-03 NOTE — PROGRESS NOTES
Endocrine Progress Note Outpatient     Patient Care Team:  Emily Burdick DO as PCP - General (Family Medicine)  Joe García MD as Consulting Physician (Cardiology)  Dano Rangel MD as Consulting Physician (Endocrinology)  Conchis Mohamud OD as Consulting Physician (Optometry)  Harinder Gresham MD as Consulting Physician (Pain Medicine)  Juan Richey MD as Consulting Physician (Gastroenterology)  Manish Graff MD as Consulting Physician (Urology)  Caleb Gamino RN as Ambulatory  ([facility])    Chief Complaint: Follow-up type 2 diabetes    HPI: 75-year-old female with history of type 2 diabetes, hypertension and hyperlipidemia is here for follow-up.    For type 2 diabetes: She is currently on Janumet /100, 1 tablet daily with Novolin 70/30 insulin, 40 units subcu before breakfast and 20 units before supper.  She is currently using freestyle stephany sensor system.      Hypertension: Well-controlled today.    Hyperlipidemia: On rosuvastatin.    Past Medical History:   Diagnosis Date    CAD 2020    Colon polyp     Congenital heart disease 2017    Depression     DEXA     2019= (-0.7/ -1.6); 2023= (-0.7/ -2.7)    Diabetic peripheral neuropathy     Diverticulosis     GERD     Heart murmur 2017    Hyperlipidemia     Hypertension     Insomnia     Lower back pain     MAMMO     NEG = 2021/ 2023    Obesity     Osteoarthritis     Osteopenia     Osteoporosis     Palpitations     RA     Rheumatoid nodule 06/2011     rt. thumb x 2 and rt. & lt. 3rd fingers (Oct.2012)/ rt. elbow, left elbow, and epidermal inclusion cyst post. neck (June 2011)-----Dr. Whitmore    Skin Cancer 2021    Type 2 diabetes mellitus 1988    Urinary tract infection     Vitamin D deficiency        Social History     Socioeconomic History    Marital status:      Spouse name: Carson Rosa    Number of children: 3    Years of education: GED   Tobacco Use    Smoking status: Never     Passive exposure:  Never    Smokeless tobacco: Never   Vaping Use    Vaping status: Never Used   Substance and Sexual Activity    Alcohol use: Not Currently     Comment: 3-4 a year    Drug use: Never    Sexual activity: Not Currently     Partners: Male     Birth control/protection: Vasectomy, Hysterectomy       Family History   Problem Relation Age of Onset    Heart disease Mother     Diabetes Mother     Hypertension Mother     Rheum arthritis Mother     Coronary artery disease Mother     Alcohol abuse Mother     Heart failure Mother     Heart disease Father     Mental illness Father     Hypertension Father     Alcohol abuse Father     Early death Father         Heart disease    Heart attack Father     Heart failure Father     Kidney cancer Sister     Stroke Sister     Osteoarthritis Sister     Rheum arthritis Sister     Hypertension Sister     Arthritis Sister     Diabetes Sister     Stroke Sister     Hypertension Sister     Rheum arthritis Sister     Osteoarthritis Sister     Diabetes Sister     Arthritis Sister     Liver disease Sister     Kidney disease Sister     No Known Problems Brother     Rheum arthritis Brother     Osteoarthritis Brother     Hypertension Brother     Arthritis Brother     Diabetes Maternal Grandfather     Cancer Sister     Cancer Sister     Early death Sister         Cancer    Stroke Sister     Early death Sister     Thyroid disease Daughter     Early death Brother     Hypertension Sister     Arthritis Sister     Diabetes Sister     Liver disease Sister        Allergies   Allergen Reactions    Jardiance [Empagliflozin] Other (See Comments)     UTI's    Metformin Diarrhea       ROS:   Constitutional:  Denies fatigue, tiredness.    Eyes:  Denies change in visual acuity   HENT:  Denies nasal congestion or sore throat   Respiratory: denies cough, admit shortness of breath.   Cardiovascular:  denies chest pain, edema   GI:  Denies abdominal pain, nausea, vomiting.   Musculoskeletal:  Denies back pain or joint pain    Integument:  Denies dry skin and rash   Neurologic:  Denies headache, focal weakness or sensory changes   Endocrine:  Denies polyuria or polydipsia   Psychiatric:  Denies depression or anxiety      Vitals:    06/03/25 1247   BP: 130/70   Pulse: 62   SpO2: 96%     Body mass index is 28.35 kg/m².     Physical Exam:  GEN: NAD, conversant  EYES: EOMI,  NECK: no thyromegaly,  CV: RRR,   LUNG: CTA  PSYCH: AOX3, appropriate mood, affect normal      Results Review:     I reviewed the patient's new clinical results.    Lab Results   Component Value Date    HGBA1C 6.80 (H) 05/28/2025    HGBA1C 7.68 (H) 10/07/2024    HGBA1C 7.50 (H) 03/21/2024      Lab Results   Component Value Date    GLUCOSE 98 05/28/2025    BUN 10.0 05/28/2025    CREATININE 0.84 05/28/2025    EGFRIFNONA 78 11/15/2021    EGFRIFAFRI 59 (L) 08/13/2017    BCR 11.9 05/28/2025    K 4.5 05/28/2025    CO2 28.0 05/28/2025    CALCIUM 10.0 05/28/2025    ALBUMIN 4.3 05/28/2025    AST 50 (H) 05/28/2025    ALT 68 (H) 05/28/2025    CHOL 148 05/28/2025    TRIG 99 05/28/2025    LDL 78 05/28/2025    HDL 52 05/28/2025     Lab Results   Component Value Date    TSH 2.340 04/08/2025     Freestyle stephany download interpretation: Dates covered was between May 21, 2025 to Romi 3, 2025.  Average blood sugar is 146.  Spending 78% in the range, 21% above range and 1% below range.  Glucose graph does show some minor fluctuations with some low blood sugars in the evening.      Medication Review: Reviewed.       Current Outpatient Medications:     acebutolol (SECTRAL) 200 MG capsule, TAKE 1 CAPSULE BY MOUTH DAILY, Disp: 90 capsule, Rfl: 0    acetaminophen (TYLENOL) 325 MG tablet, Take 2 tablets by mouth Every 6 (Six) Hours As Needed for Mild Pain., Disp: , Rfl:     albuterol sulfate  (90 Base) MCG/ACT inhaler, Inhale 2 puffs Every 6 (Six) Hours As Needed for Shortness of Air (cough)., Disp: 18 g, Rfl: 0    amLODIPine (NORVASC) 10 MG tablet, TAKE ONE TABLET BY MOUTH ONCE NIGHTLY,  Disp: 90 tablet, Rfl: 0    aspirin 81 MG EC tablet, Take 1 tablet by mouth Daily., Disp: , Rfl:     B Complex Vitamins (vitamin b complex) capsule capsule, Take 1 capsule by mouth Daily., Disp: , Rfl:     CALCIUM MAGNESIUM ZINC PO, Take 1 tablet by mouth Daily., Disp: , Rfl:     Continuous Glucose  (FreeStyle Adelaide 3 [place]) device, Use 1 package Daily., Disp: 1 each, Rfl: 3    Continuous Glucose Sensor (FreeStyle Adelaide 3 Plus Sensor), Use Every 15 (Fifteen) Days., Disp: 6 each, Rfl: 3    Cranberry 125 MG tablet, 1 po qd, Disp: , Rfl:     estradiol (ESTRACE) 0.1 MG/GM vaginal cream, , Disp: , Rfl:     fenofibrate 160 MG tablet, TAKE 1 TABLET BY MOUTH DAILY, Disp: 90 tablet, Rfl: 0    ferrous sulfate 324 (65 Fe) MG tablet delayed-release EC tablet, Take 1 tablet by mouth Daily With Breakfast., Disp: , Rfl:     folic acid (FOLVITE) 1 MG tablet, TAKE 1 TABLET BY MOUTH DAILY, Disp: 90 tablet, Rfl: 1    gabapentin (NEURONTIN) 300 MG capsule, TAKE 1 CAPSULE BY MOUTH 2 TIMES A DAY, Disp: 180 capsule, Rfl: 0    Insulin NPH Isophane & Regular (HumuLIN 70/30 KwikPen) (70-30) 100 UNIT/ML suspension pen-injector, TAKE 18 UNITS UNDER THE SKIN BEFORE BREAKFAST, TAKE 21 UNITS BEFORE LUNCH, AND TAKE 23 UNITS BEFORE DINNER, Disp: 18 mL, Rfl: 2    Insulin Pen Needle (Droplet Pen Needles) 32G X 4 MM misc, USE 1 PEN NEED 2 TIMES A DAY, Disp: 100 each, Rfl: 3    ipratropium-albuterol (Combivent Respimat)  MCG/ACT inhaler, , Disp: , Rfl:     isosorbide mononitrate (IMDUR) 60 MG 24 hr tablet, TAKE 1 TABLET BY MOUTH DAILY WITH BREAKFAST, Disp: 90 tablet, Rfl: 0    Janumet -1000 MG tablet, TAKE 1 TABLET BY MOUTH DAILY, Disp: 90 tablet, Rfl: 0    lactulose (CHRONULAC) 10 GM/15ML solution, Take 30 mL by mouth At Night As Needed (constipation)., Disp: 237 mL, Rfl: 2    lansoprazole (PREVACID) 30 MG capsule, TAKE 1 CAPSULE BY MOUTH DAILY, Disp: 90 capsule, Rfl: 1    lisinopril (PRINIVIL,ZESTRIL) 40 MG tablet, TAKE 1 TABLET  BY MOUTH DAILY WITH BREAKFAST, Disp: 90 tablet, Rfl: 0    loratadine (Claritin) 10 MG tablet, Take 1 tablet by mouth Every Night., Disp: , Rfl:     meclizine (ANTIVERT) 12.5 MG tablet, 1/2 - 1 po q6-8hrs prn dizzyness, Disp: 20 tablet, Rfl: 0    Misc Natural Products (BEET ROOT PO), Take 1 tablet/day by mouth Daily., Disp: , Rfl:     Misc Natural Products (BEET ROOT PO), Take 1 tablet by mouth Daily., Disp: , Rfl:     nabumetone (RELAFEN) 750 MG tablet, Take 1 tablet by mouth 2 (Two) Times a Day As Needed for Mild Pain., Disp: 60 tablet, Rfl: 0    nitrofurantoin, macrocrystal-monohydrate, (Macrobid) 100 MG capsule, Take 1 capsule by mouth 2 (Two) Times a Day. (Patient taking differently: Take 1 capsule by mouth Daily.), Disp: 28 capsule, Rfl: 0    ondansetron ODT (ZOFRAN-ODT) 4 MG disintegrating tablet, Place 1 tablet on the tongue Every 8 (Eight) Hours As Needed for Nausea., Disp: 30 tablet, Rfl: 0    phenazopyridine (Pyridium) 200 MG tablet, Take 1 tablet by mouth 3 (Three) Times a Day As Needed for Bladder Spasms or Dysuria., Disp: 20 tablet, Rfl: 0    Probiotic Product (PROBIOTIC BLEND PO), Take 1 tablet by mouth Daily., Disp: , Rfl:     traZODone (DESYREL) 50 MG tablet, Take 1 tablet by mouth every night at bedtime., Disp: 90 tablet, Rfl: 1    Turmeric 500 MG capsule, Take 1 capsule by mouth Daily., Disp: , Rfl:     Vitamin D-Vitamin K (VITAMIN K2-VITAMIN D3 PO), Take 1 tablet by mouth Daily., Disp: , Rfl:     methotrexate 2.5 MG tablet, TAKE 5 TABLETS BY MOUTH ONCE WEEKLY (Patient not taking: Reported on 6/3/2025), Disp: 60 tablet, Rfl: 0    rosuvastatin (CRESTOR) 5 MG tablet, TAKE ONE TABLET BY MOUTH ONCE NIGHTLY (Patient not taking: Reported on 6/3/2025), Disp: 90 tablet, Rfl: 2      Assessment & Plan   1.  Diabetes mellitus type 2 with Hyperglycemia: Overall doing well with A1c of 6.8% but she is having some low blood sugars in the evening time.  She is taking her evening dose of insulin after she eats, I  have asked her to take the evening dose before meals and decrease the dose to 16 units at supper.  Continue 40 units in the morning.  Continue Janumet.  Advised to always keep glucose source in case of low blood sugars and continue to work on diet and activity.    2.  Hypertension: Well-controlled, continue current medications.    3.  Hyperlipidemia: Well-controlled, continue rosuvastatin.    4.  Diabetic peripheral neuropathy: Stable.    Assessment and plan from October 14, 2024 reviewed and updated.            Dano Rangel MD FACE.

## 2025-06-04 RX ORDER — METHOTREXATE 2.5 MG/1
TABLET ORAL
Qty: 60 TABLET | Refills: 0 | OUTPATIENT
Start: 2025-06-04

## 2025-06-06 RX ORDER — METHOTREXATE 2.5 MG/1
TABLET ORAL
Qty: 60 TABLET | Refills: 0 | OUTPATIENT
Start: 2025-06-06

## 2025-06-10 ENCOUNTER — PATIENT OUTREACH (OUTPATIENT)
Dept: CASE MANAGEMENT | Facility: OTHER | Age: 75
End: 2025-06-10
Payer: MEDICARE

## 2025-06-10 NOTE — OUTREACH NOTE
AMBULATORY CASE MANAGEMENT NOTE    Names and Relationships of Patient/Support Persons: Contact: Jenna Rosa; Relationship: Self -     ACM completed a successful follow up outreach to patient. Pt. Reports UTI subsided with no current S/S. Pt. Reports to be doing much better. Pt. in process of managing glucose. Pt. Reports blood glucose normalizing Pt's previous A1c - 6.8. Pt. placed on Holter monitor for 11 days. Pt. was experiencing palpitations. Pt. received outcome of testing with understanding and acknowledgment. Pt. reports BP running within normal limits- controlled. Pt. still monitoring, managing, journaling. Pt. reports full understanding of all care gaps to be completed. No questions per pt. She expresses appreciation for the call. Follow up outreach scheduled for 3-4 weeks. ACM provided all contact information for all needs/inquiries.     Caleb GTZ  Ambulatory Case Management    6/10/2025, 09:12 EDT

## 2025-06-16 ENCOUNTER — TELEPHONE (OUTPATIENT)
Dept: FAMILY MEDICINE CLINIC | Facility: CLINIC | Age: 75
End: 2025-06-16
Payer: MEDICARE

## 2025-06-16 DIAGNOSIS — G62.9 NEUROPATHY: ICD-10-CM

## 2025-06-16 DIAGNOSIS — M05.9 SEROPOSITIVE RHEUMATOID ARTHRITIS: Primary | ICD-10-CM

## 2025-06-16 RX ORDER — GABAPENTIN 300 MG/1
300 CAPSULE ORAL 2 TIMES DAILY
Qty: 180 CAPSULE | Refills: 0 | Status: SHIPPED | OUTPATIENT
Start: 2025-06-16

## 2025-06-16 NOTE — TELEPHONE ENCOUNTER
Patient is requesting a referral to Dr Whitmore with [facility] and [facility] for arthritis. Please advise.

## 2025-06-23 ENCOUNTER — TELEPHONE (OUTPATIENT)
Dept: FAMILY MEDICINE CLINIC | Facility: CLINIC | Age: 75
End: 2025-06-23

## 2025-06-23 RX ORDER — PREDNISONE 5 MG/1
5 TABLET ORAL DAILY
Qty: 30 TABLET | Refills: 0 | Status: SHIPPED | OUTPATIENT
Start: 2025-06-23

## 2025-06-23 NOTE — TELEPHONE ENCOUNTER
Caller: Jenna Rosa    Relationship: Self    Best call back number: [phone]    What are your current symptoms: ARTHRITIS IN BOTH HANDS    If a prescription is needed, what is your preferred pharmacy and phone number:  [facility] [id number] - [place], [place] - [address] [place] [address] [address] [facility] [address] - [phone]  - [phone] FX     Additional notes:      PATIENT IS REQUESTING MEDICATION TO HELP WITH ARTHRITIS PAIN UNTIL SHE CAN GET IN TO SEE HER RHEUMATOLOGY.

## 2025-07-07 ENCOUNTER — HOSPITAL ENCOUNTER (OUTPATIENT)
Dept: GENERAL RADIOLOGY | Facility: HOSPITAL | Age: 75
Discharge: HOME | End: 2025-07-07
Admitting: INTERNAL MEDICINE
Payer: MEDICARE

## 2025-07-07 ENCOUNTER — TRANSCRIBE ORDERS (OUTPATIENT)
Dept: ADMINISTRATIVE | Facility: HOSPITAL | Age: 75
End: 2025-07-07
Payer: MEDICARE

## 2025-07-07 DIAGNOSIS — Z79.899 DRUG THERAPY: ICD-10-CM

## 2025-07-07 DIAGNOSIS — R53.83 TIREDNESS: ICD-10-CM

## 2025-07-07 DIAGNOSIS — Z01.89 LABORATORY TEST: ICD-10-CM

## 2025-07-07 DIAGNOSIS — Z79.1 ENCOUNTER FOR LONG-TERM (CURRENT) USE OF NON-STEROIDAL ANTI-INFLAMMATORIES: ICD-10-CM

## 2025-07-07 DIAGNOSIS — E11.9 DIABETES MELLITUS WITHOUT COMPLICATION: ICD-10-CM

## 2025-07-07 DIAGNOSIS — I10 ESSENTIAL HYPERTENSION, MALIGNANT: ICD-10-CM

## 2025-07-07 DIAGNOSIS — E11.9 DIABETES MELLITUS WITHOUT COMPLICATION: Primary | ICD-10-CM

## 2025-07-07 DIAGNOSIS — M05.79 SEROPOSITIVE RHEUMATOID ARTHRITIS OF MULTIPLE SITES: ICD-10-CM

## 2025-07-07 PROCEDURE — 73130 X-RAY EXAM OF HAND: CPT

## 2025-07-07 RX ORDER — FOLIC ACID 1 MG/1
1000 TABLET ORAL DAILY
Qty: 90 TABLET | Refills: 0 | Status: SHIPPED | OUTPATIENT
Start: 2025-07-07

## 2025-07-07 RX ORDER — FOLIC ACID 1 MG/1
1000 TABLET ORAL DAILY
Qty: 90 TABLET | Refills: 1 | OUTPATIENT
Start: 2025-07-07

## 2025-07-07 NOTE — TELEPHONE ENCOUNTER
Incoming Refill Request      Medication requested (name and dose): FOLIC ACID 1MG     [facility] where [facility] be sent[facility]    Additional details provided by patient: N/A    Best call back number:     Does the patient have less than a 3 day supply:  [] Yes  [x] No    Yoanna Gutierrez Rep  07/07/25, 08:13 EDT

## 2025-07-11 ENCOUNTER — PATIENT OUTREACH (OUTPATIENT)
Dept: CASE MANAGEMENT | Facility: OTHER | Age: 75
End: 2025-07-11
Payer: MEDICARE

## 2025-07-11 NOTE — OUTREACH NOTE
AMBULATORY CASE MANAGEMENT NOTE    Names and Relationships of Patient/Support Persons: Contact: Jenna Rosa; Relationship: Self -     ACM completed a successful follow up outreach to patient. Pt. still reports UTI's subsided with no current S/S. Pt. reports to be doing much better since starting new medication per Dr. Graff. Pt. in process of managing glucose. Pt. still reports blood glucose normalizing. Pt. reports BP running within normal limits- controlled. Pt. still monitoring, managing, journaling. Pt. currently receiving treatment for arthritis. No questions per pt. No further needs per patient. She expresses appreciation for the call. [facility] will graduate patient. [facility] provided all contact information for all needs/inquiries.     Caleb GTZ  Ambulatory Case Management    7/11/2025, 08:45 EDT

## 2025-07-14 RX ORDER — SITAGLIPTIN AND METFORMIN HYDROCHLORIDE 1000; 100 MG/1; MG/1
1 TABLET, FILM COATED, EXTENDED RELEASE ORAL DAILY
Qty: 90 TABLET | Refills: 0 | Status: SHIPPED | OUTPATIENT
Start: 2025-07-14

## 2025-07-14 RX ORDER — ISOSORBIDE MONONITRATE 60 MG/1
60 TABLET, EXTENDED RELEASE ORAL
Qty: 90 TABLET | Refills: 0 | Status: SHIPPED | OUTPATIENT
Start: 2025-07-14

## 2025-07-14 RX ORDER — SITAGLIPTIN AND METFORMIN HYDROCHLORIDE 1000; 100 MG/1; MG/1
1 TABLET, FILM COATED, EXTENDED RELEASE ORAL DAILY
Qty: 90 TABLET | Refills: 0 | OUTPATIENT
Start: 2025-07-14

## 2025-07-28 ENCOUNTER — HOSPITAL ENCOUNTER (OUTPATIENT)
Facility: HOSPITAL | Age: 75
Setting detail: OBSERVATION
Discharge: HOME | End: 2025-07-29
Attending: EMERGENCY MEDICINE | Admitting: EMERGENCY MEDICINE
Payer: MEDICARE

## 2025-07-28 ENCOUNTER — APPOINTMENT (OUTPATIENT)
Dept: GENERAL RADIOLOGY | Facility: HOSPITAL | Age: 75
End: 2025-07-28
Payer: MEDICARE

## 2025-07-28 ENCOUNTER — APPOINTMENT (OUTPATIENT)
Dept: CT IMAGING | Facility: HOSPITAL | Age: 75
End: 2025-07-28
Payer: MEDICARE

## 2025-07-28 ENCOUNTER — APPOINTMENT (OUTPATIENT)
Dept: CARDIOLOGY | Facility: HOSPITAL | Age: 75
End: 2025-07-28
Payer: MEDICARE

## 2025-07-28 DIAGNOSIS — R00.2 PALPITATIONS: Primary | ICD-10-CM

## 2025-07-28 LAB
ALBUMIN SERPL-MCNC: 4.1 G/DL (ref 3.5–5.2)
ALBUMIN/GLOB SERPL: 1.4 G/DL
ALP SERPL-CCNC: 79 U/L (ref 39–117)
ALT SERPL W P-5'-P-CCNC: 10 U/L (ref 1–33)
ANION GAP SERPL CALCULATED.3IONS-SCNC: 11.5 MMOL/L (ref 5–15)
AST SERPL-CCNC: 13 U/L (ref 1–32)
BASOPHILS # BLD AUTO: 0.04 10*3/MM3 (ref 0–0.2)
BASOPHILS NFR BLD AUTO: 0.5 % (ref 0–1.5)
BILIRUB SERPL-MCNC: <0.2 MG/DL (ref 0–1.2)
BILIRUB UR QL STRIP: NEGATIVE
BUN SERPL-MCNC: 13.8 MG/DL (ref 8–23)
BUN/CREAT SERPL: 19.4 (ref 7–25)
CALCIUM SPEC-SCNC: 9.4 MG/DL (ref 8.6–10.5)
CHLORIDE SERPL-SCNC: 98 MMOL/L (ref 98–107)
CLARITY UR: CLEAR
CO2 SERPL-SCNC: 24.5 MMOL/L (ref 22–29)
COLOR UR: YELLOW
CREAT SERPL-MCNC: 0.71 MG/DL (ref 0.57–1)
D DIMER PPP FEU-MCNC: 0.9 MCGFEU/ML (ref 0–0.74)
DEPRECATED RDW RBC AUTO: 47.7 FL (ref 37–54)
EGFRCR SERPLBLD CKD-EPI 2021: 89.4 ML/MIN/1.73
EOSINOPHIL # BLD AUTO: 0.16 10*3/MM3 (ref 0–0.4)
EOSINOPHIL NFR BLD AUTO: 1.9 % (ref 0.3–6.2)
ERYTHROCYTE [DISTWIDTH] IN BLOOD BY AUTOMATED COUNT: 13.4 % (ref 12.3–15.4)
GLOBULIN UR ELPH-MCNC: 3 GM/DL
GLUCOSE SERPL-MCNC: 229 MG/DL (ref 65–99)
GLUCOSE UR STRIP-MCNC: ABNORMAL MG/DL
HCT VFR BLD AUTO: 40 % (ref 34–46.6)
HGB BLD-MCNC: 13.1 G/DL (ref 12–15.9)
HGB UR QL STRIP.AUTO: NEGATIVE
HOLD SPECIMEN: NORMAL
HOLD SPECIMEN: NORMAL
IMM GRANULOCYTES # BLD AUTO: 0.03 10*3/MM3 (ref 0–0.05)
IMM GRANULOCYTES NFR BLD AUTO: 0.4 % (ref 0–0.5)
KETONES UR QL STRIP: NEGATIVE
LEUKOCYTE ESTERASE UR QL STRIP.AUTO: NEGATIVE
LYMPHOCYTES # BLD AUTO: 1.95 10*3/MM3 (ref 0.7–3.1)
LYMPHOCYTES NFR BLD AUTO: 23.6 % (ref 19.6–45.3)
MAGNESIUM SERPL-MCNC: 1.7 MG/DL (ref 1.6–2.4)
MCH RBC QN AUTO: 31.5 PG (ref 26.6–33)
MCHC RBC AUTO-ENTMCNC: 32.8 G/DL (ref 31.5–35.7)
MCV RBC AUTO: 96.2 FL (ref 79–97)
MONOCYTES # BLD AUTO: 0.65 10*3/MM3 (ref 0.1–0.9)
MONOCYTES NFR BLD AUTO: 7.9 % (ref 5–12)
NEUTROPHILS NFR BLD AUTO: 5.43 10*3/MM3 (ref 1.7–7)
NEUTROPHILS NFR BLD AUTO: 65.7 % (ref 42.7–76)
NITRITE UR QL STRIP: NEGATIVE
NRBC BLD AUTO-RTO: 0 /100 WBC (ref 0–0.2)
NT-PROBNP SERPL-MCNC: 91.6 PG/ML (ref 0–900)
PH UR STRIP.AUTO: 7 [PH] (ref 5–8)
PHOSPHATE SERPL-MCNC: 3.4 MG/DL (ref 2.5–4.5)
PLATELET # BLD AUTO: 321 10*3/MM3 (ref 140–450)
PMV BLD AUTO: 9.2 FL (ref 6–12)
POTASSIUM SERPL-SCNC: 4.1 MMOL/L (ref 3.5–5.2)
PROT SERPL-MCNC: 7.1 G/DL (ref 6–8.5)
PROT UR QL STRIP: NEGATIVE
RBC # BLD AUTO: 4.16 10*6/MM3 (ref 3.77–5.28)
SODIUM SERPL-SCNC: 134 MMOL/L (ref 136–145)
SP GR UR STRIP: 1.01 (ref 1–1.03)
TROPONIN T SERPL HS-MCNC: <6 NG/L
TSH SERPL DL<=0.05 MIU/L-ACNC: 1.3 UIU/ML (ref 0.27–4.2)
UROBILINOGEN UR QL STRIP: ABNORMAL
WBC NRBC COR # BLD AUTO: 8.26 10*3/MM3 (ref 3.4–10.8)
WHOLE BLOOD HOLD COAG: NORMAL
WHOLE BLOOD HOLD SPECIMEN: NORMAL

## 2025-07-28 PROCEDURE — 93005 ELECTROCARDIOGRAM TRACING: CPT | Performed by: EMERGENCY MEDICINE

## 2025-07-28 PROCEDURE — 83735 ASSAY OF MAGNESIUM: CPT

## 2025-07-28 PROCEDURE — 85025 COMPLETE CBC W/AUTO DIFF WBC: CPT

## 2025-07-28 PROCEDURE — 93356 MYOCRD STRAIN IMG SPCKL TRCK: CPT | Performed by: INTERNAL MEDICINE

## 2025-07-28 PROCEDURE — 83880 ASSAY OF NATRIURETIC PEPTIDE: CPT

## 2025-07-28 PROCEDURE — G0378 HOSPITAL OBSERVATION PER HR: HCPCS

## 2025-07-28 PROCEDURE — 85379 FIBRIN DEGRADATION QUANT: CPT

## 2025-07-28 PROCEDURE — 80053 COMPREHEN METABOLIC PANEL: CPT

## 2025-07-28 PROCEDURE — 93356 MYOCRD STRAIN IMG SPCKL TRCK: CPT

## 2025-07-28 PROCEDURE — 25510000001 IOPAMIDOL PER 1 ML

## 2025-07-28 PROCEDURE — 81003 URINALYSIS AUTO W/O SCOPE: CPT

## 2025-07-28 PROCEDURE — 99285 EMERGENCY DEPT VISIT HI MDM: CPT

## 2025-07-28 PROCEDURE — 84100 ASSAY OF PHOSPHORUS: CPT

## 2025-07-28 PROCEDURE — 84484 ASSAY OF TROPONIN QUANT: CPT

## 2025-07-28 PROCEDURE — 93005 ELECTROCARDIOGRAM TRACING: CPT

## 2025-07-28 PROCEDURE — 96374 THER/PROPH/DIAG INJ IV PUSH: CPT

## 2025-07-28 PROCEDURE — 84443 ASSAY THYROID STIM HORMONE: CPT

## 2025-07-28 PROCEDURE — 93306 TTE W/DOPPLER COMPLETE: CPT

## 2025-07-28 PROCEDURE — 25010000002 MORPHINE PER 10 MG

## 2025-07-28 PROCEDURE — 71045 X-RAY EXAM CHEST 1 VIEW: CPT

## 2025-07-28 PROCEDURE — 71275 CT ANGIOGRAPHY CHEST: CPT

## 2025-07-28 PROCEDURE — 93306 TTE W/DOPPLER COMPLETE: CPT | Performed by: INTERNAL MEDICINE

## 2025-07-28 RX ORDER — GABAPENTIN 300 MG/1
300 CAPSULE ORAL 3 TIMES DAILY
Status: DISCONTINUED | OUTPATIENT
Start: 2025-07-28 | End: 2025-07-29

## 2025-07-28 RX ORDER — IBUPROFEN 400 MG/1
200 TABLET, FILM COATED ORAL EVERY 4 HOURS PRN
Status: DISCONTINUED | OUTPATIENT
Start: 2025-07-28 | End: 2025-07-29

## 2025-07-28 RX ORDER — BISACODYL 10 MG
10 SUPPOSITORY, RECTAL RECTAL DAILY PRN
Status: DISCONTINUED | OUTPATIENT
Start: 2025-07-28 | End: 2025-07-29 | Stop reason: HOSPADM

## 2025-07-28 RX ORDER — SODIUM CHLORIDE 0.9 % (FLUSH) 0.9 %
10 SYRINGE (ML) INJECTION AS NEEDED
Status: DISCONTINUED | OUTPATIENT
Start: 2025-07-28 | End: 2025-07-29 | Stop reason: HOSPADM

## 2025-07-28 RX ORDER — IOPAMIDOL 755 MG/ML
100 INJECTION, SOLUTION INTRAVASCULAR
Status: COMPLETED | OUTPATIENT
Start: 2025-07-28 | End: 2025-07-28

## 2025-07-28 RX ORDER — AMOXICILLIN 250 MG
2 CAPSULE ORAL 2 TIMES DAILY PRN
Status: DISCONTINUED | OUTPATIENT
Start: 2025-07-28 | End: 2025-07-29 | Stop reason: HOSPADM

## 2025-07-28 RX ORDER — MELOXICAM 7.5 MG/1
15 TABLET ORAL DAILY
COMMUNITY

## 2025-07-28 RX ORDER — SODIUM CHLORIDE 9 MG/ML
40 INJECTION, SOLUTION INTRAVENOUS AS NEEDED
Status: DISCONTINUED | OUTPATIENT
Start: 2025-07-28 | End: 2025-07-29 | Stop reason: HOSPADM

## 2025-07-28 RX ORDER — DROPERIDOL 2.5 MG/ML
2.5 INJECTION, SOLUTION INTRAMUSCULAR; INTRAVENOUS EVERY 6 HOURS PRN
Status: DISCONTINUED | OUTPATIENT
Start: 2025-07-28 | End: 2025-07-29 | Stop reason: HOSPADM

## 2025-07-28 RX ORDER — IBUPROFEN 600 MG/1
1 TABLET ORAL
Status: DISCONTINUED | OUTPATIENT
Start: 2025-07-28 | End: 2025-07-29 | Stop reason: HOSPADM

## 2025-07-28 RX ORDER — AMLODIPINE BESYLATE 5 MG/1
10 TABLET ORAL NIGHTLY
Status: DISCONTINUED | OUTPATIENT
Start: 2025-07-28 | End: 2025-07-29

## 2025-07-28 RX ORDER — NITROGLYCERIN 0.4 MG/1
0.4 TABLET SUBLINGUAL
Status: DISCONTINUED | OUTPATIENT
Start: 2025-07-28 | End: 2025-07-29 | Stop reason: HOSPADM

## 2025-07-28 RX ORDER — BISACODYL 5 MG/1
5 TABLET, DELAYED RELEASE ORAL DAILY PRN
Status: DISCONTINUED | OUTPATIENT
Start: 2025-07-28 | End: 2025-07-29 | Stop reason: HOSPADM

## 2025-07-28 RX ORDER — ONDANSETRON 2 MG/ML
4 INJECTION INTRAMUSCULAR; INTRAVENOUS EVERY 6 HOURS PRN
Status: DISCONTINUED | OUTPATIENT
Start: 2025-07-28 | End: 2025-07-29 | Stop reason: HOSPADM

## 2025-07-28 RX ORDER — SODIUM CHLORIDE 0.9 % (FLUSH) 0.9 %
10 SYRINGE (ML) INJECTION EVERY 12 HOURS SCHEDULED
Status: DISCONTINUED | OUTPATIENT
Start: 2025-07-28 | End: 2025-07-29 | Stop reason: HOSPADM

## 2025-07-28 RX ORDER — TRAZODONE HYDROCHLORIDE 50 MG/1
50 TABLET ORAL NIGHTLY
Status: DISCONTINUED | OUTPATIENT
Start: 2025-07-28 | End: 2025-07-29

## 2025-07-28 RX ORDER — DEXTROSE MONOHYDRATE 25 G/50ML
25 INJECTION, SOLUTION INTRAVENOUS
Status: DISCONTINUED | OUTPATIENT
Start: 2025-07-28 | End: 2025-07-29 | Stop reason: HOSPADM

## 2025-07-28 RX ORDER — POLYETHYLENE GLYCOL 3350 17 G/17G
17 POWDER, FOR SOLUTION ORAL DAILY PRN
Status: DISCONTINUED | OUTPATIENT
Start: 2025-07-28 | End: 2025-07-29 | Stop reason: HOSPADM

## 2025-07-28 RX ORDER — NICOTINE POLACRILEX 4 MG
15 LOZENGE BUCCAL
Status: DISCONTINUED | OUTPATIENT
Start: 2025-07-28 | End: 2025-07-29 | Stop reason: HOSPADM

## 2025-07-28 RX ADMIN — AMLODIPINE BESYLATE 10 MG: 5 TABLET ORAL at 20:40

## 2025-07-28 RX ADMIN — Medication 10 ML: at 20:42

## 2025-07-28 RX ADMIN — GABAPENTIN 300 MG: 300 CAPSULE ORAL at 20:40

## 2025-07-28 RX ADMIN — MORPHINE SULFATE 4 MG: 4 INJECTION, SOLUTION INTRAMUSCULAR; INTRAVENOUS at 20:52

## 2025-07-28 RX ADMIN — TRAZODONE HYDROCHLORIDE 50 MG: 50 TABLET ORAL at 20:40

## 2025-07-28 RX ADMIN — IOPAMIDOL 100 ML: 755 INJECTION, SOLUTION INTRAVENOUS at 17:55

## 2025-07-28 NOTE — ED PROVIDER NOTES
Subjective   History of Present Illness  Chief complaint: Palpitations    Context: Patient is 75-year-old female who presents to the emergency department with complaints of palpitations.  Patient states that she was sitting at home this morning and began to feel her heart racing and states it felt like she could feel her heartbeat up in her throat.  Patient denies any actual chest pain or shortness of breath during event.  Patient denies any recent illness or fever and denies any other complaints not addressed above; denies abdominal pain, vomiting, or diarrhea.    PCP: Emily Burdick DO    LNMP: Postmenopausal     Echo 4/12/2024  ·  Left ventricular ejection fraction appears to be 61 - 65%.  ·  Left ventricular wall thickness is consistent with moderate concentric hypertrophy.  ·  Left ventricular diastolic function is consistent with (grade I) impaired relaxation.    Stress test 8/18/2023  ·  Left ventricular ejection fraction is hyperdynamic (Calculated EF > 70%).  ·  Myocardial perfusion imaging indicates a normal myocardial perfusion study with no evidence of ischemia.  ·  Impressions are consistent with a low risk study.    Cardiac cath 9/16/2020  1. HEMODYNAMICS:  LV end-diastolic pressure 10 mmHg, /80 mmHg.  2. LEFT VENTRICULOGRAPHY: Was done with hand-held injection probably normal LV function  3. CORONARY ANGIOGRAPHY:   Dominance right                Left Main: Large-caliber vessel bifurcates into LAD circumflex artery 0% stenosis                LAD: Large-caliber vessel gives rise to medium caliber diagonal artery multiple septal branches reaches the apex  At the level of diagonal artery LAD 50 to 60% ostium of the diagonal artery 80% stenosis                CIRC: Large-caliber vessel gives rise to marginal lateral branches less than 10% stenosis                RCA: Large-caliber dominant artery gives rise to PDA and PL branches less than 10% stenosis  SUMMARY: Moderate to severe LAD disease and  severe ostial diagonal artery disease  Films reviewed with interventional cardiology   RECOMMENDATIONS: IFR to the LAD was done and it was 0.94  Aggressive medical treatment for diagonal artery disease             Review of Systems   Cardiovascular:  Positive for palpitations.       Past Medical History:   Diagnosis Date    CAD 2020    Colon polyp     Congenital heart disease 2017    Depression     DEXA     2019= (-0.7/ -1.6); 2023= (-0.7/ -2.7)    Diabetic peripheral neuropathy     Diverticulosis     GERD     Heart murmur 2017    Hyperlipidemia     Hypertension     Insomnia     Lower back pain     MAMMO     NEG = 2021/ 2023    Obesity     Osteoarthritis     Osteopenia     Osteoporosis     Palpitations     RA     Rheumatoid nodule 06/2011     rt. thumb x 2 and rt. & lt. 3rd fingers (Oct.2012)/ rt. elbow, left elbow, and epidermal inclusion cyst post. neck (June 2011)-----Dr. Whitmore    Skin Cancer 2021    Type 2 diabetes mellitus 1988    Urinary tract infection     Vitamin D deficiency        Allergies   Allergen Reactions    Jardiance [Empagliflozin] Other (See Comments)     UTI's    Metformin Diarrhea       Past Surgical History:   Procedure Laterality Date    BLADDER SUSPENSION  10/11/2023        BREAST SURGERY  1972    CARDIAC CATHETERIZATION N/A 09/16/2020    Procedure: Left Heart Cath;  Surgeon: Walker Rodriguez MD;  Location: [facility] CATH INVASIVE LOCATION;  Service: Cardiovascular;  Laterality: N/A;    CARDIAC CATHETERIZATION N/A 09/16/2020    Procedure: Coronary angiography;  Surgeon: Walker Rodriguez MD;  Location: [facility] CATH INVASIVE LOCATION;  Service: Cardiovascular;  Laterality: N/A;    CARDIAC CATHETERIZATION  09/16/2020    Procedure: Functional Flow [place];  Surgeon: Kath Medina MD;  Location: [facility] CATH INVASIVE LOCATION;  Service: Cardiology;;    CARPAL TUNNEL RELEASE Right 02/20/2023    Procedure: CARPAL TUNNEL RELEASE WITH POSSIBLE SYNOVECTOMY;   Surgeon: Amadeo Magaña MD;  Location: [facility] MAIN OR;  Service: Orthopedics;  Laterality: Right;    CHOLECYSTECTOMY      COLONOSCOPY      2017 / 2023= TA, rech 2028? [facility]    CYST REMOVAL      Seb cyst on neck    EYE SURGERY      TOTAL ABDOMINAL HYSTERECTOMY WITH SALPINGO OOPHORECTOMY      VAGINAL PROLAPSE REPAIR       Uterine Prolapse repair       Family History   Problem Relation Age of Onset    Heart disease Mother     Diabetes Mother     Hypertension Mother     Rheum arthritis Mother     Coronary artery disease Mother     Alcohol abuse Mother     Heart failure Mother     Heart disease Father     Mental illness Father     Hypertension Father     Alcohol abuse Father     Early death Father         Heart disease    Heart attack Father     Heart failure Father     Kidney cancer Sister     Stroke Sister     Osteoarthritis Sister     Rheum arthritis Sister     Hypertension Sister     Arthritis Sister     Diabetes Sister     Stroke Sister     Hypertension Sister     Rheum arthritis Sister     Osteoarthritis Sister     Diabetes Sister     Arthritis Sister     Liver disease Sister     Kidney disease Sister     No Known Problems Brother     Rheum arthritis Brother     Osteoarthritis Brother     Hypertension Brother     Arthritis Brother     Diabetes Maternal Grandfather     Cancer Sister     Cancer Sister     Early death Sister         Cancer    Stroke Sister     Early death Sister     Thyroid disease Daughter     Early death Brother     Hypertension Sister     Arthritis Sister     Diabetes Sister     Liver disease Sister        Social History     Socioeconomic History    Marital status:      Spouse name: Carson Rosa    Number of children: 3    Years of education: GED   Tobacco Use    Smoking status: Never     Passive exposure: Never    Smokeless tobacco: Never   Vaping Use    Vaping status: Never Used   Substance and Sexual Activity    Alcohol use: Not Currently     Comment: 3-4 a year    Drug use: Never     Sexual activity: Not Currently     Partners: Male     Birth control/protection: Vasectomy, Hysterectomy           Objective   Physical Exam  Vitals and nursing note reviewed.   Constitutional:       General: She is not in acute distress.     Appearance: Normal appearance. She is ill-appearing. She is not toxic-appearing.   HENT:      Head: Normocephalic and atraumatic.      Nose: No congestion or rhinorrhea.      Mouth/Throat:      Mouth: Mucous membranes are moist.      Pharynx: Oropharynx is clear.   Eyes:      Extraocular Movements: Extraocular movements intact.      Pupils: Pupils are equal, round, and reactive to light.   Cardiovascular:      Rate and Rhythm: Normal rate and regular rhythm.      Pulses: Normal pulses.      Heart sounds: Normal heart sounds. No murmur heard.     No friction rub. No gallop.   Pulmonary:      Effort: Pulmonary effort is normal. No respiratory distress.      Breath sounds: Normal breath sounds. No stridor. No wheezing, rhonchi or rales.   Abdominal:      General: Abdomen is flat. Bowel sounds are normal. There is no distension.      Palpations: Abdomen is soft. There is no mass.      Tenderness: There is no abdominal tenderness.   Musculoskeletal:         General: No swelling or tenderness. Normal range of motion.      Cervical back: Normal range of motion and neck supple. No rigidity or tenderness.      Right lower leg: Edema present.      Left lower leg: Edema present.   Skin:     General: Skin is warm and dry.      Capillary Refill: Capillary refill takes less than 2 seconds.      Coloration: Skin is not jaundiced or pale.   Neurological:      General: No focal deficit present.      Mental Status: She is alert. Mental status is at baseline.   Psychiatric:         Mood and Affect: Mood normal.         Behavior: Behavior normal.         Thought Content: Thought content normal.         Judgment: Judgment normal.         Procedures           ED Course  ED Course as of 07/28/25  "1926 Mon Jul 28, 2025 1816 Waiting for CT final result. [RS]      ED Course User Index  [RS] Jordan Tavares, RIKA      /71   Pulse 58   Temp 98.3 °F (36.8 °C) (Oral)   Resp 18   Ht 160 cm (63\")   Wt 73.6 kg (162 lb 4.1 oz)   SpO2 95%   BMI 28.74 kg/m²   Labs Reviewed   COMPREHENSIVE METABOLIC PANEL - Abnormal; Notable for the following components:       Result Value    Glucose 229 (*)     Sodium 134 (*)     All other components within normal limits    Narrative:     GFR Categories in Chronic Kidney Disease (CKD)              GFR Category          GFR (mL/min/1.73)    Interpretation  G1                    90 or greater        Normal or high (1)  G2                    60-89                Mild decrease (1)  G3a                   45-59                Mild to moderate decrease  G3b                   30-44                Moderate to severe decrease  G4                    15-29                Severe decrease  G5                    14 or less           Kidney failure    (1)In the absence of evidence of kidney disease, neither GFR category G1 or G2 fulfill the criteria for CKD.    eGFR calculation 2021 CKD-EPI creatinine equation, which does not include race as a factor   URINALYSIS W/ CULTURE IF INDICATED - Abnormal; Notable for the following components:    Glucose,  mg/dL (1+) (*)     All other components within normal limits    Narrative:     In absence of clinical symptoms, the presence of pyuria, bacteria, and/or nitrites on the urinalysis result does not correlate with infection.  Urine microscopic not indicated.   D-DIMER, QUANTITATIVE - Abnormal; Notable for the following components:    D-Dimer, Quantitative 0.90 (*)     All other components within normal limits    Narrative:     According to the assay 's published package insert, a normal (<0.50 MCGFEU/mL) D-dimer result in conjunction with a non-high clinical probability assessment, excludes deep vein thrombosis (DVT) and " "pulmonary embolism (PE) with high sensitivity.    D-dimer values increase with age and this can make VTE exclusion of an older population difficult. To address this, the American College of [employer], based on best available evidence and recent guidelines, recommends that clinicians use age-adjusted D-dimer thresholds in patients greater than 50 years of age with: a) a low probability of PE who do not meet all Pulmonary Embolism Rule Out Criteria, or b) in those with intermediate probability of PE.   The formula for an age-adjusted D-dimer cut-off is \"age/100\".  For example, a 60 year old patient would have an age-adjusted cut-off of 0.60 MCGFEU/mL and an 80 year old 0.80 MCGFEU/mL.   TROPONIN - Normal    Narrative:     High Sensitive Troponin T Reference Range:  <14.0 ng/L- Negative Female for AMI  <22.0 ng/L- Negative Male for AMI  >=14 - Abnormal Female indicating possible myocardial injury.  >=22 - Abnormal Male indicating possible myocardial injury.   Clinicians would have to utilize clinical acumen, EKG, Troponin, and serial changes to determine if it is an Acute Myocardial Infarction or myocardial injury due to an underlying chronic condition.        BNP (IN-HOUSE) - Normal    Narrative:     This assay is used as an aid in the diagnosis of individuals suspected of having heart failure. It can be used as an aid in the diagnosis of acute decompensated heart failure (ADHF) in patients presenting with signs and symptoms of ADHF to the emergency department (ED). In addition, NT-proBNP of <300 pg/mL indicates ADHF is not likely.    Age Range Result Interpretation  NT-proBNP Concentration (pg/mL:      <50             Positive            >450                   Gray                 300-450                    Negative             <300    50-75           Positive            >900                  Gray                300-900                  Negative            <300      >75             Positive            >1800          "         Rader                300-1800                  Negative            <300   TSH RFX ON ABNORMAL TO FREE T4 - Normal   PHOSPHORUS - Normal   MAGNESIUM - Normal   CBC WITH AUTO DIFFERENTIAL - Normal   RAINBOW DRAW    Narrative:     The following orders were created for panel order [facility] Draw.  Procedure                               Abnormality         Status                     ---------                               -----------         ------                     Green Top (Gel)[[id number]]                                  Final result               Lavender Top[[id number]]                                     Final result               Gold Top - SST[[id number]]                                   Final result               Light Blue Top[[id number]]                                   Final result                 Please view results for these tests on the individual orders.   GREEN TOP   LAVENDER TOP   GOLD TOP - SST   LIGHT BLUE TOP   CBC AND DIFFERENTIAL    Narrative:     The following orders were created for panel order CBC & Differential.  Procedure                               Abnormality         Status                     ---------                               -----------         ------                     CBC Auto Differential[[id number]]        Normal              Final result                 Please view results for these tests on the individual orders.     Medications   sodium chloride 0.9 % flush 10 mL (has no administration in time range)   amLODIPine (NORVASC) tablet 10 mg (has no administration in time range)   gabapentin (NEURONTIN) capsule 300 mg (has no administration in time range)   traZODone (DESYREL) tablet 50 mg (has no administration in time range)   ondansetron (ZOFRAN) injection 4 mg (has no administration in time range)   droperidol (INAPSINE) injection 2.5 mg (has no administration in time range)   morphine injection 4 mg (has no administration in time range)   sodium chloride 0.9 % flush  "10 mL (has no administration in time range)   sodium chloride 0.9 % flush 10 mL (has no administration in time range)   sodium chloride 0.9 % infusion 40 mL (has no administration in time range)   nitroglycerin (NITROSTAT) SL tablet 0.4 mg (has no administration in time range)   Potassium Replacement - Follow Nurse / BPA Driven Protocol (has no administration in time range)   Magnesium Standard Dose Replacement - Follow Nurse / BPA Driven Protocol (has no administration in time range)   Phosphorus Replacement - Follow Nurse / BPA Driven Protocol (has no administration in time range)   Calcium Replacement - Follow Nurse / BPA Driven Protocol (has no administration in time range)   ibuprofen (ADVIL,MOTRIN) tablet 200 mg (has no administration in time range)   sennosides-docusate (PERICOLACE) 8.6-50 MG per tablet 2 tablet (has no administration in time range)     And   polyethylene glycol (MIRALAX) packet 17 g (has no administration in time range)     And   bisacodyl (DULCOLAX) EC tablet 5 mg (has no administration in time range)     And   bisacodyl (DULCOLAX) suppository 10 mg (has no administration in time range)   iopamidol (ISOVUE-370) 76 % injection 100 mL (100 mL Intravenous Given 7/28/25 1755)     CT Angiogram Chest Pulmonary Embolism  Result Date: 7/28/2025  Impression: 1.No evidence of pulmonary embolism. 2.No acute findings in the chest. Electronically Signed: Matty Mitchell DO  7/28/2025 6:31 PM EDT  Workstation ID: [id number]    XR Chest 1 View  Result Date: 7/28/2025  Impression: No radiographic evidence of acute cardiopulmonary abnormality. Electronically Signed: Juve Shelton MD  7/28/2025 6:13 PM EDT  Workstation ID: [id number]                                                     Medical Decision Making  /71   Pulse 58   Temp 98.3 °F (36.8 °C) (Oral)   Resp 18   Ht 160 cm (63\")   Wt 73.6 kg (162 lb 4.1 oz)   SpO2 95%   BMI 28.74 kg/m²      Chart review: Patient's prior visit in April 2025 " for palpitations reviewed.  Patient's most recent echo and stress test reviewed.    Patient is 75-year-old female who presents the emergency department with complaints of palpitations while at home this morning.  See full HPI with primary assessment and exam above.  IV access is obtained for medication and labs if indicated and patient placed into ED bed and gown for assessment.  Primary differentials for patient include but are not limited to ACS, PE, electrolyte derangement, pneumonia.    Comorbidities:  has a past medical history of CAD (2020), Colon polyp, Congenital heart disease (2017), Depression, DEXA, Diabetic peripheral neuropathy, Diverticulosis, GERD, Heart murmur (2017), Hyperlipidemia, Hypertension, Insomnia, Lower back pain, MAMMO, Obesity, Osteoarthritis, Osteopenia, Osteoporosis, Palpitations, RA, Rheumatoid nodule (06/2011), Skin Cancer (2021), Type 2 diabetes mellitus (1988), Urinary tract infection, and Vitamin D deficiency.    Discussion with provider: Dr. Corbett    Radiology interpretation:  Imaging reviewed by ED Attending physician and myself and interpreted by radiologist.  CT Angiogram Chest Pulmonary Embolism  Result Date: 7/28/2025  Impression: 1.No evidence of pulmonary embolism. 2.No acute findings in the chest. Electronically Signed: Matty Mitchell DO  7/28/2025 6:31 PM EDT  Workstation ID: [id number]    XR Chest 1 View  Result Date: 7/28/2025  Impression: No radiographic evidence of acute cardiopulmonary abnormality. Electronically Signed: Juve Shelton MD  7/28/2025 6:13 PM EDT  Workstation ID: [id number]    Lab interpretation:  Labs viewed by me significant for glucosuria on UA and elevated D-dimer.  Otherwise labs generally unremarkable with no significant electrolyte dysfunction or kidney injury on CMP, no significant leukocytosis or anemia on CBC, and troponin, BNP, TSH, and other electrolytes all within normal limits.    EKG interpreted by ED attending physician and myself as  sinus or ectopic atrial rhythm with LVH; rate 71, , QTc 453.  EKG compared to previous from 4/8/2025.  EKG at that time was sinus rhythm with PACs and a prolonged NE; rate 62, , QTc 454.    Results reviewed and plan of care discussed during repeat physical exam at bedside.  Patient's physical exam is unchanged from previous; patient remains alert and oriented, afebrile and nontoxic with GCS 15.  Plan of care discussed is for patient to be placed into observation overnight with consultation with cardiology tomorrow morning.  Patient verbalizes understanding of and is amenable to this plan of care.    Appropriate PPE worn during exam.    This document is intended for medical expert use only. Reading of this document by patients and/or patient's family without participating medical staff guidance may result in misinterpretation and unintended morbidity.  Any interpretation of such data is the responsibility of the patient and/or family member responsible for the patient in concert with their primary or specialist providers, not to be left for sources of online searches such as [employer], [employer] or similar queries. Relying on these approaches to knowledge may result in misinterpretation, misguided goals of care and even death should patients or family members try recommendations outside of the realm of professional medical care in a supervised inpatient environment.       Problems Addressed:  Palpitations: complicated acute illness or injury    Amount and/or Complexity of Data Reviewed  Labs: ordered.  Radiology: ordered.  ECG/medicine tests: ordered.    Risk  OTC drugs.  Prescription drug management.  Decision regarding hospitalization.        Final diagnoses:   Palpitations       ED Disposition  ED Disposition       ED Disposition   Decision to Admit    Condition   --    Comment   --               No follow-up provider specified.       Medication List      No changes were made to your prescriptions during this  visit.            Jordan Tavares PA-C  07/28/25 1926

## 2025-07-29 ENCOUNTER — READMISSION MANAGEMENT (OUTPATIENT)
Dept: CALL CENTER | Facility: HOSPITAL | Age: 75
End: 2025-07-29
Payer: MEDICARE

## 2025-07-29 VITALS
HEART RATE: 58 BPM | RESPIRATION RATE: 16 BRPM | SYSTOLIC BLOOD PRESSURE: 141 MMHG | HEIGHT: 63 IN | OXYGEN SATURATION: 96 % | WEIGHT: 162 LBS | DIASTOLIC BLOOD PRESSURE: 67 MMHG | TEMPERATURE: 97.8 F | BODY MASS INDEX: 28.7 KG/M2

## 2025-07-29 LAB
ALBUMIN SERPL-MCNC: 3.9 G/DL (ref 3.5–5.2)
ALBUMIN/GLOB SERPL: 1.3 G/DL
ALP SERPL-CCNC: 80 U/L (ref 39–117)
ALT SERPL W P-5'-P-CCNC: 8 U/L (ref 1–33)
ANION GAP SERPL CALCULATED.3IONS-SCNC: 11.9 MMOL/L (ref 5–15)
AORTIC DIMENSIONLESS INDEX: 0.81 (DI)
AST SERPL-CCNC: 12 U/L (ref 1–32)
AV MEAN PRESS GRAD SYS DOP V1V2: 4 MMHG
AV VMAX SYS DOP: 142 CM/SEC
BASOPHILS # BLD AUTO: 0.03 10*3/MM3 (ref 0–0.2)
BASOPHILS NFR BLD AUTO: 0.4 % (ref 0–1.5)
BH CV ECHO LEFT VENTRICLE GLOBAL LONGITUDINAL STRAIN: -21.7 %
BH CV ECHO MEAS - ACS: 2 CM
BH CV ECHO MEAS - AO MAX PG: 8.1 MMHG
BH CV ECHO MEAS - AO V2 VTI: 34.5 CM
BH CV ECHO MEAS - AVA(I,D): 2.28 CM2
BH CV ECHO MEAS - EDV(CUBED): 79.5 ML
BH CV ECHO MEAS - EDV(MOD-SP4): 79.2 ML
BH CV ECHO MEAS - EF(MOD-SP4): 64.9 %
BH CV ECHO MEAS - ESV(CUBED): 22 ML
BH CV ECHO MEAS - ESV(MOD-SP4): 27.8 ML
BH CV ECHO MEAS - FS: 34.9 %
BH CV ECHO MEAS - IVS/LVPW: 1 CM
BH CV ECHO MEAS - IVSD: 1.2 CM
BH CV ECHO MEAS - LA DIMENSION: 3.9 CM
BH CV ECHO MEAS - LAT PEAK E' VEL: 10 CM/SEC
BH CV ECHO MEAS - LV DIASTOLIC VOL/BSA (35-75): 44.8 CM2
BH CV ECHO MEAS - LV MASS(C)D: 184.7 GRAMS
BH CV ECHO MEAS - LV MAX PG: 5.3 MMHG
BH CV ECHO MEAS - LV MEAN PG: 3 MMHG
BH CV ECHO MEAS - LV SYSTOLIC VOL/BSA (12-30): 15.7 CM2
BH CV ECHO MEAS - LV V1 MAX: 115 CM/SEC
BH CV ECHO MEAS - LV V1 VTI: 27.8 CM
BH CV ECHO MEAS - LVIDD: 4.3 CM
BH CV ECHO MEAS - LVIDS: 2.8 CM
BH CV ECHO MEAS - LVOT AREA: 2.8 CM2
BH CV ECHO MEAS - LVOT DIAM: 1.9 CM
BH CV ECHO MEAS - LVPWD: 1.2 CM
BH CV ECHO MEAS - MED PEAK E' VEL: 5.6 CM/SEC
BH CV ECHO MEAS - MV A MAX VEL: 108 CM/SEC
BH CV ECHO MEAS - MV DEC SLOPE: 327 CM/SEC2
BH CV ECHO MEAS - MV DEC TIME: 0.33 SEC
BH CV ECHO MEAS - MV E MAX VEL: 73.4 CM/SEC
BH CV ECHO MEAS - MV E/A: 0.68
BH CV ECHO MEAS - MV MAX PG: 6.4 MMHG
BH CV ECHO MEAS - MV MEAN PG: 2 MMHG
BH CV ECHO MEAS - MV P1/2T: 81.9 MSEC
BH CV ECHO MEAS - MV V2 VTI: 34.8 CM
BH CV ECHO MEAS - MVA(P1/2T): 2.7 CM2
BH CV ECHO MEAS - MVA(VTI): 2.26 CM2
BH CV ECHO MEAS - PA ACC TIME: 0.07 SEC
BH CV ECHO MEAS - PA V2 MAX: 132 CM/SEC
BH CV ECHO MEAS - PI END-D VEL: 90.3 CM/SEC
BH CV ECHO MEAS - RAP SYSTOLE: 3 MMHG
BH CV ECHO MEAS - RV MAX PG: 4.1 MMHG
BH CV ECHO MEAS - RV V1 MAX: 101 CM/SEC
BH CV ECHO MEAS - RV V1 VTI: 22.4 CM
BH CV ECHO MEAS - RVDD: 3.4 CM
BH CV ECHO MEAS - RVSP: 22 MMHG
BH CV ECHO MEAS - SV(LVOT): 78.8 ML
BH CV ECHO MEAS - SV(MOD-SP4): 51.4 ML
BH CV ECHO MEAS - SVI(LVOT): 44.6 ML/M2
BH CV ECHO MEAS - SVI(MOD-SP4): 29.1 ML/M2
BH CV ECHO MEAS - TAPSE (>1.6): 2.48 CM
BH CV ECHO MEAS - TR MAX PG: 19 MMHG
BH CV ECHO MEAS - TR MAX VEL: 218 CM/SEC
BH CV ECHO MEASUREMENTS AVERAGE E/E' RATIO: 9.41
BH CV XLRA - TDI S': 13.9 CM/SEC
BILIRUB SERPL-MCNC: 0.2 MG/DL (ref 0–1.2)
BUN SERPL-MCNC: 10.5 MG/DL (ref 8–23)
BUN/CREAT SERPL: 19.4 (ref 7–25)
CALCIUM SPEC-SCNC: 9.7 MG/DL (ref 8.6–10.5)
CHLORIDE SERPL-SCNC: 101 MMOL/L (ref 98–107)
CHOLEST SERPL-MCNC: 190 MG/DL (ref 0–200)
CO2 SERPL-SCNC: 25.1 MMOL/L (ref 22–29)
CREAT SERPL-MCNC: 0.54 MG/DL (ref 0.57–1)
DEPRECATED RDW RBC AUTO: 47.7 FL (ref 37–54)
EGFRCR SERPLBLD CKD-EPI 2021: 96.7 ML/MIN/1.73
EOSINOPHIL # BLD AUTO: 0.28 10*3/MM3 (ref 0–0.4)
EOSINOPHIL NFR BLD AUTO: 4 % (ref 0.3–6.2)
ERYTHROCYTE [DISTWIDTH] IN BLOOD BY AUTOMATED COUNT: 13.4 % (ref 12.3–15.4)
GLOBULIN UR ELPH-MCNC: 3 GM/DL
GLUCOSE BLDC GLUCOMTR-MCNC: 170 MG/DL (ref 70–105)
GLUCOSE BLDC GLUCOMTR-MCNC: 203 MG/DL (ref 70–105)
GLUCOSE SERPL-MCNC: 175 MG/DL (ref 65–99)
HCT VFR BLD AUTO: 41.1 % (ref 34–46.6)
HDLC SERPL-MCNC: 48 MG/DL (ref 40–60)
HGB BLD-MCNC: 13.4 G/DL (ref 12–15.9)
IMM GRANULOCYTES # BLD AUTO: 0.02 10*3/MM3 (ref 0–0.05)
IMM GRANULOCYTES NFR BLD AUTO: 0.3 % (ref 0–0.5)
LDLC SERPL CALC-MCNC: 111 MG/DL (ref 0–100)
LDLC/HDLC SERPL: 2.21 {RATIO}
LEFT ATRIUM VOLUME INDEX: 20.6 ML/M2
LV EF BIPLANE MOD: 65 %
LYMPHOCYTES # BLD AUTO: 2.27 10*3/MM3 (ref 0.7–3.1)
LYMPHOCYTES NFR BLD AUTO: 32.2 % (ref 19.6–45.3)
MCH RBC QN AUTO: 31.4 PG (ref 26.6–33)
MCHC RBC AUTO-ENTMCNC: 32.6 G/DL (ref 31.5–35.7)
MCV RBC AUTO: 96.3 FL (ref 79–97)
MONOCYTES # BLD AUTO: 0.59 10*3/MM3 (ref 0.1–0.9)
MONOCYTES NFR BLD AUTO: 8.4 % (ref 5–12)
NEUTROPHILS NFR BLD AUTO: 3.85 10*3/MM3 (ref 1.7–7)
NEUTROPHILS NFR BLD AUTO: 54.7 % (ref 42.7–76)
NRBC BLD AUTO-RTO: 0 /100 WBC (ref 0–0.2)
PLATELET # BLD AUTO: 306 10*3/MM3 (ref 140–450)
PMV BLD AUTO: 9.4 FL (ref 6–12)
POTASSIUM SERPL-SCNC: 4.4 MMOL/L (ref 3.5–5.2)
PROT SERPL-MCNC: 6.9 G/DL (ref 6–8.5)
QT INTERVAL: 417 MS
QTC INTERVAL: 453 MS
RBC # BLD AUTO: 4.27 10*6/MM3 (ref 3.77–5.28)
SINUS: 3 CM
SODIUM SERPL-SCNC: 138 MMOL/L (ref 136–145)
STJ: 2.6 CM
T3FREE SERPL-MCNC: 2.99 PG/ML (ref 2–4.4)
T4 FREE SERPL-MCNC: 1.03 NG/DL (ref 0.92–1.68)
TRIGL SERPL-MCNC: 180 MG/DL (ref 0–150)
TROPONIN T SERPL HS-MCNC: <6 NG/L
VLDLC SERPL-MCNC: 31 MG/DL (ref 5–40)
WBC NRBC COR # BLD AUTO: 7.04 10*3/MM3 (ref 3.4–10.8)

## 2025-07-29 PROCEDURE — 84481 FREE ASSAY (FT-3): CPT | Performed by: NURSE PRACTITIONER

## 2025-07-29 PROCEDURE — 84484 ASSAY OF TROPONIN QUANT: CPT

## 2025-07-29 PROCEDURE — G0378 HOSPITAL OBSERVATION PER HR: HCPCS

## 2025-07-29 PROCEDURE — 85025 COMPLETE CBC W/AUTO DIFF WBC: CPT

## 2025-07-29 PROCEDURE — 80061 LIPID PANEL: CPT | Performed by: NURSE PRACTITIONER

## 2025-07-29 PROCEDURE — 63710000001 INSULIN LISPRO PROTAMINE-INSULIN LISPRO (75-25) 100 UNIT/ML SUSPENSION

## 2025-07-29 PROCEDURE — 82948 REAGENT STRIP/BLOOD GLUCOSE: CPT

## 2025-07-29 PROCEDURE — 99214 OFFICE O/P EST MOD 30 MIN: CPT | Performed by: INTERNAL MEDICINE

## 2025-07-29 PROCEDURE — 80053 COMPREHEN METABOLIC PANEL: CPT

## 2025-07-29 PROCEDURE — 84439 ASSAY OF FREE THYROXINE: CPT | Performed by: NURSE PRACTITIONER

## 2025-07-29 RX ORDER — MECLIZINE HYDROCHLORIDE 25 MG/1
12.5 TABLET ORAL 2 TIMES DAILY PRN
Status: DISCONTINUED | OUTPATIENT
Start: 2025-07-29 | End: 2025-07-29 | Stop reason: HOSPADM

## 2025-07-29 RX ORDER — LISINOPRIL 20 MG/1
40 TABLET ORAL
Status: DISCONTINUED | OUTPATIENT
Start: 2025-07-29 | End: 2025-07-29 | Stop reason: HOSPADM

## 2025-07-29 RX ORDER — ISOSORBIDE MONONITRATE 60 MG/1
60 TABLET, EXTENDED RELEASE ORAL
Status: DISCONTINUED | OUTPATIENT
Start: 2025-07-29 | End: 2025-07-29 | Stop reason: HOSPADM

## 2025-07-29 RX ORDER — ALBUTEROL SULFATE 0.83 MG/ML
2.5 SOLUTION RESPIRATORY (INHALATION) EVERY 6 HOURS PRN
Status: DISCONTINUED | OUTPATIENT
Start: 2025-07-29 | End: 2025-07-29 | Stop reason: HOSPADM

## 2025-07-29 RX ORDER — GABAPENTIN 300 MG/1
300 CAPSULE ORAL 2 TIMES DAILY
Status: DISCONTINUED | OUTPATIENT
Start: 2025-07-29 | End: 2025-07-29 | Stop reason: HOSPADM

## 2025-07-29 RX ORDER — FERROUS SULFATE 325(65) MG
325 TABLET ORAL
Status: DISCONTINUED | OUTPATIENT
Start: 2025-07-29 | End: 2025-07-29 | Stop reason: HOSPADM

## 2025-07-29 RX ORDER — FENOFIBRATE 145 MG/1
145 TABLET, FILM COATED ORAL DAILY
Status: DISCONTINUED | OUTPATIENT
Start: 2025-07-29 | End: 2025-07-29 | Stop reason: HOSPADM

## 2025-07-29 RX ORDER — TRAZODONE HYDROCHLORIDE 50 MG/1
50 TABLET ORAL NIGHTLY
Status: DISCONTINUED | OUTPATIENT
Start: 2025-07-29 | End: 2025-07-29 | Stop reason: HOSPADM

## 2025-07-29 RX ORDER — CETIRIZINE HYDROCHLORIDE 10 MG/1
10 TABLET ORAL DAILY
Status: DISCONTINUED | OUTPATIENT
Start: 2025-07-29 | End: 2025-07-29 | Stop reason: HOSPADM

## 2025-07-29 RX ORDER — PANTOPRAZOLE SODIUM 40 MG/1
40 TABLET, DELAYED RELEASE ORAL
Status: DISCONTINUED | OUTPATIENT
Start: 2025-07-29 | End: 2025-07-29 | Stop reason: HOSPADM

## 2025-07-29 RX ORDER — IPRATROPIUM BROMIDE AND ALBUTEROL SULFATE 2.5; .5 MG/3ML; MG/3ML
3 SOLUTION RESPIRATORY (INHALATION) EVERY 6 HOURS PRN
Status: DISCONTINUED | OUTPATIENT
Start: 2025-07-29 | End: 2025-07-29 | Stop reason: HOSPADM

## 2025-07-29 RX ORDER — ASPIRIN 81 MG/1
81 TABLET ORAL DAILY
Status: DISCONTINUED | OUTPATIENT
Start: 2025-07-29 | End: 2025-07-29 | Stop reason: HOSPADM

## 2025-07-29 RX ORDER — AMLODIPINE BESYLATE 5 MG/1
10 TABLET ORAL NIGHTLY
Status: DISCONTINUED | OUTPATIENT
Start: 2025-07-29 | End: 2025-07-29 | Stop reason: HOSPADM

## 2025-07-29 RX ORDER — ACETAMINOPHEN 325 MG/1
650 TABLET ORAL EVERY 6 HOURS PRN
Status: DISCONTINUED | OUTPATIENT
Start: 2025-07-29 | End: 2025-07-29 | Stop reason: HOSPADM

## 2025-07-29 RX ORDER — ACETAMINOPHEN 325 MG/1
650 TABLET ORAL EVERY 6 HOURS PRN
Status: DISCONTINUED | OUTPATIENT
Start: 2025-07-29 | End: 2025-07-29

## 2025-07-29 RX ORDER — MELOXICAM 15 MG/1
15 TABLET ORAL DAILY
Status: DISCONTINUED | OUTPATIENT
Start: 2025-07-29 | End: 2025-07-29 | Stop reason: HOSPADM

## 2025-07-29 RX ORDER — FOLIC ACID 1 MG/1
1000 TABLET ORAL DAILY
Status: DISCONTINUED | OUTPATIENT
Start: 2025-07-29 | End: 2025-07-29 | Stop reason: HOSPADM

## 2025-07-29 RX ORDER — LACTULOSE 10 G/15ML
20 SOLUTION ORAL NIGHTLY PRN
Status: DISCONTINUED | OUTPATIENT
Start: 2025-07-29 | End: 2025-07-29 | Stop reason: HOSPADM

## 2025-07-29 RX ADMIN — GABAPENTIN 300 MG: 300 CAPSULE ORAL at 08:47

## 2025-07-29 RX ADMIN — LISINOPRIL 40 MG: 20 TABLET ORAL at 09:51

## 2025-07-29 RX ADMIN — INSULIN LISPRO 40 UNITS: 100 INJECTION, SUSPENSION SUBCUTANEOUS at 08:47

## 2025-07-29 RX ADMIN — Medication 10 ML: at 09:52

## 2025-07-29 RX ADMIN — PANTOPRAZOLE SODIUM 40 MG: 40 TABLET, DELAYED RELEASE ORAL at 09:51

## 2025-07-29 RX ADMIN — CETIRIZINE HYDROCHLORIDE 10 MG: 10 TABLET, FILM COATED ORAL at 09:51

## 2025-07-29 RX ADMIN — ASPIRIN 81 MG: 81 TABLET, COATED ORAL at 09:51

## 2025-07-29 RX ADMIN — FENOFIBRATE 145 MG: 145 TABLET ORAL at 09:51

## 2025-07-29 RX ADMIN — FOLIC ACID 1000 MCG: 1 TABLET ORAL at 09:51

## 2025-07-29 RX ADMIN — ACETAMINOPHEN 650 MG: 325 TABLET, FILM COATED ORAL at 07:44

## 2025-07-29 RX ADMIN — MELOXICAM 15 MG: 15 TABLET ORAL at 09:51

## 2025-07-29 RX ADMIN — FERROUS SULFATE TAB 325 MG (65 MG ELEMENTAL FE) 325 MG: 325 (65 FE) TAB at 09:51

## 2025-07-29 NOTE — DISCHARGE SUMMARY
[facility]    Emily Burdick,     CHIEF COMPLAINT:     Palpitations     HISTORY OF PRESENT ILLNESS:    HPI    75-year-old female who presents to the emergency department with complaints of palpitations. Patient states that she was sitting at home this morning and began to feel her heart racing and states it felt like she could feel her heartbeat up in her throat. Patient denies any actual chest pain or shortness of breath during event. Patient denies any recent illness or fever and denies any other complaints not addressed above; denies abdominal pain, vomiting, or diarrhea.     Past Medical History:   Diagnosis Date    CAD 2020    Colon polyp     Congenital heart disease 2017    Depression     DEXA     2019= (-0.7/ -1.6); 2023= (-0.7/ -2.7)    Diabetic peripheral neuropathy     Diverticulosis     GERD     Heart murmur 2017    Hyperlipidemia     Hypertension     Insomnia     Lower back pain     MAMMO     NEG = 2021/ 2023    Obesity     Osteoarthritis     Osteopenia     Osteoporosis     Palpitations     RA     Rheumatoid nodule 06/2011     rt. thumb x 2 and rt. & lt. 3rd fingers (Oct.2012)/ rt. elbow, left elbow, and epidermal inclusion cyst post. neck (June 2011)-----Dr. Whitmore    Skin Cancer 2021    Type 2 diabetes mellitus 1988    Urinary tract infection     Vitamin D deficiency      Past Surgical History:   Procedure Laterality Date    BLADDER SUSPENSION  10/11/2023        BREAST SURGERY  1972    CARDIAC CATHETERIZATION N/A 09/16/2020    Procedure: Left Heart Cath;  Surgeon: Walker Rodriguez MD;  Location: [facility] CATH INVASIVE LOCATION;  Service: Cardiovascular;  Laterality: N/A;    CARDIAC CATHETERIZATION N/A 09/16/2020    Procedure: Coronary angiography;  Surgeon: Walker Rodriguez MD;  Location: [facility] CATH INVASIVE LOCATION;  Service: Cardiovascular;  Laterality: N/A;    CARDIAC CATHETERIZATION  09/16/2020    Procedure: Functional Flow Reserve;   Surgeon: Kath Medina MD;  Location: [facility] CATH INVASIVE LOCATION;  Service: Cardiology;;    CARPAL TUNNEL RELEASE Right 02/20/2023    Procedure: CARPAL TUNNEL RELEASE WITH POSSIBLE SYNOVECTOMY;  Surgeon: Amadeo Magaña MD;  Location: [facility] MAIN OR;  Service: Orthopedics;  Laterality: Right;    CHOLECYSTECTOMY      COLONOSCOPY      2017 / 2023= TA, rech 2028? GSI    CYST REMOVAL      Seb cyst on neck    EYE SURGERY      TOTAL ABDOMINAL HYSTERECTOMY WITH SALPINGO OOPHORECTOMY      VAGINAL PROLAPSE REPAIR       Uterine Prolapse repair     Family History   Problem Relation Age of Onset    Heart disease Mother     Diabetes Mother     Hypertension Mother     Rheum arthritis Mother     Coronary artery disease Mother     Alcohol abuse Mother     Heart failure Mother     Heart disease Father     Mental illness Father     Hypertension Father     Alcohol abuse Father     Early death Father         Heart disease    Heart attack Father     Heart failure Father     Kidney cancer Sister     Stroke Sister     Osteoarthritis Sister     Rheum arthritis Sister     Hypertension Sister     Arthritis Sister     Diabetes Sister     Stroke Sister     Hypertension Sister     Rheum arthritis Sister     Osteoarthritis Sister     Diabetes Sister     Arthritis Sister     Liver disease Sister     Kidney disease Sister     No Known Problems Brother     Rheum arthritis Brother     Osteoarthritis Brother     Hypertension Brother     Arthritis Brother     Diabetes Maternal Grandfather     Cancer Sister     Cancer Sister     Early death Sister         Cancer    Stroke Sister     Early death Sister     Thyroid disease Daughter     Early death Brother     Hypertension Sister     Arthritis Sister     Diabetes Sister     Liver disease Sister      Social History     Tobacco Use    Smoking status: Never     Passive exposure: Never    Smokeless tobacco: Never   Vaping Use    Vaping status: Never Used   Substance Use Topics    Alcohol  use: Not Currently     Comment: 3-4 a year    Drug use: Never     Medications Prior to Admission   Medication Sig Dispense Refill Last Dose/Taking    acebutolol (SECTRAL) 200 MG capsule TAKE 1 CAPSULE BY MOUTH DAILY 90 capsule 0 7/27/2025    acetaminophen (TYLENOL) 325 MG tablet Take 2 tablets by mouth Every 6 (Six) Hours As Needed for Mild Pain.   7/28/2025    albuterol sulfate  (90 Base) MCG/ACT inhaler Inhale 2 puffs Every 6 (Six) Hours As Needed for Shortness of Air (cough). 18 g 0 Past Month    amLODIPine (NORVASC) 10 MG tablet TAKE ONE TABLET BY MOUTH ONCE NIGHTLY 90 tablet 0 7/28/2025    aspirin 81 MG EC tablet Take 1 tablet by mouth Daily.   7/28/2025    B Complex Vitamins (vitamin b complex) capsule capsule Take 1 capsule by mouth Daily.   7/28/2025    Continuous Glucose  (FreeStyle [place] 3 [place]) device Use 1 package Daily. 1 each 3 7/28/2025    Continuous Glucose Sensor (FreeStyle [place] 3 Plus Sensor) Use Every 15 (Fifteen) Days. 6 each 3 7/28/2025    Cranberry 125 MG tablet 1 po qd   7/27/2025    estradiol (ESTRACE) 0.1 MG/GM vaginal cream    Past Week    fenofibrate 160 MG tablet TAKE 1 TABLET BY MOUTH DAILY 90 tablet 0 Past Month    ferrous sulfate 324 (65 Fe) MG tablet delayed-release EC tablet Take 1 tablet by mouth Daily With Breakfast.   Past Month    folic acid (FOLVITE) 1 MG tablet Take 1 tablet by mouth Daily. 90 tablet 0 7/28/2025    gabapentin (NEURONTIN) 300 MG capsule Take 1 capsule by mouth 2 (Two) Times a Day. 180 capsule 0 7/28/2025    Insulin NPH Isophane & Regular (HumuLIN 70/30 KwikPen) (70-30) 100 UNIT/ML suspension pen-injector Inject 40 units subcu half an hour before breakfast and 16 units subcu half an hour before supper. 30 mL 5 7/28/2025    Insulin Pen Needle (Droplet Pen Needles) 32G X 4 MM misc USE 1 PEN NEED 2 TIMES A  each 3 7/28/2025    ipratropium-albuterol (Combivent Respimat)  MCG/ACT inhaler    Past Month    isosorbide mononitrate (IMDUR) 60  MG 24 hr tablet TAKE 1 TABLET BY MOUTH DAILY WITH BREAKFAST 90 tablet 0 7/28/2025    Janumet -1000 MG tablet TAKE 1 TABLET BY MOUTH DAILY 90 tablet 0 7/28/2025    lactulose (CHRONULAC) 10 GM/15ML solution Take 30 mL by mouth At Night As Needed (constipation). 237 mL 2 Past Month    lansoprazole (PREVACID) 30 MG capsule TAKE 1 CAPSULE BY MOUTH DAILY 90 capsule 1 7/28/2025    lisinopril (PRINIVIL,ZESTRIL) 40 MG tablet TAKE 1 TABLET BY MOUTH DAILY WITH BREAKFAST 90 tablet 0 7/28/2025    loratadine (Claritin) 10 MG tablet Take 1 tablet by mouth Every Night.   7/27/2025    meclizine (ANTIVERT) 12.5 MG tablet 1/2 - 1 po q6-8hrs prn dizzyness 20 tablet 0 7/28/2025    meloxicam (MOBIC) 7.5 MG tablet Take 2 tablets by mouth Daily.   7/28/2025    Misc Natural Products (BEET ROOT PO) Take 1 tablet/day by mouth Daily.   7/28/2025    Misc Natural Products (BEET ROOT PO) Take 1 tablet by mouth Daily.   7/28/2025    nitrofurantoin, macrocrystal-monohydrate, (Macrobid) 100 MG capsule Take 1 capsule by mouth 2 (Two) Times a Day. 28 capsule 0 7/27/2025    ondansetron ODT (ZOFRAN-ODT) 4 MG disintegrating tablet Place 1 tablet on the tongue Every 8 (Eight) Hours As Needed for Nausea. 30 tablet 0 Past Month    phenazopyridine (Pyridium) 200 MG tablet Take 1 tablet by mouth 3 (Three) Times a Day As Needed for Bladder Spasms or Dysuria. 20 tablet 0 Past Month    predniSONE (DELTASONE) 5 MG tablet Take 1 tablet by mouth Daily. 30 tablet 0 7/27/2025    Probiotic Product (PROBIOTIC BLEND PO) Take 1 tablet by mouth Daily.   7/28/2025    traZODone (DESYREL) 50 MG tablet Take 1 tablet by mouth every night at bedtime. 90 tablet 1 7/27/2025    Vitamin D-Vitamin K (VITAMIN K2-VITAMIN D3 PO) Take 1 tablet by mouth Daily.   7/27/2025    CALCIUM MAGNESIUM ZINC PO Take 1 tablet by mouth Daily.   Unknown    nabumetone (RELAFEN) 750 MG tablet Take 1 tablet by mouth 2 (Two) Times a Day As Needed for Mild Pain. 60 tablet 0 More than a month     Turmeric 500 MG capsule Take 1 capsule by mouth Daily.   Unknown     Allergies:  Jardiance [empagliflozin] and Metformin    Immunization History   Administered Date(s) Administered    31-influenza Vac Quardvalent Preservativ 11/30/2018, 05/01/2022    FLUAD TRI 65YR+ 12/30/2019    Fluad Quad 65+ 11/19/2020    Fluzone  >6mos 09/15/2015    Fluzone High-Dose 65+YRS 10/28/2014, 09/15/2015, 10/19/2017, 10/03/2024    Fluzone High-Dose 65+yrs 11/15/2021, 12/29/2022, 11/08/2023    Influenza Seasonal Injectable 02/06/2006    Influenza, Unspecified 02/06/2006, 11/30/2018, 02/01/2022, 12/01/2023    Pneumococcal Conjugate 13-Valent (PCV13) 10/19/2017    Pneumococcal Conjugate 20-Valent (PCV20) 07/20/2023    Pneumococcal Polysaccharide (PPSV23) 01/20/2015, 02/04/2015, 07/12/2016           REVIEW OF SYSTEMS:    Review of Systems   Constitutional: Positive for malaise/fatigue. Negative for decreased appetite and fever.   HENT: Negative.     Eyes: Negative.    Cardiovascular:  Positive for palpitations. Negative for chest pain and dyspnea on exertion.   Respiratory:  Negative for shortness of breath.    Skin: Negative.    Musculoskeletal:  Negative for falls.   Gastrointestinal: Negative.    Genitourinary: Negative.    Neurological:  Positive for weakness.   Psychiatric/Behavioral:  The patient is nervous/anxious.        Vital Signs  Temp:  [97.7 °F (36.5 °C)-98.3 °F (36.8 °C)] 97.8 °F (36.6 °C)  Heart Rate:  [58-74] 58  Resp:  [12-18] 16  BP: (138-192)/(61-76) 141/67          Physical Exam:  Physical Exam  Vitals and nursing note reviewed.   Constitutional:       Appearance: Normal appearance.   HENT:      Head: Normocephalic and atraumatic.      Right Ear: External ear normal.      Left Ear: External ear normal.      Nose: Nose normal.      Mouth/Throat:      Pharynx: Oropharynx is clear.   Eyes:      Extraocular Movements: Extraocular movements intact.      Conjunctiva/sclera: Conjunctivae normal.      Pupils: Pupils are equal,  round, and reactive to light.   Cardiovascular:      Rate and Rhythm: Normal rate and regular rhythm.      Pulses: Normal pulses.      Heart sounds: Normal heart sounds.   Pulmonary:      Effort: Pulmonary effort is normal.   Abdominal:      General: Bowel sounds are normal.   Musculoskeletal:         General: Normal range of motion.      Cervical back: Normal range of motion.   Skin:     General: Skin is warm.      Capillary Refill: Capillary refill takes less than 2 seconds.   Neurological:      Mental Status: She is alert and oriented to person, place, and time.   Psychiatric:         Mood and Affect: Mood normal.         Behavior: Behavior normal.         Thought Content: Thought content normal.         Judgment: Judgment normal.       Emotional Behavior:    wnl   Debilities:   none  Results Review:    I reviewed the patient's new clinical results.  Lab Results (most recent)       Procedure Component Value Units Date/Time    POC Glucose Once [[id number]]  (Abnormal) Collected: 07/29/25 1145    Specimen: Blood Updated: 07/29/25 1147     Glucose 170 mg/dL      Comment: Serial Number: [id number]:  [id number]       T4, Free [[id number]]  (Normal) Collected: 07/29/25 0429    Specimen: Blood from Arm, Right Updated: 07/29/25 0928     Free T4 1.03 ng/dL     Lipid Panel [[id number]]  (Abnormal) Collected: 07/29/25 0429    Specimen: Blood from Arm, Right Updated: 07/29/25 0928     Total Cholesterol 190 mg/dL      Triglycerides 180 mg/dL      HDL Cholesterol 48 mg/dL      LDL Cholesterol  111 mg/dL      VLDL Cholesterol 31 mg/dL      LDL/HDL Ratio 2.21    Narrative:      Cholesterol Reference Ranges  (U.S. Department of Health and Human Services ATP III Classifications)    Desirable          <200 mg/dL  Borderline High    200-239 mg/dL  High Risk          >240 mg/dL      Triglyceride Reference Ranges  (U.S. Department of Health and Human Services ATP III Classifications)    Normal           <150 mg/dL  Borderline High   150-199 mg/dL  High             200-499 mg/dL  Very High        >500 mg/dL    HDL Reference Ranges  (U.S. Department of Health and Human Services ATP III Classifications)    Low     <40 mg/dl (major risk factor for CHD)  High    >60 mg/dl ('negative' risk factor for CHD)        LDL Reference Ranges  (U.S. Department of Health and Human Services ATP III Classifications)    Optimal          <100 mg/dL  Near Optimal     100-129 mg/dL  Borderline High  130-159 mg/dL  High             160-189 mg/dL  Very High        >189 mg/dL    LDL is calculated using the NIH LDL-C calculation.      T3, Free [[id number]] Collected: 07/29/25 0429    Specimen: Blood from Arm, Right Updated: 07/29/25 0907    POC Glucose Once [[id number]]  (Abnormal) Collected: 07/29/25 0814    Specimen: Blood Updated: 07/29/25 0818     Glucose 203 mg/dL      Comment: Serial Number: [id number]:  [id number]       Comprehensive Metabolic Panel [[id number]]  (Abnormal) Collected: 07/29/25 0429    Specimen: Blood from Arm, Right Updated: 07/29/25 0559     Glucose 175 mg/dL      BUN 10.5 mg/dL      Creatinine 0.54 mg/dL      Sodium 138 mmol/L      Potassium 4.4 mmol/L      Chloride 101 mmol/L      CO2 25.1 mmol/L      Calcium 9.7 mg/dL      Total Protein 6.9 g/dL      Albumin 3.9 g/dL      ALT (SGPT) 8 U/L      AST (SGOT) 12 U/L      Alkaline Phosphatase 80 U/L      Total Bilirubin 0.2 mg/dL      Globulin 3.0 gm/dL      A/G Ratio 1.3 g/dL      BUN/Creatinine Ratio 19.4     Anion Gap 11.9 mmol/L      eGFR 96.7 mL/min/1.73     Narrative:      GFR Categories in Chronic Kidney Disease (CKD)              GFR Category          GFR (mL/min/1.73)    Interpretation  G1                    90 or greater        Normal or high (1)  G2                    60-89                Mild decrease (1)  G3a                   45-59                Mild to moderate decrease  G3b                   30-44                Moderate to severe decrease  G4                    15-29                 Severe decrease  G5                    14 or less           Kidney failure    (1)In the absence of evidence of kidney disease, neither GFR category G1 or G2 fulfill the criteria for CKD.    eGFR calculation 2021 CKD-EPI creatinine equation, which does not include race as a factor    High Sensitivity Troponin T [[id number]]  (Normal) Collected: 07/29/25 0429    Specimen: Blood from Arm, Right Updated: 07/29/25 0559     HS Troponin T <6 ng/L     Narrative:      High Sensitive Troponin T Reference Range:  <14.0 ng/L- Negative Female for AMI  <22.0 ng/L- Negative Male for AMI  >=14 - Abnormal Female indicating possible myocardial injury.  >=22 - Abnormal Male indicating possible myocardial injury.   Clinicians would have to utilize clinical acumen, EKG, Troponin, and serial changes to determine if it is an Acute Myocardial Infarction or myocardial injury due to an underlying chronic condition.         CBC Auto Differential [[id number]]  (Normal) Collected: 07/29/25 0429    Specimen: Blood from Arm, Right Updated: 07/29/25 0529     WBC 7.04 10*3/mm3      RBC 4.27 10*6/mm3      Hemoglobin 13.4 g/dL      Hematocrit 41.1 %      MCV 96.3 fL      MCH 31.4 pg      MCHC 32.6 g/dL      RDW 13.4 %      RDW-SD 47.7 fl      MPV 9.4 fL      Platelets 306 10*3/mm3      Neutrophil % 54.7 %      Lymphocyte % 32.2 %      Monocyte % 8.4 %      Eosinophil % 4.0 %      Basophil % 0.4 %      Immature Grans % 0.3 %      Neutrophils, Absolute 3.85 10*3/mm3      Lymphocytes, Absolute 2.27 10*3/mm3      Monocytes, Absolute 0.59 10*3/mm3      Eosinophils, Absolute 0.28 10*3/mm3      Basophils, Absolute 0.03 10*3/mm3      Immature Grans, Absolute 0.02 10*3/mm3      nRBC 0.0 /100 WBC     Urinalysis With Culture If Indicated - Urine, Clean Catch [[id number]]  (Abnormal) Collected: 07/28/25 1731    Specimen: Urine, Clean Catch Updated: 07/28/25 1744     Color, UA Yellow     Appearance, UA Clear     pH, UA 7.0     Specific [place], UA 1.012      Glucose,  mg/dL (1+)     Ketones, UA Negative     Bilirubin, UA Negative     Blood, UA Negative     Protein, UA Negative     Leuk Esterase, UA Negative     Nitrite, UA Negative     Urobilinogen, UA 0.2 E.U./dL    Narrative:      In absence of clinical symptoms, the presence of pyuria, bacteria, and/or nitrites on the urinalysis result does not correlate with infection.  Urine microscopic not indicated.    TSH Rfx On Abnormal To Free T4 [[id number]]  (Normal) Collected: 07/28/25 1642    Specimen: Blood Updated: 07/28/25 1721     TSH 1.300 uIU/mL     Comprehensive Metabolic Panel [[id number]]  (Abnormal) Collected: 07/28/25 1642    Specimen: Blood Updated: 07/28/25 1721     Glucose 229 mg/dL      BUN 13.8 mg/dL      Creatinine 0.71 mg/dL      Sodium 134 mmol/L      Potassium 4.1 mmol/L      Chloride 98 mmol/L      CO2 24.5 mmol/L      Calcium 9.4 mg/dL      Total Protein 7.1 g/dL      Albumin 4.1 g/dL      ALT (SGPT) 10 U/L      AST (SGOT) 13 U/L      Alkaline Phosphatase 79 U/L      Total Bilirubin <0.2 mg/dL      Globulin 3.0 gm/dL      A/G Ratio 1.4 g/dL      BUN/Creatinine Ratio 19.4     Anion Gap 11.5 mmol/L      eGFR 89.4 mL/min/1.73     Narrative:      GFR Categories in Chronic Kidney Disease (CKD)              GFR Category          GFR (mL/min/1.73)    Interpretation  G1                    90 or greater        Normal or high (1)  G2                    60-89                Mild decrease (1)  G3a                   45-59                Mild to moderate decrease  G3b                   30-44                Moderate to severe decrease  G4                    15-29                Severe decrease  G5                    14 or less           Kidney failure    (1)In the absence of evidence of kidney disease, neither GFR category G1 or G2 fulfill the criteria for CKD.    eGFR calculation 2021 CKD-EPI creatinine equation, which does not include race as a factor    High Sensitivity Troponin T [[id number]]  (Normal)  Collected: 07/28/25 1642    Specimen: Blood Updated: 07/28/25 1721     HS Troponin T <6 ng/L     Narrative:      High Sensitive Troponin T Reference Range:  <14.0 ng/L- Negative Female for AMI  <22.0 ng/L- Negative Male for AMI  >=14 - Abnormal Female indicating possible myocardial injury.  >=22 - Abnormal Male indicating possible myocardial injury.   Clinicians would have to utilize clinical acumen, EKG, Troponin, and serial changes to determine if it is an Acute Myocardial Infarction or myocardial injury due to an underlying chronic condition.         BNP [[id number]]  (Normal) Collected: 07/28/25 1642    Specimen: Blood Updated: 07/28/25 1721     proBNP 91.6 pg/mL     Narrative:      This assay is used as an aid in the diagnosis of individuals suspected of having heart failure. It can be used as an aid in the diagnosis of acute decompensated heart failure (ADHF) in patients presenting with signs and symptoms of ADHF to the emergency department (ED). In addition, NT-proBNP of <300 pg/mL indicates ADHF is not likely.    Age Range Result Interpretation  NT-proBNP Concentration (pg/mL:      <50             Positive            >450                   Gray                 300-450                    Negative             <300    50-75           Positive            >900                  Gray                300-900                  Negative            <300      >75             Positive            >1800                  Gray                300-1800                  Negative            <300    Phosphorus [[id number]]  (Normal) Collected: 07/28/25 1642    Specimen: Blood Updated: 07/28/25 1721     Phosphorus 3.4 mg/dL     Magnesium [[id number]]  (Normal) Collected: 07/28/25 1642    Specimen: Blood Updated: 07/28/25 1721     Magnesium 1.7 mg/dL     D-dimer, Quantitative [[id number]]  (Abnormal) Collected: 07/28/25 1642    Specimen: Blood Updated: 07/28/25 1715     D-Dimer, Quantitative 0.90 MCGFEU/mL     Narrative:      According  "to the assay 's published package insert, a normal (<0.50 MCGFEU/mL) D-dimer result in conjunction with a non-high clinical probability assessment, excludes deep vein thrombosis (DVT) and pulmonary embolism (PE) with high sensitivity.    D-dimer values increase with age and this can make VTE exclusion of an older population difficult. To address this, the American College [employer], based on best available evidence and recent guidelines, recommends that clinicians use age-adjusted D-dimer thresholds in patients greater than 50 years of age with: a) a low probability of PE who do not meet all Pulmonary Embolism Rule Out Criteria, or b) in those with intermediate probability of PE.   The formula for an age-adjusted D-dimer cut-off is \"age/100\".  For example, a 60 year old patient would have an age-adjusted cut-off of 0.60 MCGFEU/mL and an 80 year old 0.80 MCGFEU/mL.    CBC & Differential [[id number]]  (Normal) Collected: 07/28/25 1642    Specimen: Blood Updated: 07/28/25 1703    Narrative:      The following orders were created for panel order CBC & Differential.  Procedure                               Abnormality         Status                     ---------                               -----------         ------                     CBC Auto Differential[[id number]]        Normal              Final result                 Please view results for these tests on the individual orders.    CBC Auto Differential [[id number]]  (Normal) Collected: 07/28/25 1642    Specimen: Blood Updated: 07/28/25 1703     WBC 8.26 10*3/mm3      RBC 4.16 10*6/mm3      Hemoglobin 13.1 g/dL      Hematocrit 40.0 %      MCV 96.2 fL      MCH 31.5 pg      MCHC 32.8 g/dL      RDW 13.4 %      RDW-SD 47.7 fl      MPV 9.2 fL      Platelets 321 10*3/mm3      Neutrophil % 65.7 %      Lymphocyte % 23.6 %      Monocyte % 7.9 %      Eosinophil % 1.9 %      Basophil % 0.5 %      Immature Grans % 0.4 %      Neutrophils, Absolute 5.43 10*3/mm3  "     Lymphocytes, Absolute 1.95 10*3/mm3      Monocytes, Absolute 0.65 10*3/mm3      Eosinophils, Absolute 0.16 10*3/mm3      Basophils, Absolute 0.04 10*3/mm3      Immature Grans, Absolute 0.03 10*3/mm3      nRBC 0.0 /100 WBC     Round O Draw [[id number]] Collected: 07/28/25 1642    Specimen: Blood Updated: 07/28/25 1645    Narrative:      The following orders were created for panel order Round O Draw.  Procedure                               Abnormality         Status                     ---------                               -----------         ------                     Green Top (Gel)[[id number]]                                  Final result               Lavender Top[[id number]]                                     Final result               Gold Top - SST[[id number]]                                   Final result               Light Blue Top[[id number]]                                   Final result                 Please view results for these tests on the individual orders.    Green Top (Gel) [[id number]] Collected: 07/28/25 1642    Specimen: Blood Updated: 07/28/25 1645     Extra Tube Hold for add-ons.     Comment: Auto resulted.       Lavender Top [[id number]] Collected: 07/28/25 1642    Specimen: Blood Updated: 07/28/25 1645     Extra Tube hold for add-on     Comment: Auto resulted       Gold Top - SST [[id number]] Collected: 07/28/25 1642    Specimen: Blood Updated: 07/28/25 1645     Extra Tube Hold for add-ons.     Comment: Auto resulted.       Light Blue Top [[id number]] Collected: 07/28/25 1642    Specimen: Blood Updated: 07/28/25 1645     Extra Tube Hold for add-ons.     Comment: Auto resulted               Imaging Results (Most Recent)       Procedure Component Value Units Date/Time    CT Angiogram Chest Pulmonary Embolism [[id number]] Collected: 07/28/25 1827     Updated: 07/28/25 1833    Narrative:      CT ANGIOGRAM CHEST PULMONARY EMBOLISM    Date of Exam: 7/28/2025 5:43 PM EDT    Indication: palpitations,  tachycardia; elevated d-dimer.    Comparison: Chest CT 3/28/2023    Technique: Axial CT images were obtained of the chest after the uneventful intravenous administration of iodinated contrast utilizing pulmonary embolism protocol.  In addition, a 3-D volume rendered image was created for interpretation.  Sagittal and   coronal reconstructions were performed.  Automated exposure control and iterative reconstruction methods were used.      Findings:    Pulmonary arteries:Adequate opacification of the pulmonary arteries. No evidence of acute pulmonary embolism.    Aorta: Unremarkable.    Lungs and Pleura: The lungs are clear. No pleural effusion or pneumothorax. The airways are patent.    Mediastinum/Ammy: No lymphadenopathy. No suspicious mass.    Heart: Normal in size. No pericardial effusion. Normal RV/LV ratio.    Soft Tissue: Unremarkable.      Upper Abdomen: Patient is status post cholecystectomy. There is mild dilation of the common bile duct, most likely due to postcholecystectomy status.    Bones: No acute osseous abnormality.        Impression:      Impression:  1.No evidence of pulmonary embolism.  2.No acute findings in the chest.        Electronically Signed: Matty Mitchell DO    7/28/2025 6:31 PM EDT    Workstation ID: [id number]    XR Chest 1 View [[id number]] Collected: 07/28/25 1812     Updated: 07/28/25 1815    Narrative:      XR CHEST 1 VW    Date of Exam: 7/28/2025 5:18 PM EDT    Indication: Palpitations    Comparison: Chest radiograph 4/8/2025    Findings:      Mediastinum: Cardiac silhouette appears unchanged and enlarged    Lungs: The lungs appear clear without focal consolidation appreciated.    Pleura: No pleural effusion or pneumothorax.    Bones and soft tissues: No acute, displaced fracture seen.        Impression:      Impression:  No radiographic evidence of acute cardiopulmonary abnormality.        Electronically Signed: Juve Shelton MD    7/28/2025 6:13 PM EDT    Workstation ID:  [id number]          reviewed    ECG/EMG Results (most recent)       Procedure Component Value Units Date/Time    Telemetry Scan [[id number]] Resulted: 07/28/25 2125     Updated: 07/28/25 2134    Telemetry Scan [[id number]] Resulted: 07/29/25 0002     Updated: 07/29/25 0120    Telemetry Scan [[id number]] Resulted: 07/29/25 0412     Updated: 07/29/25 0538    Telemetry Scan [[id number]] Resulted: 07/29/25 0713     Updated: 07/29/25 0739    ECG 12 Lead Chest Pain [[id number]] Collected: 07/28/25 1559     Updated: 07/29/25 0833     QT Interval 417 ms      QTC Interval 453 ms     Narrative:      HEART RATE=71  bpm  RR Nbyfyeha=680  ms  KS Gleuxopj=475  ms  P Horizontal Axis=-89  deg  P Front Axis=-87  deg  QRSD Interval=86  ms  QT Clbgwmdr=975  ms  OQpM=895  ms  QRS Axis=-37  deg  T Wave Axis=35  deg  - BORDERLINE ECG -  Sinus or ectopic atrial rhythm  Left ventricular hypertrophy  When compared with ECG of 08-Apr-2025 16:43:47,  Significant change in rhythm: previously sinus  Electronically Signed By: Tucker Mcgraw (Rj) 2025-07-29 08:32:57  Date and Time of Study:2025-07-28 15:59:56    ADULT TRANSTHORACIC ECHO COMPLETE W/ CONT IF NECESSARY PER PROTOCOL [[id number]] Resulted: 07/29/25 0903     Updated: 07/29/25 0903     LV GLOBAL STRAIN  -21.7 %      LVIDd 4.3 cm      LVIDs 2.8 cm      IVSd 1.20 cm      LVPWd 1.20 cm      FS 34.9 %      IVS/LVPW 1.00 cm      ESV(cubed) 22.0 ml      LV Sys Vol (BSA corrected) 15.7 cm2      EDV(cubed) 79.5 ml      LV Cheek Vol (BSA corrected) 44.8 cm2      LV mass(C)d 184.7 grams      LVOT area 2.8 cm2      LVOT diam 1.90 cm      EDV(MOD-sp4) 79.2 ml      ESV(MOD-sp4) 27.8 ml      SV(MOD-sp4) 51.4 ml      SVi(MOD-SP4) 29.1 ml/m2      SVi (LVOT) 44.6 ml/m2      EF(MOD-sp4) 64.9 %      MV E max jude 73.4 cm/sec      MV A max jude 108.0 cm/sec      MV dec time 0.33 sec      MV E/A 0.68     LA ESV Index (BP) 20.6 ml/m2      Med Peak E' Jude 5.6 cm/sec      Lat Peak E' Jude 10.0 cm/sec      TR max jude 218.0  cm/sec      Avg E/e' ratio 9.41     SV(LVOT) 78.8 ml      RVIDd 3.4 cm      TAPSE (>1.6) 2.48 cm      RV S' 13.9 cm/sec      LA dimension (2D)  3.9 cm      LV V1 max 115.0 cm/sec      LV V1 max PG 5.3 mmHg      LV V1 mean PG 3.0 mmHg      LV V1 VTI 27.8 cm      Ao pk edgar 142.0 cm/sec      Ao max PG 8.1 mmHg      Ao mean PG 4.0 mmHg      Ao V2 VTI 34.5 cm      JAMSHID(I,D) 2.28 cm2      Dimensionless Index 0.81 (DI)      MV max PG 6.4 mmHg      MV mean PG 2.00 mmHg      MV V2 VTI 34.8 cm      MV P1/2t 81.9 msec      MVA(P1/2t) 2.7 cm2      MVA(VTI) 2.26 cm2      MV dec slope 327.0 cm/sec2      TR max PG 19.0 mmHg      RVSP(TR) 22.0 mmHg      RAP systole 3.0 mmHg      RV V1 max PG 4.1 mmHg      RV V1 max 101.0 cm/sec      RV V1 VTI 22.4 cm      PA V2 max 132.0 cm/sec      PA acc time 0.07 sec      PI end-d edgar 90.3 cm/sec      ACS 2.00 cm      Sinus 3.0 cm      STJ 2.6 cm      EF(MOD-bp) 65.0 %     Narrative:        Left ventricular systolic function is normal. Left ventricular ejection   fraction appears to be 61 - 65%.    Left ventricular wall thickness is consistent with mild to moderate   concentric hypertrophy.    Left ventricular diastolic function is consistent with (grade I)   impaired relaxation.    Estimated right ventricular systolic pressure from tricuspid   regurgitation is normal (<35 mmHg).      Telemetry Scan [[id number]] Resulted: 07/29/25 1102     Updated: 07/29/25 1121    Telemetry Scan [[id number]] Resulted: 07/29/25 1505     Updated: 07/29/25 1520          reviewed        Results for orders placed during the hospital encounter of 07/28/25    ADULT TRANSTHORACIC ECHO COMPLETE W/ CONT IF NECESSARY PER PROTOCOL    Interpretation Summary    Left ventricular systolic function is normal. Left ventricular ejection fraction appears to be 61 - 65%.    Left ventricular wall thickness is consistent with mild to moderate concentric hypertrophy.    Left ventricular diastolic function is consistent with (grade I)  impaired relaxation.    Estimated right ventricular systolic pressure from tricuspid regurgitation is normal (<35 mmHg).      Microbiology Results (last 10 days)       ** No results found for the last 240 hours. **            Assessment & Plan     Palpitations     Palpitations   Lab Results   Component Value Date    TROPONINT <6 07/29/2025    TROPONINT <6 07/28/2025    TROPONINT <6 04/08/2025   -BNP 91.6  - CT chest showed no evidence of pulmonary embolism or acute findings  -EKG: Sinus rhythm with few PVCs  -D-dimer 0.91  -Telemetry  -Cardiology consulted     Diabetes mellitus with neuropathy   Lab Results   Component Value Date    GLUCOSE 175 (H) 07/29/2025    GLUCOSE 229 (H) 07/28/2025    GLUCOSE 98 05/28/2025    GLUCOSE 228 (H) 04/08/2025   -Hold Janumet  - Sliding scale insulin  -Continue gabapentin  -Diabetic diet  -Monitor before meals and at bedtime    Hypertension  BP Readings from Last 1 Encounters:   07/29/25 141/67   - Continue lisinopril, amlodipine  - Monitor while admitted      I discussed the patients findings and my recommendations with patient.     Discharge Diagnosis:      Palpitations      Hospital Course  Patient is a 75 y.o. female presented with palpitations and shortness of breath at home.  Patient states palpitations have been intermittent for the past few months but worse in the other day.  Patient denies upon exam no chest pain or shortness of breath.  Patient denies dizziness or weakness today.  EKG showed sinus rhythm and no wave changes with occasional PVCs.  CT chest showed no evidence of pulmonary embolism.  Troponins negative.  BMP unremarkable.  Patient states she recently was sick and had steroids and advised to avoid steroids if possible and decrease caffeine intake.  Patient states she drinks about 2 to 3 cups of coffee daily and trying to decrease.  Patient was seen by cardiology which recommended patient stay on current regimen.  Patient to take medication as prescribed and  follow-up with cardiology outpatient.  Testing recommendation reviewed patient family at bedside.  If symptoms worsen patient to call 911 or go to nearest ED.    Past Medical History:     Past Medical History:   Diagnosis Date    CAD 2020    Colon polyp     Congenital heart disease 2017    Depression     DEXA     2019= (-0.7/ -1.6); 2023= (-0.7/ -2.7)    Diabetic peripheral neuropathy     Diverticulosis     GERD     Heart murmur 2017    Hyperlipidemia     Hypertension     Insomnia     Lower back pain     MAMMO     NEG = 2021/ 2023    Obesity     Osteoarthritis     Osteopenia     Osteoporosis     Palpitations     RA     Rheumatoid nodule 06/2011     rt. thumb x 2 and rt. & lt. 3rd fingers (Oct.2012)/ rt. elbow, left elbow, and epidermal inclusion cyst post. neck (June 2011)-----Dr. Whitmore    Skin Cancer 2021    Type 2 diabetes mellitus 1988    Urinary tract infection     Vitamin D deficiency        Past Surgical History:     Past Surgical History:   Procedure Laterality Date    BLADDER SUSPENSION  10/11/2023        BREAST SURGERY  1972    CARDIAC CATHETERIZATION N/A 09/16/2020    Procedure: Left Heart Cath;  Surgeon: Walker Rodriguez MD;  Location: [facility] CATH INVASIVE LOCATION;  Service: Cardiovascular;  Laterality: N/A;    CARDIAC CATHETERIZATION N/A 09/16/2020    Procedure: Coronary angiography;  Surgeon: Walker Rodriguez MD;  Location: [facility] CATH INVASIVE LOCATION;  Service: Cardiovascular;  Laterality: N/A;    CARDIAC CATHETERIZATION  09/16/2020    Procedure: Functional Flow [place];  Surgeon: Kath Medina MD;  Location: [facility] CATH INVASIVE LOCATION;  Service: Cardiology;;    CARPAL TUNNEL RELEASE Right 02/20/2023    Procedure: CARPAL TUNNEL RELEASE WITH POSSIBLE SYNOVECTOMY;  Surgeon: Amadeo Magaña MD;  Location: [facility] MAIN OR;  Service: Orthopedics;  Laterality: Right;    CHOLECYSTECTOMY      COLONOSCOPY      2017 / 2023= TA, rech 2028? [facility]    CYST REMOVAL       Seb cyst on neck    EYE SURGERY      TOTAL ABDOMINAL HYSTERECTOMY WITH SALPINGO OOPHORECTOMY      VAGINAL PROLAPSE REPAIR       Uterine Prolapse repair       Social History:   Social History     Socioeconomic History    Marital status:      Spouse name: Carson Rosa    Number of children: 3    Years of education: GED   Tobacco Use    Smoking status: Never     Passive exposure: Never    Smokeless tobacco: Never   Vaping Use    Vaping status: Never Used   Substance and Sexual Activity    Alcohol use: Not Currently     Comment: 3-4 a year    Drug use: Never    Sexual activity: Not Currently     Partners: Male     Birth control/protection: Vasectomy, Hysterectomy       Procedures Performed         Consults:   Consults       Date and Time Order Name Status Description    7/28/2025  6:49 PM Inpatient Cardiology Consult Completed             Condition on Discharge:     Stable    Discharge Disposition  Home or Self Care    Discharge Medications     Discharge Medications        Changes to Medications        Instructions Start Date   amLODIPine 10 MG tablet  Commonly known as: NORVASC  What changed: additional instructions   10 mg, Oral, Nightly      nitrofurantoin (macrocrystal-monohydrate) 100 MG capsule  Commonly known as: Macrobid  What changed: when to take this   100 mg, Oral, 2 Times Daily             Continue These Medications        Instructions Start Date   acebutolol 200 MG capsule  Commonly known as: SECTRAL   200 mg, Oral, Daily      acetaminophen 325 MG tablet  Commonly known as: TYLENOL   650 mg, Every 6 Hours PRN      albuterol sulfate  (90 Base) MCG/ACT inhaler  Commonly known as: PROVENTIL HFA;VENTOLIN HFA;PROAIR HFA   2 puffs, Inhalation, Every 6 Hours PRN      aspirin 81 MG EC tablet   81 mg, Daily      BEET ROOT PO   1 tablet/day, Daily      BEET ROOT PO   1 tablet, Daily      CALCIUM MAGNESIUM ZINC PO   1 tablet, Daily      Claritin 10 MG tablet  Generic drug: loratadine   10 mg,  Nightly      Combivent Respimat  MCG/ACT inhaler  Generic drug: ipratropium-albuterol       Cranberry 125 MG tablet   1 po qd      Droplet Pen Needles 32G X 4 MM misc  Generic drug: Insulin Pen Needle   USE 1 PEN NEED 2 TIMES A DAY      estradiol 0.1 MG/GM vaginal cream  Commonly known as: ESTRACE       fenofibrate 160 MG tablet   160 mg, Oral, Daily      ferrous sulfate 324 (65 Fe) MG tablet delayed-release EC tablet   324 mg, Daily With Breakfast      folic acid 1 MG tablet  Commonly known as: FOLVITE   1,000 mcg, Oral, Daily      FreeStyle Adelaide 3 Plus Sensor   Not Applicable, Every 15 Days      FreeStyle Adelaide 3 Avilla device   1 package, Not Applicable, Daily      gabapentin 300 MG capsule  Commonly known as: NEURONTIN   300 mg, Oral, 2 Times Daily      HumuLIN 70/30 KwikPen (70-30) 100 UNIT/ML suspension pen-injector  Generic drug: Insulin NPH Isophane & Regular   Inject 40 units subcu half an hour before breakfast and 16 units subcu half an hour before supper.      isosorbide mononitrate 60 MG 24 hr tablet  Commonly known as: IMDUR   60 mg, Oral, Daily With Breakfast      Janumet -1000 MG tablet  Generic drug: SITaglip Phos-metFORMIN HCl ER   1 tablet, Oral, Daily      lactulose 10 GM/15ML solution  Commonly known as: CHRONULAC   20 g, Oral, Nightly PRN      lansoprazole 30 MG capsule  Commonly known as: PREVACID   30 mg, Oral, Daily      lisinopril 40 MG tablet  Commonly known as: PRINIVIL,ZESTRIL   40 mg, Oral, Daily With Breakfast      meclizine 12.5 MG tablet  Commonly known as: ANTIVERT   1/2 - 1 po q6-8hrs prn dizzyness      meloxicam 7.5 MG tablet  Commonly known as: MOBIC   15 mg, Daily      nabumetone 750 MG tablet  Commonly known as: RELAFEN   750 mg, Oral, 2 Times Daily PRN      ondansetron ODT 4 MG disintegrating tablet  Commonly known as: ZOFRAN-ODT   4 mg, Translingual, Every 8 Hours PRN      phenazopyridine 200 MG tablet  Commonly known as: Pyridium   200 mg, Oral, 3 Times Daily  PRN      PROBIOTIC BLEND PO   1 tablet, Daily      traZODone 50 MG tablet  Commonly known as: DESYREL   50 mg, Oral, Every Night at Bedtime      Turmeric 500 MG capsule   1 capsule, Daily      vitamin b complex capsule capsule   1 capsule, Daily      VITAMIN K2-VITAMIN D3 PO   1 tablet, Daily             Stop These Medications      predniSONE 5 MG tablet  Commonly known as: DELTASONE              Discharge Diet:   Diet Instructions       Diet: Cardiac Diets; Low Sodium (2g); Regular (IDDSI 7); Thin (IDDSI 0)      Discharge Diet: Cardiac Diets    Cardiac Diet: Low Sodium (2g)    Texture: Regular (IDDSI 7)    Fluid Consistency: Thin (IDDSI 0)            Activity at Discharge:   Activity Instructions       Activity as Tolerated      Measure Blood Pressure              Follow-up Appointments  Future Appointments   Date Time Provider Department Center   8/5/2025  1:15 PM Emily Burdick DO [facility]   12/2/2025 12:45 PM Joe García MD [facility]   12/18/2025  2:30 PM Dano Rangel MD [facility]     Additional Instructions for the Follow-ups that You Need to Schedule       Ambulatory Referral to Sleep Medicine   As directed      Follow-up needed: Yes        Discharge Follow-up with PCP   As directed       Currently Documented PCP:    Emily Burdick DO    PCP Phone Number:    [phone]     Follow Up Details: 7-10 days                Test Results Pending at Discharge  Pending Results       Procedure [Order ID] Specimen - Date/Time    High Sensitivity Troponin T 1Hr [[id number]]     Specimen: Blood     T3, Free [[id number]] Collected: 07/29/25 0429    Specimen: Blood from Arm, Right Updated: 07/29/25 0907             Risk for Readmission (LACE) Score: 8 (7/29/2025  6:00 AM)          RIN Gomez  07/29/25  15:43 EDT

## 2025-07-29 NOTE — CASE MANAGEMENT/SOCIAL WORK
Discharge Planning Assessment  [facility]     Patient Name: Jenna Rosa  MRN: [id number]  Today's Date: 7/29/2025    Admit Date: 7/28/2025    Plan: From home with alone.   Discharge Needs Assessment       Row Name 07/29/25 1414       Living Environment    People in Home alone    Current Living Arrangements home    Potentially Unsafe Housing Conditions none    In the past 12 months has the electric, gas, oil, or water company threatened to shut off services in your home? No    Primary Care Provided by self    Provides Primary Care For no one    Family Caregiver if Needed child(berta), adult    Family Caregiver Names Daughter- Roxane    Quality of Family Relationships helpful;involved;supportive    Able to Return to Prior Arrangements yes       Resource/Environmental Concerns    Resource/Environmental Concerns none    Transportation Concerns none       Transportation Needs    In the past 12 months, has lack of transportation kept you from medical appointments or from getting medications? no    In the past 12 months, has lack of transportation kept you from meetings, work, or from getting things needed for daily living? No       Food Insecurity    Within the past 12 months, you worried that your food would run out before you got the money to buy more. Never true    Within the past 12 months, the food you bought just didn't last and you didn't have money to get more. Never true       Transition Planning    Patient/Family Anticipates Transition to home    Patient/Family Anticipated Services at Transition none    Transportation Anticipated family or friend will provide       Discharge Needs Assessment    Readmission Within the Last 30 Days no previous admission in last 30 days    Equipment Currently Used at Home bp cuff;glucometer    Concerns to be Addressed discharge planning    Anticipated Changes Related to Illness none    Equipment Needed After Discharge none                   Discharge Plan       Row Name 07/29/25 141        Plan    Plan From home with alone.    Patient/Family in Agreement with Plan yes    Plan Comments Patient lives at home alone. Patient does drive and her daughter, Roxane, will transport at dc. Patient can do ADL. PCP ([facility]) and pharamcy (Tracey) confirmed. Patient wishes to be enrolled in the [facility] program, CM updated this in the chart. Denies financial assistance needs with medications and/or food. Denies PT and/or HH needs. DC barriers: cardio following, cardiac monitoring.                  Continued Care and Services - Admitted Since 7/28/2025    No active coordination exists.       Selected Continued Care - Episodes Includes continued care and service providers with selected services from the active episodes listed below      [facility] Episode start date: 1/11/2023   There are no active outsourced providers for this episode.                 Expected Discharge Date and Time       Expected Discharge Date Expected Discharge Time    Jul 29, 2025            Demographic Summary       Row Name 07/29/25 1412       General Information    Admission Type observation    Arrived From emergency department    Required Notices Provided Observation Status Notice    Referral Source admission list    Reason for Consult discharge planning    Preferred Language English       Contact Information    Permission Granted to Share Info With                    Functional Status       Row Name 07/29/25 1413       Functional Status    Usual Activity Tolerance good    Current Activity Tolerance good       Functional Status, IADL    Medications independent    Meal Preparation independent    Housekeeping independent    Laundry independent    Shopping independent                  Patient Forms       Row Name 07/29/25 1421       Patient Forms    Patient Observation Letter Delivered  FORRESTER given by reg.                  Met with patient in room wearing PPE: mask.    Maintained distance greater than six feet and spent  less than 15 minutes in the room.       Milly Gutierrez RN

## 2025-07-29 NOTE — CONSULTS
CARDIOLOGY CONSULT:    Jenna Rosa  1950  female  [id number]      Referring Provider: Hospitalist  Reason for Consultation: Palpitations    Patient Care Team:  Emily Burdick DO as PCP - General (Family Medicine)  Joe García MD as Consulting Physician (Cardiology)  Dano Rangel MD as Consulting Physician (Endocrinology)  Conchis Mohamud OD as Consulting Physician (Optometry)  Harinder Gresham MD as Consulting Physician (Pain Medicine)  Juan Richey MD as Consulting Physician (Gastroenterology)  Manish Graff MD as Consulting Physician (Urology)    Chief complaint palpitations    Subjective .     History of present illness:  Jenna Rosa is a 75 y.o. female with history of coronary artery disease diabetes and hypertension presented to the hospital with complaints of palpitations.  Patient has been having the symptoms on and off for several months.  Patient does not have any chest pain or shortness of breath at rest or exertion.  No complaint of any PND or orthopnea.  No dizziness syncope or swelling of the feet.  Patient was then seen in the hospital and admitted ruled out for MI by EKG and enzymes.  Patient was on a monitor  Review of Systems   Constitutional: Negative for fever and malaise/fatigue.   HENT:  Negative for ear pain and nosebleeds.    Eyes:  Negative for blurred vision and double vision.   Cardiovascular:  Positive for palpitations. Negative for chest pain and dyspnea on exertion.   Respiratory:  Negative for cough and shortness of breath.    Skin:  Negative for rash.   Musculoskeletal:  Negative for joint pain.   Gastrointestinal:  Negative for abdominal pain, nausea and vomiting.   Neurological:  Negative for focal weakness and headaches.   Psychiatric/Behavioral:  Negative for depression. The patient is not nervous/anxious.    All other systems reviewed and are negative.      History  Past Medical History:   Diagnosis Date    CAD 2020    Colon polyp      Congenital heart disease 2017    Depression     DEXA     2019= (-0.7/ -1.6); 2023= (-0.7/ -2.7)    Diabetic peripheral neuropathy     Diverticulosis     GERD     Heart murmur 2017    Hyperlipidemia     Hypertension     Insomnia     Lower back pain     MAMMO     NEG = 2021/ 2023    Obesity     Osteoarthritis     Osteopenia     Osteoporosis     Palpitations     RA     Rheumatoid nodule 06/2011     rt. thumb x 2 and rt. & lt. 3rd fingers (Oct.2012)/ rt. elbow, left elbow, and epidermal inclusion cyst post. neck (June 2011)-----Dr. Whitmore    Skin Cancer 2021    Type 2 diabetes mellitus 1988    Urinary tract infection     Vitamin D deficiency        Past Surgical History:   Procedure Laterality Date    BLADDER SUSPENSION  10/11/2023        BREAST SURGERY  1972    CARDIAC CATHETERIZATION N/A 09/16/2020    Procedure: Left Heart Cath;  Surgeon: Walker Rodriguez MD;  Location: [facility] CATH INVASIVE LOCATION;  Service: Cardiovascular;  Laterality: N/A;    CARDIAC CATHETERIZATION N/A 09/16/2020    Procedure: Coronary angiography;  Surgeon: Walker Rodriguez MD;  Location: [facility] CATH INVASIVE LOCATION;  Service: Cardiovascular;  Laterality: N/A;    CARDIAC CATHETERIZATION  09/16/2020    Procedure: Functional Flow Rose;  Surgeon: Kath Medina MD;  Location: [facility] CATH INVASIVE LOCATION;  Service: Cardiology;;    CARPAL TUNNEL RELEASE Right 02/20/2023    Procedure: CARPAL TUNNEL RELEASE WITH POSSIBLE SYNOVECTOMY;  Surgeon: Amadeo Magaña MD;  Location: [facility] MAIN OR;  Service: Orthopedics;  Laterality: Right;    CHOLECYSTECTOMY      COLONOSCOPY      2017 / 2023= TA, rech 2028? [facility]    CYST REMOVAL      Seb cyst on neck    EYE SURGERY      TOTAL ABDOMINAL HYSTERECTOMY WITH SALPINGO OOPHORECTOMY      VAGINAL PROLAPSE REPAIR       Uterine Prolapse repair       Family History   Problem Relation Age of Onset    Heart disease Mother     Diabetes Mother     Hypertension Mother      Rheum arthritis Mother     Coronary artery disease Mother     Alcohol abuse Mother     Heart failure Mother     Heart disease Father     Mental illness Father     Hypertension Father     Alcohol abuse Father     Early death Father         Heart disease    Heart attack Father     Heart failure Father     Kidney cancer Sister     Stroke Sister     Osteoarthritis Sister     Rheum arthritis Sister     Hypertension Sister     Arthritis Sister     Diabetes Sister     Stroke Sister     Hypertension Sister     Rheum arthritis Sister     Osteoarthritis Sister     Diabetes Sister     Arthritis Sister     Liver disease Sister     Kidney disease Sister     No Known Problems Brother     Rheum arthritis Brother     Osteoarthritis Brother     Hypertension Brother     Arthritis Brother     Diabetes Maternal Grandfather     Cancer Sister     Cancer Sister     Early death Sister         Cancer    Stroke Sister     Early death Sister     Thyroid disease Daughter     Early death Brother     Hypertension Sister     Arthritis Sister     Diabetes Sister     Liver disease Sister        Social History     Tobacco Use    Smoking status: Never     Passive exposure: Never    Smokeless tobacco: Never   Vaping Use    Vaping status: Never Used   Substance Use Topics    Alcohol use: Not Currently     Comment: 3-4 a year    Drug use: Never        Medications Prior to Admission   Medication Sig Dispense Refill Last Dose/Taking    acebutolol (SECTRAL) 200 MG capsule TAKE 1 CAPSULE BY MOUTH DAILY 90 capsule 0 7/27/2025    acetaminophen (TYLENOL) 325 MG tablet Take 2 tablets by mouth Every 6 (Six) Hours As Needed for Mild Pain.   7/28/2025    albuterol sulfate  (90 Base) MCG/ACT inhaler Inhale 2 puffs Every 6 (Six) Hours As Needed for Shortness of Air (cough). 18 g 0 Past Month    amLODIPine (NORVASC) 10 MG tablet TAKE ONE TABLET BY MOUTH ONCE NIGHTLY (Patient taking differently: Take 1 tablet by mouth Every Night. PT TAKES IN AM) 90 tablet 0  7/28/2025    aspirin 81 MG EC tablet Take 1 tablet by mouth Daily.   7/28/2025    B Complex Vitamins (vitamin b complex) capsule capsule Take 1 capsule by mouth Daily.   7/28/2025    Continuous Glucose  (FreeStyle [place] 3 [place]) device Use 1 package Daily. 1 each 3 7/28/2025    Continuous Glucose Sensor (FreeStyle Adelaide 3 Plus Sensor) Use Every 15 (Fifteen) Days. 6 each 3 7/28/2025    Cranberry 125 MG tablet 1 po qd   7/27/2025    estradiol (ESTRACE) 0.1 MG/GM vaginal cream    Past Week    fenofibrate 160 MG tablet TAKE 1 TABLET BY MOUTH DAILY 90 tablet 0 Past Month    ferrous sulfate 324 (65 Fe) MG tablet delayed-release EC tablet Take 1 tablet by mouth Daily With Breakfast.   Past Month    folic acid (FOLVITE) 1 MG tablet Take 1 tablet by mouth Daily. 90 tablet 0 7/28/2025    gabapentin (NEURONTIN) 300 MG capsule Take 1 capsule by mouth 2 (Two) Times a Day. 180 capsule 0 7/28/2025    Insulin NPH Isophane & Regular (HumuLIN 70/30 KwikPen) (70-30) 100 UNIT/ML suspension pen-injector Inject 40 units subcu half an hour before breakfast and 16 units subcu half an hour before supper. 30 mL 5 7/28/2025    Insulin Pen Needle (Droplet Pen Needles) 32G X 4 MM misc USE 1 PEN NEED 2 TIMES A  each 3 7/28/2025    ipratropium-albuterol (Combivent Respimat)  MCG/ACT inhaler    Past Month    isosorbide mononitrate (IMDUR) 60 MG 24 hr tablet TAKE 1 TABLET BY MOUTH DAILY WITH BREAKFAST 90 tablet 0 7/28/2025    Janumet -1000 MG tablet TAKE 1 TABLET BY MOUTH DAILY 90 tablet 0 7/28/2025    lactulose (CHRONULAC) 10 GM/15ML solution Take 30 mL by mouth At Night As Needed (constipation). 237 mL 2 Past Month    lansoprazole (PREVACID) 30 MG capsule TAKE 1 CAPSULE BY MOUTH DAILY 90 capsule 1 7/28/2025    lisinopril (PRINIVIL,ZESTRIL) 40 MG tablet TAKE 1 TABLET BY MOUTH DAILY WITH BREAKFAST 90 tablet 0 7/28/2025    loratadine (Claritin) 10 MG tablet Take 1 tablet by mouth Every Night.   7/27/2025    meclizine  (ANTIVERT) 12.5 MG tablet 1/2 - 1 po q6-8hrs prn dizzyness 20 tablet 0 7/28/2025    meloxicam (MOBIC) 7.5 MG tablet Take 2 tablets by mouth Daily.   7/28/2025    Misc Natural Products (BEET ROOT PO) Take 1 tablet/day by mouth Daily.   7/28/2025    Misc Natural Products (BEET ROOT PO) Take 1 tablet by mouth Daily.   7/28/2025    nitrofurantoin, macrocrystal-monohydrate, (Macrobid) 100 MG capsule Take 1 capsule by mouth 2 (Two) Times a Day. (Patient taking differently: Take 1 capsule by mouth Daily.) 28 capsule 0 7/27/2025    ondansetron ODT (ZOFRAN-ODT) 4 MG disintegrating tablet Place 1 tablet on the tongue Every 8 (Eight) Hours As Needed for Nausea. 30 tablet 0 Past Month    phenazopyridine (Pyridium) 200 MG tablet Take 1 tablet by mouth 3 (Three) Times a Day As Needed for Bladder Spasms or Dysuria. 20 tablet 0 Past Month    predniSONE (DELTASONE) 5 MG tablet Take 1 tablet by mouth Daily. 30 tablet 0 7/27/2025    Probiotic Product (PROBIOTIC BLEND PO) Take 1 tablet by mouth Daily.   7/28/2025    traZODone (DESYREL) 50 MG tablet Take 1 tablet by mouth every night at bedtime. 90 tablet 1 7/27/2025    Vitamin D-Vitamin K (VITAMIN K2-VITAMIN D3 PO) Take 1 tablet by mouth Daily.   7/27/2025    CALCIUM MAGNESIUM ZINC PO Take 1 tablet by mouth Daily.   Unknown    nabumetone (RELAFEN) 750 MG tablet Take 1 tablet by mouth 2 (Two) Times a Day As Needed for Mild Pain. 60 tablet 0 More than a month    Turmeric 500 MG capsule Take 1 capsule by mouth Daily.   Unknown       Allergies: Jardiance [empagliflozin] and Metformin    Scheduled Meds:amLODIPine, 10 mg, Oral, Nightly  aspirin, 81 mg, Oral, Daily  cetirizine, 10 mg, Oral, Daily  fenofibrate, 145 mg, Oral, Daily  ferrous sulfate, 325 mg, Oral, Daily With Breakfast  folic acid, 1,000 mcg, Oral, Daily  gabapentin, 300 mg, Oral, BID  insulin lispro protamine-insulin lispro, 16 Units, Subcutaneous, Daily With Dinner  insulin lispro protamine-insulin lispro, 40 Units,  "Subcutaneous, Daily With Breakfast  [Held by provider] isosorbide mononitrate, 60 mg, Oral, Daily With Breakfast  lisinopril, 40 mg, Oral, Daily With Breakfast  meloxicam, 15 mg, Oral, Daily  pantoprazole, 40 mg, Oral, Q AM  sodium chloride, 10 mL, Intravenous, Q12H  traZODone, 50 mg, Oral, Nightly      Continuous Infusions:   PRN Meds:.  acetaminophen    albuterol    senna-docusate sodium **AND** polyethylene glycol **AND** bisacodyl **AND** bisacodyl    Calcium Replacement - Follow Nurse / BPA Driven Protocol    dextrose    dextrose    droperidol    glucagon (human recombinant)    ipratropium-albuterol    lactulose    Magnesium Standard Dose Replacement - Follow Nurse / BPA Driven Protocol    meclizine    Morphine    nitroglycerin    ondansetron    Phosphorus Replacement - Follow Nurse / BPA Driven Protocol    Potassium Replacement - Follow Nurse / BPA Driven Protocol    [COMPLETED] Insert Peripheral IV **AND** sodium chloride    sodium chloride    sodium chloride    Objective     VITAL SIGNS  Vitals:    07/28/25 2348 07/29/25 0342 07/29/25 0813 07/29/25 1144   BP: 153/70 160/74 154/71 141/67   BP Location:  Right arm Right arm Right arm   Patient Position: Lying Lying Lying Lying   Pulse: 63  66 58   Resp: 13 12 16 16   Temp: 98.2 °F (36.8 °C) 97.7 °F (36.5 °C) 97.8 °F (36.6 °C) 97.8 °F (36.6 °C)   TempSrc: Oral Oral Oral Oral   SpO2: 97%  96% 96%   Weight:       Height:           Flowsheet Rows      Flowsheet Row First Filed Value   Admission Height 160 cm (63\") Documented at 07/28/2025 1607   Admission Weight 73.6 kg (162 lb 4.1 oz) Documented at 07/28/2025 1607             TELEMETRY: Sinus rhythm    Physical Exam:  Constitutional:       Appearance: Well-developed.   Eyes:      General: No scleral icterus.     Conjunctiva/sclera: Conjunctivae normal.      Pupils: Pupils are equal, round, and reactive to light.   HENT:      Head: Normocephalic and atraumatic.   Neck:      Vascular: No carotid bruit or JVD. "   Pulmonary:      Effort: Pulmonary effort is normal.      Breath sounds: Normal breath sounds. No wheezing. No rales.   Cardiovascular:      Normal rate. Regular rhythm.   Pulses:     Intact distal pulses.   Abdominal:      General: Bowel sounds are normal.      Palpations: Abdomen is soft.   Musculoskeletal: Normal range of motion.      Cervical back: Normal range of motion and neck supple. Skin:     General: Skin is warm and dry.      Findings: No rash.   Neurological:      Mental Status: Alert.      Comments: No focal deficits          Results Review:   I reviewed the patient's new clinical results.  Lab Results (last 24 hours)       Procedure Component Value Units Date/Time    POC Glucose Once [[id number]]  (Abnormal) Collected: 07/29/25 1145    Specimen: Blood Updated: 07/29/25 1147     Glucose 170 mg/dL      Comment: Serial Number: [id number]:  [id number]       T4, Free [[id number]]  (Normal) Collected: 07/29/25 0429    Specimen: Blood from Arm, Right Updated: 07/29/25 0928     Free T4 1.03 ng/dL     Lipid Panel [[id number]]  (Abnormal) Collected: 07/29/25 0429    Specimen: Blood from Arm, Right Updated: 07/29/25 0928     Total Cholesterol 190 mg/dL      Triglycerides 180 mg/dL      HDL Cholesterol 48 mg/dL      LDL Cholesterol  111 mg/dL      VLDL Cholesterol 31 mg/dL      LDL/HDL Ratio 2.21    Narrative:      Cholesterol Reference Ranges  (U.S. Department of Health and Human Services ATP III Classifications)    Desirable          <200 mg/dL  Borderline High    200-239 mg/dL  High Risk          >240 mg/dL      Triglyceride Reference Ranges  (U.S. Department of Health and Human Services ATP III Classifications)    Normal           <150 mg/dL  Borderline High  150-199 mg/dL  High             200-499 mg/dL  Very High        >500 mg/dL    HDL Reference Ranges  (U.S. Department of Health and Human Services ATP III Classifications)    Low     <40 mg/dl (major risk factor for CHD)  High    >60 mg/dl ('negative'  risk factor for CHD)        LDL Reference Ranges  (U.S. Department of Health and Human Services ATP III Classifications)    Optimal          <100 mg/dL  Near Optimal     100-129 mg/dL  Borderline High  130-159 mg/dL  High             160-189 mg/dL  Very High        >189 mg/dL    LDL is calculated using the NIH LDL-C calculation.      T3, Free [[id number]] Collected: 07/29/25 0429    Specimen: Blood from Arm, Right Updated: 07/29/25 0907    POC Glucose Once [[id number]]  (Abnormal) Collected: 07/29/25 0814    Specimen: Blood Updated: 07/29/25 0818     Glucose 203 mg/dL      Comment: Serial Number: [id number]:  [id number]       Comprehensive Metabolic Panel [[id number]]  (Abnormal) Collected: 07/29/25 0429    Specimen: Blood from Arm, Right Updated: 07/29/25 0559     Glucose 175 mg/dL      BUN 10.5 mg/dL      Creatinine 0.54 mg/dL      Sodium 138 mmol/L      Potassium 4.4 mmol/L      Chloride 101 mmol/L      CO2 25.1 mmol/L      Calcium 9.7 mg/dL      Total Protein 6.9 g/dL      Albumin 3.9 g/dL      ALT (SGPT) 8 U/L      AST (SGOT) 12 U/L      Alkaline Phosphatase 80 U/L      Total Bilirubin 0.2 mg/dL      Globulin 3.0 gm/dL      A/G Ratio 1.3 g/dL      BUN/Creatinine Ratio 19.4     Anion Gap 11.9 mmol/L      eGFR 96.7 mL/min/1.73     Narrative:      GFR Categories in Chronic Kidney Disease (CKD)              GFR Category          GFR (mL/min/1.73)    Interpretation  G1                    90 or greater        Normal or high (1)  G2                    60-89                Mild decrease (1)  G3a                   45-59                Mild to moderate decrease  G3b                   30-44                Moderate to severe decrease  G4                    15-29                Severe decrease  G5                    14 or less           Kidney failure    (1)In the absence of evidence of kidney disease, neither GFR category G1 or G2 fulfill the criteria for CKD.    eGFR calculation 2021 CKD-EPI creatinine equation, which  does not include race as a factor    High Sensitivity Troponin T [[id number]]  (Normal) Collected: 07/29/25 0429    Specimen: Blood from Arm, Right Updated: 07/29/25 0559     HS Troponin T <6 ng/L     Narrative:      High Sensitive Troponin T Reference Range:  <14.0 ng/L- Negative Female for AMI  <22.0 ng/L- Negative Male for AMI  >=14 - Abnormal Female indicating possible myocardial injury.  >=22 - Abnormal Male indicating possible myocardial injury.   Clinicians would have to utilize clinical acumen, EKG, Troponin, and serial changes to determine if it is an Acute Myocardial Infarction or myocardial injury due to an underlying chronic condition.         CBC Auto Differential [[id number]]  (Normal) Collected: 07/29/25 0429    Specimen: Blood from Arm, Right Updated: 07/29/25 0529     WBC 7.04 10*3/mm3      RBC 4.27 10*6/mm3      Hemoglobin 13.4 g/dL      Hematocrit 41.1 %      MCV 96.3 fL      MCH 31.4 pg      MCHC 32.6 g/dL      RDW 13.4 %      RDW-SD 47.7 fl      MPV 9.4 fL      Platelets 306 10*3/mm3      Neutrophil % 54.7 %      Lymphocyte % 32.2 %      Monocyte % 8.4 %      Eosinophil % 4.0 %      Basophil % 0.4 %      Immature Grans % 0.3 %      Neutrophils, Absolute 3.85 10*3/mm3      Lymphocytes, Absolute 2.27 10*3/mm3      Monocytes, Absolute 0.59 10*3/mm3      Eosinophils, Absolute 0.28 10*3/mm3      Basophils, Absolute 0.03 10*3/mm3      Immature Grans, Absolute 0.02 10*3/mm3      nRBC 0.0 /100 WBC     Urinalysis With Culture If Indicated - Urine, Clean Catch [[id number]]  (Abnormal) Collected: 07/28/25 1731    Specimen: Urine, Clean Catch Updated: 07/28/25 1744     Color, UA Yellow     Appearance, UA Clear     pH, UA 7.0     Specific Gravity, UA 1.012     Glucose,  mg/dL (1+)     Ketones, UA Negative     Bilirubin, UA Negative     Blood, UA Negative     Protein, UA Negative     Leuk Esterase, UA Negative     Nitrite, UA Negative     Urobilinogen, UA 0.2 E.U./dL    Narrative:      In absence of  clinical symptoms, the presence of pyuria, bacteria, and/or nitrites on the urinalysis result does not correlate with infection.  Urine microscopic not indicated.    TSH Rfx On Abnormal To Free T4 [[id number]]  (Normal) Collected: 07/28/25 1642    Specimen: Blood Updated: 07/28/25 1721     TSH 1.300 uIU/mL     Comprehensive Metabolic Panel [[id number]]  (Abnormal) Collected: 07/28/25 1642    Specimen: Blood Updated: 07/28/25 1721     Glucose 229 mg/dL      BUN 13.8 mg/dL      Creatinine 0.71 mg/dL      Sodium 134 mmol/L      Potassium 4.1 mmol/L      Chloride 98 mmol/L      CO2 24.5 mmol/L      Calcium 9.4 mg/dL      Total Protein 7.1 g/dL      Albumin 4.1 g/dL      ALT (SGPT) 10 U/L      AST (SGOT) 13 U/L      Alkaline Phosphatase 79 U/L      Total Bilirubin <0.2 mg/dL      Globulin 3.0 gm/dL      A/G Ratio 1.4 g/dL      BUN/Creatinine Ratio 19.4     Anion Gap 11.5 mmol/L      eGFR 89.4 mL/min/1.73     Narrative:      GFR Categories in Chronic Kidney Disease (CKD)              GFR Category          GFR (mL/min/1.73)    Interpretation  G1                    90 or greater        Normal or high (1)  G2                    60-89                Mild decrease (1)  G3a                   45-59                Mild to moderate decrease  G3b                   30-44                Moderate to severe decrease  G4                    15-29                Severe decrease  G5                    14 or less           Kidney failure    (1)In the absence of evidence of kidney disease, neither GFR category G1 or G2 fulfill the criteria for CKD.    eGFR calculation 2021 CKD-EPI creatinine equation, which does not include race as a factor    High Sensitivity Troponin T [[id number]]  (Normal) Collected: 07/28/25 1642    Specimen: Blood Updated: 07/28/25 1721     HS Troponin T <6 ng/L     Narrative:      High Sensitive Troponin T Reference Range:  <14.0 ng/L- Negative Female for AMI  <22.0 ng/L- Negative Male for AMI  >=14 - Abnormal Female  indicating possible myocardial injury.  >=22 - Abnormal Male indicating possible myocardial injury.   Clinicians would have to utilize clinical acumen, EKG, Troponin, and serial changes to determine if it is an Acute Myocardial Infarction or myocardial injury due to an underlying chronic condition.         BNP [[id number]]  (Normal) Collected: 07/28/25 1642    Specimen: Blood Updated: 07/28/25 1721     proBNP 91.6 pg/mL     Narrative:      This assay is used as an aid in the diagnosis of individuals suspected of having heart failure. It can be used as an aid in the diagnosis of acute decompensated heart failure (ADHF) in patients presenting with signs and symptoms of ADHF to the emergency department (ED). In addition, NT-proBNP of <300 pg/mL indicates ADHF is not likely.    Age Range Result Interpretation  NT-proBNP Concentration (pg/mL:      <50             Positive            >450                   Gray                 300-450                    Negative             <300    50-75           Positive            >900                  Gray                300-900                  Negative            <300      >75             Positive            >1800                  Gray                300-1800                  Negative            <300    Phosphorus [[id number]]  (Normal) Collected: 07/28/25 1642    Specimen: Blood Updated: 07/28/25 1721     Phosphorus 3.4 mg/dL     Magnesium [[id number]]  (Normal) Collected: 07/28/25 1642    Specimen: Blood Updated: 07/28/25 1721     Magnesium 1.7 mg/dL     D-dimer, Quantitative [[id number]]  (Abnormal) Collected: 07/28/25 1642    Specimen: Blood Updated: 07/28/25 1715     D-Dimer, Quantitative 0.90 MCGFEU/mL     Narrative:      According to the assay 's published package insert, a normal (<0.50 MCGFEU/mL) D-dimer result in conjunction with a non-high clinical probability assessment, excludes deep vein thrombosis (DVT) and pulmonary embolism (PE) with high  "sensitivity.    D-dimer values increase with age and this can make VTE exclusion of an older population difficult. To address this, the [employer], based on best available evidence and recent guidelines, recommends that clinicians use age-adjusted D-dimer thresholds in patients greater than 50 years of age with: a) a low probability of PE who do not meet all Pulmonary Embolism Rule Out Criteria, or b) in those with intermediate probability of PE.   The formula for an age-adjusted D-dimer cut-off is \"age/100\".  For example, a 60 year old patient would have an age-adjusted cut-off of 0.60 MCGFEU/mL and an 80 year old 0.80 MCGFEU/mL.    CBC & Differential [[id number]]  (Normal) Collected: 07/28/25 1642    Specimen: Blood Updated: 07/28/25 1703    Narrative:      The following orders were created for panel order CBC & Differential.  Procedure                               Abnormality         Status                     ---------                               -----------         ------                     CBC Auto Differential[[id number]]        Normal              Final result                 Please view results for these tests on the individual orders.    CBC Auto Differential [[id number]]  (Normal) Collected: 07/28/25 1642    Specimen: Blood Updated: 07/28/25 1703     WBC 8.26 10*3/mm3      RBC 4.16 10*6/mm3      Hemoglobin 13.1 g/dL      Hematocrit 40.0 %      MCV 96.2 fL      MCH 31.5 pg      MCHC 32.8 g/dL      RDW 13.4 %      RDW-SD 47.7 fl      MPV 9.2 fL      Platelets 321 10*3/mm3      Neutrophil % 65.7 %      Lymphocyte % 23.6 %      Monocyte % 7.9 %      Eosinophil % 1.9 %      Basophil % 0.5 %      Immature Grans % 0.4 %      Neutrophils, Absolute 5.43 10*3/mm3      Lymphocytes, Absolute 1.95 10*3/mm3      Monocytes, Absolute 0.65 10*3/mm3      Eosinophils, Absolute 0.16 10*3/mm3      Basophils, Absolute 0.04 10*3/mm3      Immature Grans, Absolute 0.03 10*3/mm3      nRBC 0.0 /100 WBC     " [place] Draw [[id number]] Collected: 07/28/25 1642    Specimen: Blood Updated: 07/28/25 1645    Narrative:      The following orders were created for panel order [place] Draw.  Procedure                               Abnormality         Status                     ---------                               -----------         ------                     Green Top (Gel)[[id number]]                                  Final result               Lavender Top[[id number]]                                     Final result               Gold Top - SST[[id number]]                                   Final result               Light Blue Top[[id number]]                                   Final result                 Please view results for these tests on the individual orders.    Green Top (Gel) [[id number]] Collected: 07/28/25 1642    Specimen: Blood Updated: 07/28/25 1645     Extra Tube Hold for add-ons.     Comment: Auto resulted.       Lavender Top [[id number]] Collected: 07/28/25 1642    Specimen: Blood Updated: 07/28/25 1645     Extra Tube hold for add-on     Comment: Auto resulted       Gold Top - SST [[id number]] Collected: 07/28/25 1642    Specimen: Blood Updated: 07/28/25 1645     Extra Tube Hold for add-ons.     Comment: Auto resulted.       Light Blue Top [[id number]] Collected: 07/28/25 1642    Specimen: Blood Updated: 07/28/25 1645     Extra Tube Hold for add-ons.     Comment: Auto resulted               Imaging Results (Last 24 Hours)       Procedure Component Value Units Date/Time    CT Angiogram Chest Pulmonary Embolism [[id number]] Collected: 07/28/25 1827     Updated: 07/28/25 1833    Narrative:      CT ANGIOGRAM CHEST PULMONARY EMBOLISM    Date of Exam: 7/28/2025 5:43 PM EDT    Indication: palpitations, tachycardia; elevated d-dimer.    Comparison: Chest CT 3/28/2023    Technique: Axial CT images were obtained of the chest after the uneventful intravenous administration of iodinated contrast utilizing pulmonary embolism  protocol.  In addition, a 3-D volume rendered image was created for interpretation.  Sagittal and   coronal reconstructions were performed.  Automated exposure control and iterative reconstruction methods were used.      Findings:    Pulmonary arteries:Adequate opacification of the pulmonary arteries. No evidence of acute pulmonary embolism.    Aorta: Unremarkable.    Lungs and Pleura: The lungs are clear. No pleural effusion or pneumothorax. The airways are patent.    Mediastinum/Ammy: No lymphadenopathy. No suspicious mass.    Heart: Normal in size. No pericardial effusion. Normal RV/LV ratio.    Soft Tissue: Unremarkable.      Upper Abdomen: Patient is status post cholecystectomy. There is mild dilation of the common bile duct, most likely due to postcholecystectomy status.    Bones: No acute osseous abnormality.        Impression:      Impression:  1.No evidence of pulmonary embolism.  2.No acute findings in the chest.        Electronically Signed: Matty Mitchell DO    7/28/2025 6:31 PM EDT    Workstation ID: [id number]    XR Chest 1 View [[id number]] Collected: 07/28/25 1812     Updated: 07/28/25 1815    Narrative:      XR CHEST 1 VW    Date of Exam: 7/28/2025 5:18 PM EDT    Indication: Palpitations    Comparison: Chest radiograph 4/8/2025    Findings:      Mediastinum: Cardiac silhouette appears unchanged and enlarged    Lungs: The lungs appear clear without focal consolidation appreciated.    Pleura: No pleural effusion or pneumothorax.    Bones and soft tissues: No acute, displaced fracture seen.        Impression:      Impression:  No radiographic evidence of acute cardiopulmonary abnormality.        Electronically Signed: Juve Shelton MD    7/28/2025 6:13 PM EDT    Workstation ID: [id number]            EKG      I personally viewed and interpreted the patient's EKG/Telemetry data:    ECHOCARDIOGRAM:  Results for orders placed during the hospital encounter of 07/28/25    ADULT TRANSTHORACIC ECHO COMPLETE  W/ CONT IF NECESSARY PER PROTOCOL 07/29/2025 0903    Interpretation Summary    Left ventricular systolic function is normal. Left ventricular ejection fraction appears to be 61 - 65%.    Left ventricular wall thickness is consistent with mild to moderate concentric hypertrophy.    Left ventricular diastolic function is consistent with (grade I) impaired relaxation.    Estimated right ventricular systolic pressure from tricuspid regurgitation is normal (<35 mmHg).         STRESS MYOVIEW:  Results for orders placed during the hospital encounter of 08/18/23    Stress Test With Myocardial Perfusion - One Day 08/18/2023 1530    Interpretation Summary    Left ventricular ejection fraction is hyperdynamic (Calculated EF > 70%).    Myocardial perfusion imaging indicates a normal myocardial perfusion study with no evidence of ischemia.    Impressions are consistent with a low risk study.       CARDIAC CATHETERIZATION:    Results for orders placed during the hospital encounter of 09/16/20    Cardiac Catheterization/Vascular Study    Narrative  Table formatting from the original result was not included.  Percutaneous Coronary Revascularization Report    Jenna ARUN Moe  [id number]  9/16/2020  @PCP@    Indications for the procedure include: abnormal stress test.    Procedure Details:  The risks, benefits, complications, treatment options, and expected outcomes were discussed with the patient. The patient and/or family concurred with the proposed plan, giving informed consent prior to diagnostic catheterization.    After informed consentShe was brought to the cath lab and diagnostic catheterization was performed by Dr. Figueroa.  Complete diagnostic catheterization findings will be discussed in their operative report.    The decision was then made to proceed with intervention. She received Heparin as per protocol.  The interventional procedure is as follows:    For the IFR evaluation of the LAD we used a XB 3.5 guide catheter with a  Volcano IFR pressure wire to measure the IFR value of the mid LAD lesion.  Procedure anticoagulation was heparin patient tolerated procedure well with no mucosal complication plan to obtain hemostasis by using Angio-Seal closure device.    After the procedure was completed, sedation was stopped and the sheaths and catheters were all removed according to established hospital protocols.    Findings:    Interventions  IFR evaluation of the mid LAD  Target Vessel  mid LAD  Pre % Stenosis 50  Post % Stenosis  0  Pre-procedure TONI flow  3  Post procedure TONI flow  3  Closure Device  none  Complications  none    Estimated Blood Loss:  Minimal    Complications:  None; patient tolerated the procedure well.    Disposition: PACU - hemodynamically stable.    Condition: stable    Impressions:  IFR evaluation of the mid LAD showing IFR value of 0.93 suggestive of no physiologically significant stenosis involving the mid LAD    Recommendations:  Medical management for obstructive coronary artery disease  Test results discussed with the patient and family       OTHER:         MDM      Palpitations  Patient had Holter monitor couple of times which only showed sinus rhythm with occasional PACs and PVCs and no other arrhythmias and hence will not do any further cardiac workup  Patient has normal LV systolic function.  Patient is advised to decrease her caffeine intake and also avoid steroids which she is on occasionally for her lungs    Coronary artery disease  Patient had a 50% disease in the LAD and is currently stable without any angina and has normal LV systolic function  Patient is currently on aspirin amlodipine      Hypertension  Patient is currently stable on medical therapy including amlodipine 5 mg and isosorbide 30 mg once a day    Diabetes  Patient is on oral medicines and followed by primary care doctor.        Joe García MD  07/29/25  13:37 EDT

## 2025-07-29 NOTE — OUTREACH NOTE
Prep Survey      Flowsheet Row Responses   Spiritism [facility] patient discharged from? [facility]   Is LACE score < 7 ? No   Eligibility [facility]   Date of Admission 07/28/25   Date of Discharge 07/29/25   Discharge Disposition Home or Self Care   Discharge diagnosis Palpitations   Does the patient have one of the following disease processes/diagnoses(primary or secondary)? Other   Does the patient have Home health ordered? No   Is there a DME ordered? No   Prep survey completed? Yes            Aniya ANGEL - Registered Nurse

## 2025-07-29 NOTE — CASE MANAGEMENT/SOCIAL WORK
Case Management Discharge Note      Final Note: Home.     Transportation Services  Transportation: Private Transportation  Private: Car    Final Discharge Disposition Code: 01 - home or self-care

## 2025-07-30 ENCOUNTER — TRANSITIONAL CARE MANAGEMENT TELEPHONE ENCOUNTER (OUTPATIENT)
Dept: CALL CENTER | Facility: HOSPITAL | Age: 75
End: 2025-07-30
Payer: MEDICARE

## 2025-07-30 DIAGNOSIS — E11.65 TYPE 2 DIABETES MELLITUS WITH HYPERGLYCEMIA, WITH LONG-TERM CURRENT USE OF INSULIN: ICD-10-CM

## 2025-07-30 DIAGNOSIS — Z79.4 TYPE 2 DIABETES MELLITUS WITH HYPERGLYCEMIA, WITH LONG-TERM CURRENT USE OF INSULIN: ICD-10-CM

## 2025-07-30 RX ORDER — PEN NEEDLE, DIABETIC 32GX 5/32"
NEEDLE, DISPOSABLE MISCELLANEOUS
Qty: 100 EACH | Refills: 3 | Status: SHIPPED | OUTPATIENT
Start: 2025-07-30

## 2025-07-30 RX ORDER — LANSOPRAZOLE 30 MG/1
30 CAPSULE, DELAYED RELEASE ORAL DAILY
Qty: 90 CAPSULE | Refills: 0 | Status: SHIPPED | OUTPATIENT
Start: 2025-07-30

## 2025-07-31 ENCOUNTER — TELEPHONE (OUTPATIENT)
Dept: CARDIOLOGY | Facility: CLINIC | Age: 75
End: 2025-07-31
Payer: MEDICARE

## 2025-07-31 NOTE — TELEPHONE ENCOUNTER
Called patient to advise that she had a full cardiac workup in the hospital and she was cleared from a cardiac standpoint. I advised patient that she can contact her PCP if she needs to be seen tomorrow; otherwise, we will see her in the office on Monday for further review. Patient voiced her full understanding.

## 2025-07-31 NOTE — TELEPHONE ENCOUNTER
At [facility] on 7/28 for palpitations. Dr. García wanted patient to follow up in one month, but she did not want to wait that long.  She is having palpitations again and does not know what to do.  I did schedule follow up 8/4 with Dr. García, but patient would like call back.

## 2025-08-04 ENCOUNTER — OFFICE VISIT (OUTPATIENT)
Dept: CARDIOLOGY | Facility: CLINIC | Age: 75
End: 2025-08-04
Payer: MEDICARE

## 2025-08-04 VITALS
BODY MASS INDEX: 28.53 KG/M2 | OXYGEN SATURATION: 97 % | HEIGHT: 63 IN | DIASTOLIC BLOOD PRESSURE: 82 MMHG | WEIGHT: 161 LBS | HEART RATE: 64 BPM | SYSTOLIC BLOOD PRESSURE: 164 MMHG

## 2025-08-04 DIAGNOSIS — I25.10 CORONARY ARTERY DISEASE INVOLVING NATIVE CORONARY ARTERY OF NATIVE HEART WITHOUT ANGINA PECTORIS: Primary | ICD-10-CM

## 2025-08-04 DIAGNOSIS — I10 ESSENTIAL HYPERTENSION: ICD-10-CM

## 2025-08-04 DIAGNOSIS — E11.65 TYPE 2 DIABETES MELLITUS WITH HYPERGLYCEMIA, WITH LONG-TERM CURRENT USE OF INSULIN: ICD-10-CM

## 2025-08-04 DIAGNOSIS — E78.00 PURE HYPERCHOLESTEROLEMIA: ICD-10-CM

## 2025-08-04 DIAGNOSIS — Z79.4 TYPE 2 DIABETES MELLITUS WITH HYPERGLYCEMIA, WITH LONG-TERM CURRENT USE OF INSULIN: ICD-10-CM

## 2025-08-04 PROCEDURE — 3079F DIAST BP 80-89 MM HG: CPT | Performed by: INTERNAL MEDICINE

## 2025-08-04 PROCEDURE — 1160F RVW MEDS BY RX/DR IN RCRD: CPT | Performed by: INTERNAL MEDICINE

## 2025-08-04 PROCEDURE — 99214 OFFICE O/P EST MOD 30 MIN: CPT | Performed by: INTERNAL MEDICINE

## 2025-08-04 PROCEDURE — 3077F SYST BP >= 140 MM HG: CPT | Performed by: INTERNAL MEDICINE

## 2025-08-04 PROCEDURE — 1159F MED LIST DOCD IN RCRD: CPT | Performed by: INTERNAL MEDICINE

## 2025-08-05 ENCOUNTER — OFFICE VISIT (OUTPATIENT)
Dept: FAMILY MEDICINE CLINIC | Facility: CLINIC | Age: 75
End: 2025-08-05
Payer: MEDICARE

## 2025-08-05 VITALS
TEMPERATURE: 98.8 F | SYSTOLIC BLOOD PRESSURE: 147 MMHG | BODY MASS INDEX: 28.42 KG/M2 | HEIGHT: 63 IN | DIASTOLIC BLOOD PRESSURE: 53 MMHG | OXYGEN SATURATION: 97 % | WEIGHT: 160.4 LBS | HEART RATE: 60 BPM

## 2025-08-05 DIAGNOSIS — M05.9 SEROPOSITIVE RHEUMATOID ARTHRITIS: ICD-10-CM

## 2025-08-05 DIAGNOSIS — E11.649 TYPE 2 DIABETES MELLITUS WITH HYPOGLYCEMIA WITHOUT COMA, WITH LONG-TERM CURRENT USE OF INSULIN: Primary | ICD-10-CM

## 2025-08-05 DIAGNOSIS — Z79.4 TYPE 2 DIABETES MELLITUS WITH HYPOGLYCEMIA WITHOUT COMA, WITH LONG-TERM CURRENT USE OF INSULIN: Primary | ICD-10-CM

## 2025-08-05 DIAGNOSIS — F41.9 ANXIETY: ICD-10-CM

## 2025-08-05 DIAGNOSIS — G47.8 POOR SLEEP PATTERN: ICD-10-CM

## 2025-08-05 DIAGNOSIS — K57.32 DIVERTICULITIS OF COLON: ICD-10-CM

## 2025-08-05 LAB
EXPIRATION DATE: ABNORMAL
HBA1C MFR BLD: 7.7 % (ref 4.5–5.7)
Lab: ABNORMAL

## 2025-08-05 RX ORDER — METHOTREXATE 2.5 MG/1
5 TABLET ORAL WEEKLY
COMMUNITY
Start: 2025-08-05

## 2025-08-05 RX ORDER — DULOXETIN HYDROCHLORIDE 30 MG/1
30 CAPSULE, DELAYED RELEASE ORAL DAILY
Qty: 30 CAPSULE | Refills: 0 | Status: SHIPPED | OUTPATIENT
Start: 2025-08-05

## 2025-08-05 RX ORDER — HYDROCODONE BITARTRATE AND ACETAMINOPHEN 5; 325 MG/1; MG/1
1 TABLET ORAL EVERY 8 HOURS PRN
Qty: 20 TABLET | Refills: 0 | Status: SHIPPED | OUTPATIENT
Start: 2025-08-05

## 2025-08-05 RX ORDER — ABATACEPT 125 MG/ML
1 INJECTION, SOLUTION SUBCUTANEOUS WEEKLY
COMMUNITY

## 2025-08-05 RX ORDER — LISINOPRIL 40 MG/1
40 TABLET ORAL
Qty: 90 TABLET | Refills: 0 | Status: SHIPPED | OUTPATIENT
Start: 2025-08-05

## 2025-08-08 RX ORDER — ACEBUTOLOL HYDROCHLORIDE 200 MG/1
200 CAPSULE ORAL DAILY
Qty: 90 CAPSULE | Refills: 3 | Status: SHIPPED | OUTPATIENT
Start: 2025-08-08

## 2025-08-08 RX ORDER — FENOFIBRATE 160 MG/1
160 TABLET ORAL DAILY
Qty: 90 TABLET | Refills: 0 | Status: SHIPPED | OUTPATIENT
Start: 2025-08-08

## 2025-08-22 RX ORDER — AMLODIPINE BESYLATE 10 MG/1
10 TABLET ORAL NIGHTLY
Qty: 90 TABLET | Refills: 0 | Status: SHIPPED | OUTPATIENT
Start: 2025-08-22

## 2025-08-27 RX ORDER — DULOXETIN HYDROCHLORIDE 30 MG/1
30 CAPSULE, DELAYED RELEASE ORAL DAILY
Qty: 90 CAPSULE | Refills: 0 | Status: SHIPPED | OUTPATIENT
Start: 2025-08-27

## 2025-08-27 RX ORDER — ALBUTEROL SULFATE 90 UG/1
2 INHALANT RESPIRATORY (INHALATION) EVERY 6 HOURS PRN
Qty: 18 G | Refills: 0 | Status: SHIPPED | OUTPATIENT
Start: 2025-08-27

## (undated) DEVICE — TBG NAMIC PRESS MONTR A/ F/M 12IN

## (undated) DEVICE — CATH DIAG IMPULSE FR4 5F 100CM

## (undated) DEVICE — KT PK ANGIOPLASTY ACC 9 MERIT

## (undated) DEVICE — UNDERGLV SURG BIOGEL INDICAT PI SZ8 BLU

## (undated) DEVICE — UNDERGLV SURG BIOGEL INDICAT PF 8 GRN

## (undated) DEVICE — CVR HNDL LT SURG ACCSSRY BLU STRL

## (undated) DEVICE — GW PRESS VERRATA STR 185CM 10185P

## (undated) DEVICE — PK EXTREM 50

## (undated) DEVICE — CATH DIAG IMPULSE FL4 5F 100CM

## (undated) DEVICE — SUT ETHLN 4/0 FS2 18IN 662G

## (undated) DEVICE — 6F .070 XB 3.5 100CM: Brand: VISTA BRITE TIP

## (undated) DEVICE — 3M™ STERI-DRAPE™ U-DRAPE 1015: Brand: STERI-DRAPE™

## (undated) DEVICE — 3M™ TEGADERM™ CHG DRESSING 25/CARTON 4 CARTONS/CASE 1657: Brand: TEGADERM™

## (undated) DEVICE — SKIN PREP TRAY W/CHG: Brand: MEDLINE INDUSTRIES, INC.

## (undated) DEVICE — GLV SURG BIOGEL LTX PF 8

## (undated) DEVICE — CATH DIAG IMPULSE PIG 5F 100CM

## (undated) DEVICE — SOL IRRIG NACL 1000ML

## (undated) DEVICE — BNDG ELAS MATRX V/CLS 3INX5YD LF

## (undated) DEVICE — DRSNG GZ PETROLTM XEROFORM CURAD 1X8IN STRL

## (undated) DEVICE — SLV SCD CALF HEMOFORCE DVT THERP REPROC MD

## (undated) DEVICE — ELECTRD DEFIB M/FUNC PROPADZ RADIOL 2PK

## (undated) DEVICE — PINNACLE INTRODUCER SHEATH: Brand: PINNACLE

## (undated) DEVICE — GLV SURG SIGNATURE ESSENTIAL PF LTX SZ7

## (undated) DEVICE — STPCK 3WY HP ROT

## (undated) DEVICE — KT SURG TURNOVER 050

## (undated) DEVICE — GOWN,PREVENTION PLUS,XXLARGE,STERILE: Brand: MEDLINE

## (undated) DEVICE — APPL CHLORAPREP HI/LITE TINTED 10.5ML ORNG

## (undated) DEVICE — INTENDED FOR TISSUE SEPARATION, AND OTHER PROCEDURES THAT REQUIRE A SHARP SURGICAL BLADE TO PUNCTURE OR CUT.: Brand: BARD-PARKER ® CARBON RIB-BACK BLADES

## (undated) DEVICE — DRSNG SURESITE WNDW 2.38X2.75

## (undated) DEVICE — PK TRY HEART CATH 50

## (undated) DEVICE — SPNG GZ AVANT 6PLY 4X4IN STRL PK/2

## (undated) DEVICE — BNDG ELAS MATRX  2IN 5YD LF STRL

## (undated) DEVICE — ANGIO-SEAL VIP VASCULAR CLOSURE DEVICE: Brand: ANGIO-SEAL

## (undated) DEVICE — GW PTFE EMERALD HEPCOAT FC J TIP STD .035 3MM 150CM

## (undated) DEVICE — SOLUTION,WATER,IRRIGATION,1000ML,STERILE: Brand: MEDLINE

## (undated) DEVICE — BNDG ELAS ELITE V/CLOSE 4IN 5YD LF STRL